# Patient Record
Sex: MALE | Race: WHITE | NOT HISPANIC OR LATINO | Employment: OTHER | URBAN - METROPOLITAN AREA
[De-identification: names, ages, dates, MRNs, and addresses within clinical notes are randomized per-mention and may not be internally consistent; named-entity substitution may affect disease eponyms.]

---

## 2017-01-06 ENCOUNTER — ALLSCRIPTS OFFICE VISIT (OUTPATIENT)
Dept: OTHER | Facility: OTHER | Age: 77
End: 2017-01-06

## 2017-01-12 ENCOUNTER — GENERIC CONVERSION - ENCOUNTER (OUTPATIENT)
Dept: OTHER | Facility: OTHER | Age: 77
End: 2017-01-12

## 2017-02-13 ENCOUNTER — GENERIC CONVERSION - ENCOUNTER (OUTPATIENT)
Dept: OTHER | Facility: OTHER | Age: 77
End: 2017-02-13

## 2017-03-02 ENCOUNTER — TRANSCRIBE ORDERS (OUTPATIENT)
Dept: ADMINISTRATIVE | Facility: HOSPITAL | Age: 77
End: 2017-03-02

## 2017-03-02 DIAGNOSIS — I71.4 ABDOMINAL AORTIC ANEURYSM (AAA) WITHOUT RUPTURE (HCC): Primary | ICD-10-CM

## 2017-03-03 ENCOUNTER — APPOINTMENT (OUTPATIENT)
Dept: LAB | Facility: HOSPITAL | Age: 77
End: 2017-03-03
Payer: MEDICARE

## 2017-03-03 ENCOUNTER — TRANSCRIBE ORDERS (OUTPATIENT)
Dept: ADMINISTRATIVE | Facility: HOSPITAL | Age: 77
End: 2017-03-03

## 2017-03-03 DIAGNOSIS — I71.4 ABDOMINAL AORTIC ANEURYSM WITHOUT RUPTURE (HCC): ICD-10-CM

## 2017-03-03 DIAGNOSIS — I71.4 ABDOMINAL AORTIC ANEURYSM WITHOUT RUPTURE (HCC): Primary | ICD-10-CM

## 2017-03-03 LAB
ANION GAP SERPL CALCULATED.3IONS-SCNC: 9 MMOL/L (ref 4–13)
BUN SERPL-MCNC: 10 MG/DL (ref 5–25)
CALCIUM SERPL-MCNC: 9.1 MG/DL (ref 8.3–10.1)
CHLORIDE SERPL-SCNC: 105 MMOL/L (ref 100–108)
CO2 SERPL-SCNC: 29 MMOL/L (ref 21–32)
CREAT SERPL-MCNC: 1.04 MG/DL (ref 0.6–1.3)
GFR SERPL CREATININE-BSD FRML MDRD: >60 ML/MIN/1.73SQ M
GLUCOSE SERPL-MCNC: 109 MG/DL (ref 65–140)
POTASSIUM SERPL-SCNC: 4.9 MMOL/L (ref 3.5–5.3)
SODIUM SERPL-SCNC: 143 MMOL/L (ref 136–145)

## 2017-03-03 PROCEDURE — 36415 COLL VENOUS BLD VENIPUNCTURE: CPT

## 2017-03-03 PROCEDURE — 80048 BASIC METABOLIC PNL TOTAL CA: CPT

## 2017-03-08 ENCOUNTER — HOSPITAL ENCOUNTER (OUTPATIENT)
Dept: CT IMAGING | Facility: HOSPITAL | Age: 77
Discharge: HOME | End: 2017-03-08
Payer: MEDICARE

## 2017-03-08 DIAGNOSIS — I71.4 ABDOMINAL AORTIC ANEURYSM (AAA) WITHOUT RUPTURE (HCC): ICD-10-CM

## 2017-03-08 PROCEDURE — 71275 CT ANGIOGRAPHY CHEST: CPT

## 2017-03-08 PROCEDURE — 74174 CTA ABD&PLVS W/CONTRAST: CPT

## 2017-03-08 RX ADMIN — IOHEXOL 100 ML: 350 INJECTION, SOLUTION INTRAVENOUS at 09:53

## 2017-03-21 ENCOUNTER — GENERIC CONVERSION - ENCOUNTER (OUTPATIENT)
Dept: OTHER | Facility: OTHER | Age: 77
End: 2017-03-21

## 2017-03-21 ENCOUNTER — TRANSCRIBE ORDERS (OUTPATIENT)
Dept: LAB | Facility: MEDICAL CENTER | Age: 77
End: 2017-03-21

## 2017-03-21 ENCOUNTER — ALLSCRIPTS OFFICE VISIT (OUTPATIENT)
Dept: OTHER | Facility: OTHER | Age: 77
End: 2017-03-21

## 2017-03-21 ENCOUNTER — TRANSCRIBE ORDERS (OUTPATIENT)
Dept: RADIOLOGY | Facility: MEDICAL CENTER | Age: 77
End: 2017-03-21

## 2017-03-21 ENCOUNTER — HOSPITAL ENCOUNTER (OUTPATIENT)
Dept: RADIOLOGY | Facility: MEDICAL CENTER | Age: 77
Discharge: HOME | End: 2017-03-21
Payer: MEDICARE

## 2017-03-21 DIAGNOSIS — S50.02XA CONTUSION OF LEFT ELBOW: ICD-10-CM

## 2017-03-21 PROCEDURE — 73080 X-RAY EXAM OF ELBOW: CPT

## 2017-04-04 ENCOUNTER — ALLSCRIPTS OFFICE VISIT (OUTPATIENT)
Dept: OTHER | Facility: OTHER | Age: 77
End: 2017-04-04

## 2017-04-07 ENCOUNTER — GENERIC CONVERSION - ENCOUNTER (OUTPATIENT)
Dept: OTHER | Facility: OTHER | Age: 77
End: 2017-04-07

## 2017-04-08 ENCOUNTER — TRANSCRIBE ORDERS (OUTPATIENT)
Dept: ADMINISTRATIVE | Facility: HOSPITAL | Age: 77
End: 2017-04-08

## 2017-04-08 ENCOUNTER — APPOINTMENT (OUTPATIENT)
Dept: LAB | Facility: HOSPITAL | Age: 77
End: 2017-04-08
Payer: MEDICARE

## 2017-04-08 DIAGNOSIS — R19.4 FREQUENT BOWEL MOVEMENTS: ICD-10-CM

## 2017-04-08 DIAGNOSIS — R19.4 FREQUENT BOWEL MOVEMENTS: Primary | ICD-10-CM

## 2017-04-08 LAB
BASOPHILS # BLD AUTO: 0.04 THOUSANDS/ΜL (ref 0–0.1)
BASOPHILS NFR BLD AUTO: 1 % (ref 0–1)
EOSINOPHIL # BLD AUTO: 0.07 THOUSAND/ΜL (ref 0–0.61)
EOSINOPHIL NFR BLD AUTO: 1 % (ref 0–6)
ERYTHROCYTE [DISTWIDTH] IN BLOOD BY AUTOMATED COUNT: 15.2 % (ref 11.6–15.1)
HCT VFR BLD AUTO: 48.8 % (ref 36.5–49.3)
HGB BLD-MCNC: 16.2 G/DL (ref 12–17)
LYMPHOCYTES # BLD AUTO: 1.26 THOUSANDS/ΜL (ref 0.6–4.47)
LYMPHOCYTES NFR BLD AUTO: 23 % (ref 14–44)
MCH RBC QN AUTO: 30.3 PG (ref 26.8–34.3)
MCHC RBC AUTO-ENTMCNC: 33.2 G/DL (ref 31.4–37.4)
MCV RBC AUTO: 91 FL (ref 82–98)
MONOCYTES # BLD AUTO: 0.5 THOUSAND/ΜL (ref 0.17–1.22)
MONOCYTES NFR BLD AUTO: 9 % (ref 4–12)
NEUTROPHILS # BLD AUTO: 3.53 THOUSANDS/ΜL (ref 1.85–7.62)
NEUTS SEG NFR BLD AUTO: 66 % (ref 43–75)
PLATELET # BLD AUTO: 135 THOUSANDS/UL (ref 149–390)
PMV BLD AUTO: 9.9 FL (ref 8.9–12.7)
RBC # BLD AUTO: 5.34 MILLION/UL (ref 3.88–5.62)
WBC # BLD AUTO: 5.4 THOUSAND/UL (ref 4.31–10.16)

## 2017-04-08 PROCEDURE — 36415 COLL VENOUS BLD VENIPUNCTURE: CPT

## 2017-04-08 PROCEDURE — 85025 COMPLETE CBC W/AUTO DIFF WBC: CPT

## 2017-05-15 ENCOUNTER — ANESTHESIA EVENT (OUTPATIENT)
Dept: GASTROENTEROLOGY | Facility: HOSPITAL | Age: 77
End: 2017-05-15
Payer: MEDICARE

## 2017-05-16 ENCOUNTER — ANESTHESIA (OUTPATIENT)
Dept: GASTROENTEROLOGY | Facility: HOSPITAL | Age: 77
End: 2017-05-16
Payer: MEDICARE

## 2017-05-16 ENCOUNTER — HOSPITAL ENCOUNTER (OUTPATIENT)
Facility: HOSPITAL | Age: 77
Setting detail: OUTPATIENT SURGERY
Discharge: HOME | End: 2017-05-16
Attending: INTERNAL MEDICINE | Admitting: INTERNAL MEDICINE
Payer: MEDICARE

## 2017-05-16 VITALS
OXYGEN SATURATION: 96 % | HEIGHT: 69 IN | HEART RATE: 80 BPM | TEMPERATURE: 97.6 F | DIASTOLIC BLOOD PRESSURE: 63 MMHG | RESPIRATION RATE: 20 BRPM | SYSTOLIC BLOOD PRESSURE: 106 MMHG | WEIGHT: 157 LBS | BODY MASS INDEX: 23.25 KG/M2

## 2017-05-16 DIAGNOSIS — R19.4 CHANGE IN BOWEL HABITS: ICD-10-CM

## 2017-05-16 PROCEDURE — 88305 TISSUE EXAM BY PATHOLOGIST: CPT | Performed by: INTERNAL MEDICINE

## 2017-05-16 RX ORDER — SODIUM CHLORIDE 9 MG/ML
125 INJECTION, SOLUTION INTRAVENOUS CONTINUOUS
Status: DISCONTINUED | OUTPATIENT
Start: 2017-05-16 | End: 2017-05-16 | Stop reason: HOSPADM

## 2017-05-16 RX ORDER — PROPOFOL 10 MG/ML
INJECTION, EMULSION INTRAVENOUS AS NEEDED
Status: DISCONTINUED | OUTPATIENT
Start: 2017-05-16 | End: 2017-05-16 | Stop reason: SURG

## 2017-05-16 RX ADMIN — PROPOFOL 20 MG: 10 INJECTION, EMULSION INTRAVENOUS at 14:31

## 2017-05-16 RX ADMIN — PROPOFOL 20 MG: 10 INJECTION, EMULSION INTRAVENOUS at 14:41

## 2017-05-16 RX ADMIN — PROPOFOL 80 MG: 10 INJECTION, EMULSION INTRAVENOUS at 14:17

## 2017-05-16 RX ADMIN — SODIUM CHLORIDE 125 ML/HR: 0.9 INJECTION, SOLUTION INTRAVENOUS at 13:22

## 2017-05-16 RX ADMIN — PROPOFOL 20 MG: 10 INJECTION, EMULSION INTRAVENOUS at 14:25

## 2017-05-16 RX ADMIN — PROPOFOL 20 MG: 10 INJECTION, EMULSION INTRAVENOUS at 14:53

## 2017-05-16 RX ADMIN — PROPOFOL 20 MG: 10 INJECTION, EMULSION INTRAVENOUS at 14:35

## 2017-05-16 RX ADMIN — PROPOFOL 20 MG: 10 INJECTION, EMULSION INTRAVENOUS at 14:45

## 2017-05-25 ENCOUNTER — GENERIC CONVERSION - ENCOUNTER (OUTPATIENT)
Dept: OTHER | Facility: OTHER | Age: 77
End: 2017-05-25

## 2017-05-26 ENCOUNTER — GENERIC CONVERSION - ENCOUNTER (OUTPATIENT)
Dept: OTHER | Facility: OTHER | Age: 77
End: 2017-05-26

## 2017-05-30 ENCOUNTER — GENERIC CONVERSION - ENCOUNTER (OUTPATIENT)
Dept: OTHER | Facility: OTHER | Age: 77
End: 2017-05-30

## 2017-05-30 ENCOUNTER — HOSPITAL ENCOUNTER (INPATIENT)
Facility: HOSPITAL | Age: 77
LOS: 5 days | Discharge: HOME | DRG: 871 | End: 2017-06-05
Attending: EMERGENCY MEDICINE | Admitting: INTERNAL MEDICINE
Payer: MEDICARE

## 2017-05-30 ENCOUNTER — APPOINTMENT (EMERGENCY)
Dept: RADIOLOGY | Facility: HOSPITAL | Age: 77
DRG: 871 | End: 2017-05-30
Payer: MEDICARE

## 2017-05-30 DIAGNOSIS — R93.89 ABNORMAL CT SCAN, CHEST: ICD-10-CM

## 2017-05-30 DIAGNOSIS — N28.9 ACUTE RENAL INSUFFICIENCY: ICD-10-CM

## 2017-05-30 DIAGNOSIS — A49.1 STREPTOCOCCUS PNEUMONIAE INFECTION: ICD-10-CM

## 2017-05-30 DIAGNOSIS — R05.9 COUGH: ICD-10-CM

## 2017-05-30 DIAGNOSIS — R31.9 HEMATURIA: ICD-10-CM

## 2017-05-30 DIAGNOSIS — J20.9 ACUTE TRACHEOBRONCHITIS: ICD-10-CM

## 2017-05-30 DIAGNOSIS — A41.9 SEVERE SEPSIS (HCC): Primary | ICD-10-CM

## 2017-05-30 DIAGNOSIS — R68.89 RIGORS: ICD-10-CM

## 2017-05-30 DIAGNOSIS — R65.20 SEVERE SEPSIS (HCC): Primary | ICD-10-CM

## 2017-05-30 LAB
ALBUMIN SERPL BCP-MCNC: 3.8 G/DL (ref 3.5–5)
ALP SERPL-CCNC: 103 U/L (ref 46–116)
ALT SERPL W P-5'-P-CCNC: 13 U/L (ref 12–78)
ANION GAP SERPL CALCULATED.3IONS-SCNC: 13 MMOL/L (ref 4–13)
APTT PPP: 30 SECONDS (ref 23–35)
AST SERPL W P-5'-P-CCNC: 18 U/L (ref 5–45)
BACTERIA UR QL AUTO: ABNORMAL /HPF
BASOPHILS # BLD AUTO: 0.04 THOUSANDS/ΜL (ref 0–0.1)
BASOPHILS NFR BLD AUTO: 0 % (ref 0–1)
BILIRUB SERPL-MCNC: 1.3 MG/DL (ref 0.2–1)
BILIRUB UR QL STRIP: NEGATIVE
BUN SERPL-MCNC: 15 MG/DL (ref 5–25)
CALCIUM SERPL-MCNC: 9.8 MG/DL (ref 8.3–10.1)
CHLORIDE SERPL-SCNC: 105 MMOL/L (ref 100–108)
CLARITY UR: CLEAR
CO2 SERPL-SCNC: 26 MMOL/L (ref 21–32)
COLOR UR: YELLOW
CREAT SERPL-MCNC: 1.36 MG/DL (ref 0.6–1.3)
EOSINOPHIL # BLD AUTO: 0.07 THOUSAND/ΜL (ref 0–0.61)
EOSINOPHIL NFR BLD AUTO: 1 % (ref 0–6)
ERYTHROCYTE [DISTWIDTH] IN BLOOD BY AUTOMATED COUNT: 14.5 % (ref 11.6–15.1)
GFR SERPL CREATININE-BSD FRML MDRD: 51.2 ML/MIN/1.73SQ M
GLUCOSE SERPL-MCNC: 104 MG/DL (ref 65–140)
GLUCOSE SERPL-MCNC: 96 MG/DL (ref 65–140)
GLUCOSE UR STRIP-MCNC: NEGATIVE MG/DL
HCT VFR BLD AUTO: 48.6 % (ref 36.5–49.3)
HGB BLD-MCNC: 16 G/DL (ref 12–17)
HGB UR QL STRIP.AUTO: ABNORMAL
INR PPP: 1.02 (ref 0.86–1.16)
KETONES UR STRIP-MCNC: NEGATIVE MG/DL
LACTATE SERPL-SCNC: 6 MMOL/L (ref 0.5–2)
LEUKOCYTE ESTERASE UR QL STRIP: NEGATIVE
LYMPHOCYTES # BLD AUTO: 1.46 THOUSANDS/ΜL (ref 0.6–4.47)
LYMPHOCYTES NFR BLD AUTO: 14 % (ref 14–44)
MCH RBC QN AUTO: 30.8 PG (ref 26.8–34.3)
MCHC RBC AUTO-ENTMCNC: 32.9 G/DL (ref 31.4–37.4)
MCV RBC AUTO: 94 FL (ref 82–98)
MONOCYTES # BLD AUTO: 0.95 THOUSAND/ΜL (ref 0.17–1.22)
MONOCYTES NFR BLD AUTO: 9 % (ref 4–12)
NEUTROPHILS # BLD AUTO: 7.74 THOUSANDS/ΜL (ref 1.85–7.62)
NEUTS SEG NFR BLD AUTO: 76 % (ref 43–75)
NITRITE UR QL STRIP: NEGATIVE
NON-SQ EPI CELLS URNS QL MICRO: ABNORMAL /HPF
PH UR STRIP.AUTO: 7.5 [PH] (ref 4.5–8)
PLATELET # BLD AUTO: 153 THOUSANDS/UL (ref 149–390)
PMV BLD AUTO: 9.7 FL (ref 8.9–12.7)
POTASSIUM SERPL-SCNC: 5 MMOL/L (ref 3.5–5.3)
PROT SERPL-MCNC: 7.4 G/DL (ref 6.4–8.2)
PROT UR STRIP-MCNC: NEGATIVE MG/DL
PROTHROMBIN TIME: 13.3 SECONDS (ref 12.1–14.4)
RBC # BLD AUTO: 5.2 MILLION/UL (ref 3.88–5.62)
RBC #/AREA URNS AUTO: ABNORMAL /HPF
SODIUM SERPL-SCNC: 144 MMOL/L (ref 136–145)
SP GR UR STRIP.AUTO: 1.01 (ref 1–1.03)
UROBILINOGEN UR QL STRIP.AUTO: 0.2 E.U./DL
WBC # BLD AUTO: 10.26 THOUSAND/UL (ref 4.31–10.16)
WBC #/AREA URNS AUTO: ABNORMAL /HPF

## 2017-05-30 PROCEDURE — 82948 REAGENT STRIP/BLOOD GLUCOSE: CPT

## 2017-05-30 PROCEDURE — 81001 URINALYSIS AUTO W/SCOPE: CPT | Performed by: EMERGENCY MEDICINE

## 2017-05-30 PROCEDURE — 96361 HYDRATE IV INFUSION ADD-ON: CPT

## 2017-05-30 PROCEDURE — 85025 COMPLETE CBC W/AUTO DIFF WBC: CPT | Performed by: EMERGENCY MEDICINE

## 2017-05-30 PROCEDURE — 83605 ASSAY OF LACTIC ACID: CPT | Performed by: EMERGENCY MEDICINE

## 2017-05-30 PROCEDURE — 85730 THROMBOPLASTIN TIME PARTIAL: CPT | Performed by: EMERGENCY MEDICINE

## 2017-05-30 PROCEDURE — 36415 COLL VENOUS BLD VENIPUNCTURE: CPT | Performed by: EMERGENCY MEDICINE

## 2017-05-30 PROCEDURE — 80053 COMPREHEN METABOLIC PANEL: CPT | Performed by: EMERGENCY MEDICINE

## 2017-05-30 PROCEDURE — 93005 ELECTROCARDIOGRAM TRACING: CPT | Performed by: EMERGENCY MEDICINE

## 2017-05-30 PROCEDURE — 71020 HB CHEST X-RAY 2VW FRONTAL&LATL: CPT

## 2017-05-30 PROCEDURE — 87040 BLOOD CULTURE FOR BACTERIA: CPT | Performed by: EMERGENCY MEDICINE

## 2017-05-30 PROCEDURE — 85610 PROTHROMBIN TIME: CPT | Performed by: EMERGENCY MEDICINE

## 2017-05-30 RX ORDER — ACETAMINOPHEN 325 MG/1
TABLET ORAL
Status: COMPLETED
Start: 2017-05-30 | End: 2017-05-30

## 2017-05-30 RX ORDER — ACETAMINOPHEN 325 MG/1
650 TABLET ORAL ONCE
Status: COMPLETED | OUTPATIENT
Start: 2017-05-30 | End: 2017-05-30

## 2017-05-30 RX ADMIN — SODIUM CHLORIDE 1000 ML: 0.9 INJECTION, SOLUTION INTRAVENOUS at 23:24

## 2017-05-30 RX ADMIN — ACETAMINOPHEN 650 MG: 325 TABLET ORAL at 23:20

## 2017-05-30 RX ADMIN — ACETAMINOPHEN 650 MG: 325 TABLET, FILM COATED ORAL at 23:20

## 2017-05-31 ENCOUNTER — APPOINTMENT (INPATIENT)
Dept: OCCUPATIONAL THERAPY | Facility: HOSPITAL | Age: 77
DRG: 871 | End: 2017-05-31
Payer: MEDICARE

## 2017-05-31 ENCOUNTER — APPOINTMENT (INPATIENT)
Dept: PHYSICAL THERAPY | Facility: HOSPITAL | Age: 77
DRG: 871 | End: 2017-05-31
Payer: MEDICARE

## 2017-05-31 ENCOUNTER — APPOINTMENT (INPATIENT)
Dept: CT IMAGING | Facility: HOSPITAL | Age: 77
DRG: 871 | End: 2017-05-31
Payer: MEDICARE

## 2017-05-31 PROBLEM — E87.2 LACTIC ACIDOSIS: Status: ACTIVE | Noted: 2017-05-31

## 2017-05-31 PROBLEM — E80.6 HYPERBILIRUBINEMIA: Status: ACTIVE | Noted: 2017-05-31

## 2017-05-31 PROBLEM — R31.29 MICROSCOPIC HEMATURIA: Status: ACTIVE | Noted: 2017-05-31

## 2017-05-31 PROBLEM — N17.9 ACUTE KIDNEY INJURY (HCC): Status: ACTIVE | Noted: 2017-05-31

## 2017-05-31 PROBLEM — N28.9 ACUTE RENAL INSUFFICIENCY: Status: ACTIVE | Noted: 2017-05-31

## 2017-05-31 LAB
ANION GAP SERPL CALCULATED.3IONS-SCNC: 10 MMOL/L (ref 4–13)
ATRIAL RATE: 94 BPM
BUN SERPL-MCNC: 15 MG/DL (ref 5–25)
CALCIUM SERPL-MCNC: 8.1 MG/DL (ref 8.3–10.1)
CHLORIDE SERPL-SCNC: 107 MMOL/L (ref 100–108)
CO2 SERPL-SCNC: 22 MMOL/L (ref 21–32)
CREAT SERPL-MCNC: 1.12 MG/DL (ref 0.6–1.3)
ERYTHROCYTE [DISTWIDTH] IN BLOOD BY AUTOMATED COUNT: 14.5 % (ref 11.6–15.1)
GFR SERPL CREATININE-BSD FRML MDRD: >60 ML/MIN/1.73SQ M
GLUCOSE SERPL-MCNC: 108 MG/DL (ref 65–140)
HCT VFR BLD AUTO: 42.4 % (ref 36.5–49.3)
HGB BLD-MCNC: 13.9 G/DL (ref 12–17)
L PNEUMO1 AG UR QL IA.RAPID: NEGATIVE
LACTATE SERPL-SCNC: 1.4 MMOL/L (ref 0.5–2)
MAGNESIUM SERPL-MCNC: 1.6 MG/DL (ref 1.6–2.6)
MCH RBC QN AUTO: 30.5 PG (ref 26.8–34.3)
MCHC RBC AUTO-ENTMCNC: 32.8 G/DL (ref 31.4–37.4)
MCV RBC AUTO: 93 FL (ref 82–98)
P AXIS: 68 DEGREES
PLATELET # BLD AUTO: 135 THOUSANDS/UL (ref 149–390)
PMV BLD AUTO: 10.2 FL (ref 8.9–12.7)
POTASSIUM SERPL-SCNC: 3.7 MMOL/L (ref 3.5–5.3)
PR INTERVAL: 184 MS
QRS AXIS: 65 DEGREES
QRSD INTERVAL: 88 MS
QT INTERVAL: 384 MS
QTC INTERVAL: 480 MS
RBC # BLD AUTO: 4.55 MILLION/UL (ref 3.88–5.62)
S PNEUM AG UR QL: POSITIVE
SODIUM SERPL-SCNC: 139 MMOL/L (ref 136–145)
T WAVE AXIS: 83 DEGREES
VENTRICULAR RATE: 94 BPM
WBC # BLD AUTO: 9.22 THOUSAND/UL (ref 4.31–10.16)

## 2017-05-31 PROCEDURE — 97163 PT EVAL HIGH COMPLEX 45 MIN: CPT | Performed by: PHYSICAL THERAPIST

## 2017-05-31 PROCEDURE — 96365 THER/PROPH/DIAG IV INF INIT: CPT

## 2017-05-31 PROCEDURE — 83605 ASSAY OF LACTIC ACID: CPT | Performed by: PHYSICIAN ASSISTANT

## 2017-05-31 PROCEDURE — 71260 CT THORAX DX C+: CPT

## 2017-05-31 PROCEDURE — 97116 GAIT TRAINING THERAPY: CPT | Performed by: PHYSICAL THERAPIST

## 2017-05-31 PROCEDURE — 87798 DETECT AGENT NOS DNA AMP: CPT | Performed by: INTERNAL MEDICINE

## 2017-05-31 PROCEDURE — 80048 BASIC METABOLIC PNL TOTAL CA: CPT | Performed by: PHYSICIAN ASSISTANT

## 2017-05-31 PROCEDURE — G8978 MOBILITY CURRENT STATUS: HCPCS | Performed by: PHYSICAL THERAPIST

## 2017-05-31 PROCEDURE — G8988 SELF CARE GOAL STATUS: HCPCS

## 2017-05-31 PROCEDURE — 97167 OT EVAL HIGH COMPLEX 60 MIN: CPT

## 2017-05-31 PROCEDURE — 94760 N-INVAS EAR/PLS OXIMETRY 1: CPT

## 2017-05-31 PROCEDURE — G8987 SELF CARE CURRENT STATUS: HCPCS

## 2017-05-31 PROCEDURE — 99285 EMERGENCY DEPT VISIT HI MDM: CPT

## 2017-05-31 PROCEDURE — 85027 COMPLETE CBC AUTOMATED: CPT | Performed by: PHYSICIAN ASSISTANT

## 2017-05-31 PROCEDURE — G8979 MOBILITY GOAL STATUS: HCPCS | Performed by: PHYSICAL THERAPIST

## 2017-05-31 PROCEDURE — 83735 ASSAY OF MAGNESIUM: CPT | Performed by: PHYSICIAN ASSISTANT

## 2017-05-31 PROCEDURE — 87449 NOS EACH ORGANISM AG IA: CPT | Performed by: INTERNAL MEDICINE

## 2017-05-31 PROCEDURE — 94640 AIRWAY INHALATION TREATMENT: CPT

## 2017-05-31 RX ORDER — MONTELUKAST SODIUM 10 MG/1
10 TABLET ORAL DAILY
Status: DISCONTINUED | OUTPATIENT
Start: 2017-05-31 | End: 2017-06-05 | Stop reason: HOSPADM

## 2017-05-31 RX ORDER — MAGNESIUM HYDROXIDE/ALUMINUM HYDROXICE/SIMETHICONE 120; 1200; 1200 MG/30ML; MG/30ML; MG/30ML
30 SUSPENSION ORAL EVERY 6 HOURS PRN
Status: DISCONTINUED | OUTPATIENT
Start: 2017-05-31 | End: 2017-06-05 | Stop reason: HOSPADM

## 2017-05-31 RX ORDER — HEPARIN SODIUM 5000 [USP'U]/ML
5000 INJECTION, SOLUTION INTRAVENOUS; SUBCUTANEOUS EVERY 8 HOURS SCHEDULED
Status: DISCONTINUED | OUTPATIENT
Start: 2017-05-31 | End: 2017-06-05 | Stop reason: HOSPADM

## 2017-05-31 RX ORDER — FLUTICASONE FUROATE AND VILANTEROL 100; 25 UG/1; UG/1
1 POWDER RESPIRATORY (INHALATION) DAILY
Status: DISCONTINUED | OUTPATIENT
Start: 2017-05-31 | End: 2017-06-05 | Stop reason: HOSPADM

## 2017-05-31 RX ORDER — TAMSULOSIN HYDROCHLORIDE 0.4 MG/1
0.4 CAPSULE ORAL DAILY
Status: DISCONTINUED | OUTPATIENT
Start: 2017-05-31 | End: 2017-06-05 | Stop reason: HOSPADM

## 2017-05-31 RX ORDER — ATORVASTATIN CALCIUM 10 MG/1
10 TABLET, FILM COATED ORAL
Status: DISCONTINUED | OUTPATIENT
Start: 2017-05-31 | End: 2017-06-05 | Stop reason: HOSPADM

## 2017-05-31 RX ORDER — SODIUM CHLORIDE 9 MG/ML
100 INJECTION, SOLUTION INTRAVENOUS CONTINUOUS
Status: DISCONTINUED | OUTPATIENT
Start: 2017-05-31 | End: 2017-06-05 | Stop reason: HOSPADM

## 2017-05-31 RX ORDER — PANTOPRAZOLE SODIUM 40 MG/1
40 TABLET, DELAYED RELEASE ORAL
Status: DISCONTINUED | OUTPATIENT
Start: 2017-05-31 | End: 2017-06-05 | Stop reason: HOSPADM

## 2017-05-31 RX ORDER — SODIUM CHLORIDE 9 MG/ML
125 INJECTION, SOLUTION INTRAVENOUS CONTINUOUS
Status: DISCONTINUED | OUTPATIENT
Start: 2017-05-31 | End: 2017-05-31

## 2017-05-31 RX ORDER — DONEPEZIL HYDROCHLORIDE 5 MG/1
10 TABLET, FILM COATED ORAL
Status: DISCONTINUED | OUTPATIENT
Start: 2017-05-31 | End: 2017-06-05 | Stop reason: HOSPADM

## 2017-05-31 RX ORDER — AZTREONAM 1 G/1
INJECTION, POWDER, LYOPHILIZED, FOR SOLUTION INTRAMUSCULAR; INTRAVENOUS
Status: COMPLETED
Start: 2017-05-31 | End: 2017-05-31

## 2017-05-31 RX ORDER — POTASSIUM CHLORIDE 20 MEQ/1
40 TABLET, EXTENDED RELEASE ORAL ONCE
Status: COMPLETED | OUTPATIENT
Start: 2017-05-31 | End: 2017-05-31

## 2017-05-31 RX ORDER — ACETAMINOPHEN 325 MG/1
650 TABLET ORAL EVERY 6 HOURS PRN
Status: DISCONTINUED | OUTPATIENT
Start: 2017-05-31 | End: 2017-05-31

## 2017-05-31 RX ORDER — LEVALBUTEROL 1.25 MG/.5ML
1.25 SOLUTION, CONCENTRATE RESPIRATORY (INHALATION)
Status: DISCONTINUED | OUTPATIENT
Start: 2017-05-31 | End: 2017-06-01

## 2017-05-31 RX ORDER — METOPROLOL SUCCINATE 25 MG/1
25 TABLET, EXTENDED RELEASE ORAL DAILY
Status: DISCONTINUED | OUTPATIENT
Start: 2017-05-31 | End: 2017-06-05 | Stop reason: HOSPADM

## 2017-05-31 RX ORDER — LORATADINE 10 MG/1
10 TABLET ORAL DAILY
Status: DISCONTINUED | OUTPATIENT
Start: 2017-05-31 | End: 2017-06-05 | Stop reason: HOSPADM

## 2017-05-31 RX ORDER — ACETAMINOPHEN 325 MG/1
650 TABLET ORAL EVERY 6 HOURS PRN
Status: DISCONTINUED | OUTPATIENT
Start: 2017-05-31 | End: 2017-06-05 | Stop reason: HOSPADM

## 2017-05-31 RX ORDER — MAGNESIUM SULFATE HEPTAHYDRATE 40 MG/ML
2 INJECTION, SOLUTION INTRAVENOUS ONCE
Status: COMPLETED | OUTPATIENT
Start: 2017-05-31 | End: 2017-06-02

## 2017-05-31 RX ORDER — MELATONIN
2000 DAILY
Status: DISCONTINUED | OUTPATIENT
Start: 2017-05-31 | End: 2017-06-02

## 2017-05-31 RX ORDER — LEVALBUTEROL 1.25 MG/.5ML
SOLUTION, CONCENTRATE RESPIRATORY (INHALATION)
Status: COMPLETED
Start: 2017-05-31 | End: 2017-05-31

## 2017-05-31 RX ORDER — ONDANSETRON 2 MG/ML
4 INJECTION INTRAMUSCULAR; INTRAVENOUS EVERY 6 HOURS PRN
Status: DISCONTINUED | OUTPATIENT
Start: 2017-05-31 | End: 2017-06-05 | Stop reason: HOSPADM

## 2017-05-31 RX ORDER — ASPIRIN 81 MG/1
81 TABLET, CHEWABLE ORAL DAILY
Status: DISCONTINUED | OUTPATIENT
Start: 2017-05-31 | End: 2017-06-05 | Stop reason: HOSPADM

## 2017-05-31 RX ORDER — AZTREONAM 1 G/1
1000 INJECTION, POWDER, LYOPHILIZED, FOR SOLUTION INTRAMUSCULAR; INTRAVENOUS EVERY 8 HOURS
Status: DISCONTINUED | OUTPATIENT
Start: 2017-05-31 | End: 2017-05-31 | Stop reason: CLARIF

## 2017-05-31 RX ADMIN — DONEPEZIL HYDROCHLORIDE 10 MG: 5 TABLET, FILM COATED ORAL at 22:06

## 2017-05-31 RX ADMIN — DONEPEZIL HYDROCHLORIDE 10 MG: 5 TABLET, FILM COATED ORAL at 01:52

## 2017-05-31 RX ADMIN — IPRATROPIUM BROMIDE 0.5 MG: 0.5 SOLUTION RESPIRATORY (INHALATION) at 05:15

## 2017-05-31 RX ADMIN — MAGNESIUM SULFATE HEPTAHYDRATE 2 G: 40 INJECTION, SOLUTION INTRAVENOUS at 15:22

## 2017-05-31 RX ADMIN — MONTELUKAST SODIUM 10 MG: 10 TABLET, FILM COATED ORAL at 08:55

## 2017-05-31 RX ADMIN — ACETAMINOPHEN 650 MG: 325 TABLET, FILM COATED ORAL at 03:32

## 2017-05-31 RX ADMIN — SODIUM CHLORIDE 100 ML/HR: 0.9 INJECTION, SOLUTION INTRAVENOUS at 08:56

## 2017-05-31 RX ADMIN — ASPIRIN 81 MG 81 MG: 81 TABLET ORAL at 08:55

## 2017-05-31 RX ADMIN — SODIUM CHLORIDE 125 ML/HR: 0.9 INJECTION, SOLUTION INTRAVENOUS at 02:22

## 2017-05-31 RX ADMIN — LEVALBUTEROL 1.25 MG: 1.25 SOLUTION, CONCENTRATE RESPIRATORY (INHALATION) at 20:57

## 2017-05-31 RX ADMIN — ATORVASTATIN CALCIUM 10 MG: 10 TABLET, FILM COATED ORAL at 15:59

## 2017-05-31 RX ADMIN — HEPARIN SODIUM 5000 UNITS: 5000 INJECTION, SOLUTION INTRAVENOUS; SUBCUTANEOUS at 22:06

## 2017-05-31 RX ADMIN — SODIUM CHLORIDE 100 ML/HR: 0.9 INJECTION, SOLUTION INTRAVENOUS at 23:15

## 2017-05-31 RX ADMIN — CEFTRIAXONE 1000 MG: 1 INJECTION, POWDER, FOR SOLUTION INTRAMUSCULAR; INTRAVENOUS at 11:45

## 2017-05-31 RX ADMIN — VANCOMYCIN HYDROCHLORIDE 1250 MG: 1 INJECTION, POWDER, LYOPHILIZED, FOR SOLUTION INTRAVENOUS at 02:25

## 2017-05-31 RX ADMIN — METOPROLOL SUCCINATE 25 MG: 25 TABLET, EXTENDED RELEASE ORAL at 08:55

## 2017-05-31 RX ADMIN — CHOLECALCIFEROL TAB 25 MCG (1000 UNIT) 2000 UNITS: 25 TAB at 08:55

## 2017-05-31 RX ADMIN — LEVALBUTEROL 1.25 MG: 1.25 SOLUTION, CONCENTRATE RESPIRATORY (INHALATION) at 05:16

## 2017-05-31 RX ADMIN — AZITHROMYCIN MONOHYDRATE 500 MG: 500 INJECTION, POWDER, LYOPHILIZED, FOR SOLUTION INTRAVENOUS at 13:19

## 2017-05-31 RX ADMIN — POTASSIUM CHLORIDE 40 MEQ: 20 TABLET, EXTENDED RELEASE ORAL at 11:45

## 2017-05-31 RX ADMIN — SODIUM CHLORIDE 1271 ML: 0.9 INJECTION, SOLUTION INTRAVENOUS at 00:25

## 2017-05-31 RX ADMIN — IOHEXOL 85 ML: 350 INJECTION, SOLUTION INTRAVENOUS at 14:37

## 2017-05-31 RX ADMIN — LORATADINE 10 MG: 10 TABLET ORAL at 08:55

## 2017-05-31 RX ADMIN — ACETAMINOPHEN 650 MG: 325 TABLET, FILM COATED ORAL at 15:59

## 2017-05-31 RX ADMIN — HEPARIN SODIUM 5000 UNITS: 5000 INJECTION, SOLUTION INTRAVENOUS; SUBCUTANEOUS at 01:53

## 2017-05-31 RX ADMIN — AZTREONAM 2000 MG: 1 INJECTION, POWDER, LYOPHILIZED, FOR SOLUTION INTRAMUSCULAR; INTRAVENOUS at 00:12

## 2017-05-31 RX ADMIN — HEPARIN SODIUM 5000 UNITS: 5000 INJECTION, SOLUTION INTRAVENOUS; SUBCUTANEOUS at 13:19

## 2017-05-31 RX ADMIN — HEPARIN SODIUM 5000 UNITS: 5000 INJECTION, SOLUTION INTRAVENOUS; SUBCUTANEOUS at 05:20

## 2017-05-31 RX ADMIN — TAMSULOSIN HYDROCHLORIDE 0.4 MG: 0.4 CAPSULE ORAL at 08:56

## 2017-05-31 RX ADMIN — PANTOPRAZOLE SODIUM 40 MG: 40 TABLET, DELAYED RELEASE ORAL at 05:20

## 2017-05-31 RX ADMIN — IPRATROPIUM BROMIDE 0.5 MG: 0.5 SOLUTION RESPIRATORY (INHALATION) at 20:57

## 2017-06-01 ENCOUNTER — APPOINTMENT (INPATIENT)
Dept: ULTRASOUND IMAGING | Facility: HOSPITAL | Age: 77
DRG: 871 | End: 2017-06-01
Payer: MEDICARE

## 2017-06-01 ENCOUNTER — APPOINTMENT (INPATIENT)
Dept: PHYSICAL THERAPY | Facility: HOSPITAL | Age: 77
DRG: 871 | End: 2017-06-01
Payer: MEDICARE

## 2017-06-01 ENCOUNTER — APPOINTMENT (INPATIENT)
Dept: OCCUPATIONAL THERAPY | Facility: HOSPITAL | Age: 77
DRG: 871 | End: 2017-06-01
Payer: MEDICARE

## 2017-06-01 ENCOUNTER — GENERIC CONVERSION - ENCOUNTER (OUTPATIENT)
Dept: OTHER | Facility: OTHER | Age: 77
End: 2017-06-01

## 2017-06-01 PROBLEM — E83.39 HYPOPHOSPHATEMIA: Status: ACTIVE | Noted: 2017-06-01

## 2017-06-01 PROBLEM — A49.1 STREPTOCOCCUS PNEUMONIAE INFECTION: Status: ACTIVE | Noted: 2017-06-01

## 2017-06-01 PROBLEM — R93.89 ABNORMAL CT SCAN, CHEST: Status: ACTIVE | Noted: 2017-06-01

## 2017-06-01 PROBLEM — I71.4 ABDOMINAL AORTIC ANEURYSM (HCC): Status: ACTIVE | Noted: 2017-06-01

## 2017-06-01 LAB
25(OH)D3 SERPL-MCNC: 25.9 NG/ML (ref 30–100)
ALBUMIN SERPL BCP-MCNC: 2.7 G/DL (ref 3.5–5)
ALP SERPL-CCNC: 70 U/L (ref 46–116)
ALT SERPL W P-5'-P-CCNC: 12 U/L (ref 12–78)
ANION GAP SERPL CALCULATED.3IONS-SCNC: 10 MMOL/L (ref 4–13)
AST SERPL W P-5'-P-CCNC: 17 U/L (ref 5–45)
BASOPHILS # BLD AUTO: 0.03 THOUSANDS/ΜL (ref 0–0.1)
BASOPHILS NFR BLD AUTO: 0 % (ref 0–1)
BILIRUB SERPL-MCNC: 2 MG/DL (ref 0.2–1)
BUN SERPL-MCNC: 10 MG/DL (ref 5–25)
CA-I BLD-SCNC: 1.15 MMOL/L (ref 1.12–1.32)
CALCIUM SERPL-MCNC: 8.1 MG/DL (ref 8.3–10.1)
CHLORIDE SERPL-SCNC: 108 MMOL/L (ref 100–108)
CO2 SERPL-SCNC: 20 MMOL/L (ref 21–32)
CREAT SERPL-MCNC: 0.97 MG/DL (ref 0.6–1.3)
EOSINOPHIL # BLD AUTO: 0.02 THOUSAND/ΜL (ref 0–0.61)
EOSINOPHIL NFR BLD AUTO: 0 % (ref 0–6)
ERYTHROCYTE [DISTWIDTH] IN BLOOD BY AUTOMATED COUNT: 14.7 % (ref 11.6–15.1)
FLUAV AG SPEC QL: NORMAL
FLUBV AG SPEC QL: NORMAL
GFR SERPL CREATININE-BSD FRML MDRD: >60 ML/MIN/1.73SQ M
GLUCOSE SERPL-MCNC: 103 MG/DL (ref 65–140)
HCT VFR BLD AUTO: 40 % (ref 36.5–49.3)
HGB BLD-MCNC: 13.3 G/DL (ref 12–17)
LACTATE SERPL-SCNC: 1 MMOL/L (ref 0.5–2)
LYMPHOCYTES # BLD AUTO: 1.05 THOUSANDS/ΜL (ref 0.6–4.47)
LYMPHOCYTES NFR BLD AUTO: 14 % (ref 14–44)
MAGNESIUM SERPL-MCNC: 2.1 MG/DL (ref 1.6–2.6)
MCH RBC QN AUTO: 30.8 PG (ref 26.8–34.3)
MCHC RBC AUTO-ENTMCNC: 33.3 G/DL (ref 31.4–37.4)
MCV RBC AUTO: 93 FL (ref 82–98)
MONOCYTES # BLD AUTO: 0.44 THOUSAND/ΜL (ref 0.17–1.22)
MONOCYTES NFR BLD AUTO: 6 % (ref 4–12)
NEUTROPHILS # BLD AUTO: 5.99 THOUSANDS/ΜL (ref 1.85–7.62)
NEUTS SEG NFR BLD AUTO: 80 % (ref 43–75)
NT-PROBNP SERPL-MCNC: 742 PG/ML
PHOSPHATE SERPL-MCNC: 1.9 MG/DL (ref 2.3–4.1)
PLATELET # BLD AUTO: 110 THOUSANDS/UL (ref 149–390)
PMV BLD AUTO: 10.2 FL (ref 8.9–12.7)
POTASSIUM SERPL-SCNC: 3.7 MMOL/L (ref 3.5–5.3)
PROT SERPL-MCNC: 6.1 G/DL (ref 6.4–8.2)
RBC # BLD AUTO: 4.32 MILLION/UL (ref 3.88–5.62)
RSV B RNA SPEC QL NAA+PROBE: NORMAL
SODIUM SERPL-SCNC: 138 MMOL/L (ref 136–145)
TSH SERPL DL<=0.05 MIU/L-ACNC: 2.14 UIU/ML (ref 0.36–3.74)
WBC # BLD AUTO: 7.53 THOUSAND/UL (ref 4.31–10.16)

## 2017-06-01 PROCEDURE — 97110 THERAPEUTIC EXERCISES: CPT

## 2017-06-01 PROCEDURE — 84100 ASSAY OF PHOSPHORUS: CPT | Performed by: INTERNAL MEDICINE

## 2017-06-01 PROCEDURE — 94760 N-INVAS EAR/PLS OXIMETRY 1: CPT

## 2017-06-01 PROCEDURE — 80053 COMPREHEN METABOLIC PANEL: CPT | Performed by: INTERNAL MEDICINE

## 2017-06-01 PROCEDURE — 83735 ASSAY OF MAGNESIUM: CPT | Performed by: INTERNAL MEDICINE

## 2017-06-01 PROCEDURE — 94640 AIRWAY INHALATION TREATMENT: CPT

## 2017-06-01 PROCEDURE — 94762 N-INVAS EAR/PLS OXIMTRY CONT: CPT

## 2017-06-01 PROCEDURE — 83880 ASSAY OF NATRIURETIC PEPTIDE: CPT | Performed by: INTERNAL MEDICINE

## 2017-06-01 PROCEDURE — 84443 ASSAY THYROID STIM HORMONE: CPT | Performed by: INTERNAL MEDICINE

## 2017-06-01 PROCEDURE — 76770 US EXAM ABDO BACK WALL COMP: CPT

## 2017-06-01 PROCEDURE — 97530 THERAPEUTIC ACTIVITIES: CPT

## 2017-06-01 PROCEDURE — 82306 VITAMIN D 25 HYDROXY: CPT | Performed by: INTERNAL MEDICINE

## 2017-06-01 PROCEDURE — 97116 GAIT TRAINING THERAPY: CPT

## 2017-06-01 PROCEDURE — 82330 ASSAY OF CALCIUM: CPT | Performed by: INTERNAL MEDICINE

## 2017-06-01 PROCEDURE — 83605 ASSAY OF LACTIC ACID: CPT | Performed by: INTERNAL MEDICINE

## 2017-06-01 PROCEDURE — 85025 COMPLETE CBC W/AUTO DIFF WBC: CPT | Performed by: INTERNAL MEDICINE

## 2017-06-01 RX ADMIN — ACETAMINOPHEN 650 MG: 325 TABLET, FILM COATED ORAL at 01:39

## 2017-06-01 RX ADMIN — DIBASIC SODIUM PHOSPHATE, MONOBASIC POTASSIUM PHOSPHATE AND MONOBASIC SODIUM PHOSPHATE 2 TABLET: 852; 155; 130 TABLET ORAL at 12:11

## 2017-06-01 RX ADMIN — LEVALBUTEROL 1.25 MG: 1.25 SOLUTION, CONCENTRATE RESPIRATORY (INHALATION) at 20:31

## 2017-06-01 RX ADMIN — ATORVASTATIN CALCIUM 10 MG: 10 TABLET, FILM COATED ORAL at 17:06

## 2017-06-01 RX ADMIN — ASPIRIN 81 MG 81 MG: 81 TABLET ORAL at 10:24

## 2017-06-01 RX ADMIN — LEVALBUTEROL 1.25 MG: 1.25 SOLUTION, CONCENTRATE RESPIRATORY (INHALATION) at 14:12

## 2017-06-01 RX ADMIN — HEPARIN SODIUM 5000 UNITS: 5000 INJECTION, SOLUTION INTRAVENOUS; SUBCUTANEOUS at 14:13

## 2017-06-01 RX ADMIN — LORATADINE 10 MG: 10 TABLET ORAL at 10:26

## 2017-06-01 RX ADMIN — SODIUM CHLORIDE 100 ML/HR: 0.9 INJECTION, SOLUTION INTRAVENOUS at 21:57

## 2017-06-01 RX ADMIN — IPRATROPIUM BROMIDE 0.5 MG: 0.5 SOLUTION RESPIRATORY (INHALATION) at 14:12

## 2017-06-01 RX ADMIN — CEFTRIAXONE 1000 MG: 1 INJECTION, POWDER, FOR SOLUTION INTRAMUSCULAR; INTRAVENOUS at 11:30

## 2017-06-01 RX ADMIN — MONTELUKAST SODIUM 10 MG: 10 TABLET, FILM COATED ORAL at 10:21

## 2017-06-01 RX ADMIN — METOPROLOL SUCCINATE 25 MG: 25 TABLET, EXTENDED RELEASE ORAL at 10:24

## 2017-06-01 RX ADMIN — IPRATROPIUM BROMIDE 0.5 MG: 0.5 SOLUTION RESPIRATORY (INHALATION) at 07:42

## 2017-06-01 RX ADMIN — IPRATROPIUM BROMIDE 0.5 MG: 0.5 SOLUTION RESPIRATORY (INHALATION) at 20:31

## 2017-06-01 RX ADMIN — ACETAMINOPHEN 650 MG: 325 TABLET, FILM COATED ORAL at 18:33

## 2017-06-01 RX ADMIN — HEPARIN SODIUM 5000 UNITS: 5000 INJECTION, SOLUTION INTRAVENOUS; SUBCUTANEOUS at 05:24

## 2017-06-01 RX ADMIN — LEVALBUTEROL 1.25 MG: 1.25 SOLUTION, CONCENTRATE RESPIRATORY (INHALATION) at 07:42

## 2017-06-01 RX ADMIN — HEPARIN SODIUM 5000 UNITS: 5000 INJECTION, SOLUTION INTRAVENOUS; SUBCUTANEOUS at 21:57

## 2017-06-01 RX ADMIN — AZITHROMYCIN MONOHYDRATE 500 MG: 500 INJECTION, POWDER, LYOPHILIZED, FOR SOLUTION INTRAVENOUS at 12:11

## 2017-06-01 RX ADMIN — PANTOPRAZOLE SODIUM 40 MG: 40 TABLET, DELAYED RELEASE ORAL at 05:24

## 2017-06-01 RX ADMIN — DONEPEZIL HYDROCHLORIDE 10 MG: 5 TABLET, FILM COATED ORAL at 21:57

## 2017-06-01 RX ADMIN — TAMSULOSIN HYDROCHLORIDE 0.4 MG: 0.4 CAPSULE ORAL at 10:23

## 2017-06-01 RX ADMIN — CHOLECALCIFEROL TAB 25 MCG (1000 UNIT) 2000 UNITS: 25 TAB at 10:22

## 2017-06-01 RX ADMIN — SODIUM CHLORIDE 100 ML/HR: 0.9 INJECTION, SOLUTION INTRAVENOUS at 10:20

## 2017-06-02 ENCOUNTER — APPOINTMENT (INPATIENT)
Dept: ULTRASOUND IMAGING | Facility: HOSPITAL | Age: 77
DRG: 871 | End: 2017-06-02
Payer: MEDICARE

## 2017-06-02 ENCOUNTER — APPOINTMENT (INPATIENT)
Dept: PHYSICAL THERAPY | Facility: HOSPITAL | Age: 77
DRG: 871 | End: 2017-06-02
Payer: MEDICARE

## 2017-06-02 ENCOUNTER — APPOINTMENT (INPATIENT)
Dept: OCCUPATIONAL THERAPY | Facility: HOSPITAL | Age: 77
DRG: 871 | End: 2017-06-02
Payer: MEDICARE

## 2017-06-02 PROBLEM — E55.9 VITAMIN D INSUFFICIENCY: Status: ACTIVE | Noted: 2017-06-02

## 2017-06-02 PROBLEM — N20.0 RENAL CALCULI: Status: ACTIVE | Noted: 2017-06-02

## 2017-06-02 PROBLEM — D69.6 THROMBOCYTOPENIA (HCC): Status: ACTIVE | Noted: 2017-06-02

## 2017-06-02 LAB
ALBUMIN SERPL BCP-MCNC: 2.7 G/DL (ref 3.5–5)
ALP SERPL-CCNC: 72 U/L (ref 46–116)
ALT SERPL W P-5'-P-CCNC: 12 U/L (ref 12–78)
ANION GAP SERPL CALCULATED.3IONS-SCNC: 10 MMOL/L (ref 4–13)
AST SERPL W P-5'-P-CCNC: 17 U/L (ref 5–45)
BASOPHILS # BLD AUTO: 0.01 THOUSANDS/ΜL (ref 0–0.1)
BASOPHILS NFR BLD AUTO: 0 % (ref 0–1)
BILIRUB DIRECT SERPL-MCNC: 0.3 MG/DL (ref 0–0.2)
BILIRUB SERPL-MCNC: 1.5 MG/DL (ref 0.2–1)
BILIRUB UR QL STRIP: NEGATIVE
BUN SERPL-MCNC: 9 MG/DL (ref 5–25)
CA-I BLD-SCNC: 1.14 MMOL/L (ref 1.12–1.32)
CALCIUM SERPL-MCNC: 8.3 MG/DL (ref 8.3–10.1)
CHLORIDE SERPL-SCNC: 108 MMOL/L (ref 100–108)
CLARITY UR: CLEAR
CO2 SERPL-SCNC: 22 MMOL/L (ref 21–32)
COLOR UR: YELLOW
CREAT SERPL-MCNC: 0.88 MG/DL (ref 0.6–1.3)
EOSINOPHIL # BLD AUTO: 0.06 THOUSAND/ΜL (ref 0–0.61)
EOSINOPHIL NFR BLD AUTO: 1 % (ref 0–6)
ERYTHROCYTE [DISTWIDTH] IN BLOOD BY AUTOMATED COUNT: 14.7 % (ref 11.6–15.1)
GFR SERPL CREATININE-BSD FRML MDRD: >60 ML/MIN/1.73SQ M
GLUCOSE SERPL-MCNC: 93 MG/DL (ref 65–140)
GLUCOSE UR STRIP-MCNC: NEGATIVE MG/DL
HCT VFR BLD AUTO: 39.8 % (ref 36.5–49.3)
HGB BLD-MCNC: 13.2 G/DL (ref 12–17)
HGB UR QL STRIP.AUTO: NEGATIVE
KETONES UR STRIP-MCNC: ABNORMAL MG/DL
LACTATE SERPL-SCNC: 0.8 MMOL/L (ref 0.5–2)
LEUKOCYTE ESTERASE UR QL STRIP: NEGATIVE
LYMPHOCYTES # BLD AUTO: 0.91 THOUSANDS/ΜL (ref 0.6–4.47)
LYMPHOCYTES NFR BLD AUTO: 16 % (ref 14–44)
MAGNESIUM SERPL-MCNC: 1.9 MG/DL (ref 1.6–2.6)
MCH RBC QN AUTO: 30.7 PG (ref 26.8–34.3)
MCHC RBC AUTO-ENTMCNC: 33.2 G/DL (ref 31.4–37.4)
MCV RBC AUTO: 93 FL (ref 82–98)
MONOCYTES # BLD AUTO: 0.47 THOUSAND/ΜL (ref 0.17–1.22)
MONOCYTES NFR BLD AUTO: 8 % (ref 4–12)
NEUTROPHILS # BLD AUTO: 4.29 THOUSANDS/ΜL (ref 1.85–7.62)
NEUTS SEG NFR BLD AUTO: 75 % (ref 43–75)
NITRITE UR QL STRIP: NEGATIVE
PH UR STRIP.AUTO: 7 [PH] (ref 4.5–8)
PHOSPHATE SERPL-MCNC: 2.2 MG/DL (ref 2.3–4.1)
PLATELET # BLD AUTO: 101 THOUSANDS/UL (ref 149–390)
PMV BLD AUTO: 10.6 FL (ref 8.9–12.7)
POTASSIUM SERPL-SCNC: 3.6 MMOL/L (ref 3.5–5.3)
PROT SERPL-MCNC: 6.3 G/DL (ref 6.4–8.2)
PROT UR STRIP-MCNC: NEGATIVE MG/DL
RBC # BLD AUTO: 4.3 MILLION/UL (ref 3.88–5.62)
SODIUM SERPL-SCNC: 140 MMOL/L (ref 136–145)
SP GR UR STRIP.AUTO: 1.01 (ref 1–1.03)
UROBILINOGEN UR QL STRIP.AUTO: 0.2 E.U./DL
WBC # BLD AUTO: 5.74 THOUSAND/UL (ref 4.31–10.16)

## 2017-06-02 PROCEDURE — 85025 COMPLETE CBC W/AUTO DIFF WBC: CPT | Performed by: INTERNAL MEDICINE

## 2017-06-02 PROCEDURE — 94760 N-INVAS EAR/PLS OXIMETRY 1: CPT

## 2017-06-02 PROCEDURE — 82330 ASSAY OF CALCIUM: CPT | Performed by: INTERNAL MEDICINE

## 2017-06-02 PROCEDURE — 94761 N-INVAS EAR/PLS OXIMETRY MLT: CPT

## 2017-06-02 PROCEDURE — 81003 URINALYSIS AUTO W/O SCOPE: CPT | Performed by: PHYSICIAN ASSISTANT

## 2017-06-02 PROCEDURE — 97535 SELF CARE MNGMENT TRAINING: CPT

## 2017-06-02 PROCEDURE — 84100 ASSAY OF PHOSPHORUS: CPT | Performed by: INTERNAL MEDICINE

## 2017-06-02 PROCEDURE — 76705 ECHO EXAM OF ABDOMEN: CPT

## 2017-06-02 PROCEDURE — 83735 ASSAY OF MAGNESIUM: CPT | Performed by: INTERNAL MEDICINE

## 2017-06-02 PROCEDURE — 80053 COMPREHEN METABOLIC PANEL: CPT | Performed by: INTERNAL MEDICINE

## 2017-06-02 PROCEDURE — 83605 ASSAY OF LACTIC ACID: CPT | Performed by: INTERNAL MEDICINE

## 2017-06-02 PROCEDURE — 97116 GAIT TRAINING THERAPY: CPT

## 2017-06-02 PROCEDURE — 82248 BILIRUBIN DIRECT: CPT | Performed by: INTERNAL MEDICINE

## 2017-06-02 RX ORDER — MELATONIN
3000 DAILY
Status: DISCONTINUED | OUTPATIENT
Start: 2017-06-03 | End: 2017-06-05 | Stop reason: HOSPADM

## 2017-06-02 RX ORDER — POTASSIUM CHLORIDE 20 MEQ/1
40 TABLET, EXTENDED RELEASE ORAL ONCE
Status: COMPLETED | OUTPATIENT
Start: 2017-06-02 | End: 2017-06-02

## 2017-06-02 RX ADMIN — HEPARIN SODIUM 5000 UNITS: 5000 INJECTION, SOLUTION INTRAVENOUS; SUBCUTANEOUS at 14:02

## 2017-06-02 RX ADMIN — TAMSULOSIN HYDROCHLORIDE 0.4 MG: 0.4 CAPSULE ORAL at 08:42

## 2017-06-02 RX ADMIN — METOPROLOL SUCCINATE 25 MG: 25 TABLET, EXTENDED RELEASE ORAL at 08:42

## 2017-06-02 RX ADMIN — SODIUM CHLORIDE 100 ML/HR: 0.9 INJECTION, SOLUTION INTRAVENOUS at 21:57

## 2017-06-02 RX ADMIN — CHOLECALCIFEROL TAB 25 MCG (1000 UNIT) 2000 UNITS: 25 TAB at 08:42

## 2017-06-02 RX ADMIN — HEPARIN SODIUM 5000 UNITS: 5000 INJECTION, SOLUTION INTRAVENOUS; SUBCUTANEOUS at 21:54

## 2017-06-02 RX ADMIN — MONTELUKAST SODIUM 10 MG: 10 TABLET, FILM COATED ORAL at 08:42

## 2017-06-02 RX ADMIN — SODIUM CHLORIDE 100 ML/HR: 0.9 INJECTION, SOLUTION INTRAVENOUS at 08:41

## 2017-06-02 RX ADMIN — POTASSIUM CHLORIDE 40 MEQ: 1500 TABLET, EXTENDED RELEASE ORAL at 11:52

## 2017-06-02 RX ADMIN — CEFTRIAXONE 1000 MG: 1 INJECTION, POWDER, FOR SOLUTION INTRAMUSCULAR; INTRAVENOUS at 11:52

## 2017-06-02 RX ADMIN — DIBASIC SODIUM PHOSPHATE, MONOBASIC POTASSIUM PHOSPHATE AND MONOBASIC SODIUM PHOSPHATE 2 TABLET: 852; 155; 130 TABLET ORAL at 11:52

## 2017-06-02 RX ADMIN — PANTOPRAZOLE SODIUM 40 MG: 40 TABLET, DELAYED RELEASE ORAL at 05:31

## 2017-06-02 RX ADMIN — AZITHROMYCIN MONOHYDRATE 500 MG: 500 INJECTION, POWDER, LYOPHILIZED, FOR SOLUTION INTRAVENOUS at 14:02

## 2017-06-02 RX ADMIN — DONEPEZIL HYDROCHLORIDE 10 MG: 5 TABLET, FILM COATED ORAL at 21:54

## 2017-06-02 RX ADMIN — LORATADINE 10 MG: 10 TABLET ORAL at 08:42

## 2017-06-02 RX ADMIN — HEPARIN SODIUM 5000 UNITS: 5000 INJECTION, SOLUTION INTRAVENOUS; SUBCUTANEOUS at 05:31

## 2017-06-02 RX ADMIN — ASPIRIN 81 MG 81 MG: 81 TABLET ORAL at 08:42

## 2017-06-02 RX ADMIN — ATORVASTATIN CALCIUM 10 MG: 10 TABLET, FILM COATED ORAL at 17:34

## 2017-06-03 LAB
ALBUMIN SERPL BCP-MCNC: 2.7 G/DL (ref 3.5–5)
ALP SERPL-CCNC: 81 U/L (ref 46–116)
ALT SERPL W P-5'-P-CCNC: 15 U/L (ref 12–78)
ANION GAP SERPL CALCULATED.3IONS-SCNC: 14 MMOL/L (ref 4–13)
AST SERPL W P-5'-P-CCNC: 24 U/L (ref 5–45)
BASOPHILS # BLD AUTO: 0.02 THOUSANDS/ΜL (ref 0–0.1)
BASOPHILS NFR BLD AUTO: 0 % (ref 0–1)
BILIRUB SERPL-MCNC: 1.6 MG/DL (ref 0.2–1)
BUN SERPL-MCNC: 8 MG/DL (ref 5–25)
CALCIUM SERPL-MCNC: 8.4 MG/DL (ref 8.3–10.1)
CHLORIDE SERPL-SCNC: 107 MMOL/L (ref 100–108)
CO2 SERPL-SCNC: 19 MMOL/L (ref 21–32)
CREAT SERPL-MCNC: 0.89 MG/DL (ref 0.6–1.3)
EOSINOPHIL # BLD AUTO: 0.08 THOUSAND/ΜL (ref 0–0.61)
EOSINOPHIL NFR BLD AUTO: 2 % (ref 0–6)
ERYTHROCYTE [DISTWIDTH] IN BLOOD BY AUTOMATED COUNT: 14.6 % (ref 11.6–15.1)
GFR SERPL CREATININE-BSD FRML MDRD: >60 ML/MIN/1.73SQ M
GLUCOSE SERPL-MCNC: 83 MG/DL (ref 65–140)
HCT VFR BLD AUTO: 42.3 % (ref 36.5–49.3)
HGB BLD-MCNC: 14 G/DL (ref 12–17)
LACTATE SERPL-SCNC: 1 MMOL/L (ref 0.5–2)
LYMPHOCYTES # BLD AUTO: 0.91 THOUSANDS/ΜL (ref 0.6–4.47)
LYMPHOCYTES NFR BLD AUTO: 19 % (ref 14–44)
MAGNESIUM SERPL-MCNC: 1.8 MG/DL (ref 1.6–2.6)
MCH RBC QN AUTO: 30.4 PG (ref 26.8–34.3)
MCHC RBC AUTO-ENTMCNC: 33.1 G/DL (ref 31.4–37.4)
MCV RBC AUTO: 92 FL (ref 82–98)
MONOCYTES # BLD AUTO: 0.44 THOUSAND/ΜL (ref 0.17–1.22)
MONOCYTES NFR BLD AUTO: 9 % (ref 4–12)
NEUTROPHILS # BLD AUTO: 3.23 THOUSANDS/ΜL (ref 1.85–7.62)
NEUTS SEG NFR BLD AUTO: 70 % (ref 43–75)
PHOSPHATE SERPL-MCNC: 2.5 MG/DL (ref 2.3–4.1)
PLATELET # BLD AUTO: 124 THOUSANDS/UL (ref 149–390)
PMV BLD AUTO: 10.2 FL (ref 8.9–12.7)
POTASSIUM SERPL-SCNC: 3.7 MMOL/L (ref 3.5–5.3)
PREALB SERPL-MCNC: 10.8 MG/DL (ref 18–40)
PROT SERPL-MCNC: 6.4 G/DL (ref 6.4–8.2)
RBC # BLD AUTO: 4.61 MILLION/UL (ref 3.88–5.62)
SODIUM SERPL-SCNC: 140 MMOL/L (ref 136–145)
WBC # BLD AUTO: 4.68 THOUSAND/UL (ref 4.31–10.16)

## 2017-06-03 PROCEDURE — 83605 ASSAY OF LACTIC ACID: CPT | Performed by: INTERNAL MEDICINE

## 2017-06-03 PROCEDURE — 94664 DEMO&/EVAL PT USE INHALER: CPT

## 2017-06-03 PROCEDURE — 94762 N-INVAS EAR/PLS OXIMTRY CONT: CPT

## 2017-06-03 PROCEDURE — 84134 ASSAY OF PREALBUMIN: CPT | Performed by: INTERNAL MEDICINE

## 2017-06-03 PROCEDURE — 82542 COL CHROMOTOGRAPHY QUAL/QUAN: CPT | Performed by: INTERNAL MEDICINE

## 2017-06-03 PROCEDURE — 94760 N-INVAS EAR/PLS OXIMETRY 1: CPT

## 2017-06-03 PROCEDURE — 80074 ACUTE HEPATITIS PANEL: CPT | Performed by: INTERNAL MEDICINE

## 2017-06-03 PROCEDURE — 80053 COMPREHEN METABOLIC PANEL: CPT | Performed by: INTERNAL MEDICINE

## 2017-06-03 PROCEDURE — 83735 ASSAY OF MAGNESIUM: CPT | Performed by: INTERNAL MEDICINE

## 2017-06-03 PROCEDURE — 84100 ASSAY OF PHOSPHORUS: CPT | Performed by: INTERNAL MEDICINE

## 2017-06-03 PROCEDURE — 85025 COMPLETE CBC W/AUTO DIFF WBC: CPT | Performed by: INTERNAL MEDICINE

## 2017-06-03 RX ORDER — LEVALBUTEROL INHALATION SOLUTION 0.63 MG/3ML
0.63 SOLUTION RESPIRATORY (INHALATION) EVERY 6 HOURS PRN
Status: DISCONTINUED | OUTPATIENT
Start: 2017-06-03 | End: 2017-06-05 | Stop reason: HOSPADM

## 2017-06-03 RX ADMIN — CHOLECALCIFEROL TAB 25 MCG (1000 UNIT) 3000 UNITS: 25 TAB at 08:44

## 2017-06-03 RX ADMIN — SODIUM CHLORIDE 100 ML/HR: 0.9 INJECTION, SOLUTION INTRAVENOUS at 20:40

## 2017-06-03 RX ADMIN — MONTELUKAST SODIUM 10 MG: 10 TABLET, FILM COATED ORAL at 08:45

## 2017-06-03 RX ADMIN — METOPROLOL SUCCINATE 25 MG: 25 TABLET, EXTENDED RELEASE ORAL at 08:45

## 2017-06-03 RX ADMIN — SODIUM CHLORIDE 100 ML/HR: 0.9 INJECTION, SOLUTION INTRAVENOUS at 08:47

## 2017-06-03 RX ADMIN — HEPARIN SODIUM 5000 UNITS: 5000 INJECTION, SOLUTION INTRAVENOUS; SUBCUTANEOUS at 05:00

## 2017-06-03 RX ADMIN — TAMSULOSIN HYDROCHLORIDE 0.4 MG: 0.4 CAPSULE ORAL at 08:45

## 2017-06-03 RX ADMIN — HEPARIN SODIUM 5000 UNITS: 5000 INJECTION, SOLUTION INTRAVENOUS; SUBCUTANEOUS at 13:22

## 2017-06-03 RX ADMIN — LORATADINE 10 MG: 10 TABLET ORAL at 08:45

## 2017-06-03 RX ADMIN — ASPIRIN 81 MG 81 MG: 81 TABLET ORAL at 08:44

## 2017-06-03 RX ADMIN — CEFTRIAXONE 1000 MG: 1 INJECTION, POWDER, FOR SOLUTION INTRAMUSCULAR; INTRAVENOUS at 12:09

## 2017-06-03 RX ADMIN — ATORVASTATIN CALCIUM 10 MG: 10 TABLET, FILM COATED ORAL at 17:24

## 2017-06-03 RX ADMIN — AZITHROMYCIN MONOHYDRATE 500 MG: 500 INJECTION, POWDER, LYOPHILIZED, FOR SOLUTION INTRAVENOUS at 13:19

## 2017-06-03 RX ADMIN — PANTOPRAZOLE SODIUM 40 MG: 40 TABLET, DELAYED RELEASE ORAL at 05:00

## 2017-06-04 ENCOUNTER — APPOINTMENT (INPATIENT)
Dept: CT IMAGING | Facility: HOSPITAL | Age: 77
DRG: 871 | End: 2017-06-04
Payer: MEDICARE

## 2017-06-04 LAB
ALBUMIN SERPL BCP-MCNC: 2.7 G/DL (ref 3.5–5)
ALP SERPL-CCNC: 84 U/L (ref 46–116)
ALT SERPL W P-5'-P-CCNC: 24 U/L (ref 12–78)
ANION GAP SERPL CALCULATED.3IONS-SCNC: 11 MMOL/L (ref 4–13)
AST SERPL W P-5'-P-CCNC: 29 U/L (ref 5–45)
BASOPHILS # BLD AUTO: 0.03 THOUSANDS/ΜL (ref 0–0.1)
BASOPHILS NFR BLD AUTO: 1 % (ref 0–1)
BILIRUB SERPL-MCNC: 1.2 MG/DL (ref 0.2–1)
BUN SERPL-MCNC: 8 MG/DL (ref 5–25)
CALCIUM SERPL-MCNC: 8.4 MG/DL (ref 8.3–10.1)
CHLORIDE SERPL-SCNC: 107 MMOL/L (ref 100–108)
CO2 SERPL-SCNC: 22 MMOL/L (ref 21–32)
CREAT SERPL-MCNC: 0.86 MG/DL (ref 0.6–1.3)
EOSINOPHIL # BLD AUTO: 0.05 THOUSAND/ΜL (ref 0–0.61)
EOSINOPHIL NFR BLD AUTO: 1 % (ref 0–6)
ERYTHROCYTE [DISTWIDTH] IN BLOOD BY AUTOMATED COUNT: 14.4 % (ref 11.6–15.1)
GFR SERPL CREATININE-BSD FRML MDRD: >60 ML/MIN/1.73SQ M
GLUCOSE SERPL-MCNC: 93 MG/DL (ref 65–140)
HAV IGM SER QL: NORMAL
HBV CORE IGM SER QL: NORMAL
HBV SURFACE AG SER QL: NORMAL
HCT VFR BLD AUTO: 40.2 % (ref 36.5–49.3)
HCV AB SER QL: NORMAL
HGB BLD-MCNC: 13.7 G/DL (ref 12–17)
LYMPHOCYTES # BLD AUTO: 1.09 THOUSANDS/ΜL (ref 0.6–4.47)
LYMPHOCYTES NFR BLD AUTO: 19 % (ref 14–44)
MAGNESIUM SERPL-MCNC: 1.8 MG/DL (ref 1.6–2.6)
MCH RBC QN AUTO: 30.9 PG (ref 26.8–34.3)
MCHC RBC AUTO-ENTMCNC: 34.1 G/DL (ref 31.4–37.4)
MCV RBC AUTO: 91 FL (ref 82–98)
MONOCYTES # BLD AUTO: 0.54 THOUSAND/ΜL (ref 0.17–1.22)
MONOCYTES NFR BLD AUTO: 10 % (ref 4–12)
NEUTROPHILS # BLD AUTO: 3.95 THOUSANDS/ΜL (ref 1.85–7.62)
NEUTS SEG NFR BLD AUTO: 69 % (ref 43–75)
PLATELET # BLD AUTO: 139 THOUSANDS/UL (ref 149–390)
PMV BLD AUTO: 9.7 FL (ref 8.9–12.7)
POTASSIUM SERPL-SCNC: 3.3 MMOL/L (ref 3.5–5.3)
POTASSIUM SERPL-SCNC: 3.4 MMOL/L (ref 3.5–5.3)
PROT SERPL-MCNC: 6.4 G/DL (ref 6.4–8.2)
RBC # BLD AUTO: 4.43 MILLION/UL (ref 3.88–5.62)
SODIUM SERPL-SCNC: 140 MMOL/L (ref 136–145)
WBC # BLD AUTO: 5.66 THOUSAND/UL (ref 4.31–10.16)

## 2017-06-04 PROCEDURE — 70450 CT HEAD/BRAIN W/O DYE: CPT

## 2017-06-04 PROCEDURE — 85025 COMPLETE CBC W/AUTO DIFF WBC: CPT | Performed by: PHYSICIAN ASSISTANT

## 2017-06-04 PROCEDURE — 84132 ASSAY OF SERUM POTASSIUM: CPT | Performed by: HOSPITALIST

## 2017-06-04 PROCEDURE — 80053 COMPREHEN METABOLIC PANEL: CPT | Performed by: PHYSICIAN ASSISTANT

## 2017-06-04 PROCEDURE — 83735 ASSAY OF MAGNESIUM: CPT | Performed by: HOSPITALIST

## 2017-06-04 RX ORDER — POTASSIUM CHLORIDE 20 MEQ/1
20 TABLET, EXTENDED RELEASE ORAL ONCE
Status: COMPLETED | OUTPATIENT
Start: 2017-06-04 | End: 2017-06-04

## 2017-06-04 RX ORDER — CEFUROXIME AXETIL 500 MG/1
500 TABLET ORAL EVERY 12 HOURS SCHEDULED
Qty: 4 TABLET | Refills: 0 | Status: SHIPPED | OUTPATIENT
Start: 2017-06-04 | End: 2017-06-06

## 2017-06-04 RX ADMIN — POTASSIUM CHLORIDE 20 MEQ: 1500 TABLET, EXTENDED RELEASE ORAL at 12:41

## 2017-06-04 RX ADMIN — CEFTRIAXONE 1000 MG: 1 INJECTION, POWDER, FOR SOLUTION INTRAMUSCULAR; INTRAVENOUS at 10:15

## 2017-06-04 RX ADMIN — HEPARIN SODIUM 5000 UNITS: 5000 INJECTION, SOLUTION INTRAVENOUS; SUBCUTANEOUS at 00:49

## 2017-06-04 RX ADMIN — HEPARIN SODIUM 5000 UNITS: 5000 INJECTION, SOLUTION INTRAVENOUS; SUBCUTANEOUS at 13:14

## 2017-06-04 RX ADMIN — LORATADINE 10 MG: 10 TABLET ORAL at 08:19

## 2017-06-04 RX ADMIN — FLUTICASONE FUROATE AND VILANTEROL 1 PUFF: 100; 25 POWDER RESPIRATORY (INHALATION) at 08:20

## 2017-06-04 RX ADMIN — ATORVASTATIN CALCIUM 10 MG: 10 TABLET, FILM COATED ORAL at 16:45

## 2017-06-04 RX ADMIN — DONEPEZIL HYDROCHLORIDE 10 MG: 5 TABLET, FILM COATED ORAL at 00:49

## 2017-06-04 RX ADMIN — PANTOPRAZOLE SODIUM 40 MG: 40 TABLET, DELAYED RELEASE ORAL at 06:52

## 2017-06-04 RX ADMIN — AZITHROMYCIN MONOHYDRATE 500 MG: 500 INJECTION, POWDER, LYOPHILIZED, FOR SOLUTION INTRAVENOUS at 12:40

## 2017-06-04 RX ADMIN — SODIUM CHLORIDE 100 ML/HR: 0.9 INJECTION, SOLUTION INTRAVENOUS at 06:57

## 2017-06-04 RX ADMIN — HEPARIN SODIUM 5000 UNITS: 5000 INJECTION, SOLUTION INTRAVENOUS; SUBCUTANEOUS at 23:07

## 2017-06-04 RX ADMIN — METOPROLOL SUCCINATE 25 MG: 25 TABLET, EXTENDED RELEASE ORAL at 08:19

## 2017-06-04 RX ADMIN — ASPIRIN 81 MG 81 MG: 81 TABLET ORAL at 08:19

## 2017-06-04 RX ADMIN — SODIUM CHLORIDE 100 ML/HR: 0.9 INJECTION, SOLUTION INTRAVENOUS at 18:56

## 2017-06-04 RX ADMIN — HEPARIN SODIUM 5000 UNITS: 5000 INJECTION, SOLUTION INTRAVENOUS; SUBCUTANEOUS at 06:52

## 2017-06-04 RX ADMIN — MONTELUKAST SODIUM 10 MG: 10 TABLET, FILM COATED ORAL at 08:19

## 2017-06-04 RX ADMIN — TAMSULOSIN HYDROCHLORIDE 0.4 MG: 0.4 CAPSULE ORAL at 08:19

## 2017-06-04 RX ADMIN — DONEPEZIL HYDROCHLORIDE 10 MG: 5 TABLET, FILM COATED ORAL at 23:08

## 2017-06-04 RX ADMIN — CHOLECALCIFEROL TAB 25 MCG (1000 UNIT) 3000 UNITS: 25 TAB at 08:19

## 2017-06-05 ENCOUNTER — APPOINTMENT (INPATIENT)
Dept: OCCUPATIONAL THERAPY | Facility: HOSPITAL | Age: 77
DRG: 871 | End: 2017-06-05
Payer: MEDICARE

## 2017-06-05 ENCOUNTER — APPOINTMENT (INPATIENT)
Dept: PHYSICAL THERAPY | Facility: HOSPITAL | Age: 77
DRG: 871 | End: 2017-06-05
Payer: MEDICARE

## 2017-06-05 VITALS
DIASTOLIC BLOOD PRESSURE: 80 MMHG | BODY MASS INDEX: 24.46 KG/M2 | OXYGEN SATURATION: 94 % | HEART RATE: 92 BPM | HEIGHT: 68 IN | TEMPERATURE: 98.4 F | WEIGHT: 161.38 LBS | SYSTOLIC BLOOD PRESSURE: 136 MMHG | RESPIRATION RATE: 18 BRPM

## 2017-06-05 LAB
ANION GAP SERPL CALCULATED.3IONS-SCNC: 13 MMOL/L (ref 4–13)
BACTERIA BLD CULT: NORMAL
BACTERIA BLD CULT: NORMAL
BASOPHILS # BLD AUTO: 0.03 THOUSANDS/ΜL (ref 0–0.1)
BASOPHILS NFR BLD AUTO: 1 % (ref 0–1)
BUN SERPL-MCNC: 8 MG/DL (ref 5–25)
CALCIUM SERPL-MCNC: 8.6 MG/DL (ref 8.3–10.1)
CHLORIDE SERPL-SCNC: 106 MMOL/L (ref 100–108)
CO2 SERPL-SCNC: 20 MMOL/L (ref 21–32)
CREAT SERPL-MCNC: 0.83 MG/DL (ref 0.6–1.3)
EOSINOPHIL # BLD AUTO: 0.09 THOUSAND/ΜL (ref 0–0.61)
EOSINOPHIL NFR BLD AUTO: 2 % (ref 0–6)
ERYTHROCYTE [DISTWIDTH] IN BLOOD BY AUTOMATED COUNT: 14.6 % (ref 11.6–15.1)
GFR SERPL CREATININE-BSD FRML MDRD: >60 ML/MIN/1.73SQ M
GLUCOSE SERPL-MCNC: 97 MG/DL (ref 65–140)
HCT VFR BLD AUTO: 41.4 % (ref 36.5–49.3)
HGB BLD-MCNC: 13.8 G/DL (ref 12–17)
LYMPHOCYTES # BLD AUTO: 1.34 THOUSANDS/ΜL (ref 0.6–4.47)
LYMPHOCYTES NFR BLD AUTO: 22 % (ref 14–44)
MCH RBC QN AUTO: 30.4 PG (ref 26.8–34.3)
MCHC RBC AUTO-ENTMCNC: 33.3 G/DL (ref 31.4–37.4)
MCV RBC AUTO: 91 FL (ref 82–98)
MONOCYTES # BLD AUTO: 0.59 THOUSAND/ΜL (ref 0.17–1.22)
MONOCYTES NFR BLD AUTO: 10 % (ref 4–12)
NEUTROPHILS # BLD AUTO: 4.09 THOUSANDS/ΜL (ref 1.85–7.62)
NEUTS SEG NFR BLD AUTO: 65 % (ref 43–75)
PLATELET # BLD AUTO: 164 THOUSANDS/UL (ref 149–390)
PMV BLD AUTO: 9.9 FL (ref 8.9–12.7)
POTASSIUM SERPL-SCNC: 3.3 MMOL/L (ref 3.5–5.3)
RBC # BLD AUTO: 4.54 MILLION/UL (ref 3.88–5.62)
SODIUM SERPL-SCNC: 139 MMOL/L (ref 136–145)
WBC # BLD AUTO: 6.14 THOUSAND/UL (ref 4.31–10.16)

## 2017-06-05 PROCEDURE — 97116 GAIT TRAINING THERAPY: CPT

## 2017-06-05 PROCEDURE — 97535 SELF CARE MNGMENT TRAINING: CPT

## 2017-06-05 PROCEDURE — 94760 N-INVAS EAR/PLS OXIMETRY 1: CPT

## 2017-06-05 PROCEDURE — 80048 BASIC METABOLIC PNL TOTAL CA: CPT | Performed by: PHYSICIAN ASSISTANT

## 2017-06-05 PROCEDURE — 85025 COMPLETE CBC W/AUTO DIFF WBC: CPT | Performed by: PHYSICIAN ASSISTANT

## 2017-06-05 PROCEDURE — 97530 THERAPEUTIC ACTIVITIES: CPT

## 2017-06-05 PROCEDURE — 94762 N-INVAS EAR/PLS OXIMTRY CONT: CPT

## 2017-06-05 RX ADMIN — ASPIRIN 81 MG 81 MG: 81 TABLET ORAL at 08:16

## 2017-06-05 RX ADMIN — LORATADINE 10 MG: 10 TABLET ORAL at 08:16

## 2017-06-05 RX ADMIN — FLUTICASONE FUROATE AND VILANTEROL 1 PUFF: 100; 25 POWDER RESPIRATORY (INHALATION) at 08:17

## 2017-06-05 RX ADMIN — CEFTRIAXONE 1000 MG: 1 INJECTION, POWDER, FOR SOLUTION INTRAMUSCULAR; INTRAVENOUS at 11:31

## 2017-06-05 RX ADMIN — HEPARIN SODIUM 5000 UNITS: 5000 INJECTION, SOLUTION INTRAVENOUS; SUBCUTANEOUS at 05:13

## 2017-06-05 RX ADMIN — TAMSULOSIN HYDROCHLORIDE 0.4 MG: 0.4 CAPSULE ORAL at 08:17

## 2017-06-05 RX ADMIN — ACETAMINOPHEN 650 MG: 325 TABLET, FILM COATED ORAL at 04:24

## 2017-06-05 RX ADMIN — CHOLECALCIFEROL TAB 25 MCG (1000 UNIT) 3000 UNITS: 25 TAB at 08:16

## 2017-06-05 RX ADMIN — METOPROLOL SUCCINATE 25 MG: 25 TABLET, EXTENDED RELEASE ORAL at 08:16

## 2017-06-05 RX ADMIN — AZITHROMYCIN MONOHYDRATE 500 MG: 500 INJECTION, POWDER, LYOPHILIZED, FOR SOLUTION INTRAVENOUS at 12:24

## 2017-06-05 RX ADMIN — PANTOPRAZOLE SODIUM 40 MG: 40 TABLET, DELAYED RELEASE ORAL at 05:13

## 2017-06-05 RX ADMIN — HEPARIN SODIUM 5000 UNITS: 5000 INJECTION, SOLUTION INTRAVENOUS; SUBCUTANEOUS at 13:16

## 2017-06-05 RX ADMIN — MONTELUKAST SODIUM 10 MG: 10 TABLET, FILM COATED ORAL at 08:16

## 2017-06-05 RX ADMIN — SODIUM CHLORIDE 100 ML/HR: 0.9 INJECTION, SOLUTION INTRAVENOUS at 04:24

## 2017-06-07 LAB
SRA .2 IU/ML UFH SER-ACNC: 12 % (ref 0–20)
SRA 100IU/ML UFH SER-ACNC: 10 % (ref 0–20)
SRA UFH SER-IMP: NORMAL

## 2017-06-08 ENCOUNTER — APPOINTMENT (OUTPATIENT)
Dept: LAB | Facility: MEDICAL CENTER | Age: 77
End: 2017-06-08
Payer: MEDICARE

## 2017-06-08 ENCOUNTER — ALLSCRIPTS OFFICE VISIT (OUTPATIENT)
Dept: OTHER | Facility: OTHER | Age: 77
End: 2017-06-08

## 2017-06-08 ENCOUNTER — GENERIC CONVERSION - ENCOUNTER (OUTPATIENT)
Dept: OTHER | Facility: OTHER | Age: 77
End: 2017-06-08

## 2017-06-08 ENCOUNTER — TRANSCRIBE ORDERS (OUTPATIENT)
Dept: LAB | Facility: MEDICAL CENTER | Age: 77
End: 2017-06-08

## 2017-06-08 DIAGNOSIS — A41.9 SEPSIS, UNSPECIFIED: ICD-10-CM

## 2017-06-08 LAB
ANION GAP SERPL CALCULATED.3IONS-SCNC: 6 MMOL/L (ref 4–13)
BUN SERPL-MCNC: 9 MG/DL (ref 5–25)
CALCIUM SERPL-MCNC: 9.2 MG/DL (ref 8.3–10.1)
CHLORIDE SERPL-SCNC: 104 MMOL/L (ref 100–108)
CO2 SERPL-SCNC: 27 MMOL/L (ref 21–32)
CREAT SERPL-MCNC: 0.96 MG/DL (ref 0.6–1.3)
GFR SERPL CREATININE-BSD FRML MDRD: >60 ML/MIN/1.73SQ M
GLUCOSE SERPL-MCNC: 99 MG/DL (ref 65–140)
POTASSIUM SERPL-SCNC: 4.3 MMOL/L (ref 3.5–5.3)
SODIUM SERPL-SCNC: 137 MMOL/L (ref 136–145)

## 2017-06-08 PROCEDURE — 80048 BASIC METABOLIC PNL TOTAL CA: CPT

## 2017-06-08 PROCEDURE — 36415 COLL VENOUS BLD VENIPUNCTURE: CPT

## 2017-06-12 ENCOUNTER — GENERIC CONVERSION - ENCOUNTER (OUTPATIENT)
Dept: OTHER | Facility: OTHER | Age: 77
End: 2017-06-12

## 2017-06-15 ENCOUNTER — GENERIC CONVERSION - ENCOUNTER (OUTPATIENT)
Dept: OTHER | Facility: OTHER | Age: 77
End: 2017-06-15

## 2017-06-26 ENCOUNTER — HOSPITAL ENCOUNTER (OUTPATIENT)
Dept: NUCLEAR MEDICINE | Facility: HOSPITAL | Age: 77
Discharge: HOME | End: 2017-06-26
Attending: FAMILY MEDICINE
Payer: MEDICARE

## 2017-06-26 ENCOUNTER — GENERIC CONVERSION - ENCOUNTER (OUTPATIENT)
Dept: OTHER | Facility: OTHER | Age: 77
End: 2017-06-26

## 2017-06-26 DIAGNOSIS — A41.9 SEPSIS, UNSPECIFIED: ICD-10-CM

## 2017-06-26 PROCEDURE — 78227 HEPATOBIL SYST IMAGE W/DRUG: CPT

## 2017-06-26 PROCEDURE — A9537 TC99M MEBROFENIN: HCPCS

## 2017-06-26 RX ADMIN — SINCALIDE 1.4 MCG: 5 INJECTION, POWDER, LYOPHILIZED, FOR SOLUTION INTRAVENOUS at 13:01

## 2017-07-11 ENCOUNTER — TRANSCRIBE ORDERS (OUTPATIENT)
Dept: LAB | Facility: MEDICAL CENTER | Age: 77
End: 2017-07-11

## 2017-07-11 ENCOUNTER — ALLSCRIPTS OFFICE VISIT (OUTPATIENT)
Dept: OTHER | Facility: OTHER | Age: 77
End: 2017-07-11

## 2017-07-11 ENCOUNTER — APPOINTMENT (OUTPATIENT)
Dept: LAB | Facility: MEDICAL CENTER | Age: 77
End: 2017-07-11
Payer: MEDICARE

## 2017-07-11 ENCOUNTER — GENERIC CONVERSION - ENCOUNTER (OUTPATIENT)
Dept: OTHER | Facility: OTHER | Age: 77
End: 2017-07-11

## 2017-07-11 DIAGNOSIS — R42 DIZZINESS AND GIDDINESS: ICD-10-CM

## 2017-07-11 DIAGNOSIS — E55.9 VITAMIN D DEFICIENCY: ICD-10-CM

## 2017-07-11 LAB
25(OH)D3 SERPL-MCNC: 33.5 NG/ML (ref 30–100)
ANION GAP SERPL CALCULATED.3IONS-SCNC: 6 MMOL/L (ref 4–13)
BUN SERPL-MCNC: 9 MG/DL (ref 5–25)
CALCIUM SERPL-MCNC: 9.2 MG/DL (ref 8.3–10.1)
CHLORIDE SERPL-SCNC: 106 MMOL/L (ref 100–108)
CO2 SERPL-SCNC: 27 MMOL/L (ref 21–32)
CREAT SERPL-MCNC: 1.1 MG/DL (ref 0.6–1.3)
GFR SERPL CREATININE-BSD FRML MDRD: >60 ML/MIN/1.73SQ M
GLUCOSE SERPL-MCNC: 91 MG/DL (ref 65–140)
POTASSIUM SERPL-SCNC: 4.7 MMOL/L (ref 3.5–5.3)
SODIUM SERPL-SCNC: 139 MMOL/L (ref 136–145)

## 2017-07-11 PROCEDURE — 82306 VITAMIN D 25 HYDROXY: CPT

## 2017-07-11 PROCEDURE — 80048 BASIC METABOLIC PNL TOTAL CA: CPT

## 2017-07-11 PROCEDURE — 36415 COLL VENOUS BLD VENIPUNCTURE: CPT

## 2017-07-17 ENCOUNTER — TRANSCRIBE ORDERS (OUTPATIENT)
Dept: ADMINISTRATIVE | Facility: HOSPITAL | Age: 77
End: 2017-07-17

## 2017-07-17 ENCOUNTER — GENERIC CONVERSION - ENCOUNTER (OUTPATIENT)
Dept: OTHER | Facility: OTHER | Age: 77
End: 2017-07-17

## 2017-07-17 DIAGNOSIS — R19.7 DIARRHEA OF PRESUMED INFECTIOUS ORIGIN: Primary | ICD-10-CM

## 2017-07-20 ENCOUNTER — GENERIC CONVERSION - ENCOUNTER (OUTPATIENT)
Dept: OTHER | Facility: OTHER | Age: 77
End: 2017-07-20

## 2017-07-28 ENCOUNTER — GENERIC CONVERSION - ENCOUNTER (OUTPATIENT)
Dept: OTHER | Facility: OTHER | Age: 77
End: 2017-07-28

## 2017-08-03 ENCOUNTER — HOSPITAL ENCOUNTER (OUTPATIENT)
Dept: RADIOLOGY | Facility: HOSPITAL | Age: 77
Discharge: HOME | End: 2017-08-03
Payer: MEDICARE

## 2017-08-03 DIAGNOSIS — R19.7 DIARRHEA OF PRESUMED INFECTIOUS ORIGIN: ICD-10-CM

## 2017-08-03 PROCEDURE — 74250 X-RAY XM SM INT 1CNTRST STD: CPT

## 2017-08-31 ENCOUNTER — GENERIC CONVERSION - ENCOUNTER (OUTPATIENT)
Dept: OTHER | Facility: OTHER | Age: 77
End: 2017-08-31

## 2017-09-01 ENCOUNTER — ALLSCRIPTS OFFICE VISIT (OUTPATIENT)
Dept: OTHER | Facility: OTHER | Age: 77
End: 2017-09-01

## 2017-09-01 ENCOUNTER — GENERIC CONVERSION - ENCOUNTER (OUTPATIENT)
Dept: OTHER | Facility: OTHER | Age: 77
End: 2017-09-01

## 2017-09-01 DIAGNOSIS — J95.89 OTHER POSTPROCEDURAL COMPLICATIONS AND DISORDERS OF RESPIRATORY SYSTEM, NOT ELSEWHERE CLASSIFIED: ICD-10-CM

## 2017-09-01 DIAGNOSIS — R35.0 FREQUENCY OF MICTURITION: ICD-10-CM

## 2017-09-01 DIAGNOSIS — M54.50 LOW BACK PAIN: ICD-10-CM

## 2017-09-05 ENCOUNTER — TRANSCRIBE ORDERS (OUTPATIENT)
Dept: ADMINISTRATIVE | Facility: HOSPITAL | Age: 77
End: 2017-09-05

## 2017-09-05 ENCOUNTER — APPOINTMENT (OUTPATIENT)
Dept: LAB | Facility: HOSPITAL | Age: 77
End: 2017-09-05
Payer: MEDICARE

## 2017-09-05 ENCOUNTER — GENERIC CONVERSION - ENCOUNTER (OUTPATIENT)
Dept: OTHER | Facility: OTHER | Age: 77
End: 2017-09-05

## 2017-09-05 DIAGNOSIS — I25.119 ATHEROSCLEROSIS OF NATIVE CORONARY ARTERY WITH ANGINA PECTORIS, UNSPECIFIED WHETHER NATIVE OR TRANSPLANTED HEART (HCC): Primary | ICD-10-CM

## 2017-09-05 DIAGNOSIS — I25.119 ATHEROSCLEROSIS OF NATIVE CORONARY ARTERY WITH ANGINA PECTORIS, UNSPECIFIED WHETHER NATIVE OR TRANSPLANTED HEART (HCC): ICD-10-CM

## 2017-09-05 LAB
ANION GAP SERPL CALCULATED.3IONS-SCNC: 12 MMOL/L (ref 4–13)
BASOPHILS # BLD AUTO: 0.03 THOUSANDS/ΜL (ref 0–0.1)
BASOPHILS NFR BLD AUTO: 0 % (ref 0–1)
BUN SERPL-MCNC: 13 MG/DL (ref 5–25)
CALCIUM SERPL-MCNC: 8.9 MG/DL (ref 8.3–10.1)
CHLORIDE SERPL-SCNC: 98 MMOL/L (ref 100–108)
CO2 SERPL-SCNC: 24 MMOL/L (ref 21–32)
CREAT SERPL-MCNC: 1.12 MG/DL (ref 0.6–1.3)
EOSINOPHIL # BLD AUTO: 0.07 THOUSAND/ΜL (ref 0–0.61)
EOSINOPHIL NFR BLD AUTO: 1 % (ref 0–6)
ERYTHROCYTE [DISTWIDTH] IN BLOOD BY AUTOMATED COUNT: 15 % (ref 11.6–15.1)
GFR SERPL CREATININE-BSD FRML MDRD: 64 ML/MIN/1.73SQ M
GLUCOSE SERPL-MCNC: 112 MG/DL (ref 65–140)
HCT VFR BLD AUTO: 40 % (ref 36.5–49.3)
HGB BLD-MCNC: 13.1 G/DL (ref 12–17)
LYMPHOCYTES # BLD AUTO: 1.84 THOUSANDS/ΜL (ref 0.6–4.47)
LYMPHOCYTES NFR BLD AUTO: 23 % (ref 14–44)
MCH RBC QN AUTO: 30.5 PG (ref 26.8–34.3)
MCHC RBC AUTO-ENTMCNC: 32.8 G/DL (ref 31.4–37.4)
MCV RBC AUTO: 93 FL (ref 82–98)
MONOCYTES # BLD AUTO: 0.91 THOUSAND/ΜL (ref 0.17–1.22)
MONOCYTES NFR BLD AUTO: 12 % (ref 4–12)
NEUTROPHILS # BLD AUTO: 5.03 THOUSANDS/ΜL (ref 1.85–7.62)
NEUTS SEG NFR BLD AUTO: 64 % (ref 43–75)
PLATELET # BLD AUTO: 325 THOUSANDS/UL (ref 149–390)
PMV BLD AUTO: 8.7 FL (ref 8.9–12.7)
POTASSIUM SERPL-SCNC: 4.3 MMOL/L (ref 3.5–5.3)
RBC # BLD AUTO: 4.3 MILLION/UL (ref 3.88–5.62)
SODIUM SERPL-SCNC: 134 MMOL/L (ref 136–145)
WBC # BLD AUTO: 7.88 THOUSAND/UL (ref 4.31–10.16)

## 2017-09-05 PROCEDURE — 80048 BASIC METABOLIC PNL TOTAL CA: CPT

## 2017-09-05 PROCEDURE — 85025 COMPLETE CBC W/AUTO DIFF WBC: CPT

## 2017-09-05 PROCEDURE — 36415 COLL VENOUS BLD VENIPUNCTURE: CPT

## 2017-09-11 ENCOUNTER — ALLSCRIPTS OFFICE VISIT (OUTPATIENT)
Dept: OTHER | Facility: OTHER | Age: 77
End: 2017-09-11

## 2017-09-11 ENCOUNTER — HOSPITAL ENCOUNTER (OUTPATIENT)
Dept: CT IMAGING | Facility: HOSPITAL | Age: 77
Discharge: HOME | End: 2017-09-11
Attending: FAMILY MEDICINE
Payer: MEDICARE

## 2017-09-11 ENCOUNTER — GENERIC CONVERSION - ENCOUNTER (OUTPATIENT)
Dept: OTHER | Facility: OTHER | Age: 77
End: 2017-09-11

## 2017-09-11 ENCOUNTER — TRANSCRIBE ORDERS (OUTPATIENT)
Dept: ADMINISTRATIVE | Facility: HOSPITAL | Age: 77
End: 2017-09-11

## 2017-09-11 ENCOUNTER — HOSPITAL ENCOUNTER (OUTPATIENT)
Dept: RADIOLOGY | Facility: HOSPITAL | Age: 77
Discharge: HOME | End: 2017-09-11
Attending: FAMILY MEDICINE
Payer: MEDICARE

## 2017-09-11 DIAGNOSIS — J95.89 OTHER POSTPROCEDURAL COMPLICATIONS AND DISORDERS OF RESPIRATORY SYSTEM, NOT ELSEWHERE CLASSIFIED: ICD-10-CM

## 2017-09-11 DIAGNOSIS — M54.5 LOW BACK PAIN, UNSPECIFIED BACK PAIN LATERALITY, UNSPECIFIED CHRONICITY, WITH SCIATICA PRESENCE UNSPECIFIED: Primary | ICD-10-CM

## 2017-09-11 DIAGNOSIS — M54.50 LOW BACK PAIN: ICD-10-CM

## 2017-09-11 PROCEDURE — 71020 HB CHEST X-RAY 2VW FRONTAL&LATL: CPT

## 2017-09-11 PROCEDURE — 74174 CTA ABD&PLVS W/CONTRAST: CPT

## 2017-09-11 RX ADMIN — IOHEXOL 100 ML: 350 INJECTION, SOLUTION INTRAVENOUS at 15:56

## 2017-09-12 ENCOUNTER — GENERIC CONVERSION - ENCOUNTER (OUTPATIENT)
Dept: OTHER | Facility: OTHER | Age: 77
End: 2017-09-12

## 2017-09-15 ENCOUNTER — APPOINTMENT (OUTPATIENT)
Dept: LAB | Facility: HOSPITAL | Age: 77
End: 2017-09-15
Attending: FAMILY MEDICINE
Payer: MEDICARE

## 2017-09-15 DIAGNOSIS — R35.0 FREQUENCY OF MICTURITION: ICD-10-CM

## 2017-09-15 PROCEDURE — 87086 URINE CULTURE/COLONY COUNT: CPT

## 2017-09-16 LAB — BACTERIA UR CULT: NORMAL

## 2017-09-18 ENCOUNTER — GENERIC CONVERSION - ENCOUNTER (OUTPATIENT)
Dept: OTHER | Facility: OTHER | Age: 77
End: 2017-09-18

## 2017-09-19 ENCOUNTER — GENERIC CONVERSION - ENCOUNTER (OUTPATIENT)
Dept: OTHER | Facility: OTHER | Age: 77
End: 2017-09-19

## 2017-09-21 ENCOUNTER — GENERIC CONVERSION - ENCOUNTER (OUTPATIENT)
Dept: OTHER | Facility: OTHER | Age: 77
End: 2017-09-21

## 2017-10-02 ENCOUNTER — GENERIC CONVERSION - ENCOUNTER (OUTPATIENT)
Dept: OTHER | Facility: OTHER | Age: 77
End: 2017-10-02

## 2017-10-06 ENCOUNTER — ALLSCRIPTS OFFICE VISIT (OUTPATIENT)
Dept: OTHER | Facility: OTHER | Age: 77
End: 2017-10-06

## 2017-10-06 LAB
BILIRUB UR QL STRIP: NEGATIVE
CLARITY UR: NORMAL
COLOR UR: YELLOW
GLUCOSE (HISTORICAL): NEGATIVE
HGB UR QL STRIP.AUTO: NORMAL
KETONES UR STRIP-MCNC: NEGATIVE MG/DL
LEUKOCYTE ESTERASE UR QL STRIP: NEGATIVE
NITRITE UR QL STRIP: NEGATIVE
PH UR STRIP.AUTO: 7.5 [PH]
PROT UR STRIP-MCNC: NEGATIVE MG/DL
SP GR UR STRIP.AUTO: 1.01
UROBILINOGEN UR QL STRIP.AUTO: 0.2

## 2017-10-18 ENCOUNTER — GENERIC CONVERSION - ENCOUNTER (OUTPATIENT)
Dept: OTHER | Facility: OTHER | Age: 77
End: 2017-10-18

## 2017-10-20 ENCOUNTER — ALLSCRIPTS OFFICE VISIT (OUTPATIENT)
Dept: OTHER | Facility: OTHER | Age: 77
End: 2017-10-20

## 2017-10-20 LAB
BILIRUB UR QL STRIP: NEGATIVE
CLARITY UR: NORMAL
COLOR UR: YELLOW
GLUCOSE (HISTORICAL): NEGATIVE
HGB UR QL STRIP.AUTO: NORMAL
KETONES UR STRIP-MCNC: NORMAL MG/DL
LEUKOCYTE ESTERASE UR QL STRIP: NEGATIVE
NITRITE UR QL STRIP: NEGATIVE
PH UR STRIP.AUTO: 7.5 [PH]
PROT UR STRIP-MCNC: NEGATIVE MG/DL
SP GR UR STRIP.AUTO: 1.01
UROBILINOGEN UR QL STRIP.AUTO: 0.2

## 2017-10-26 ENCOUNTER — APPOINTMENT (EMERGENCY)
Dept: RADIOLOGY | Facility: HOSPITAL | Age: 77
End: 2017-10-26
Payer: MEDICARE

## 2017-10-26 ENCOUNTER — HOSPITAL ENCOUNTER (EMERGENCY)
Facility: HOSPITAL | Age: 77
Discharge: HOME | End: 2017-10-27
Attending: EMERGENCY MEDICINE | Admitting: EMERGENCY MEDICINE
Payer: MEDICARE

## 2017-10-26 ENCOUNTER — ALLSCRIPTS OFFICE VISIT (OUTPATIENT)
Dept: OTHER | Facility: OTHER | Age: 77
End: 2017-10-26

## 2017-10-26 DIAGNOSIS — R25.1 OCCASIONAL TREMORS: Primary | ICD-10-CM

## 2017-10-26 LAB
ALBUMIN SERPL BCP-MCNC: 3.4 G/DL (ref 3.5–5)
ALP SERPL-CCNC: 89 U/L (ref 46–116)
ALT SERPL W P-5'-P-CCNC: 21 U/L (ref 12–78)
ANION GAP SERPL CALCULATED.3IONS-SCNC: 10 MMOL/L (ref 4–13)
APTT PPP: 31 SECONDS (ref 23–35)
AST SERPL W P-5'-P-CCNC: 21 U/L (ref 5–45)
BASOPHILS # BLD AUTO: 0.03 THOUSANDS/ΜL (ref 0–0.1)
BASOPHILS NFR BLD AUTO: 0 % (ref 0–1)
BILIRUB SERPL-MCNC: 1 MG/DL (ref 0.2–1)
BUN SERPL-MCNC: 13 MG/DL (ref 5–25)
CALCIUM SERPL-MCNC: 9 MG/DL (ref 8.3–10.1)
CHLORIDE SERPL-SCNC: 103 MMOL/L (ref 100–108)
CO2 SERPL-SCNC: 26 MMOL/L (ref 21–32)
CREAT SERPL-MCNC: 0.99 MG/DL (ref 0.6–1.3)
EOSINOPHIL # BLD AUTO: 0.05 THOUSAND/ΜL (ref 0–0.61)
EOSINOPHIL NFR BLD AUTO: 1 % (ref 0–6)
ERYTHROCYTE [DISTWIDTH] IN BLOOD BY AUTOMATED COUNT: 15.1 % (ref 11.6–15.1)
GFR SERPL CREATININE-BSD FRML MDRD: 75 ML/MIN/1.73SQ M
GLUCOSE SERPL-MCNC: 95 MG/DL (ref 65–140)
HCT VFR BLD AUTO: 43.7 % (ref 36.5–49.3)
HGB BLD-MCNC: 14.5 G/DL (ref 12–17)
INR PPP: 0.97 (ref 0.86–1.16)
LACTATE SERPL-SCNC: 1.1 MMOL/L (ref 0.5–2)
LYMPHOCYTES # BLD AUTO: 1.26 THOUSANDS/ΜL (ref 0.6–4.47)
LYMPHOCYTES NFR BLD AUTO: 15 % (ref 14–44)
MCH RBC QN AUTO: 30.9 PG (ref 26.8–34.3)
MCHC RBC AUTO-ENTMCNC: 33.2 G/DL (ref 31.4–37.4)
MCV RBC AUTO: 93 FL (ref 82–98)
MONOCYTES # BLD AUTO: 0.86 THOUSAND/ΜL (ref 0.17–1.22)
MONOCYTES NFR BLD AUTO: 10 % (ref 4–12)
NEUTROPHILS # BLD AUTO: 6.29 THOUSANDS/ΜL (ref 1.85–7.62)
NEUTS SEG NFR BLD AUTO: 74 % (ref 43–75)
PLATELET # BLD AUTO: 142 THOUSANDS/UL (ref 149–390)
PMV BLD AUTO: 9.7 FL (ref 8.9–12.7)
POTASSIUM SERPL-SCNC: 4.4 MMOL/L (ref 3.5–5.3)
PROT SERPL-MCNC: 7.2 G/DL (ref 6.4–8.2)
PROTHROMBIN TIME: 12.8 SECONDS (ref 12.1–14.4)
RBC # BLD AUTO: 4.7 MILLION/UL (ref 3.88–5.62)
SODIUM SERPL-SCNC: 139 MMOL/L (ref 136–145)
TROPONIN I SERPL-MCNC: <0.02 NG/ML
WBC # BLD AUTO: 8.49 THOUSAND/UL (ref 4.31–10.16)

## 2017-10-26 PROCEDURE — 87040 BLOOD CULTURE FOR BACTERIA: CPT | Performed by: EMERGENCY MEDICINE

## 2017-10-26 PROCEDURE — 80053 COMPREHEN METABOLIC PANEL: CPT | Performed by: EMERGENCY MEDICINE

## 2017-10-26 PROCEDURE — 36415 COLL VENOUS BLD VENIPUNCTURE: CPT | Performed by: EMERGENCY MEDICINE

## 2017-10-26 PROCEDURE — 84484 ASSAY OF TROPONIN QUANT: CPT | Performed by: EMERGENCY MEDICINE

## 2017-10-26 PROCEDURE — 85610 PROTHROMBIN TIME: CPT | Performed by: EMERGENCY MEDICINE

## 2017-10-26 PROCEDURE — 85025 COMPLETE CBC W/AUTO DIFF WBC: CPT | Performed by: EMERGENCY MEDICINE

## 2017-10-26 PROCEDURE — 85730 THROMBOPLASTIN TIME PARTIAL: CPT | Performed by: EMERGENCY MEDICINE

## 2017-10-26 PROCEDURE — 93005 ELECTROCARDIOGRAM TRACING: CPT | Performed by: EMERGENCY MEDICINE

## 2017-10-26 PROCEDURE — 83605 ASSAY OF LACTIC ACID: CPT | Performed by: EMERGENCY MEDICINE

## 2017-10-26 PROCEDURE — 71020 HB CHEST X-RAY 2VW FRONTAL&LATL: CPT

## 2017-10-27 VITALS
OXYGEN SATURATION: 91 % | TEMPERATURE: 98.5 F | BODY MASS INDEX: 25.22 KG/M2 | DIASTOLIC BLOOD PRESSURE: 84 MMHG | SYSTOLIC BLOOD PRESSURE: 167 MMHG | RESPIRATION RATE: 18 BRPM | HEART RATE: 84 BPM | HEIGHT: 68 IN | WEIGHT: 166.4 LBS

## 2017-10-27 DIAGNOSIS — Z86.19 PERSONAL HISTORY OF OTHER INFECTIOUS AND PARASITIC DISEASES: ICD-10-CM

## 2017-10-27 DIAGNOSIS — R50.9 FEVER: ICD-10-CM

## 2017-10-27 DIAGNOSIS — R53.83 OTHER FATIGUE: ICD-10-CM

## 2017-10-27 DIAGNOSIS — M62.81 MUSCLE WEAKNESS (GENERALIZED): ICD-10-CM

## 2017-10-27 LAB
BILIRUB UR QL STRIP: NEGATIVE
CLARITY UR: CLEAR
COLOR UR: YELLOW
GLUCOSE UR STRIP-MCNC: NEGATIVE MG/DL
HGB UR QL STRIP.AUTO: NEGATIVE
KETONES UR STRIP-MCNC: NEGATIVE MG/DL
LEUKOCYTE ESTERASE UR QL STRIP: NEGATIVE
NITRITE UR QL STRIP: NEGATIVE
PH UR STRIP.AUTO: 7.5 [PH] (ref 4.5–8)
PROT UR STRIP-MCNC: NEGATIVE MG/DL
SP GR UR STRIP.AUTO: 1.01 (ref 1–1.03)
UROBILINOGEN UR QL STRIP.AUTO: 0.2 E.U./DL

## 2017-10-27 PROCEDURE — 81003 URINALYSIS AUTO W/O SCOPE: CPT | Performed by: EMERGENCY MEDICINE

## 2017-10-27 PROCEDURE — 99285 EMERGENCY DEPT VISIT HI MDM: CPT

## 2017-10-27 NOTE — ED PROVIDER NOTES
History  Chief Complaint   Patient presents with    Tremors     Wife states that pt today started with tremors, then this evening started running a low grade fever and became more confused than usual  Pt is prone to sepsis wife states  Patient presents with his wife for 2 hour complaint of tremulous shaking of his upper extremities and mildly increased confusion  Patient has some early signs of dementia and does get confused from time to time  His wife is quite concerned about his shaking because she states that he gets sepsis Baby Baptise  He has been diagnosed with sepsis 4 times in the past couple of years; 3 times from pneumonia, once from UTI  His wife further states that his temperature sergei from 97-99 she became concerned that he was becoming febrile  She did not give him anything to treat the rise in temperature or the tremors  No recent travel or similar sick contacts  Denies HA, neck pain/stiffness, lightheadedness or dizziness, cough, CP, SOB, abdominal pain, n/v/d, dysuria  12 system ROS o/w negative  History provided by:  Patient and medical records  Medical Problem   Severity:  Mild  Onset quality:  Sudden  Duration:  2 hours  Timing:  Intermittent  Progression:  Waxing and waning  Chronicity:  New  Relieved by:  Nothing tried  Worsened by:  Nothing tried  Ineffective treatments:  None tried  Associated symptoms: no abdominal pain, no chest pain, no congestion, no cough, no diarrhea, no ear pain, no fatigue, no fever, no headaches, no loss of consciousness, no myalgias, no nausea, no rash, no rhinorrhea, no shortness of breath, no sore throat, no vomiting and no wheezing        Prior to Admission Medications   Prescriptions Last Dose Informant Patient Reported? Taking? Cholecalciferol (VITAMIN D-3 PO)  Self Yes No   Sig: Take 2,000 Units by mouth daily     Fluticasone Furoate-Vilanterol (BREO ELLIPTA) 100-25 MCG/INH AEPB  Spouse/Significant Other Yes No   Sig: Inhale 1 puff daily    KRILL OIL PO  Spouse/Significant Other Yes No   Sig: Take 500 mg by mouth daily  METOPROLOL SUCCINATE ER PO  Spouse/Significant Other Yes No   Sig: Take 25 mg by mouth daily  Montelukast Sodium (SINGULAIR PO)  Self Yes No   Sig: Take 10 mg by mouth daily  Potassium Citrate ER 15 MEQ (1620 MG) TBCR  Spouse/Significant Other Yes No   Sig: Take 15 mEq by mouth 2 (two) times a day  Probiotic Product (PROBIOTIC DAILY PO)  Spouse/Significant Other Yes No   Sig: Take by mouth daily  aspirin 81 MG tablet  Spouse/Significant Other Yes No   Sig: Take 81 mg by mouth daily Took within 24 hours    atorvastatin (LIPITOR) 20 mg tablet  Self Yes No   Sig: Take 10 mg by mouth daily     cetirizine (ZYRTEC ALLERGY) 10 mg tablet  Self Yes No   Sig: Take 10 mg by mouth daily  donepezil (ARICEPT) 5 mg tablet  Self Yes No   Sig: Take 10 mg by mouth daily at bedtime     pantoprazole (PROTONIX) 40 mg tablet  Spouse/Significant Other Yes No   Sig: Take 40 mg by mouth daily   tamsulosin (FLOMAX) 0 4 mg  Spouse/Significant Other Yes No   Sig: Take 0 4 mg by mouth daily  tiotropium (SPIRIVA HANDIHALER) 18 mcg inhalation capsule  Spouse/Significant Other Yes No   Sig: Place 18 mcg into inhaler and inhale daily  Facility-Administered Medications: None       Past Medical History:   Diagnosis Date    AAA (abdominal aortic aneurysm) (HCC)     Acid reflux     Anxiety     Asthma     bronchietasis    BPH (benign prostatic hyperplasia)     Cancer (HCC)     skin CA on nose    Change in bowel function     Colon polyps     COPD (chronic obstructive pulmonary disease) (HCC)     Coronary artery disease     Dementia     Dementia     Depression     Fatigue     High cholesterol     Insomnia     Kidney stones     Pancreatitis, chronic (HCC)     Pneumonia     Pulmonary emphysema (HCC)     Sleep apnea     does not use CPAP      Ulcer ([facility] [address] )     stomach    Vitamin D deficiency        Past Surgical History: Procedure Laterality Date    CARDIAC SURGERY      CABG x3    CATARACT EXTRACTION Bilateral     CATARACT EXTRACTION, BILATERAL      EXCISIONAL HEMORRHOIDECTOMY      HERNIA REPAIR      umbilical    LITHOTRIPSY      renal    MOUTH SURGERY      MT COLONOSCOPY FLX DX W/COLLJ SPEC WHEN PFRMD N/A 5/16/2017    Procedure: COLONOSCOPY with polypectomies/ hot snare and tattoo;  Surgeon: Ernestina Hinds MD;  Location: [facility]; Service: Gastroenterology    MT ESOPHAGOGASTRODUODENOSCOPY TRANSORAL DIAGNOSTIC N/A 8/18/2016    Procedure: ESOPHAGOGASTRODUODENOSCOPY (EGD); Surgeon: Ernestina Hinds MD;  Location: [facility]; Service: Gastroenterology    URETERAL STENT PLACEMENT         No family history on file  I have reviewed and agree with the history as documented  Social History   Substance Use Topics    Smoking status: Former Smoker     Packs/day: 1 50     Years: 60 00    Smokeless tobacco: Never Used      Comment: quit 2 yrs ago, used to be a 1-1 5 ppd smoker    Alcohol use No      Comment: quit 25 yrs ago        Review of Systems   Constitutional: Negative for chills, diaphoresis, fatigue and fever  HENT: Negative for congestion, ear pain, rhinorrhea, sore throat and trouble swallowing  Respiratory: Negative for cough, chest tightness, shortness of breath and wheezing  Cardiovascular: Negative for chest pain, palpitations and leg swelling  Gastrointestinal: Negative for abdominal distention, abdominal pain, constipation, diarrhea, nausea and vomiting  Genitourinary: Negative for decreased urine volume, difficulty urinating, dysuria, flank pain, frequency, hematuria and urgency  Musculoskeletal: Negative for arthralgias, back pain, myalgias, neck pain and neck stiffness  Skin: Negative for color change, pallor, rash and wound  Neurological: Positive for tremors   Negative for dizziness, seizures, loss of consciousness, syncope, facial asymmetry, weakness, light-headedness, numbness and headaches  Hematological: Negative for adenopathy  Psychiatric/Behavioral: Negative for agitation and confusion  The patient is not nervous/anxious  All other systems reviewed and are negative  Physical Exam  ED Triage Vitals   Temperature Pulse Respirations Blood Pressure SpO2   10/26/17 2223 10/26/17 2223 10/26/17 2223 10/26/17 2223 10/26/17 2245   99 4 °F (37 4 °C) 91 22 167/84 92 %      Temp Source Heart Rate Source Patient Position - Orthostatic VS BP Location FiO2 (%)   10/26/17 2223 10/26/17 2223 10/26/17 2223 10/26/17 2223 --   Temporal Monitor Sitting Left arm       Pain Score       10/26/17 2223       No Pain           Orthostatic Vital Signs  Vitals:    10/26/17 2223 10/26/17 2245 10/27/17 0045   BP: 167/84     Pulse: 91 89 84   Patient Position - Orthostatic VS: Sitting         Physical Exam   Constitutional: He is oriented to person, place, and time  He appears well-developed and well-nourished  No distress  HENT:   Head: Normocephalic  Right Ear: External ear normal    Left Ear: External ear normal    Mouth/Throat: Oropharynx is clear and moist    Eyes: EOM are normal  Pupils are equal, round, and reactive to light  Neck: Normal range of motion  Neck supple  Cardiovascular: Normal rate and regular rhythm  No murmur heard  Pulmonary/Chest: Effort normal and breath sounds normal    Abdominal: Soft  Bowel sounds are normal  He exhibits no distension  There is no tenderness  Musculoskeletal: Normal range of motion  He exhibits no edema or tenderness  Lymphadenopathy:     He has no cervical adenopathy  Neurological: He is alert and oriented to person, place, and time  He has normal reflexes  No cranial nerve deficit  He exhibits normal muscle tone  Coordination normal    No tremors present during my exam   Skin: Skin is warm and dry  Capillary refill takes less than 2 seconds  No rash noted  He is not diaphoretic  No erythema  No pallor     Psychiatric: He has a normal mood and affect  His behavior is normal  Thought content normal    Vitals reviewed  ED Medications  Medications - No data to display    Diagnostic Studies  Results Reviewed     Procedure Component Value Units Date/Time    UA w Reflex to Microscopic w Reflex to Culture [[id number]]  (Normal) Collected:  10/27/17 0017    Lab Status:  Final result Specimen:  Urine from Urine, Clean Catch Updated:  10/27/17 0025     Color, UA Yellow     Clarity, UA Clear     Specific Gravity, UA 1 015     pH, UA 7 5     Leukocytes, UA Negative     Nitrite, UA Negative     Protein, UA Negative mg/dl      Glucose, UA Negative mg/dl      Ketones, UA Negative mg/dl      Urobilinogen, UA 0 2 E U /dl      Bilirubin, UA Negative     Blood, UA Negative    Lactic acid x2 [[id number]]  (Normal) Collected:  10/26/17 2323    Lab Status:  Final result Specimen:  Blood from Arm, Right Updated:  10/26/17 2350     LACTIC ACID 1 1 mmol/L     Narrative:         Result may be elevated if tourniquet was used during collection  Troponin I [[id number]]  (Normal) Collected:  10/26/17 2323    Lab Status:  Final result Specimen:  Blood from Arm, Right Updated:  10/26/17 2350     Troponin I <0 02 ng/mL     Narrative:         [employer] Chemistry analyzer 99% cutoff is > 0 04 ng/mL in network labs    o cTnI 99% cutoff is useful only when applied to patients in the clinical setting of myocardial ischemia  o cTnI 99% cutoff should be interpreted in the context of clinical history, ECG findings and possibly cardiac imaging to establish correct diagnosis  o cTnI 99% cutoff may be suggestive but clearly not indicative of a coronary event without the clinical setting of myocardial ischemia      Comprehensive metabolic panel [[id number]]  (Abnormal) Collected:  10/26/17 2323    Lab Status:  Final result Specimen:  Blood from Arm, Right Updated:  10/26/17 2347     Sodium 139 mmol/L      Potassium 4 4 mmol/L      Chloride 103 mmol/L      CO2 26 mmol/L      Anion Gap 10 mmol/L BUN 13 mg/dL      Creatinine 0 99 mg/dL      Glucose 95 mg/dL      Calcium 9 0 mg/dL      AST 21 U/L      ALT 21 U/L      Alkaline Phosphatase 89 U/L      Total Protein 7 2 g/dL      Albumin 3 4 (L) g/dL      Total Bilirubin 1 00 mg/dL      eGFR 75 ml/min/1 73sq m     Narrative:         National Kidney Disease Education Program recommendations are as follows:  GFR calculation is accurate only with a steady state creatinine  Chronic Kidney disease less than 60 ml/min/1 73 sq  meters  Kidney failure less than 15 ml/min/1 73 sq  meters  Nithya Perez [[id number]]  (Normal) Collected:  10/26/17 2323    Lab Status:  Final result Specimen:  Blood from Arm, Right Updated:  10/26/17 2342     Protime 12 8 seconds      INR 0 97    APTT [[id number]]  (Normal) Collected:  10/26/17 2323    Lab Status:  Final result Specimen:  Blood from Arm, Right Updated:  10/26/17 2342     PTT 31 seconds     Narrative: Therapeutic Heparin Range = 60-90 seconds    CBC and differential [[id number]]  (Abnormal) Collected:  10/26/17 2323    Lab Status:  Final result Specimen:  Blood from Arm, Right Updated:  10/26/17 2333     WBC 8 49 Thousand/uL      RBC 4 70 Million/uL      Hemoglobin 14 5 g/dL      Hematocrit 43 7 %      MCV 93 fL      MCH 30 9 pg      MCHC 33 2 g/dL      RDW 15 1 %      MPV 9 7 fL      Platelets 383 (L) Thousands/uL      Neutrophils Relative 74 %      Lymphocytes Relative 15 %      Monocytes Relative 10 %      Eosinophils Relative 1 %      Basophils Relative 0 %      Neutrophils Absolute 6 29 Thousands/µL      Lymphocytes Absolute 1 26 Thousands/µL      Monocytes Absolute 0 86 Thousand/µL      Eosinophils Absolute 0 05 Thousand/µL      Basophils Absolute 0 03 Thousands/µL     Blood culture #2 [[id number]] Collected:  10/26/17 2323    Lab Status: In process Specimen:  Blood from Arm, Right Updated:  10/26/17 2332    Blood culture #1 [[id number]] Collected:  10/26/17 2323    Lab Status:   In process Specimen:  Blood from Arm, Right Updated:  10/26/17 2332    Lactic acid x2 [[id number]]     Lab Status:  No result Specimen:  Blood                  X-ray chest 2 views   ED Interpretation by Roberto Kwong DO (10/27 0003)   No acute cardiopulmonary disease                 Procedures  Procedures       Phone Contacts  ED Phone Contact    ED Course  ED Course as of Oct 27 0218   Fri Oct 27, 2017   0029 Results reviewed with patient and wife  Has shaking/tremors again but otherwise feels well  Will recheck temperature  0030 Repeat oral temp is 98 5  HEART Risk Score    Flowsheet Row Most Recent Value   History  0 Filed at: 10/26/2017 2240   ECG  0 Filed at: 10/26/2017 2240   Age  2 Filed at: 10/26/2017 2240   Risk Factors  1 Filed at: 10/26/2017 2240   Troponin  0 Filed at: 10/26/2017 2240   Heart Score Risk Calculator   History  0 Filed at: 10/26/2017 2240   ECG  0 Filed at: 10/26/2017 2240   Age  2 Filed at: 10/26/2017 2240   Risk Factors  1 Filed at: 10/26/2017 2240   Troponin  0 Filed at: 10/26/2017 2240   HEART Score  3 Filed at: 10/26/2017 2240   HEART Score  3 Filed at: 10/26/2017 2240                      Initial Sepsis Screening     Row Name 10/26/17 2240                Is the patient's history suggestive of a new or worsening infection? (!)  Yes (Proceed)  -AC        Suspected source of infection suspect infection, source unknown  -AC        Are two or more of the following signs & symptoms of infection both present and new to the patient? No  -AC        Indicate SIRS criteria          If the answer is yes to both questions, suspicion of sepsis is present          If severe sepsis is present AND tissue hypoperfusion perists in the hour after fluid resuscitation or lactate > 4, the patient meets criteria for SEPTIC SHOCK          Are any of the following organ dysfunction criteria present within 6 hours of suspected infection and SIRS criteria that are NOT considered to be chronic conditions?           Organ dysfunction   Date of presentation of severe sepsis          Time of presentation of severe sepsis          Tissue hypoperfusion persists in the hour after crystalloid fluid administration, evidenced, by either:          Was hypotension present within one hour of the conclusion of crystalloid fluid administration?         Date of presentation of septic shock          Time of presentation of septic shock            User Key  (r) = Recorded By, (t) = Taken By, (c) = Cosigned By    [address] Name Provider Type    [place] [address], DO Physician                  MDM  Number of Diagnoses or Management Options  Occasional tremors:   Diagnosis management comments: DDx:  Tremors - essential, abnormal electrolytes, generalized weakness, doubt sepsis, ACS/MI or stroke  A/P: Will check septic workup, treat symptoms, reevaluate for further work up and disposition  Amount and/or Complexity of Data Reviewed  Clinical lab tests: reviewed and ordered  Tests in the radiology section of CPT®: reviewed and ordered  Obtain history from someone other than the patient: yes (Wife)  Review and summarize past medical records: yes  Independent visualization of images, tracings, or specimens: yes      CritCare Time    Disposition  Final diagnoses:   Occasional tremors     Time reflects when diagnosis was documented in both MDM as applicable and the Disposition within this note     Time User Action Codes Description Comment    10/27/2017 12:31 AM Adrian Garcia Add [R25 1] Occasional tremors       ED Disposition     ED Disposition Condition Comment    Discharge  Lorna Ghada discharge to home/self care      Condition at discharge: Good        Follow-up Information     Follow up With Specialties Details Why Contact Zaina Prabhakar Later, DO Family Medicine Schedule an appointment as soon as possible for a visit If symptoms worsen [address] [address]  [address]  [phone]          Discharge Medication List as of 10/27/2017 12:32 AM      CONTINUE these medications which have NOT CHANGED    Details   aspirin 81 MG tablet Take 81 mg by mouth daily Took within 24 hours , Until Discontinued, Historical Med      atorvastatin (LIPITOR) 20 mg tablet Take 10 mg by mouth daily  , Until Discontinued, Historical Med      cetirizine (ZYRTEC ALLERGY) 10 mg tablet Take 10 mg by mouth daily  , Until Discontinued, Historical Med      Cholecalciferol (VITAMIN D-3 PO) Take 2,000 Units by mouth daily  , Until Discontinued, Historical Med      donepezil (ARICEPT) 5 mg tablet Take 10 mg by mouth daily at bedtime  , Until Discontinued, Historical Med      Fluticasone Furoate-Vilanterol (BREO ELLIPTA) 100-25 MCG/INH AEPB Inhale 1 puff daily  , Until Discontinued, Historical Med      KRILL OIL PO Take 500 mg by mouth daily  , Until Discontinued, Historical Med      METOPROLOL SUCCINATE ER PO Take 25 mg by mouth daily  , Until Discontinued, Historical Med      Montelukast Sodium (SINGULAIR PO) Take 10 mg by mouth daily  , Until Discontinued, Historical Med      pantoprazole (PROTONIX) 40 mg tablet Take 40 mg by mouth daily, Until Discontinued, Historical Med      Potassium Citrate ER 15 MEQ (1620 MG) TBCR Take 15 mEq by mouth 2 (two) times a day , Until Discontinued, Historical Med      Probiotic Product (PROBIOTIC DAILY PO) Take by mouth daily  , Until Discontinued, Historical Med      tamsulosin (FLOMAX) 0 4 mg Take 0 4 mg by mouth daily  , Until Discontinued, Historical Med      tiotropium (SPIRIVA HANDIHALER) 18 mcg inhalation capsule Place 18 mcg into inhaler and inhale daily  , Until Discontinued, Historical Med           No discharge procedures on file      ED Provider  Electronically Signed by           Cale Lara DO  10/27/17 3830

## 2017-10-27 NOTE — ED PROCEDURE NOTE
PROCEDURE  ECG 12 Lead Documentation  Date/Time: 10/26/2017 10:29 PM  Performed by: Elliot Harding  Authorized by: Elliot Harding     Indications / Diagnosis:  Dyspnea  ECG reviewed by me, the ED Provider: yes    Patient location:  ED  Interpretation:     Interpretation: normal    Quality:     Tracing quality:  Limited by artifact  Rate:     ECG rate:  92    ECG rate assessment: normal    Rhythm:     Rhythm: sinus rhythm    Ectopy:     Ectopy: none    QRS:     QRS axis:  Normal  ST segments:     ST segments:  Non-specific  T waves:     T waves: normal

## 2017-10-27 NOTE — PROGRESS NOTES
Assessment  1  Former smoker (V15 82) (J44 521)   2  Advanced COPD (496) (J44 9)   3  Hyperlipidemia (272 4) (E78 5)    Plan  Hyperlipidemia    · Amoxicillin 500 MG Oral Capsule; TAKE 1 CAPSULE 3 TIMES DAILY  Screening for colorectal cancer    · COLONOSCOPY; Status:Temporary Deferral - Patient reports item recently done;     10/26/2022    Discussion/Summary    Patient will let continue current medications  We'll see him again in about 3-4 months  Chief Complaint  Patient is here today for follow up of chronic conditions described in HPI  History of Present Illness  The patient states his hyperlipidemia has been under good control since the last visit  Comorbid Illnesses: coronary artery disease,-- tobacco use-- and-- hypertension  He has no significant interval events  Symptoms: The patient is currently asymptomatic  Associated symptoms include no focal neurologic deficits-- and-- no memory loss  Medications: the patient is adherent with his medication regimen  -- He denies medication side effects  The patient is doing well with his hyperlipidemia goals  the patient's LDL goal is 100 mg/dL  -- the patient's last LDL was 104 mg/dL  Review of Systems    Constitutional: No fever or chills, feels well, no tiredness, no recent weight gain or weight loss  Eyes: No complaints of eye pain, no red eyes, no discharge from eyes, no itchy eyes  ENT: no complaints of earache, no hearing loss, no nosebleeds, no nasal discharge, no sore throat, no hoarseness  Cardiovascular: No complaints of slow heart rate, no fast heart rate, no chest pain, no palpitations, no leg claudication, no lower extremity  Respiratory: shortness of breath-- and-- shortness of breath during exertion  Gastrointestinal: No complaints of abdominal pain, no constipation, no nausea or vomiting, no diarrhea or bloody stools     Genitourinary: No complaints of dysuria, no incontinence, no hesitancy, no nocturia, no genital lesion, no testicular pain  Musculoskeletal: No complaints of arthralgia, no myalgias, no joint swelling or stiffness, no limb pain or swelling  Integumentary: No complaints of skin rash or skin lesions, no itching, no skin wound, no dry skin  Neurological: No compliants of headache, no confusion, no convulsions, no numbness or tingling, no dizziness or fainting, no limb weakness, no difficulty walking  Psychiatric: Is not suicidal, no sleep disturbances, no anxiety or depression, no change in personality, no emotional problems  ROS reviewed  Active Problems  1  Acute bilateral low back pain without sciatica (724 2,338 19) (M54 5)   2  Acute cholecystitis (575 0) (K81 0)   3  Adrenal Calcification (255 41)   4  Advanced COPD (496) (J44 9)   5  Aortic Aneurysm Repair   6  Arteriosclerotic coronary artery disease (414 00) (I25 10)   7  Benign prostatic hypertrophy (600 00) (N40 0)   8  Biliary dyskinesia (575 8) (K82 8)   9  Carious teeth (521 00) (K02 9)   10  Cath Aorta Aneurysm Abdominal   11  Dementia (294 20) (F03 90)   12  Depression (311) (F32 9)   13  Disequilibrium (780 4) (R42)   14  Fatigue (780 79) (R53 83)   15  Hyperlipidemia (272 4) (E78 5)   16  Infiltrate, 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)   17  Insomnia (780 52) (G47 00)   18  Positive depression screening (796 4) (Z13 89)   19  Postoperative pneumonia (997 39,486) (J95 89,J18 9)   20  Renal calculi (592 0) (N20 0)   21  Screening for colorectal cancer (V76 51) (Z12 11,Z12 12)   22  Severe obstructive sleep apnea (327 23) (G47 33)   23  Skin cancer (173 90) (C44 90)   24  Urine frequency (788 41) (R35 0)   25  Vitamin D deficiency (268 9) (E55 9)    Past Medical History  1  History of Abdominal aortic aneurysm without rupture (441 4) (I71 4)   2  History of Acute exacerbation of chronic obstructive airways disease with asthma   (493 22) (J44 1,J45 901)   3   History of Acute serous otitis media of left ear, recurrence not specified (381 01) (H65 02)   4  History of BPH without urinary obstruction (600 00) (N40 0)   5  History of CAP (community acquired pneumonia) (5) (J18 9)   6  History of Cataracts, bilateral (366 9) (H26 9)   7  History of Contusion of left elbow, initial encounter (923 11) (S50 02XA)   8  History of Cough (786 2) (R05)   9  History of Dental caries (521 00) (K02 9)   10  History of Exercise counseling (V65 41) (Z71 82)   11  History of Fever of unknown origin (780 60) (R50 9)   12  History of Glaucoma screening (V80 1) (Z13 5)   13  History of abdominal aortic aneurysm (AAA) (V12 59) (Z86 79)   14  History of bacteremia (V12 09) (Z87 898)   15  History of bladder stone (V13 09) (Z87 448)   16  History of chronic obstructive lung disease (V12 69) (Z87 09)   17  History of common cold (V12 09) (Z86 19)   18  History of epistaxis (V12 69) (Z87 898)   19  History of influenza vaccination (V49 89) (Z92 29)   20  History of pneumonia (V12 61) (Z87 01)   21  History of skin cancer (V10 83) (Z85 828)   22  History of sleep apnea (V13 89) (Z86 69)   23  History of urinary tract infection (V13 02) (Z87 440)   24  History of Hydronephrosis with obstructing calculus (591) (N13 2)   25  History of Influenza vaccine needed (V04 81) (Z23)   26  History of Need for pneumococcal vaccination (V03 82) (Z23)   27  Need for prophylactic vaccination and inoculation against influenza (V04 81) (Z23)   28  History of Other emphysema (492 8) (J43 8)   29  Personal history of kidney stones (V13 01) (Z87 442)   30  History of Pneumonia (486) (J18 9)   31  History of Screening for colon cancer (V76 51) (Z12 11)   32  History of Screening for depression (V79 0) (Z13 89)   33  History of Screening for genitourinary condition (V81 6) (Z13 89)   34  History of Screening for neurological condition (V80 09) (Z13 89)   35  History of Sepsis, due to unspecified organism (038 9,995 91) (A41 9)   36  History of Sinusitis (473 9) (J32 9)   37   History of Special screening examination for neoplasm of prostate (V76 44) (Z12 5)    The active problems and past medical history were reviewed and updated today  Surgical History  1  History of CABG   2  History of Cataract Surgery   3  History of Coronary Artery Surgery   4  History of Cystoscopy With Insertion Of Ureteral Stent   5  History of Cystoscopy With Ureteroscopy With Lithotripsy   6  History of Endovascular Repair Of Aortic Aneurysm   7  History of Hernia Repair   8  History of Lithotripsy   9  History of Oral Surgery Tooth Extraction   10  History of Renal Lithotripsy   11  History of Tonsillectomy   12  History of Transurethral Removal Of Internally Dwelling Ureteral Stent    The surgical history was reviewed and updated today  Family History  Mother    1  Family history of Type 2 diabetes mellitus (250 00) (E11 9)  Father    2  Family history of renal failure (V18 69) (Z84 1)  Maternal Grandmother    3  Family history of Type 2 diabetes mellitus (250 00) (E11 9)  Paternal Grandmother    4  Family history of Benign essential hypertension (401 1) (I10)    The family history was reviewed and updated today  Social History   · Daily caffeine consumption, 4-5 servings a day   · Denied: Drug Use (305 90)   · Former smoker (V15 82) (Q26 257)   ·    · No illicit drug use   · No living will   · Retired   · Stopped Drinking Alcohol  The social history was reviewed and updated today  The social history was reviewed and is unchanged  Current Meds   1  Aspirin 81 MG Oral Tablet Delayed Release; Take 1 tablet daily Recorded   2  Atorvastatin Calcium 20 MG Oral Tablet; TAKE 1 TABLET AT BEDTIME; Therapy: 11WJW7335 to (Evaluate:07Duc8666)  Requested for: 17Aug2017; Last   Rx:17Aug2017 Ordered   3  Breo Ellipta 100-25 MCG/INH Inhalation Aerosol Powder Breath Activated; USE 1   INHALATION ONCE DAILY; Therapy: 81GEE4185 to ([phone])  Requested for: 26Oct2017 Recorded   4   Escitalopram Oxalate 20 MG Oral Tablet; TAKE 1 TABLET EVERY MORNING; Therapy: 00XVD6059 to (Last Rx:82Pfz8557)  Requested for: 22Snn4921 Ordered   5  Metoprolol Succinate ER 25 MG Oral Tablet Extended Release 24 Hour; Take 1 tablet   daily Recorded   6  Montelukast Sodium 10 MG Oral Tablet; Take 1 tablet daily; Therapy: 22FTX3751 to (Xuan Garcia)  Requested for: 23CFH6616; Last   PQ:90SYP0792 Ordered   7  Probiotic Oral Capsule Recorded   8  RA Vitamin D-3 2000 UNIT Oral Capsule; TAKE 1 SOFTGELL ONCE DAILY Recorded   9  Spiriva HandiHaler 18 MCG Inhalation Capsule; INHALE THE CONTENTS OF 1     CAPSULE ONCE DAILY; Therapy: 14CGE6323 to (Laitha Burn)  Requested for: 84GQC2630; Last   Rx:39Bqp1563 Ordered   10  Tamsulosin HCl - 0 4 MG Oral Capsule; Therapy: 33DCF5665 to (Last Rx:22Nov2010)  Requested for: 22Nov2010 Ordered   11  Urocit-K 15 15 MEQ (1620 MG) Oral Tablet Extended Release; Take 1 tablet daily    Recorded   12  ZyrTEC Allergy 10 MG Oral Capsule; TAKE 1 CAPSULE Daily Recorded    The medication list was reviewed and updated today  Allergies  1  Cipro TABS   2  Augmentin TABS   3  Morphine Sulfate SOLN  4  No Known Environmental Allergies   5  No Known Food Allergies    Vitals  Vital Signs    Recorded: 68CXW9740 58:82EG   Systolic 146, LUE, Sitting   Diastolic 60, LUE, Sitting   Height 5 ft 8 in   Weight 166 lb 8 oz   BMI Calculated 25 32   BSA Calculated 1 89     Physical Exam    Constitutional   General appearance: No acute distress, well appearing and well nourished  Eyes   Conjunctiva and lids: No swelling, erythema, or discharge  Pupils and irises: Equal, round and reactive to light  Ears, Nose, Mouth, and Throat   External inspection of ears and nose: Normal     Otoscopic examination: Tympanic membrance translucent with normal light reflex  Canals patent without erythema  Nasal mucosa, septum, and turbinates: Normal without edema or erythema      Oropharynx: Normal with no erythema, edema, exudate or lesions  Pulmonary   Respiratory effort: No increased work of breathing or signs of respiratory distress  Auscultation of lungs: Clear to auscultation, equal breath sounds bilaterally, no wheezes, no rales, no rhonci  Cardiovascular   Palpation of heart: Normal PMI, no thrills  Auscultation of heart: Normal rate and rhythm, normal S1 and S2, without murmurs  Examination of extremities for edema and/or varicosities: Normal     Carotid pulses: Normal     Abdomen   Abdomen: Non-tender, no masses  Liver and spleen: No hepatomegaly or splenomegaly  Lymphatic   Palpation of lymph nodes in neck: No lymphadenopathy  Musculoskeletal   Gait and station: Normal     Digits and nails: Normal without clubbing or cyanosis  Inspection/palpation of joints, bones, and muscles: Normal     Skin   Skin and subcutaneous tissue: Normal without rashes or lesions  Neurologic   Cranial nerves: Cranial nerves 2-12 intact  Reflexes: 2+ and symmetric  Sensation: No sensory loss  Psychiatric   Orientation to person, place and time: Normal     Mood and affect: Normal         Socks and shoes removed, Right Foot Findings: normal foot, no swelling, no erythema  The right toes were normal-- and-- had full range of motion  The sensory exam showed normal vibratory sensation at the level of the toes on the right  Normal tactile sensation with monofilament testing throughout the right foot  Socks and shoes removed, Left Foot Findings: normal foot, no swelling, no erythema  The left toes were normal-- and-- had full range of motion  The sensory exam showed normal vibratory sensation at the level of the toes on the left  Normal tactile sensation with monofilament testing throughout the left foot  Pulses:   2+ in the dorsalis pedis on the right  Pulses:   2+ in the dorsalis pedis on the left  Assign Risk Category: 0: No loss of protective sensation, no deformity  No present risk  Health Management  Health Maintenance   Medicare Annual Wellness Visit; every 1 year; Last 35QSC3133; Next Due: 68KCX8957;  Overdue    Future Appointments    Date/Time Provider Specialty Site   11/16/2017 07:30 AM Aleksandra Palma MD Urology [facility]   12/15/2017 01:00 PM Aleksandra Palma MD Urology [address]     Signatures   Electronically signed by : Se Wilks DO; Oct 26 2017  1:34PM EST                       (Author)

## 2017-10-27 NOTE — DISCHARGE INSTRUCTIONS
Tremors   WHAT YOU NEED TO KNOW:   A tremor is a movement you cannot control that occurs in a rhythm  Tremors most commonly occur in the hands  Other common places include the head or face, trunk, or legs  Your voice can also have a tremor and sound shaky when you speak  A tremor may be caused by a nerve problem, too much thyroid hormone, or by certain medicines, caffeine, or alcohol  Tremors may be temporary or permanent  The tremor may go away and return, or worsen with stress  Tremors can happen at any age, but they are more common in later years  DISCHARGE INSTRUCTIONS:   Contact your healthcare provider if:   · You have a new or worsening tremor  · You have tremors that make it difficult to do your regular activities  · You have questions or concerns about your condition or care  Medicines:   · Medicines  may be used to help control some kinds of tremors  · Take your medicine as directed  Contact your healthcare provider if you think your medicine is not helping or if you have side effects  Tell him or her if you are allergic to any medicine  Keep a list of the medicines, vitamins, and herbs you take  Include the amounts, and when and why you take them  Bring the list or the pill bottles to follow-up visits  Carry your medicine list with you in case of an emergency  Manage your symptoms:   · Do not have caffeine or other chemicals that affect your nerves  Limit or do not have caffeine  Caffeine can make tremors worse  Talk to your healthcare provider about herbal medicines, teas, and supplements  They may also increase tremors  Do not use illegal drugs  · Go to physical and occupational therapy as directed  A physical therapist can teach you exercises to help reduce the tremor and improve muscle control  You may be shown how to hold the body part during a tremor to help control the movement  The therapist can also help you build strength and balance   An occupational therapist can show you how to use adaptive devices to help you move more easily  · Use objects that will help you control movements  You may have more hand control if you add a watch or bracelet to your wrist  It may be easier for you to drink from a straw, or to fill your cup only half full  Cups with lids, such as travel mugs, can also help you drink with more control  Heavy eating utensils can help you eat more easily  A button fastener can help you button clothing if tremors in your hands make this difficult  · Set a regular sleep schedule  Lack of sleep can make tremors worse  Try to go to bed at the same time each night and wake up at the same time each morning  Follow up with your healthcare provider as directed:  Write down your questions so you remember to ask them during your visits  © 2017 [employer] [employer] Information is for End User's use only and may not be sold, redistributed or otherwise used for commercial purposes  All illustrations and images included in CareNotes® are the copyrighted property of [employer]  or Babak Deshpande  The above information is an  only  It is not intended as medical advice for individual conditions or treatments  Talk to your doctor, nurse or pharmacist before following any medical regimen to see if it is safe and effective for you

## 2017-10-30 LAB
ATRIAL RATE: 92 BPM
P AXIS: 74 DEGREES
PR INTERVAL: 188 MS
QRS AXIS: 70 DEGREES
QRSD INTERVAL: 86 MS
QT INTERVAL: 372 MS
QTC INTERVAL: 460 MS
T WAVE AXIS: 82 DEGREES
VENTRICULAR RATE: 92 BPM

## 2017-11-01 LAB
BACTERIA BLD CULT: NORMAL
BACTERIA BLD CULT: NORMAL

## 2017-11-08 ENCOUNTER — APPOINTMENT (OUTPATIENT)
Dept: RADIOLOGY | Facility: MEDICAL CENTER | Age: 77
End: 2017-11-08
Payer: MEDICARE

## 2017-11-08 ENCOUNTER — ALLSCRIPTS OFFICE VISIT (OUTPATIENT)
Dept: OTHER | Facility: OTHER | Age: 77
End: 2017-11-08

## 2017-11-08 ENCOUNTER — TRANSCRIBE ORDERS (OUTPATIENT)
Dept: RADIOLOGY | Facility: MEDICAL CENTER | Age: 77
End: 2017-11-08

## 2017-11-08 DIAGNOSIS — Z86.19 PERSONAL HISTORY OF OTHER INFECTIOUS AND PARASITIC DISEASES: ICD-10-CM

## 2017-11-08 PROCEDURE — 73502 X-RAY EXAM HIP UNI 2-3 VIEWS: CPT

## 2017-11-08 PROCEDURE — 71020 HB CHEST X-RAY 2VW FRONTAL&LATL: CPT

## 2017-11-10 ENCOUNTER — APPOINTMENT (OUTPATIENT)
Dept: PREADMISSION TESTING | Facility: HOSPITAL | Age: 77
End: 2017-11-10
Payer: MEDICARE

## 2017-11-10 ENCOUNTER — APPOINTMENT (OUTPATIENT)
Dept: LAB | Facility: HOSPITAL | Age: 77
End: 2017-11-10
Attending: UROLOGY
Payer: MEDICARE

## 2017-11-10 ENCOUNTER — ANESTHESIA EVENT (OUTPATIENT)
Dept: PERIOP | Facility: HOSPITAL | Age: 77
End: 2017-11-10
Payer: MEDICARE

## 2017-11-10 ENCOUNTER — TRANSCRIBE ORDERS (OUTPATIENT)
Dept: ADMINISTRATIVE | Facility: HOSPITAL | Age: 77
End: 2017-11-10

## 2017-11-10 VITALS
SYSTOLIC BLOOD PRESSURE: 134 MMHG | RESPIRATION RATE: 18 BRPM | DIASTOLIC BLOOD PRESSURE: 72 MMHG | WEIGHT: 158.2 LBS | BODY MASS INDEX: 23.98 KG/M2 | HEART RATE: 78 BPM | HEIGHT: 68 IN | TEMPERATURE: 97.6 F

## 2017-11-10 DIAGNOSIS — Z01.818 PREOP EXAMINATION: Primary | ICD-10-CM

## 2017-11-10 DIAGNOSIS — N21.0 CALCULUS IN DIVERTICULUM OF BLADDER: ICD-10-CM

## 2017-11-10 DIAGNOSIS — Z01.818 PREOP EXAMINATION: ICD-10-CM

## 2017-11-10 LAB
ALBUMIN SERPL BCP-MCNC: 3.4 G/DL (ref 3.5–5)
ALP SERPL-CCNC: 96 U/L (ref 46–116)
ALT SERPL W P-5'-P-CCNC: 19 U/L (ref 12–78)
ANION GAP SERPL CALCULATED.3IONS-SCNC: 7 MMOL/L (ref 4–13)
AST SERPL W P-5'-P-CCNC: 18 U/L (ref 5–45)
BASOPHILS # BLD AUTO: 0.02 THOUSANDS/ΜL (ref 0–0.1)
BASOPHILS NFR BLD AUTO: 0 % (ref 0–1)
BILIRUB SERPL-MCNC: 0.83 MG/DL (ref 0.2–1)
BUN SERPL-MCNC: 14 MG/DL (ref 5–25)
CALCIUM SERPL-MCNC: 9.2 MG/DL (ref 8.3–10.1)
CHLORIDE SERPL-SCNC: 102 MMOL/L (ref 100–108)
CO2 SERPL-SCNC: 28 MMOL/L (ref 21–32)
CREAT SERPL-MCNC: 1.12 MG/DL (ref 0.6–1.3)
EOSINOPHIL # BLD AUTO: 0.04 THOUSAND/ΜL (ref 0–0.61)
EOSINOPHIL NFR BLD AUTO: 1 % (ref 0–6)
ERYTHROCYTE [DISTWIDTH] IN BLOOD BY AUTOMATED COUNT: 15 % (ref 11.6–15.1)
GFR SERPL CREATININE-BSD FRML MDRD: 64 ML/MIN/1.73SQ M
GLUCOSE SERPL-MCNC: 107 MG/DL (ref 65–140)
HCT VFR BLD AUTO: 43.9 % (ref 36.5–49.3)
HGB BLD-MCNC: 14.3 G/DL (ref 12–17)
LYMPHOCYTES # BLD AUTO: 1.19 THOUSANDS/ΜL (ref 0.6–4.47)
LYMPHOCYTES NFR BLD AUTO: 17 % (ref 14–44)
MCH RBC QN AUTO: 30.8 PG (ref 26.8–34.3)
MCHC RBC AUTO-ENTMCNC: 32.6 G/DL (ref 31.4–37.4)
MCV RBC AUTO: 94 FL (ref 82–98)
MONOCYTES # BLD AUTO: 0.46 THOUSAND/ΜL (ref 0.17–1.22)
MONOCYTES NFR BLD AUTO: 7 % (ref 4–12)
NEUTROPHILS # BLD AUTO: 5.38 THOUSANDS/ΜL (ref 1.85–7.62)
NEUTS SEG NFR BLD AUTO: 75 % (ref 43–75)
NRBC BLD AUTO-RTO: 0 /100 WBCS
PLATELET # BLD AUTO: 243 THOUSANDS/UL (ref 149–390)
PMV BLD AUTO: 10 FL (ref 8.9–12.7)
POTASSIUM SERPL-SCNC: 4.8 MMOL/L (ref 3.5–5.3)
PROT SERPL-MCNC: 7.8 G/DL (ref 6.4–8.2)
RBC # BLD AUTO: 4.65 MILLION/UL (ref 3.88–5.62)
SODIUM SERPL-SCNC: 137 MMOL/L (ref 136–145)
WBC # BLD AUTO: 7.09 THOUSAND/UL (ref 4.31–10.16)

## 2017-11-10 PROCEDURE — 80053 COMPREHEN METABOLIC PANEL: CPT

## 2017-11-10 PROCEDURE — 87086 URINE CULTURE/COLONY COUNT: CPT

## 2017-11-10 PROCEDURE — 36415 COLL VENOUS BLD VENIPUNCTURE: CPT

## 2017-11-10 PROCEDURE — 85025 COMPLETE CBC W/AUTO DIFF WBC: CPT

## 2017-11-10 RX ORDER — LANOLIN ALCOHOL/MO/W.PET/CERES
1000 CREAM (GRAM) TOPICAL 3 TIMES WEEKLY
COMMUNITY
End: 2020-08-29 | Stop reason: HOSPADM

## 2017-11-10 RX ORDER — MULTIVITAMIN
1 TABLET ORAL DAILY
Status: ON HOLD | COMMUNITY
End: 2018-05-13

## 2017-11-10 RX ORDER — SODIUM CHLORIDE 9 MG/ML
125 INJECTION, SOLUTION INTRAVENOUS CONTINUOUS
Status: CANCELLED | OUTPATIENT
Start: 2017-11-30

## 2017-11-10 NOTE — PRE-PROCEDURE INSTRUCTIONS
Pre-Surgery Instructions:   Medication Instructions    aspirin 81 MG tablet Patient was instructed by Physician and understands   atorvastatin (LIPITOR) 20 mg tablet Patient was instructed by Physician and understands   cetirizine (ZYRTEC ALLERGY) 10 mg tablet Patient was instructed by Physician and understands   Cholecalciferol (VITAMIN D-3 PO) Patient was instructed by Physician and understands   cyanocobalamin (VITAMIN B-12) 1,000 mcg tablet Patient was instructed by Physician and understands   Fluticasone Furoate-Vilanterol (BREO ELLIPTA) 100-25 MCG/INH AEPB Patient was instructed by Physician and understands   KRILL OIL PO Patient was instructed by Physician and understands   METOPROLOL SUCCINATE ER PO Patient was instructed by Physician and understands   Montelukast Sodium (SINGULAIR PO) Patient was instructed by Physician and understands   Multiple Vitamin (MULTIVITAMIN) tablet Patient was instructed by Physician and understands   Potassium Citrate ER 15 MEQ (1620 MG) TBCR Patient was instructed by Physician and understands   Probiotic Product (PROBIOTIC DAILY PO) Patient was instructed by Physician and understands   tamsulosin (FLOMAX) 0 4 mg Patient was instructed by Physician and understands   tiotropium (SPIRIVA HANDIHALER) 18 mcg inhalation capsule Patient was instructed by Physician and understands  Pt instructed by Dr Sharee Gale to take lexapro and singulair with a sip of water the morning of surgery and also breo and spireva IN  Pt and wife given/reviewed [facility] preop instructions and chlorhexadine soap   Pt to hold asa/NSAIDS/vitamins/herbal supplements one week before surgery

## 2017-11-10 NOTE — PRE-PROCEDURE INSTRUCTIONS
Pre-Surgery Instructions:   Medication Instructions    aspirin 81 MG tablet Patient was instructed by Physician and understands   atorvastatin (LIPITOR) 20 mg tablet Patient was instructed by Physician and understands   cetirizine (ZYRTEC ALLERGY) 10 mg tablet Patient was instructed by Physician and understands   Cholecalciferol (VITAMIN D-3 PO) Patient was instructed by Physician and understands   cyanocobalamin (VITAMIN B-12) 1,000 mcg tablet Patient was instructed by Physician and understands   Fluticasone Furoate-Vilanterol (BREO ELLIPTA) 100-25 MCG/INH AEPB Patient was instructed by Physician and understands   KRILL OIL PO Patient was instructed by Physician and understands   METOPROLOL SUCCINATE ER PO Patient was instructed by Physician and understands   Montelukast Sodium (SINGULAIR PO) Patient was instructed by Physician and understands   Multiple Vitamin (MULTIVITAMIN) tablet Patient was instructed by Physician and understands   Potassium Citrate ER 15 MEQ (1620 MG) TBCR Patient was instructed by Physician and understands   Probiotic Product (PROBIOTIC DAILY PO) Patient was instructed by Physician and understands   tamsulosin (FLOMAX) 0 4 mg Patient was instructed by Physician and understands   tiotropium (SPIRIVA HANDIHALER) 18 mcg inhalation capsule Patient was instructed by Physician and understands   UNKNOWN TO PATIENT Patient was instructed by Physician and understands  Pt instructed by Dr Shira Steen to take lexapro and singulair with a sip of water the morning of surgery also breo and bi IN  Pt and wife given/reviewed [facility] preop instructions and chlorhexadine soap  Pt to hold asa/NSAIDS/vitamins/herbal supplements one week before surgery

## 2017-11-11 LAB — BACTERIA UR CULT: NORMAL

## 2017-11-13 ENCOUNTER — GENERIC CONVERSION - ENCOUNTER (OUTPATIENT)
Dept: OTHER | Facility: OTHER | Age: 77
End: 2017-11-13

## 2017-11-16 ENCOUNTER — ANESTHESIA (OUTPATIENT)
Dept: PERIOP | Facility: HOSPITAL | Age: 77
End: 2017-11-16
Payer: MEDICARE

## 2017-11-30 ENCOUNTER — HOSPITAL ENCOUNTER (OUTPATIENT)
Facility: HOSPITAL | Age: 77
Setting detail: OUTPATIENT SURGERY
Discharge: HOME | End: 2017-11-30
Attending: UROLOGY | Admitting: UROLOGY
Payer: MEDICARE

## 2017-11-30 VITALS
HEIGHT: 68 IN | DIASTOLIC BLOOD PRESSURE: 64 MMHG | SYSTOLIC BLOOD PRESSURE: 133 MMHG | HEART RATE: 79 BPM | TEMPERATURE: 97.5 F | OXYGEN SATURATION: 94 % | WEIGHT: 158.2 LBS | RESPIRATION RATE: 20 BRPM | BODY MASS INDEX: 23.98 KG/M2

## 2017-11-30 DIAGNOSIS — N21.0 CALCULUS IN BLADDER: ICD-10-CM

## 2017-11-30 PROCEDURE — 82360 CALCULUS ASSAY QUANT: CPT | Performed by: UROLOGY

## 2017-11-30 RX ORDER — ONDANSETRON 2 MG/ML
4 INJECTION INTRAMUSCULAR; INTRAVENOUS EVERY 6 HOURS PRN
Status: DISCONTINUED | OUTPATIENT
Start: 2017-11-30 | End: 2017-11-30 | Stop reason: HOSPADM

## 2017-11-30 RX ORDER — ESCITALOPRAM OXALATE 20 MG/1
5 TABLET ORAL DAILY
COMMUNITY
End: 2018-01-29

## 2017-11-30 RX ORDER — MAGNESIUM HYDROXIDE 1200 MG/15ML
LIQUID ORAL AS NEEDED
Status: DISCONTINUED | OUTPATIENT
Start: 2017-11-30 | End: 2017-11-30 | Stop reason: HOSPADM

## 2017-11-30 RX ORDER — PROPOFOL 10 MG/ML
INJECTION, EMULSION INTRAVENOUS CONTINUOUS PRN
Status: DISCONTINUED | OUTPATIENT
Start: 2017-11-30 | End: 2017-11-30 | Stop reason: SURG

## 2017-11-30 RX ORDER — FENTANYL CITRATE 50 UG/ML
50 INJECTION, SOLUTION INTRAMUSCULAR; INTRAVENOUS
Status: DISCONTINUED | OUTPATIENT
Start: 2017-11-30 | End: 2017-11-30 | Stop reason: HOSPADM

## 2017-11-30 RX ORDER — ACETAMINOPHEN 325 MG/1
650 TABLET ORAL EVERY 6 HOURS PRN
Status: DISCONTINUED | OUTPATIENT
Start: 2017-11-30 | End: 2017-11-30 | Stop reason: HOSPADM

## 2017-11-30 RX ORDER — PROPOFOL 10 MG/ML
INJECTION, EMULSION INTRAVENOUS AS NEEDED
Status: DISCONTINUED | OUTPATIENT
Start: 2017-11-30 | End: 2017-11-30 | Stop reason: SURG

## 2017-11-30 RX ORDER — SODIUM CHLORIDE 9 MG/ML
125 INJECTION, SOLUTION INTRAVENOUS CONTINUOUS
Status: DISCONTINUED | OUTPATIENT
Start: 2017-11-30 | End: 2017-11-30 | Stop reason: HOSPADM

## 2017-11-30 RX ADMIN — PROPOFOL 20 MG: 10 INJECTION, EMULSION INTRAVENOUS at 12:41

## 2017-11-30 RX ADMIN — PROPOFOL 30 MG: 10 INJECTION, EMULSION INTRAVENOUS at 12:59

## 2017-11-30 RX ADMIN — CEFAZOLIN SODIUM 2000 MG: 2 SOLUTION INTRAVENOUS at 12:40

## 2017-11-30 RX ADMIN — PROPOFOL 50 MG: 10 INJECTION, EMULSION INTRAVENOUS at 12:45

## 2017-11-30 RX ADMIN — PROPOFOL 70 MCG/KG/MIN: 10 INJECTION, EMULSION INTRAVENOUS at 12:41

## 2017-11-30 RX ADMIN — SODIUM CHLORIDE 125 ML/HR: 0.9 INJECTION, SOLUTION INTRAVENOUS at 10:29

## 2017-11-30 RX ADMIN — GENTAMICIN SULFATE 70 MG: 40 INJECTION, SOLUTION INTRAMUSCULAR; INTRAVENOUS at 12:51

## 2017-11-30 NOTE — OP NOTE
OPERATIVE REPORT  PATIENT NAME: Samuel Gastelum    :  1940  MRN: [id number]  Pt Location: [facility] OR ROOM 05    SURGERY DATE: 2017    Surgeon(s) and Role:     * Elva Hess MD - Primary    Preop Diagnosis:  Calculus in bladder [N21 0]    Post-Op Diagnosis Codes:     * Calculus in bladder [N21 0]    Procedure(s) (LRB):  CYSTOSCOPY (N/A)  LITHOLOPAXY HOLMIUM LASER (N/A)    Specimen(s): It was approximately 1 cm in size, too large to manipulate through the sheath without breaking it up 1st   * No specimens in log *    Estimated Blood Loss:   Minimal    Drains:       Anesthesia Type:   General    Operative Indications:  Calculus in bladder [N21 0]      Operative Findings:  A yellow crystalline stone was identified in the urinary bladder  Complications:   None    Procedure and Technique:,   The patient was brought to the operating room identified  He was sedated  He was placed in the lithotomy position and prepped and draped in sterile fashion  A time-out was performed  The 25 [place] cystoscope sheath-30 degree telescope was inserted under direct vision  The urethra was normal   The prostate was borderline obstructing  Inspection of the interior of the bladder disclosed a normal trigone, normal orifices and normal mucosa  The stone was identified in the dependent portion of the bladder  There was moderate trabeculation noted, despite the relatively small prostate  Using the 550 micron holmium laser fiber, the stone was broken into the several chunks which could be brought through the cystoscope  In addition, there was residual tiny dustlike fragments which were irrigated out  After clearing the bladder, the cystoscope was removed  The procedure was completed  The patient tolerated well and was sent to recovery in satisfactory condition  There was no blood loss       I was present for the entire procedure    Patient Disposition:  PACU     SIGNATURE: Elva Hess MD  DATE: November 30, 2017  TIME: 1:13 PM

## 2017-11-30 NOTE — DISCHARGE INSTRUCTIONS

## 2017-12-07 ENCOUNTER — TRANSCRIBE ORDERS (OUTPATIENT)
Dept: LAB | Facility: MEDICAL CENTER | Age: 77
End: 2017-12-07

## 2017-12-07 ENCOUNTER — APPOINTMENT (OUTPATIENT)
Dept: LAB | Facility: MEDICAL CENTER | Age: 77
End: 2017-12-07
Payer: MEDICARE

## 2017-12-07 ENCOUNTER — APPOINTMENT (OUTPATIENT)
Dept: RADIOLOGY | Facility: MEDICAL CENTER | Age: 77
End: 2017-12-07
Payer: MEDICARE

## 2017-12-07 ENCOUNTER — ALLSCRIPTS OFFICE VISIT (OUTPATIENT)
Dept: OTHER | Facility: OTHER | Age: 77
End: 2017-12-07

## 2017-12-07 DIAGNOSIS — J44.1 CHRONIC OBSTRUCTIVE PULMONARY DISEASE WITH ACUTE EXACERBATION (HCC): ICD-10-CM

## 2017-12-07 LAB
BASOPHILS # BLD AUTO: 0.03 THOUSANDS/ΜL (ref 0–0.1)
BASOPHILS NFR BLD AUTO: 1 % (ref 0–1)
EOSINOPHIL # BLD AUTO: 0.06 THOUSAND/ΜL (ref 0–0.61)
EOSINOPHIL NFR BLD AUTO: 1 % (ref 0–6)
ERYTHROCYTE [DISTWIDTH] IN BLOOD BY AUTOMATED COUNT: 15.3 % (ref 11.6–15.1)
HCT VFR BLD AUTO: 46.7 % (ref 36.5–49.3)
HGB BLD-MCNC: 15.2 G/DL (ref 12–17)
LYMPHOCYTES # BLD AUTO: 1.54 THOUSANDS/ΜL (ref 0.6–4.47)
LYMPHOCYTES NFR BLD AUTO: 30 % (ref 14–44)
MCH RBC QN AUTO: 30.3 PG (ref 26.8–34.3)
MCHC RBC AUTO-ENTMCNC: 32.5 G/DL (ref 31.4–37.4)
MCV RBC AUTO: 93 FL (ref 82–98)
MONOCYTES # BLD AUTO: 0.71 THOUSAND/ΜL (ref 0.17–1.22)
MONOCYTES NFR BLD AUTO: 14 % (ref 4–12)
NEUTROPHILS # BLD AUTO: 2.78 THOUSANDS/ΜL (ref 1.85–7.62)
NEUTS SEG NFR BLD AUTO: 54 % (ref 43–75)
NRBC BLD AUTO-RTO: 0 /100 WBCS
NT-PROBNP SERPL-MCNC: 106 PG/ML
PLATELET # BLD AUTO: 139 THOUSANDS/UL (ref 149–390)
PMV BLD AUTO: 11.1 FL (ref 8.9–12.7)
RBC # BLD AUTO: 5.01 MILLION/UL (ref 3.88–5.62)
WBC # BLD AUTO: 5.13 THOUSAND/UL (ref 4.31–10.16)

## 2017-12-07 PROCEDURE — 85025 COMPLETE CBC W/AUTO DIFF WBC: CPT

## 2017-12-07 PROCEDURE — 71020 HB CHEST X-RAY 2VW FRONTAL&LATL: CPT

## 2017-12-07 PROCEDURE — 36415 COLL VENOUS BLD VENIPUNCTURE: CPT

## 2017-12-07 PROCEDURE — 83880 ASSAY OF NATRIURETIC PEPTIDE: CPT

## 2017-12-08 ENCOUNTER — GENERIC CONVERSION - ENCOUNTER (OUTPATIENT)
Dept: OTHER | Facility: OTHER | Age: 77
End: 2017-12-08

## 2017-12-11 LAB
CA PHOS MFR STONE: 10 %
CALCIUM OXALATE DIHYDRATE MFR STONE IR: 10 %
COLOR STONE: NORMAL
COM MFR STONE: 80 %
COMMENT-STONE3: NORMAL
COMPOSITION: NORMAL
LABORATORY COMMENT REPORT: NORMAL
NIDUS STONE QL: NORMAL
PHOTO: NORMAL
SIZE STONE: NORMAL MM
STONE ANALYSIS-IMP: NORMAL
WT STONE: 131 MG

## 2017-12-15 ENCOUNTER — ALLSCRIPTS OFFICE VISIT (OUTPATIENT)
Dept: OTHER | Facility: OTHER | Age: 77
End: 2017-12-15

## 2017-12-15 LAB
BILIRUB UR QL STRIP: NEGATIVE
CLARITY UR: NORMAL
COLOR UR: YELLOW
GLUCOSE (HISTORICAL): NEGATIVE
HGB UR QL STRIP.AUTO: NORMAL
KETONES UR STRIP-MCNC: NEGATIVE MG/DL
LEUKOCYTE ESTERASE UR QL STRIP: NEGATIVE
NITRITE UR QL STRIP: NEGATIVE
PH UR STRIP.AUTO: 7 [PH]
PROT UR STRIP-MCNC: NEGATIVE MG/DL
SP GR UR STRIP.AUTO: 1.01
UROBILINOGEN UR QL STRIP.AUTO: 1

## 2018-01-10 NOTE — RESULT NOTES
Verified Results  * XR ELBOW 3+ VIEW LEFT 21Mar2017 09:21AM Simona James Order Number: [id number]     Test Name Result Flag Reference   XR ELBOW 3+ VW LEFT (Report)     LEFT ELBOW     INDICATION: Left elbow pain  COMPARISON: None     VIEWS: AP, lateral and obliques      IMAGES: 5     FINDINGS:     There is no acute fracture or dislocation  There is no joint effusion  No degenerative changes  No lytic or blastic lesions are seen  Soft tissue swelling overlies the dorsum of the olecranon  No radiopaque foreign body or calcification  IMPRESSION:     No acute osseous abnormality  Olecranon soft tissue swelling suggests posttraumatic olecranon bursitis  Workstation performed: [id number]     Signed by:   Shona Hernandez MD   3/21/17

## 2018-01-10 NOTE — MISCELLANEOUS
Assessment    1  No advance directives (V49 89) (Z78 9)   2  Former smoker (V15 82) (U73 628)   3  No advance directives (V49 89) (Z78 9)   4  History of sepsis (V12 09) (Z86 19)    Plan  Fatigue, Fever of unknown origin, History of sepsis    · (1) URINE CULTURE; Source:Urine, Clean Catch; Status:Active; Requested  WRG:[id number];    Perform:[facility]; MRF:[id number]; Ordered; For:Fatigue, Fever of unknown origin, History of sepsis; Ordered By:Romeo Lyles; History of sepsis    · (1) BASIC METABOLIC PROFILE; Status:Active; Requested YKE:[id number];    Perform:[facility]; DSI:[id number]; Ordered;  For:History of sepsis; Ordered By:Romeo Lyles;   · (1) CBC/PLT/DIFF; Status:Active; Requested RMQ:[id number];    Perform:[facility]; JGH:[id number]; Ordered;  For:History of sepsis; Ordered By:Romeo Lyles;   · * XR CHEST PA & LATERAL; Status:Active; Requested DVF:[id number];    Perform:[facility]; XDT:[id number]; Ordered;  For:History of sepsis; Ordered By:Pablito Lyles;   · * XR HIP/PELV 2-3 VWS LEFT W PELVIS IF PERFORMED; Status:Active; Requested  RUB:[id number];    Perform:[facility]; XXL:[id number]; Ordered;  For:History of sepsis; Ordered By:Romeo Lyles; History of Present Illness  TCM Communication [facility]: The patient is being contacted for follow-up after hospitalization and PT WIFE SHELLIE CALLED JU FOR PERRY APPT 11-7-17  He was hospitalized at [facility]  Diagnosis: HYPOXEMIA AND SEPSIS AND FEVER OF UNKNOWN ORIGIN  He was discharged to home  Medications were not reviewed today  Follow-up appointments with other specialists: INFECTIOUS DISEASE AND DR Sinai Skaggs [address] [place] [address]  The patient is currently asymptomatic  Communication performed and completed by Ra Wood   HPI: PT WAS IN THE ER WITH AMS AND FEVER  PT WAS ADMITTED WITH INFECTIOUS DISEASE CONSULT AND LABS SHOWED ESR WAS ELEVATED SO LYME TEST ORDERED THAT WAS NEGATIVE   PT HAD AN US OF RLQ THAT HAD SLUDGE BUT NO GB PATHOLOGY AND NORMAL LFT'S  PT HAD A CT OF CHEST WITH A QUESTION OF A LLL INFILTRATE BUT NO PNEUNO WAS SUSPECTED AS SOURCE OF INFECTION  THERE WAS A ILIAC OCCULSIVE THROMBUS NOTED BUT DUPLEX WAS NEGATIVE  PT HAD A PAST CT 9-11-17 THAT SHOWED A 8MM MODULAR DENSITY THAT WAS FOLLOWED BY FLY THAT WAS TREATED WITH BACTRIM AND AZITHRO WITH A NEGATIVE TB  PT PLATELET COUNT DROPPED TO 88 THAT SELF RESOLVED  FOLLOW UP APPTS WITH INFECTIOUS DISEASE AND UROLOGY WERE MADE AT TIME OF D/C  Review of Systems  Complete-Male:   Constitutional: No fever or chills, feels well, no tiredness, no recent weight gain or weight loss  Eyes: No complaints of eye pain, no red eyes, no discharge from eyes, no itchy eyes  ENT: no complaints of earache, no hearing loss, no nosebleeds, no nasal discharge, no sore throat, no hoarseness  Cardiovascular: No complaints of slow heart rate, no fast heart rate, no chest pain, no palpitations, no leg claudication, no lower extremity  Respiratory: No complaints of shortness of breath, no wheezing, no cough, no SOB on exertion, no orthopnea or PND  Gastrointestinal: No complaints of abdominal pain, no constipation, no nausea or vomiting, no diarrhea or bloody stools  Genitourinary: No complaints of dysuria, no incontinence, no hesitancy, no nocturia, no genital lesion, no testicular pain  Musculoskeletal: No complaints of arthralgia, no myalgias, no joint swelling or stiffness, no limb pain or swelling  Integumentary: No complaints of skin rash or skin lesions, no itching, no skin wound, no dry skin  Neurological: No compliants of headache, no confusion, no convulsions, no numbness or tingling, no dizziness or fainting, no limb weakness, no difficulty walking  Psychiatric: Is not suicidal, no sleep disturbances, no anxiety or depression, no change in personality, no emotional problems     Endocrine: No complaints of proptosis, no hot flashes, no muscle weakness, no erectile dysfunction, no deepening of the voice, no feelings of weakness  Hematologic/Lymphatic: No complaints of swollen glands, no swollen glands in the neck, does not bleed easily, no easy bruising  ROS Reviewed:   ROS reviewed  Active Problems    1  Acute bilateral low back pain without sciatica (724 2,338 19) (M54 5)   2  Acute cholecystitis (575 0) (K81 0)   3  Adrenal Calcification (255 41)   4  Advanced COPD (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) (J44 9)   5  Aortic Aneurysm Repair   6  Arteriosclerotic coronary artery disease (414 00) (I25 10)   7  Benign prostatic hypertrophy (600 00) (N40 0)   8  Biliary dyskinesia (575 8) (K82 8)   9  Carious teeth (521 00) (K02 9)   10  Cath Aorta Aneurysm Abdominal   11  Dementia (294 20) (F03 90)   12  Depression (311) (F32 9)   13  Disequilibrium (780 4) (R42)   14  Fatigue (780 79) (R53 83)   15  Fever of unknown origin (780 60) (R50 9)   16  Hyperlipidemia (272 4) (E78 5)   17  Infiltrate, 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)   18  Insomnia (780 52) (G47 00)   19  Muscle weakness (728 87) (M62 81)   20  Positive depression screening (796 4) (Z13 89)   21  Postoperative pneumonia (997 39,486) (J95 89,J18 9)   22  Renal calculi (592 0) (N20 0)   23  Screening for colorectal cancer (V76 51) (Z12 11,Z12 12)   24  Severe obstructive sleep apnea (327 23) (G47 33)   25  Skin cancer (173 90) (C44 90)   26  Urine frequency (788 41) (R35 0)   27  Vitamin D deficiency (268 9) (E55 9)    Past Medical History    1  History of Abdominal aortic aneurysm without rupture (441 4) (I71 4)   2  History of Acute exacerbation of chronic obstructive airways disease with asthma   (493 22) (J44 1,J45 901)   3  History of Acute serous otitis media of left ear, recurrence not specified (381 01) (H65 02)   4  History of BPH without urinary obstruction (600 00) (N40 0)   5  History of CAP (community acquired pneumonia) (5) (J18 9)   6  History of Cataracts, bilateral (366 9) (H26 9)   7   History of Contusion of left elbow, initial encounter (923 11) (S50 02XA)   8  History of Cough (786 2) (R05)   9  History of Dental caries (521 00) (K02 9)   10  History of Exercise counseling (V65 41) (Z71 82)   11  History of Glaucoma screening (V80 1) (Z13 5)   12  History of abdominal aortic aneurysm (AAA) (V12 59) (Z86 79)   13  History of bacteremia (V12 09) (Z87 898)   14  History of bladder stone (V13 09) (Z87 448)   15  History of chronic obstructive lung disease (V12 69) (Z87 09)   16  History of common cold (V12 09) (Z86 19)   17  History of epistaxis (V12 69) (Z87 898)   18  History of influenza vaccination (V49 89) (Z92 29)   19  History of pneumonia (V12 61) (Z87 01)   20  History of skin cancer (V10 83) (Z85 828)   21  History of sleep apnea (V13 89) (Z86 69)   22  History of urinary tract infection (V13 02) (Z87 440)   23  History of Hydronephrosis with obstructing calculus (591) (N13 2)   24  History of Influenza vaccine needed (V04 81) (Z23)   25  History of Need for pneumococcal vaccination (V03 82) (Z23)   26  Need for prophylactic vaccination and inoculation against influenza (V04 81) (Z23)   27  History of Other emphysema (492 8) (J43 8)   28  Personal history of kidney stones (V13 01) (Z87 442)   29  History of Pneumonia (486) (J18 9)   30  History of Screening for colon cancer (V76 51) (Z12 11)   31  History of Screening for depression (V79 0) (Z13 89)   32  History of Screening for genitourinary condition (V81 6) (Z13 89)   33  History of Screening for neurological condition (V80 09) (Z13 89)   34  History of Sepsis, due to unspecified organism (038 9,995 91) (A41 9)   35  History of Sinusitis (473 9) (J32 9)   36  History of Special screening examination for neoplasm of prostate (V76 44) (Z12 5)    Surgical History    1  History of CABG   2  History of Cataract Surgery   3  History of Coronary Artery Surgery   4  History of Cystoscopy With Insertion Of Ureteral Stent   5   History of Cystoscopy With Ureteroscopy With Lithotripsy   6  History of Endovascular Repair Of Aortic Aneurysm   7  History of Hernia Repair   8  History of Lithotripsy   9  History of Oral Surgery Tooth Extraction   10  History of Renal Lithotripsy   11  History of Tonsillectomy   12  History of Transurethral Removal Of Internally Dwelling Ureteral Stent  Surgical History Reviewed: The surgical history was reviewed and updated today  Family History  Mother    1  Family history of Type 2 diabetes mellitus (250 00) (E11 9)  Father    2  Family history of renal failure (V18 69) (Z84 1)  Maternal Grandmother    3  Family history of Type 2 diabetes mellitus (250 00) (E11 9)  Paternal Grandmother    4  Family history of Benign essential hypertension (401 1) (I10)  Family History Reviewed: The family history was reviewed and updated today  Social History    · Daily caffeine consumption, 4-5 servings a day   · Denied: Drug Use (305 90)   · Former smoker (V15 82) (D68 209)   ·    · No illicit drug use   · No living will   · Retired   · Stopped Drinking Alcohol  Social History Reviewed: The social history was reviewed and updated today  The social history was reviewed and is unchanged  Current Meds   1  Aspirin 81 MG Oral Tablet Delayed Release; Take 1 tablet daily Recorded   2  Atorvastatin Calcium 20 MG Oral Tablet; TAKE 1 TABLET AT BEDTIME; Therapy: 68ZOP1111 to (Evaluate:62Sdf6707)  Requested for: 17Aug2017; Last   Rx:17Aug2017 Ordered   3  Breo Ellipta 100-25 MCG/INH Inhalation Aerosol Powder Breath Activated; USE 1   INHALATION ONCE DAILY; Therapy: 71KCH6151 to (Kayleen Montanez)  Requested for: 26Oct2017 Recorded   4  Centrum Silver Adult 50+ TABS; TAKE 1 TABLET DAILY; Therapy: (0470-3916399) to Recorded   5  Escitalopram Oxalate 20 MG Oral Tablet; TAKE 1 TABLET EVERY MORNING; Therapy: 47ARN0791 to (Last Rx:09Ens6159)  Requested for: 58Cyb5963 Ordered   6  [facility];    Therapy: (2414-1691503) to Recorded   7  Metoprolol Succinate ER 25 MG Oral Tablet Extended Release 24 Hour; Take 1 tablet   daily Recorded   8  Montelukast Sodium 10 MG Oral Tablet; Take 1 tablet daily; Therapy: 08ETW0198 to (Madi Meline)  Requested for: 92ICX7529; Last   RB:83LUC0179 Ordered   9  Probiotic Oral Capsule Recorded   10  RA Vitamin D-3 2000 UNIT Oral Capsule; TAKE 1 SOFTGELL ONCE DAILY Recorded   11  Spiriva HandiHaler 18 MCG Inhalation Capsule; INHALE THE CONTENTS OF 1      CAPSULE ONCE DAILY; Therapy: 75KXS6384 to (Tawnya Rosales)  Requested for: 94ALW6485; Last    Rx:02Hft6173 Ordered   12  Tamsulosin HCl - 0 4 MG Oral Capsule; Therapy: 76VDL0465 to (Last Rx:22Nov2010)  Requested for: 22Nov2010 Ordered   13  Urocit-K 15 15 MEQ (1620 MG) Oral Tablet Extended Release; Take 1 tablet twice daily; Therapy: (0914-6873026) to Recorded   14  Vitamin B-12 1000 MCG Sublingual Tablet Sublingual; TAKE AS DIRECTED; Therapy: (0914-6873026) to Recorded   15  ZyrTEC Allergy 10 MG Oral Capsule; TAKE 1 CAPSULE Daily Recorded  Medication List Reviewed: The medication list was reviewed and updated today  Allergies    1  Cipro TABS   2  Augmentin TABS   3  Morphine Sulfate SOLN    4  No Known Environmental Allergies   5  No Known Food Allergies    Vitals  Signs   Recorded: 99FDJ7380 02:51PM   Temperature: 34 1 F  Systolic: 214  Diastolic: 70  Height: 5 ft 8 in  Weight: 158 lb   BMI Calculated: 24 02  BSA Calculated: 1 85    Physical Exam    Constitutional   General appearance: No acute distress, well appearing and well nourished  Eyes   Conjunctiva and lids: No swelling, erythema, or discharge  Pupils and irises: Equal, round and reactive to light  Ears, Nose, Mouth, and Throat   External inspection of ears and nose: Normal     Otoscopic examination: Tympanic membrance translucent with normal light reflex  Canals patent without erythema      Nasal mucosa, septum, and turbinates: Normal without edema or erythema  Oropharynx: Normal with no erythema, edema, exudate or lesions  Pulmonary   Respiratory effort: No increased work of breathing or signs of respiratory distress  Auscultation of lungs: Abnormal   Prolonged expiratory phase of respiration  Cardiovascular   Palpation of heart: Normal PMI, no thrills  Auscultation of heart: Normal rate and rhythm, normal S1 and S2, without murmurs  Examination of extremities for edema and/or varicosities: Normal     Carotid pulses: Normal     Abdomen   Abdomen: Non-tender, no masses  Liver and spleen: No hepatomegaly or splenomegaly  Lymphatic   Palpation of lymph nodes in neck: No lymphadenopathy  Musculoskeletal   Gait and station: Abnormal   Pain left hip with ambulation  Digits and nails: Normal without clubbing or cyanosis  Inspection/palpation of joints, bones, and muscles: Normal     Skin   Skin and subcutaneous tissue: Normal without rashes or lesions  Neurologic   Cranial nerves: Cranial nerves 2-12 intact  Reflexes: 2+ and symmetric  Sensation: No sensory loss  Psychiatric   Orientation to person, place and time: Normal     Mood and affect: Normal          Health Management  Health Maintenance   Medicare Annual Wellness Visit; every 1 year; Last 55EBC8540; Next Due: 16KEX9416;  Overdue    Future Appointments    Date/Time Provider Specialty Site   11/08/2017 03:00 PM Abdulaziz Paredes DO Family Medicine [address] [place] [address]   01/29/2018 11:00 AM Abdulaziz Paredes DO Family Medicine [address] [place] [address]   11/16/2017 07:30 AM Josie Almanza MD Urology [address]   12/15/2017 01:00 PM Josie Almanza MD Urology [address]     Signatures   Electronically signed by : Mario Alberto Sarabia, ; Nov 6 2017 10:34AM EST                       (Author)    Electronically signed by : Miriam Bryson DO; Nov 8 2017  3:21PM EST                       (Author)

## 2018-01-10 NOTE — RESULT NOTES
Verified Results  (1) URINE CULTURE 33Fqb9424 05:31PM Susana Calabresenya   TW Order Number: [id number]     Test Name Result Flag Reference   CLINICAL REPORT (Report)     Test:        Urine culture  Specimen Type:   Urine  Specimen Date:   9/15/2017 5:31 PM  Result Date:    9/16/2017 5:01 PM  Result Status:   Final result  Resulting Lab:   [place] [address]            Tel: [phone]      CULTURE                                       ------------------                                   No Growth <1000 cfu/mL

## 2018-01-11 ENCOUNTER — GENERIC CONVERSION - ENCOUNTER (OUTPATIENT)
Dept: FAMILY MEDICINE CLINIC | Facility: CLINIC | Age: 78
End: 2018-01-11

## 2018-01-11 NOTE — RESULT NOTES
Verified Results  (1) BASIC METABOLIC PROFILE 17HES0568 12:14PM Lyly HartRubinstein Order Number: [id number]     Test Name Result Flag Reference   GLUCOSE,RANDM 99 mg/dL     If the patient is fasting, the ADA then defines impaired fasting glucose as > 100 mg/dL and diabetes as > or equal to 123 mg/dL  SODIUM 137 mmol/L  136-145   POTASSIUM 4 3 mmol/L  3 5-5 3   CHLORIDE 104 mmol/L  100-108   CARBON DIOXIDE 27 mmol/L  21-32   ANION GAP (CALC) 6 mmol/L  4-13   BLOOD UREA NITROGEN 9 mg/dL  5-25   CREATININE 0 96 mg/dL  0 60-1 30   Standardized to IDMS reference method   CALCIUM 9 2 mg/dL  8 3-10 1   eGFR Non-African American      >60 0 ml/min/1 73sq m   [facility] [employer] Disease Education Program recommendations are as follows:  GFR calculation is accurate only with a steady state creatinine  Chronic Kidney disease less than 60 ml/min/1 73 sq  meters  Kidney failure less than 15 ml/min/1 73 sq  meters

## 2018-01-11 NOTE — MISCELLANEOUS
Message   Recorded as Task   Date: 11/13/2017 02:04 PM, Created By: Mohamud Russ   Task Name: Miscellaneous   Assigned To: Giles VALERA,TEAM   Regarding Patient: Richa Bishop, Status: Active   CommentSpencer Pica - 13 Nov 2017 2:04 PM     TASK CREATED  Osbaldo Wang; [phone]  Wife called to inform Noe Yee pt was in [facility] 10/27/17- 11/02/17 Pneumonia, today pt is having hip pain which is due to arthritis and she has been treating him with Tylenol & Lidocaine Patch,he is scheduled 11/16/17 for surgery if this is a problem please call her  Alyssa Paredes - 13 Nov 2017 2:08 PM     TASK EDITED  WILL DIRECT TO DR LARSOE  Active Problems    1  Acute bilateral low back pain without sciatica (724 2,338 19) (M54 5)   2  Acute cholecystitis (575 0) (K81 0)   3  Adrenal Calcification (255 41)   4  Advanced COPD (496) (J44 9)   5  Aortic Aneurysm Repair   6  Arteriosclerotic coronary artery disease (414 00) (I25 10)   7  Benign prostatic hypertrophy (600 00) (N40 0)   8  Biliary dyskinesia (575 8) (K82 8)   9  Carious teeth (521 00) (K02 9)   10  Cath Aorta Aneurysm Abdominal   11  Dementia (294 20) (F03 90)   12  Depression (311) (F32 9)   13  Disequilibrium (780 4) (R42)   14  Fatigue (780 79) (R53 83)   15  Fever of unknown origin (780 60) (R50 9)   16  History of sepsis (V12 09) (Z86 19)   17  Hyperlipidemia (272 4) (E78 5)   18  Infiltrate, 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)   19  Insomnia (780 52) (G47 00)   20  Muscle weakness (728 87) (M62 81)   21  Positive depression screening (796 4) (Z13 89)   22  Postoperative pneumonia (997 39,486) (J95 89,J18 9)   23  Renal calculi (592 0) (N20 0)   24  Screening for colorectal cancer (V76 51) (Z12 11,Z12 12)   25  Severe obstructive sleep apnea (327 23) (G47 33)   26  Skin cancer (173 90) (C44 90)   27  Urine frequency (788 41) (R35 0)   28  Vitamin D deficiency (268 9) (E55 9)    Current Meds   1   Aspirin 81 MG Oral Tablet Delayed Release; Take 1 tablet daily Recorded   2  Atorvastatin Calcium 20 MG Oral Tablet; TAKE 1 TABLET AT BEDTIME; Therapy: 08LVH2495 to (Evaluate:57Ypr4155)  Requested for: 17Aug2017; Last   Rx:17Aug2017 Ordered   3  Breo Ellipta 100-25 MCG/INH Inhalation Aerosol Powder Breath Activated; USE 1   INHALATION ONCE DAILY; Therapy: 24RNB3018 to ([facility])  Requested for: 26Oct2017 Recorded   4  Centrum Silver Adult 50+ TABS; TAKE 1 TABLET DAILY; Therapy: (450 45 295) to Recorded   5  Escitalopram Oxalate 20 MG Oral Tablet; TAKE 1 TABLET EVERY MORNING; Therapy: 85ZPJ5773 to (Last Rx:10Xdi5239)  Requested for: 39Jww9578 Ordered   6  [address] [place] [address]; Therapy: (450 45 295) to Recorded   7  Metoprolol Succinate ER 25 MG Oral Tablet Extended Release 24 Hour; Take 1 tablet   daily Recorded   8  Montelukast Sodium 10 MG Oral Tablet; Take 1 tablet daily; Therapy: 07PHH5487 to ([facility])  Requested for: 31CDK1452; Last   JV:64AVI6075 Ordered   9  Probiotic Oral Capsule Recorded   10  RA Vitamin D-3 2000 UNIT Oral Capsule; TAKE 1 SOFTGELL ONCE DAILY Recorded   11  Spiriva HandiHaler 18 MCG Inhalation Capsule; INHALE THE CONTENTS OF 1      CAPSULE ONCE DAILY; Therapy: 49WLD8739 to (Liz King)  Requested for: 48FZM8907; Last    Rx:73Xqc9865 Ordered   12  Tamsulosin HCl - 0 4 MG Oral Capsule; Therapy: 42MAT8373 to (Last Rx:22Nov2010)  Requested for: 22Nov2010 Ordered   13  Urocit-K 15 15 MEQ (1620 MG) Oral Tablet Extended Release (Potassium Citrate ER); Take 1 tablet twice daily; Therapy: (450 45 295) to Recorded   14  Vitamin B-12 1000 MCG Sublingual Tablet Sublingual; TAKE AS DIRECTED; Therapy: (450 45 295) to Recorded   15  ZyrTEC Allergy 10 MG Oral Capsule; TAKE 1 CAPSULE Daily Recorded    Allergies    1  Cipro TABS   2  Augmentin TABS   3  Morphine Sulfate SOLN    4  No Known Environmental Allergies   5   No Known Food Allergies    Signatures   Electronically signed by : Cecile Olmstead, ; Nov 13 2017  2:09PM EST                       (Author)

## 2018-01-11 NOTE — MISCELLANEOUS
Assessment    1  Biliary dyskinesia (575 8) (K82 8)   2  Sepsis, due to unspecified organism (038 9,330 91) (A41 9)    Plan  Sepsis, due to unspecified organism    · Cefuroxime Axetil 500 MG Oral Tablet; TAKE 1 TABLET EVERY 12 HOURS DAILY   Rx By: Beronica Franklin; Dispense: 10 Days ; #:20 Tablet; Refill: 0; For: Sepsis, due to unspecified organism; MICHAEL = N; Sent To: [facility] [address]   · * NM HEPATOBILIARY W RX; Status:Hold For - Scheduling; Requested MVY:[id number];    Perform:[facility]; [id number]; Ordered; For:Sepsis, due to unspecified organism; Ordered By:Aravind Lyles; Discussion/Summary  Discussion Summary:   Patient will need a hepatobiliary nuclear scan if his scan shows chronic cholecystitis and biliary dyskinesia  This might possibly be the source of his infection in which case he'll need a surgical consult probably with Dr Marcela Lewis  History of Present Illness  TCM Communication [facility]: The patient is being contacted for follow-up after hospitalization and Called and spoke with patients wife Brooks Troy to schedule PERRY appt 17 at 11 am  he has no pending appts scheduled with our office at this time  He was hospitalized at [facility]  The date of admission: 2017, date of discharge: 2017  Diagnosis: Cough; Rigors; Acute renal insufficiency; Fever; Severe sepsis D/C dx - Severe sepsis; Acute tracheobronchitis; COPD; Hyperlipidemia; GERD; Acute kidney injury;; Microscopic hematuria; Lactic acidosis; Hyperbilirubinemia; Abnormal CT scan, chest; Abdominal aortic aneurysm; Streptococcus pneumoniae infection; hypophosphatemia; Renal calculi; Thrombocytopenia; Vitamin d insufficiency  He was discharged to home  Medications were not reviewed today  He scheduled a follow up appointment  Symptoms: weakness, fatigue and cough     Communication performed and completed by APX      Review of Systems  Complete-Male:   Constitutional: feeling poorly and feeling tired    Eyes: eyesight problems  ENT: no complaints of earache, no hearing loss, no nosebleeds, no nasal discharge, no sore throat, no hoarseness  Cardiovascular: No complaints of slow heart rate, no fast heart rate, no chest pain, no palpitations, no leg claudication, no lower extremity  Respiratory: wheezing and shortness of breath during exertion  Gastrointestinal: Patient has bloating, gassiness and chalky stools  Genitourinary: Story of kidney stones  Musculoskeletal: arthralgias  ROS Reviewed:   ROS reviewed  Active Problems    1  Acute exacerbation of chronic obstructive airways disease with asthma (493 22)   (J44 1,J45 901)   2  Adrenal Calcification (255 41)   3  Advanced COPD (496) (J44 9)   4  Arteriosclerotic coronary artery disease (414 00) (I25 10)   5  Benign prostatic hypertrophy (600 00) (N40 0)   6  CAP (community acquired pneumonia) (5) (J18 9)   7  Carious teeth (521 00) (K02 9)   8  Cath Aorta Aneurysm Abdominal   9  Contusion of left elbow, initial encounter (923 11) (S50 02XA)   10  Dementia (294 20) (F03 90)   11  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)   12  Depression (311) (F32 9)   13  Fatigue (780 79) (R53 83)   14  Hyperlipidemia (272 4) (E78 5)   15  Infiltrate, 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)   16  Insomnia (780 52) (G47 00)   17  Pneumonia (486) (J18 9)   18  Renal calculi (592 0) (N20 0)   19  Severe obstructive sleep apnea (327 23) (G47 33)   20  Skin cancer (173 90) (C44 90)   21  Vitamin D deficiency (268 9) (E55 9)    Past Medical History    1  History of Abdominal aortic aneurysm without rupture (441 4) (I71 4)   2  History of Cataracts, bilateral (366 9) (H26 9)   3  History of Cough (786 2) (R05)   4  History of Dental caries (521 00) (K02 9)   5  History of Exercise counseling (V65 41) (Z71 89)   6  History of Fever of unknown origin (780 60) (R50 9)   7  History of Glaucoma screening (V80 1) (Z13 5)   8   History of common cold (V12 09) (Z86 19)   9  History of epistaxis (V12 69) (Z87 898)   10  History of Influenza vaccine needed (V04 81) (Z23)   11  History of Need for pneumococcal vaccination (V03 82) (Z23)   12  Need for prophylactic vaccination and inoculation against influenza (V04 81) (Z23)   13  History of Pneumonia (486) (J18 9)   14  History of Screening for colorectal cancer (V76 51) (Z12 11,Z12 12)   15  History of Screening for genitourinary condition (V81 6) (Z13 89)   16  History of Sinusitis (473 9) (J32 9)   17  History of Special screening examination for neoplasm of prostate (V76 44) (Z12 5)    Surgical History    1  History of Cataract Surgery   2  History of Coronary Artery Surgery   3  History of Hernia Repair   4  History of Lithotripsy   5  History of Oral Surgery Tooth Extraction   6  History of Renal Lithotripsy   7  History of Transurethral Removal Of Internally Dwelling Ureteral Stent  Surgical History Reviewed: The surgical history was reviewed and updated today  Family History  Mother    1  Family history of Type 2 diabetes mellitus (250 00) (E11 9)  Maternal Grandmother    2  Family history of Type 2 diabetes mellitus (250 00) (E11 9)  Paternal Grandmother    3  Family history of Benign essential hypertension (401 1) (I10)  Family History Reviewed: The family history was reviewed and updated today  Social History    · Denied: Drug Use (305 90)   · Former smoker (H82 90) (F35 179)   · No living will   · Stopped Drinking Alcohol  Social History Reviewed: The social history was reviewed and updated today  The social history was reviewed and is unchanged  Current Meds   1  Aspirin 81 MG Oral Tablet Delayed Release; Take 1 tablet daily Recorded   2  Atorvastatin Calcium 10 MG Oral Tablet; TAKE 1 TABLET DAILY AT BEDTIME; Therapy: 41PPM0293 to (01 81 87 12 80)  Requested for: 31UOD3931; Last   Rx:46Tjq6038 Ordered   3   Breo Ellipta 100-25 MCG/INH Inhalation Aerosol Powder Breath Activated; USE 1   INHALATION ONCE DAILY; Therapy: 92BJL3932 to (Evaluate:11Mar2018)  Requested for: 39PLY9829; Last   Rx:16Mar2017 Ordered   4  Donepezil HCl - 10 MG Oral Tablet; TAKE 1 TABLET DAILY; Therapy: 04KSG1237 to (Evaluate:14Nov2017)  Requested for: 72VCH6928; Last   Rx:78Heb4166 Ordered   5  Metoprolol Succinate ER 25 MG Oral Tablet Extended Release 24 Hour; Take 1 tablet   daily Recorded   6  Montelukast Sodium 10 MG Oral Tablet; Take 1 tablet daily; Therapy: 75HKZ7115 to (Jim Palomares)  Requested for: 88ZGF8525; Last   EI:34ASF0566 Ordered   7  Probiotic Oral Capsule Recorded   8  RA Vitamin D-3 2000 UNIT Oral Capsule; TAKE 1 SOFTGELL ONCE DAILY Recorded   9  Spiriva HandiHaler 18 MCG Inhalation Capsule; INHALE THE CONTENTS OF 1     CAPSULE ONCE DAILY; Therapy: 58ZJY8823 to (Evelyn Abebe)  Requested for: 01Apr2016; Last   Rx:01Apr2016 Ordered   10  Tamsulosin HCl - 0 4 MG Oral Capsule; Therapy: 01IID5762 to (Last Rx:22Nov2010)  Requested for: 22Nov2010 Ordered   11  Urocit-K 15 15 MEQ (1620 MG) Oral Tablet Extended Release; Take 1 tablet daily    Recorded   12  ZyrTEC Allergy 10 MG Oral Capsule; TAKE 1 CAPSULE Daily Recorded  Medication List Reviewed: The medication list was reviewed and updated today  Allergies    1  Cipro TABS   2  Augmentin TABS   3  Morphine Sulfate SOLN    Vitals  Signs   Recorded: 74JRB1507 10:58AM   Temperature: 97 3 F, Tympanic  Systolic: 180, LUE, Sitting  Diastolic: 80, LUE, Sitting  Height: 5 ft 10 in  Weight: 160 lb   BMI Calculated: 22 96  BSA Calculated: 1 9    Physical Exam    Constitutional   General appearance: No acute distress, well appearing and well nourished  Ears, Nose, Mouth, and Throat   External inspection of ears and nose: Normal     Otoscopic examination: Tympanic membrance translucent with normal light reflex  Canals patent without erythema  Nasal mucosa, septum, and turbinates: Normal without edema or erythema  Oropharynx: Normal with no erythema, edema, exudate or lesions  Pulmonary   Respiratory effort: No increased work of breathing or signs of respiratory distress  Auscultation of lungs: Clear to auscultation, equal breath sounds bilaterally, no wheezes, no rales, no rhonci  Cardiovascular   Palpation of heart: Normal PMI, no thrills  Auscultation of heart: Normal rate and rhythm, normal S1 and S2, without murmurs  Examination of extremities for edema and/or varicosities: Normal     Carotid pulses: Normal     Abdomen   Abdomen: Non-tender, no masses  Liver and spleen: No hepatomegaly or splenomegaly  Lymphatic   Palpation of lymph nodes in neck: No lymphadenopathy  Musculoskeletal   Gait and station: Normal     Digits and nails: Normal without clubbing or cyanosis  Inspection/palpation of joints, bones, and muscles: Normal     Skin   Skin and subcutaneous tissue: Normal without rashes or lesions  Neurologic   Cranial nerves: Cranial nerves 2-12 intact  Reflexes: 2+ and symmetric  Sensation: No sensory loss  Psychiatric   Orientation to person, place and time: Normal     Mood and affect: Normal          Health Management  Health Maintenance   Medicare Annual Wellness Visit; every 1 year; Last 34EWH3594; Next Due: 70ZQF2427;  Overdue    Signatures   Electronically signed by : Rita Cody, ; Jun 7 2017  3:34PM EST                       (Author)    Electronically signed by : Mart Knowles DO; Jun 8 2017 11:48AM EST                       (Author)

## 2018-01-12 VITALS
WEIGHT: 160 LBS | TEMPERATURE: 97.3 F | HEIGHT: 70 IN | SYSTOLIC BLOOD PRESSURE: 130 MMHG | DIASTOLIC BLOOD PRESSURE: 80 MMHG | BODY MASS INDEX: 22.9 KG/M2

## 2018-01-12 VITALS
BODY MASS INDEX: 23.22 KG/M2 | DIASTOLIC BLOOD PRESSURE: 78 MMHG | SYSTOLIC BLOOD PRESSURE: 118 MMHG | HEIGHT: 70 IN | WEIGHT: 162.2 LBS

## 2018-01-12 VITALS
BODY MASS INDEX: 23.95 KG/M2 | SYSTOLIC BLOOD PRESSURE: 130 MMHG | TEMPERATURE: 96.9 F | HEIGHT: 68 IN | WEIGHT: 158 LBS | DIASTOLIC BLOOD PRESSURE: 70 MMHG

## 2018-01-12 NOTE — RESULT NOTES
Verified Results  (1) BASIC METABOLIC PROFILE [id number] 11:00AM Valentino Somerset    Order Number: [id number]     Test Name Result Flag Reference   GLUCOSE,RANDM 91 mg/dL     If the patient is fasting, the ADA then defines impaired fasting glucose as > 100 mg/dL and diabetes as > or equal to 123 mg/dL  SODIUM 139 mmol/L  136-145   POTASSIUM 4 7 mmol/L  3 5-5 3   CHLORIDE 106 mmol/L  100-108   CARBON DIOXIDE 27 mmol/L  21-32   ANION GAP (CALC) 6 mmol/L  4-13   BLOOD UREA NITROGEN 9 mg/dL  5-25   CREATININE 1 10 mg/dL  0 60-1 30   Standardized to IDMS reference method   CALCIUM 9 2 mg/dL  8 3-10 1   eGFR Non-African American      >60 0 ml/min/1 [facility] Disease Education Program recommendations are as follows:  GFR calculation is accurate only with a steady state creatinine  Chronic Kidney disease less than 60 ml/min/1 73 sq  meters  Kidney failure less than 15 ml/min/1 73 sq  meters  (1) VITAMIN D 25-HYDROXY 03KUL3515 11:00AM [employer] Order Number: [id number]     Test Name Result Flag Reference   VIT D 25-HYDROX 33 5 ng/mL  30 0-100 0   This assay is a certified procedure of the CDC Vitamin D Standardization Certification Program (VDSCP)     Deficiency <20ng/ml   Insufficiency 20-30ng/ml   Sufficient  ng/ml     *Patients undergoing fluorescein dye angiography may retain small amounts of fluorescein in the body for 48-72 hours post procedure  Samples containing fluorescein can produce falsely elevated Vitamin D values  If the patient had this procedure, a specimen should be resubmitted post fluorescein clearance

## 2018-01-12 NOTE — RESULT NOTES
Verified Results  * NM HEPATOBILIARY W YF 66RHQ1971 11:05AM Jesus Alberto Rausch Order Number: [id number]    - Patient Instructions: To schedule this appointment, please contact Central Scheduling at [phone]  Test Name Result Flag Reference   NM HEPATOBILIARY W RX (Report)     HEPATOBILIARY SCAN WITH CHOLECYSTOKININ ADMINISTRATION      INDICATION: Diarrhea  Rasheed colored stools  Gallbladder wall thickening on ultrasound  COMPARISON: None available     TECHNIQUE:  Following the intravenous administration of 5 4 mCi Tc-99m labeled mebrofenin, dynamic abdominal images were obtained over a 60 minute time period  Images were performed in AP projection  FINDINGS:      There is prompt, uniform accumulation with normal clearance of the radiopharmaceutical by the liver  There is normal filling of the intrahepatic ducts, common bile duct and gallbladder with normal excretion of the radiopharmaceutical into the duodenum  In order to evaluate the contractile response of the gallbladder to cholecystokinin stimulation, 1 4 mcg sincalide (0 02 mcg/kg) was administered by slow intravenous infusion over 60 minutes  These images demonstrate normal contraction of the    gallbladder  The calculated gallbladder ejection fraction is 93 % (N = >35%)  The patient experienced no symptoms after CCK administration  IMPRESSION:     1  Normal hepatobiliary study   2  Normal contractile response of the gallbladder to cholecystokinin infusion  (93%)       Workstation performed: [id number]     Signed by:   Romayne Cross Carnella Pipe, MD   6/26/17       Plan  Depression    · Escitalopram Oxalate 5 MG Oral Tablet;  Take 1 tablet daily

## 2018-01-13 VITALS
BODY MASS INDEX: 22.12 KG/M2 | HEIGHT: 70 IN | SYSTOLIC BLOOD PRESSURE: 88 MMHG | DIASTOLIC BLOOD PRESSURE: 64 MMHG | WEIGHT: 154.5 LBS | HEART RATE: 94 BPM

## 2018-01-13 VITALS
BODY MASS INDEX: 22.53 KG/M2 | WEIGHT: 157.38 LBS | HEIGHT: 70 IN | OXYGEN SATURATION: 97 % | DIASTOLIC BLOOD PRESSURE: 80 MMHG | SYSTOLIC BLOOD PRESSURE: 130 MMHG

## 2018-01-13 VITALS
HEIGHT: 70 IN | WEIGHT: 164.5 LBS | DIASTOLIC BLOOD PRESSURE: 78 MMHG | SYSTOLIC BLOOD PRESSURE: 136 MMHG | BODY MASS INDEX: 23.55 KG/M2

## 2018-01-13 VITALS
HEIGHT: 68 IN | BODY MASS INDEX: 24.15 KG/M2 | DIASTOLIC BLOOD PRESSURE: 72 MMHG | WEIGHT: 159.38 LBS | SYSTOLIC BLOOD PRESSURE: 114 MMHG

## 2018-01-13 NOTE — RESULT NOTES
Verified Results  CTA ABDOMEN PELVIS W WO CONTRAST [id number] 03:11PM Luc Mack Order Number: [id number]   Performing Comments: Recent endovascular repair of abdominal aortic aneurysm August 23 patient has severe low back pain, rule out leakage from endovascular repair   - Patient Instructions: To schedule this appointment, please contact Central Scheduling    at [phone]  Test Name Result Flag Reference   CTA ABDOMEN PELVIS W WO CONTRAST (Report)     Addendum: This examination, like all CT scans performed in the [facility], was performed utilizing techniques to minimize radiation dose exposure, including the use of iterative reconstruction and automated exposure control  CT ANGIOGRAM OF THE ABDOMEN AND PELVIS WITH AND WITHOUT IV CONTRAST     INDICATION: Fever, Acute bilateral low back pain  Post endograft placement for abdominal aortic aneurysm  Endovascular repair of aortic aneurysm performed on August 23  COMPARISON: March 8, 2017     TECHNIQUE: CT angiogram examination of the abdomen and pelvis was performed according to standard post endograft CTA protocol  Contrast as well as noncontrast images were obtained  100 ml of Omnipaque 350 was injected intravenously  3D reconstructions were performed an independent workstation, and are supplied for review  FINDINGS:     VASCULAR STRUCTURES:     As compared to the previous CT examination of March 20, 2017 there has been interval placement of a bifurcated aortic endograft for repair of aortic aneurysm  There is air in the aneurysm sac, expected post operative appearance  There is no significant   postcontrast enhancement of the sac, no definitive endoleak identified  Overall sac size is similar to prior exam up to 6 2 cm on the current exam, and 6 1 cm on the previous exam      Bifurcated graft extends into the right common iliac artery and with extension into the left external iliac artery   Left internal iliac artery not opacified at the origin but there is filling distally possibly via collaterals  There is filling of the    right internal iliac artery, with thrombosed aneurysmal segment near the origin measuring up to 1 7 cm  There is diffuse atherosclerotic disease of both external iliac arteries which extends into both femoral arteries  Celiac artery remains patent  Superior mesenteric artery narrowing of at least 50% secondary to partially calcified atherosclerotic plaque eccentric to the left persists, there is filling of the artery distal to this segment  Bilateral renal arteries are patent  Inferior mesenteric artery is not well identified  OTHER FINDINGS:   ABDOMEN:     LOWER CHEST: 8 mm nodular density in the lingula, which appears more prominent than on the previous exam  Emphysematous changes persist      LIVER/BILIARY TREE: Unremarkable  GALLBLADDER: Small nodular density again seen at the fundus of the gallbladder, similar in size to prior exam      SPLEEN: Unremarkable  Normal size  PANCREAS: Unremarkable  ADRENAL GLANDS: Unremarkable  KIDNEYS/URETERS: There are multiple stones in both left and right kidneys, these do not appear to be obstructive as there is no hydronephrosis or hydroureter on either the left of the right  Largest on the left measures up to 7 mm, largest on the right    measures up to 5 mm  PELVIS:   REPRODUCTIVE ORGANS: Prostate gland is enlarged to 5 cm and does protrude superiorly  URINARY BLADDER: 7 mm stone is seen dependently in the urinary bladder  There is air in the urinary bladder on delayed phase imaging, this was not appreciated on the precontrast imaging but is more apparent on the contrast secondary to greater filling of the bladder  ADDITIONAL ABDOMINAL AND PELVIC STRUCTURES:   STOMACH AND BOWEL: No oral contrast was administered  There is some stool throughout the colon  Scattered colonic diverticula   No evidence of pneumatosis  No portal venous gas  ABDOMINOPELVIC CAVITY: No pathologically enlarged mesenteric or retroperitoneal lymph nodes  No ascites or free intraperitoneal air  ABDOMINAL WALL/INGUINAL REGIONS: Unremarkable  OSSEOUS STRUCTURES: No acute fracture or destructive osseous lesion  IMPRESSION:     1  Status post bifurcated endograft repair of abdominal aortic aneurysm  The current sac size is 62 mm, which is within range of expected variation from the 61 mm since the prior study  2  There is no endoleak present  3  7 mm stone and air in the urinary bladder, more apparent on arterial and delayed phase imaging  Findings could represent urinary bladder infection  4  Multiple nonobstructing renal stones  No hydronephrosis or hydroureter  5  8 mm nodule within the lingula, slightly more apparent than on prior exam  This can be reevaluated on next follow-up for the abdominal aortic aneurysm if that is within a year  6  8mm nodule in the fundus of the gallbladder   This is unchanged from prior exam         ##sigslh##sigslh       Workstation performed: [id number]     Signed by:   Nadege Hernadnez MD   9/12/17   RAD_DOSE   Modality Radiation Exposure Data    Order Radiation    Type Dose Range    Radiation Dose 1464 27 mGy-cm 0 - 6000 mGy-cm

## 2018-01-13 NOTE — RESULT NOTES
Verified Results  * CT HEAD WO CONTRAST [id number] 09:23AM Alissa Waters Order Number: [id number]    - Patient Instructions: To schedule this appointment, please contact Central Scheduling at [phone]   Order Number: [id number]    - Patient Instructions: To schedule this appointment, please contact Central Scheduling at [phone]  Test Name Result Flag Reference   CT HEAD WO CONTRAST (Report)     CT BRAIN - WITHOUT CONTRAST     INDICATION: Intermittent confusion  Dementia  Forgetfulness  COMPARISON: None     TECHNIQUE: Multiple contiguous axial CT images were obtained at 2 5 mm intervals through the posterior fossa followed by 5 mm intervals through the remainder of the brain  IMAGE QUALITY: Diagnostic     FINDINGS:      PARENCHYMA:    No mass, mass effect or midline shift  No acute parenchymal abnormality  Diminished attenuation is noted in the periventricular and subcortical white matter tracks of both hemispheres, likely related to small vessel ischemic changes of age  No hemorrhage  No signs of acute ischemia  VENTRICLES AND EXTRA-AXIAL SPACES:  Age appropriate  VISUALIZED ORBITS AND PARANASAL SINUSES: Orbits intact  5 mm left frontal osteoma  CALVARIUM AND EXTRACRANIAL SOFT TISSUES: Normal          IMPRESSION:     Age related changes; chronic white matter small vessel ischemic changes  Small left frontal osteoma  Workstation performed: [id number]     Signed by:   Gera Mckeon MD   10/18/16

## 2018-01-14 VITALS
DIASTOLIC BLOOD PRESSURE: 72 MMHG | WEIGHT: 162.5 LBS | HEIGHT: 68 IN | BODY MASS INDEX: 24.63 KG/M2 | SYSTOLIC BLOOD PRESSURE: 116 MMHG

## 2018-01-15 VITALS
WEIGHT: 166.5 LBS | HEIGHT: 68 IN | DIASTOLIC BLOOD PRESSURE: 60 MMHG | BODY MASS INDEX: 25.23 KG/M2 | SYSTOLIC BLOOD PRESSURE: 114 MMHG

## 2018-01-15 NOTE — RESULT NOTES
Verified Results  CTA ABDOMEN PELVIS W WO CONTRAST [id number] 03:11PM Hugo Jewell Order Number: [id number]   Performing Comments: Recent endovascular repair of abdominal aortic aneurysm August 23 patient has severe low back pain, rule out leakage from endovascular repair   - Patient Instructions: To schedule this appointment, please contact Central Scheduling    at [phone]  Test Name Result Flag Reference   CTA ABDOMEN PELVIS W WO CONTRAST (Report)     CT ANGIOGRAM OF THE ABDOMEN AND PELVIS WITH AND WITHOUT IV CONTRAST     INDICATION: Fever, Acute bilateral low back pain  Post endograft placement for abdominal aortic aneurysm  Endovascular repair of aortic aneurysm performed on August 23  COMPARISON: March 8, 2017     TECHNIQUE: CT angiogram examination of the abdomen and pelvis was performed according to standard post endograft CTA protocol  Contrast as well as noncontrast images were obtained  100 ml of Omnipaque 350 was injected intravenously  3D reconstructions were performed an independent workstation, and are supplied for review  FINDINGS:     VASCULAR STRUCTURES:     As compared to the previous CT examination of March 20, 2017 there has been interval placement of a bifurcated aortic endograft for repair of aortic aneurysm  There is air in the aneurysm sac, expected post operative appearance  There is no significant   postcontrast enhancement of the sac, no definitive endoleak identified  Overall sac size is similar to prior exam up to 6 2 cm on the current exam, and 6 1 cm on the previous exam      Bifurcated graft extends into the right common iliac artery and with extension into the left external iliac artery  Left internal iliac artery not opacified at the origin but there is filling distally possibly via collaterals  There is filling of the    right internal iliac artery, with thrombosed aneurysmal segment near the origin measuring up to 1 7 cm   There is diffuse atherosclerotic disease of both external iliac arteries which extends into both femoral arteries  Celiac artery remains patent  Superior mesenteric artery narrowing of at least 50% secondary to partially calcified atherosclerotic plaque eccentric to the left persists, there is filling of the artery distal to this segment  Bilateral renal arteries are patent  Inferior mesenteric artery is not well identified  OTHER FINDINGS:   ABDOMEN:     LOWER CHEST: 8 mm nodular density in the lingula, which appears more prominent than on the previous exam  Emphysematous changes persist      LIVER/BILIARY TREE: Unremarkable  GALLBLADDER: Small nodular density again seen at the fundus of the gallbladder, similar in size to prior exam      SPLEEN: Unremarkable  Normal size  PANCREAS: Unremarkable  ADRENAL GLANDS: Unremarkable  KIDNEYS/URETERS: There are multiple stones in both left and right kidneys, these do not appear to be obstructive as there is no hydronephrosis or hydroureter on either the left of the right  Largest on the left measures up to 7 mm, largest on the right    measures up to 5 mm  PELVIS:   REPRODUCTIVE ORGANS: Prostate gland is enlarged to 5 cm and does protrude superiorly  URINARY BLADDER: 7 mm stone is seen dependently in the urinary bladder  There is air in the urinary bladder on delayed phase imaging, this was not appreciated on the precontrast imaging but is more apparent on the contrast secondary to greater filling of the bladder  ADDITIONAL ABDOMINAL AND PELVIC STRUCTURES:   STOMACH AND BOWEL: No oral contrast was administered  There is some stool throughout the colon  Scattered colonic diverticula  No evidence of pneumatosis  No portal venous gas  ABDOMINOPELVIC CAVITY: No pathologically enlarged mesenteric or retroperitoneal lymph nodes  No ascites or free intraperitoneal air  ABDOMINAL WALL/INGUINAL REGIONS: Unremarkable       OSSEOUS STRUCTURES: No acute fracture or destructive osseous lesion  IMPRESSION:     1  Status post bifurcated endograft repair of abdominal aortic aneurysm  The current sac size is 62 mm, which is within range of expected variation from the 61 mm since the prior study  2  There is no endoleak present  3  7 mm stone and air in the urinary bladder, more apparent on arterial and delayed phase imaging  Findings could represent urinary bladder infection  4  Multiple nonobstructing renal stones  No hydronephrosis or hydroureter  5  8 mm nodule within the lingula, slightly more apparent than on prior exam  This can be reevaluated on next follow-up for the abdominal aortic aneurysm if that is within a year  6  8mm nodule in the fundus of the gallbladder   This is unchanged from prior exam         ##sigslh##sigslh       Workstation performed: [id number]     Signed by:   Nadege Hernandez MD   9/12/17

## 2018-01-15 NOTE — RESULT NOTES
Verified Results  * XR HIP/PELV 2-3 VWS LEFT W PELVIS IF PERFORMED [id number] 03:31PM Susana Leavitt   TW Order Number: [id number]     Test Name Result Flag Reference   * XR HIP/PELV 2-3 VWS LEFT (Report)     LEFT HIP     INDICATION: 68-year-old male, left hip pain     COMPARISON: None     VIEWS: AP pelvis and 2 coned down views     IMAGES: 3     FINDINGS:     There is no fracture or dislocation  Mild degenerative arthritis both hips     No lytic or blastic lesions are seen  Bilateral iliac arterial stents       IMPRESSION:   Mild degenerative arthritis both hips     No acute osseous abnormality  Workstation performed: [id number]     Signed by:   Valentina Otoole MD   11/11/17     * XR CHEST PA & LATERAL [id number] 03:31PM Sabra Choe Order Number: [id number]     Test Name Result Flag Reference   XR CHEST PA & LATERAL (Report)     CHEST     INDICATION: 68-year-old male, CABG, follow-up   COMPARISON: 9/11/2017 abdominopelvic CTA; 10/26/2017 x-rays     VIEWS: Frontal and lateral projections; 2 images     FINDINGS:   Hyperinflation consistent with COPD   Status post CABG   The lungs appear clear  Lingular nodule identified on prior CT is not visualized No pleural effusions  The cardiomediastinal silhouette is unremarkable  Bony thorax unremarkable       Findings are stable       IMPRESSION:   Hyperinflation consistent with COPD   No acute cardiopulmonary disease, stable    Status post CABG       Workstation performed: [id number]     Signed by:   Valentina Otoole MD   11/11/17

## 2018-01-15 NOTE — RESULT NOTES
Verified Results  (1) CBC/PLT/DIFF 21Oct2016 06:56AM Bella Sánchez    Order Number: [id number]     Test Name Result Flag Reference   WBC COUNT 5 59 Thousand/uL  4 31-10 16   RBC COUNT 5 21 Million/uL  3 88-5 62   HEMOGLOBIN 15 4 g/dL  12 0-17 0   HEMATOCRIT 46 5 %  36 5-49 3   MCV 89 fL  82-98   MCH 29 6 pg  26 8-34 3   MCHC 33 1 g/dL  31 4-37 4   RDW 15 0 %  11 6-15 1   MPV 8 8 fL L 8 9-12 7   PLATELET COUNT 523 Thousands/uL  149-390   NEUTROPHILS RELATIVE PERCENT 56 %  43-75   LYMPHOCYTES RELATIVE PERCENT 30 %  14-44   MONOCYTES RELATIVE PERCENT 11 %  4-12   EOSINOPHILS RELATIVE PERCENT 2 %  0-6   BASOPHILS RELATIVE PERCENT 1 %  0-1   NEUTROPHILS ABSOLUTE COUNT 3 20 Thousands/?L  1 85-7 62   LYMPHOCYTES ABSOLUTE COUNT 1 65 Thousands/?L  0 60-4 47   MONOCYTES ABSOLUTE COUNT 0 60 Thousand/?L  0 17-1 22   EOSINOPHILS ABSOLUTE COUNT 0 09 Thousand/?L  0 00-0 61   BASOPHILS ABSOLUTE COUNT 0 05 Thousands/?L  0 00-0 10   - Patient Instructions: This bloodwork is non-fasting  Please drink two glasses of water morning of bloodwork  - Patient Instructions: This bloodwork is non-fasting  Please drink two glasses of water morning of bloodwork  (1) COMPREHENSIVE METABOLIC PANEL [id number] 24:15RT Bella Sánchez    Order Number: [id number]     Test Name Result Flag Reference   GLUCOSE,RANDM 120 mg/dL     If the patient is fasting, the ADA then defines impaired fasting glucose as > 100 mg/dL and diabetes as > or equal to 123 mg/dL     SODIUM 140 mmol/L  136-145   POTASSIUM 4 4 mmol/L  3 5-5 3   CHLORIDE 104 mmol/L  100-108   CARBON DIOXIDE 28 mmol/L  21-32   ANION GAP (CALC) 8 mmol/L  4-13   BLOOD UREA NITROGEN 13 mg/dL  5-25   CREATININE 1 02 mg/dL  0 60-1 30   Standardized to IDMS reference method   CALCIUM 8 6 mg/dL  8 3-10 1   BILI, TOTAL 0 80 mg/dL  0 20-1 00   ALK PHOSPHATAS 110 U/L     ALT (SGPT) 17 U/L  12-78   AST(SGOT) 15 U/L  5-45   ALBUMIN 3 2 g/dL L 3 5-5 0   TOTAL PROTEIN 6 8 g/dL  6 4-8 2   eGFR Non-African American      >60 0 ml/min/1 73sq m   [facility] Disease Education Program recommendations are as follows:  GFR calculation is accurate only with a steady state creatinine  Chronic Kidney disease less than 60 ml/min/1 73 sq  meters  Kidney failure less than 15 ml/min/1 73 sq  meters  (1) TSH WITH FT4 REFLEX 21Oct2016 06:56AM Zia Chew   TW Order Number: [id number]     Test Name Result Flag Reference   TSH 1 270 uIU/mL  0 358-3 740   Patients undergoing fluorescein dye angiography may retain small amounts of fluorescein in the body for 48-72 hours post procedure  Samples containing fluorescein can produce falsely depressed TSH values  If the patient had this procedure,a specimen should be resubmitted post fluorescein clearance       (1) VITAMIN B12 21Oct2016 06:56AM Zia Chew   TW Order Number: [id number]     Test Name Result Flag Reference   VITAMIN B12 451 pg/mL  100-900     (1) FOLATE [id number] 06:56AM Gwyn Gonzalez Order Number: [id number]     Test Name Result Flag Reference   FOLATE 10 6 ng/mL  3 1-17 5

## 2018-01-17 NOTE — RESULT NOTES
Verified Results  * XR CHEST PA & LATERAL 11Sep2017 03:12PM Darin Rahman Order Number: [id number]     Test Name Result Flag Reference   XR CHEST PA & LATERAL (Report)     CHEST     INDICATION: Check up  AAA  Remote tobacco abuse  COMPARISON: May 30, 2017     VIEWS: PA and lateral; IMAGES: 2     FINDINGS: The patient is slightly rotated to the left  Midline sternotomy wires are present from previous cardiac surgery  The cardiomediastinal silhouette is unremarkable  Linear scarring noted at the right lung apex, unchanged  No acute pulmonary consolidation  No pleural effusions  Bony thorax is otherwise unremarkable  IMPRESSION:     No acute pulmonary disease, status post cardiac surgery             Workstation performed: [id number]     Signed by:   Sepideh Fuentes MD   9/11/17       Plan  Positive depression screening    · *VB - Depression Follow Up With Primary Care Provider Evaluation and Treatment   Follow-up  Status: Active  Requested for: 11Sep2017

## 2018-01-18 NOTE — RESULT NOTES
Verified Results  (1) LYME ANTIBODY PROFILE W/REFLEX TO WESTERN BLOT 21Oct2016 06:56AM Kadie Valerio    Order Number: [id number]     Test Name Result Flag Reference   LYME IGG 0 14  0 00-0 79   NEGATIVE(0 00-0 79)-Absence of detectable Borrelia IgG Antibodies  A negative result does not exclude the possibility of Borrelia infection  If early Lyme disease is suspected,a second sample should be collected & tested 4 weeks after initial testing  LYME IGM 0 26  0 00-0 79   NEGATIVE (0 00-0 79)-Absence of detectable Borrelia IgM antibodies  A negative result does not exclude the possibility of Borrelia infection   If early lyme disease is suspected, a second sample should be collected & tested 4 weeks after initial testing      (1) RPR 21Oct2016 06:56AM Kadie Valerio    Order Number: [id number]     Test Name Result Flag Reference   RPR Non-Reactive  Non-Reactive

## 2018-01-22 VITALS
TEMPERATURE: 97.8 F | HEIGHT: 70 IN | WEIGHT: 159 LBS | BODY MASS INDEX: 22.76 KG/M2 | SYSTOLIC BLOOD PRESSURE: 118 MMHG | DIASTOLIC BLOOD PRESSURE: 76 MMHG

## 2018-01-22 VITALS
BODY MASS INDEX: 23.19 KG/M2 | DIASTOLIC BLOOD PRESSURE: 58 MMHG | WEIGHT: 162 LBS | SYSTOLIC BLOOD PRESSURE: 136 MMHG | HEIGHT: 70 IN

## 2018-01-23 VITALS
TEMPERATURE: 99.6 F | SYSTOLIC BLOOD PRESSURE: 128 MMHG | HEIGHT: 68 IN | DIASTOLIC BLOOD PRESSURE: 84 MMHG | HEIGHT: 68 IN | BODY MASS INDEX: 24.74 KG/M2 | WEIGHT: 163.25 LBS | SYSTOLIC BLOOD PRESSURE: 110 MMHG | WEIGHT: 165.5 LBS | BODY MASS INDEX: 25.08 KG/M2 | DIASTOLIC BLOOD PRESSURE: 60 MMHG

## 2018-01-23 NOTE — MISCELLANEOUS
Assessment   1  Former smoker (F86 34) (W27 230)7   2  No advance directives (V49 89) (Z78 9)1   3  History of Endovascular Repair Of Aortic Aneurysm1   4  Need for influenza vaccination (V04 81) (Z23)1   5  No living will1   6  Aortic Aneurysm Repair1   7  Postoperative pneumonia (890 51,712) (J95 89,J18 9)1      1 Amended By: Denny Cooper; Sep 01 2017 3:10 PM EST    Plan  Postoperative pneumonia    · (1) BASIC METABOLIC PROFILE; Status:Active; Requested for:69Nmy9212; 1   Perform:[facility]; Due:61Lxj4257; Ordered; For:Postoperative   pneumonia; Ordered By:Alex Lyles    · (1) CBC/PLT/DIFF; Status:Active; Requested for:55Ejd9475; 1   Perform:[facility]; Due:98Vsn1921; Ordered; For:Postoperative   pneumonia; Ordered By:Alex Lyles    · (1) CULTURE, BLOOD BACT; Status:Active; Requested for:91Gng4123; 1   Perform:[facility]; Due:60Bij6594; Ordered; For:Postoperative   pneumonia; Ordered By:Alex Lyles    · * XR CHEST PA & LATERAL; Status:Active; Requested for:01Sep2017; 1   Perform:[facility]; [phone]; Ordered; For:Postoperative pneumonia; Ordered By:Alex Lyles      1 Amended By: Denny Cooper; Sep 01 2017 3:10 PM EST    Discussion/Summary  Discussion Summary:   Patient is here status post endovascular repair of an abdominal aortic aneurysm  He had postoperative pneumonia and sirs  He had an enterococcus fiery Dixon bacteremia  He looks great and other than the fact these purple from his belly button to his mid thighs, so I'm going to order a chest x-ray, CBC, I'm also going to order a BMP just to make sure he hasn't had any decrease in renal function from the contrast that was administered for the endovascular stenting and we will repeat a chest x-ray  We will see him again in a month1    Counseling Documentation With Imm: The  patient1  was counseled regarding1      Patient Education: Educational resources provided:1    Medication SE Review and Pt Understands Tx: Possible side effects of new medications were reviewed with the patient/guardian today1  The treatment plan was reviewed with the patient/guardian  The patient/guardian understands and agrees with the treatment plan1    Self Referrals:   Self Referrals: No1        1 Amended By: Timothy Rodriguez; Sep 01 2017 3:12 PM EST    History of Present Illness    TCM Communication [facility]: The patient is being contacted for  follow-up after hospitalization1  and  and PERRY appt is on 9/1/17 at 2:30 pm  He has no pending appts scheduled with our office at this time  1  1   He was hospitalized  at [facility]  1   The  date of admission: 08/23/20171  ,  date of discharge: 08/29/20171  1   Diagnosis:1  Endovascular AAA repair via percutaneous approach; Soft hematoma right groin post procedure; Pneumonia involving right lung; Hypokalemia; Hypophosphatemia; Headache; Hypotension; CAD; COPD; Shivering; Hematoma of groin; SIRS; Leukocytosis1   He was discharged  to home1  ,  with home health services1  ,  [address]  1   Medications were not reviewed today  1    He scheduled a follow up appointment  1   Follow-up appointments with other specialists:  Vascular surgeon Kev get 9/281  1   Symptoms:1  weakness1  and fatigue1   The patient is currently experiencing symptoms  1  Sore belly and black and blue in groin area1  Counseling was provided to  patient's family1  1   Per wife Od[place]   Communication performed and completed by1  Génesis Sy1        1 Amended By: Eyal Alcazar; Sep 01 2017 12:02 PM EST    Review of Systems  Complete-Male:   Constitutional:1  No fever or chills, feels well, no tiredness, no recent weight gain or weight loss1   Eyes:1  No complaints of eye pain, no red eyes, no discharge from eyes, no itchy eyes1   ENT:1  no complaints of earache, no hearing loss, no nosebleeds, no nasal discharge, no sore throat, no hoarseness1      Cardiovascular: 1  No complaints of slow heart rate, no fast heart rate, no chest pain, no palpitations, no leg claudication, no lower extremity1   Respiratory:1  No complaints of shortness of breath, no wheezing, no cough, no SOB on exertion, no orthopnea or PND1   Gastrointestinal: recent endovascular AAA repair1   Genitourinary:1  No complaints of dysuria, no incontinence, no hesitancy, no nocturia, no genital lesion, no testicular pain1   Musculoskeletal:1  No complaints of arthralgia, no myalgias, no joint swelling or stiffness, no limb pain or swelling1   Integumentary:1  No complaints of skin rash or skin lesions, no itching, no skin wound, no dry skin1   ROS Reviewed:   ROS reviewed  1        1 Amended By: Krystal Zamora; Sep 01 2017 3:08 PM EST    Active Problems   1  Acute cholecystitis (575 0) (K81 0)  2  Acute exacerbation of chronic obstructive airways disease with asthma (493 22)   (J44 1,J45 901)  3  Acute serous otitis media of left ear, recurrence not specified (381 01) (H65 02)  4  Adrenal Calcification (255 41)  5  Advanced COPD (496) (J44 9)  6  Arteriosclerotic coronary artery disease (414 00) (I25 10)  7  Benign prostatic hypertrophy (600 00) (N40 0)  8  Biliary dyskinesia (575 8) (K82 8)  9  CAP (community acquired pneumonia) (5) (J18 9)  10  Carious teeth (521 00) (K02 9)  11  Cath Aorta Aneurysm Abdominal  12  Contusion of left elbow, initial encounter (923 11) (S50 02XA)  13  Dementia (294 20) (F03 90)  14  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)  15  Depression (311) (F32 9)  16  Disequilibrium (780 4) (R42)  17  Fatigue (780 79) (R53 83)  18  Hyperlipidemia (272 4) (E78 5)  19  Infiltrate, 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)  20  Insomnia (780 52) (G47 00)  21  Pneumonia (486) (J18 9)  22  Renal calculi (592 0) (N20 0)  23  Screening for genitourinary condition (V81 6) (Z13 89)  24  Sepsis, due to unspecified organism (038 9,995 91) (A41 9)  25   Severe obstructive sleep apnea (327 23) (G47 33)  26  Skin cancer (173 90) (C44 90)  27  Vitamin D deficiency (268 9) (E55 9)    Past Medical History   1  History of Abdominal aortic aneurysm without rupture (441 4) (I71 4)  2  History of Cataracts, bilateral (366 9) (H26 9)  3  History of Cough (786 2) (R05)  4  History of Dental caries (521 00) (K02 9)  5  History of Exercise counseling (V65 41) (Z71 89)  6  History of Fever of unknown origin (780 60) (R50 9)  7  History of Glaucoma screening (V80 1) (Z13 5)  8  History of common cold (V12 09) (Z86 19)  9  History of epistaxis (V12 69) (Z87 898)  10  History of Influenza vaccine needed (V04 81) (Z23)  11  History of Need for pneumococcal vaccination (V03 82) (Z23)  12  Need for prophylactic vaccination and inoculation against influenza (V04 81) (Z23)  13  History of Pneumonia (486) (J18 9)  14  History of Screening for colorectal cancer (V76 51) (Z12 11,Z12 12)  15  History of Sinusitis (473 9) (J32 9)  16  History of Special screening examination for neoplasm of prostate (V76 44) (Z12 5)    Surgical History   1  History of Cataract Surgery  2  History of Coronary Artery Surgery  3  History of Hernia Repair  4  History of Lithotripsy  5  History of Oral Surgery Tooth Extraction  6  History of Renal Lithotripsy  7  History of Transurethral Removal Of Internally Dwelling Ureteral Stent  Surgical History Reviewed: The surgical history was reviewed and updated today  1        1 Amended By: Abdulaziz Paredes; Sep 01 2017 3:08 PM EST    Family History  Mother   1  Family history of Type 2 diabetes mellitus (250 00) (E11 9)  Maternal Grandmother   2  Family history of Type 2 diabetes mellitus (250 00) (E11 9)  Paternal Grandmother   3  Family history of Benign essential hypertension (401 1) (I10)  Family History Reviewed: The family history was reviewed and updated today  1        1 Amended By: Abdulaziz Paredes;  Sep 01 2017 3:08 PM EST    Social History    · Denied: Drug Use (305 90)   · Former smoker (V15 82) (C75 876)   · No living will   · Stopped Drinking Alcohol    Social History Reviewed: The social history was reviewed and updated today  1  The social history was reviewed and is unchanged  1        1 Amended By: Trisha Nicole; Sep 01 2017 3:08 PM EST    Current Meds  1  Aspirin 81 MG Oral Tablet Delayed Release; Take 1 tablet daily Recorded  2  Atorvastatin Calcium 20 MG Oral Tablet; TAKE 1 TABLET AT BEDTIME; Therapy: 17BMH4012 to (Evaluate:73Njq9685)  Requested for: 17Aug2017; Last   Rx:17Aug2017 Ordered  3  Breo Ellipta 100-25 MCG/INH Inhalation Aerosol Powder Breath Activated; USE 1   INHALATION ONCE DAILY; Therapy: 96GSQ6462 to (Evaluate:11Mar2018)  Requested for: 16NMC8515; Last   Rx:16Mar2017 Ordered  4  Donepezil HCl - 10 MG Oral Tablet; TAKE 1 TABLET DAILY; Therapy: 66MKT2914 to (Evaluate:14Nov2017)  Requested for: 48ZCZ8837; Last   Rx:57Tef9065 Ordered  5  Escitalopram Oxalate 10 MG Oral Tablet; Take 1 tablet daily; Therapy: 60JRX4241 to (Evaluate:11Zcy8686)  Requested for: 27Jun2017; Last   CR:27REB5438 Ordered  6  Metoprolol Succinate ER 25 MG Oral Tablet Extended Release 24 Hour; Take 1 tablet   daily Recorded  7  Montelukast Sodium 10 MG Oral Tablet; Take 1 tablet daily; Therapy: 28SQM1034 to (Pearson Reasons)  Requested for: 73PVF9484; Last   QH:77WGF8610 Ordered  8  Neomycin-Polymyxin-HC 3 5-44960-0 Otic Suspension; INSTILL 3 DROPS IN   AFFECTED EAR(S) 3-4 TIMES DAILY; Therapy: 42TVT2756 to (Last Rx:25Ete7445)  Requested for: 13MAJ1782 Ordered  9  Probiotic Oral Capsule Recorded  10  RA Vitamin D-3 2000 UNIT Oral Capsule; TAKE 1 SOFTGELL ONCE DAILY Recorded  11  Spiriva HandiHaler 18 MCG Inhalation Capsule; INHALE THE CONTENTS OF 1      CAPSULE ONCE DAILY; Therapy: 33BNY1546 to (Sonya Clark)  Requested for: 57OYS1323; Last    Rx:98Upi8400 Ordered  12  Tamsulosin HCl - 0 4 MG Oral Capsule;     Therapy: 63OPL4603 to (Last Rx:22Nov2010)  Requested for: 36TPO1265 Ordered  13  Urocit-K 15 15 MEQ (1620 MG) Oral Tablet Extended Release (Potassium Citrate ER); Take 1 tablet daily Recorded  14  ZyrTEC Allergy 10 MG Oral Capsule; TAKE 1 CAPSULE Daily Recorded  Medication List Reviewed: The medication list was reviewed and updated today  1        1 Amended By: Nicole Lundberg; Sep 01 2017 3:08 PM EST    Allergies   1  Cipro TABS  2  Augmentin TABS  3  Morphine Sulfate SOLN    Vitals  Signs   Recorded: 01Sep2017 57:74PK    Systolic: 202 1    Diastolic: 58 1    Height: 5 ft 10 in 1    Weight: 162 lb  1    BMI Calculated: 23 24 1    BSA Calculated: 1 91 1      1 Amended By: Nicole Lundberg; Sep 01 2017 3:08 PM EST    Physical Exam    Constitutional1    General appearance: No acute distress, well appearing and well nourished  1    Eyes1    Conjunctiva and lids: No swelling, erythema, or discharge  1    Pupils and irises: Equal, round and reactive to light  1    Ears, Nose, Mouth, and Throat1    External inspection of ears and nose: Normal 1    Otoscopic examination: Tympanic membrance translucent with normal light reflex  Canals patent without erythema  1    Nasal mucosa, septum, and turbinates: Normal without edema or erythema  1    Oropharynx: Normal with no erythema, edema, exudate or lesions  1    Pulmonary1    Respiratory effort: No increased work of breathing or signs of respiratory distress  1    Auscultation of lungs: Clear to auscultation, equal breath sounds bilaterally, no wheezes, no rales, no rhonci  1    Cardiovascular1    Palpation of heart: Normal PMI, no thrills  1    Auscultation of heart: Normal rate and rhythm, normal S1 and S2, without murmurs  1    Examination of extremities for edema and/or varicosities: Normal 1    Carotid pulses: Normal 1    Abdomen1    Abdomen: Non-tender, no masses  1    Liver and spleen: No hepatomegaly or splenomegaly  1    Lymphatic1    Palpation of lymph nodes in neck: No lymphadenopathy  1    Musculoskeletal1    Gait and station: Normal 1 Digits and nails: Normal without clubbing or cyanosis  1    Inspection/palpation of joints, bones, and muscles: Normal 1    Skin1    Skin and subcutaneous tissue: Normal without rashes or lesions  1    Neurologic1    Cranial nerves: Cranial nerves 2-12 intact  1    Reflexes: 2+ and symmetric  1    Sensation: No sensory loss  1    Psychiatric1    Orientation to person, place and time: Normal 1    Mood and affect: Normal 1          1 Amended By: Beronica Franklin; Sep 01 2017 3:08 PM EST    Health Management  Health Maintenance   Medicare Annual Wellness Visit; every 1 year; Last 59XED6902; Next Due: 63DXV6841;  Overdue    Future Appointments    Date/Time Provider Specialty Site   09/01/2017 02:30 PM Beronica Franklin DO Family Medicine [address] [facility]     Signatures   Electronically signed by : Walter Quispe DO; Sep  1 2017  8:19AM EST                       (Author)    Electronically signed by : Walter Quispe DO; Sep  1 2017  3:13PM EST                       (Author)

## 2018-01-23 NOTE — RESULT NOTES
Verified Results  * XR CHEST PA & LATERAL [id number] 01:36PM Bobby Collazo Order Number: [id number]     Test Name Result Flag Reference   XR CHEST PA & LATERAL (Report)     CHEST     INDICATION: COPD exacerbation  COMPARISON: 11/8/2017     VIEWS: Frontal and lateral projections     IMAGES: 2     FINDINGS:   Posttraumatic changes from CABG  Intact median sternotomy wires  The cardiomediastinal silhouette is unremarkable  Normal pulmonary vasculature  Coarsened reticular markings are chronic  Lungs are hyperinflated  Incidental note of right azygous lobe  No acute consolidation  Chronic biapical fibronodular pleural thickening  No pleural effusions  No pneumothorax  Bones are unremarkable  IMPRESSION:   Chronic air trapping  No acute cardiopulmonary findings or significant change from prior  Specifically, no pneumonic consolidation         Workstation performed: [id number]     Signed by:   Sanaz Bahena MD   12/8/17

## 2018-01-23 NOTE — RESULT NOTES
Verified Results  (1) CBC/PLT/DIFF [id number] 01:40PM Renate Jesus    Order Number: [id number]     Test Name Result Flag Reference   WBC COUNT 5 13 Thousand/uL  4 31-10 16   RBC COUNT 5 01 Million/uL  3 88-5 62   HEMOGLOBIN 15 2 g/dL  12 0-17 0   HEMATOCRIT 46 7 %  36 5-49 3   MCV 93 fL  82-98   MCH 30 3 pg  26 8-34 3   MCHC 32 5 g/dL  31 4-37 4   RDW 15 3 % H 11 6-15 1   MPV 11 1 fL  8 9-12 7   PLATELET COUNT 800 Thousands/uL L 149-390   nRBC AUTOMATED 0 /100 WBCs     NEUTROPHILS RELATIVE PERCENT 54 %  43-75   LYMPHOCYTES RELATIVE PERCENT 30 %  14-44   MONOCYTES RELATIVE PERCENT 14 % H 4-12   EOSINOPHILS RELATIVE PERCENT 1 %  0-6   BASOPHILS RELATIVE PERCENT 1 %  0-1   NEUTROPHILS ABSOLUTE COUNT 2 78 Thousands/? ??L  1 85-7 62   LYMPHOCYTES ABSOLUTE COUNT 1 54 Thousands/? ??L  0 60-4 47   MONOCYTES ABSOLUTE COUNT 0 71 Thousand/? ??L  0 17-1 22   EOSINOPHILS ABSOLUTE COUNT 0 06 Thousand/? ??L  0 00-0 61   BASOPHILS ABSOLUTE COUNT 0 03 Thousands/? ??L  0 00-0 10

## 2018-01-24 NOTE — MISCELLANEOUS
Assessment   1  Former smoker (Y61 72) (J28 939)2   2  No advance directives (V49 89) (Z78 9)1   3  Exercise counseling (V65 41) (Z71 89)1   4  Screening for colorectal cancer (V76 51) (Z12 11,Z12 12)1   5  CAP (community acquired pneumonia) (472) (J19 10)1      1 Amended By: Maria Teresa Gross; Jan 06 2017 11:28 AM EST    Plan  CAP (community acquired pneumonia)    · Pulse Ox Exercise - POC; Status:Complete;   Done: 30HGA9415 11:28AM1   Perform: In Office; VVW:[id number]; Ordered; For:CAP (community acquired pneumonia); Ordered By:Alex Lyles      1 Amended By: Maria Teresa Gross; Jan 06 2017 11:28 AM EST    Discussion/Summary  Discussion Summary:   Patient is discharged from the hospital  This is a trans-for of care  He has been treated for community-acquired pneumonia, dehydration  Blood cultures were negative  Echocardiogram did not reveal any subacute bacterial endocarditis today here in the office  He looks great  He feels well  His respiratory and GI symptoms have totally resolved  He's back on his usual medications  I did review his entire hospital chart1        1 Amended By: Maria Teresa Gross; Jan 06 2017 11:29 AM EST    History of Present Illness  TCM Communication [facility]: The patient is being contacted for follow-up after hospitalization and PERRY 1-6-17  He was hospitalized at [facility]  The dates of hospitalization:, date of admission: 12-28-16, date of discharge: 12-30-16  Diagnosis: PNEUMONIA  He was discharged to home  Symptoms: weakness  Communication performed and completed by Angelia Paredes      Review of Systems  Complete-Male:   Constitutional:1  feeling poorly1  and feeling tired1   Eyes:1  No complaints of eye pain, no red eyes, no discharge from eyes, no itchy eyes1   ENT:1  no complaints of earache, no hearing loss, no nosebleeds, no nasal discharge, no sore throat, no hoarseness1      Cardiovascular: 1  No complaints of slow heart rate, no fast heart rate, no chest pain, no palpitations, no leg claudication, no lower extremity1   Respiratory:1  cough1  and shortness of breath during exertion1   Gastrointestinal:1  No complaints of abdominal pain, no constipation, no nausea or vomiting, no diarrhea or bloody stools1   Genitourinary:1  No complaints of dysuria, no incontinence, no hesitancy, no nocturia, no genital lesion, no testicular pain1   Musculoskeletal:1  No complaints of arthralgia, no myalgias, no joint swelling or stiffness, no limb pain or swelling1   Integumentary:1  No complaints of skin rash or skin lesions, no itching, no skin wound, no dry skin1   Neurological:1  No compliants of headache, no confusion, no convulsions, no numbness or tingling, no dizziness or fainting, no limb weakness, no difficulty walking1   Psychiatric:1  Is not suicidal, no sleep disturbances, no anxiety or depression, no change in personality, no emotional problems1   ROS Reviewed:   ROS reviewed  1        1 Amended By: Jose Harding; Jan 06 2017 11:26 AM EST    Active Problems   1  Abdominal aortic aneurysm without rupture (441 4) (I71 4)  2  Acute exacerbation of chronic obstructive airways disease with asthma (493 22)   (J44 1,J45 901)  3  Adrenal Calcification (255 41)  4  Advanced COPD (496) (J44 9)  5  Arteriosclerotic coronary artery disease (414 00) (I25 10)  6  Benign prostatic hypertrophy (600 00) (N40 0)  7  Carious teeth (521 00) (K02 9)  8  Cath Aorta Aneurysm Abdominal  9  Dementia (294 20) (F03 90)  10  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)  11  Dental caries (521 00) (K02 9)  12  Depression (311) (F32 9)  13  Epistaxis (784 7) (R04 0)  14  Fatigue (780 79) (R53 83)  15  Hyperlipidemia (272 4) (E78 5)  16  Infiltrate, 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)  17  Influenza vaccine needed (V04 81) (Z23)  18  Insomnia (780 52) (G47 00)  19  Need for pneumococcal vaccination (V03 82) (Z23)  20  Renal calculi (592 0) (N20 0)  21   Screening for genitourinary condition (V81 6) (Z13 89)  22  Severe obstructive sleep apnea (327 23) (G47 33)  23  Skin cancer (173 90) (C44 90)  24  Vitamin D deficiency (268 9) (E55 9)    Past Medical History   1  History of Cataracts, bilateral (366 9) (H26 9)  2  History of Cough (786 2) (R05)  3  History of Fever of unknown origin (780 60) (R50 9)  4  History of Glaucoma screening (V80 1) (Z13 5)  5  History of common cold (V12 09) (Z86 19)  6  Need for prophylactic vaccination and inoculation against influenza (V04 81) (Z23)  7  History of Pneumonia (486) (J18 9)  8  History of Sinusitis (473 9) (J32 9)  9  History of Special screening examination for neoplasm of prostate (V76 44) (Z12 5)    Surgical History   1  History of Cataract Surgery  2  History of Coronary Artery Surgery  3  History of Hernia Repair  4  History of Lithotripsy  5  History of Oral Surgery Tooth Extraction  6  History of Renal Lithotripsy  7  History of Transurethral Removal Of Internally Dwelling Ureteral Stent  Surgical History Reviewed: The surgical history was reviewed and updated today  1        1 Amended By: Yessica Garcia; Jan 06 2017 11:26 AM EST    Family History  Mother   1  Family history of Type 2 diabetes mellitus (250 00) (E11 9)  Maternal Grandmother   2  Family history of Type 2 diabetes mellitus (250 00) (E11 9)  Paternal Grandmother   3  Family history of Benign essential hypertension (401 1) (I10)  Family History Reviewed: The family history was reviewed and updated today  1        1 Amended By: Yessica Garcia; Jan 06 2017 11:26 AM EST    Social History    · Denied: Drug Use (305 90)   · Former smoker (V15 82) (Z87 891)   · No living will   · Stopped Drinking Alcohol    Social History Reviewed: The social history was reviewed and updated today  1  The social history was reviewed and is unchanged  1        1 Amended By: Yessica Garcia; Jan 06 2017 11:26 AM EST    Current Meds  1  Aspirin 81 MG Oral Tablet Delayed Release; Take 1 tablet daily Recorded  2  Atorvastatin Calcium 10 MG Oral Tablet; TAKE 1 TABLET DAILY AT BEDTIME; Therapy: 16GDZ7449 to (96 701955)  Requested for: 83WOM0401; Last   Rx:[id number] Ordered  3  Breo Ellipta 100-25 MCG/INH Inhalation Aerosol Powder Breath Activated; USE 1   INHALATION ONCE DAILY; Therapy: 59IQK6787 to (Evaluate:20Dpr9072)  Requested for: 91XQY1339; Last   Rx:[id number] Ordered  4  [employer] Vitamin D3 1000 UNIT CAPS; Take as directed 2000iu daily Recorded  5  Donepezil HCl - 10 MG Oral Tablet; TAKE 1 TABLET DAILY; Therapy: 03PRY0286 to ([facility])  Requested for: 27FCH2122; Last   Rx:[id number] Ordered  6  Krill Oil Plus Oral Capsule; 500mg daily Recorded  7  Metoprolol Succinate ER 25 MG Oral Tablet Extended Release 24 Hour; Take 1 tablet   daily Recorded  8  Pantoprazole Sodium 40 MG Oral Tablet Delayed Release; Take 1 tablet daily Recorded  9  Probiotic Oral Capsule Recorded  10  QUEtiapine Fumarate 25 MG Oral Tablet; take 0 5 tablet bedtime for one week then    increase to a full tablet; Therapy: 32RQR5716 to ([facility])  Requested for: 96LWE4303; Last    Rx:[id number] Ordered  11  RA Vitamin D-3 2000 UNIT Oral Capsule; TAKE 1 SOFTGELL ONCE DAILY Recorded  12  Spiriva HandiHaler 18 MCG Inhalation Capsule; INHALE THE CONTENTS OF 1      CAPSULE ONCE DAILY; Therapy: 78TWA0417 to ([facility])  Requested for: 01Apr2016; Last    Rx:01Apr2016 Ordered  13  Tamsulosin HCl - 0 4 MG Oral Capsule; Therapy: 86AYO7710 to (Last Rx:22Nov2010)  Requested for: 22Nov2010 Ordered  14  Urocit-K 15 15 MEQ (1620 MG) Oral Tablet Extended Release (Potassium Citrate ER); Take 1 tablet daily Recorded  15  ZyrTEC Allergy 10 MG Oral Capsule; TAKE 1 CAPSULE Daily Recorded  Medication List Reviewed: The medication list was reviewed and updated today  1        1 Amended By: Pedro Parisi; Jan 06 2017 11:26 AM EST    Allergies   1  Cipro TABS  2  Augmentin TABS  3   Morphine Sulfate SOLN    Vitals  Signs Recorded: 97DQM1413 10:57AM    Temperature: 65 9 F 1    Systolic: 738 1    Diastolic: 76 1    Height: 5 ft 10 in 1    Weight: 159 lb  1    BMI Calculated: 22 81 1    BSA Calculated: 1 9 1      1 Amended By: Huey Blanchard; Jan 06 2017 11:26 AM EST    Physical Exam    Constitutional1    General appearance: No acute distress, well appearing and well nourished  1    Eyes1    Conjunctiva and lids: No swelling, erythema, or discharge  1    Pupils and irises: Equal, round and reactive to light  1    Ears, Nose, Mouth, and Throat1    External inspection of ears and nose: Normal 1    Otoscopic examination: Tympanic membrance translucent with normal light reflex  Canals patent without erythema  1    Nasal mucosa, septum, and turbinates: Normal without edema or erythema  1    Oropharynx: Normal with no erythema, edema, exudate or lesions  1    Pulmonary1    Respiratory effort: No increased work of breathing or signs of respiratory distress  1    Auscultation of lungs: Clear to auscultation, equal breath sounds bilaterally, no wheezes, no rales, no rhonci  1    Cardiovascular1    Palpation of heart: Normal PMI, no thrills  1    Auscultation of heart: Normal rate and rhythm, normal S1 and S2, without murmurs  1    Examination of extremities for edema and/or varicosities: Normal 1    Carotid pulses: Normal 1    Abdomen1    Abdomen: Non-tender, no masses  1    Liver and spleen: No hepatomegaly or splenomegaly  1    Lymphatic1    Palpation of lymph nodes in neck: No lymphadenopathy  1    Musculoskeletal1    Gait and station: Normal 1    Digits and nails: Normal without clubbing or cyanosis  1    Inspection/palpation of joints, bones, and muscles: Normal 1    Skin1    Skin and subcutaneous tissue: Normal without rashes or lesions  1    Neurologic1    Cranial nerves: Cranial nerves 2-12 intact  1    Reflexes: 2+ and symmetric  1    Sensation: No sensory loss  1    Psychiatric1    Orientation to person, place and time: Normal 1    Mood and affect: Normal 1          1 Amended By: Jimmy Rasmussen; Jan 06 2017 11:27 AM EST    Health Management  Health Maintenance   Medicare Annual Wellness Visit; every 1 year; Last 29JUR5593; Next Due: 61TXK2737;  Overdue    Future Appointments    Date/Time Provider Specialty Site   01/06/2017 11:00 AM Jimmy Rasmussen DO Family Medicine [address] [facility]     Signatures   Electronically signed by : Moreno Mckeon, ; [place]  3 2017 11:25AM EST                       (Author)    Electronically signed by : Priya Deleon DO; Jan 5 2017  5:54PM EST                       (Author)    Electronically signed by : Priya Deleon DO; Jan 6 2017 11:29AM EST                       (Author)

## 2018-01-29 ENCOUNTER — OFFICE VISIT (OUTPATIENT)
Dept: FAMILY MEDICINE CLINIC | Facility: CLINIC | Age: 78
End: 2018-01-29
Payer: MEDICARE

## 2018-01-29 VITALS
DIASTOLIC BLOOD PRESSURE: 60 MMHG | HEIGHT: 68 IN | BODY MASS INDEX: 24.58 KG/M2 | WEIGHT: 162.2 LBS | SYSTOLIC BLOOD PRESSURE: 102 MMHG

## 2018-01-29 DIAGNOSIS — J20.9 COPD (CHRONIC OBSTRUCTIVE PULMONARY DISEASE) WITH ACUTE BRONCHITIS (HCC): Primary | ICD-10-CM

## 2018-01-29 DIAGNOSIS — J44.0 COPD (CHRONIC OBSTRUCTIVE PULMONARY DISEASE) WITH ACUTE BRONCHITIS (HCC): Primary | ICD-10-CM

## 2018-01-29 DIAGNOSIS — F03.90 DEMENTIA ARISING IN THE SENIUM AND PRESENIUM (HCC): ICD-10-CM

## 2018-01-29 DIAGNOSIS — F32.A DEPRESSION, UNSPECIFIED DEPRESSION TYPE: ICD-10-CM

## 2018-01-29 PROBLEM — E80.6 HYPERBILIRUBINEMIA: Status: RESOLVED | Noted: 2017-05-31 | Resolved: 2018-01-29

## 2018-01-29 PROBLEM — A49.1 STREPTOCOCCUS PNEUMONIAE INFECTION: Status: RESOLVED | Noted: 2017-06-01 | Resolved: 2018-01-29

## 2018-01-29 PROBLEM — N17.9 ACUTE KIDNEY INJURY (HCC): Status: RESOLVED | Noted: 2017-05-31 | Resolved: 2018-01-29

## 2018-01-29 PROBLEM — E55.9 VITAMIN D INSUFFICIENCY: Status: RESOLVED | Noted: 2017-06-02 | Resolved: 2018-01-29

## 2018-01-29 PROBLEM — R31.29 MICROSCOPIC HEMATURIA: Status: RESOLVED | Noted: 2017-05-31 | Resolved: 2018-01-29

## 2018-01-29 PROBLEM — Z86.79 HISTORY OF AORTIC ANEURYSM REPAIR: Status: ACTIVE | Noted: 2017-09-01

## 2018-01-29 PROBLEM — E83.39 HYPOPHOSPHATEMIA: Status: RESOLVED | Noted: 2017-06-01 | Resolved: 2018-01-29

## 2018-01-29 PROBLEM — E87.2 LACTIC ACIDOSIS: Status: RESOLVED | Noted: 2017-05-31 | Resolved: 2018-01-29

## 2018-01-29 PROBLEM — N20.0 RENAL CALCULI: Status: RESOLVED | Noted: 2017-06-02 | Resolved: 2018-01-29

## 2018-01-29 PROBLEM — D69.6 THROMBOCYTOPENIA (HCC): Status: RESOLVED | Noted: 2017-06-02 | Resolved: 2018-01-29

## 2018-01-29 PROBLEM — Z98.890 HISTORY OF AORTIC ANEURYSM REPAIR: Status: ACTIVE | Noted: 2017-09-01

## 2018-01-29 PROCEDURE — 99214 OFFICE O/P EST MOD 30 MIN: CPT | Performed by: FAMILY MEDICINE

## 2018-01-29 RX ORDER — AMOXICILLIN 500 MG/1
500 CAPSULE ORAL EVERY 8 HOURS SCHEDULED
Qty: 30 CAPSULE | Refills: 2 | Status: SHIPPED | OUTPATIENT
Start: 2018-01-29 | End: 2018-02-08

## 2018-01-29 RX ORDER — ESCITALOPRAM OXALATE 20 MG/1
20 TABLET ORAL DAILY
Qty: 30 TABLET | Refills: 0 | Status: SHIPPED | OUTPATIENT
Start: 2018-01-29 | End: 2019-02-13 | Stop reason: SDUPTHER

## 2018-01-29 RX ORDER — DONEPEZIL HYDROCHLORIDE 10 MG/1
10 TABLET, FILM COATED ORAL
Qty: 30 TABLET | Refills: 0 | Status: SHIPPED | OUTPATIENT
Start: 2018-01-29 | End: 2019-01-25 | Stop reason: SDUPTHER

## 2018-01-29 NOTE — ASSESSMENT & PLAN NOTE
Patient not experiencing any symptoms referable to his reflux esophagitis he is not having any swallowing difficulties

## 2018-01-29 NOTE — ASSESSMENT & PLAN NOTE
Currently doing very well with his COPD not having any excessive shortness of breath not smoking patient's using Breo and Spiriva to control his symptoms and doing quite well

## 2018-01-29 NOTE — PROGRESS NOTES
Assessment/Plan:    GERD (gastroesophageal reflux disease)   Patient not experiencing any symptoms referable to his reflux esophagitis he is not having any swallowing difficulties    COPD (chronic obstructive pulmonary disease) (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  -     donepezil (ARICEPT) 10 mg tablet; Take 1 tablet (10 mg total) by mouth daily at bedtime for 30 days    Depression, unspecified depression type  -     escitalopram (LEXAPRO) 20 mg tablet; Take 1 tablet (20 mg total) by mouth daily          Subjective:      Patient ID: Roshan De La Torre is a 78 y o  male  Patient is here for follow-up visit recently his geriatric psychiatrist started on a 10 mg dose of donepezil and that seems to be working quite nicely he is tolerating the medications well        The following portions of the patient's history were reviewed and updated as appropriate: He  has a past medical history of AAA (abdominal aortic aneurysm) ([address] ); Acid reflux; Acute exacerbation of chronic obstructive airways disease with asthma ([address] ); Acute serous otitis media of left ear; Anesthesia; Anxiety; Aortic aneurysm without rupture ([address] ); Arm bruise; Arthritis; Asthma; At risk for falls; BPH (benign prostatic hyperplasia); BPH without urinary obstruction; Cancer ([address] ); CAP (community acquired pneumonia); Cataracts, bilateral; Change in bowel function; CHF (congestive heart failure) ([address] ); Clubbing of fingers; Colon polyps; Common cold; Contusion of elbow, left; COPD (chronic obstructive pulmonary disease) ([address] );  Coronary artery disease; Cough; Dementia; Depression; Diverticulosis; Dizziness; Exercise counseling; Fatigue; Full dentures; Glaucoma screening; High cholesterol; History of abdominal aortic aneurysm (AAA) repair (08/2017); History of bacteremia; History of chronic obstructive lung disease; History of epistaxis; History of influenza vaccination; History of kidney stones; History of pneumonia; History of sepsis (10/2017); History of sinusitis; History of skin cancer; History of sleep apnea; History of urinary tract infection; [place] (hard of hearing); Hydronephrosis with obstructing calculus; Influenza vaccine needed; Insomnia; Jock itch; Kidney stones; Left hip pain; Need for pneumococcal vaccination; Need for prophylactic vaccination and inoculation against influenza; Other emphysema ([facility] ); Pancreatitis, chronic ([facility] ); Pneumonia (10/26/2017); Pulmonary emphysema ([facility] ); S/P CABG x 3; Screening for genitourinary condition; Screening for neurological condition; Sepsis ([place] [address] ); Short-term memory loss; Sleep apnea; Special screening examination for neoplasm of prostate; TIA involving carotid artery; Ulcer ([facility] ); Unsteady gait; Use of cane as ambulatory aid; Vitamin D deficiency; and Wears glasses  He  does not have any pertinent problems on file  He  has a past surgical history that includes Cardiac surgery; Ureteral stent placement; Excisional hemorrhoidectomy; Mouth surgery; pr esophagogastroduodenoscopy transoral diagnostic (N/A, 8/18/2016); Cataract extraction (Bilateral); Lithotripsy; Hernia repair; pr colonoscopy flx dx w/collj spec when pfrmd (N/A, 5/16/2017); Abdominal aortic aneurysm repair (08/23/2017); CAROTID ENDARTARECTOMY (Left, 11/1996); Coronary artery bypass graft (01/2003); Eye surgery (Bilateral); Cystoscopy; pr cystourethroscopy (N/A, 11/30/2017); pr remove bladder stone,<2 5 cm (N/A, 11/30/2017); Cystoscopy; AAA repair, endovascular; and Tonsillectomy    His family history includes Diabetes type II in his maternal grandmother and mother; Hypertension in his paternal grandmother; Kidney failure in his father  He  reports that he quit smoking about 4 years ago  He has a 90 00 pack-year smoking history  He has never used smokeless tobacco  He reports that he does not drink alcohol or use drugs  Current Outpatient Prescriptions   Medication Sig Dispense Refill    aspirin 81 MG tablet Take 81 mg by mouth daily Took within 24 hours       atorvastatin (LIPITOR) 20 mg tablet Take 20 mg by mouth every evening        cetirizine (ZYRTEC ALLERGY) 10 mg tablet Take 10 mg by mouth every evening        Cholecalciferol (VITAMIN D-3 PO) Take 2,000 Units by mouth daily   cyanocobalamin (VITAMIN B-12) 1,000 mcg tablet Take by mouth daily      Fluticasone Furoate-Vilanterol (BREO ELLIPTA) 100-25 MCG/INH AEPB Inhale 1 puff daily   KRILL OIL PO Take 500 mg by mouth daily   METOPROLOL SUCCINATE ER PO Take 25 mg by mouth daily at bedtime        Montelukast Sodium (SINGULAIR PO) Take 10 mg by mouth daily   Multiple Vitamin (MULTIVITAMIN) tablet Take 1 tablet by mouth daily      Potassium Citrate ER 15 MEQ (1620 MG) TBCR Take 15 mEq by mouth 2 (two) times a day   Probiotic Product (PROBIOTIC DAILY PO) Take by mouth daily   tamsulosin (FLOMAX) 0 4 mg Take 0 4 mg by mouth daily   tiotropium (SPIRIVA HANDIHALER) 18 mcg inhalation capsule Place 18 mcg into inhaler and inhale daily   donepezil (ARICEPT) 10 mg tablet Take 1 tablet (10 mg total) by mouth daily at bedtime for 30 days 30 tablet 0    escitalopram (LEXAPRO) 20 mg tablet Take 1 tablet (20 mg total) by mouth daily 30 tablet 0     No current facility-administered medications for this visit        Current Outpatient Prescriptions on File Prior to Visit   Medication Sig    aspirin 81 MG tablet Take 81 mg by mouth daily Took within 24 hours     atorvastatin (LIPITOR) 20 mg tablet Take 20 mg by mouth every evening      cetirizine (ZYRTEC ALLERGY) 10 mg tablet Take 10 mg by mouth every evening      Cholecalciferol (VITAMIN D-3 PO) Take 2,000 Units by mouth daily   cyanocobalamin (VITAMIN B-12) 1,000 mcg tablet Take by mouth daily    Fluticasone Furoate-Vilanterol (BREO ELLIPTA) 100-25 MCG/INH AEPB Inhale 1 puff daily   KRILL OIL PO Take 500 mg by mouth daily   METOPROLOL SUCCINATE ER PO Take 25 mg by mouth daily at bedtime      Montelukast Sodium (SINGULAIR PO) Take 10 mg by mouth daily   Multiple Vitamin (MULTIVITAMIN) tablet Take 1 tablet by mouth daily    Potassium Citrate ER 15 MEQ (1620 MG) TBCR Take 15 mEq by mouth 2 (two) times a day   Probiotic Product (PROBIOTIC DAILY PO) Take by mouth daily   tamsulosin (FLOMAX) 0 4 mg Take 0 4 mg by mouth daily   tiotropium (SPIRIVA HANDIHALER) 18 mcg inhalation capsule Place 18 mcg into inhaler and inhale daily   [DISCONTINUED] escitalopram (LEXAPRO) 20 mg tablet Take 5 mg by mouth daily     No current facility-administered medications on file prior to visit  He is allergic to augmentin [amoxicillin-pot clavulanate]; ciprofloxacin; morphine and related; and quetiapine       Review of Systems   Constitutional: Negative for activity change, appetite change, diaphoresis, fatigue and fever  HENT: Negative  Eyes: Negative  Respiratory: Negative for apnea, cough, chest tightness, shortness of breath and wheezing  Cardiovascular: Negative for chest pain, palpitations and leg swelling  Gastrointestinal: Negative for abdominal distention, abdominal pain, anal bleeding, constipation, diarrhea, nausea and vomiting  Endocrine: Negative for cold intolerance, heat intolerance, polydipsia, polyphagia and polyuria  Genitourinary: Negative for difficulty urinating, dysuria, flank pain, hematuria and urgency  Musculoskeletal: Negative for arthralgias, back pain, gait problem, joint swelling and myalgias  Skin: Negative for color change, rash and wound  Allergic/Immunologic: Negative for environmental allergies, food allergies and immunocompromised state  Neurological: Negative for dizziness, seizures, syncope, speech difficulty, numbness and headaches  Hematological: Negative for adenopathy  Does not bruise/bleed easily  Psychiatric/Behavioral: Negative for agitation, behavioral problems, hallucinations, sleep disturbance and suicidal ideas  Objective:     Physical Exam   Constitutional: He is oriented to person, place, and time  He appears well-developed and well-nourished  No distress  HENT:   Head: Normocephalic  Right Ear: External ear normal    Left Ear: External ear normal    Nose: Nose normal    Mouth/Throat: Oropharynx is clear and moist    Eyes: Conjunctivae and EOM are normal  Pupils are equal, round, and reactive to light  Right eye exhibits no discharge  Left eye exhibits no discharge  No scleral icterus  Neck: Normal range of motion  No tracheal deviation present  No thyromegaly present  Cardiovascular: Normal rate, regular rhythm and normal heart sounds  Exam reveals no gallop and no friction rub  No murmur heard  Pulmonary/Chest: Effort normal and breath sounds normal  No respiratory distress  He has no wheezes  Abdominal: Soft  Bowel sounds are normal  He exhibits no mass  There is no tenderness  There is no guarding  Musculoskeletal: He exhibits no edema or deformity  Lymphadenopathy:     He has no cervical adenopathy  Neurological: He is alert and oriented to person, place, and time  No cranial nerve deficit  Skin: Skin is warm and dry  No rash noted  He is not diaphoretic  No erythema  Psychiatric: He has a normal mood and affect   Thought content normal

## 2018-01-29 NOTE — ASSESSMENT & PLAN NOTE
Status post repair of abdominal aortic aneurysm doing well has not yet had a follow-up visit with his surgeon and that is scheduled for the spring

## 2018-02-06 DIAGNOSIS — E78.2 MIXED HYPERLIPIDEMIA: Primary | ICD-10-CM

## 2018-02-07 RX ORDER — ATORVASTATIN CALCIUM 20 MG/1
TABLET, FILM COATED ORAL
Qty: 90 TABLET | Refills: 1 | Status: SHIPPED | OUTPATIENT
Start: 2018-02-07 | End: 2018-08-01 | Stop reason: SDUPTHER

## 2018-02-25 ENCOUNTER — APPOINTMENT (EMERGENCY)
Dept: CT IMAGING | Facility: HOSPITAL | Age: 78
End: 2018-02-25
Payer: MEDICARE

## 2018-02-25 ENCOUNTER — APPOINTMENT (EMERGENCY)
Dept: RADIOLOGY | Facility: HOSPITAL | Age: 78
End: 2018-02-25
Payer: MEDICARE

## 2018-02-25 ENCOUNTER — HOSPITAL ENCOUNTER (EMERGENCY)
Facility: HOSPITAL | Age: 78
Discharge: HOME | End: 2018-02-25
Attending: EMERGENCY MEDICINE
Payer: MEDICARE

## 2018-02-25 VITALS
TEMPERATURE: 98.3 F | RESPIRATION RATE: 18 BRPM | DIASTOLIC BLOOD PRESSURE: 71 MMHG | SYSTOLIC BLOOD PRESSURE: 151 MMHG | OXYGEN SATURATION: 96 % | HEART RATE: 78 BPM | BODY MASS INDEX: 25.58 KG/M2 | WEIGHT: 168.21 LBS

## 2018-02-25 DIAGNOSIS — M54.30 SCIATICA: Primary | ICD-10-CM

## 2018-02-25 DIAGNOSIS — R26.2 AMBULATORY DYSFUNCTION: ICD-10-CM

## 2018-02-25 LAB
ALBUMIN SERPL BCP-MCNC: 3.6 G/DL (ref 3.5–5)
ALP SERPL-CCNC: 86 U/L (ref 46–116)
ALT SERPL W P-5'-P-CCNC: 27 U/L (ref 12–78)
ANION GAP SERPL CALCULATED.3IONS-SCNC: 13 MMOL/L (ref 4–13)
AST SERPL W P-5'-P-CCNC: 31 U/L (ref 5–45)
BASOPHILS # BLD AUTO: 0.04 THOUSANDS/ΜL (ref 0–0.1)
BASOPHILS NFR BLD AUTO: 1 % (ref 0–1)
BILIRUB SERPL-MCNC: 1.4 MG/DL (ref 0.2–1)
BUN SERPL-MCNC: 19 MG/DL (ref 5–25)
CALCIUM SERPL-MCNC: 9 MG/DL (ref 8.3–10.1)
CHLORIDE SERPL-SCNC: 105 MMOL/L (ref 100–108)
CO2 SERPL-SCNC: 23 MMOL/L (ref 21–32)
CREAT SERPL-MCNC: 1.12 MG/DL (ref 0.6–1.3)
EOSINOPHIL # BLD AUTO: 0.05 THOUSAND/ΜL (ref 0–0.61)
EOSINOPHIL NFR BLD AUTO: 1 % (ref 0–6)
ERYTHROCYTE [DISTWIDTH] IN BLOOD BY AUTOMATED COUNT: 14.9 % (ref 11.6–15.1)
GFR SERPL CREATININE-BSD FRML MDRD: 64 ML/MIN/1.73SQ M
GLUCOSE SERPL-MCNC: 109 MG/DL (ref 65–140)
HCT VFR BLD AUTO: 45.7 % (ref 36.5–49.3)
HGB BLD-MCNC: 15.5 G/DL (ref 12–17)
INR PPP: 0.99 (ref 0.86–1.16)
LYMPHOCYTES # BLD AUTO: 1.58 THOUSANDS/ΜL (ref 0.6–4.47)
LYMPHOCYTES NFR BLD AUTO: 24 % (ref 14–44)
MCH RBC QN AUTO: 30.9 PG (ref 26.8–34.3)
MCHC RBC AUTO-ENTMCNC: 33.9 G/DL (ref 31.4–37.4)
MCV RBC AUTO: 91 FL (ref 82–98)
MONOCYTES # BLD AUTO: 0.61 THOUSAND/ΜL (ref 0.17–1.22)
MONOCYTES NFR BLD AUTO: 9 % (ref 4–12)
NEUTROPHILS # BLD AUTO: 4.28 THOUSANDS/ΜL (ref 1.85–7.62)
NEUTS SEG NFR BLD AUTO: 65 % (ref 43–75)
PLATELET # BLD AUTO: 124 THOUSANDS/UL (ref 149–390)
PMV BLD AUTO: 10 FL (ref 8.9–12.7)
POTASSIUM SERPL-SCNC: 4.3 MMOL/L (ref 3.5–5.3)
PROT SERPL-MCNC: 7.1 G/DL (ref 6.4–8.2)
PROTHROMBIN TIME: 13 SECONDS (ref 12.1–14.4)
RBC # BLD AUTO: 5.01 MILLION/UL (ref 3.88–5.62)
SODIUM SERPL-SCNC: 141 MMOL/L (ref 136–145)
TROPONIN I SERPL-MCNC: <0.02 NG/ML
WBC # BLD AUTO: 6.56 THOUSAND/UL (ref 4.31–10.16)

## 2018-02-25 PROCEDURE — 85610 PROTHROMBIN TIME: CPT | Performed by: EMERGENCY MEDICINE

## 2018-02-25 PROCEDURE — G8980 MOBILITY D/C STATUS: HCPCS

## 2018-02-25 PROCEDURE — 96374 THER/PROPH/DIAG INJ IV PUSH: CPT

## 2018-02-25 PROCEDURE — 72170 X-RAY EXAM OF PELVIS: CPT

## 2018-02-25 PROCEDURE — 80053 COMPREHEN METABOLIC PANEL: CPT | Performed by: EMERGENCY MEDICINE

## 2018-02-25 PROCEDURE — 36415 COLL VENOUS BLD VENIPUNCTURE: CPT | Performed by: EMERGENCY MEDICINE

## 2018-02-25 PROCEDURE — 97162 PT EVAL MOD COMPLEX 30 MIN: CPT

## 2018-02-25 PROCEDURE — 74177 CT ABD & PELVIS W/CONTRAST: CPT

## 2018-02-25 PROCEDURE — 96375 TX/PRO/DX INJ NEW DRUG ADDON: CPT

## 2018-02-25 PROCEDURE — G8979 MOBILITY GOAL STATUS: HCPCS

## 2018-02-25 PROCEDURE — G8978 MOBILITY CURRENT STATUS: HCPCS

## 2018-02-25 PROCEDURE — 93005 ELECTROCARDIOGRAM TRACING: CPT | Performed by: EMERGENCY MEDICINE

## 2018-02-25 PROCEDURE — 97116 GAIT TRAINING THERAPY: CPT

## 2018-02-25 PROCEDURE — 85025 COMPLETE CBC W/AUTO DIFF WBC: CPT | Performed by: EMERGENCY MEDICINE

## 2018-02-25 PROCEDURE — 99284 EMERGENCY DEPT VISIT MOD MDM: CPT

## 2018-02-25 PROCEDURE — 84484 ASSAY OF TROPONIN QUANT: CPT | Performed by: EMERGENCY MEDICINE

## 2018-02-25 RX ORDER — PREDNISONE 20 MG/1
60 TABLET ORAL ONCE
Status: COMPLETED | OUTPATIENT
Start: 2018-02-25 | End: 2018-02-25

## 2018-02-25 RX ORDER — DIAZEPAM 5 MG/1
2.5 TABLET ORAL EVERY 8 HOURS PRN
Qty: 15 TABLET | Refills: 0 | Status: SHIPPED | OUTPATIENT
Start: 2018-02-25 | End: 2018-03-06

## 2018-02-25 RX ORDER — PREDNISONE 20 MG/1
60 TABLET ORAL DAILY
Qty: 15 TABLET | Refills: 0 | Status: SHIPPED | OUTPATIENT
Start: 2018-02-25 | End: 2018-03-02

## 2018-02-25 RX ORDER — LIDOCAINE 50 MG/G
1 PATCH TOPICAL ONCE
Status: DISCONTINUED | OUTPATIENT
Start: 2018-02-25 | End: 2018-02-25 | Stop reason: HOSPADM

## 2018-02-25 RX ORDER — KETOROLAC TROMETHAMINE 30 MG/ML
15 INJECTION, SOLUTION INTRAMUSCULAR; INTRAVENOUS ONCE
Status: COMPLETED | OUTPATIENT
Start: 2018-02-25 | End: 2018-02-25

## 2018-02-25 RX ORDER — LIDOCAINE 50 MG/G
1 PATCH TOPICAL DAILY
Qty: 30 PATCH | Refills: 0 | Status: SHIPPED | OUTPATIENT
Start: 2018-02-25 | End: 2018-03-06

## 2018-02-25 RX ORDER — FENTANYL CITRATE 50 UG/ML
12.5 INJECTION, SOLUTION INTRAMUSCULAR; INTRAVENOUS ONCE
Status: COMPLETED | OUTPATIENT
Start: 2018-02-25 | End: 2018-02-25

## 2018-02-25 RX ORDER — ONDANSETRON 2 MG/ML
4 INJECTION INTRAMUSCULAR; INTRAVENOUS ONCE
Status: COMPLETED | OUTPATIENT
Start: 2018-02-25 | End: 2018-02-25

## 2018-02-25 RX ORDER — DIAZEPAM 5 MG/1
5 TABLET ORAL ONCE
Status: COMPLETED | OUTPATIENT
Start: 2018-02-25 | End: 2018-02-25

## 2018-02-25 RX ADMIN — ONDANSETRON 4 MG: 2 INJECTION INTRAMUSCULAR; INTRAVENOUS at 08:04

## 2018-02-25 RX ADMIN — SODIUM CHLORIDE 500 ML: 0.9 INJECTION, SOLUTION INTRAVENOUS at 07:29

## 2018-02-25 RX ADMIN — LIDOCAINE 1 PATCH: 50 PATCH CUTANEOUS at 08:24

## 2018-02-25 RX ADMIN — DIAZEPAM 5 MG: 5 TABLET ORAL at 06:23

## 2018-02-25 RX ADMIN — KETOROLAC TROMETHAMINE 15 MG: 30 INJECTION, SOLUTION INTRAMUSCULAR at 07:29

## 2018-02-25 RX ADMIN — PREDNISONE 60 MG: 20 TABLET ORAL at 06:23

## 2018-02-25 RX ADMIN — IOHEXOL 100 ML: 350 INJECTION, SOLUTION INTRAVENOUS at 08:31

## 2018-02-25 RX ADMIN — FENTANYL CITRATE 12.5 MCG: 50 INJECTION, SOLUTION INTRAMUSCULAR; INTRAVENOUS at 08:06

## 2018-02-25 NOTE — ED PROCEDURE NOTE
PROCEDURE  ECG 12 Lead Documentation  Date/Time: 2/25/2018 7:35 AM  Performed by: Kasey Batres  Authorized by: Kasey Batres     Indications / Diagnosis:  Weakness   ECG reviewed by me, the ED Provider: yes    Patient location:  ED  Previous ECG:     Previous ECG:  Unavailable  Interpretation:     Interpretation: non-specific    Rate:     ECG rate:  72    ECG rate assessment: normal    Rhythm:     Rhythm: sinus rhythm    Ectopy:     Ectopy: none    Conduction:     Conduction normal: 1st degree AV block      ST segments:     ST segments:  Non-specific         Lennie Body, DO  02/25/18 0611

## 2018-02-25 NOTE — PHYSICAL THERAPY NOTE
PT EVAL     02/25/18 1006   Note Type   Note type Eval/Treat   Pain Assessment   Pain Assessment 0-10   Pain Score 2   Pain Type Acute pain   Pain Location Buttocks; Hip   Pain Orientation Left   Pain Radiating Towards radiates from L buttocks to L thigh   Pain Descriptors Aching;Dull   Pain Frequency Constant/continuous   Home Living   Type of Home House   Home Layout Two level   Home Equipment Cane  (urinal)   Additional Comments 1 BERNICE, 13 steps to 2nd floor with 1 rail   Prior Function   Level of [place] Independent with ADLs and functional mobility   Lives With Spouse   Receives Help From Family   ADL Assistance Independent   IADLs Independent   Comments Pt was walking without a device  Drives   Restrictions/Precautions   Weight Bearing Precautions Per Order No   Other Precautions Fall Risk;Pain   General   Family/Caregiver Present Yes  (Pt's wife present)   Cognition   Overall Cognitive Status WFL   Arousal/Participation Alert   Orientation Level Oriented X4   Memory Within functional limits   Following Commands Follows multistep commands with increased time or repetition   RUE Assessment   RUE Assessment WFL   LUE Assessment   LUE Assessment WFL   Strength RLE   RLE Overall Strength 4/5   LLE Assessment   LLE Assessment WFL  (L hip pain with L knee extension)   Strength LLE   LLE Overall Strength (L LE 4- to 4/5 due to pain)   Coordination   Movements are Fluid and Coordinated 1   Bed Mobility   Rolling R (Pt OOB in chair at start of eval )   Transfers   Sit to Stand 5  Supervision   Additional items Verbal cues  (Cues for safe hand placement)   Stand to Sit 5  Supervision   Additional items Verbal cues  (Cues for safe hand placement)   Ambulation/Elevation   Gait pattern (Decreased step length   Initially antalgic)   Gait Assistance (CGA to supervision)   Additional items Verbal cues   Assistive Device Rolling walker   Distance (80')   Balance   Static Sitting Normal   Dynamic Sitting Normal   Static Standing Fair +  (With RW)   Dynamic Standing Fair +  (With RW)   Ambulatory Fair +  (With RW)   Activity Tolerance   Activity Tolerance Patient limited by pain   Assessment   Prognosis Good   Problem List Decreased strength; Impaired balance;Decreased mobility;Pain   Assessment Pt is a 77 yo male admitted due to increased pain in L buttock and L hip with difficulty walking  Pt diagnosed with sciatica  Pt presents with pain in the L hip and buttock  Reports that pain is more manageable  Pt instructed in gait with use of RW due to pt unsteady without device  Pt initially required CGA for gait  Gait trained with step to gait pattern  With increased gait, pt able to gait train with reciprocal step pattern with decreased antalgic gait noted  Pt's wife present during eval  Reviewed safety with hand placement with transfers, safe use of RW for gait, sequencing with ascending/descending steps and curb, gentle seated hamstring stretch, and overall safety  Pt is to be discharged to home after PT eval    Goals   Patient Goals To return home and decrease pain  Plan   Treatment/Interventions (D/C PT)   Recommendation   Recommendation Home independently  (Outpatient PT as needed)   Equipment Recommended Walker  (Pt issued RW)   PT - OK to Discharge Yes   Nsg present at end of eval to review d/c paperwork

## 2018-02-25 NOTE — ED PROVIDER NOTES
History  Chief Complaint   Patient presents with    Hip Pain     Pt c/o left hip pain since Friday - now having increased pain with weight bearing - denies trauma     HPI    Prior to Admission Medications   Prescriptions Last Dose Informant Patient Reported? Taking? Cholecalciferol (VITAMIN D-3 PO)  Self Yes Yes   Sig: Take 2,000 Units by mouth daily  Fluticasone Furoate-Vilanterol (BREO ELLIPTA) 100-25 MCG/INH AEPB  Spouse/Significant Other Yes Yes   Sig: Inhale 1 puff daily  KRILL OIL PO  Spouse/Significant Other Yes Yes   Sig: Take 500 mg by mouth daily  METOPROLOL SUCCINATE ER PO  Spouse/Significant Other Yes Yes   Sig: Take 25 mg by mouth daily at bedtime     Montelukast Sodium (SINGULAIR PO)  Self Yes Yes   Sig: Take 10 mg by mouth daily  Multiple Vitamin (MULTIVITAMIN) tablet   Yes Yes   Sig: Take 1 tablet by mouth daily   Potassium Citrate ER 15 MEQ (1620 MG) TBCR  Spouse/Significant Other Yes No   Sig: Take 15 mEq by mouth 2 (two) times a day  Probiotic Product (PROBIOTIC DAILY PO)  Spouse/Significant Other Yes Yes   Sig: Take by mouth daily  aspirin 81 MG tablet  Spouse/Significant Other Yes Yes   Sig: Take 81 mg by mouth daily Took within 24 hours    atorvastatin (LIPITOR) 20 mg tablet   No Yes   Sig: TAKE 1 TABLET AT BEDTIME   cetirizine (ZYRTEC ALLERGY) 10 mg tablet  Self Yes Yes   Sig: Take 10 mg by mouth every evening     cyanocobalamin (VITAMIN B-12) 1,000 mcg tablet   Yes Yes   Sig: Take by mouth daily   donepezil (ARICEPT) 10 mg tablet   No Yes   Sig: Take 1 tablet (10 mg total) by mouth daily at bedtime for 30 days   Patient taking differently: Take 5 mg by mouth daily at bedtime     escitalopram (LEXAPRO) 20 mg tablet   No Yes   Sig: Take 1 tablet (20 mg total) by mouth daily   tamsulosin (FLOMAX) 0 4 mg  Spouse/Significant Other Yes Yes   Sig: Take 0 4 mg by mouth daily     tiotropium (SPIRIVA HANDIHALER) 18 mcg inhalation capsule  Spouse/Significant Other Yes Yes   Sig: Place 18 mcg into inhaler and inhale daily        Facility-Administered Medications: None       Past Medical History:   Diagnosis Date    [facility]     Acid reflux     Acute exacerbation of chronic obstructive airways disease with asthma [facility]     Acute serous otitis media of left ear     recurrence not specified     Anesthesia     "always has mental changes /lingers and lingers after anesthesia"    Anxiety     Aortic aneurysm without rupture ([facility]     Arm bruise     right inner forearm "recent IV"    Arthritis     spine and poss left hip    Asthma     bronchietasis    At risk for falls     BPH (benign prostatic hyperplasia)     pt and wife can't confirm 11/10    BPH without urinary obstruction     Cancer ([facility] [address] [facility]     skin CA on nose    CAP (community acquired pneumonia)     Cataracts, bilateral     Change in bowel function     CHF (congestive heart failure) [facility]     Clubbing of fingers     Colon polyps     Common cold     Contusion of elbow, left     initial encounter     COPD (chronic obstructive pulmonary disease) [facility]     Coronary artery disease     Cough     Dementia     Depression     Diverticulosis     Dizziness     upon "standing quickly on occas"    Exercise counseling     Fatigue     Full dentures     "doesn't wear them"    Glaucoma screening     High cholesterol     History of abdominal aortic aneurysm (AAA) repair 08/2017    History of bacteremia     History of chronic obstructive lung disease     History of epistaxis     History of influenza vaccination     History of kidney stones     History of pneumonia     "many times" "at least 5 times" almost always goes to sepsis"    History of sepsis 10/2017    History of sinusitis     History of skin cancer     History of sleep apnea     History of urinary tract infection     [place] (hard of hearing)     Hydronephrosis with obstructing calculus     Influenza vaccine needed     Insomnia     Jock itch     left/saw doctor 11/8 and started on antifungal cream    Kidney stones     Left hip pain     Need for pneumococcal vaccination     Need for prophylactic vaccination and inoculation against influenza     Other emph[facility]ma [facility] [address] [facility]     Pancreatitis, chronic ([facility] [address] )     pt and wife can't confirm 11/10/17    Pneumonia 10/26/2017    admitted [facility]    Pulmonary emphysema ([facility] [address] [facility]     S/P CABG x 3     approx 14 yrs ago    Screening for genitourinary condition     Screening for neurological condition     Sepsis ([facility] [address] )     due to unspecified organism     Short-term memory loss     Sleep apnea     does not use CPAP   Special screening examination for neoplasm of prostate     TIA involving carotid artery     "before carotid surgery"    Ulcer ([facility] [address] )     stomach, "years ago"    Unsteady gait     Use of cane as ambulatory aid     sometimes    Vitamin D deficiency     Wears glasses        Past Surgical History:   Procedure Laterality Date    ABDOMINAL AORTIC ANEURYSM REPAIR  08/23/2017    ABDOMINAL AORTIC ANEURYSM REPAIR, ENDOVASCULAR      CARDIAC SURGERY      CABG x3    CAROTID ENDARTARECTOMY Left 11/1996    CATARACT EXTRACTION Bilateral     CORONARY ARTERY BYPASS GRAFT  01/2003    x3    CYSTOSCOPY      with insertion of ureteral stent     CYSTOSCOPY      with ureteroscopy with lithotripsy     EXCISIONAL HEMORRHOIDECTOMY      EYE SURGERY Bilateral     "for a wrinkle" after cataract surgery    HERNIA REPAIR      umbilical    LITHOTRIPSY      renal    MOUTH SURGERY      full mouth extraction     [place] COLONOSCOPY FLX DX W/COLLJ SPEC WHEN PFRMD N/A 5/16/2017    Procedure: COLONOSCOPY with polypectomies/ hot snare and tattoo;  Surgeon: Sara Garcia MD;  [facility]: [facility];   Service: Gastroenterology    [place] CYSTOURETHROSCOPY N/A 11/30/2017    Procedure: Carlos Richey;  Surgeon: Ivory Bahena MD;  [facility];  Service: Urology    [place] ESOPHAGOGASTRODUODENOSCOPY TRANSORAL DIAGNOSTIC N/A 8/18/2016    Procedure: ESOPHAGOGASTRODUODENOSCOPY (EGD); Surgeon: Patricia Starks MD;  Location: [facility]; Service: Gastroenterology    [place] REMOVE BLADDER STONE,<2 5 CM N/A 11/30/2017    Procedure: Luisana Arnold;  Surgeon: Sindi Kim MD;  [facility];  Service: Urology    TONSILLECTOMY      URETERAL STENT PLACEMENT      and removal       Family History   Problem Relation Age of Onset    Diabetes type II Mother      mellitus    Kidney failure Father     Diabetes type II Maternal Grandmother      mellitus     Hypertension Paternal Grandmother      benign essential      I have reviewed and agree with the history as documented      Social History   Substance Use Topics    Smoking status: Former Smoker     Packs/day: 1 50     Years: 60 00     Quit date: 2014    Smokeless tobacco: Never Used      Comment: quit 3 yrs ago, used to be a 1-1 5 ppd smoker    Alcohol use No      Comment: quit 25 yrs ago        Review of Systems    Physical Exam  ED Triage Vitals   Temperature Pulse Respirations Blood Pressure SpO2   02/25/18 0614 02/25/18 0614 02/25/18 0614 02/25/18 0614 02/25/18 0614   98 3 °F (36 8 °C) 97 (!) 24 135/90 96 %      Temp Source Heart Rate Source Patient Position - Orthostatic VS BP Location FiO2 (%)   02/25/18 0614 02/25/18 0614 02/25/18 0614 02/25/18 0614 --   Temporal Monitor Lying Left arm       Pain Score       02/25/18 0618       4           Orthostatic Vital Signs  Vitals:    02/25/18 0614 02/25/18 0706 02/25/18 0937   BP: 135/90 112/78 151/71   Pulse: 97 72 78   Patient Position - Orthostatic VS: Lying Sitting Sitting       Physical Exam    ED Medications  Medications   lidocaine (LIDODERM) 5 % patch 1 patch (1 patch Transdermal Medication Applied 2/25/18 0824)   diazepam (VALIUM) tablet 5 mg (5 mg Oral Given 2/25/18 0623)   predniSONE tablet 60 mg (60 mg Oral Given 2/25/18 0623)   ketorolac (TORADOL) injection 15 mg (15 mg Intravenous Given 2/25/18 0729)   sodium chloride 0 9 % bolus 500 mL (0 mL Intravenous Stopped 2/25/18 0756)   fentanyl citrate (PF) 100 MCG/2ML 12 5 mcg (12 5 mcg Intravenous Given 2/25/18 0806)   ondansetron (ZOFRAN) injection 4 mg (4 mg Intravenous Given 2/25/18 0804)   iohexol (OMNIPAQUE) 350 MG/ML injection (SINGLE-DOSE) 100 mL (100 mL Intravenous Given 2/25/18 0831)       Diagnostic Studies  Results Reviewed     Procedure Component Value Units Date/Time    Troponin I [[id number]]  (Normal) Collected:  02/25/18 8525    Lab Status:  Final result Specimen:  Blood from Arm, Right Updated:  02/25/18 0756     Troponin I <0 02 ng/mL     Narrative:         [employer] Chemistry analyzer 99% cutoff is > 0 04 ng/mL in network labs    o cTnI 99% cutoff is useful only when applied to patients in the clinical setting of myocardial ischemia  o cTnI 99% cutoff should be interpreted in the context of clinical history, ECG findings and possibly cardiac imaging to establish correct diagnosis  o cTnI 99% cutoff may be suggestive but clearly not indicative of a coronary event without the clinical setting of myocardial ischemia  Comprehensive metabolic panel [[id number]]  (Abnormal) Collected:  02/25/18 0728    Lab Status:  Final result Specimen:  Blood from Arm, Right Updated:  02/25/18 0751     Sodium 141 mmol/L      Potassium 4 3 mmol/L      Chloride 105 mmol/L      CO2 23 mmol/L      Anion Gap 13 mmol/L      BUN 19 mg/dL      Creatinine 1 12 mg/dL      Glucose 109 mg/dL      Calcium 9 0 mg/dL      AST 31 U/L      ALT 27 U/L      Alkaline Phosphatase 86 U/L      Total Protein 7 1 g/dL      Albumin 3 6 g/dL      Total Bilirubin 1 40 (H) mg/dL      eGFR 64 ml/min/1 73sq m     Narrative:         National Kidney Disease Education Program recommendations are as follows:  GFR calculation is accurate only with a steady state creatinine  Chronic Kidney disease less than 60 ml/min/1 73 sq  meters  Kidney failure less than 15 ml/min/1 73 sq  meters      Marielle Waters [[id number]] (Normal) Collected:  02/25/18 0728    Lab Status:  Final result Specimen:  Blood from Arm, Right Updated:  02/25/18 0746     Protime 13 0 seconds      INR 0 99    CBC and differential [[id number]]  (Abnormal) Collected:  02/25/18 0728    Lab Status:  Final result Specimen:  Blood from Arm, Right Updated:  02/25/18 0739     WBC 6 56 Thousand/uL      RBC 5 01 Million/uL      Hemoglobin 15 5 g/dL      Hematocrit 45 7 %      MCV 91 fL      MCH 30 9 pg      MCHC 33 9 g/dL      RDW 14 9 %      MPV 10 0 fL      Platelets 010 (L) Thousands/uL      Neutrophils Relative 65 %      Lymphocytes Relative 24 %      Monocytes Relative 9 %      Eosinophils Relative 1 %      Basophils Relative 1 %      Neutrophils Absolute 4 28 Thousands/µL      Lymphocytes Absolute 1 58 Thousands/µL      Monocytes Absolute 0 61 Thousand/µL      Eosinophils Absolute 0 05 Thousand/µL      Basophils Absolute 0 04 Thousands/µL                  XR pelvis ap only 1 or 2 vw   ED Interpretation by Jasper Gee DO (02/25 4208)   No acute osseous abnormality of the pelvis      CT recon only lumbar spine   Final Result by Bartolome Ochoa MD (02/25 6369)      No fracture or traumatic subluxation  Multilevel degenerative disc disease as above, greatest at L4-5 with severe canal narrowing  Workstation performed: [id number]         CT abdomen pelvis with contrast   Final Result by Bartolome Ochoa MD (02/25 3252)      No acute pneumonia identified  Numerous bilateral nonobstructing renal calculi, left greater than right  Mild renal cortical scarring  Bifurcated aortoiliac endograft repair within expected normal compared to prior                    Workstation performed: [id number]                    Procedures  Procedures       Phone Contacts  ED Phone Contact    ED Course  ED Course                                MDM  Number of Diagnoses or Management Options  Sciatica:   Diagnosis management comments: 0 700 hours patient care assumed from Dr Helen Wilson  Patient continues to complain of bilateral hip pain left greater than right  At this time we will perform labs EKG check a CT scan IV contrast of the abdomen pelvis and have PT OT evaluate the patient  Patient has no cauda equina syndrome symptoms and has no bladder bowel incontinence or saddle paresthesia     CritCare Time    Disposition  Final diagnoses:   Sciatica   Ambulatory dysfunction     Time reflects when diagnosis was documented in both MDM as applicable and the Disposition within this note     Time User Action Codes Description Comment    2/25/2018  6:54 AM Keven Daley Add [M54 30] Sciatica     2/25/2018  9:55 AM Kiesha Giles Add [R26 2] Ambulatory dysfunction       ED Disposition     ED Disposition Condition Comment    Discharge  Sung Busch discharge to home/self care  Condition at discharge: Good        Follow-up Information     Follow up With Specialties Details Why Contact Info    Sheeba Wyatt DO Family Medicine  As needed [address] [address],[facility] Floor [address]  [address] [facility]  [phone]          Patient's Medications   Discharge Prescriptions    DIAZEPAM (VALIUM) 5 MG TABLET    Take 0 5 tablets (2 5 mg total) by mouth every 8 (eight) hours as needed for anxiety for up to 10 days       Start Date: 2/25/2018 End Date: 3/7/2018       Order Dose: 2 5 mg       Quantity: 15 tablet    Refills: 0    LIDOCAINE (LIDODERM) 5 %    Place 1 patch on the skin daily Remove & Discard patch within 12 hours or as directed by MD       Start Date: 2/25/2018 End Date: --       Order Dose: 1 patch       Quantity: 30 patch    Refills: 0    PREDNISONE 20 MG TABLET    Take 3 tablets (60 mg total) by mouth daily for 5 days       Start Date: 2/25/2018 End Date: 3/2/2018       Order Dose: 60 mg       Quantity: 15 tablet    Refills: 0     No discharge procedures on file      ED Provider  Electronically Signed by           Jose Potter DO  02/25/18 8393

## 2018-02-25 NOTE — ED NOTES
Attempted to sit back on side of bed and unable due to pain in hip  Dr Jana Almanzar and dr Durbin Brought made aware   New orders noted     Florentin Larry RN  02/25/18 7106

## 2018-02-26 LAB
ATRIAL RATE: 72 BPM
P AXIS: 74 DEGREES
PR INTERVAL: 224 MS
QRS AXIS: 67 DEGREES
QRSD INTERVAL: 94 MS
QT INTERVAL: 420 MS
QTC INTERVAL: 459 MS
T WAVE AXIS: 73 DEGREES
VENTRICULAR RATE: 72 BPM

## 2018-02-26 PROCEDURE — 93010 ELECTROCARDIOGRAM REPORT: CPT | Performed by: INTERNAL MEDICINE

## 2018-03-06 ENCOUNTER — HOSPITAL ENCOUNTER (INPATIENT)
Facility: HOSPITAL | Age: 78
LOS: 2 days | Discharge: HOME | DRG: 310 | End: 2018-03-08
Attending: EMERGENCY MEDICINE | Admitting: HOSPITALIST
Payer: MEDICARE

## 2018-03-06 ENCOUNTER — APPOINTMENT (EMERGENCY)
Dept: RADIOLOGY | Facility: HOSPITAL | Age: 78
DRG: 310 | End: 2018-03-06
Payer: MEDICARE

## 2018-03-06 DIAGNOSIS — I49.8 BIGEMINY: ICD-10-CM

## 2018-03-06 DIAGNOSIS — R00.1 BRADYCARDIA: Primary | ICD-10-CM

## 2018-03-06 DIAGNOSIS — R42 POSITIONAL LIGHTHEADEDNESS: ICD-10-CM

## 2018-03-06 PROBLEM — R06.02 SOB (SHORTNESS OF BREATH): Status: ACTIVE | Noted: 2018-03-06

## 2018-03-06 LAB
ALBUMIN SERPL BCP-MCNC: 3.5 G/DL (ref 3.5–5)
ALP SERPL-CCNC: 99 U/L (ref 46–116)
ALT SERPL W P-5'-P-CCNC: 35 U/L (ref 12–78)
ANION GAP SERPL CALCULATED.3IONS-SCNC: 8 MMOL/L (ref 4–13)
APTT PPP: 28 SECONDS (ref 23–35)
AST SERPL W P-5'-P-CCNC: 21 U/L (ref 5–45)
BASOPHILS # BLD AUTO: 0.02 THOUSANDS/ΜL (ref 0–0.1)
BASOPHILS NFR BLD AUTO: 0 % (ref 0–1)
BILIRUB SERPL-MCNC: 1.1 MG/DL (ref 0.2–1)
BILIRUB UR QL STRIP: NEGATIVE
BUN SERPL-MCNC: 20 MG/DL (ref 5–25)
CALCIUM SERPL-MCNC: 8.6 MG/DL (ref 8.3–10.1)
CHLORIDE SERPL-SCNC: 102 MMOL/L (ref 100–108)
CLARITY UR: CLEAR
CO2 SERPL-SCNC: 30 MMOL/L (ref 21–32)
COLOR UR: NORMAL
CREAT SERPL-MCNC: 1.14 MG/DL (ref 0.6–1.3)
EOSINOPHIL # BLD AUTO: 0.13 THOUSAND/ΜL (ref 0–0.61)
EOSINOPHIL NFR BLD AUTO: 1 % (ref 0–6)
ERYTHROCYTE [DISTWIDTH] IN BLOOD BY AUTOMATED COUNT: 15.4 % (ref 11.6–15.1)
GFR SERPL CREATININE-BSD FRML MDRD: 63 ML/MIN/1.73SQ M
GLUCOSE SERPL-MCNC: 110 MG/DL (ref 65–140)
GLUCOSE UR STRIP-MCNC: NEGATIVE MG/DL
HCT VFR BLD AUTO: 47.6 % (ref 36.5–49.3)
HGB BLD-MCNC: 16 G/DL (ref 12–17)
HGB UR QL STRIP.AUTO: NEGATIVE
INR PPP: 0.97 (ref 0.86–1.16)
KETONES UR STRIP-MCNC: NEGATIVE MG/DL
LEUKOCYTE ESTERASE UR QL STRIP: NEGATIVE
LYMPHOCYTES # BLD AUTO: 2.06 THOUSANDS/ΜL (ref 0.6–4.47)
LYMPHOCYTES NFR BLD AUTO: 22 % (ref 14–44)
MAGNESIUM SERPL-MCNC: 2.1 MG/DL (ref 1.6–2.6)
MCH RBC QN AUTO: 31.4 PG (ref 26.8–34.3)
MCHC RBC AUTO-ENTMCNC: 33.6 G/DL (ref 31.4–37.4)
MCV RBC AUTO: 94 FL (ref 82–98)
MONOCYTES # BLD AUTO: 1.08 THOUSAND/ΜL (ref 0.17–1.22)
MONOCYTES NFR BLD AUTO: 11 % (ref 4–12)
NEUTROPHILS # BLD AUTO: 6.15 THOUSANDS/ΜL (ref 1.85–7.62)
NEUTS SEG NFR BLD AUTO: 66 % (ref 43–75)
NITRITE UR QL STRIP: NEGATIVE
NT-PROBNP SERPL-MCNC: 382 PG/ML
PH UR STRIP.AUTO: 7 [PH] (ref 4.5–8)
PHOSPHATE SERPL-MCNC: 3.3 MG/DL (ref 2.3–4.1)
PLATELET # BLD AUTO: 131 THOUSANDS/UL (ref 149–390)
PMV BLD AUTO: 10 FL (ref 8.9–12.7)
POTASSIUM SERPL-SCNC: 4.4 MMOL/L (ref 3.5–5.3)
PROT SERPL-MCNC: 6.6 G/DL (ref 6.4–8.2)
PROT UR STRIP-MCNC: NEGATIVE MG/DL
PROTHROMBIN TIME: 12.8 SECONDS (ref 12.1–14.4)
RBC # BLD AUTO: 5.09 MILLION/UL (ref 3.88–5.62)
SODIUM SERPL-SCNC: 140 MMOL/L (ref 136–145)
SP GR UR STRIP.AUTO: <=1.005 (ref 1–1.03)
TROPONIN I SERPL-MCNC: <0.02 NG/ML
UROBILINOGEN UR QL STRIP.AUTO: 0.2 E.U./DL
WBC # BLD AUTO: 9.44 THOUSAND/UL (ref 4.31–10.16)

## 2018-03-06 PROCEDURE — 71046 X-RAY EXAM CHEST 2 VIEWS: CPT

## 2018-03-06 PROCEDURE — 81003 URINALYSIS AUTO W/O SCOPE: CPT | Performed by: EMERGENCY MEDICINE

## 2018-03-06 PROCEDURE — 99222 1ST HOSP IP/OBS MODERATE 55: CPT | Performed by: HOSPITALIST

## 2018-03-06 PROCEDURE — 93005 ELECTROCARDIOGRAM TRACING: CPT

## 2018-03-06 PROCEDURE — 84484 ASSAY OF TROPONIN QUANT: CPT | Performed by: EMERGENCY MEDICINE

## 2018-03-06 PROCEDURE — 84100 ASSAY OF PHOSPHORUS: CPT | Performed by: EMERGENCY MEDICINE

## 2018-03-06 PROCEDURE — 36415 COLL VENOUS BLD VENIPUNCTURE: CPT

## 2018-03-06 PROCEDURE — 80053 COMPREHEN METABOLIC PANEL: CPT | Performed by: EMERGENCY MEDICINE

## 2018-03-06 PROCEDURE — 83735 ASSAY OF MAGNESIUM: CPT | Performed by: EMERGENCY MEDICINE

## 2018-03-06 PROCEDURE — 99285 EMERGENCY DEPT VISIT HI MDM: CPT

## 2018-03-06 PROCEDURE — 83880 ASSAY OF NATRIURETIC PEPTIDE: CPT | Performed by: EMERGENCY MEDICINE

## 2018-03-06 PROCEDURE — 93005 ELECTROCARDIOGRAM TRACING: CPT | Performed by: EMERGENCY MEDICINE

## 2018-03-06 PROCEDURE — 85610 PROTHROMBIN TIME: CPT | Performed by: EMERGENCY MEDICINE

## 2018-03-06 PROCEDURE — 85730 THROMBOPLASTIN TIME PARTIAL: CPT | Performed by: EMERGENCY MEDICINE

## 2018-03-06 PROCEDURE — 85025 COMPLETE CBC W/AUTO DIFF WBC: CPT | Performed by: EMERGENCY MEDICINE

## 2018-03-06 RX ORDER — ONDANSETRON 2 MG/ML
4 INJECTION INTRAMUSCULAR; INTRAVENOUS EVERY 6 HOURS PRN
Status: DISCONTINUED | OUTPATIENT
Start: 2018-03-06 | End: 2018-03-08 | Stop reason: HOSPADM

## 2018-03-06 RX ORDER — MONTELUKAST SODIUM 10 MG/1
10 TABLET ORAL DAILY
Status: DISCONTINUED | OUTPATIENT
Start: 2018-03-07 | End: 2018-03-08 | Stop reason: HOSPADM

## 2018-03-06 RX ORDER — MELATONIN
2000 DAILY
Status: DISCONTINUED | OUTPATIENT
Start: 2018-03-07 | End: 2018-03-08 | Stop reason: HOSPADM

## 2018-03-06 RX ORDER — ASPIRIN 81 MG/1
81 TABLET, CHEWABLE ORAL DAILY
Status: DISCONTINUED | OUTPATIENT
Start: 2018-03-07 | End: 2018-03-08 | Stop reason: HOSPADM

## 2018-03-06 RX ORDER — ESCITALOPRAM OXALATE 10 MG/1
20 TABLET ORAL DAILY
Status: DISCONTINUED | OUTPATIENT
Start: 2018-03-07 | End: 2018-03-07

## 2018-03-06 RX ORDER — POTASSIUM CITRATE 10 MEQ/1
10 TABLET, EXTENDED RELEASE ORAL 2 TIMES DAILY
Status: DISCONTINUED | OUTPATIENT
Start: 2018-03-07 | End: 2018-03-08 | Stop reason: HOSPADM

## 2018-03-06 RX ORDER — ACETAMINOPHEN 325 MG/1
650 TABLET ORAL EVERY 6 HOURS PRN
Status: DISCONTINUED | OUTPATIENT
Start: 2018-03-06 | End: 2018-03-08 | Stop reason: HOSPADM

## 2018-03-06 RX ORDER — ATORVASTATIN CALCIUM 10 MG/1
20 TABLET, FILM COATED ORAL
Status: DISCONTINUED | OUTPATIENT
Start: 2018-03-06 | End: 2018-03-08 | Stop reason: HOSPADM

## 2018-03-06 RX ORDER — LORATADINE 10 MG/1
10 TABLET ORAL DAILY
Status: DISCONTINUED | OUTPATIENT
Start: 2018-03-07 | End: 2018-03-08 | Stop reason: HOSPADM

## 2018-03-06 RX ORDER — TAMSULOSIN HYDROCHLORIDE 0.4 MG/1
0.4 CAPSULE ORAL DAILY
Status: DISCONTINUED | OUTPATIENT
Start: 2018-03-07 | End: 2018-03-08 | Stop reason: HOSPADM

## 2018-03-06 RX ORDER — SACCHAROMYCES BOULARDII 250 MG
250 CAPSULE ORAL 2 TIMES DAILY
Status: DISCONTINUED | OUTPATIENT
Start: 2018-03-07 | End: 2018-03-08 | Stop reason: HOSPADM

## 2018-03-06 RX ORDER — CALCIUM CARBONATE 200(500)MG
1000 TABLET,CHEWABLE ORAL DAILY PRN
Status: DISCONTINUED | OUTPATIENT
Start: 2018-03-06 | End: 2018-03-08 | Stop reason: HOSPADM

## 2018-03-06 RX ORDER — LANOLIN ALCOHOL/MO/W.PET/CERES
1000 CREAM (GRAM) TOPICAL DAILY
Status: DISCONTINUED | OUTPATIENT
Start: 2018-03-07 | End: 2018-03-08 | Stop reason: HOSPADM

## 2018-03-06 RX ORDER — DONEPEZIL HYDROCHLORIDE 5 MG/1
10 TABLET, FILM COATED ORAL
Status: DISCONTINUED | OUTPATIENT
Start: 2018-03-06 | End: 2018-03-08 | Stop reason: HOSPADM

## 2018-03-06 RX ADMIN — ATORVASTATIN CALCIUM 20 MG: 10 TABLET, FILM COATED ORAL at 23:51

## 2018-03-06 RX ADMIN — DONEPEZIL HYDROCHLORIDE 10 MG: 5 TABLET, FILM COATED ORAL at 23:51

## 2018-03-06 NOTE — ED PROVIDER NOTES
History  Chief Complaint   Patient presents with    Shortness of Breath     sob started 2 hours ago  Also dizzy  Wife stated Pulse decreased 38  Fluctuation of Bp  Patient with intermittant carlie cardia, currently rate of 105 but then episodes of HR in 40s  Patient with sob that started today around 3pm     No cough or f/c/s  No vomiting  No chest pain  No recent cancer, no recent immobilization, no chest pain, no leg swelling and no hx of dvt or pe  SOB is worse with walking around  Hx of COPD but currently with no wheezing and no cough  Some lightheadedness worse with sitting up to fast and with exertion  MDM well appearing 78 yom, intermittant bradycardia, intermittant bigeminy on the monitor, concern for tachy carlie syndrome, possible need for admission and pacemaker  PER PRIOR NOTES-------   Active Problems    1  Acute bilateral low back pain without sciatica (724 2,338 19) (M54 5)   2  Acute cholecystitis (575 0) (K81 0)   3  Adrenal Calcification (255 41)   4  Advanced COPD (496) (J44 9)   5  Aortic Aneurysm Repair   6  Arteriosclerotic coronary artery disease (414 00) (I25 10)   7  Benign prostatic hypertrophy (600 00) (N40 0)   8  Biliary dyskinesia (575 8) (K82 8)   9  Carious teeth (521 00) (K02 9)   10  Cath Aorta Aneurysm Abdominal   11  Dementia (294 20) (F03 90)   12  Depression (311) (F32 9)   13  Disequilibrium (780 4) (R42)   14  Fatigue (780 79) (R53 83)   15  Fever of unknown origin (780 60) (R50 9)   16  Hyperlipidemia (272 4) (E78 5)   17  Infiltrate, 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)   18  Insomnia (780 52) (G47 00)   19  Muscle weakness (728 87) (M62 81)   20  Positive depression screening (796 4) (Z13 89)   21  Postoperative pneumonia (997 39,486) (J95 89,J18 9)   22  Renal calculi (592 0) (N20 0)   23  Screening for colorectal cancer (V76 51) (Z12 11,Z12 12)   24  Severe obstructive sleep apnea (327 23) (G47 33)   25   Skin cancer (173 90) (C44 90)   26  Urine frequency (788 41) (R35 0)   27  Vitamin D deficiency (268 9) (E55 9)     Past Medical History    1  History of Abdominal aortic aneurysm without rupture (441 4) (I71 4)   2  History of Acute exacerbation of chronic obstructive airways disease with asthma   (493 22) (J44 1,J45 901)   3  History of Acute serous otitis media of left ear, recurrence not specified (381 01) (H65 02)   4  History of BPH without urinary obstruction (600 00) (N40 0)   5  History of CAP (community acquired pneumonia) (5) (J18 9)   6  History of Cataracts, bilateral (366 9) (H26 9)   7  History of Contusion of left elbow, initial encounter (923 11) (S50 02XA)   8  History of Cough (786 2) (R05)   9  History of Dental caries (521 00) (K02 9)   10  History of Exercise counseling (V65 41) (Z71 82)   11  History of Glaucoma screening (V80 1) (Z13 5)   12  History of abdominal aortic aneurysm (AAA) (V12 59) (Z86 79)   13  History of bacteremia (V12 09) (Z87 898)   14  History of bladder stone (V13 09) (Z87 448)   15  History of chronic obstructive lung disease (V12 69) (Z87 09)   16  History of common cold (V12 09) (Z86 19)   17  History of epistaxis (V12 69) (Z87 898)   18  History of influenza vaccination (V49 89) (Z92 29)   19  History of pneumonia (V12 61) (Z87 01)   20  History of skin cancer (V10 83) (Z85 828)   21  History of sleep apnea (V13 89) (Z86 69)   22  History of urinary tract infection (V13 02) (Z87 440)   23  History of Hydronephrosis with obstructing calculus (591) (N13 2)   24  History of Influenza vaccine needed (V04 81) (Z23)   25  History of Need for pneumococcal vaccination (V03 82) (Z23)   26  Need for prophylactic vaccination and inoculation against influenza (V04 81) (Z23)   27  History of Other emphysema (492 8) (J43 8)   28  Personal history of kidney stones (V13 01) (Z87 442)   29  History of Pneumonia (486) (J18 9)   30  History of Screening for colon cancer (V76 51) (Z12 11)   31   History of Screening for depression (V79 0) (Z13 89)   32  History of Screening for genitourinary condition (V81 6) (Z13 89)   33  History of Screening for neurological condition (V80 09) (Z13 89)   34  History of Sepsis, due to unspecified organism (038 9,995 91) (A41 9)   35  History of Sinusitis (473 9) (J32 9)   36  History of Special screening examination for neoplasm of prostate (V76 44) (Z12 5)     Surgical History    1  History of CABG   2  History of Cataract Surgery   3  History of Coronary Artery Surgery   4  History of Cystoscopy With Insertion Of Ureteral Stent   5  History of Cystoscopy With Ureteroscopy With Lithotripsy   6  History of Endovascular Repair Of Aortic Aneurysm   7  History of Hernia Repair   8  History of Lithotripsy   9  History of Oral Surgery Tooth Extraction   10  History of Renal Lithotripsy   11  History of Tonsillectomy   12  History of Transurethral Removal Of Internally Dwelling Ureteral Stent  Surgical History Reviewed: The surgical history was reviewed and updated today     --------------------------            Prior to Admission Medications   Prescriptions Last Dose Informant Patient Reported? Taking? Cholecalciferol (VITAMIN D-3 PO) 3/6/2018 at Unknown time Self Yes Yes   Sig: Take 2,000 Units by mouth daily  Fluticasone Furoate-Vilanterol (BREO ELLIPTA) 100-25 MCG/INH AEPB 3/6/2018 at Unknown time Spouse/Significant Other Yes Yes   Sig: Inhale 1 puff daily  KRILL OIL PO 3/6/2018 at Unknown time Spouse/Significant Other Yes Yes   Sig: Take 500 mg by mouth daily  METOPROLOL SUCCINATE ER PO 3/6/2018 at Unknown time Spouse/Significant Other Yes Yes   Sig: Take 25 mg by mouth daily at bedtime     Montelukast Sodium (SINGULAIR PO) 3/6/2018 at Unknown time Self Yes Yes   Sig: Take 10 mg by mouth daily     Multiple Vitamin (MULTIVITAMIN) tablet 3/6/2018 at Unknown time  Yes Yes   Sig: Take 1 tablet by mouth daily   Potassium Citrate ER 15 MEQ (1620 MG) TBCR 3/6/2018 at Unknown time Spouse/Significant Other Yes Yes   Sig: Take 15 mEq by mouth 2 (two) times a day  Probiotic Product (PROBIOTIC DAILY PO) 3/6/2018 at Unknown time Spouse/Significant Other Yes Yes   Sig: Take by mouth daily  aspirin 81 MG tablet 3/6/2018 at Unknown time Spouse/Significant Other Yes Yes   Sig: Take 81 mg by mouth daily Took within 24 hours    atorvastatin (LIPITOR) 20 mg tablet 3/6/2018 at Unknown time  No Yes   Sig: TAKE 1 TABLET AT BEDTIME   cetirizine (ZYRTEC ALLERGY) 10 mg tablet 3/6/2018 at Unknown time Self Yes Yes   Sig: Take 10 mg by mouth every evening     cyanocobalamin (VITAMIN B-12) 1,000 mcg tablet 3/6/2018 at Unknown time  Yes Yes   Sig: Take by mouth daily   donepezil (ARICEPT) 10 mg tablet 3/6/2018 at Unknown time  No Yes   Sig: Take 1 tablet (10 mg total) by mouth daily at bedtime for 30 days   escitalopram (LEXAPRO) 20 mg tablet 3/6/2018 at Unknown time  No Yes   Sig: Take 1 tablet (20 mg total) by mouth daily   tamsulosin (FLOMAX) 0 4 mg 3/6/2018 at Unknown time Spouse/Significant Other Yes Yes   Sig: Take 0 4 mg by mouth daily  tiotropium (SPIRIVA HANDIHALER) 18 mcg inhalation capsule 3/6/2018 at Unknown time Spouse/Significant Other Yes Yes   Sig: Place 18 mcg into inhaler and inhale daily        Facility-Administered Medications: None       Past Medical History:   Diagnosis Date    AAA [facility]abdominal aortic aneurysm) ([facility])     Acid reflux     Acute exacerbation of chronic obstructive airways disease with asthma ([facility])     Acute serous otitis media of left ear     recurrence not specified     Anesthesia     "always has mental changes /lingers and lingers after anesthesia"    Anxiety     Aortic aneurysm without rupture (HCC)     Arm bruise     right inner forearm "recent IV"    Arthritis     spine and poss left hip    Asthma     bronchietasis    At risk for falls     BPH (benign prostatic hyperplasia)     pt and wife can't confirm 11/10    BPH without urinary obstruction     Cancer ([address] )     skin CA on nose    CAP (community acquired pneumonia)     Cataracts, bilateral     Change in bowel function     CHF (congestive heart failure) (HCC)     Clubbing of fingers     Colon polyps     Common cold     Contusion of elbow, left     initial encounter     COPD (chronic obstructive pulmonary disease) (HCC)     Coronary artery disease     Cough     Dementia     Depression     Diverticulosis     Dizziness     upon "standing quickly on occas"    Exercise counseling     Fatigue     Full dentures     "doesn't wear them"    Glaucoma screening     High cholesterol     History of abdominal aortic aneurysm (AAA) repair 08/2017    History of bacteremia     History of chronic obstructive lung disease     History of epistaxis     History of influenza vaccination     History of kidney stones     History of pneumonia     "many times" "at least 5 times" almost always goes to sepsis"    History of sepsis 10/2017    History of sinusitis     History of skin cancer     History of sleep apnea     History of urinary tract infection     [place] hearing[place]     Hydronephrosis with obstructing calculus     Influenza vaccine needed     Insomnia     Jock itch     left/saw doctor 11/8 and started on antifungal cream    Kidney stones     Left hip pain     Need for pneumococcal vaccination     Need for prophylactic vaccination and inoculation against influenza     Other emphysema ([address] )     Pancreatitis, chronic ([address] )     pt and wife can't confirm 11/10/17    Pneumonia 10/26/2017    admitted [facility]    Pulmonary emphysema ([address] )     S/P CABG x 3     approx 14 yrs ago    Screening for genitourinary condition     Screening for neurological condition     Sepsis ([address] )     due to unspecified organism     Short-term memory loss     Sleep apnea     does not use CPAP      Special screening examination for neoplasm of prostate     TIA involving carotid artery     "before carotid surgery"    Ulcer ([address] )     stomach, "years ago"    Unsteady gait     Use of cane as ambulatory aid     sometimes    Vitamin D deficiency     Wears glasses        Past Surgical History:   Procedure Laterality Date    ABDOMINAL AORTIC ANEURYSM REPAIR  08/23/2017    ABDOMINAL AORTIC ANEURYSM REPAIR, ENDOVASCULAR      CARDIAC SURGERY      CABG x3    CAROTID ENDARTARECTOMY Left 11/1996    CATARACT EXTRACTION Bilateral     CORONARY ARTERY BYPASS GRAFT  01/2003    x3    CYSTOSCOPY      with insertion of ureteral stent     CYSTOSCOPY      with ureteroscopy with lithotripsy     EXCISIONAL HEMORRHOIDECTOMY      EYE SURGERY Bilateral     "for a wrinkle" after cataract surgery    HERNIA REPAIR      umbilical    LITHOTRIPSY      renal    MOUTH SURGERY      full mouth extraction     MD COLONOSCOPY FLX DX W/COLLJ SPEC WHEN PFRMD N/A 5/16/2017    Procedure: COLONOSCOPY with polypectomies/ hot snare and tattoo;  Surgeon: Kaitlynn Stokes MD;  Location: [facility]; Service: Gastroenterology    MD CYSTOURETHROSCOPY N/A 11/30/2017    Procedure: Bang Tanner;  Surgeon: Marc Lobo MD;  [facility];  Service: Urology    MD ESOPHAGOGASTRODUODENOSCOPY TRANSORAL DIAGNOSTIC N/A 8/18/2016    Procedure: ESOPHAGOGASTRODUODENOSCOPY (EGD); Surgeon: Kaitlynn Stokes MD;  Location: [facility]; Service: Gastroenterology    MD REMOVE BLADDER STONE,<2 5 CM N/A 11/30/2017    Procedure: Maira Odom;  Surgeon: Marc Lobo MD;  [facility];  Service: Urology    TONSILLECTOMY      URETERAL STENT PLACEMENT      and removal       Family History   Problem Relation Age of Onset    Diabetes type II Mother      mellitus    Kidney failure Father     Diabetes type II Maternal Grandmother      mellitus     Hypertension Paternal Grandmother      benign essential      I have reviewed and agree with the history as documented      Social History   Substance Use Topics    Smoking status: Former Smoker Packs/day: 1 50     Years: 60 00     Quit date: 2014    Smokeless tobacco: Never Used      Comment: quit 3 yrs ago, used to be a 1-1 5 ppd smoker    Alcohol use No      Comment: quit 25 yrs ago        Review of Systems   Constitutional: Negative for chills and fever  Respiratory: Positive for shortness of breath  Negative for cough and chest tightness  Gastrointestinal: Negative for abdominal pain, nausea and vomiting  Skin: Negative for pallor and wound  All other systems reviewed and are negative  Physical Exam  ED Triage Vitals [03/06/18 1813]   Temperature Pulse Respirations Blood Pressure SpO2   98 °F (36 7 °C) 105 20 147/66 94 %      Temp Source Heart Rate Source Patient Position - Orthostatic VS BP Location FiO2 (%)   Temporal Monitor Lying Left arm --      Pain Score       5           Orthostatic Vital Signs  Vitals:    03/06/18 1900 03/06/18 1930 03/06/18 2000 03/06/18 2100   BP: 137/79 125/60 136/78 114/55   Pulse: 81 80 102 76   Patient Position - Orthostatic VS: Sitting Sitting Sitting Lying       Physical Exam   Constitutional: He is oriented to person, place, and time  He appears well-developed and well-nourished  HENT:   Head: Normocephalic and atraumatic  Eyes: EOM are normal  Pupils are equal, round, and reactive to light  Neck: Normal range of motion  Neck supple  Cardiovascular: Normal rate and normal heart sounds  No murmur heard  Occasionally irregular   Pulmonary/Chest: Effort normal and breath sounds normal  No respiratory distress  He has no wheezes  Abdominal: Soft  Bowel sounds are normal  He exhibits no distension  There is no tenderness  Musculoskeletal: Normal range of motion  He exhibits no edema or tenderness  Neurological: He is alert and oriented to person, place, and time  No cranial nerve deficit  Coordination normal    Skin: Skin is warm and dry  He is not diaphoretic  No erythema  Psychiatric: He has a normal mood and affect   His behavior is normal    Nursing note and vitals reviewed  ED Medications  Medications - No data to display    Diagnostic Studies  Results Reviewed     Procedure Component Value Units Date/Time    UA w Reflex to Microscopic w Reflex to Culture [[id number]]  (Normal) Collected:  03/06/18 1951    Lab Status:  Final result Specimen:  Urine from Urine, Clean Catch Updated:  03/06/18 2006     Color, UA Light Yellow     Clarity, UA Clear     Specific Gravity, UA <=1 005     pH, UA 7 0     Leukocytes, UA Negative     Nitrite, UA Negative     Protein, UA Negative mg/dl      Glucose, UA Negative mg/dl      Ketones, UA Negative mg/dl      Urobilinogen, UA 0 2 E U /dl      Bilirubin, UA Negative     Blood, UA Negative    Comprehensive metabolic panel [[id number]]  (Abnormal) Collected:  03/06/18 1822    Lab Status:  Final result Specimen:  Blood from Arm, Right Updated:  03/06/18 1906     Sodium 140 mmol/L      Potassium 4 4 mmol/L      Chloride 102 mmol/L      CO2 30 mmol/L      Anion Gap 8 mmol/L      BUN 20 mg/dL      Creatinine 1 14 mg/dL      Glucose 110 mg/dL      Calcium 8 6 mg/dL      AST 21 U/L      ALT 35 U/L      Alkaline Phosphatase 99 U/L      Total Protein 6 6 g/dL      Albumin 3 5 g/dL      Total Bilirubin 1 10 (H) mg/dL      eGFR 63 ml/min/1 73sq m     Narrative:         National Kidney Disease Education Program recommendations are as follows:  GFR calculation is accurate only with a steady state creatinine  Chronic Kidney disease less than 60 ml/min/1 73 sq  meters  Kidney failure less than 15 ml/min/1 73 sq  meters      B-type natriuretic peptide [[id number]]  (Normal) Collected:  03/06/18 1822    Lab Status:  Final result Specimen:  Blood from Arm, Right Updated:  03/06/18 1906     NT-proBNP 382 pg/mL     Phosphorus [[id number]]  (Normal) Collected:  03/06/18 1822    Lab Status:  Final result Specimen:  Blood from Arm, Right Updated:  03/06/18 1906     Phosphorus 3 3 mg/dL     Magnesium [[id number]]  (Normal) Collected: 03/06/18 1822    Lab Status:  Final result Specimen:  Blood from Arm, Right Updated:  03/06/18 1906     Magnesium 2 1 mg/dL     Troponin I [[id number]]  (Normal) Collected:  03/06/18 1822    Lab Status:  Final result Specimen:  Blood from Arm, Right Updated:  03/06/18 1856     Troponin I <0 02 ng/mL     Narrative:         [employer] Chemistry analyzer 99% cutoff is > 0 04 ng/mL in network labs    o cTnI 99% cutoff is useful only when applied to patients in the clinical setting of myocardial ischemia  o cTnI 99% cutoff should be interpreted in the context of clinical history, ECG findings and possibly cardiac imaging to establish correct diagnosis  o cTnI 99% cutoff may be suggestive but clearly not indicative of a coronary event without the clinical setting of myocardial ischemia  Protime-INR [[id number]]  (Normal) Collected:  03/06/18 1822    Lab Status:  Final result Specimen:  Blood from Arm, Right Updated:  03/06/18 1845     Protime 12 8 seconds      INR 0 97    APTT [[id number]]  (Normal) Collected:  03/06/18 1822    Lab Status:  Final result Specimen:  Blood from Arm, Right Updated:  03/06/18 1845     PTT 28 seconds     Narrative:          Therapeutic Heparin Range = 60-90 seconds    CBC and differential [[id number]]  (Abnormal) Collected:  03/06/18 1822    Lab Status:  Final result Specimen:  Blood from Arm, Right Updated:  03/06/18 1842     WBC 9 44 Thousand/uL      RBC 5 09 Million/uL      Hemoglobin 16 0 g/dL      Hematocrit 47 6 %      MCV 94 fL      MCH 31 4 pg      MCHC 33 6 g/dL      RDW 15 4 (H) %      MPV 10 0 fL      Platelets 561 (L) Thousands/uL      Neutrophils Relative 66 %      Lymphocytes Relative 22 %      Monocytes Relative 11 %      Eosinophils Relative 1 %      Basophils Relative 0 %      Neutrophils Absolute 6 15 Thousands/µL      Lymphocytes Absolute 2 06 Thousands/µL      Monocytes Absolute 1 08 Thousand/µL      Eosinophils Absolute 0 13 Thousand/µL      Basophils Absolute 0 02 Thousands/µL X-ray chest 2 views   ED Interpretation by Denton Balderas MD (03/06 1927)   Similar to prior cxr      Final Result by María Farrar MD (03/06 2014)      COPD  No focal airspace opacity  Questionable 8 mm lower lung nodule overlying the anterior 6th rib  Follow-up with outpatient nonemergent unenhanced CT chest       Workstation performed: [id number]                    Procedures  Procedures       Phone Contacts  ED Phone Contact    ED Course  ED Course as of Mar 06 2242   Tue Mar 06, 2018   1922 EKG rate of 78, sinus with PVCs, bigeminy  Nonspecific ST changes  1930 Spoke with Najma Harris, admit here, monitor on tele  MDM  CritCare Time    Disposition  Final diagnoses:   Bradycardia     Time reflects when diagnosis was documented in both MDM as applicable and the Disposition within this note     Time User Action Codes Description Comment    3/6/2018  7:40 PM Marcarmen Hermilo, [address] [R00 1] Bradycardia       ED Disposition     ED Disposition Condition Comment    Admit  Case was discussed with [facility] Dr Bulmaro Prajapati and the patient's admission status was agreed to be Admission Status: inpatient status to the service of Dr Bulmaro Prajapati  Follow-up Information    None       Current Discharge Medication List      CONTINUE these medications which have NOT CHANGED    Details   aspirin 81 MG tablet Take 81 mg by mouth daily Took within 24 hours       atorvastatin (LIPITOR) 20 mg tablet TAKE 1 TABLET AT BEDTIME  Qty: 90 tablet, Refills: 1    Associated Diagnoses: Mixed hyperlipidemia      cetirizine (ZYRTEC ALLERGY) 10 mg tablet Take 10 mg by mouth every evening        Cholecalciferol (VITAMIN D-3 PO) Take 2,000 Units by mouth daily        cyanocobalamin (VITAMIN B-12) 1,000 mcg tablet Take by mouth daily      donepezil (ARICEPT) 10 mg tablet Take 1 tablet (10 mg total) by mouth daily at bedtime for 30 days  Qty: 30 tablet, Refills: 0    Associated Diagnoses: 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      escitalopram (LEXAPRO) 20 mg tablet Take 1 tablet (20 mg total) by mouth daily  Qty: 30 tablet, Refills: 0    Associated Diagnoses: Depression, unspecified depression type      Fluticasone Furoate-Vilanterol (BREO ELLIPTA) 100-25 MCG/INH AEPB Inhale 1 puff daily  KRILL OIL PO Take 500 mg by mouth daily  METOPROLOL SUCCINATE ER PO Take 25 mg by mouth daily at bedtime        Montelukast Sodium (SINGULAIR PO) Take 10 mg by mouth daily  Multiple Vitamin (MULTIVITAMIN) tablet Take 1 tablet by mouth daily      Potassium Citrate ER 15 MEQ (1620 MG) TBCR Take 15 mEq by mouth 2 (two) times a day  Probiotic Product (PROBIOTIC DAILY PO) Take by mouth daily  tamsulosin (FLOMAX) 0 4 mg Take 0 4 mg by mouth daily  tiotropium (SPIRIVA HANDIHALER) 18 mcg inhalation capsule Place 18 mcg into inhaler and inhale daily  No discharge procedures on file      ED Provider  Electronically Signed by           Romy Higgins MD  03/06/18 5387

## 2018-03-07 ENCOUNTER — APPOINTMENT (INPATIENT)
Dept: NON INVASIVE DIAGNOSTICS | Facility: HOSPITAL | Age: 78
DRG: 310 | End: 2018-03-07
Payer: MEDICARE

## 2018-03-07 PROBLEM — R91.1 PULMONARY NODULE: Status: ACTIVE | Noted: 2018-03-07

## 2018-03-07 PROBLEM — R42 POSITIONAL LIGHTHEADEDNESS: Status: ACTIVE | Noted: 2018-03-07

## 2018-03-07 PROBLEM — I49.8 VENTRICULAR BIGEMINY: Status: ACTIVE | Noted: 2018-03-07

## 2018-03-07 LAB
ALBUMIN SERPL BCP-MCNC: 3 G/DL (ref 3.5–5)
ALP SERPL-CCNC: 79 U/L (ref 46–116)
ALT SERPL W P-5'-P-CCNC: 30 U/L (ref 12–78)
ANION GAP SERPL CALCULATED.3IONS-SCNC: 8 MMOL/L (ref 4–13)
APTT PPP: 31 SECONDS (ref 23–35)
AST SERPL W P-5'-P-CCNC: 20 U/L (ref 5–45)
ATRIAL RATE: 74 BPM
ATRIAL RATE: 78 BPM
BILIRUB SERPL-MCNC: 1.2 MG/DL (ref 0.2–1)
BUN SERPL-MCNC: 17 MG/DL (ref 5–25)
CALCIUM SERPL-MCNC: 8.4 MG/DL (ref 8.3–10.1)
CHLORIDE SERPL-SCNC: 106 MMOL/L (ref 100–108)
CO2 SERPL-SCNC: 27 MMOL/L (ref 21–32)
CREAT SERPL-MCNC: 1.01 MG/DL (ref 0.6–1.3)
ERYTHROCYTE [DISTWIDTH] IN BLOOD BY AUTOMATED COUNT: 15.3 % (ref 11.6–15.1)
GFR SERPL CREATININE-BSD FRML MDRD: 72 ML/MIN/1.73SQ M
GLUCOSE SERPL-MCNC: 103 MG/DL (ref 65–140)
HCT VFR BLD AUTO: 46.2 % (ref 36.5–49.3)
HGB BLD-MCNC: 15.4 G/DL (ref 12–17)
INR PPP: 1.05 (ref 0.86–1.16)
MAGNESIUM SERPL-MCNC: 2.1 MG/DL (ref 1.6–2.6)
MCH RBC QN AUTO: 30.9 PG (ref 26.8–34.3)
MCHC RBC AUTO-ENTMCNC: 33.3 G/DL (ref 31.4–37.4)
MCV RBC AUTO: 93 FL (ref 82–98)
P AXIS: 65 DEGREES
P AXIS: 82 DEGREES
PHOSPHATE SERPL-MCNC: 3.3 MG/DL (ref 2.3–4.1)
PLATELET # BLD AUTO: 117 THOUSANDS/UL (ref 149–390)
PMV BLD AUTO: 10.1 FL (ref 8.9–12.7)
POTASSIUM SERPL-SCNC: 4.4 MMOL/L (ref 3.5–5.3)
PR INTERVAL: 180 MS
PR INTERVAL: 182 MS
PROT SERPL-MCNC: 6 G/DL (ref 6.4–8.2)
PROTHROMBIN TIME: 13.6 SECONDS (ref 12.1–14.4)
QRS AXIS: 65 DEGREES
QRS AXIS: 78 DEGREES
QRSD INTERVAL: 86 MS
QRSD INTERVAL: 92 MS
QT INTERVAL: 400 MS
QT INTERVAL: 426 MS
QTC INTERVAL: 456 MS
QTC INTERVAL: 472 MS
RBC # BLD AUTO: 4.98 MILLION/UL (ref 3.88–5.62)
SODIUM SERPL-SCNC: 141 MMOL/L (ref 136–145)
T WAVE AXIS: 75 DEGREES
T WAVE AXIS: 88 DEGREES
TROPONIN I SERPL-MCNC: <0.02 NG/ML
TROPONIN I SERPL-MCNC: <0.02 NG/ML
TSH SERPL DL<=0.05 MIU/L-ACNC: 1.88 UIU/ML (ref 0.36–3.74)
VENTRICULAR RATE: 74 BPM
VENTRICULAR RATE: 78 BPM
WBC # BLD AUTO: 6.85 THOUSAND/UL (ref 4.31–10.16)

## 2018-03-07 PROCEDURE — 99232 SBSQ HOSP IP/OBS MODERATE 35: CPT | Performed by: INTERNAL MEDICINE

## 2018-03-07 PROCEDURE — 85027 COMPLETE CBC AUTOMATED: CPT | Performed by: HOSPITALIST

## 2018-03-07 PROCEDURE — 97116 GAIT TRAINING THERAPY: CPT | Performed by: PHYSICAL THERAPIST

## 2018-03-07 PROCEDURE — 84443 ASSAY THYROID STIM HORMONE: CPT | Performed by: HOSPITALIST

## 2018-03-07 PROCEDURE — 97167 OT EVAL HIGH COMPLEX 60 MIN: CPT

## 2018-03-07 PROCEDURE — 93306 TTE W/DOPPLER COMPLETE: CPT

## 2018-03-07 PROCEDURE — 80053 COMPREHEN METABOLIC PANEL: CPT | Performed by: HOSPITALIST

## 2018-03-07 PROCEDURE — 85730 THROMBOPLASTIN TIME PARTIAL: CPT | Performed by: HOSPITALIST

## 2018-03-07 PROCEDURE — 93010 ELECTROCARDIOGRAM REPORT: CPT | Performed by: INTERNAL MEDICINE

## 2018-03-07 PROCEDURE — 99232 SBSQ HOSP IP/OBS MODERATE 35: CPT | Performed by: FAMILY MEDICINE

## 2018-03-07 PROCEDURE — 93306 TTE W/DOPPLER COMPLETE: CPT | Performed by: INTERNAL MEDICINE

## 2018-03-07 PROCEDURE — 97163 PT EVAL HIGH COMPLEX 45 MIN: CPT | Performed by: PHYSICAL THERAPIST

## 2018-03-07 PROCEDURE — G8988 SELF CARE GOAL STATUS: HCPCS

## 2018-03-07 PROCEDURE — 84100 ASSAY OF PHOSPHORUS: CPT | Performed by: HOSPITALIST

## 2018-03-07 PROCEDURE — G8978 MOBILITY CURRENT STATUS: HCPCS | Performed by: PHYSICAL THERAPIST

## 2018-03-07 PROCEDURE — 85610 PROTHROMBIN TIME: CPT | Performed by: HOSPITALIST

## 2018-03-07 PROCEDURE — 83735 ASSAY OF MAGNESIUM: CPT | Performed by: HOSPITALIST

## 2018-03-07 PROCEDURE — 84484 ASSAY OF TROPONIN QUANT: CPT | Performed by: INTERNAL MEDICINE

## 2018-03-07 PROCEDURE — G8987 SELF CARE CURRENT STATUS: HCPCS

## 2018-03-07 PROCEDURE — G8979 MOBILITY GOAL STATUS: HCPCS | Performed by: PHYSICAL THERAPIST

## 2018-03-07 RX ORDER — ESCITALOPRAM OXALATE 10 MG/1
10 TABLET ORAL DAILY
Status: DISCONTINUED | OUTPATIENT
Start: 2018-03-07 | End: 2018-03-08 | Stop reason: HOSPADM

## 2018-03-07 RX ORDER — METOPROLOL SUCCINATE 25 MG/1
25 TABLET, EXTENDED RELEASE ORAL
Status: DISCONTINUED | OUTPATIENT
Start: 2018-03-07 | End: 2018-03-08 | Stop reason: HOSPADM

## 2018-03-07 RX ADMIN — ESCITALOPRAM OXALATE 10 MG: 10 TABLET ORAL at 09:12

## 2018-03-07 RX ADMIN — MONTELUKAST SODIUM 10 MG: 10 TABLET, FILM COATED ORAL at 09:12

## 2018-03-07 RX ADMIN — CYANOCOBALAMIN TAB 1000 MCG 1000 MCG: 1000 TAB at 09:12

## 2018-03-07 RX ADMIN — LORATADINE 10 MG: 10 TABLET ORAL at 09:12

## 2018-03-07 RX ADMIN — ACETAMINOPHEN 650 MG: 325 TABLET, FILM COATED ORAL at 23:22

## 2018-03-07 RX ADMIN — Medication 250 MG: at 09:12

## 2018-03-07 RX ADMIN — TAMSULOSIN HYDROCHLORIDE 0.4 MG: 0.4 CAPSULE ORAL at 09:12

## 2018-03-07 RX ADMIN — VITAMIN D, TAB 1000IU (100/BT) 2000 UNITS: 25 TAB at 09:12

## 2018-03-07 RX ADMIN — DONEPEZIL HYDROCHLORIDE 10 MG: 5 TABLET, FILM COATED ORAL at 22:17

## 2018-03-07 RX ADMIN — TIOTROPIUM BROMIDE 18 MCG: 18 CAPSULE ORAL; RESPIRATORY (INHALATION) at 09:18

## 2018-03-07 RX ADMIN — Medication 250 MG: at 18:07

## 2018-03-07 RX ADMIN — ENOXAPARIN SODIUM 40 MG: 40 INJECTION SUBCUTANEOUS at 09:13

## 2018-03-07 RX ADMIN — FLUTICASONE PROPIONATE AND SALMETEROL 1 PUFF: 50; 250 POWDER RESPIRATORY (INHALATION) at 09:16

## 2018-03-07 RX ADMIN — POTASSIUM CITRATE 10 MEQ: 10 TABLET ORAL at 18:07

## 2018-03-07 RX ADMIN — ASPIRIN 81 MG 81 MG: 81 TABLET ORAL at 09:12

## 2018-03-07 RX ADMIN — ATORVASTATIN CALCIUM 20 MG: 10 TABLET, FILM COATED ORAL at 22:17

## 2018-03-07 NOTE — ASSESSMENT & PLAN NOTE
· Frequent PVCs noted on EKG/monitor  · Cardiology input appreciated  · Awaiting 2D Echo report  · Restarted metoprolol per Cardiology recommendations

## 2018-03-07 NOTE — PHYSICAL THERAPY NOTE
PT Evaluation     03/07/18 0741   Note Type   Note type Eval/Treat   Pain Assessment   Pain Score 8   Pain Location Hip;Leg  (Left)   Home Living   Type of Home House   Home Layout Multi-level;Bed/bath upstairs;Stairs to enter without rails  (1 BERNICE no HR, 12 Steps with HR)   Bathroom Shower/Tub Walk-in shower   Bathroom Toilet Standard   Bathroom Equipment Shower chair;Grab bars in shower;Grab bars around toilet   Home Equipment Walker;Cane   Prior Function   Level of [place] Independent with ADLs and functional mobility  ((I) ambulation with RW)   Lives With Annabelle Help From Family   ADL Assistance Independent   IADLs Needs assistance  (spouse performs IADL's)   Comments (I) driving    Restrictions/Precautions   Other Precautions Chair Alarm; Bed Alarm;Multiple lines;Telemetry; Fall Risk;Pain   General   Family/Caregiver Present No   Cognition   Arousal/Participation Alert   Orientation Level Oriented X4   Following Commands Follows all commands and directions without difficulty   RLE Assessment   RLE Assessment WFL  (4+/5)   LLE Assessment   LLE Assessment WFL  (4+/5)   Coordination   Sensation WFL   Light Touch   RLE Light Touch Grossly intact   LLE Light Touch Grossly intact   Bed Mobility   Supine to Sit 5  Supervision   Additional items Bedrails   Sit to Supine (seated in chair at bedside alarm on and bell in reach)   Additional Comments vitals at rest HR 75-80 SpO2 93% /69, after ambulation SpO2 94% HR 87-90 /72   Transfers   Sit to Stand 5  Supervision   Additional items (with RW)   Stand to Sit 5  Supervision   Additional items (with RW)   Stand pivot 5  Supervision   Additional items (with RW)   Additional Comments pt with antalgic gait due to sciatic pain L posterior hip and thigh  No LOB with use of RW but demonstrates mild limitation in endurance due to fatigue and mild SOB   Ambulation/Elevation   Gait pattern Forward Flexion; Antalgic   Gait Assistance 5  Supervision Assistive Device Rolling walker   Distance 110ft with RW Supervision no LOB but demonstrates antalgic gait pattern due to sciatica  Balance   Static Sitting Good   Dynamic Sitting Good   Static Standing Fair +  (with RW)   Dynamic Standing Fair +  (with RW)   Ambulatory Fair +  (with RW)   Endurance Deficit   Endurance Deficit Yes   Endurance Deficit Description mild limitation in ambulation due to pain and sciatic symptoms   Activity Tolerance   Activity Tolerance Patient limited by pain; Patient tolerated treatment well   Assessment   Prognosis Good   Problem List Decreased strength;Decreased endurance; Impaired balance;Decreased mobility;Pain   Assessment Pt is a 78year old male presenting with L posterior buttock pain with radicular symptoms limiting ambulation  Pt demonstrates fair to good strength balance and functional mobility performing all bed mobility transfers and ambulation at a Supervision level  Pt would benefit from continued activity in PT to improve impairments and functional deficits and to ensure safe (I) stair negotiation  Pt will be safe to return home on discharge  Goals   Patient Goals To feel better and return home   LTG Expiration Date 03/21/18   Long Term Goal #1 (I) with all bed mobility and transfers no A D  Long Term Goal #2 Pt will ambulate 300ft with RW modified Independent and will ascend/descend 11 steps with HR (S)   Plan   Treatment/Interventions Functional transfer training;LE strengthening/ROM; Elevations; Therapeutic exercise; Endurance training;Patient/family training;Bed mobility;Gait training   PT Frequency (3-5x/wk)   Recommendation   Recommendation Outpatient PT  (for treatment of sciatica)   PT - OK to Discharge Yes  (when medically stable for discharge)   Pt with SCD's on when PT entered room  SCD's reapplied and turned on with pt seated in chair at bedside with call bell in reach and chair alarm on

## 2018-03-07 NOTE — ASSESSMENT & PLAN NOTE
· Will check orthostatic VS  · Unclear if related to bradycardia/bigeminy  · Cardiology input appreciated

## 2018-03-07 NOTE — ASSESSMENT & PLAN NOTE
· Occurs most on exertion and at time at rest, no associated chest pain  · COPD is controlled, normal SPO2, no wheezing  · 2D Echo pending  · Cardiology input appreciated

## 2018-03-07 NOTE — PHYSICIAN ADVISOR
Current patient class: Inpatient  The patient is currently on Hospital Day: 2      The patient was admitted to the hospital at 1947 on 3/6/18 for the following diagnosis:  Bradycardia [R00 1]  SOB (shortness of breath) [R06 02]       There is documentation in the medical record of an expected length of stay of at least 2 midnights  The patient is therefore expected to satisfy the 2 midnight benchmark and given the 2 midnight presumption is appropriate for INPATIENT ADMISSION  Given this expectation of a satisfying stay, CMS instructs us that the patient is most often appropriate for inpatient admission under part A provided medical necessity is documented in the chart  After review of the relevant documentation, labs, vital signs and test results, the patient is appropriate for INPATIENT ADMISSION  Admission to the hospital as an inpatient is a complex decision making process which requires the practitioner to consider the patients presenting complaint, history and physical examination and all relevant testing  With this in mind, in this case, the patient was deemed appropriate for INPATIENT ADMISSION  After review of the documentation and testing available at the time of the admission I concur with this clinical determination of medical necessity  Rationale is as follows: The patient is a 78 yrs old Male who presented to the ED at 3/6/2018  6:10 PM with a chief complaint of Shortness of Breath (sob started 2 hours ago  Also dizzy  Wife stated Pulse decreased 38  Fluctuation of Bp )     Patient admitted with a complaint of dizziness and an irregular heart rate  He was noted to have episodes of bigeminy with a palpable pulse in the 40's  He odes have a past medical history for COPD and dementia  He was placed on telemetry and a consult was placed with Cardiology  An echocardiagram was also ordered    This patient had documented episodes of marked bradycardia and the reason is yet unexplained; a two night admission status to the hospital would be considered appropriate for him      The patients vitals on arrival were ED Triage Vitals [03/06/18 1813]   Temperature Pulse Respirations Blood Pressure SpO2   98 °F (36 7 °C) 105 20 147/66 94 %      Temp Source Heart Rate Source Patient Position - Orthostatic VS BP Location FiO2 (%)   Temporal Monitor Lying Left arm --      Pain Score       5           Past Medical History:   Diagnosis Date    [facility]abdominal aortic aneurysm) [facility])     Acid reflux     Acute exacerbation of chronic obstructive airways disease with asthma ([facility])     Acute serous otitis media of left ear     recurrence not specified     Anesthesia     "always has mental changes /lingers and lingers after anesthesia"    Anxiety     Aortic aneurysm without rupture ([facility])     Arm bruise     right inner forearm "recent IV"    Arthritis     spine and poss left hip    Asthma     bronchietasis    At risk for falls     BPH (benign prostatic hyperplasia)     pt and wife can't confirm 11/10    BPH without urinary obstruction     Cancer ([facility] [address] )     skin CA on nose    CAP (community acquired pneumonia)     Cataracts, bilateral     Change in bowel function     CHF (congestive heart failure) ([facility])     Clubbing of fingers     Colon polyps     Common cold     Contusion of elbow, left     initial encounter     COPD (chronic obstructive pulmonary disease) ([address] )     Coronary artery disease     Cough     Dementia     Depression     Diverticulosis     Dizziness     upon "standing quickly on occas"    Exercise counseling     Fatigue     Full dentures     "doesn't wear them"    Glaucoma screening     High cholesterol     History of abdominal aortic aneurysm (AAA) repair 08/2017    History of bacteremia     History of chronic obstructive lung disease     History of epistaxis     History of influenza vaccination     History of kidney stones     History of pneumonia     "many times" "at least 5 times" almost always goes to sepsis"    History of sepsis 10/2017    History of sinusitis     History of skin cancer     History of sleep apnea     History of urinary tract infection     [place] (hard of hearing)     Hydronephrosis with obstructing calculus     Influenza vaccine needed     Insomnia     Jock itch     left/saw doctor 11/8 and started on antifungal cream    Kidney stones     Left hip pain     Need for pneumococcal vaccination     Need for prophylactic vaccination and inoculation against influenza     Other emphysema ([address] )     Pancreatitis, chronic ([address] )     pt and wife can't confirm 11/10/17    Pneumonia 10/26/2017    admitted [facility]    Pulmonary emphysema ([address] )     S/P CABG x 3     approx 14 yrs ago    Screening for genitourinary condition     Screening for neurological condition     Sepsis ([address] )     due to unspecified organism     Short-term memory loss     Sleep apnea     does not use CPAP      Special screening examination for neoplasm of prostate     TIA involving carotid artery     "before carotid surgery"    Ulcer ([address] )     stomach, "years ago"    Unsteady gait     Use of cane as ambulatory aid     sometimes    Vitamin D deficiency     Wears glasses      Past Surgical History:   Procedure Laterality Date    ABDOMINAL AORTIC ANEURYSM REPAIR  08/23/2017    ABDOMINAL AORTIC ANEURYSM REPAIR, ENDOVASCULAR      CARDIAC SURGERY      CABG x3    CAROTID ENDARTARECTOMY Left 11/1996    CATARACT EXTRACTION Bilateral     CORONARY ARTERY BYPASS GRAFT  01/2003    x3    CYSTOSCOPY      with insertion of ureteral stent     CYSTOSCOPY      with ureteroscopy with lithotripsy     EXCISIONAL HEMORRHOIDECTOMY      EYE SURGERY Bilateral     "for a wrinkle" after cataract surgery    HERNIA REPAIR      umbilical    LITHOTRIPSY      renal    MOUTH SURGERY      full mouth extraction     CT COLONOSCOPY FLX DX W/COLLJ SPEC WHEN PFRMD N/A 5/16/2017    Procedure: COLONOSCOPY with polypectomies/ hot snare and tattoo;  Surgeon: María Slaughter MD;  Location: [facility]; Service: Gastroenterology    WA CYSTOURETHROSCOPY N/A 11/30/2017    Procedure: Elaina Bowling;  Surgeon: Bam Cleary MD;  [facility];  Service: Urology    WA ESOPHAGOGASTRODUODENOSCOPY TRANSORAL DIAGNOSTIC N/A 8/18/2016    Procedure: ESOPHAGOGASTRODUODENOSCOPY (EGD); Surgeon: María Slaughter MD;  Location: [facility];   Service: Gastroenterology    WA REMOVE BLADDER STONE,<2 5 CM N/A 11/30/2017    Procedure: Tavia Mutter;  Surgeon: Bam Cleary MD;  [facility]: [facility];  Service: Urology    TONSILLECTOMY     [address]      and removal           Consults have been placed to:   IP CONSULT TO CARDIOLOGY    Vitals:    03/07/18 0200 03/07/18 0300 03/07/18 0400 03/07/18 0714   BP: 118/59 122/59 120/71 153/69   BP Location:  Left arm  Left arm   Pulse: 72 82 74 78   Resp: 15 20 15 19   Temp:  98 1 °F (36 7 °C)  97 9 °F (36 6 °C)   TempSrc:  Temporal  Temporal   SpO2: 91% 92% 92% 93%   Weight:       Height:           Most recent labs:    Recent Labs      03/06/18   1822  03/07/18   0502  03/07/18   0750   WBC  9 44  6 85   --    HGB  16 0  15 4   --    HCT  47 6  46 2   --    PLT  131*  117*   --    K  4 4  4 4   --    NA  140  141   --    CALCIUM  8 6  8 4   --    BUN  20  17   --    CREATININE  1 14  1 01   --    INR  0 97  1 05   --    TROPONINI  <0 02   --   <0 02   AST  21  20   --    ALT  35  30   --    ALKPHOS  99  79   --    BILITOT  1 10*  1 20*   --        Scheduled Meds:  Current Facility-Administered Medications:  acetaminophen 650 mg Oral Q6H PRN Blease Laurel, DO   aspirin 81 mg Oral Daily Poppy Ku Argueta, DO   atorvastatin 20 mg Oral HS Blease Laurel, DO   calcium carbonate 1,000 mg Oral Daily PRN Blease Derek, DO   cholecalciferol 2,000 Units Oral Daily Poppy Ku Argueta, DO   cyanocobalamin 1,000 mcg Oral Daily Poppy Argueta, DO   donepezil 10 mg Oral HS Bobbi Reed, DO enoxaparin 40 mg Subcutaneous Daily Smithblayne Escobedo, DO   escitalopram 10 mg Oral Daily Earma Darrell Dodson, DO   fluticasone-salmeterol 1 puff Inhalation Q12H [facility] Smith Escobedo, DO   loratadine 10 mg Oral Daily Earma Darrell Dodson, DO   montelukast 10 mg Oral Daily Earma Darrell Dodson, DO   ondansetron 4 mg Intravenous Q6H PRN Smith Escobedo, DO   Potassium Citrate ER  Oral BID Earma Darrell Argueta, DO   saccharomyces boulardii 250 mg Oral BID Smithblayne Escobedo, DO   tamsulosin 0 4 mg Oral Daily Earma Darrell Dodson, DO   tiotropium 18 mcg Inhalation Daily Earnorma Argueta, DO     Continuous Infusions:   PRN Meds:   acetaminophen    calcium carbonate    ondansetron    Surgical procedures (if appropriate):

## 2018-03-07 NOTE — H&P
H&P Exam - Lee Tejada 78 y o  male MRN: [id number]    Unit/Bed#:  Encounter: [id number]    Assessment:  1  Irregular cardiac rhythm with episodes of bigeminy with rates 80 on monitor and 40 by palpation of pulse  Also episode of HR 40 noted in ED but no strips caught  2  Episodes of dyspnea and lightheaded sensation  3  COPD chronic and stable  4  Dementia--mild  5  Nodule on CXR--already being followed as outpatient, has a repeat CT scan already scheduled this month    Plan:  1  Admit to hospital, monitor in ICU  2  Consult cardiology  3  Hold Toprol xl in a m  Pending cardiology evaluation  4  Full Code    History of Present Illness   Mr Hassan Holter is a very pleasant 78year old male who presents to the ED with lightheaded sensation and dyspnea  He was seen by cardiology, Dr Reyes Agrawal,  as an outpatient yesterday for his lightheadedness  He was noted to have a pulse of 40 by office staff and confirmed by the cardiologist  An EKG was done and his HR was 75 on that  The cardiologist did not change anything yesterday  Today the patient came down the stairs and said he was so lightheaded and his wife states that he was breathing hard and looked terrible  She checked his pulse with a pulse ox and it was 42, then 38, then 79, then 38  His BP was 144/114 and then 90/60  She brought him to the ED for evaluation  He had an episode of bradycardia post op after his endovascular AAA repair in August  The doctors at [facility] told her if it persisted to bring him to an ED  It had resolved and didn't seem to recur until now  Review of Systems   HENT:        Edentulous, upper dentures only with no sores in mouth   Eyes:        Glasses, dry eyes   Respiratory: Positive for shortness of breath  Gastrointestinal: Positive for diarrhea          Diarrhea is chronic and sporadic   Genitourinary:        Nocturia x 1   Musculoskeletal:        Sciatica--due for shot #2 by pain management on Friday   Neurological: Positive for light-headedness and headaches  Memory loss   All other systems reviewed and are negative        Historical Information   Past Medical History:   Diagnosis Date    AAA [facility]ab[facility]) [facility])     Acid reflux     Acute exacerbation of chronic obstructive airways disease with asthma ([facility])     Acute serous otitis media of left ear     recurrence not specified     Anesthesia     "always has mental changes /lingers and lingers after anesthesia"    Anxiety     Aortic aneurysm without rupture ([facility])     Arm bruise     right inner forearm "recent IV"    Arthritis     spine and poss left hip    Asthma     bronchietasis    At risk for falls     BPH (benign prostatic hyperplasia)     pt and wife can't confirm 11/10    BPH without urinary obstruction     Cancer ([facility] [address] [facility]     skin CA on nose    CAP (community acquired pneumonia)     Cataracts, bilateral     Change in bowel function     CHF (congestive heart failure) ([facility])     Clubbing of fingers     Colon polyps     Common cold     Contusion of elbow, left     initial encounter     COPD (chronic obstructive pulmonary disease) ([facility] [address] [facility]     Coronary artery disease     Cough     Dementia     Depression     Diverticulosis     Dizziness     upon "standing quickly on occas"    Exercise counseling     Fatigue     Full dentures     "doesn't wear them"    Glaucoma screening     High cholesterol     History of abdominal aortic aneurysm (AAA) repair 08/2017    History of bacteremia     History of chronic obstructive lung disease     History of epistaxis     History of influenza vaccination     History of kidney stones     History of pneumonia     "many times" "at least 5 times" almost always goes to sepsis"    History of sepsis 10/2017    History of sinusitis     History of skin cancer     History of sleep apnea     History of urinary tract infection     [place] (hard of hearing)     Hydronephrosis with obstructing calculus     Influenza vaccine needed     Insomnia     Jock itch     left/saw doctor 11/8 and started on antifungal cream    Kidney stones     Left hip pain     Need for pneumococcal vaccination     Need for prophylactic vaccination and inoculation against influenza     Other emphysema ([facility])     Pancreatitis, chronic ([facility])     pt and wife can't confirm 11/10/17    Pneumonia 10/26/2017    admitted [facility]    Pulmonary emphysema ([facility])     S/P CABG x 3     approx 14 yrs ago    Screening for genitourinary condition     Screening for neurological condition     Sepsis ([facility])     due to unspecified organism     Short-term memory loss     Sleep apnea     does not use CPAP   Special screening examination for neoplasm of prostate     TIA involving carotid artery     "before carotid surgery"    Ulcer ([facility])     stomach, "years ago"    Unsteady gait     Use of cane as ambulatory aid     sometimes    Vitamin D deficiency     Wears glasses      Past Surgical History:   Procedure Laterality Date    ABDOMINAL AORTIC ANEURYSM REPAIR  08/23/2017    ABDOMINAL AORTIC ANEURYSM REPAIR, ENDOVASCULAR      CARDIAC SURGERY      CABG x3    CAROTID ENDARTARECTOMY Left 11/1996    CATARACT EXTRACTION Bilateral     CORONARY ARTERY BYPASS GRAFT  01/2003    x3    CYSTOSCOPY      with insertion of ureteral stent     CYSTOSCOPY      with ureteroscopy with lithotripsy     EXCISIONAL HEMORRHOIDECTOMY      EYE SURGERY Bilateral     "for a wrinkle" after cataract surgery    HERNIA REPAIR      umbilical    LITHOTRIPSY      renal    MOUTH SURGERY      full mouth extraction     MO COLONOSCOPY FLX DX W/COLLJ SPEC WHEN PFRMD N/A 5/16/2017    Procedure: COLONOSCOPY with polypectomies/ hot snare and tattoo;  Surgeon: Gil Albrecht MD;  [facility];   Service: Gastroenterology    MO CYSTOURETHROSCOPY N/A 11/30/2017    Procedure: Mckayla Fermo;  Surgeon: Dale Bonilla MD;  [facility];  Service: Urology    MO ESOPHAGOGASTRODUODENOSCOPY TRANSORAL DIAGNOSTIC N/A 8/18/2016    Procedure: ESOPHAGOGASTRODUODENOSCOPY (EGD); Surgeon: Paulina Gatica MD;  [facility]: [facility]; Service: Gastroenterology    IA REMOVE BLADDER STONE,<2 5 CM N/A 11/30/2017    Procedure: Nilo Tony;  Surgeon: Carlos A Hooper MD;  [facility];  Service: Urology    TONSILLECTOMY     [address]      and [facility]     Social History   History   Alcohol Use No     Comment: quit 25 yrs ago     History   Drug Use No     Comment: No illicit drug use      History   Smoking Status    Former Smoker    Packs/day: 1 50    Years: 60 00    Quit date: 2014   Smokeless Tobacco    Never Used     Comment: quit 3 yrs ago, used to be a 1-1 5 ppd smoker     Family History:   Family History   Problem Relation Age of Onset    Diabetes type II Mother      mellitus    Kidney failure Father     Diabetes type II Maternal Grandmother      mellitus     Hypertension Paternal Grandmother      benign essential        Meds/Allergies   PTA meds:   Prior to Admission Medications   Prescriptions Last Dose Informant Patient Reported? Taking? Cholecalciferol (VITAMIN D-3 PO) 3/6/2018 at Unknown time Self Yes Yes   Sig: Take 2,000 Units by mouth daily  Fluticasone Furoate-Vilanterol (BREO ELLIPTA) 100-25 MCG/INH AEPB 3/6/2018 at Unknown time Spouse/Significant Other Yes Yes   Sig: Inhale 1 puff daily  KRILL OIL PO 3/6/2018 at Unknown time Spouse/Significant Other Yes Yes   Sig: Take 500 mg by mouth daily  METOPROLOL SUCCINATE ER PO 3/6/2018 at Unknown time Spouse/Significant Other Yes Yes   Sig: Take 25 mg by mouth daily at bedtime     Montelukast Sodium (SINGULAIR PO) 3/6/2018 at Unknown time Self Yes Yes   Sig: Take 10 mg by mouth daily     Multiple Vitamin (MULTIVITAMIN) tablet 3/6/2018 at Unknown time  Yes Yes   Sig: Take 1 tablet by mouth daily   Potassium Citrate ER 15 MEQ (1620 MG) TBCR 3/6/2018 at Unknown time Spouse/Significant Other Yes Yes   Sig: Take 15 mEq by mouth 2 (two) times a day  Probiotic Product (PROBIOTIC DAILY PO) 3/6/2018 at Unknown time Spouse/Significant Other Yes Yes   Sig: Take by mouth daily  aspirin 81 MG tablet 3/6/2018 at Unknown time Spouse/Significant Other Yes Yes   Sig: Take 81 mg by mouth daily Took within 24 hours    atorvastatin (LIPITOR) 20 mg tablet 3/6/2018 at Unknown time  No Yes   Sig: TAKE 1 TABLET AT BEDTIME   cetirizine (ZYRTEC ALLERGY) 10 mg tablet 3/6/2018 at Unknown time Self Yes Yes   Sig: Take 10 mg by mouth every evening     cyanocobalamin (VITAMIN B-12) 1,000 mcg tablet 3/6/2018 at Unknown time  Yes Yes   Sig: Take by mouth daily   donepezil (ARICEPT) 10 mg tablet 3/6/2018 at Unknown time  No Yes   Sig: Take 1 tablet (10 mg total) by mouth daily at bedtime for 30 days   escitalopram (LEXAPRO) 20 mg tablet 3/6/2018 at Unknown time  No Yes   Sig: Take 1 tablet (20 mg total) by mouth daily   tamsulosin (FLOMAX) 0 4 mg 3/6/2018 at Unknown time Spouse/Significant Other Yes Yes   Sig: Take 0 4 mg by mouth daily  tiotropium (SPIRIVA HANDIHALER) 18 mcg inhalation capsule 3/6/2018 at Unknown time Spouse/Significant Other Yes Yes   Sig: Place 18 mcg into inhaler and inhale daily        Facility-Administered Medications: None     Allergies   Allergen Reactions    Augmentin [Amoxicillin-Pot Clavulanate] Diarrhea    Ciprofloxacin Hives    Morphine And Related Other (See Comments)     Change in mental status    Quetiapine Other (See Comments)     Severe nightmares       Objective   First Vitals:   Blood Pressure: 147/66 (03/06/18 1813)  Pulse: 105 (03/06/18 1813)  Temperature: 98 °F (36 7 °C) (03/06/18 1813)  Temp Source: Temporal (03/06/18 1813)  Respirations: 20 (03/06/18 1813)  Height: 5' 8" (172 7 cm) (03/06/18 1813)  Weight - Scale: 76 3 kg (168 lb 3 oz) (03/06/18 1813)  SpO2: 94 % (03/06/18 1813)    Current Vitals:   Blood Pressure: 163/77 (03/06/18 2200)  Pulse: 74 (03/06/18 2200)  Temperature: 97 9 °F (36 6 °C) (03/06/18 2100)  Temp Source: Temporal (03/06/18 2100)  Respirations: (!) 29 (03/06/18 2200)  Height: 5' 8" (172 7 cm) (03/06/18 2100)  Weight - Scale: 73 3 kg (161 lb 9 6 oz) (03/06/18 2100)  SpO2: 96 % (03/06/18 2200)      Intake/Output Summary (Last 24 hours) at 03/06/18 2243  Last data filed at 03/06/18 2226   Gross per 24 hour   Intake              300 ml   Output                0 ml   Net              300 ml       Invasive Devices     Peripheral Intravenous Line            Peripheral IV 03/06/18 Right Antecubital less than 1 day                Physical Exam   Constitutional: He is oriented to person, place, and time  He appears well-developed and well-nourished  No distress  HENT:   Head: Normocephalic and atraumatic  Nose: Nose normal    Mouth/Throat: Oropharynx is clear and moist  No oropharyngeal exudate  Eyes: Conjunctivae and EOM are normal  Pupils are equal, round, and reactive to light  Right eye exhibits no discharge  Left eye exhibits no discharge  No scleral icterus  Neck: Normal range of motion  Neck supple  No JVD present  No tracheal deviation present  No thyromegaly present  Cardiovascular: Normal rate, regular rhythm, normal heart sounds and intact distal pulses  Exam reveals no gallop and no friction rub  No murmur heard  Pulmonary/Chest: Effort normal and breath sounds normal  No stridor  No respiratory distress  He has no wheezes  He has no rales  He exhibits no tenderness  Abdominal: Soft  Bowel sounds are normal  He exhibits no distension and no mass  There is no tenderness  There is no rebound and no guarding  Musculoskeletal: Normal range of motion  He exhibits no edema, tenderness or deformity  Lymphadenopathy:     He has no cervical adenopathy  Neurological: He is alert and oriented to person, place, and time  He has normal reflexes  He displays normal reflexes  No cranial nerve deficit  He exhibits normal muscle tone  Coordination normal    Skin: Skin is warm and dry  No rash noted  He is not diaphoretic  No erythema  No pallor  Psychiatric: He has a normal mood and affect  His behavior is normal  Judgment and thought content normal    Vitals reviewed  Lab Results: Results for Richa Bishop (MRN [id number]) as of 3/6/2018 22:46   Ref   Range 3/6/2018 18:22 3/6/2018 19:51   eGFR Latest Units: ml/min/1 73sq m 63    Sodium Latest Ref Range: 136 - 145 mmol/L 140    Potassium Latest Ref Range: 3 5 - 5 3 mmol/L 4 4    Chloride Latest Ref Range: 100 - 108 mmol/L 102    CO2 Latest Ref Range: 21 - 32 mmol/L 30    Anion Gap Latest Ref Range: 4 - 13 mmol/L 8    BUN Latest Ref Range: 5 - 25 mg/dL 20    Creatinine Latest Ref Range: 0 60 - 1 30 mg/dL 1 14    Glucose Latest Ref Range: 65 - 140 mg/dL 110    Calcium Latest Ref Range: 8 3 - 10 1 mg/dL 8 6    AST Latest Ref Range: 5 - 45 U/L 21    ALT Latest Ref Range: 12 - 78 U/L 35    Alkaline Phosphatase Latest Ref Range: 46 - 116 U/L 99    Total Protein Latest Ref Range: 6 4 - 8 2 g/dL 6 6    Albumin Latest Ref Range: 3 5 - 5 0 g/dL 3 5    Total Bilirubin Latest Ref Range: 0 20 - 1 00 mg/dL 1 10 (H)    Phosphorus Latest Ref Range: 2 3 - 4 1 mg/dL 3 3    Magnesium Latest Ref Range: 1 6 - 2 6 mg/dL 2 1    Troponin I Latest Ref Range: <=0 04 ng/mL <0 02    NT-proBNP Latest Ref Range: <450 pg/mL 382    WBC Latest Ref Range: 4 31 - 10 16 Thousand/uL 9 44    RBC Latest Ref Range: 3 88 - 5 62 Million/uL 5 09    Hemoglobin Latest Ref Range: 12 0 - 17 0 g/dL 16 0    Hematocrit Latest Ref Range: 36 5 - 49 3 % 47 6    MCV Latest Ref Range: 82 - 98 fL 94    MCH Latest Ref Range: 26 8 - 34 3 pg 31 4    MCHC Latest Ref Range: 31 4 - 37 4 g/dL 33 6    RDW Latest Ref Range: 11 6 - 15 1 % 15 4 (H)    Platelets Latest Ref Range: 149 - 390 Thousands/uL 131 (L)    MPV Latest Ref Range: 8 9 - 12 7 fL 10 0    Neutrophils Relative Latest Ref Range: 43 - 75 % 66    Lymphocytes Relative Latest Ref Range: 14 - 44 % 22    Monocytes Relative Latest Ref Range: 4 - 12 % 11    Eosinophils Relative Latest Ref Range: 0 - 6 % 1    Basophils Relative Latest Ref Range: 0 - 1 % 0    Neutrophils Absolute Latest Ref Range: 1 85 - 7 62 Thousands/µL 6 15    Lymphocytes Absolute Latest Ref Range: 0 60 - 4 47 Thousands/µL 2 06    Monocytes Absolute Latest Ref Range: 0 17 - 1 22 Thousand/µL 1 08    Eosinophils Absolute Latest Ref Range: 0 00 - 0 61 Thousand/µL 0 13    Basophils Absolute Latest Ref Range: 0 00 - 0 10 Thousands/µL 0 02    Protime Latest Ref Range: 12 1 - 14 4 seconds 12 8    INR Latest Ref Range: 0 86 - 1 16  0 97    PTT Latest Ref Range: 23 - 35 seconds 28    Color, UA Unknown  Light Yellow   Clarity, UA Unknown  Clear   Specific [place], UA Latest Ref Range: 1 003 - 1 030   <=1 005   Glucose, UA Latest Ref Range: Negative mg/dl  Negative   Ketones, UA Latest Ref Range: Negative mg/dl  Negative   Blood, UA Latest Ref Range: Negative, Trace-Intact   Negative   Nitrite, UA Latest Ref Range: Negative   Negative   Leukocytes, UA Latest Ref Range: Negative   Negative   pH, UA Latest Ref Range: 4 5 - 8 0   7 0   Protein, UA Latest Ref Range: Negative mg/dl  Negative   Bilirubin, UA Latest Ref Range: Negative   Negative   Urobilinogen, UA Latest Ref Range: 0 2, 1 0 E U /dl E U /dl  0 2     Imaging: IMPRESSION:     COPD  No focal airspace opacity      Questionable 8 mm lower lung nodule overlying the anterior 6th rib  Follow-up with outpatient nonemergent unenhanced CT chest        EKG, Pathology, and Other Studies: EKG: EKG rate of 78, sinus with PVCs, bigeminy  Nonspecific ST changes  Code Status: Prior FULL   Advance Directive and Living Will:      Power of :    POLST:      Counseling / Coordination of Care: Total floor / unit time spent today 63 minutes

## 2018-03-07 NOTE — PLAN OF CARE
Problem: Potential for Falls  Goal: Patient will remain free of falls  INTERVENTIONS:  - Assess patient frequently for physical needs  -  Identify cognitive and physical deficits and behaviors that affect risk of falls    -  [place] fall precautions as indicated by assessment   - Educate patient/family on patient safety including physical limitations  - Instruct patient to call for assistance with activity based on assessment  - Modify environment to reduce risk of injury  - Consider OT/PT consult to assist with strengthening/mobility   Outcome: Progressing      Problem: DISCHARGE PLANNING - CARE MANAGEMENT  Goal: Discharge to post-acute care or home with appropriate resources  INTERVENTIONS:  - Conduct assessment to determine patient/family and health care team treatment goals, and need for post-acute services based on payer coverage, community resources, and patient preferences, and barriers to discharge  - Address psychosocial, clinical, and financial barriers to discharge as identified in assessment in conjunction with the patient/family and health care team  - Arrange appropriate level of post-acute services according to patient's   needs and preference and payer coverage in collaboration with the physician and health care team  - Communicate with and update the patient/family, physician, and health care team regarding progress on the discharge plan  - Arrange appropriate transportation to post-acute venues   Outcome: Progressing

## 2018-03-07 NOTE — PROGRESS NOTES
Pt received from ER;being admitted to CCU room 206,pt identified & being placed on bedside monitoring

## 2018-03-07 NOTE — OCCUPATIONAL THERAPY NOTE
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    Bradycardia    SOB (shortness of breath)     Past Medical History  Past Medical History:   Diagnosis Date    [facility]) [facility]     Acid reflux     Acute exacerbation of chronic obstructive airways disease with asthma ([facility] )     Acute serous otitis media of left ear     recurrence not specified     Anesthesia     "always has mental changes /lingers and lingers after anesthesia"    Anxiety     [facility]     Arm bruise     right inner forearm "recent IV"    Arthritis     spine and poss left hip    Asthma     bronchietasis    At risk for falls     BPH (benign prostatic hyperplasia)     pt and wife can't confirm 11/10    BPH without urinary obstruction     Cancer ([facility] )     skin CA on nose    CAP (community acquired pneumonia)     Cataracts, bilateral     Change in bowel function     CHF (congestive heart failure) ([facility])     Clubbing of fingers     Colon polyps     Common cold     Contusion of elbow, left     initial encounter     COPD (chronic obstructive pulmonary disease) ([facility]     Coronary artery disease     Cough     Dementia     Depression     Diverticulosis     Dizziness     upon "standing quickly on occas"    Exercise counseling     Fatigue     Full dentures     "doesn't wear them"    Glaucoma screening     High cholesterol     History of abdominal aortic aneurysm (AAA) repair 08/2017    History of bacteremia     History of chronic obstructive lung disease     History of epistaxis  History of influenza vaccination     History of kidney stones     History of pneumonia     "many times" "at least 5 times" almost always goes to sepsis"    History of sepsis 10/2017    History of sinusitis     History of skin cancer     History of sleep apnea     History of urinary tract infection     [place] (hard of hearing)     Hydronephrosis with obstructing calculus     Influenza vaccine needed     Insomnia     Jock itch     left/saw doctor 11/8 and started on antifungal cream    Kidney stones     Left hip pain     Need for pneumococcal vaccination     Need for prophylactic vaccination and inoculation against influenza     Other emphysema ([address] )     Pancreatitis, chronic ([address] )     pt and wife can't confirm 11/10/17    Pneumonia 10/26/2017    admitted [facility]    Pulmonary emphysema ([address] )     S/P CABG x 3     approx 14 yrs ago    Screening for genitourinary condition     Screening for neurological condition     Sepsis ([address] )     due to unspecified organism     Short-term memory loss     Sleep apnea     does not use CPAP      Special screening examination for neoplasm of prostate     TIA involving carotid artery     "before carotid surgery"    Ulcer ([address] )     stomach, "years ago"    Unsteady gait     Use of cane as ambulatory aid     sometimes    Vitamin D deficiency     Wears glasses      Past Surgical History  Past Surgical History:   Procedure Laterality Date    ABDOMINAL AORTIC ANEURYSM REPAIR  08/23/2017    ABDOMINAL AORTIC ANEURYSM REPAIR, ENDOVASCULAR      CARDIAC SURGERY      CABG x3    CAROTID ENDARTARECTOMY Left 11/1996    CATARACT EXTRACTION Bilateral     CORONARY ARTERY BYPASS GRAFT  01/2003    x3    CYSTOSCOPY      with insertion of ureteral stent     CYSTOSCOPY      with ureteroscopy with lithotripsy     EXCISIONAL HEMORRHOIDECTOMY      EYE SURGERY Bilateral     "for a wrinkle" after cataract surgery    HERNIA REPAIR      umbilical    LITHOTRIPSY      renal    MOUTH SURGERY      full mouth extraction     ND COLONOSCOPY FLX DX W/COLLJ SPEC WHEN PFRMD N/A 5/16/2017    Procedure: COLONOSCOPY with polypectomies/ hot snare and tattoo;  Surgeon: Tom Moody MD;  [facility]; Service: Gastroenterology    ND CYSTOURETHROSCOPY N/A 11/30/2017    Procedure: Wicho Jose Enrique;  Surgeon: Migdalia Gloria MD;  [facility];  Service: Urology    ND ESOPHAGOGASTRODUODENOSCOPY TRANSORAL DIAGNOSTIC N/A 8/18/2016    Procedure: ESOPHAGOGASTRODUODENOSCOPY (EGD); Surgeon: Tom Moody MD;  [facility]; Service: Gastroenterology    ND REMOVE BLADDER STONE,<2 5 CM N/A 11/30/2017    Procedure: Favian Phillips;  Surgeon: Migdalia Gloria MD;  [facility];  Service: Urology    TONSILLECTOMY     [address]      and removal           03/07/18 0801   Note Type   Note type Eval/Treat   Restrictions/Precautions   Weight Bearing Precautions Per Order No   Other Precautions Chair Alarm; Bed Alarm;Telemetry;Multiple lines; Fall Risk;Pain   Pain Assessment   Pain Assessment 0-10   Pain Score 8   Pain Location Leg;Hip   Pain Orientation Left   Home Living   Additional Comments see PT evaluation for details    Prior Function   Level of [place] Independent with ADLs and functional mobility   Lives With Spouse   Receives Help From Family   ADL Assistance Independent   IADLs Needs assistance   Falls in the last 6 months 0   Comments pt is (I) c driving    Psychosocial   Psychosocial (WDL) WDL   Bed Mobility   Supine to Sit 5  Supervision   Additional items Bedrails   Sit to Supine (seated in chair at end of session)   Transfers   Sit to Stand 5  Supervision   Additional items (RW)   Stand to Sit 5  Supervision   Additional items (RW)   Additional Comments pt with sciatic pain throughout session   Functional Mobility   Functional Mobility 5  Supervision   Additional Comments pt performed FM with RW during session at (S) level    Additional items Rolling walker   Balance   Static Sitting Good   Dynamic Sitting Good   Static Standing Fair +   Dynamic Standing Fair +   Ambulatory Fair   Activity Tolerance   Activity Tolerance Patient limited by fatigue;Patient limited by pain   RUE Assessment   RUE Assessment WFL   LUE Assessment   LUE Assessment WFL   Hand Function   Gross Motor Coordination Functional   Fine Motor Coordination Functional   Sensation   Light Touch No apparent deficits   Sharp/Dull No apparent deficits   Cognition   Overall Cognitive Status WFL   Arousal/Participation Alert   Attention Within functional limits   Orientation Level Oriented X4   Memory Within functional limits   Following Commands Follows all commands and directions without difficulty   Assessment   Limitation Decreased ADL status; Decreased UE strength;Decreased endurance;Decreased self-care trans;Decreased high-level ADLs   Assessment pt presents at evaluation performing at overall (S) level with use of RW during session; pt limited this session due to sciatic pain in L leg during FM; pt will benefit from continued OT intervention to ensure (I) c ADL performance prior to d/c home; recommend outpatient therapy or home services at d/c    Goals   Short Term Goal  pt will perform UE strengthening exercises    Long Term Goal #1 pt will perform FM c RW at mod (I) level    Long Term Goal #2 pt will perform UB/LB bathing and grooming tasks at (I) level    Long Term Goal pt will perform toilet transfers and perineal hygiene at (I) level    Plan   Treatment Interventions ADL retraining;Functional transfer training;UE strengthening/ROM; Endurance training;Patient/family training; Activityengagement   Goal Expiration Date 03/21/18   OT Frequency 3-5x/wk   Recommendation   OT Discharge Recommendation (home health vs  outpatient therapy)   OT - OK to Discharge No   Barthel Index   Feeding 10   Bathing 0   Grooming Score 0   Dressing Score 5   Bladder Score 10   Bowels Score 10   Toilet Use Score 5   Transfers (Bed/Chair) Score 10   Mobility (Level Surface) Score 10   Stairs Score 0   Barthel Index Score 60       Pt will benefit from continued OT services in order to maximize (I) c ADL performance, FM c RW, and improve overall endurance/strength required to complete functional tasks in preparation for d/c  Pt left seated in chair at end of session; all needs within reach; all lines intact; scds connected and turned on

## 2018-03-07 NOTE — PLAN OF CARE
Problem: Potential for Falls  Goal: Patient will remain free of falls  INTERVENTIONS:  - Assess patient frequently for physical needs  -  Identify cognitive and physical deficits and behaviors that affect risk of falls    -  [place] fall precautions as indicated by assessment   - Educate patient/family on patient safety including physical limitations  - Instruct patient to call for assistance with activity based on assessment  - Modify environment to reduce risk of injury  - Consider OT/PT consult to assist with strengthening/mobility   Outcome: Progressing

## 2018-03-07 NOTE — PROGRESS NOTES
No pain, ambulated with therapy in [place] and did well, hr/rhythm stable this shift, patient downgraded to telemetry

## 2018-03-07 NOTE — ASSESSMENT & PLAN NOTE
· Resolved  · Awaiting 2D Echo  · No further intervention for now  · Continue telemetry  · Transfer to [facility]

## 2018-03-07 NOTE — SOCIAL WORK
Cm met with the patient to evaluate the patients prior function and living situation and any barriers to d/c and form a safe d/c plan  Cm also evaluated the patient for any services in the home or needs for services  Pt resides at home with his wife in a multistory house (has 1 BERNICE then 13 to his 2nd floor bedroom/bathroom)  Pt  had been independent with his adls and ambulation (has a walker and cane but wasn't using an Ad)  No services  PCP is Jay Walls and pharmacy is [facility] in [facility]  Pt's family to transport him home on dc  Plans at this time are home on dc with outpatient follow up  Cm will continue to follow

## 2018-03-07 NOTE — PROGRESS NOTES
Progress Note - Tony Montes De [place] 1940, 78 y o  male MRN: [id number]    Unit/Bed#:  Encounter: [id number]    Primary Care Provider: Miriam Bryson DO   Date and time admitted to hospital: 3/6/2018  6:10 PM        Ventricular bigeminy   Assessment & Plan    · Frequent PVCs noted on EKG/monitor  · Cardiology input appreciated  · Awaiting 2D Echo report  · Restarted metoprolol per Cardiology recommendations        Pulmonary nodule   Assessment & Plan    · Stable  · Outpatient follow up        SOB (shortness of breath)   Assessment & Plan    · Occurs most on exertion and at time at rest, no associated chest pain  · COPD is controlled, normal SPO2, no wheezing  · 2D Echo pending  · Cardiology input appreciated          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   Assessment & Plan    · Mild, patient is pleasant and cooperative, A&O x 3  · Continue home medications        COPD (chronic obstructive pulmonary disease) ([facility] )   Assessment & Plan    · Does not appear in exacerbation, lungs are clear  · Continue home regimen        * Bradycardia   Assessment & Plan    · Resolved  · Awaiting 2D Echo  · No further intervention for now  · Continue telemetry  · Transfer to Med-Surg            VTE Pharmacologic Prophylaxis:   Pharmacologic: Enoxaparin (Lovenox)  Mechanical VTE Prophylaxis in Place: Yes    Patient Centered Rounds: I have performed bedside rounds with nursing staff today  Discussions with Specialists or Other Care Team Provider: Reviewed Cardiology recommendations    Education and Discussions with Family / Patient: Patient, will update wife    Time Spent for Care: 30 minutes  More than 50% of total time spent on counseling and coordination of care as described above      Current Length of Stay: 1 day(s)    Current Patient Status: Inpatient   Certification Statement: The patient will continue to require additional inpatient hospital stay due to need for close monitoring    Discharge Plan: TBD    Code Status: Level 1 - Full Code      Subjective:   Patient seen and examined  He states he feels fine other than occasional SOB and lightheadedness, mostly positional  Denies chest pain or palpitations  Denies abdominal or urinary symptoms  Objective:     Vitals:   Temp (24hrs), Av °F (36 7 °C), Min:97 9 °F (36 6 °C), Max:98 2 °F (36 8 °C)    HR:  [] 78  Resp:  [15-29] 19  BP: (106-163)/(55-79) 153/69  SpO2:  [91 %-99 %] 93 %  Body mass index is 24 57 kg/m²  Input and Output Summary (last 24 hours): Intake/Output Summary (Last 24 hours) at 18 1409  Last data filed at 18 1300   Gross per 24 hour   Intake              520 ml   Output             1250 ml   Net             -730 ml       Physical Exam:     Physical Exam   Constitutional: He is oriented to person, place, and time  No distress  HENT:   Head: Normocephalic and atraumatic  Eyes: Conjunctivae are normal    Neck: No JVD present  Cardiovascular: Normal rate and regular rhythm  Exam reveals no gallop and no friction rub  No murmur heard  Pulmonary/Chest: Effort normal  No respiratory distress  He has no wheezes  He has no rales  Abdominal: Soft  He exhibits no distension  There is no tenderness  There is no guarding  Musculoskeletal: He exhibits no edema  Neurological: He is alert and oriented to person, place, and time  Skin: Skin is warm and dry  Psychiatric: He has a normal mood and affect         Additional Data:     Labs:      Results from last 7 days  Lab Units 18  0502 18  1822   WBC Thousand/uL 6 85 9 44   HEMOGLOBIN g/dL 15 4 16 0   HEMATOCRIT % 46 2 47 6   PLATELETS Thousands/uL 117* 131*   NEUTROS PCT %  --  66   LYMPHS PCT %  --  22   MONOS PCT %  --  11   EOS PCT %  --  1       Results from last 7 days  Lab Units 18  0502   SODIUM mmol/L 141   POTASSIUM mmol/L 4 4   CHLORIDE mmol/L 106   CO2 mmol/L 27   BUN mg/dL 17   CREATININE mg/dL 1 01   CALCIUM mg/dL 8 4   TOTAL PROTEIN g/dL 6 0*   BILIRUBIN TOTAL mg/dL 1 20*   ALK PHOS U/L 79   ALT U/L 30   AST U/L 20   GLUCOSE RANDOM mg/dL 103       Results from last 7 days  Lab Units 03/07/18  0502   INR  1 05       * I Have Reviewed All Lab Data Listed Above  * Additional Pertinent Lab Tests Reviewed: [facility] Admission Reviewed    Imaging:    Imaging Reports Reviewed Today Include: Chest xray, CT abd/pelvis    Recent Cultures (last 7 days):           Last 24 Hours Medication List:     Current Facility-Administered Medications:  acetaminophen 650 mg Oral Q6H PRN Dav Kyle, DO   aspirin 81 mg Oral Daily Nicholette Cool Argueta, DO   atorvastatin 20 mg Oral HS Dav Kyle, DO   calcium carbonate 1,000 mg Oral Daily PRN Lovejoy Kyle, DO   cholecalciferol 2,000 Units Oral Daily Nicholette Cool Argueta, DO   cyanocobalamin 1,000 mcg Oral Daily Nicholette Cool Argueta, DO   donepezil 10 mg Oral HS Nicholette Cool Argueta, DO   enoxaparin 40 mg Subcutaneous Daily Nicholette Cool Argueta, DO   escitalopram 10 mg Oral Daily Nicholette Cool Ela Kaur, DO   fluticasone-salmeterol 1 puff Inhalation Q12H [facility] Lovejoy Kyle, DO   loratadine 10 mg Oral Daily Nicholette Cool Argueta, DO   metoprolol succinate 25 mg Oral HS Ela Kaur MD   montelukast 10 mg Oral Daily Nicholette Cool Argueta, DO   ondansetron 4 mg Intravenous Q6H PRN Dav Kyle, DO   Potassium Citrate ER  Oral BID Nicholette Cool Argueta, DO   saccharomyces boulardii 250 mg Oral BID Lovejoy Kyle, DO   tamsulosin 0 4 mg Oral Daily Dav Kyle, DO   tiotropium 18 mcg Inhalation Daily Dav Kyle, DO        Today, Patient Was Seen By: Curtis Mandujano MD    ** Please Note: Dictation voice to text software may have been used in the creation of this document   **

## 2018-03-07 NOTE — CASE MANAGEMENT
Initial Clinical Review    Admission: Date/Time/Statement: 3/6/18 @ 1947     Orders Placed This Encounter   Procedures    Inpatient Admission (expected length of stay for this patient is greater than two midnights)     Standing Status:   Standing     Number of Occurrences:   1     Order Specific Question:   Admitting Physician     Answer:   Paolo Valadez [[id number]]     Order Specific Question:   Level of Care     Answer:   Med Surg [16]     Order Specific Question:   Estimated length of stay     Answer:   More than 2 Midnights     Order Specific Question:   Certification     Answer:   I certify that inpatient services are medically necessary for this patient for a duration of greater than two midnights  See H&P and MD Progress Notes for additional information about the patient's course of treatment   Inpatient Admission     Standing Status:   Standing     Number of Occurrences:   1     Order Specific Question:   Admitting Physician     Answer:   Paolo Valadez [[id number]]     Order Specific Question:   Level of Care     Answer:   Critical Care [15]     Order Specific Question:   Estimated length of stay     Answer:   More than 2 Midnights     Order Specific Question:   Certification     Answer:   I certify that inpatient services are medically necessary for this patient for a duration of greater than two midnights  See H&P and MD Progress Notes for additional information about the patient's course of treatment  ED: Date/Time/Mode of Arrival:   ED Arrival Information     Expected Arrival Acuity Means of Arrival Escorted By Service Admission Type    - 3/6/2018 18:09 Urgent Walk-In Family Member General Medicine Urgent    Arrival Complaint    SOB          Chief Complaint:   Chief Complaint   Patient presents with    Shortness of Breath     sob started 2 hours ago  Also dizzy  Wife stated Pulse decreased 38  Fluctuation of Bp         History of Illness:   Patient with intermittant carlie cardia, currently rate of 105 but then episodes of HR in 40s  Patient with sob that started today around 3pm  SOB is worse with walking around     Some lightheadedness worse with sitting up too fast and with exertion   MDM well appearing 78 yom, intermittant bradycardia, intermittant bigeminy on the monitor, concern for tachy carlie syndrome, possible need for admission and pacemaker  ED Vital Signs:   ED Triage Vitals [03/06/18 1813]   Temperature Pulse Respirations Blood Pressure SpO2   98 °F (36 7 °C) 105 20 147/66 94 %      Temp Source Heart Rate Source Patient Position - Orthostatic VS BP Location FiO2 (%)   Temporal Monitor Lying Left arm --      Pain Score       5        Wt Readings from Last 1 Encounters:   03/06/18 73 3 kg (161 lb 9 6 oz)   Vital Signs (abnormal): Abnormal Labs/Diagnostic Test Results:   TBILI 1 10    TROP NEG  CXR=COPD  No focal airspace opacity  Questionable 8 mm lower lung nodule overlying the anterior 6th rib  Follow-up with outpatient nonemergent unenhanced CT chest   EKG rate of 78, sinus with PVCs, bigeminy  Nonspecific ST changes  ED Treatment:   Medication Administration from 03/06/2018 1809 to 03/06/2018 2056     None      Past Medical/Surgical History:    Active Ambulatory Problems     Diagnosis Date Noted    [facility] 12/28/2016    Hyperlipidemia 12/28/2016    GERD (gastroesophageal reflux disease) 12/28/2016    Abnormal CT scan, chest 06/01/2017    Abdominal aortic aneurysm ([facility] ) 06/01/2017    History of aortic aneurysm repair 09/01/2017    Benign prostatic hyperplasia 09/18/2015    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 01/29/2018     Resolved Ambulatory Problems     Diagnosis Date Noted    Severe sepsis ([facility] ) 12/28/2016    Acute tracheobronchitis 12/28/2016    Gastroenteritis 12/28/2016    Nausea and vomiting 12/28/2016    Syncope and collapse 12/28/2016    Acute kidney injury ([address] ) 05/31/2017    Microscopic hematuria 05/31/2017    Lactic acidosis 05/31/2017    Hyperbilirubinemia 05/31/2017    Streptococcus pneumoniae infection 06/01/2017    Hypophosphatemia 06/01/2017    Renal calculi 06/02/2017    Thrombocytopenia ([facility] ) 06/02/2017    Vitamin D insufficiency 06/02/2017     Past Medical History:   Diagnosis Date    [facility]     Acid reflux     Acute exacerbation of chronic obstructive airways disease with asthma (HCC)     Acute serous otitis media of left ear     Anesthesia     Anxiety     Aortic aneurysm without rupture ([facility])     Arm bruise     Arthritis     Asthma     At risk for falls     BPH (benign prostatic hyperplasia)     BPH without urinary obstruction     Cancer ([facility] )     CAP (community acquired pneumonia)     Cataracts, bilateral     Change in bowel function     CHF (congestive heart failure) ([facility])     Clubbing of fingers     Colon polyps     Common cold     Contusion of elbow, left     COPD (chronic obstructive pulmonary disease) ([facility]     Coronary artery disease     Cough     Dementia     Depression     Diverticulosis     Dizziness     Exercise counseling     Fatigue     Full dentures     Glaucoma screening     High cholesterol     History of abdominal aortic aneurysm (AAA) repair 08/2017    History of bacteremia     History of chronic obstructive lung disease     History of epistaxis     History of influenza vaccination     History of kidney stones     History of pneumonia     History of sepsis 10/2017    History of sinusitis     History of skin cancer     History of sleep apnea     History of urinary tract infection     [place] (hard of hearing)     Hydronephrosis with obstructing calculus     Influenza vaccine needed     Insomnia     Jock itch     Kidney stones     Left hip pain     Need for pneumococcal vaccination     Need for prophylactic vaccination and inoculation against influenza     Other emphysema ([facility] )     Pancreatitis, chronic ([facility] )     Pneumonia 10/26/2017    Pulmonary emphysema (HCC)     S/P CABG x 3     Screening for genitourinary condition     Screening for neurological condition     Sepsis ([place] [address] )     Short-term memory loss     Sleep apnea     Special screening examination for neoplasm of prostate     TIA involving carotid artery     Ulcer ([place] [address] )     Unsteady gait     Use of cane as ambulatory aid     Vitamin D deficiency     Wears glasses    Admitting Diagnosis: Bradycardia [R00 1]  SOB (shortness of breath) [R06 02]  Age/Sex: 78 y o  male  Assessment/Plan:   1  Irregular cardiac rhythm with episodes of bigeminy with rates 80 on monitor and 40 by palpation of pulse  Also episode of HR 40 noted in ED  2  Episodes of dyspnea and lightheaded sensation  3  COPD chronic and stable  4  Dementia--mild  5  Nodule on CXR--already being followed as outpatient, has a repeat CT scan already scheduled this month  Plan:  1  Admit to hospital, monitor in ICU  2  Consult cardiology  3  Hold Toprol xl in a m   Pending cardiology evaluation  Admission Orders:  CCU/TELEMETRY  PT/OT EVAL & [address]  CONT PULSE OX  INCENTIVE SPIROMETRY  Scheduled Meds:   Current Facility-Administered Medications:  acetaminophen 650 mg Oral Q6H PRN Blease [place], DO   aspirin 81 mg Oral Daily Poppy [employer] [place], DO   atorvastatin 20 mg Oral HS Blease [place], DO   calcium carbonate 1,000 mg Oral Daily PRN Blease Derek, DO   cholecalciferol 2,000 Units Oral Daily Poppy Ku Argueta, DO   cyanocobalamin 1,000 mcg Oral Daily Poppy Trineanks, DO   donepezil 10 mg Oral HS Blease [place], DO   enoxaparin 40 mg Subcutaneous Daily Poppy Ku Argueta, DO   escitalopram 10 mg Oral Daily Poppy Ku Mány, DO   fluticasone-salmeterol 1 puff Inhalation Q12H [address] Blease [place], DO   loratadine 10 mg Oral Daily Poppy Ku Argueta, DO   montelukast 10 mg Oral Daily Poppy Ku Argueta, DO   ondansetron 4 mg Intravenous Q6H PRN Blease Derek, DO   Potassium Citrate ER  Oral BID Desi Cotton, DO   saccharomyces boulardii 250 mg Oral BID Rowena Dodson, DO   tamsulosin 0 4 mg Oral Daily Rowena Dodson, DO   tiotropium 18 mcg Inhalation Daily Rowena Dodson, DO     Continuous Infusions:    PRN Meds:   acetaminophen    calcium carbonate    ondansetron

## 2018-03-07 NOTE — CONSULTS
Consultation - Cardiology   Hartford Cranker 78 y o  male MRN: [id number]  Unit/Bed#:  Encounter: [id number]  03/07/18  1:23 PM      Assessment:  Principal Problem:    Bradycardia  Active Problems:    COPD (chronic obstructive pulmonary disease) ([address] )    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    SOB (shortness of breath)    Chief Complaint   Patient presents with    Shortness of Breath     sob started 2 hours ago  Also dizzy  Wife stated Pulse decreased 38  Fluctuation of Bp  Admitting diagnosis:  Bradycardia [R00 1]  SOB (shortness of breath) [R06 02]      Plan:    Dizziness/lightheadedness:  Check orthostatics  Mostly symptoms are with standing up  No associated nausea, pain, sweating or shortness of breath  No changes to medications at this time  If his orthostatics are positive, I will give him some fluids  No recent weight loss  TSH was normal, echocardiogram is pending  If necessary, if his symptoms are refractory, will check his posterior  extracranial circulation for any stenotic lesions  Frequent PVCs:  Normal electrolytes and normal TSH, if his echocardiogram shows preserved LV function, no further evaluation will be necessary  I will leave him on the beta-blocker for now  He has not demonstrated any bradycardia since admission over 12 hours ago  Fatigue:  Await echocardiogram, hemoglobin is fifteen, no recent significant weight loss  Coronary artery disease and CABG and prior history of abdominal aortic aneurysm, continue aspirin statin  History of Present Illness   Physician Requesting Consult: Amarjit Cleary MD   Reason for Consult / Principal Problem:  Lightheadedness  HPI: Hartford Cranker is a 78y o  year old male   With history of coronary artery disease, coronary artery bypass grafting, endovascular abdominal aortic aneurysm repair in September, hypertension, dyslipidemia, no peripheral neuropathy and sciatica that has been an issue for the last few weeks    He has also apparently been treated for pneumonia recently  He has been admitted with suspected bradycardia-by pulse  Subsequently he had a 12 lead electrocardiogram-I am not sure this confirmed any bradycardia  He has been in sinus rhythm with frequent PVCs often in a pattern of bigeminy  His main complaint currently is lightheadedness for the last few weeks and severe fatigue  His blood pressures have been normal here  At baseline he is on metoprolol 25 mg at bedtime  His never had any syncopal spells and has never been recommended a pacemaker  No new medications recently  He has had his sciatica injected recently  Cardiac physical exam is unremarkable  Orthostatics were not checked by me-ordered  He will need assistance to stand up because of his sciatica  ProBNP is normal at three hundred eighty-two, serial troponins are negative  Cardiac physical exam is unremarkable  ECG demonstrates sinus rhythm with PVCs  Consults    Review of Systems:  feels ill and fatigued  Review of Systems   Constitutional: Negative  HENT: Negative  Eyes: Negative  Respiratory: Negative  Cardiovascular: Negative  Gastrointestinal: Negative  Endocrine: Negative  Genitourinary: Negative  Musculoskeletal: Positive for arthralgias, back pain and gait problem  Negative for joint swelling, myalgias and neck pain  Allergic/Immunologic: Negative  Neurological: Positive for dizziness and light-headedness  Negative for tremors, seizures, syncope, facial asymmetry, speech difficulty, weakness, numbness and headaches  Hematological: Negative  Psychiatric/Behavioral: Negative          Historical Information   Past Medical History:   Diagnosis Date    [facility]     Acid reflux     Acute exacerbation of chronic obstructive airways disease with asthma ([facility])     Acute serous otitis media of left ear     recurrence not specified     Anesthesia     "always has mental changes /lingers and lingers after anesthesia"    Anxiety     Aortic aneurysm without rupture (HCC)     Arm bruise     right inner forearm "recent IV"    Arthritis     spine and poss left hip    Asthma     bronchietasis    At risk for falls     BPH (benign prostatic hyperplasia)     pt and wife can't confirm 11/10    BPH without urinary obstruction     Cancer ([address] )     skin CA on nose    CAP (community acquired pneumonia)     Cataracts, bilateral     Change in bowel function     CH[facility]congestive [facility] failure) [facility]     Clubbing of fingers     Colon polyps     Common cold     Contusion of elbow, left     initial encounter     COPD (chronic obstructive pulmonary disease) [facility])     Coronary artery disease     Cough     Dementia     Depression     Diverticulosis     Dizziness     upon "standing quickly on occas"    Exercise counseling     Fatigue     Full dentures     "doesn't wear them"    Glaucoma screening     High cholesterol     History of abdominal aortic aneurysm (AAA) repair 08/2017    History of bacteremia     History of chronic obstructive lung disease     History of epistaxis     History of influenza vaccination     History of kidney stones     History of pneumonia     "many times" "at least 5 times" almost always goes to sepsis"    History of sepsis 10/2017    History of sinusitis     History of skin cancer     History of sleep apnea     History of urinary tract infection     [place] (hard of hearing)     Hydronephrosis with obstructing calculus     Influenza vaccine needed     Insomnia     Jock itch     left/saw doctor 11/8 and started on antifungal cream    Kidney stones     Left hip pain     Need for pneumococcal vaccination     Need for prophylactic vaccination and inoculation against influenza     Other emphysema ([address] )     Pancreatitis, chronic ([address] )     pt and wife can't confirm 11/10/17    Pneumonia 10/26/2017    admitted [facility]    [facility] emph[facility]sema [facility]     S/P CABG x 3     approx 14 yrs ago    Screening for genitourinary condition     Screening for neurological condition     Sepsis ([facility] )     due to unspecified organism     Short-term memory loss     Sleep apnea     does not use CPAP   Special screening examination for neoplasm of prostate     TIA involving carotid artery     "before carotid surgery"    Ulcer ([facility] )     stomach, "years ago"    Unsteady gait     Use of cane as ambulatory aid     sometimes    Vitamin D deficiency     Wears glasses      Past Surgical History:   Procedure Laterality Date    ABDOMINAL AORTIC ANEURYSM REPAIR  08/23/2017    ABDOMINAL AORTIC ANEURYSM REPAIR, ENDOVASCULAR      CARDIAC SURGERY      CABG x3    CAROTID ENDARTARECTOMY Left 11/1996    CATARACT EXTRACTION Bilateral     CORONARY ARTERY BYPASS GRAFT  01/2003    x3    CYSTOSCOPY      with insertion of ureteral stent     CYSTOSCOPY      with ureteroscopy with lithotripsy     EXCISIONAL HEMORRHOIDECTOMY      EYE SURGERY Bilateral     "for a wrinkle" after cataract surgery    HERNIA REPAIR      umbilical    LITHOTRIPSY      renal    MOUTH SURGERY      full mouth extraction     [place] COLONOSCOPY FLX DX W/COLLJ SPEC WHEN PFRMD N/A 5/16/2017    Procedure: COLONOSCOPY with polypectomies/ hot snare and tattoo;  Surgeon: Leoncio Ko MD;  [facility]; Service: Gastroenterology    [place] CYSTOURETHROSCOPY N/A 11/30/2017    Procedure: Evita Mixer;  Surgeon: Archie Lehman MD;  [facility];  Service: Urology    [place] ESOPHAGOGASTRODUODENOSCOPY TRANSORAL DIAGNOSTIC N/A 8/18/2016    Procedure: ESOPHAGOGASTRODUODENOSCOPY (EGD); Surgeon: Leoncio Ko MD;  [facility];   Service: Gastroenterology    [place] REMOVE BLADDER STONE,<2 5 CM N/A 11/30/2017    Procedure: Emilie Kim;  Surgeon: Archie Lehman MD;  [facility];  Service: Urology    [address] [place] [address]      and removal History   Alcohol Use No     Comment: quit 25 yrs ago     History   Drug Use No     Comment: No illicit drug use      History   Smoking Status    Former Smoker    Packs/day: 1 50    Years: 60 00    Quit date: 2014   Smokeless Tobacco    Never Used     Comment: quit 3 yrs ago, used to be a 1-1 5 ppd smoker     Family History:   Family History   Problem Relation Age of Onset    Diabetes type II Mother      mellitus    Kidney failure Father     Diabetes type II Maternal Grandmother      mellitus     Hypertension Paternal Grandmother      benign essential      No family history of premature CAD or Sudden Cardiac Death    Meds/Allergies     Current Facility-Administered Medications:     acetaminophen (TYLENOL) tablet 650 mg, 650 mg, Oral, Q6H PRN, Stacey Blazing, DO    aspirin chewable tablet 81 mg, 81 mg, Oral, Daily, Estheriván Samuelks, DO, 81 mg at 03/07/18 0912    atorvastatin (LIPITOR) tablet 20 mg, 20 mg, Oral, HS, Stacey Blazing, DO, 20 mg at 03/06/18 2351    calcium carbonate (TUMS) chewable tablet 1,000 mg, 1,000 mg, Oral, Daily PRN, Stacey Blazing, DO    cholecalciferol (VITAMIN D3) tablet 2,000 Units, 2,000 Units, Oral, Daily, Stacey Blazing, DO, 2,000 Units at 03/07/18 0912    cyanocobalamin (VITAMIN B-12) tablet 1,000 mcg, 1,000 mcg, Oral, Daily, Stacey Blazing, DO, 1,000 mcg at 03/07/18 0912    donepezil (ARICEPT) tablet 10 mg, 10 mg, Oral, HS, Stacey Blazing, DO, 10 mg at 03/06/18 2351    enoxaparin (LOVENOX) subcutaneous injection 40 mg, 40 mg, Subcutaneous, Daily, Stacey Blazing, DO, 40 mg at 03/07/18 0913    escitalopram (LEXAPRO) tablet 10 mg, 10 mg, Oral, Daily, Stacey Blazing, DO, 10 mg at 03/07/18 0912    fluticasone-salmeterol (ADVAIR) 250-50 mcg/dose inhaler 1 puff, 1 puff, Inhalation, Q12H [address], Stacey Blazing, DO, 1 puff at 03/07/18 0916    loratadine (CLARITIN) tablet 10 mg, 10 mg, Oral, Daily, Esther Argueta DO, 10 mg at 03/07/18 0912    montelukast (SINGULAIR) tablet 10 mg, 10 mg, Oral, Daily, Roanne Forward, DO, 10 mg at 03/07/18 0912    ondansetron [facility]) injection 4 mg, 4 mg, Intravenous, Q6H PRN, Roanne Forward, DO    Potassium Citrate ER TBCR, , Oral, BID, Arvid Messenger Argueta, DO    saccharomyces boulardii Evgeny) capsule 250 mg, 250 mg, Oral, BID, Arvid Messenger Argueta, DO, 250 mg at 03/07/18 0912    tamsulosin [facility]) capsule 0 4 mg, 0 4 mg, Oral, Daily, Roanne Forward, DO, 0 4 mg at 03/07/18 0912    tiotropium [facility]) capsule for inhaler 18 mcg, 18 mcg, Inhalation, Daily, Roanne Forward, DO, 18 mcg at 03/07/18 0924  Allergies   Allergen Reactions    Augmentin [Amoxicillin-Pot Clavulanate] Diarrhea    Ciprofloxacin Hives    Morphine And Related Other (See Comments)     Change in mental status    Quetiapine Other (See Comments)     Severe nightmares       Objective   Vitals: Blood pressure 153/69, pulse 78, temperature 97 9 °F (36 6 °C), temperature source Temporal, resp  rate 19, height 5' 8" (1 727 m), weight 73 3 kg (161 lb 9 6 oz), SpO2 93 %  , Body mass index is 24 57 kg/m² , Orthostatic Blood Pressures    Flowsheet Row Most Recent Value   Blood Pressure  153/69 filed at 03/07/2018 6925   Patient Position - Orthostatic VS  Lying filed at 03/07/2018 3072            Intake/Output Summary (Last 24 hours) at 03/07/18 1323  Last data filed at 03/07/18 0901   Gross per 24 hour   Intake              400 ml   Output              850 ml   Net             -450 ml       Weight (last 2 days)     Date/Time   Weight    03/06/18 2100  73 3 (161 6)    03/06/18 1813  76 3 (168 19)              Invasive Devices     Peripheral Intravenous Line            Peripheral IV 03/06/18 Right Antecubital less than 1 day                    Physical Exam:  GEN: Hartford Cranker appears well, alert and oriented x 3, pleasant and cooperative   HEENT: pupils equal, round, and reactive to light; extraocular muscles intact  NECK: supple, no carotid bruits or JVD  HEART/Cardiovascular: regular rhythm, normal S1 and S2, no murmurs, clicks, gallops or rubs   LUNGS/Respiratory: clear to auscultation bilaterally; no wheezes, rales, or rhonchi   ABDOMEN/GI: normal bowel sounds, soft, no tenderness, no distention  EXTREMITIES/Musculoskeletal: peripheral pulses normal; no clubbing, cyanosis, or edema  NEURO: no focal findings   SKIN: normal without suspicious lesions on exposed skin    Lab Results:   Admission on 03/06/2018   Component Date Value    Sodium 03/06/2018 140     Potassium 03/06/2018 4 4     Chloride 03/06/2018 102     CO2 03/06/2018 30     Anion Gap 03/06/2018 8     BUN 03/06/2018 20     Creatinine 03/06/2018 1 14     Glucose 03/06/2018 110     Calcium 03/06/2018 8 6     AST 03/06/2018 21     ALT 03/06/2018 35     Alkaline Phosphatase 03/06/2018 99     Total Protein 03/06/2018 6 6     Albumin 03/06/2018 3 5     Total Bilirubin 03/06/2018 1 10*    eGFR 03/06/2018 63     WBC 03/06/2018 9 44     RBC 03/06/2018 5 09     Hemoglobin 03/06/2018 16 0     Hematocrit 03/06/2018 47 6     MCV 03/06/2018 94     MCH 03/06/2018 31 4     MCHC 03/06/2018 33 6     RDW 03/06/2018 15 4*    MPV 03/06/2018 10 0     Platelets 48/82/2365 131*    Neutrophils Relative 03/06/2018 66     Lymphocytes Relative 03/06/2018 22     Monocytes Relative 03/06/2018 11     Eosinophils Relative 03/06/2018 1     Basophils Relative 03/06/2018 0     Neutrophils Absolute 03/06/2018 6 15     Lymphocytes Absolute 03/06/2018 2 06     Monocytes Absolute 03/06/2018 1 08     Eosinophils Absolute 03/06/2018 0 13     Basophils Absolute 03/06/2018 0 02     Ventricular Rate 03/06/2018 74     Atrial Rate 03/06/2018 74     [place] Interval 03/06/2018 182     QRSD Interval 03/06/2018 92     QT Interval 03/06/2018 426     QTC Interval 03/06/2018 472     P Axis 03/06/2018 82     QRS Axis 03/06/2018 78     T Wave Axis 03/06/2018 88     NT-proBNP 03/06/2018 382     Protime 03/06/2018 12 8     INR 03/06/2018 0 97     PTT 03/06/2018 28  Troponin I 03/06/2018 <0 02     Color, UA 03/06/2018 Light Yellow     Clarity, UA 03/06/2018 Clear     Specific Gravity, UA 03/06/2018 <=1 005     pH, UA 03/06/2018 7 0     Leukocytes, UA 03/06/2018 Negative     Nitrite, UA 03/06/2018 Negative     Protein, UA 03/06/2018 Negative     Glucose, UA 03/06/2018 Negative     Ketones, UA 03/06/2018 Negative     Urobilinogen, UA 03/06/2018 0 2     Bilirubin, UA 03/06/2018 Negative     Blood, UA 03/06/2018 Negative     Phosphorus 03/06/2018 3 3     Magnesium 03/06/2018 2 1     TSH 3RD GENERATON 03/07/2018 1 884     Sodium 03/07/2018 141     Potassium 03/07/2018 4 4     Chloride 03/07/2018 106     CO2 03/07/2018 27     Anion Gap 03/07/2018 8     BUN 03/07/2018 17     Creatinine 03/07/2018 1 01     Glucose 03/07/2018 103     Calcium 03/07/2018 8 4     AST 03/07/2018 20     ALT 03/07/2018 30     Alkaline Phosphatase 03/07/2018 79     Total Protein 03/07/2018 6 0*    Albumin 03/07/2018 3 0*    Total Bilirubin 03/07/2018 1 20*    eGFR 03/07/2018 72     Phosphorus 03/07/2018 3 3     Magnesium 03/07/2018 2 1     WBC 03/07/2018 6 85     RBC 03/07/2018 4 98     Hemoglobin 03/07/2018 15 4     Hematocrit 03/07/2018 46 2     MCV 03/07/2018 93     MCH 03/07/2018 30 9     MCHC 03/07/2018 33 3     RDW 03/07/2018 15 3*    Platelets 16/93/2920 117*    MPV 03/07/2018 10 1     Protime 03/07/2018 13 6     INR 03/07/2018 1 05     PTT 03/07/2018 31     Troponin I 03/07/2018 <0 02     Troponin I 03/07/2018 <0 02     Ventricular Rate 03/06/2018 78     Atrial Rate 03/06/2018 78     UT Interval 03/06/2018 180     QRSD Interval 03/06/2018 86     QT Interval 03/06/2018 400     QTC Interval 03/06/2018 456     P Axis 03/06/2018 65     QRS Axis 03/06/2018 65     T Wave Axis 03/06/2018 75          Imaging: I have personally reviewed pertinent reports          EKG:  Sinus rhythm, frequent PVCs    Counseling / Coordination of Care  Total floor / unit time spent today 45 minutes  Greater than 50% of total time was spent with the patient and / or family counseling and / or coordination of care  We will continue to follow with the patient throughout their hospitalization  Thank you for allowing us to participate in their care     Storm Blum MD

## 2018-03-08 VITALS
HEIGHT: 68 IN | TEMPERATURE: 98.9 F | RESPIRATION RATE: 18 BRPM | DIASTOLIC BLOOD PRESSURE: 54 MMHG | SYSTOLIC BLOOD PRESSURE: 103 MMHG | OXYGEN SATURATION: 96 % | HEART RATE: 92 BPM | BODY MASS INDEX: 24.49 KG/M2 | WEIGHT: 161.6 LBS

## 2018-03-08 LAB
ALBUMIN SERPL BCP-MCNC: 3.1 G/DL (ref 3.5–5)
ALP SERPL-CCNC: 86 U/L (ref 46–116)
ALT SERPL W P-5'-P-CCNC: 30 U/L (ref 12–78)
ANION GAP SERPL CALCULATED.3IONS-SCNC: 11 MMOL/L (ref 4–13)
AST SERPL W P-5'-P-CCNC: 17 U/L (ref 5–45)
BASOPHILS # BLD AUTO: 0.01 THOUSANDS/ΜL (ref 0–0.1)
BASOPHILS NFR BLD AUTO: 0 % (ref 0–1)
BILIRUB SERPL-MCNC: 1.7 MG/DL (ref 0.2–1)
BUN SERPL-MCNC: 18 MG/DL (ref 5–25)
CALCIUM SERPL-MCNC: 8.4 MG/DL (ref 8.3–10.1)
CHLORIDE SERPL-SCNC: 106 MMOL/L (ref 100–108)
CO2 SERPL-SCNC: 24 MMOL/L (ref 21–32)
CREAT SERPL-MCNC: 1.08 MG/DL (ref 0.6–1.3)
EOSINOPHIL # BLD AUTO: 0.08 THOUSAND/ΜL (ref 0–0.61)
EOSINOPHIL NFR BLD AUTO: 1 % (ref 0–6)
ERYTHROCYTE [DISTWIDTH] IN BLOOD BY AUTOMATED COUNT: 15.5 % (ref 11.6–15.1)
GFR SERPL CREATININE-BSD FRML MDRD: 67 ML/MIN/1.73SQ M
GLUCOSE SERPL-MCNC: 102 MG/DL (ref 65–140)
HCT VFR BLD AUTO: 46.8 % (ref 36.5–49.3)
HGB BLD-MCNC: 15.9 G/DL (ref 12–17)
LYMPHOCYTES # BLD AUTO: 1.41 THOUSANDS/ΜL (ref 0.6–4.47)
LYMPHOCYTES NFR BLD AUTO: 19 % (ref 14–44)
MCH RBC QN AUTO: 31.5 PG (ref 26.8–34.3)
MCHC RBC AUTO-ENTMCNC: 34 G/DL (ref 31.4–37.4)
MCV RBC AUTO: 93 FL (ref 82–98)
MONOCYTES # BLD AUTO: 0.68 THOUSAND/ΜL (ref 0.17–1.22)
MONOCYTES NFR BLD AUTO: 9 % (ref 4–12)
NEUTROPHILS # BLD AUTO: 5.28 THOUSANDS/ΜL (ref 1.85–7.62)
NEUTS SEG NFR BLD AUTO: 71 % (ref 43–75)
PLATELET # BLD AUTO: 125 THOUSANDS/UL (ref 149–390)
PMV BLD AUTO: 9.7 FL (ref 8.9–12.7)
POTASSIUM SERPL-SCNC: 3.9 MMOL/L (ref 3.5–5.3)
PROT SERPL-MCNC: 6.4 G/DL (ref 6.4–8.2)
RBC # BLD AUTO: 5.04 MILLION/UL (ref 3.88–5.62)
SODIUM SERPL-SCNC: 141 MMOL/L (ref 136–145)
WBC # BLD AUTO: 7.46 THOUSAND/UL (ref 4.31–10.16)

## 2018-03-08 PROCEDURE — 97530 THERAPEUTIC ACTIVITIES: CPT

## 2018-03-08 PROCEDURE — 97116 GAIT TRAINING THERAPY: CPT

## 2018-03-08 PROCEDURE — 99239 HOSP IP/OBS DSCHRG MGMT >30: CPT | Performed by: FAMILY MEDICINE

## 2018-03-08 PROCEDURE — 80053 COMPREHEN METABOLIC PANEL: CPT | Performed by: FAMILY MEDICINE

## 2018-03-08 PROCEDURE — 85025 COMPLETE CBC W/AUTO DIFF WBC: CPT | Performed by: FAMILY MEDICINE

## 2018-03-08 RX ADMIN — MONTELUKAST SODIUM 10 MG: 10 TABLET, FILM COATED ORAL at 08:17

## 2018-03-08 RX ADMIN — VITAMIN D, TAB 1000IU (100/BT) 2000 UNITS: 25 TAB at 08:17

## 2018-03-08 RX ADMIN — CYANOCOBALAMIN TAB 1000 MCG 1000 MCG: 1000 TAB at 08:17

## 2018-03-08 RX ADMIN — POTASSIUM CITRATE 10 MEQ: 10 TABLET ORAL at 08:17

## 2018-03-08 RX ADMIN — LORATADINE 10 MG: 10 TABLET ORAL at 08:17

## 2018-03-08 RX ADMIN — ASPIRIN 81 MG 81 MG: 81 TABLET ORAL at 08:17

## 2018-03-08 RX ADMIN — Medication 250 MG: at 08:17

## 2018-03-08 RX ADMIN — ACETAMINOPHEN 650 MG: 325 TABLET, FILM COATED ORAL at 08:22

## 2018-03-08 RX ADMIN — ESCITALOPRAM OXALATE 10 MG: 10 TABLET ORAL at 08:17

## 2018-03-08 RX ADMIN — FLUTICASONE PROPIONATE AND SALMETEROL 1 PUFF: 50; 250 POWDER RESPIRATORY (INHALATION) at 08:16

## 2018-03-08 RX ADMIN — TAMSULOSIN HYDROCHLORIDE 0.4 MG: 0.4 CAPSULE ORAL at 08:17

## 2018-03-08 RX ADMIN — ENOXAPARIN SODIUM 40 MG: 40 INJECTION SUBCUTANEOUS at 08:17

## 2018-03-08 RX ADMIN — TIOTROPIUM BROMIDE 18 MCG: 18 CAPSULE ORAL; RESPIRATORY (INHALATION) at 08:16

## 2018-03-08 NOTE — PLAN OF CARE
Problem: Potential for Falls  Goal: Patient will remain free of falls  INTERVENTIONS:  - Assess patient frequently for physical needs  -  Identify cognitive and physical deficits and behaviors that affect risk of falls    -  [place] fall precautions as indicated by assessment   - Educate patient/family on patient safety including physical limitations  - Instruct patient to call for assistance with activity based on assessment  - Modify environment to reduce risk of injury  - Consider OT/PT consult to assist with strengthening/mobility   Outcome: Progressing      Problem: PAIN - ADULT  Goal: Verbalizes/displays adequate comfort level or baseline comfort level  Interventions:  - Encourage patient to monitor pain and request assistance  - Assess pain using appropriate pain scale  - Administer analgesics based on type and severity of pain and evaluate response  - Implement non-pharmacological measures as appropriate and evaluate response  - Consider cultural and social influences on pain and pain management  - Notify physician/advanced practitioner if interventions unsuccessful or patient reports new pain  Outcome: Progressing      Problem: INFECTION - ADULT  Goal: Absence or prevention of progression during hospitalization  INTERVENTIONS:  - Assess and monitor for signs and symptoms of infection  - Monitor lab/diagnostic results  - Monitor all insertion sites, i e  indwelling lines, tubes, and drains  - Monitor endotracheal (as able) and nasal secretions for changes in amount and color  - [place] appropriate cooling/warming therapies per order  - Administer medications as ordered  - Instruct and encourage patient and family to use good hand hygiene technique  - Identify and instruct in appropriate isolation precautions for identified infection/condition  Outcome: Progressing    Goal: Absence of fever/infection during neutropenic period  INTERVENTIONS:  - Monitor WBC  - Implement neutropenic guidelines  Outcome: Progressing      Problem: SAFETY ADULT  Goal: Patient will remain free of falls  INTERVENTIONS:  - Assess patient frequently for physical needs  -  Identify cognitive and physical deficits and behaviors that affect risk of falls    -  [place] fall precautions as indicated by assessment   - Educate patient/family on patient safety including physical limitations  - Instruct patient to call for assistance with activity based on assessment  - Modify environment to reduce risk of injury  - Consider OT/PT consult to assist with strengthening/mobility   Outcome: Progressing    Goal: Maintain or return to baseline ADL function  INTERVENTIONS:  -  Assess patient's ability to carry out ADLs; assess patient's baseline for ADL function and identify physical deficits which impact ability to perform ADLs (bathing, care of mouth/teeth, toileting, grooming, dressing, etc )  - Assess/evaluate cause of self-care deficits   - Assess range of motion  - Assess patient's mobility; develop plan if impaired  - Assess patient's need for assistive devices and provide as appropriate  - Encourage maximum independence but intervene and supervise when necessary  ¯ Involve family in performance of ADLs  ¯ Assess for home care needs following discharge   ¯ Request OT consult to assist with ADL evaluation and planning for discharge  ¯ Provide patient education as appropriate  Outcome: Progressing    Goal: Maintain or return mobility status to optimal level  INTERVENTIONS:  - Assess patient's baseline mobility status (ambulation, transfers, stairs, etc )    - Identify cognitive and physical deficits and behaviors that affect mobility  - Identify mobility aids required to assist with transfers and/or ambulation (gait belt, sit-to-stand, lift, walker, cane, etc )  - [place] fall precautions as indicated by assessment  - Record patient progress and toleration of activity level on Mobility SBAR; progress patient to next Phase/Stage  - Instruct patient to call for assistance with activity based on assessment  - Request Rehabilitation consult to assist with strengthening/weightbearing, etc   Outcome: Progressing      Problem: DISCHARGE PLANNING  Goal: Discharge to home or other facility with appropriate resources  INTERVENTIONS:  - Identify barriers to discharge w/patient and caregiver  - Arrange for needed discharge resources and transportation as appropriate  - Identify discharge learning needs (meds, wound care, etc )  - Arrange for interpretive services to assist at discharge as needed  - Refer to Case Management Department for coordinating discharge planning if the patient needs post-hospital services based on physician/advanced practitioner order or complex needs related to functional status, cognitive ability, or social support system  Outcome: Progressing      Problem: Knowledge Deficit  Goal: Patient/family/caregiver demonstrates understanding of disease process, treatment plan, medications, and discharge instructions  Complete learning assessment and assess knowledge base    Interventions:  - Provide teaching at level of understanding  - Provide teaching via preferred learning methods  Outcome: Progressing

## 2018-03-08 NOTE — PLAN OF CARE
Problem: DISCHARGE PLANNING - CARE MANAGEMENT  Goal: Discharge to post-acute care or home with appropriate resources  INTERVENTIONS:  - Conduct assessment to determine patient/family and health care team treatment goals, and need for post-acute services based on payer coverage, community resources, and patient preferences, and barriers to discharge  - Address psychosocial, clinical, and financial barriers to discharge as identified in assessment in conjunction with the patient/family and health care team  - Arrange appropriate level of post-acute services according to patient's   needs and preference and payer coverage in collaboration with the physician and health care team  - Communicate with and update the patient/family, physician, and health care team regarding progress on the discharge plan  - Arrange appropriate transportation to post-acute venues   Outcome: Completed Date Met: 03/08/18  -[place] home with outpatient follow up with Pcp and cardiology

## 2018-03-08 NOTE — NURSING NOTE
Reviewed AVS with patient and significant other, gave compression stocking note to wife and reviewed it with her  Pt independent at home  VS stable, no signs of distress

## 2018-03-08 NOTE — DISCHARGE SUMMARY
Discharge- [facility] 1940, 78 y o  male MRN: [id number]    Unit/Bed#: 421-01 Encounter: [id number]    Primary Care Provider: Viola Neri DO   Date and time admitted to hospital: 3/6/2018  6:10 PM        Positional lightheadedness   Assessment & Plan    · Improved  · Patient is non-orthostatic  · Good oral intake  · No evidence of bradycardia  · No further intervention, f/u with Cardiology within 1 week        Ventricular bigeminy   Assessment & Plan    · Frequent PVCs noted on EKG/monitor  · Cardiology input appreciated  · Grade I diastolic dysfunction on 2D Echo  · Continue metoprolol per Cardiology recommendations        Pulmonary nodule   Assessment & Plan    · Stable  · Outpatient follow up        SOB (shortness of breath)   Assessment & Plan    · Improved  · Occurs most on exertion and at time at rest, no associated chest pain  · COPD is controlled, normal SPO2, no wheezing  · 2D Echo noted for grade 1 diastolic dysfunction  · Cardiology input appreciated          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   Assessment & Plan    · Mild, patient is pleasant and cooperative, A&O x 3  · Continue home medications        Thrombocytopenia (HCC)   Assessment & Plan    · Chronic and stable  · Monitor CBC        COPD (chronic obstructive pulmonary disease) ([facility] [address] )   Assessment & Plan    · Does not appear in exacerbation, lungs are clear  · Continue home regimen        * Bradycardia   Assessment & Plan    · Resolved  · Grade I diastolic dysfunction on 2D Echo  · No further intervention for now  · No events on Telemetry other than frequent PVCs  · Transfer to Med-Surg            Resolved Problems  Date Reviewed: 3/7/2018    None          Consultations During Hospital Stay:  · Cardiology    Procedures Performed:     · 2D Echo  SUMMARY     LEFT VENTRICLE:  Systolic function was normal  Ejection fraction was estimated to be 60 %  There were no regional wall motion abnormalities    Doppler parameters were consistent with abnormal left ventricular relaxation (grade 1 diastolic dysfunction)      RIGHT VENTRICLE:  The size was normal   Systolic function was normal      LEFT ATRIUM:  The atrium was mildly dilated      TRICUSPID VALVE:  There was trace regurgitation      HISTORY: PRIOR HISTORY: CABG 2003     PROCEDURE: The procedure was performed at the bedside  This was a routine study  The transthoracic approach was used  The study included complete 2D imaging, M-mode, complete spectral Doppler, and color Doppler  The heart rate was 75 bpm,  at the start of the study  This was a technically difficult study      LEFT VENTRICLE: Size was normal  Systolic function was normal  Ejection fraction was estimated to be 60 %  There were no regional wall motion abnormalities  Wall thickness was normal  DOPPLER: Doppler parameters were consistent with  abnormal left ventricular relaxation (grade 1 diastolic dysfunction)      RIGHT VENTRICLE: The size was normal  Systolic function was normal  Wall thickness was normal      LEFT ATRIUM: The atrium was mildly dilated      RIGHT ATRIUM: Size was normal      MITRAL VALVE: Valve structure was normal  There was normal leaflet separation  DOPPLER: The transmitral velocity was within the normal range  There was no evidence for stenosis  There was no significant regurgitation      AORTIC VALVE: The valve was trileaflet  Leaflets exhibited normal thickness and normal cuspal separation  DOPPLER: Transaortic velocity was within the normal range  There was no evidence for stenosis  There was no significant  regurgitation      TRICUSPID VALVE: The valve structure was normal  There was normal leaflet separation  DOPPLER: The transtricuspid velocity was within the normal range  There was no evidence for stenosis  There was trace regurgitation  Estimated peak PA  pressure was 29 mmHg      PULMONIC VALVE: Leaflets exhibited normal thickness, no calcification, and normal cuspal separation   DOPPLER: The transpulmonic velocity was within the normal range  There was no significant regurgitation      PERICARDIUM: There was no pericardial effusion  The pericardium was normal in appearance      AORTA: The root exhibited normal size      SYSTEMIC VEINS: IVC: The inferior vena cava was normal in size  Significant Findings / Test Results:     · As above    Incidental Findings:   · None     Test Results Pending at Discharge (will require follow up): · None     Outpatient Tests Requested:  · None    Complications:  None    Reason for Admission: [facility] Course:     James Daniels is a 78 y o  male patient who originally presented to the hospital on 3/6/2018 due to symptomatic bradycardia for which he was sent to [place] [address] ED by his physician  The patient was complaining of positional lightheadedness and occasional shortness of breath  The patient was found to be in bigeminy and was monitored on telemetry  No recurrence of bradycardia was recorded during his hospitalization even after his home metoprolol was restarted  Cardiology was following the patient during his stay  Orthostatic vital signs were obtained and negative, while mild drop in diastolic BP was recorded, it was less than 10  Meanwhile the patient's symptoms resolved and he was able to tolerate PT/OT with chronic sciatica as his limiting factor  Compression stocking were recommended to the patient in the case his positional lightheadedness recurs to avoid orthostatic hypotension, prescription was provided  The patient was discharge in improved and stable condition  His VS remained stable  His oxygen saturation was normal on room air  He oral intake was good  He was instructed to follow up with his Cardiologist and his primary care provider  Discharge instructions were provided in verbal and written form to patient and his wife  Please see above list of diagnoses and related plan for additional information       Condition at Discharge: stable     Discharge Day Visit / Exam:     Subjective:  Patient seen and examined  He reports feeling much better, stating his lightheadedness and SOB have resolved  He denies chest pain or palpitations, denies fatigue  He states that he would like to go home so he can make it to his Pain management appointment later today to address his chronic sciatica  The patient also denies abdominal pain, nausea, vomiting, diarrhea or constipation  Denies urinary symptoms  Vitals: Blood Pressure: 103/54 (03/08/18 0713)  Pulse: 92 (03/08/18 0713)  Temperature: 98 9 °F (37 2 °C) (03/08/18 0713)  Temp Source: Temporal (03/08/18 0713)  Respirations: 18 (03/08/18 0713)  Height: 5' 8" (172 7 cm) (03/06/18 2100)  Weight - Scale: 73 3 kg (161 lb 9 6 oz) (03/06/18 2100)  SpO2: 96 % (03/08/18 0713)  Exam:     Physical Exam   Constitutional: He is oriented to person, place, and time  No distress  HENT:   Head: Normocephalic and atraumatic  Eyes: Conjunctivae are normal    Neck: No JVD present  Cardiovascular: Normal rate  An irregularly irregular rhythm present  No murmur heard  Pulmonary/Chest: Effort normal  No respiratory distress  He has no wheezes  He has no rales  Abdominal: Soft  He exhibits no distension  There is no tenderness  There is no guarding  Musculoskeletal: He exhibits no edema  Neurological: He is alert and oriented to person, place, and time  Skin: Skin is warm and dry  Psychiatric: He has a normal mood and affect  Discussion with Family: Wife    Discharge instructions/Information to patient and family:   See after visit summary for information provided to patient and family  Provisions for Follow-Up Care:  See after visit summary for information related to follow-up care and any pertinent home health orders        Disposition:     Home    For Discharges to [facility] SNF:   · Not Applicable to this Patient - Not Applicable to this Patient    Planned Readmission: No     Discharge Statement:  I spent 35 minutes discharging the patient  This time was spent on the day of discharge  I had direct contact with the patient on the day of discharge  Greater than 50% of the total time was spent examining patient, answering all patient questions, arranging and discussing plan of care with patient as well as directly providing post-discharge instructions  Additional time then spent on discharge activities  Discharge Medications:  See after visit summary for reconciled discharge medications provided to patient and family        ** Please Note: This note has been constructed using a voice recognition system **

## 2018-03-08 NOTE — SOCIAL WORK
Discussed with patient preferences on discharge;understanding how to manage health at home; purpose of taking medications; importance of follow up care/appointments; and symptoms to watch out for once discharged home  Pt is being dc'd home on this date with no Cm needs  Pt prefers to schedule his own follow up appointments with his PCP and cardiology

## 2018-03-08 NOTE — ASSESSMENT & PLAN NOTE
· Resolved  · Grade I diastolic dysfunction on 2D Echo  · No further intervention for now  · No events on Telemetry other than frequent PVCs  · Transfer to [facility]

## 2018-03-08 NOTE — PLAN OF CARE
Problem: Potential for Falls  Goal: Patient will remain free of falls  INTERVENTIONS:  - Assess patient frequently for physical needs  -  Identify cognitive and physical deficits and behaviors that affect risk of falls  -  [place] fall precautions as indicated by assessment   - Educate patient/family on patient safety including physical limitations  - Instruct patient to call for assistance with activity based on assessment  - Modify environment to reduce risk of injury  - Consider OT/PT consult to assist with strengthening/mobility   Outcome: Progressing      Problem: DISCHARGE PLANNING - CARE MANAGEMENT  Goal: Discharge to post-acute care or home with appropriate resources  INTERVENTIONS:  - Conduct assessment to determine patient/family and health care team treatment goals, and need for post-acute services based on payer coverage, community resources, and patient preferences, and barriers to discharge  - Address psychosocial, clinical, and financial barriers to discharge as identified in assessment in conjunction with the patient/family and health care team  - Arrange appropriate level of post-acute services according to patient's   needs and preference and payer coverage in collaboration with the physician and health care team  - Communicate with and update the patient/family, physician, and health care team regarding progress on the discharge plan  - Arrange appropriate transportation to post-acute venues   Outcome: Progressing      Problem: CARDIOVASCULAR - ADULT  Goal: Maintains optimal cardiac output and hemodynamic stability  INTERVENTIONS:  - Monitor I/O, vital signs and rhythm  - Monitor for S/S and trends of decreased cardiac output i e  bleeding, hypotension  - Administer and titrate ordered vasoactive medications to optimize hemodynamic stability  - Assess quality of pulses, skin color and temperature  - Assess for signs of decreased coronary artery perfusion - ex   Angina  - Instruct patient to report change in severity of symptoms   Outcome: Progressing    Goal: Absence of cardiac dysrhythmias or at baseline rhythm  INTERVENTIONS:  - Continuous cardiac monitoring, monitor vital signs, obtain 12 lead EKG if indicated  - Administer antiarrhythmic and heart rate control medications as ordered  - Monitor electrolytes and administer replacement therapy as ordered   Outcome: Progressing

## 2018-03-08 NOTE — PHYSICAL THERAPY NOTE
PT Treatment note      03/08/18 0802   Pain Assessment   Pain Score 6   Pain Location Hip;Leg   Pain Orientation Left   Restrictions/Precautions   Other Precautions (Chair Alarm; Bed Alarm;Multiple lines;Telemetry; Fall Risk;[place])   Subjective   Subjective Feeling better but I have sciatic pain  Bed Mobility   Supine to Sit 5  Supervision   Additional items Bedrails   Transfers   Sit to Stand 5  Supervision   Additional items Verbal cues   Stand to Sit 5  Supervision   Additional items Verbal cues   Stand pivot 5  Supervision   Ambulation/Elevation   Gait pattern Forward Flexion; Antalgic   Gait Assistance 5  Supervision   Assistive Device Rolling walker   Distance 140'   Balance   Static Sitting Good   Dynamic Sitting Good   Static Standing Fair +   Dynamic Standing [facility]  (with RW)   Endurance Deficit   Endurance Deficit Yes   Activity Tolerance   Activity Tolerance Patient limited by pain   Assessment   Prognosis Good   Problem List Decreased strength;Decreased endurance; Impaired balance;Decreased mobility   Assessment Performing all bed mobility transfers and ambulation at a Supervision level  Tolerating increased distance with ambulation  Pt would benefit from continued activity in PT to improve impairments and functional deficits and    Plan   Treatment/Interventions Functional transfer training;LE strengthening/ROM; Therapeutic exercise; Endurance training;Bed mobility;Gait training   Progress Progressing toward goals   Recommendation   Recommendation (Outpatient PT (for treatment of sciatica))   Pt  OOB with call bell within reach, scd's connected and turned on and alarm on at end of PT session

## 2018-03-08 NOTE — ASSESSMENT & PLAN NOTE
· Frequent PVCs noted on EKG/monitor  · Cardiology input appreciated  · Grade I diastolic dysfunction on 2D Echo  · Continue metoprolol per Cardiology recommendations

## 2018-03-08 NOTE — ASSESSMENT & PLAN NOTE
· Improved  · Patient is non-orthostatic  · Good oral intake  · No evidence of bradycardia  · No further intervention, f/u with Cardiology within 1 week

## 2018-03-08 NOTE — OCCUPATIONAL THERAPY NOTE
OT Note     03/08/18 0946   Restrictions/Precautions   Other Precautions Fall Risk   Bed Mobility   Sit to Supine 5  Supervision   Additional items Verbal cues   Transfers   Sit to Stand 5  Supervision   Additional items Armrests   Stand to Sit 5  Supervision   Additional items Bedrails   Functional Mobility   Functional Mobility 5  Supervision   Additional Comments Presents seated recliner at bedside  Requests return to supine  Additional items Rolling walker   Activity Tolerance   Activity Tolerance Patient tolerated treatment well   Assessment   Assessment Presents sseated recliner bedside  Requests return to supine  Completes txfr Recliner to opposite side of bed utiliizng RW  Cues to side step to allow proper bed positioning in supine  Call bell and phone in reach  SCDs applied and turned on     Recommendation   OT Discharge Recommendation Home with family support

## 2018-03-08 NOTE — ASSESSMENT & PLAN NOTE
· Improved  · Occurs most on exertion and at time at rest, no associated chest pain  · COPD is controlled, normal SPO2, no wheezing  · 2D Echo noted for grade 1 diastolic dysfunction  · Cardiology input appreciated

## 2018-03-09 ENCOUNTER — TRANSITIONAL CARE MANAGEMENT (OUTPATIENT)
Dept: FAMILY MEDICINE CLINIC | Facility: CLINIC | Age: 78
End: 2018-03-09

## 2018-03-13 ENCOUNTER — HOSPITAL ENCOUNTER (OUTPATIENT)
Dept: MRI IMAGING | Facility: HOSPITAL | Age: 78
Discharge: HOME | End: 2018-03-13
Attending: ANESTHESIOLOGY
Payer: MEDICARE

## 2018-03-13 ENCOUNTER — HOSPITAL ENCOUNTER (OUTPATIENT)
Dept: MRI IMAGING | Facility: HOSPITAL | Age: 78
Discharge: HOME | End: 2018-03-13
Payer: MEDICARE

## 2018-03-13 ENCOUNTER — TRANSCRIBE ORDERS (OUTPATIENT)
Dept: ADMINISTRATIVE | Facility: HOSPITAL | Age: 78
End: 2018-03-13

## 2018-03-13 DIAGNOSIS — M48.061 SPINAL STENOSIS OF LUMBAR REGION, UNSPECIFIED WHETHER NEUROGENIC CLAUDICATION PRESENT: Primary | ICD-10-CM

## 2018-03-13 DIAGNOSIS — C79.9 METASTATIC DISEASE (HCC): ICD-10-CM

## 2018-03-13 DIAGNOSIS — S72.002A CLOSED FRACTURE OF LEFT HIP, INITIAL ENCOUNTER (HCC): ICD-10-CM

## 2018-03-13 DIAGNOSIS — M79.662 PAIN OF LEFT CALF: ICD-10-CM

## 2018-03-13 DIAGNOSIS — M48.061 SPINAL STENOSIS OF LUMBAR REGION, UNSPECIFIED WHETHER NEUROGENIC CLAUDICATION PRESENT: ICD-10-CM

## 2018-03-13 PROCEDURE — 73721 MRI JNT OF LWR EXTRE W/O DYE: CPT

## 2018-03-13 PROCEDURE — 72148 MRI LUMBAR SPINE W/O DYE: CPT

## 2018-03-15 ENCOUNTER — OFFICE VISIT (OUTPATIENT)
Dept: FAMILY MEDICINE CLINIC | Facility: CLINIC | Age: 78
End: 2018-03-15
Payer: MEDICARE

## 2018-03-15 VITALS
SYSTOLIC BLOOD PRESSURE: 118 MMHG | HEIGHT: 68 IN | WEIGHT: 160.8 LBS | BODY MASS INDEX: 24.37 KG/M2 | DIASTOLIC BLOOD PRESSURE: 72 MMHG

## 2018-03-15 DIAGNOSIS — I49.8 VENTRICULAR BIGEMINY: ICD-10-CM

## 2018-03-15 DIAGNOSIS — R00.1 BRADYCARDIA: Primary | ICD-10-CM

## 2018-03-15 PROCEDURE — 99496 TRANSJ CARE MGMT HIGH F2F 7D: CPT | Performed by: FAMILY MEDICINE

## 2018-04-02 ENCOUNTER — TRANSCRIBE ORDERS (OUTPATIENT)
Dept: ADMINISTRATIVE | Facility: HOSPITAL | Age: 78
End: 2018-04-02

## 2018-04-02 DIAGNOSIS — I71.4 ABDOMINAL AORTIC ANEURYSM (AAA) WITHOUT RUPTURE (HCC): Primary | ICD-10-CM

## 2018-04-05 ENCOUNTER — HOSPITAL ENCOUNTER (OUTPATIENT)
Dept: ULTRASOUND IMAGING | Facility: HOSPITAL | Age: 78
Discharge: HOME | End: 2018-04-05
Payer: MEDICARE

## 2018-04-05 DIAGNOSIS — I71.4 ABDOMINAL AORTIC ANEURYSM (AAA) WITHOUT RUPTURE (HCC): ICD-10-CM

## 2018-04-05 PROCEDURE — 93978 VASCULAR STUDY: CPT | Performed by: SURGERY

## 2018-04-05 PROCEDURE — 93978 VASCULAR STUDY: CPT

## 2018-04-11 ENCOUNTER — HOSPITAL ENCOUNTER (OUTPATIENT)
Dept: ULTRASOUND IMAGING | Facility: HOSPITAL | Age: 78
Discharge: HOME | End: 2018-04-11
Attending: ANESTHESIOLOGY
Payer: MEDICARE

## 2018-04-11 DIAGNOSIS — M79.662 PAIN OF LEFT CALF: ICD-10-CM

## 2018-04-11 PROCEDURE — 93971 EXTREMITY STUDY: CPT

## 2018-04-12 PROCEDURE — 93971 EXTREMITY STUDY: CPT | Performed by: SURGERY

## 2018-05-01 DIAGNOSIS — J30.1 ALLERGIC RHINITIS DUE TO POLLEN, UNSPECIFIED SEASONALITY: Primary | ICD-10-CM

## 2018-05-01 RX ORDER — MONTELUKAST SODIUM 10 MG/1
TABLET ORAL
Qty: 90 TABLET | Refills: 3 | Status: SHIPPED | OUTPATIENT
Start: 2018-05-01 | End: 2018-08-01 | Stop reason: SDUPTHER

## 2018-05-08 RX ORDER — AZELASTINE 1 MG/ML
1 SPRAY, METERED NASAL
COMMUNITY
Start: 2014-11-07 | End: 2018-05-16 | Stop reason: ALTCHOICE

## 2018-05-08 RX ORDER — TRAMADOL HYDROCHLORIDE 50 MG/1
TABLET ORAL
Refills: 0 | COMMUNITY
Start: 2018-05-02 | End: 2018-07-17 | Stop reason: SDUPTHER

## 2018-05-08 RX ORDER — PANTOPRAZOLE SODIUM 40 MG/1
40 TABLET, DELAYED RELEASE ORAL
Status: ON HOLD | COMMUNITY
Start: 2016-10-11 | End: 2018-05-13

## 2018-05-08 RX ORDER — ACETAMINOPHEN 325 MG/1
650 TABLET ORAL EVERY 6 HOURS PRN
COMMUNITY
End: 2018-07-17 | Stop reason: ALTCHOICE

## 2018-05-08 RX ORDER — ALPRAZOLAM 0.25 MG/1
TABLET ORAL
COMMUNITY
Start: 2016-08-16 | End: 2018-05-16 | Stop reason: ALTCHOICE

## 2018-05-08 RX ORDER — QUETIAPINE FUMARATE 25 MG/1
TABLET, FILM COATED ORAL
Status: ON HOLD | COMMUNITY
Start: 2016-10-21 | End: 2018-05-13

## 2018-05-08 RX ORDER — MULTIVIT-MIN/FA/LYCOPEN/LUTEIN .4-300-25
1 TABLET ORAL DAILY
COMMUNITY
End: 2020-10-29 | Stop reason: HOSPADM

## 2018-05-08 RX ORDER — BACITRACIN ZINC AND POLYMYXIN B SULFATE 500; 10000 [USP'U]/G; [USP'U]/G
1 OINTMENT TOPICAL DAILY
COMMUNITY
End: 2020-10-29 | Stop reason: HOSPADM

## 2018-05-08 RX ORDER — LEVOFLOXACIN 750 MG/1
1 TABLET ORAL
Status: ON HOLD | COMMUNITY
Start: 2017-12-07 | End: 2018-05-13

## 2018-05-08 RX ORDER — CLOTRIMAZOLE AND BETAMETHASONE DIPROPIONATE 10; .5 MG/ML; MG/ML
LOTION TOPICAL
Status: ON HOLD | COMMUNITY
Start: 2017-11-08 | End: 2018-05-13

## 2018-05-08 RX ORDER — TRAZODONE HYDROCHLORIDE 50 MG/1
50 TABLET ORAL
Refills: 0 | Status: ON HOLD | COMMUNITY
Start: 2018-03-28 | End: 2018-05-13

## 2018-05-11 ENCOUNTER — TRANSCRIBE ORDERS (OUTPATIENT)
Dept: ADMINISTRATIVE | Facility: HOSPITAL | Age: 78
End: 2018-05-11

## 2018-05-11 ENCOUNTER — OFFICE VISIT (OUTPATIENT)
Dept: CARDIOLOGY CLINIC | Facility: HOSPITAL | Age: 78
End: 2018-05-11
Payer: MEDICARE

## 2018-05-11 ENCOUNTER — APPOINTMENT (OUTPATIENT)
Dept: LAB | Facility: HOSPITAL | Age: 78
DRG: 202 | End: 2018-05-11
Attending: UROLOGY
Payer: MEDICARE

## 2018-05-11 VITALS
DIASTOLIC BLOOD PRESSURE: 70 MMHG | WEIGHT: 164.8 LBS | SYSTOLIC BLOOD PRESSURE: 112 MMHG | HEIGHT: 68 IN | HEART RATE: 85 BPM | BODY MASS INDEX: 24.98 KG/M2

## 2018-05-11 DIAGNOSIS — I49.8 VENTRICULAR BIGEMINY: ICD-10-CM

## 2018-05-11 DIAGNOSIS — I25.119 CORONARY ARTERY DISEASE INVOLVING NATIVE CORONARY ARTERY OF NATIVE HEART WITH ANGINA PECTORIS (HCC): ICD-10-CM

## 2018-05-11 DIAGNOSIS — I65.23 BILATERAL CAROTID ARTERY STENOSIS: ICD-10-CM

## 2018-05-11 DIAGNOSIS — N40.0 BENIGN PROSTATIC HYPERPLASIA, UNSPECIFIED WHETHER LOWER URINARY TRACT SYMPTOMS PRESENT: Primary | ICD-10-CM

## 2018-05-11 DIAGNOSIS — Z98.890 HISTORY OF AORTIC ANEURYSM REPAIR: ICD-10-CM

## 2018-05-11 DIAGNOSIS — Z86.79 HISTORY OF AORTIC ANEURYSM REPAIR: ICD-10-CM

## 2018-05-11 DIAGNOSIS — R00.1 BRADYCARDIA: Primary | ICD-10-CM

## 2018-05-11 DIAGNOSIS — E78.2 MIXED HYPERLIPIDEMIA: ICD-10-CM

## 2018-05-11 DIAGNOSIS — N40.0 BENIGN PROSTATIC HYPERPLASIA, UNSPECIFIED WHETHER LOWER URINARY TRACT SYMPTOMS PRESENT: ICD-10-CM

## 2018-05-11 DIAGNOSIS — Z95.1 HX OF CABG: ICD-10-CM

## 2018-05-11 LAB — PSA SERPL-MCNC: 0.8 NG/ML (ref 0–4)

## 2018-05-11 PROCEDURE — 93000 ELECTROCARDIOGRAM COMPLETE: CPT | Performed by: INTERNAL MEDICINE

## 2018-05-11 PROCEDURE — G0103 PSA SCREENING: HCPCS

## 2018-05-11 PROCEDURE — 99214 OFFICE O/P EST MOD 30 MIN: CPT | Performed by: INTERNAL MEDICINE

## 2018-05-11 PROCEDURE — 36415 COLL VENOUS BLD VENIPUNCTURE: CPT

## 2018-05-11 NOTE — PROGRESS NOTES
Cardiology Follow Up    Eunice Perla  1940  [id number]  [address] [facility] [place] [address] [place] [id number]    1  Bradycardia  Ambulatory referral to Cardiology    POCT ECG    VAS carotid complete study    Event recorder with review and interpretation by physician or other qualified professional   2  Ventricular bigeminy  Ambulatory referral to Cardiology    Event recorder with review and interpretation by physician or other qualified professional   3  Coronary artery disease involving native coronary artery of native heart with angina pectoris (HCC)  VAS carotid complete study   4  Hx of CABG     5  Mixed hyperlipidemia     6  History of aortic aneurysm repair     7  Bilateral carotid artery stenosis         Discussion/Summary:  Coronary artery disease stable without complaints of angina  He has had episodes of lightheadedness I suspect her non perfusing PVCs  I have ordered a 2 week event recorder to gain some rhythm/symptom correlation  I have also ordered a follow-up carotid Dopplers  Recent transthoracic echocardiogram shows structurally normal heart  Blood pressures been well controlled  Lipids have been doing well as current dose of statin  I will request his prior records for most recent stress testing  Interval History:  68-year-old gentleman history of coronary artery disease status post CABG in 2003, peripheral arterial disease status post carotid endarterectomy and abdominal aortic aneurysm repaired with EVAR, hypertension, hyperlipidemia, prior tobacco abuse presents to establish care with me in the office  Overall he has been doing well he has had difficulty with some periods of lightheadedness  He has been told in the past that he has a low heart rate  These are most likely non perfusing PVCs    He was recently admitted to the hospital no abnormalities were discovered and he was discharged home  He has not had any ambulatory monitoring that I am aware of  He denies any exertional chest pain or discomfort  He tells me had a stress test less than a year ago at his prior cardiologist   There has been some moderate shortness of breath at times  No lower extremity edema, PND, orthopnea      Problem List     COPD (chronic obstructive pulmonary disease) ([facility])    Hyperlipidemia    GERD (gastroesophageal reflux disease)    Abnormal CT scan, chest    Abdominal aortic aneurysm ([facility])    Thrombocytopenia ([facility])    History of aortic aneurysm repair    Benign prostatic hyperplasia    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    Bradycardia    SOB (shortness of breath)    Pulmonary nodule    Ventricular bigeminy    Positional lightheadedness    Coronary artery disease involving native coronary artery of native heart with angina pectoris ([facility])    Hx of CABG    Bilateral carotid artery stenosis        Past Medical History:   Diagnosis Date    AAA (abdominal aortic aneurysm) ([facility])     Acid reflux     Acute exacerbation of chronic obstructive airways disease with asthma ([facility])     Acute serous otitis media of left ear     recurrence not specified     Anesthesia     "always has mental changes /lingers and lingers after anesthesia"    Anxiety     Aortic aneurysm without rupture ([address] )     Arm bruise     right inner forearm "recent IV"    Arthritis     spine and poss left hip    Asthma     bronchietasis    At risk for falls     BPH (benign prostatic hyperplasia)     pt and wife can't confirm 11/10    BPH without urinary obstruction     Cancer ([address] )     skin CA on nose    CAP (community acquired pneumonia)     Cataracts, bilateral     Change in bowel function     CHF (congestive heart failure) ([facility])     Clubbing of fingers     Colon polyps     Common cold     Contusion of elbow, left     initial encounter     COPD (chronic obstructive pulmonary disease) ([address] )     Coronary artery disease     Cough     Dementia     Depression     Diverticulosis     Dizziness     upon "standing quickly on occas"    Exercise counseling     Fatigue     Full dentures     "doesn't wear them"    Glaucoma screening     High cholesterol     History of abdominal aortic aneurysm (AAA) repair 08/2017    History of bacteremia     History of chronic obstructive lung disease     History of epistaxis     History of influenza vaccination     History of kidney stones     History of pneumonia     "many times" "at least 5 times" almost always goes to sepsis"    History of sepsis 10/2017    History of sinusitis     History of skin cancer     History of sleep apnea     History of urinary tract infection     [place] (hard of hearing)     Hydronephrosis with obstructing calculus     Influenza vaccine needed     Insomnia     Jock itch     left/saw doctor 11/8 and started on antifungal cream    Kidney stones     Left hip pain     Need for pneumococcal vaccination     Need for prophylactic vaccination and inoculation against influenza     Other emphysema ([address] )     Pancreatitis, chronic ([address] )     pt and wife can't confirm 11/10/17    Pneumonia 10/26/2017    admitted [facility]    Pulmonary emphysema ([address] )     S/P CABG x 3     approx 14 yrs ago    Screening for genitourinary condition     Screening for neurological condition     Sepsis ([address] )     due to unspecified organism     Short-term memory loss     Sleep apnea     does not use CPAP   Special screening examination for neoplasm of prostate     TIA involving carotid artery     "before carotid surgery"    Ulcer     stomach, "years ago"    Unsteady gait     Use of cane as ambulatory aid     sometimes    Vitamin D deficiency     Wears glasses      Social History     Social History    Marital status: /Civil Union     Spouse name: N/A    Number of children: N/A    Years of education: N/A     Occupational History    Not on file  Social History Main Topics    Smoking status: Former Smoker     Packs/day: 1 50     Years: 60 00     Quit date: 2014    Smokeless tobacco: Never Used      Comment: quit 3 yrs ago, used to be a 1-1 5 ppd smoker    Alcohol use No      Comment: quit 25 yrs ago    Drug use: No      Comment: No illicit drug use     Sexual activity: Not on file     Other Topics Concern    Not on file     Social History Narrative    Retired     No living Will     No advance directives     Daily caffeine consumption - 4-5 servings a day       Family History   Problem Relation Age of Onset    Diabetes type II Mother      mellitus    Kidney failure Father     Diabetes type II Maternal Grandmother      mellitus     Hypertension Paternal Grandmother      benign essential      Past Surgical History:   Procedure Laterality Date    ABDOMINAL AORTIC ANEURYSM REPAIR  08/23/2017    ABDOMINAL AORTIC ANEURYSM REPAIR, ENDOVASCULAR      CARDIAC SURGERY      CABG x3    CAROTID ENDARTARECTOMY Left 11/1996    CATARACT EXTRACTION Bilateral     CORONARY ARTERY BYPASS GRAFT  01/2003    x3    CYSTOSCOPY      with insertion of ureteral stent     CYSTOSCOPY      with ureteroscopy with lithotripsy     EXCISIONAL HEMORRHOIDECTOMY      EYE SURGERY Bilateral     "for a wrinkle" after cataract surgery    HERNIA REPAIR      umbilical    LITHOTRIPSY      renal    MOUTH SURGERY      full mouth extraction     CO COLONOSCOPY FLX DX W/COLLJ SPEC WHEN PFRMD N/A 5/16/2017    Procedure: COLONOSCOPY with polypectomies/ hot snare and tattoo;  Surgeon: Slick Rubin MD;  Location: [facility]; Service: Gastroenterology    CO CYSTOURETHROSCOPY N/A 11/30/2017    Procedure: Angelito Luis Miguel;  Surgeon: Dorma Epley, MD;  Location: [facility];  Service: Urology    CO ESOPHAGOGASTRODUODENOSCOPY TRANSORAL DIAGNOSTIC N/A 8/18/2016    Procedure: ESOPHAGOGASTRODUODENOSCOPY (EGD); Surgeon: Slick Ruibn MD;  Location: [facility];   Service: Gastroenterology  NH REMOVE BLADDER STONE,<2 5 CM N/A 11/30/2017    Procedure: Carissa Obrien;  Surgeon: Fredy Hurst MD;  Location[facility];  Service: Urology    TONSILLECTOMY      URETERAL STENT PLACEMENT      and removal       Current Outpatient Prescriptions:     azelastine (ASTELIN) 0 1 % nasal spray, 1 spray into each nostril, Disp: , Rfl:     clotrimazole-betamethasone (LOTRISONE) 1-0 05 % lotion, Apply topically, Disp: , Rfl:     levofloxacin (LEVAQUIN) 750 mg tablet, Take 1 tablet by mouth, Disp: , Rfl:     pantoprazole (PROTONIX) 40 mg tablet, Take 40 mg by mouth, Disp: , Rfl:     QUEtiapine (SEROquel) 25 mg tablet, Reported on 6/15/2017, Disp: , Rfl:     acetaminophen (TYLENOL) 325 mg tablet, Take 650 mg by mouth every 6 (six) hours, Disp: , Rfl:     ALPRAZolam (XANAX) 0 25 mg tablet, TAKE AS NEEDED, Disp: , Rfl:     aspirin 81 MG tablet, Take 81 mg by mouth daily Took within 24 hours , Disp: , Rfl:     atorvastatin (LIPITOR) 20 mg tablet, TAKE 1 TABLET AT BEDTIME, Disp: 90 tablet, Rfl: 1    Bacillus Coagulans-Inulin (PROBIOTIC FORMULA) 1-250 BILLION-MG CAPS, Take 2 capsules by mouth, Disp: , Rfl:     cetirizine (ZYRTEC ALLERGY) 10 mg tablet, Take 10 mg by mouth every evening  , Disp: , Rfl:     Cholecalciferol (VITAMIN D-3 PO), Take 2,000 Units by mouth daily  , Disp: , Rfl:     cyanocobalamin (VITAMIN B-12) 1,000 mcg tablet, Take by mouth daily, Disp: , Rfl:     donepezil (ARICEPT) 10 mg tablet, Take 1 tablet (10 mg total) by mouth daily at bedtime for 30 days, Disp: 30 tablet, Rfl: 0    escitalopram (LEXAPRO) 20 mg tablet, Take 1 tablet (20 mg total) by mouth daily, Disp: 30 tablet, Rfl: 0    Fluticasone Furoate-Vilanterol (BREO ELLIPTA) 100-25 MCG/INH AEPB, Inhale 1 puff daily  , Disp: , Rfl:     KRILL OIL PO, Take 500 mg by mouth daily  , Disp: , Rfl:     METOPROLOL SUCCINATE ER PO, Take 25 mg by mouth daily at bedtime  , Disp: , Rfl:     montelukast (SINGULAIR) 10 mg tablet, take 1 tablet by mouth once daily, Disp: 90 tablet, Rfl: 3    Montelukast Sodium (SINGULAIR PO), Take 10 mg by mouth daily  , Disp: , Rfl:     Multiple Vitamin (MULTIVITAMIN) tablet, Take 1 tablet by mouth daily, Disp: , Rfl:     Multiple Vitamins-Minerals (CENTRUM SILVER ADULT 50+) TABS, Take 1 tablet by mouth daily, Disp: , Rfl:     Potassium Citrate ER 15 MEQ (1620 MG) TBCR, Take 15 mEq by mouth 2 (two) times a day , Disp: , Rfl:     Probiotic Product (PROBIOTIC DAILY PO), Take by mouth daily  , Disp: , Rfl:     tamsulosin (FLOMAX) 0 4 mg, Take 0 4 mg by mouth daily  , Disp: , Rfl:     tiotropium (SPIRIVA HANDIHALER) 18 mcg inhalation capsule, Place 18 mcg into inhaler and inhale daily  , Disp: , Rfl:     traMADol (ULTRAM) 50 mg tablet, take 1/2 tablets by mouth twice a day, Disp: , Rfl: 0    traZODone (DESYREL) 50 mg tablet, Take 50 mg by mouth daily at bedtime as needed, Disp: , Rfl: 0  Allergies   Allergen Reactions    Augmentin [Amoxicillin-Pot Clavulanate] Diarrhea    Ciprofloxacin Hives    Morphine And Related Other (See Comments)     Change in mental status    Quetiapine Other (See Comments)     Severe nightmares       Labs:     Chemistry        Component Value Date/Time     03/08/2018 0506     11/29/2014 1416    K 3 9 03/08/2018 0506    K 3 6 11/29/2014 1416     03/08/2018 0506     11/29/2014 1416    CO2 24 03/08/2018 0506    CO2 30 6 11/29/2014 1416    BUN 18 03/08/2018 0506    BUN 13 11/29/2014 1416    CREATININE 1 08 03/08/2018 0506    CREATININE 0 94 08/31/2015 1715        Component Value Date/Time    CALCIUM 8 4 03/08/2018 0506    CALCIUM 8 9 11/29/2014 1416    ALKPHOS 86 03/08/2018 0506    ALKPHOS 122 11/29/2014 1416    AST 17 03/08/2018 0506    AST 28 11/29/2014 1416    ALT 30 03/08/2018 0506    ALT 38 11/29/2014 1416    BILITOT 1 70 (H) 03/08/2018 0506    BILITOT 0 7 11/29/2014 1416            Lab Results   Component Value Date    CHOL 152 04/08/2016     Lab Results   Component Value Date    HDL 32 (L) 04/08/2016     Lab Results   Component Value Date    LDLCALC 104 (H) 04/08/2016     Lab Results   Component Value Date    TRIG 81 04/08/2016     No components found for: CHOLHDL    Imaging: Vas Lower Limb Venous Duplex Study, Unilateral/limited    Result Date: 4/12/2018  Narrative:  [facility] REPORT [facility]INICAL: Indications: Patient has been experiencing pain radiating from his left buttock to the left calf  Risk Factors: The patient has no history of DVT or limb trauma  Clinical: Left Lower Limb There is complaint of pain  FINDINGS:  Segment  Right            Left              Impression       Impression       CFV      Normal (Patent)  Normal (Patent)     CONCLUSION: Impression: RIGHT LOWER LIMB LIMITED: Evaluation shows no evidence of thrombus in the common femoral vein  Doppler evaluation shows a normal response to augmentation maneuvers  LEFT LOWER LIMB: No evidence of acute or chronic deep vein thrombosis No evidence of superficial thrombophlebitis noted  Doppler evaluation shows a normal response to augmentation maneuvers  Popliteal, posterior tibial and anterior tibial arterial Doppler waveforms are triphasic  Technical findings were given to Connie Del Angel, at the office of [place], [place]   SIGNATURE: Electronically Signed by: Cindy Charles MD, [address] on 2018-04-12 87:22:17 PM      ECG:  Sinus rhythm PVC      ROS    Vitals:    05/11/18 1308   BP: 112/70   Pulse: 85     Vitals:    05/11/18 1308   Weight: 74 8 kg (164 lb 12 8 oz)     Height: 5' 8" (172 7 cm)   Body mass index is 25 06 kg/m²      Physical Exam:   General appearance:  Appears stated age, alert, well appearing and in no distress  HEENT:  PERRLA, EOMI, no scleral icterus, no conjunctival pallor  NECK:  Supple, No elevated JVP, no thyromegaly, no carotid bruits  HEART:  Regular rate and rhythm, normal S1/S2, no S3/S4, no murmur or rub  LUNGS:  Clear to auscultation bilaterally, no wheezes rales or rhonchi  ABDOMEN:  Soft, non-tender, positive bowel sounds, no rebound or guarding, no organomegaly   EXTREMITIES:  No edema, normal range of motion  VASCULAR:  Normal pedal pulses, good pulse volume   SKIN: No lesions or rashes on exposed skin  NEURO:  CN II-XII intact, no focal deficits

## 2018-05-13 ENCOUNTER — APPOINTMENT (EMERGENCY)
Dept: RADIOLOGY | Facility: HOSPITAL | Age: 78
DRG: 202 | End: 2018-05-13
Payer: MEDICARE

## 2018-05-13 ENCOUNTER — HOSPITAL ENCOUNTER (INPATIENT)
Facility: HOSPITAL | Age: 78
LOS: 2 days | Discharge: HOME | DRG: 202 | End: 2018-05-15
Attending: EMERGENCY MEDICINE | Admitting: FAMILY MEDICINE
Payer: MEDICARE

## 2018-05-13 DIAGNOSIS — J40 TRACHEOBRONCHITIS: ICD-10-CM

## 2018-05-13 DIAGNOSIS — J43.9 PULMONARY EMPHYSEMA, UNSPECIFIED EMPHYSEMA TYPE (HCC): ICD-10-CM

## 2018-05-13 DIAGNOSIS — J18.9 PNEUMONIA: Primary | ICD-10-CM

## 2018-05-13 PROBLEM — R17 ELEVATED BILIRUBIN: Status: ACTIVE | Noted: 2018-05-13

## 2018-05-13 LAB
ALBUMIN SERPL BCP-MCNC: 3.4 G/DL (ref 3.5–5)
ALP SERPL-CCNC: 121 U/L (ref 46–116)
ALT SERPL W P-5'-P-CCNC: 23 U/L (ref 12–78)
ANION GAP SERPL CALCULATED.3IONS-SCNC: 10 MMOL/L (ref 4–13)
AST SERPL W P-5'-P-CCNC: 15 U/L (ref 5–45)
BASOPHILS # BLD AUTO: 0.04 THOUSANDS/ΜL (ref 0–0.1)
BASOPHILS NFR BLD AUTO: 1 % (ref 0–1)
BILIRUB SERPL-MCNC: 2.1 MG/DL (ref 0.2–1)
BUN SERPL-MCNC: 15 MG/DL (ref 5–25)
CALCIUM SERPL-MCNC: 9 MG/DL (ref 8.3–10.1)
CHLORIDE SERPL-SCNC: 97 MMOL/L (ref 100–108)
CO2 SERPL-SCNC: 26 MMOL/L (ref 21–32)
CREAT SERPL-MCNC: 1.18 MG/DL (ref 0.6–1.3)
EOSINOPHIL # BLD AUTO: 0.12 THOUSAND/ΜL (ref 0–0.61)
EOSINOPHIL NFR BLD AUTO: 1 % (ref 0–6)
ERYTHROCYTE [DISTWIDTH] IN BLOOD BY AUTOMATED COUNT: 15.2 % (ref 11.6–15.1)
GFR SERPL CREATININE-BSD FRML MDRD: 60 ML/MIN/1.73SQ M
GLUCOSE SERPL-MCNC: 108 MG/DL (ref 65–140)
HCT VFR BLD AUTO: 45.4 % (ref 36.5–49.3)
HGB BLD-MCNC: 15.8 G/DL (ref 12–17)
INR PPP: 1.01 (ref 0.86–1.16)
LACTATE SERPL-SCNC: 2 MMOL/L (ref 0.5–2)
LYMPHOCYTES # BLD AUTO: 0.87 THOUSANDS/ΜL (ref 0.6–4.47)
LYMPHOCYTES NFR BLD AUTO: 10 % (ref 14–44)
MCH RBC QN AUTO: 33 PG (ref 26.8–34.3)
MCHC RBC AUTO-ENTMCNC: 34.8 G/DL (ref 31.4–37.4)
MCV RBC AUTO: 95 FL (ref 82–98)
MONOCYTES # BLD AUTO: 0.98 THOUSAND/ΜL (ref 0.17–1.22)
MONOCYTES NFR BLD AUTO: 11 % (ref 4–12)
NEUTROPHILS # BLD AUTO: 6.57 THOUSANDS/ΜL (ref 1.85–7.62)
NEUTS SEG NFR BLD AUTO: 77 % (ref 43–75)
PLATELET # BLD AUTO: 125 THOUSANDS/UL (ref 149–390)
PLATELET # BLD AUTO: 128 THOUSANDS/UL (ref 149–390)
PMV BLD AUTO: 8.8 FL (ref 8.9–12.7)
PMV BLD AUTO: 8.9 FL (ref 8.9–12.7)
POTASSIUM SERPL-SCNC: 4.2 MMOL/L (ref 3.5–5.3)
PROT SERPL-MCNC: 7.1 G/DL (ref 6.4–8.2)
PROTHROMBIN TIME: 13.2 SECONDS (ref 12.1–14.4)
RBC # BLD AUTO: 4.79 MILLION/UL (ref 3.88–5.62)
SODIUM SERPL-SCNC: 133 MMOL/L (ref 136–145)
TROPONIN I SERPL-MCNC: <0.02 NG/ML
WBC # BLD AUTO: 8.58 THOUSAND/UL (ref 4.31–10.16)

## 2018-05-13 PROCEDURE — 83605 ASSAY OF LACTIC ACID: CPT | Performed by: EMERGENCY MEDICINE

## 2018-05-13 PROCEDURE — 80053 COMPREHEN METABOLIC PANEL: CPT | Performed by: EMERGENCY MEDICINE

## 2018-05-13 PROCEDURE — 99223 1ST HOSP IP/OBS HIGH 75: CPT | Performed by: FAMILY MEDICINE

## 2018-05-13 PROCEDURE — 85049 AUTOMATED PLATELET COUNT: CPT | Performed by: FAMILY MEDICINE

## 2018-05-13 PROCEDURE — 87449 NOS EACH ORGANISM AG IA: CPT | Performed by: FAMILY MEDICINE

## 2018-05-13 PROCEDURE — 85025 COMPLETE CBC W/AUTO DIFF WBC: CPT | Performed by: EMERGENCY MEDICINE

## 2018-05-13 PROCEDURE — 84145 PROCALCITONIN (PCT): CPT | Performed by: FAMILY MEDICINE

## 2018-05-13 PROCEDURE — 87040 BLOOD CULTURE FOR BACTERIA: CPT | Performed by: EMERGENCY MEDICINE

## 2018-05-13 PROCEDURE — 85610 PROTHROMBIN TIME: CPT | Performed by: EMERGENCY MEDICINE

## 2018-05-13 PROCEDURE — 93005 ELECTROCARDIOGRAM TRACING: CPT

## 2018-05-13 PROCEDURE — 36415 COLL VENOUS BLD VENIPUNCTURE: CPT

## 2018-05-13 PROCEDURE — 84484 ASSAY OF TROPONIN QUANT: CPT | Performed by: EMERGENCY MEDICINE

## 2018-05-13 PROCEDURE — 96375 TX/PRO/DX INJ NEW DRUG ADDON: CPT

## 2018-05-13 PROCEDURE — 96365 THER/PROPH/DIAG IV INF INIT: CPT

## 2018-05-13 PROCEDURE — 99285 EMERGENCY DEPT VISIT HI MDM: CPT

## 2018-05-13 PROCEDURE — 94760 N-INVAS EAR/PLS OXIMETRY 1: CPT

## 2018-05-13 PROCEDURE — 71046 X-RAY EXAM CHEST 2 VIEWS: CPT

## 2018-05-13 PROCEDURE — 94664 DEMO&/EVAL PT USE INHALER: CPT

## 2018-05-13 RX ORDER — ASPIRIN 81 MG/1
81 TABLET, CHEWABLE ORAL DAILY
Status: DISCONTINUED | OUTPATIENT
Start: 2018-05-14 | End: 2018-05-15 | Stop reason: HOSPADM

## 2018-05-13 RX ORDER — ALPRAZOLAM 0.25 MG/1
0.25 TABLET ORAL 2 TIMES DAILY PRN
Status: DISCONTINUED | OUTPATIENT
Start: 2018-05-13 | End: 2018-05-15 | Stop reason: HOSPADM

## 2018-05-13 RX ORDER — SACCHAROMYCES BOULARDII 250 MG
250 CAPSULE ORAL 2 TIMES DAILY
Status: DISCONTINUED | OUTPATIENT
Start: 2018-05-13 | End: 2018-05-15 | Stop reason: HOSPADM

## 2018-05-13 RX ORDER — DONEPEZIL HYDROCHLORIDE 5 MG/1
10 TABLET, FILM COATED ORAL
Status: DISCONTINUED | OUTPATIENT
Start: 2018-05-13 | End: 2018-05-15 | Stop reason: HOSPADM

## 2018-05-13 RX ORDER — TAMSULOSIN HYDROCHLORIDE 0.4 MG/1
0.4 CAPSULE ORAL DAILY
Status: DISCONTINUED | OUTPATIENT
Start: 2018-05-14 | End: 2018-05-15 | Stop reason: HOSPADM

## 2018-05-13 RX ORDER — ATORVASTATIN CALCIUM 10 MG/1
20 TABLET, FILM COATED ORAL
Status: DISCONTINUED | OUTPATIENT
Start: 2018-05-13 | End: 2018-05-15 | Stop reason: HOSPADM

## 2018-05-13 RX ORDER — POTASSIUM CITRATE 15 MEQ/1
TABLET, EXTENDED RELEASE ORAL 2 TIMES DAILY
Status: DISCONTINUED | OUTPATIENT
Start: 2018-05-13 | End: 2018-05-14

## 2018-05-13 RX ORDER — ESCITALOPRAM OXALATE 10 MG/1
20 TABLET ORAL DAILY
Status: DISCONTINUED | OUTPATIENT
Start: 2018-05-14 | End: 2018-05-15 | Stop reason: HOSPADM

## 2018-05-13 RX ORDER — TRAZODONE HYDROCHLORIDE 50 MG/1
50 TABLET ORAL
Status: DISCONTINUED | OUTPATIENT
Start: 2018-05-13 | End: 2018-05-15 | Stop reason: HOSPADM

## 2018-05-13 RX ORDER — METOPROLOL SUCCINATE 25 MG/1
25 TABLET, EXTENDED RELEASE ORAL
Status: DISCONTINUED | OUTPATIENT
Start: 2018-05-13 | End: 2018-05-15 | Stop reason: HOSPADM

## 2018-05-13 RX ORDER — MONTELUKAST SODIUM 10 MG/1
10 TABLET ORAL DAILY
Status: DISCONTINUED | OUTPATIENT
Start: 2018-05-14 | End: 2018-05-15 | Stop reason: HOSPADM

## 2018-05-13 RX ORDER — QUETIAPINE FUMARATE 25 MG/1
25 TABLET, FILM COATED ORAL
Status: DISCONTINUED | OUTPATIENT
Start: 2018-05-13 | End: 2018-05-15 | Stop reason: HOSPADM

## 2018-05-13 RX ORDER — LEVALBUTEROL 1.25 MG/.5ML
1.25 SOLUTION, CONCENTRATE RESPIRATORY (INHALATION)
Status: DISCONTINUED | OUTPATIENT
Start: 2018-05-14 | End: 2018-05-15 | Stop reason: HOSPADM

## 2018-05-13 RX ORDER — SODIUM CHLORIDE 9 MG/ML
100 INJECTION, SOLUTION INTRAVENOUS ONCE
Status: COMPLETED | OUTPATIENT
Start: 2018-05-13 | End: 2018-05-14

## 2018-05-13 RX ADMIN — QUETIAPINE 25 MG: 25 TABLET ORAL at 21:17

## 2018-05-13 RX ADMIN — ATORVASTATIN CALCIUM 20 MG: 10 TABLET, FILM COATED ORAL at 21:17

## 2018-05-13 RX ADMIN — VANCOMYCIN HYDROCHLORIDE 1250 MG: 1 INJECTION, POWDER, LYOPHILIZED, FOR SOLUTION INTRAVENOUS at 17:56

## 2018-05-13 RX ADMIN — DONEPEZIL HYDROCHLORIDE 10 MG: 5 TABLET, FILM COATED ORAL at 21:17

## 2018-05-13 RX ADMIN — SODIUM CHLORIDE 250 ML: 0.9 INJECTION, SOLUTION INTRAVENOUS at 17:56

## 2018-05-13 RX ADMIN — CEFEPIME HYDROCHLORIDE 2000 MG: 2 INJECTION, SOLUTION INTRAVENOUS at 17:14

## 2018-05-13 RX ADMIN — SODIUM CHLORIDE 100 ML/HR: 0.9 INJECTION, SOLUTION INTRAVENOUS at 19:54

## 2018-05-13 NOTE — ASSESSMENT & PLAN NOTE
No evidence of infiltrate on official CXR read  Will deescalate antibiotics and utilize Rocephin  Urine legionella and pneumococcal antigen are negative  Sputum culture pending  Blood cultures x2 pending  pro-calcitonin from yesterday and today are pending  Will repeat procalcitonin tomorrow to assess response to deescalated antibiotics  Influenza screen negative  Incentive spirometry

## 2018-05-13 NOTE — ED PROVIDER NOTES
History  Chief Complaint   Patient presents with    Shortness of Breath     Patient states he started with shortness of breath this morning with a productive cough  Patient is a pleasant 72-year-old male with a history of pneumonia that reports to the emergency department with shortness of breath and productive cough that started this morning  No lower extremity edema, no chest pain or lightheadedness/dizziness  He notes this feels similar to prior episodes of pneumonia that he has had in the past   He has rigors on exam and is febrile  Normal heart and lung exam but chest x-ray reveals a right lower lobe pneumonia, will treat as such  Medical decision m[age]-year-old male, pneumonia, admit, IV antibiotics  Prior to Admission Medications   Prescriptions Last Dose Informant Patient Reported? Taking? ALPRAZolam (XANAX) 0 25 mg tablet Unknown at Unknown time  Yes No   Sig: TAKE AS NEEDED   Bacillus Coagulans-Inulin (PROBIOTIC FORMULA) 1-250 BILLION-MG CAPS 2018 at Unknown time  Yes Yes   Sig: Take 2 capsules by mouth   Cholecalciferol (VITAMIN D-3 PO) 2018 at Unknown time Self Yes Yes   Sig: Take 2,000 Units by mouth daily  Fluticasone Furoate-Vilanterol (BREO ELLIPTA) 100-25 MCG/INH AEPB 2018 at Unknown time Spouse/Significant Other Yes Yes   Sig: Inhale 1 puff daily  KRILL OIL PO 2018 at Unknown time Spouse/Significant Other Yes Yes   Sig: Take 500 mg by mouth daily  METOPROLOL SUCCINATE ER PO 2018 at Unknown time Spouse/Significant Other Yes Yes   Sig: Take 25 mg by mouth daily at bedtime     Multiple Vitamins-Minerals (CENTRUM SILVER ADULT 50+) TABS 2018 at Unknown time  Yes Yes   Sig: Take 1 tablet by mouth daily   Potassium Citrate ER 15 MEQ (1620 MG) TBCR 2018 at Unknown time Spouse/Significant Other Yes Yes   Sig: Take 15 mEq by mouth 2 (two) times a day     Probiotic Product (PROBIOTIC DAILY PO) 2018 at Unknown time Spouse/Significant Other Yes Yes   Sig: Take by mouth daily  acetaminophen (TYLENOL) 325 mg tablet Unknown at Unknown time  Yes No   Sig: Take 650 mg by mouth every 6 (six) hours   aspirin 81 MG tablet 2018 at Unknown time Spouse/Significant Other Yes Yes   Sig: Take 81 mg by mouth daily Took within 24 hours    atorvastatin (LIPITOR) 20 mg tablet 2018 at Unknown time  No Yes   Sig: TAKE 1 TABLET AT BEDTIME   azelastine (ASTELIN) 0 1 % nasal spray   Yes No   Si spray into each nostril   cetirizine (ZYRTEC ALLERGY) 10 mg tablet 2018 at Unknown time Self Yes Yes   Sig: Take 10 mg by mouth every evening     cyanocobalamin (VITAMIN B-12) 1,000 mcg tablet Unknown at Unknown time  Yes No   Sig: Take by mouth daily   donepezil (ARICEPT) 10 mg tablet 2018 at Unknown time  No Yes   Sig: Take 1 tablet (10 mg total) by mouth daily at bedtime for 30 days   escitalopram (LEXAPRO) 20 mg tablet 2018 at Unknown time  No Yes   Sig: Take 1 tablet (20 mg total) by mouth daily   montelukast (SINGULAIR) 10 mg tablet 2018 at Unknown time  No Yes   Sig: take 1 tablet by mouth once daily   tamsulosin (FLOMAX) 0 4 mg 2018 at Unknown time Spouse/Significant Other Yes Yes   Sig: Take 0 4 mg by mouth daily  tiotropium (SPIRIVA HANDIHALER) 18 mcg inhalation capsule 2018 at Unknown time Spouse/Significant Other Yes Yes   Sig: Place 18 mcg into inhaler and inhale daily     traMADol (ULTRAM) 50 mg tablet   Yes Yes   Sig: take 1/2 tablets by mouth twice a day as needed      Facility-Administered Medications: None       Past Medical History:   Diagnosis Date    AAA (abdominal aortic aneurysm) ([facility])     Acid reflux     Acute exacerbation of chronic obstructive airways disease with asthma ([facility])     Acute serous otitis media of left ear     recurrence not specified     Anesthesia     "always has mental changes /lingers and lingers after anesthesia"    Anxiety     Aortic aneurysm without rupture (HCC)     Arm bruise right inner forearm "recent IV"    Arthritis     spine and poss left hip    Asthma     bronchietasis    At risk for falls     BPH (benign prostatic hyperplasia)     pt and wife can't confirm 11/10    BPH without urinary obstruction     Cancer ([address] )     skin CA on nose    CAP (community acquired pneumonia)     Cataracts, bilateral     Change in bowel function     CHF (congestive heart failure) (HCC)     Clubbing of fingers     Colon polyps     Common cold     Contusion of elbow, left     initial encounter     COPD (chronic obstructive pulmonary disease) (HCC)     Coronary artery disease     Cough     Dementia     Depression     Diverticulosis     Dizziness     upon "standing quickly on occas"    Exercise counseling     Fatigue     Full dentures     "doesn't wear them"    Glaucoma screening     High cholesterol     History of abdominal aortic aneurysm (AAA) repair 08/2017    History of bacteremia     History of chronic obstructive lung disease     History of epistaxis     History of influenza vaccination     History of kidney stones     History of pneumonia     "many times" "at least 5 times" almost always goes to sepsis"    History of sepsis 10/2017    History of sinusitis     History of skin cancer     History of sleep apnea     History of urinary tract infection     [place] (hard of hearing)     Hydronephrosis with obstructing calculus     Influenza vaccine needed     Insomnia     Jock itch     left/saw doctor 11/8 and started on antifungal cream    Kidney stones     Left hip pain     Need for pneumococcal vaccination     Need for prophylactic vaccination and inoculation against influenza     Other emphysema ([address] )     Pancreatitis, chronic ([address] )     pt and wife can't confirm 11/10/17    Pneumonia 10/26/2017    admitted [facility]    Pulmonary emphysema ([address] )     S/P CABG x 3     approx 14 yrs ago    Screening for genitourinary condition     Screening for neurological condition     Sepsis ([facility] [address] [facility]     due to unspecified organism     Short-term memory loss     Sleep apnea     does not use CPAP   Special screening examination for neoplasm of prostate     TIA involving carotid artery     "before carotid surgery"    Ulcer     stomach, "years ago"    Unsteady gait     Use of cane as ambulatory aid     sometimes    Vitamin D deficiency     Wears glasses        Past Surgical History:   Procedure Laterality Date    ABDOMINAL AORTIC ANEURYSM REPAIR  08/23/2017    ABDOMINAL AORTIC ANEURYSM REPAIR, ENDOVASCULAR      CARDIAC SURGERY      CABG x3    CAROTID ENDARTARECTOMY Left 11/1996    CATARACT EXTRACTION Bilateral     CORONARY ARTERY BYPASS GRAFT  01/2003    x3    CYSTOSCOPY      with insertion of ureteral stent     CYSTOSCOPY      with ureteroscopy with lithotripsy     EXCISIONAL HEMORRHOIDECTOMY      EYE SURGERY Bilateral     "for a wrinkle" after cataract surgery    HERNIA REPAIR      umbilical    LITHOTRIPSY      renal    MOUTH SURGERY      full mouth extraction     NH COLONOSCOPY FLX DX W/COLLJ SPEC WHEN PFRMD N/A 5/16/2017    Procedure: COLONOSCOPY with polypectomies/ hot snare and tattoo;  Surgeon: Herrera Brian MD;  Location: [facility]; Service: Gastroenterology    NH CYSTOURETHROSCOPY N/A 11/30/2017    Procedure: Kasandra Collado;  Surgeon: Mora Montes MD;  [facility];  Service: Urology    NH ESOPHAGOGASTRODUODENOSCOPY TRANSORAL DIAGNOSTIC N/A 8/18/2016    Procedure: ESOPHAGOGASTRODUODENOSCOPY (EGD); Surgeon: Herrera Brian MD;  [facility]: [facility];   Service: Gastroenterology    NH REMOVE BLADDER STONE,<2 5 CM N/A 11/30/2017    Procedure: Booker Croft;  Surgeon: Mora Montes MD;  [facility];  Service: Urology    TONSILLECTOMY      URETERAL STENT PLACEMENT      and removal       Family History   Problem Relation Age of Onset    Diabetes type II Mother      mellitus    Kidney failure Father     Diabetes type II Maternal Grandmother      mellitus     Hypertension Paternal Grandmother      benign essential      I have reviewed and agree with the history as documented  Social History   Substance Use Topics    Smoking status: Former Smoker     Packs/day: 1 50     Years: 60 00     Quit date: 2014    Smokeless tobacco: Never Used      Comment: quit 3 yrs ago, used to be a 1-1 5 ppd smoker    Alcohol use No      Comment: quit 25 yrs ago        Review of Systems   Constitutional: Positive for chills, fatigue and fever  Respiratory: Positive for cough and shortness of breath  Negative for chest tightness  Cardiovascular: Negative for chest pain  All other systems reviewed and are negative  Physical Exam  ED Triage Vitals [05/13/18 1630]   Temperature Pulse Respirations Blood Pressure SpO2   (!) 100 9 °F (38 3 °C) 80 22 136/67 92 %      Temp Source Heart Rate Source Patient Position - Orthostatic VS BP Location FiO2 (%)   Temporal Monitor Lying Left arm --      Pain Score       No Pain           Orthostatic Vital Signs  Vitals:    05/13/18 1630 05/13/18 1800 05/13/18 1900 05/13/18 1945   BP: 136/67 114/72 129/71 119/70   Pulse: 80 98 91 94   Patient Position - Orthostatic VS: Lying Sitting Lying Lying       Physical Exam   Constitutional: He is oriented to person, place, and time  He appears well-developed and well-nourished  HENT:   Head: Normocephalic and atraumatic  Eyes: EOM are normal  Pupils are equal, round, and reactive to light  Neck: Normal range of motion  Neck supple  Cardiovascular: Normal rate, regular rhythm and normal heart sounds  No murmur heard  Pulmonary/Chest: Effort normal and breath sounds normal  No respiratory distress  He has no wheezes  Abdominal: Soft  Bowel sounds are normal  He exhibits no distension  There is no tenderness  Musculoskeletal: Normal range of motion  He exhibits no edema or tenderness     Neurological: He is alert and oriented to person, place, and time  No cranial nerve deficit  Coordination normal    Skin: Skin is warm and dry  He is not diaphoretic  No erythema  Psychiatric: He has a normal mood and affect  His behavior is normal    Nursing note and vitals reviewed  ED Medications  Medications   atorvastatin (LIPITOR) tablet 20 mg (not administered)   donepezil (ARICEPT) tablet 10 mg (not administered)   escitalopram (LEXAPRO) tablet 20 mg (not administered)   montelukast (SINGULAIR) tablet 10 mg (not administered)   ALPRAZolam (XANAX) tablet 0 25 mg (not administered)   aspirin chewable tablet 81 mg (not administered)   saccharomyces boulardii (FLORASTOR) capsule 250 mg (250 mg Oral Not Given 5/13/18 2003)   metoprolol succinate (TOPROL-XL) 24 hr tablet 25 mg (not administered)   Potassium Citrate ER TBCR (not administered)   QUEtiapine (SEROquel) tablet 25 mg (not administered)   tamsulosin (FLOMAX) capsule 0 4 mg (not administered)   traZODone (DESYREL) tablet 50 mg (not administered)   enoxaparin (LOVENOX) subcutaneous injection 40 mg (not administered)   cefepime (MAXIPIME) IVPB (premix) 2,000 mg (not administered)   vancomycin (VANCOCIN) 1,250 mg in sodium chloride 0 9 % 250 mL IVPB (not administered)   vancomycin (VANCOCIN) 1,250 mg in sodium chloride 0 9 % 250 mL IVPB (1,250 mg Intravenous New Bag 5/13/18 1756)   cefepime (MAXIPIME) IVPB (premix) 2,000 mg (0 mg Intravenous Stopped 5/13/18 1753)   sodium chloride 0 9 % bolus 250 mL (250 mL Intravenous New Bag 5/13/18 1756)   sodium chloride 0 9 % infusion (100 mL/hr Intravenous New Bag 5/13/18 1954)       Diagnostic Studies  Results Reviewed     Procedure Component Value Units Date/Time    Lactic acid x2 Q2H [[id number]]  (Normal) Collected:  05/13/18 1653    Lab Status:  Final result Specimen:  Blood from Arm, Right Updated:  05/13/18 1728     LACTIC ACID 2 0 mmol/L     Narrative:         Result may be elevated if tourniquet was used during collection      Blood culture #1 [[id number]] Collected: 05/13/18 1713    Lab Status: In process Specimen:  Blood from Arm, Right Updated:  05/13/18 1724    Troponin I [[id number]]  (Normal) Collected:  05/13/18 1634    Lab Status:  Final result Specimen:  Blood from Arm, Right Updated:  05/13/18 1659     Troponin I <0 02 ng/mL     Narrative:         [employer] Chemistry analyzer 99% cutoff is > 0 04 ng/mL in network labs    o cTnI 99% cutoff is useful only when applied to patients in the clinical setting of myocardial ischemia  o cTnI 99% cutoff should be interpreted in the context of clinical history, ECG findings and possibly cardiac imaging to establish correct diagnosis  o cTnI 99% cutoff may be suggestive but clearly not indicative of a coronary event without the clinical setting of myocardial ischemia  Blood culture #2 [[id number]] Collected:  05/13/18 1653    Lab Status: In process Specimen:  Blood from Arm, Right Updated:  05/13/18 1659    Comprehensive metabolic panel [[id number]]  (Abnormal) Collected:  05/13/18 1634    Lab Status:  Final result Specimen:  Blood from Arm, Right Updated:  05/13/18 1657     Sodium 133 (L) mmol/L      Potassium 4 2 mmol/L      Chloride 97 (L) mmol/L      CO2 26 mmol/L      Anion Gap 10 mmol/L      BUN 15 mg/dL      Creatinine 1 18 mg/dL      Glucose 108 mg/dL      Calcium 9 0 mg/dL      AST 15 U/L      ALT 23 U/L      Alkaline Phosphatase 121 (H) U/L      Total Protein 7 1 g/dL      Albumin 3 4 (L) g/dL      Total Bilirubin 2 10 (H) mg/dL      eGFR 60 ml/min/1 73sq m     Narrative:         National Kidney Disease Education Program recommendations are as follows:  GFR calculation is accurate only with a steady state creatinine  Chronic Kidney disease less than 60 ml/min/1 73 sq  meters  Kidney failure less than 15 ml/min/1 73 sq  meters      [facility] [[id number]]  (Normal) Collected:  05/13/18 1634    Lab Status:  Final result Specimen:  Blood from Arm, Right Updated:  05/13/18 1652     Protime 13 2 seconds      INR 1 01    CBC and differential [[id number]]  (Abnormal) Collected:  05/13/18 1634    Lab Status:  Final result Specimen:  Blood from Arm, Right Updated:  05/13/18 1642     WBC 8 58 Thousand/uL      RBC 4 79 Million/uL      Hemoglobin 15 8 g/dL      Hematocrit 45 4 %      MCV 95 fL      MCH 33 0 pg      MCHC 34 8 g/dL      RDW 15 2 (H) %      MPV 8 8 (L) fL      Platelets 389 (L) Thousands/uL      Neutrophils Relative 77 (H) %      Lymphocytes Relative 10 (L) %      Monocytes Relative 11 %      Eosinophils Relative 1 %      Basophils Relative 1 %      Neutrophils Absolute 6 57 Thousands/µL      Lymphocytes Absolute 0 87 Thousands/µL      Monocytes Absolute 0 98 Thousand/µL      Eosinophils Absolute 0 12 Thousand/µL      Basophils Absolute 0 04 Thousands/µL     UA w Reflex to Microscopic w Reflex to Culture [[id number]]     Lab Status:  No result Specimen:  Urine                  X-ray chest 2 views   ED Interpretation by Camille Ortega MD (05/13 1208)   RLL pneumonia                 Procedures  Procedures       Phone Contacts  ED Phone Contact    ED Course  ED Course as of May 13 2014   Sun May 13, 2018   1640 Ekg rate 86, sinus, narrow qrs, no stemi                                MDM  CritCare Time    Disposition  Final diagnoses:   Pneumonia     Time reflects when diagnosis was documented in both MDM as applicable and the Disposition within this note     Time User Action Codes Description Comment    5/13/2018  6:07 PM [address] [place] [address] Add [J18 9] Pneumonia       ED Disposition     ED Disposition Condition Comment    Admit  Case was discussed with sana and the patient's admission status was agreed to be Admission Status: inpatient status to the service of Dr Marshall Ortiz           Follow-up Information    None       Current Discharge Medication List      CONTINUE these medications which have NOT CHANGED    Details   aspirin 81 MG tablet Take 81 mg by mouth daily Took within 24 hours       atorvastatin (LIPITOR) 20 mg tablet TAKE 1 TABLET AT BEDTIME  Qty: 90 tablet, Refills: 1    Associated Diagnoses: Mixed hyperlipidemia      Bacillus Coagulans-Inulin (PROBIOTIC FORMULA) 1-250 BILLION-MG CAPS Take 2 capsules by mouth      cetirizine (ZYRTEC ALLERGY) 10 mg tablet Take 10 mg by mouth every evening        Cholecalciferol (VITAMIN D-3 PO) Take 2,000 Units by mouth daily  donepezil (ARICEPT) 10 mg tablet Take 1 tablet (10 mg total) by mouth daily at bedtime for 30 days  Qty: 30 tablet, Refills: 0    Associated Diagnoses: 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      escitalopram (LEXAPRO) 20 mg tablet Take 1 tablet (20 mg total) by mouth daily  Qty: 30 tablet, Refills: 0    Associated Diagnoses: Depression, unspecified depression type      Fluticasone Furoate-Vilanterol (BREO ELLIPTA) 100-25 MCG/INH AEPB Inhale 1 puff daily  KRILL OIL PO Take 500 mg by mouth daily  METOPROLOL SUCCINATE ER PO Take 25 mg by mouth daily at bedtime        montelukast (SINGULAIR) 10 mg tablet take 1 tablet by mouth once daily  Qty: 90 tablet, Refills: 3    Associated Diagnoses: Allergic rhinitis due to pollen, unspecified seasonality      Multiple Vitamins-Minerals (CENTRUM SILVER ADULT 50+) TABS Take 1 tablet by mouth daily      Potassium Citrate ER 15 MEQ (1620 MG) TBCR Take 15 mEq by mouth 2 (two) times a day  Probiotic Product (PROBIOTIC DAILY PO) Take by mouth daily  tamsulosin (FLOMAX) 0 4 mg Take 0 4 mg by mouth daily  tiotropium (SPIRIVA HANDIHALER) 18 mcg inhalation capsule Place 18 mcg into inhaler and inhale daily        traMADol (ULTRAM) 50 mg tablet take 1/2 tablets by mouth twice a day as needed  Refills: 0      acetaminophen (TYLENOL) 325 mg tablet Take 650 mg by mouth every 6 (six) hours      ALPRAZolam (XANAX) 0 25 mg tablet TAKE AS NEEDED      azelastine (ASTELIN) 0 1 % nasal spray 1 spray into each nostril      cyanocobalamin (VITAMIN B-12) 1,000 mcg tablet Take by mouth daily         STOP taking these medications QUEtiapine (SEROquel) 25 mg tablet Comments:   Reason for Stopping:         traZODone (DESYREL) 50 mg tablet Comments:   Reason for Stopping:             No discharge procedures on file      ED Provider  Electronically Signed by           Marjorie Ruiz MD  05/13/18 2015

## 2018-05-13 NOTE — ASSESSMENT & PLAN NOTE
Unclear etiology  No abdominal pain   Worsening today, will obtain a right upper quadrant ultrasound

## 2018-05-13 NOTE — H&P
H&P Exam - Jody Judith 78 y o  male MRN: [id number]    Unit/Bed#: RM04 Encounter: [id number]        HCAP (healthcare-associated pneumonia)   Assessment & Plan    vanco/cefepime  Urine legionella and pneumococcal antigen  Sputum culture  Blood cultures x2 obtain the emergency room  Check pro-calcitonin now and in am   Check for flu  Incentive spirometry        Elevated bilirubin   Assessment & Plan    Unclear etiology  No abdominal pain   Will repeat tomorrow morning         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   Assessment & Plan    Continue Aricept         GERD (gastroesophageal reflux disease)   Assessment & Plan    Protonix 40 mg p o  daily        COPD (chronic obstructive pulmonary disease) ([facility])   Assessment & Plan    Without exacerbation  Initiate respiratory protocol and continue Spiriva, singular              History of Present Illness   Patient is a pleasant 60-year-old male with past medical history significant for COPD and chronic diastolic congestive heart failure presents to the emergency room today with a chief complaint of fevers and rigors  Patient has a history of dementia and is accompanied by his wife was able to relate a good history  She states that he has been complaining of a cough and the patient states he has been expectorating yellow sputum  Wife states that when he was coming down the stairs he developed sudden rigors and had to sit down  He broke out in a sweat and and the patient's wife brought to the emergency room  In the ER he was noted to have a fever over 100 9, x-ray reviewed by myself in the emergency room physician shows a possible infiltrate  Review of Systems   Constitutional: Positive for chills and fever  HENT: Negative  Eyes: Negative  Respiratory: Positive for cough  Cardiovascular: Negative for chest pain, palpitations and leg swelling  Gastrointestinal: Negative  Endocrine: Negative  Genitourinary: Negative      Musculoskeletal: Negative  Allergic/Immunologic: Negative  Neurological: Negative  Hematological: Negative  Psychiatric/Behavioral: Negative          Historical Information   Past Medical History:   Diagnosis Date    AAA [facility]abdominal aortic aneurysm) ([facility])     Acid reflux     Acute exacerbation of chronic obstructive airways disease with asthma ([facility])     Acute serous otitis media of left ear     recurrence not specified     Anesthesia     "always has mental changes /lingers and lingers after anesthesia"    Anxiety     Aortic aneurysm without rupture ([facility])     Arm bruise     right inner forearm "recent IV"    Arthritis     spine and poss left hip    Asthma     bronchietasis    At risk for falls     BPH (benign prostatic hyperplasia)     pt and wife can't confirm 11/10    BPH without urinary obstruction     Cancer ([facility] [address] )     skin CA on nose    CAP (community acquired pneumonia)     Cataracts, bilateral     Change in bowel function     CHF (congestive heart failure) ([facility])     Clubbing of fingers     Colon polyps     Common cold     Contusion of elbow, left     initial encounter     COPD (chronic obstructive pulmonary disease) ([facility] [address] [facility]     Coronary artery disease     Cough     Dementia     Depression     Diverticulosis     Dizziness     upon "standing quickly on occas"    Exercise counseling     Fatigue     Full dentures     "doesn't wear them"    Glaucoma screening     High cholesterol     History of abdominal aortic aneurysm (AAA) repair 08/2017    History of bacteremia     History of chronic obstructive lung disease     History of epistaxis     History of influenza vaccination     History of kidney stones     History of pneumonia     "many times" "at least 5 times" almost always goes to sepsis"    History of sepsis 10/2017    History of sinusitis     History of skin cancer     History of sleep apnea     History of urinary tract infection     [place] (hard of hearing)     Hydronephrosis with obstructing calculus     Influenza vaccine needed     Insomnia     Jock itch     left/saw doctor 11/8 and started on antifungal cream    Kidney stones     Left hip pain     Need for pneumococcal vaccination     Need for prophylactic vaccination and inoculation against influenza     Other emphysema ([facility] [address] [facility]     Pancreatitis, chronic ([facility] )     pt and wife can't confirm 11/10/17    Pneumonia 10/26/2017    admitted [facility]    Pulmonary emphysema ([facility] [address] )     S/P CABG x 3     approx 14 yrs ago    Screening for genitourinary condition     Screening for neurological condition     Sepsis ([facility] [address] )     due to unspecified organism     Short-term memory loss     Sleep apnea     does not use CPAP   Special screening examination for neoplasm of prostate     TIA involving carotid artery     "before carotid surgery"    Ulcer     stomach, "years ago"    Unsteady gait     Use of cane as ambulatory aid     sometimes    Vitamin D deficiency     Wears glasses      Past Surgical History:   Procedure Laterality Date    ABDOMINAL AORTIC ANEURYSM REPAIR  08/23/2017    ABDOMINAL AORTIC ANEURYSM REPAIR, ENDOVASCULAR      CARDIAC SURGERY      CABG x3    CAROTID ENDARTARECTOMY Left 11/1996    CATARACT EXTRACTION Bilateral     CORONARY ARTERY BYPASS GRAFT  01/2003    x3    CYSTOSCOPY      with insertion of ureteral stent     CYSTOSCOPY      with ureteroscopy with lithotripsy     EXCISIONAL HEMORRHOIDECTOMY      EYE SURGERY Bilateral     "for a wrinkle" after cataract surgery    HERNIA REPAIR      umbilical    LITHOTRIPSY      renal    MOUTH SURGERY      full mouth extraction     ND COLONOSCOPY FLX DX W/COLLJ SPEC WHEN PFRMD N/A 5/16/2017    Procedure: COLONOSCOPY with polypectomies/ hot snare and tattoo;  Surgeon: Sisi Waddell MD;  Location: [facility];   Service: Gastroenterology    ND CYSTOURETHROSCOPY N/A 11/30/2017    Procedure: Yuly Goff;  Surgeon: Thaddeus Dancer, MD;  [facility]: [facility];  Service: Urology    ND ESOPHAGOGASTRODUODENOSCOPY TRANSORAL DIAGNOSTIC N/A 8/18/2016    Procedure: ESOPHAGOGASTRODUODENOSCOPY (EGD); Surgeon: Jovon Silva MD;  Location: [facility];   Service: Gastroenterology    ND REMOVE BLADDER STONE,<2 5 CM N/A 11/30/2017    Procedure: Thuan Reeves;  Surgeon: Jossue Slaughter MD;  [facility];  Service: Urology    TONSILLECTOMY      URETERAL STENT PLACEMENT      and removal     Social History   History   Alcohol Use No     Comment: quit 25 yrs ago     History   Drug Use No     Comment: No illicit drug use      History   Smoking Status    Former Smoker    Packs/day: 1 50    Years: 60 00    Quit date: 2014   Smokeless Tobacco    Never Used     Comment: quit 3 yrs ago, used to be a 1-1 5 ppd smoker     Family History: non-contributory    Meds/Allergies   all medications and allergies reviewed  Allergies   Allergen Reactions    Augmentin [Amoxicillin-Pot Clavulanate] Diarrhea    Ciprofloxacin Hives    Morphine And Related Other (See Comments)     Change in mental status    Quetiapine Other (See Comments)     Severe nightmares       Objective   First Vitals:   Blood Pressure: 136/67 (05/13/18 1630)  Pulse: 80 (05/13/18 1630)  Temperature: (!) 100 9 °F (38 3 °C) (05/13/18 1630)  Temp Source: Temporal (05/13/18 1630)  Respirations: 22 (05/13/18 1630)  Weight - Scale: 79 kg (174 lb 2 6 oz) (05/13/18 1630)  SpO2: 92 % (05/13/18 1630)    Current Vitals:   Blood Pressure: 114/72 (05/13/18 1800)  Pulse: 98 (05/13/18 1800)  Temperature: (!) 100 9 °F (38 3 °C) (05/13/18 1630)  Temp Source: Temporal (05/13/18 1630)  Respirations: 22 (05/13/18 1800)  Weight - Scale: 79 kg (174 lb 2 6 oz) (05/13/18 1630)  SpO2: 91 % (05/13/18 1800)    No intake or output data in the 24 hours ending 05/13/18 1820    Invasive Devices     Peripheral Intravenous Line            Peripheral IV 05/13/18 Left Wrist less than 1 day    Peripheral IV 05/13/18 Right Antecubital less than 1 day Physical Exam   Constitutional: He appears well-developed  No distress  HENT:   Head: Normocephalic and atraumatic  Eyes: Pupils are equal, round, and reactive to light  No scleral icterus  Neck: Neck supple  No JVD present  Cardiovascular: Normal rate and regular rhythm  Pulmonary/Chest: Effort normal and breath sounds normal  No respiratory distress  He has no wheezes  He has no rales  Abdominal: Soft  Bowel sounds are normal  He exhibits no distension  There is no tenderness  There is no rebound  Musculoskeletal: Normal range of motion  He exhibits no edema  Neurological: He is alert  No cranial nerve deficit  Skin: Skin is warm and dry  No rash noted  No erythema         Lab Results:   Lab Results   Component Value Date     (L) 05/13/2018    K 4 2 05/13/2018    CL 97 (L) 05/13/2018    CO2 26 05/13/2018    ANIONGAP 10 05/13/2018    BUN 15 05/13/2018    CREATININE 1 18 05/13/2018    GLUCOSE 108 05/13/2018    CALCIUM 9 0 05/13/2018    AST 15 05/13/2018    ALT 23 05/13/2018    ALKPHOS 121 (H) 05/13/2018    PROT 7 1 05/13/2018    BILITOT 2 10 (H) 05/13/2018    EGFR 60 05/13/2018     Imaging:   CXR Personally reviewed: RLL Infiltrate  EKG, Pathology, and Other Studies:    Sinus on monitor    Code Status: Prior  Advance Directive and Living Will:      Power of :    POLST:      Counseling / Coordination of Care:

## 2018-05-14 ENCOUNTER — APPOINTMENT (INPATIENT)
Dept: ULTRASOUND IMAGING | Facility: HOSPITAL | Age: 78
DRG: 202 | End: 2018-05-14
Payer: MEDICARE

## 2018-05-14 PROBLEM — J40 TRACHEOBRONCHITIS: Status: ACTIVE | Noted: 2018-05-13

## 2018-05-14 LAB
ALBUMIN SERPL BCP-MCNC: 2.8 G/DL (ref 3.5–5)
ALP SERPL-CCNC: 105 U/L (ref 46–116)
ALT SERPL W P-5'-P-CCNC: 20 U/L (ref 12–78)
ANION GAP SERPL CALCULATED.3IONS-SCNC: 10 MMOL/L (ref 4–13)
AST SERPL W P-5'-P-CCNC: 15 U/L (ref 5–45)
ATRIAL RATE: 86 BPM
BILIRUB SERPL-MCNC: 2.6 MG/DL (ref 0.2–1)
BUN SERPL-MCNC: 13 MG/DL (ref 5–25)
CALCIUM SERPL-MCNC: 8.6 MG/DL (ref 8.3–10.1)
CHLORIDE SERPL-SCNC: 102 MMOL/L (ref 100–108)
CO2 SERPL-SCNC: 23 MMOL/L (ref 21–32)
CREAT SERPL-MCNC: 1.03 MG/DL (ref 0.6–1.3)
ERYTHROCYTE [DISTWIDTH] IN BLOOD BY AUTOMATED COUNT: 15.3 % (ref 11.6–15.1)
GFR SERPL CREATININE-BSD FRML MDRD: 71 ML/MIN/1.73SQ M
GLUCOSE SERPL-MCNC: 111 MG/DL (ref 65–140)
HCT VFR BLD AUTO: 43.4 % (ref 36.5–49.3)
HGB BLD-MCNC: 14.8 G/DL (ref 12–17)
L PNEUMO1 AG UR QL IA.RAPID: NEGATIVE
MAGNESIUM SERPL-MCNC: 2 MG/DL (ref 1.6–2.6)
MCH RBC QN AUTO: 32.4 PG (ref 26.8–34.3)
MCHC RBC AUTO-ENTMCNC: 34.1 G/DL (ref 31.4–37.4)
MCV RBC AUTO: 95 FL (ref 82–98)
P AXIS: 69 DEGREES
PLATELET # BLD AUTO: 136 THOUSANDS/UL (ref 149–390)
PMV BLD AUTO: 9.1 FL (ref 8.9–12.7)
POTASSIUM SERPL-SCNC: 3.6 MMOL/L (ref 3.5–5.3)
PR INTERVAL: 188 MS
PROCALCITONIN SERPL-MCNC: <0.05 NG/ML
PROCALCITONIN SERPL-MCNC: <0.05 NG/ML
PROT SERPL-MCNC: 6.6 G/DL (ref 6.4–8.2)
QRS AXIS: 68 DEGREES
QRSD INTERVAL: 84 MS
QT INTERVAL: 376 MS
QTC INTERVAL: 449 MS
RBC # BLD AUTO: 4.57 MILLION/UL (ref 3.88–5.62)
S PNEUM AG UR QL: NEGATIVE
SODIUM SERPL-SCNC: 135 MMOL/L (ref 136–145)
T WAVE AXIS: 87 DEGREES
VENTRICULAR RATE: 86 BPM
WBC # BLD AUTO: 6.61 THOUSAND/UL (ref 4.31–10.16)

## 2018-05-14 PROCEDURE — G8979 MOBILITY GOAL STATUS: HCPCS | Performed by: PHYSICAL THERAPIST

## 2018-05-14 PROCEDURE — G8987 SELF CARE CURRENT STATUS: HCPCS

## 2018-05-14 PROCEDURE — 85027 COMPLETE CBC AUTOMATED: CPT | Performed by: FAMILY MEDICINE

## 2018-05-14 PROCEDURE — G8978 MOBILITY CURRENT STATUS: HCPCS | Performed by: PHYSICAL THERAPIST

## 2018-05-14 PROCEDURE — 87070 CULTURE OTHR SPECIMN AEROBIC: CPT | Performed by: FAMILY MEDICINE

## 2018-05-14 PROCEDURE — 97116 GAIT TRAINING THERAPY: CPT | Performed by: PHYSICAL THERAPIST

## 2018-05-14 PROCEDURE — 93010 ELECTROCARDIOGRAM REPORT: CPT | Performed by: INTERNAL MEDICINE

## 2018-05-14 PROCEDURE — 83735 ASSAY OF MAGNESIUM: CPT | Performed by: FAMILY MEDICINE

## 2018-05-14 PROCEDURE — 97530 THERAPEUTIC ACTIVITIES: CPT

## 2018-05-14 PROCEDURE — 87205 SMEAR GRAM STAIN: CPT | Performed by: FAMILY MEDICINE

## 2018-05-14 PROCEDURE — 99232 SBSQ HOSP IP/OBS MODERATE 35: CPT | Performed by: FAMILY MEDICINE

## 2018-05-14 PROCEDURE — 97167 OT EVAL HIGH COMPLEX 60 MIN: CPT

## 2018-05-14 PROCEDURE — 94640 AIRWAY INHALATION TREATMENT: CPT

## 2018-05-14 PROCEDURE — 84145 PROCALCITONIN (PCT): CPT | Performed by: FAMILY MEDICINE

## 2018-05-14 PROCEDURE — G8988 SELF CARE GOAL STATUS: HCPCS

## 2018-05-14 PROCEDURE — 94760 N-INVAS EAR/PLS OXIMETRY 1: CPT

## 2018-05-14 PROCEDURE — 76705 ECHO EXAM OF ABDOMEN: CPT

## 2018-05-14 PROCEDURE — 97163 PT EVAL HIGH COMPLEX 45 MIN: CPT | Performed by: PHYSICAL THERAPIST

## 2018-05-14 PROCEDURE — 80053 COMPREHEN METABOLIC PANEL: CPT | Performed by: FAMILY MEDICINE

## 2018-05-14 RX ORDER — ACETAMINOPHEN 325 MG/1
650 TABLET ORAL ONCE
Status: COMPLETED | OUTPATIENT
Start: 2018-05-14 | End: 2018-05-14

## 2018-05-14 RX ORDER — ACETAMINOPHEN 325 MG/1
650 TABLET ORAL EVERY 6 HOURS PRN
Status: DISCONTINUED | OUTPATIENT
Start: 2018-05-14 | End: 2018-05-15 | Stop reason: HOSPADM

## 2018-05-14 RX ORDER — POLYVINYL ALCOHOL 14 MG/ML
1 SOLUTION/ DROPS OPHTHALMIC
Status: DISCONTINUED | OUTPATIENT
Start: 2018-05-14 | End: 2018-05-15 | Stop reason: HOSPADM

## 2018-05-14 RX ORDER — MAGNESIUM HYDROXIDE/ALUMINUM HYDROXICE/SIMETHICONE 120; 1200; 1200 MG/30ML; MG/30ML; MG/30ML
30 SUSPENSION ORAL EVERY 4 HOURS PRN
Status: DISCONTINUED | OUTPATIENT
Start: 2018-05-14 | End: 2018-05-15 | Stop reason: HOSPADM

## 2018-05-14 RX ORDER — POTASSIUM CITRATE 10 MEQ/1
10 TABLET, EXTENDED RELEASE ORAL
Status: DISCONTINUED | OUTPATIENT
Start: 2018-05-14 | End: 2018-05-15 | Stop reason: HOSPADM

## 2018-05-14 RX ORDER — OXYCODONE HYDROCHLORIDE 5 MG/1
5 TABLET ORAL EVERY 4 HOURS PRN
Status: DISCONTINUED | OUTPATIENT
Start: 2018-05-14 | End: 2018-05-15 | Stop reason: HOSPADM

## 2018-05-14 RX ADMIN — LEVALBUTEROL 1.25 MG: 1.25 SOLUTION, CONCENTRATE RESPIRATORY (INHALATION) at 20:02

## 2018-05-14 RX ADMIN — ENOXAPARIN SODIUM 40 MG: 40 INJECTION SUBCUTANEOUS at 10:52

## 2018-05-14 RX ADMIN — VANCOMYCIN HYDROCHLORIDE 1250 MG: 1 INJECTION, POWDER, LYOPHILIZED, FOR SOLUTION INTRAVENOUS at 06:06

## 2018-05-14 RX ADMIN — IPRATROPIUM BROMIDE 0.5 MG: 0.5 SOLUTION RESPIRATORY (INHALATION) at 07:06

## 2018-05-14 RX ADMIN — IPRATROPIUM BROMIDE 0.5 MG: 0.5 SOLUTION RESPIRATORY (INHALATION) at 20:02

## 2018-05-14 RX ADMIN — ATORVASTATIN CALCIUM 20 MG: 10 TABLET, FILM COATED ORAL at 22:14

## 2018-05-14 RX ADMIN — CEFTRIAXONE 1000 MG: 1 INJECTION, SOLUTION INTRAVENOUS at 18:26

## 2018-05-14 RX ADMIN — QUETIAPINE 25 MG: 25 TABLET ORAL at 22:14

## 2018-05-14 RX ADMIN — TAMSULOSIN HYDROCHLORIDE 0.4 MG: 0.4 CAPSULE ORAL at 10:52

## 2018-05-14 RX ADMIN — LEVALBUTEROL 1.25 MG: 1.25 SOLUTION, CONCENTRATE RESPIRATORY (INHALATION) at 07:06

## 2018-05-14 RX ADMIN — DONEPEZIL HYDROCHLORIDE 10 MG: 5 TABLET, FILM COATED ORAL at 22:14

## 2018-05-14 RX ADMIN — ESCITALOPRAM OXALATE 20 MG: 10 TABLET ORAL at 10:52

## 2018-05-14 RX ADMIN — OXYCODONE HYDROCHLORIDE 5 MG: 5 TABLET ORAL at 18:35

## 2018-05-14 RX ADMIN — ACETAMINOPHEN 650 MG: 325 TABLET, FILM COATED ORAL at 05:33

## 2018-05-14 RX ADMIN — Medication 250 MG: at 10:56

## 2018-05-14 RX ADMIN — MONTELUKAST SODIUM 10 MG: 10 TABLET, FILM COATED ORAL at 10:52

## 2018-05-14 RX ADMIN — POTASSIUM CITRATE 10 MEQ: 10 TABLET, EXTENDED RELEASE ORAL at 10:56

## 2018-05-14 RX ADMIN — POTASSIUM CITRATE 10 MEQ: 10 TABLET, EXTENDED RELEASE ORAL at 17:50

## 2018-05-14 RX ADMIN — ASPIRIN 81 MG 81 MG: 81 TABLET ORAL at 10:52

## 2018-05-14 RX ADMIN — Medication 250 MG: at 17:50

## 2018-05-14 RX ADMIN — CEFEPIME HYDROCHLORIDE 2000 MG: 2 INJECTION, SOLUTION INTRAVENOUS at 04:30

## 2018-05-14 RX ADMIN — ALUMINUM HYDROXIDE, MAGNESIUM HYDROXIDE, AND SIMETHICONE 30 ML: 200; 200; 20 SUSPENSION ORAL at 18:34

## 2018-05-14 NOTE — PLAN OF CARE
Problem: PAIN - ADULT  Goal: Verbalizes/displays adequate comfort level or baseline comfort level  Interventions:  - Encourage patient to monitor pain and request assistance  - Assess pain using appropriate pain scale  - Administer analgesics based on type and severity of pain and evaluate response  - Implement non-pharmacological measures as appropriate and evaluate response  - Consider cultural and social influences on pain and pain management  - Notify physician/advanced practitioner if interventions unsuccessful or patient reports new pain  Outcome: Progressing      Problem: INFECTION - ADULT  Goal: Absence or prevention of progression during hospitalization  INTERVENTIONS:  - Assess and monitor for signs and symptoms of infection  - Monitor lab/diagnostic results  - [place] appropriate cooling/warming therapies per order  - Administer medications as ordered  - Instruct and encourage patient and family to use good hand hygiene technique  - Identify and instruct in appropriate isolation precautions for identified infection/condition  Outcome: Progressing      Problem: SAFETY ADULT  Goal: Maintain or return to baseline ADL function  INTERVENTIONS:  -  Assess patient's ability to carry out ADLs; assess patient's baseline for ADL function and identify physical deficits which impact ability to perform ADLs (bathing, care of mouth/teeth, toileting, grooming, dressing, etc )  - Assess/evaluate cause of self-care deficits   - Assess range of motion  - Assess patient's mobility; develop plan if impaired  - Assess patient's need for assistive devices and provide as appropriate  - Encourage maximum independence but intervene and supervise when necessary  ¯ Involve family in performance of ADLs  ¯ Assess for home care needs following discharge   ¯ Request OT consult to assist with ADL evaluation and planning for discharge  ¯ Provide patient education as appropriate  Outcome: Progressing    Goal: Maintain or return mobility status to optimal level  INTERVENTIONS:  - Assess patient's baseline mobility status (ambulation, transfers, stairs, etc )    - Identify cognitive and physical deficits and behaviors that affect mobility  - Identify mobility aids required to assist with transfers and/or ambulation (gait belt, sit-to-stand, lift, walker, cane, etc )  - [place] fall precautions as indicated by assessment  - Record patient progress and toleration of activity level on Mobility SBAR; progress patient to next Phase/Stage  - Instruct patient to call for assistance with activity based on assessment  - Request Rehabilitation consult to assist with strengthening/weightbearing, etc   Outcome: Progressing      Problem: DISCHARGE PLANNING  Goal: Discharge to home or other facility with appropriate resources  INTERVENTIONS:  - Identify barriers to discharge w/patient and caregiver  - Arrange for needed discharge resources and transportation as appropriate  - Identify discharge learning needs (meds, wound care, etc )  - Arrange for interpretive services to assist at discharge as needed  - Refer to Case Management Department for coordinating discharge planning if the patient needs post-hospital services based on physician/advanced practitioner order or complex needs related to functional status, cognitive ability, or social support system  Outcome: Progressing      Problem: Knowledge Deficit  Goal: Patient/family/caregiver demonstrates understanding of disease process, treatment plan, medications, and discharge instructions  Complete learning assessment and assess knowledge base    Interventions:  - Provide teaching at level of understanding  - Provide teaching via preferred learning methods  Outcome: Progressing

## 2018-05-14 NOTE — PROGRESS NOTES
Progress Note - Samuel Gastelum 1940, 78 y o  male MRN: [id number]    Unit/Bed#: 409-01 Encounter: [id number]    Primary Care Provider: Jeromy Freedman DO   Date and time admitted to hospital: 5/13/2018  4:31 PM        Tracheobronchitis   Assessment & Plan    No evidence of infiltrate on official CXR read  Will deescalate antibiotics and utilize Rocephin  Urine legionella and pneumococcal antigen are negative  Sputum culture pending  Blood cultures x2 pending  pro-calcitonin from yesterday and today are pending  Will repeat procalcitonin tomorrow to assess response to deescalated antibiotics  Influenza screen negative  Incentive spirometry        Elevated bilirubin   Assessment & Plan    Unclear etiology  No abdominal pain   Worsening today, will obtain a right upper quadrant ultrasound        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   Assessment & Plan    Continue Aricept   Patient is at baseline        COPD (chronic obstructive pulmonary disease) (HCC)   Assessment & Plan    Without exacerbation  Continue respiratory protocol and Spiriva, singular        * GERD (gastroesophageal reflux disease)   Assessment & Plan    Protonix 40 mg p o  daily            Progress Note - Samuel Gastelum 78 y o  male MRN: [id number]    Unit/Bed#: 409-01 Encounter: [id number]      Subjective:   Seen and examined @ bedside  Feels better  Wife updated via phone     Objective:     Vitals:   Vitals:    05/14/18 0750   BP: 99/59   Pulse: 96   Resp: 18   Temp: 98 2 °F (36 8 °C)   SpO2: 93%     Body mass index is 25 61 kg/m²      Intake/Output Summary (Last 24 hours) at 05/14/18 0852  Last data filed at 05/14/18 0507   Gross per 24 hour   Intake                0 ml   Output             1200 ml   Net            -1200 ml       Physical Exam:   BP 99/59 (BP Location: Left arm)   Pulse 96   Temp 98 2 °F (36 8 °C) (Temporal)   Resp 18   Ht 5' 8" (1 727 m)   Wt 76 4 kg (168 lb 6 9 oz)   SpO2 93%   BMI 25 61 kg/m²   General appearance: alert and oriented, in no acute distress  Head: Normocephalic, without obvious abnormality, atraumatic  Lungs: scattered ronchi, no rales/crackles  Heart: regular rate and rhythm, S1, S2 normal, no murmur, click, rub or gallop  Abdomen: soft, non-tender; bowel sounds normal; no masses,  no organomegaly  Extremities: extremities normal, warm and well-perfused; no cyanosis, clubbing, or edema  Pulses: 2+ and symmetric  Neurologic: Grossly normal     Invasive Devices     Peripheral Intravenous Line            Peripheral IV 05/13/18 Left Wrist less than 1 day    Peripheral IV 05/14/18 Right Antecubital less than 1 day                  Results from last 7 days  Lab Units 05/14/18  0542 05/13/18  1953 05/13/18  1634   WBC Thousand/uL 6 61  --  8 58   HEMOGLOBIN g/dL 14 8  --  15 8   HEMATOCRIT % 43 4  --  45 4   PLATELETS Thousands/uL 136* 125* 128*         Results from last 7 days  Lab Units 05/14/18  0542 05/13/18  1634   SODIUM mmol/L 135* 133*   POTASSIUM mmol/L 3 6 4 2   CHLORIDE mmol/L 102 97*   CO2 mmol/L 23 26   BUN mg/dL 13 15   CREATININE mg/dL 1 03 1 18   CALCIUM mg/dL 8 6 9 0   TOTAL PROTEIN g/dL 6 6 7 1   BILIRUBIN TOTAL mg/dL 2 60* 2 10*   ALK PHOS U/L 105 121*   ALT U/L 20 23   AST U/L 15 15   GLUCOSE RANDOM mg/dL 111 108       Medication Administration - last 24 hours from 05/13/2018 0852 to 05/14/2018 1607       Date/Time Order Dose Route Action Action by     05/13/2018 1756 vancomycin (VANCOCIN) 1,250 mg in sodium chloride 0 9 % 250 mL IVPB 1,250 mg Intravenous New Bag Bibi Zepeda RN     05/13/2018 1753 cefepime (MAXIPIME) IVPB (premix) 2,000 mg 0 mg Intravenous Stopped Bibi Zepead RN     05/13/2018 1714 cefepime (MAXIPIME) IVPB (premix) 2,000 mg 2,000 mg Intravenous New Bag Bibi Zepeda RN     05/13/2018 1756 sodium chloride 0 9 % bolus 250 mL 250 mL Intravenous New Mukesh Zepeda RN     05/13/2018 2117 atorvastatin (LIPITOR) tablet 20 mg 20 mg Oral Given Emily Bravo, RN     05/13/2018 2117 donepezil (ARICEPT) tablet 10 mg 10 mg Oral Given Emily Bravo, RN     05/13/2018 2003 saccharomyces boulardii (FLORASTOR) capsule 250 mg 250 mg Oral Not Given Emily Bravo RN     05/13/2018 2117 metoprolol succinate (TOPROL-XL) 24 hr tablet 25 mg 25 mg Oral Not Given Emily Bravo, RN     05/13/2018 2152 Potassium Citrate ER TBCR   Oral Not Given Emily Bravo, RN     05/13/2018 2117 QUEtiapine (SEROquel) tablet 25 mg 25 mg Oral Given Emily Bravo, RN     05/14/2018 0539 sodium chloride 0 9 % infusion 0 mL/hr Intravenous Stopped Emily Bravo RN     05/13/2018 1954 sodium chloride 0 9 % infusion 100 mL/hr Intravenous [facility], RN     05/14/2018 0430 cefepime (MAXIPIME) IVPB (premix) 2,000 mg 2,000 mg Intravenous [facility], [place]     05/14/2018 0758 vancomycin (VANCOCIN) 1,250 mg in sodium chloride 0 9 % 250 mL IVPB 0 mg Intravenous Stopped Cheryl Rivers, RN     05/14/2018 0606 vancomycin (VANCOCIN) 1,250 mg in sodium chloride 0 9 % 250 mL IVPB 1,250 mg Intravenous [facility], RN     05/14/2018 0706 ipratropium (ATROVENT) 0 02 % inhalation solution 0 5 mg 0 5 mg Nebulization [facility]     05/14/2018 7621 levalbuterol (XOPENEX) inhalation solution 1 25 mg 1 25 mg Nebulization Given Andrew Brochure, RT     05/14/2018 0533 acetaminophen (TYLENOL) tablet 650 mg 650 mg Oral Given Emily Bravo RN            Lab, Imaging and other studies: I have personally reviewed pertinent reports      VTE Pharmacologic Prophylaxis: Enoxaparin (Lovenox)  VTE Mechanical Prophylaxis: sequential compression device     Scot Verde MD  1/56/8427,5:23 AM

## 2018-05-14 NOTE — PLAN OF CARE
Problem: Potential for Falls  Goal: Patient will remain free of falls  INTERVENTIONS:  - Assess patient frequently for physical needs  -  Identify cognitive and physical deficits and behaviors that affect risk of falls    -  [place] fall precautions as indicated by assessment   - Educate patient/family on patient safety including physical limitations  - Instruct patient to call for assistance with activity based on assessment  - Modify environment to reduce risk of injury  - Consider OT/PT consult to assist with strengthening/mobility   Outcome: Progressing

## 2018-05-14 NOTE — RESPIRATORY THERAPY NOTE
RT Protocol Note  Eual Sacks 78 y o  male MRN: [id number]  Unit/Bed#: 409-01 Encounter: [id number]    Assessment    Active Problems:    COPD (chronic obstructive pulmonary disease) ([facility])    GERD (gastroesophageal reflux disease)    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    Elevated bilirubin    HCAP (healthcare-associated pneumonia)      Home Pulmonary Medications:  BREO 1 puff daily  Spiriva 1 puff daily       Past Medical History:   Diagnosis Date    AAA (abdominal aortic aneurysm) ([facility])     Acid reflux     Acute exacerbation of chronic obstructive airways disease with asthma ([facility])     Acute serous otitis media of left ear     recurrence not specified     Anesthesia     "always has mental changes /lingers and lingers after anesthesia"    Anxiety     [facility] without rupture [facility]     Arm bruise     right inner forearm "recent IV"    Arthritis     spine and poss left hip    Asthma     bronchietasis    At risk for falls     BPH (benign prostatic hyperplasia)     pt and wife can't confirm 11/10    BPH without urinary obstruction     Cancer ([address] )     skin CA on nose    CAP (community acquired pneumonia)     Cataracts, bilateral     Change in bowel function     CHF (congestive heart failure) ([facility])     Clubbing of fingers     Colon polyps     Common cold     Contusion of elbow, left     initial encounter     COPD (chronic obstructive pulmonary disease) ([address]     Coronary artery disease     Cough     Dementia     Depression     Diverticulosis     Dizziness     upon "standing quickly on occas"    Exercise counseling     Fatigue     Full dentures     "doesn't wear them"    Glaucoma screening     High cholesterol     History of abdominal aortic aneurysm (AAA) repair 08/2017    History of bacteremia     History of chronic obstructive lung disease     History of epistaxis     History of influenza vaccination     History of kidney stones     History of pneumonia "many times" "at least 5 times" almost always goes to sepsis"    History of sepsis 10/2017    History of sinusitis     History of skin cancer     History of sleep apnea     History of urinary tract infection     [place] (hard of hearing)     Hydronephrosis with obstructing calculus     Influenza vaccine needed     Insomnia     Jock itch     left/saw doctor 11/8 and started on antifungal cream    Kidney stones     Left hip pain     Need for pneumococcal vaccination     Need for prophylactic vaccination and inoculation against influenza     Other emphysema [facility]     Pancreatitis, chronic ([facility] [facility] )     pt and wife can't confirm 11/10/17    Pneumonia 10/26/2017    admitted [facility]    Pulmonary emphysema ([facility] )     S/P CABG x 3     approx 14 yrs ago    Screening for genitourinary condition     Screening for neurological condition     Seps[facility]     due to unspecified organism     Short-term memory loss     Sleep apnea     does not use CPAP   Special screening examination for neoplasm of prostate     TIA involving carotid artery     "before carotid surgery"    Ulcer     stomach, "years ago"    Unsteady gait     Use of cane as ambulatory aid     sometimes    Vitamin D deficiency     Wears glasses      Social History     Social History    Marital status: /Civil Union     Spouse name: N/A    Number of children: N/A    Years of education: N/A     Social History Main Topics    Smoking status: Former Smoker     Packs/day: 1 50     Years: 60 00     Quit date: 2014    Smokeless tobacco: Never Used      Comment: quit 3 yrs ago, used to be a 1-1 5 ppd smoker    Alcohol use No      Comment: quit 25 yrs ago    Drug use: No      Comment: No illicit drug use     Sexual activity: Not Asked     Other Topics Concern    None     Social History Narrative    Retired     No living Will     No advance directives     Daily caffeine consumption - 4-5 servings a day        Subjective    Subjective Data:  Will make Treatments BID    Objective    Physical Exam:   Assessment Type: Assess only  General Appearance: Alert, Awake  Respiratory Pattern: Normal  Chest Assessment: Chest expansion symmetrical  Bilateral Breath Sounds: Diminished, Clear  Cough: None    Vitals:  Blood pressure 119/70, pulse 93, temperature 99 7 °F (37 6 °C), temperature source Temporal, resp  rate 18, height 5' 8" (1 727 m), weight 76 4 kg (168 lb 6 9 oz), SpO2 91 %  Imaging and other studies: I have personally reviewed pertinent reports  Plan  Will make SVN treatments BID with 1 25mg Xopenex/0 5mg Atrovent since he is n't taking any home meds while here    Respiratory Plan: Home Bronchodilator Patient pathway

## 2018-05-14 NOTE — SOCIAL WORK
Cm met with the patient to evaluate the patients prior function and living situation and any barriers to d/c and form a safe d/c plan  Cm also evaluated the patient for any services in the home or needs for services  Pt resides at home with his wife in a house  Has 1 BERNICE then 13 to his 2nd floor bedroom/bathroom  Pt had been independent with is adls and used a cane for ambulation  (Also has a walker)  Pt was to go to outpatient therapy today for an evaluation  Pt drives  PCP is Bobbi Mcdaniels and pharmacy is [facility] or mail order through [employer]  Pt plans on returning home on dc with outpatient follow up  Cm will continue to follow

## 2018-05-14 NOTE — PHYSICAL THERAPY NOTE
PT Evaluation     05/14/18 4679   Note Type   Note type Eval/Treat   Pain Assessment   Pain Score 2   Pain Type Chronic pain   Pain Location Leg   Pain Orientation Left   Home Living   Type of [address] Multi-level;Bed/bath upstairs;Stairs to enter without rails  (1 BERNICE, 12 steps with HR to 2nd floor)   Bathroom Shower/Tub Walk-in shower   Bathroom Toilet Standard   Bathroom Equipment Grab bars around toilet;Grab bars in shower; Shower chair   Bathroom Accessibility Accessible   Home Equipment Walker;Cane   Prior Function   Level of [place] Independent with ADLs and functional mobility  (Pt ambulates with SPC at times due to sciatic pain)   Lives With Spouse   Receives Help From Family   ADL Assistance Independent   IADLs Needs assistance   Comments spouse drives  pt states pain management MD told him not to drive   Restrictions/Precautions   Other Precautions Telemetry;Multiple lines; Fall Risk;Pain; Chair Alarm   General   Family/Caregiver Present No   Cognition   Arousal/Participation Alert   Orientation Level Oriented X4   Following Commands Follows all commands and directions without difficulty   RLE Assessment   RLE Assessment WFL  (4+/5)   LLE Assessment   LLE Assessment WFL  (hip flex 4+/5, knee ext 4+/5 flex 4/5, ankle/great toe df 4)   Coordination   Sensation WFL  (pt however notes he feels numbness in L calf)   Light Touch   RLE Light Touch Grossly intact   LLE Light Touch Grossly intact   Bed Mobility   Supine to Sit 5  Supervision   Additional items Bedrails;Verbal cues   Sit to Supine (seated in chair at bedside alarm on bell in reach)   Additional Comments Pt limited by L radicular pain from L buttock to L calf  SpO2 97% after ambulation but ambulation limited by SOB and back pain     Transfers   Sit to Stand 5  Supervision   Additional items Verbal cues  (with RW)   Stand to Sit 5  Supervision   Additional items Verbal cues  (with RW)   Stand pivot 5  Supervision   Additional items (with RW)   Additional Comments Pt with limited ambulation tolerance at 70ft due to sciatic pain and SOB   Ambulation/Elevation   Gait pattern Antalgic;Decreased L stance   Gait Assistance 5  Supervision   Assistive Device Rolling walker   Distance 70ft with RW Supervision no LOB but limited by pain and SOB  SpO2 97% after ambulation but pt fatigued and with SOB   Balance   Static Sitting Good   Dynamic Sitting Good   Static Standing Fair +  (with RW)   Dynamic Standing Fair +  (with RW)   Ambulatory Fair  (with RW)   Endurance Deficit   Endurance Deficit Yes   Endurance Deficit Description limited ambulation tolerance due to fatigue pain and SOB   Activity Tolerance   Activity Tolerance Patient limited by fatigue;Patient limited by pain   Assessment   Prognosis Good   Problem List Decreased strength;Decreased endurance; Impaired balance;Decreased mobility;Pain   Assessment Pt is a 78year old male presenting with increased sciatic pain radiating from L buttock to calf with decreased strength balance endurance and functional mobility  Pt required RW for ambulation due to pain and SOB with limited ambulation tolerance  Pt would benefit from continued activity in PT to improve impairments and functional deficits  Pt will be safe to return home  Recommend Outpatient PT for treatment of back pain and radicular symptoms  Goals   Patient Goals To decrease pain improve breathing and return home   LTG Expiration Date 05/28/18   Long Term Goal #1 Pt will perform all bed mobility and transfers with least restrictive A D  Independently   Long Term Goal #2 Pt will ambulate 200ft with least restrictive A D  modified Independent and will ascend/descend 5ft with HR (S)   Plan   Treatment/Interventions Functional transfer training;LE strengthening/ROM; Elevations; Therapeutic exercise; Endurance training;Patient/family training;Bed mobility;Gait training   PT Frequency (3-5x/wk)   Recommendation   Recommendation Outpatient PT PT - OK to Discharge Yes  (when medically stable for discharge)   Pt with SCD's on when PT entered room  SCD's reapplied and turned on with pt seated in chair at bedside with call bell in reach and chair alarm on

## 2018-05-14 NOTE — OCCUPATIONAL THERAPY NOTE
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    Bradycardia    SOB (shortness of breath)    Pulmonary nodule    Ventricular bigeminy    Positional lightheadedness    Coronary artery disease involving native coronary artery of native heart with angina pectoris ([address] )    Hx of CABG    Bilateral carotid artery stenosis    Elevated bilirubin    Tracheobronchitis     Past Medical History  Past Medical History:   Diagnosis Date    [facility])     Acid reflux     Acute exacerbation of chronic obstructive airways disease with asthma ([facility])     Acute serous otitis media of left ear     recurrence not specified     Anesthesia     "always has mental changes /lingers and lingers after anesthesia"    Anxiety     A[facility] aneurysm without rupture [facility]     Arm bruise     right inner forearm "recent IV"    Arthritis     spine and poss left hip    Asthma     bronchietasis    At risk for falls     BPH (benign prostatic hyperplasia)     pt and wife can't confirm 11/10    BPH without urinary obstruction     Cancer ([address] )     skin CA on nose    CAP (community acquired pneumonia)     Cataracts, bilateral     Change in bowel function     CHF (congestive heart failure) ([facility])     Clubbing of fingers     Colon polyps     Common cold     Contusion of elbow, left     initial encounter     COPD (chronic obstructive pulmonary disease) ([address]     Coronary artery disease     Cough     Dementia     Depression     Diverticulosis     Dizziness     upon "standing quickly on occas"    Exercise counseling     Fatigue     Full dentures     "doesn't wear them"    Glaucoma screening     High cholesterol     History of abdominal aortic aneurysm (AAA) repair 08/2017    History of bacteremia     History of chronic obstructive lung disease     History of epistaxis     History of influenza vaccination     History of kidney stones     History of pneumonia     "many times" "at least 5 times" almost always goes to sepsis"    History of sepsis 10/2017    History of sinusitis     History of skin cancer     History of sleep apnea     History of urinary tract infection     [place] (hard of hearing)     Hydronephrosis with obstructing calculus     Influenza vaccine needed     Insomnia     Jock itch     left/saw doctor 11/8 and started on antifungal cream    Kidney stones     Left hip pain     Need for pneumococcal vaccination     Need for prophylactic vaccination and inoculation against influenza     Other emphysema ([address] )     Pancreatitis, chronic ([address] )     pt and wife can't confirm 11/10/17    Pneumonia 10/26/2017    admitted [facility]    Pulmonary emphysema ([address] )     S/P CABG x 3     approx 14 yrs ago    Screening for genitourinary condition     Screening for neurological condition     Sepsis ([address] )     due to unspecified organism     Short-term memory loss     Sleep apnea     does not use CPAP      Special screening examination for neoplasm of prostate     TIA involving carotid artery     "before carotid surgery"    Ulcer     stomach, "years ago"    Unsteady gait     Use of cane as ambulatory aid     sometimes    Vitamin D deficiency     Wears glasses      Past Surgical History  Past Surgical History:   Procedure Laterality Date    ABDOMINAL AORTIC ANEURYSM REPAIR  08/23/2017    ABDOMINAL AORTIC ANEURYSM REPAIR, ENDOVASCULAR      CARDIAC SURGERY      CABG x3    CAROTID ENDARTARECTOMY Left 11/1996    CATARACT EXTRACTION Bilateral     CORONARY ARTERY BYPASS GRAFT  01/2003    x3    CYSTOSCOPY      with insertion of ureteral stent     CYSTOSCOPY      with ureteroscopy with lithotripsy     EXCISIONAL HEMORRHOIDECTOMY      EYE SURGERY Bilateral     "for a wrinkle" after cataract surgery    HERNIA REPAIR      umbilical    LITHOTRIPSY      renal    MOUTH SURGERY      full mouth extraction     MI COLONOSCOPY FLX DX W/COLLJ SPEC WHEN PFRMD N/A 5/16/2017    Procedure: COLONOSCOPY with polypectomies/ hot snare and tattoo;  Surgeon: Sam Bran MD;  Location: [facility]; Service: Gastroenterology    MI CYSTOURETHROSCOPY N/A 11/30/2017    Procedure: Thurnell Livers;  Surgeon: Fredy Hurst MD;  [facility];  Service: Urology    MI ESOPHAGOGASTRODUODENOSCOPY TRANSORAL DIAGNOSTIC N/A 8/18/2016    Procedure: ESOPHAGOGASTRODUODENOSCOPY (EGD); Surgeon: Sam Bran MD;  [facility]: [facility]; Service: Gastroenterology    MI REMOVE BLADDER STONE,<2 5 CM N/A 11/30/2017    Procedure: Carissa Purchase;  Surgeon: Fredy Hurst MD;  [facility];  Service: Urology    [address] [place] [address]      and removal           05/14/18 0756   Note Type   Note type Eval/Treat   Restrictions/Precautions   Weight Bearing Precautions Per Order No   Other Precautions Multiple lines;Telemetry; Fall Risk;Pain   Pain Assessment   Pain Assessment 0-10   Pain Score 2   Pain Type Chronic pain   Pain Location Leg   Pain Orientation Left   Home Living   Type of Home House   Home Layout Two level;Bed/bath upstairs;Stairs to enter without rails  (1 BERNICE no HR; 12 steps to 2nd c HR)   Bathroom Shower/Tub Walk-in shower   Bathroom Toilet Standard   Bathroom Equipment Grab bars around toilet   [address] Walker;Cane;Grab bars  (pt utilizes cane at baseline )   Prior Function   Level of [place] Independent with ADLs and functional mobility; Needs assistance with IADLs Lives With Spouse   Receives Help From Family   ADL Assistance Independent   IADLs Needs assistance   Comments pt's family drives    Psychosocial   Psychosocial (WDL) WDL   Bed Mobility   Supine to Sit 5  Supervision   Additional items Verbal cues   Sit to Supine (seated in chair at end of session)   Transfers   Sit to Stand 5  Supervision   Additional items Verbal cues  (RW)   Stand to Sit 5  Supervision   Additional items Verbal cues  (RW)   Additional Comments pt performed transfers at slow pace with increased pain when transfering    Functional Mobility   Functional Mobility 5  Supervision   Additional Comments pt performed FM c RW at (S) level; 150ft with increased pain and visible limp of L LE during task   Additional items Rolling walker   Balance   Static Sitting Good   Dynamic Sitting Good   Static Standing Fair +   Dynamic Standing Fair +   Ambulatory Fair   Activity Tolerance   Activity Tolerance Patient limited by fatigue;Patient limited by pain   RUE Assessment   RUE Assessment WFL   LUE Assessment   LUE Assessment WFL   Hand Function   Gross Motor Coordination Functional   Fine Motor Coordination Functional   Sensation   Light Touch No apparent deficits   Sharp/Dull No apparent deficits   Cognition   Overall Cognitive Status WFL   Arousal/Participation Alert   Attention Within functional limits   Orientation Level Oriented X4   Memory Decreased long term memory   Following Commands Follows all commands and directions without difficulty   Assessment   Limitation Decreased ADL status; Decreased UE strength;Decreased Safe judgement during ADL;Decreased endurance;Decreased self-care trans;Decreased high-level ADLs   Assessment pt presents at evaluation performing at (S) level with transfers and mobility; recommend continued OT intervention and home health and oupatient PT on d/c    Goals   Short Term Goal  pt will perform UE strengthening exercises    Long Term Goal #1 pt will perform LB dressing/bathing at (S) level    Long Term Goal #2 pt will perform FM c RW at mod (I) level    Long Term Goal pt will perform toilet transfers and [place] hygiene at (I) level    Plan   Treatment Interventions ADL retraining;Functional transfer training;UE strengthening/ROM; Endurance training;Cognitive reorientation;Patient/family training; Activityengagement   Goal Expiration Date 05/28/18   OT Frequency 3-5x/wk   Recommendation   OT Discharge Recommendation (home health and outpatient PT)   OT - OK to Discharge No   Barthel Index   Feeding 10   Bathing 0   Grooming Score 0   Dressing Score 5   Bladder Score 10   Bowels Score 10   Toilet Use Score 5   Transfers (Bed/Chair) Score 10   Mobility (Level Surface) Score 10   Stairs Score 0   Barthel Index Score 60     Pt will benefit from continued OT services in order to maximize (I) c ADL performance, FM c RW, and improve overall endurance/strength required to complete functional tasks in preparation for d/c  Pt left seated in chair at end of session; all needs within reach; all lines intact; scds connected and turned on

## 2018-05-14 NOTE — CASE MANAGEMENT
Initial Clinical Review    Admission: Date/Time/Statement: 5/13/18 @ 1808     Orders Placed This Encounter   Procedures    Inpatient Admission (expected length of stay for this patient is greater than two midnights)     Standing Status:   Standing     Number of Occurrences:   1     Order Specific Question:   Admitting Physician     Answer:   Madi Garrido     Order Specific Question:   Level of Care     Answer:   Med Surg [16]     Order Specific Question:   Estimated length of stay     Answer:   More than 2 Midnights     Order Specific Question:   Certification     Answer:   I certify that inpatient services are medically necessary for this patient for a duration of greater than two midnights  See H&P and MD Progress Notes for additional information about the patient's course of treatment  ED: Date/Time/Mode of Arrival:   ED Arrival Information     Expected Arrival Acuity Means of Arrival Escorted By Service Admission Type    - 5/13/2018 16:28 Urgent [address] [facility] General Medicine Urgent    Arrival Complaint    sob          Chief Complaint:   Chief Complaint   Patient presents with    Shortness of Breath     Patient states he started with shortness of breath this morning with a productive cough  History of Illness: 19-year-old male with past medical history significant for COPD and chronic diastolic congestive heart failure presents to the emergency room today with a chief complaint of fevers and rigors  Patient has a history of dementia and is accompanied by his wife was able to relate a good history  She states that he has been complaining of a cough and the patient states he has been expectorating yellow sputum  Wife states that when he was coming down the stairs he developed sudden rigors and had to sit down  He broke out in a sweat and and the patient's wife brought to the emergency room    In the ER he was noted to have a fever over 100 9, x-ray reviewed by myself in the emergency room physician shows a possible infiltrate        ED Vital Signs:   ED Triage Vitals [05/13/18 1630]   Temperature Pulse Respirations Blood Pressure SpO2   (!) 100 9 °F (38 3 °C) 80 22 136/67 92 %      Temp Source Heart Rate Source Patient Position - Orthostatic VS BP Location FiO2 (%)   Temporal Monitor Lying Left arm --      Pain Score       No Pain        Wt Readings from Last 1 Encounters:   05/13/18 76 4 kg (168 lb 6 9 oz)       Vital Signs (abnormal): Abnormal Labs/Diagnostic Test Results: na 133--cl 97---alk phos 121--alb 3 4--t  Bili 2 10  platlets 128  ED Treatment:   Medication Administration from 05/13/2018 1628 to 05/13/2018 1929       Date/Time Order Dose Route Action Action by Comments     05/13/2018 1756 vancomycin (VANCOCIN) 1,250 mg in sodium chloride 0 9 % 250 mL IVPB 1,250 mg Intravenous New Bag Lorena Gorman RN      05/13/2018 1753 cefepime (MAXIPIME) IVPB (premix) 2,000 mg 0 mg Intravenous Stopped Lorena Gorman RN      05/13/2018 1714 cefepime (MAXIPIME) IVPB (premix) 2,000 mg 2,000 mg Intravenous New Bag Lorena Gorman RN      05/13/2018 1756 sodium chloride 0 9 % bolus 250 mL 250 mL Intravenous New Bag Lorena Gorman RN           Past Medical/Surgical History:    Active Ambulatory Problems     Diagnosis Date Noted    Acute tracheobronchitis 12/28/2016    COPD (chronic obstructive pulmonary disease) ([facility] ) 12/28/2016    Hyperlipidemia 12/28/2016    GERD (gastroesophageal reflux disease) 12/28/2016    Abnormal CT scan, chest 06/01/2017    Abdominal aortic aneurysm (HCC) 06/01/2017    Thrombocytopenia ([facility] [address] ) 06/02/2017    History of aortic aneurysm repair 09/01/2017    Benign prostatic hyperplasia 09/18/2015    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 01/29/2018    Bradycardia 03/06/2018    SOB (shortness of breath) 03/06/2018    Pulmonary nodule 03/07/2018    Ventricular bigeminy 03/07/2018    Positional lightheadedness 03/07/2018    Coronary artery disease involving native coronary artery of native heart with angina pectoris ([facility] ) 05/11/2018    Hx of CABG 05/11/2018    Bilateral carotid artery stenosis 05/11/2018     Resolved Ambulatory Problems     Diagnosis Date Noted    Severe sepsis ([place] [address] ) 12/28/2016    Gastroenteritis 12/28/2016    Nausea and vomiting 12/28/2016    Syncope and collapse 12/28/2016    Acute kidney injury ([place] [address] ) 05/31/2017    Microscopic hematuria 05/31/2017    Lactic acidosis 05/31/2017    Hyperbilirubinemia 05/31/2017    Streptococcus pneumoniae infection 06/01/2017    Hypophosphatemia 06/01/2017    Renal calculi 06/02/2017    Vitamin D insufficiency 06/02/2017     Past Medical History:   Diagnosis Date    AAA [facility]abdominal aortic [facility]) [facility]     Acid reflux     Acute exacerbation of chronic obstructive airways disease with asthma ([facility])     Acute serous otitis media of left ear     Anesthesia     Anxiety     Aortic aneurysm without rupture ([facility])     Arm bruise     Arthritis     Asthma     At risk for falls     BPH (benign prostatic hyperplasia)     BPH without urinary obstruction     Cancer ([place] [address] )     CAP (community acquired pneumonia)     Cataracts, bilateral     Change in bowel function     CHF (congestive heart failure) ([facility])     Clubbing of fingers     Colon polyps     Common cold     Contusion of elbow, left     COPD (chronic obstructive pulmonary disease) ([place] [address] )     Coronary artery disease     Cough     Dementia     Depression     Diverticulosis     Dizziness     Exercise counseling     Fatigue     Full dentures     Glaucoma screening     High cholesterol     History of abdominal aortic aneurysm (AAA) repair 08/2017    History of bacteremia     History of chronic obstructive lung disease     History of epistaxis     History of influenza vaccination     History of kidney stones     History of pneumonia     History of sepsis 10/2017    History of sinusitis     History of skin cancer     History of sleep apnea     History of urinary tract infection     [place] (hard of hearing)     Hydronephrosis with obstructing calculus     Influenza vaccine needed     Insomnia     Jock itch     Kidney stones     Left hip pain     Need for pneumococcal vaccination     Need for prophylactic vaccination and inoculation against influenza     Other emphysema ([place] [address] )     Pancreatitis, chronic ([place] [address] )     Pneumonia 10/26/2017    Pulmonary emphysema ([place] [address] )     S/P CABG x 3     Screening for genitourinary condition     Screening for neurological condition     Sepsis ([place] [address] )     Short-term memory loss     Sleep apnea     Special screening examination for neoplasm of prostate     TIA involving carotid artery     Ulcer     Unsteady gait     Use of cane as ambulatory aid     Vitamin D deficiency     Wears glasses        Admitting Diagnosis: Shortness of breath [R06 02]  Pneumonia [J18 9]    Age/Sex: 78 y o  male    Assessment/Plan:   HCAP (healthcare-associated pneumonia)   Assessment & Plan     vanco/cefepime  Urine legionella and pneumococcal antigen  Sputum culture  Blood cultures x2 obtain the emergency room  Check pro-calcitonin now and in am   Check for flu  Incentive spirometry          Elevated bilirubin   Assessment & Plan     Unclear etiology  No abdominal pain   Will repeat tomorrow morning           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   Assessment & Plan     Continue Aricept           GERD (gastroesophageal reflux disease)   Assessment & Plan     Protonix 40 mg p o  daily          COPD (chronic obstructive pulmonary disease) ([place] [address] )   Assessment & Plan     Without exacerbation  Initiate respiratory protocol and continue Spiriva, singular                Admission Orders:  Scheduled Meds:   Current Facility-Administered Medications:  ALPRAZolam 0 25 mg Oral BID PRN Sabino Anderson MD   aspirin 81 mg Oral Daily Nicci Mata MD   atorvastatin 20 mg Oral HS Nicci Mata MD   cefTRIAXone 1,000 mg Intravenous Q24H Nicci Mata MD   donepezil 10 mg Oral HS Nicci Mata MD   enoxaparin 40 mg Subcutaneous Daily Keaton Ortiz MD   escitalopram 20 mg Oral Daily Nicci Mata MD   ipratropium 0 5 mg Nebulization BID Nicci Mata MD   levalbuterol 1 25 mg Nebulization BID Nicci Mata MD   metoprolol succinate 25 mg Oral HS Nicci Mata MD   montelukast 10 mg Oral Daily Nicci Mata MD   potassium citrate 10 mEq Oral TID With Meals Keaton Ortiz MD   QUEtiapine 25 mg Oral HS Keaton Ortiz MD   saccharomyces boulardii 250 mg Oral BID Nicci Mata MD   tamsulosin 0 4 mg Oral Daily Keaton Ortiz MD   traZODone 50 mg Oral HS PRN Nicci Mata MD     Continuous Infusions:    PRN Meds:   ALPRAZolam    traZODone     scd  u/s of right uq abd  Reg diept/ot

## 2018-05-15 ENCOUNTER — HOSPITAL ENCOUNTER (OUTPATIENT)
Dept: ULTRASOUND IMAGING | Facility: HOSPITAL | Age: 78
Discharge: HOME | End: 2018-05-15
Attending: INTERNAL MEDICINE
Payer: MEDICARE

## 2018-05-15 VITALS
RESPIRATION RATE: 18 BRPM | TEMPERATURE: 96.7 F | HEART RATE: 110 BPM | WEIGHT: 168.43 LBS | HEIGHT: 68 IN | SYSTOLIC BLOOD PRESSURE: 114 MMHG | OXYGEN SATURATION: 89 % | DIASTOLIC BLOOD PRESSURE: 59 MMHG | BODY MASS INDEX: 25.53 KG/M2

## 2018-05-15 DIAGNOSIS — R00.1 BRADYCARDIA: ICD-10-CM

## 2018-05-15 DIAGNOSIS — I25.119 CORONARY ARTERY DISEASE INVOLVING NATIVE CORONARY ARTERY OF NATIVE HEART WITH ANGINA PECTORIS (HCC): ICD-10-CM

## 2018-05-15 PROBLEM — E87.1 HYPONATREMIA: Status: ACTIVE | Noted: 2018-05-15

## 2018-05-15 PROBLEM — I25.10 CAD (CORONARY ARTERY DISEASE): Status: ACTIVE | Noted: 2018-05-15

## 2018-05-15 LAB
ALBUMIN SERPL BCP-MCNC: 2.5 G/DL (ref 3.5–5)
ALP SERPL-CCNC: 108 U/L (ref 46–116)
ALT SERPL W P-5'-P-CCNC: 20 U/L (ref 12–78)
ANION GAP SERPL CALCULATED.3IONS-SCNC: 12 MMOL/L (ref 4–13)
AST SERPL W P-5'-P-CCNC: 18 U/L (ref 5–45)
BASOPHILS # BLD AUTO: 0.02 THOUSANDS/ΜL (ref 0–0.1)
BASOPHILS NFR BLD AUTO: 0 % (ref 0–1)
BILIRUB SERPL-MCNC: 1.5 MG/DL (ref 0.2–1)
BUN SERPL-MCNC: 12 MG/DL (ref 5–25)
CALCIUM SERPL-MCNC: 8.3 MG/DL (ref 8.3–10.1)
CHLORIDE SERPL-SCNC: 102 MMOL/L (ref 100–108)
CO2 SERPL-SCNC: 23 MMOL/L (ref 21–32)
CREAT SERPL-MCNC: 1 MG/DL (ref 0.6–1.3)
EOSINOPHIL # BLD AUTO: 0.07 THOUSAND/ΜL (ref 0–0.61)
EOSINOPHIL NFR BLD AUTO: 1 % (ref 0–6)
ERYTHROCYTE [DISTWIDTH] IN BLOOD BY AUTOMATED COUNT: 15.1 % (ref 11.6–15.1)
GFR SERPL CREATININE-BSD FRML MDRD: 73 ML/MIN/1.73SQ M
GLUCOSE SERPL-MCNC: 102 MG/DL (ref 65–140)
HCT VFR BLD AUTO: 40.4 % (ref 36.5–49.3)
HGB BLD-MCNC: 14 G/DL (ref 12–17)
LYMPHOCYTES # BLD AUTO: 0.86 THOUSANDS/ΜL (ref 0.6–4.47)
LYMPHOCYTES NFR BLD AUTO: 15 % (ref 14–44)
MCH RBC QN AUTO: 32.6 PG (ref 26.8–34.3)
MCHC RBC AUTO-ENTMCNC: 34.7 G/DL (ref 31.4–37.4)
MCV RBC AUTO: 94 FL (ref 82–98)
MONOCYTES # BLD AUTO: 0.79 THOUSAND/ΜL (ref 0.17–1.22)
MONOCYTES NFR BLD AUTO: 14 % (ref 4–12)
NEUTROPHILS # BLD AUTO: 4.06 THOUSANDS/ΜL (ref 1.85–7.62)
NEUTS SEG NFR BLD AUTO: 70 % (ref 43–75)
PLATELET # BLD AUTO: 135 THOUSANDS/UL (ref 149–390)
PMV BLD AUTO: 9.6 FL (ref 8.9–12.7)
POTASSIUM SERPL-SCNC: 3.3 MMOL/L (ref 3.5–5.3)
PROCALCITONIN SERPL-MCNC: <0.05 NG/ML
PROT SERPL-MCNC: 6.4 G/DL (ref 6.4–8.2)
RBC # BLD AUTO: 4.3 MILLION/UL (ref 3.88–5.62)
SODIUM SERPL-SCNC: 137 MMOL/L (ref 136–145)
WBC # BLD AUTO: 5.8 THOUSAND/UL (ref 4.31–10.16)

## 2018-05-15 PROCEDURE — 97110 THERAPEUTIC EXERCISES: CPT

## 2018-05-15 PROCEDURE — 80053 COMPREHEN METABOLIC PANEL: CPT | Performed by: FAMILY MEDICINE

## 2018-05-15 PROCEDURE — 93880 EXTRACRANIAL BILAT STUDY: CPT | Performed by: SURGERY

## 2018-05-15 PROCEDURE — 84145 PROCALCITONIN (PCT): CPT | Performed by: FAMILY MEDICINE

## 2018-05-15 PROCEDURE — 99239 HOSP IP/OBS DSCHRG MGMT >30: CPT | Performed by: INTERNAL MEDICINE

## 2018-05-15 PROCEDURE — 94640 AIRWAY INHALATION TREATMENT: CPT

## 2018-05-15 PROCEDURE — 97116 GAIT TRAINING THERAPY: CPT

## 2018-05-15 PROCEDURE — 97535 SELF CARE MNGMENT TRAINING: CPT

## 2018-05-15 PROCEDURE — 94760 N-INVAS EAR/PLS OXIMETRY 1: CPT

## 2018-05-15 PROCEDURE — 85025 COMPLETE CBC W/AUTO DIFF WBC: CPT | Performed by: FAMILY MEDICINE

## 2018-05-15 PROCEDURE — 93880 EXTRACRANIAL BILAT STUDY: CPT

## 2018-05-15 RX ORDER — CEFDINIR 300 MG/1
300 CAPSULE ORAL EVERY 12 HOURS SCHEDULED
Qty: 10 CAPSULE | Refills: 0 | Status: SHIPPED | OUTPATIENT
Start: 2018-05-15 | End: 2018-05-18 | Stop reason: SDUPTHER

## 2018-05-15 RX ORDER — PREDNISONE 10 MG/1
TABLET ORAL
Qty: 12 TABLET | Refills: 0 | Status: SHIPPED | OUTPATIENT
Start: 2018-05-15 | End: 2018-07-02 | Stop reason: ALTCHOICE

## 2018-05-15 RX ADMIN — POTASSIUM CITRATE 10 MEQ: 10 TABLET, EXTENDED RELEASE ORAL at 13:01

## 2018-05-15 RX ADMIN — LEVALBUTEROL 1.25 MG: 1.25 SOLUTION, CONCENTRATE RESPIRATORY (INHALATION) at 07:22

## 2018-05-15 RX ADMIN — POTASSIUM CITRATE 10 MEQ: 10 TABLET, EXTENDED RELEASE ORAL at 09:14

## 2018-05-15 RX ADMIN — MONTELUKAST SODIUM 10 MG: 10 TABLET, FILM COATED ORAL at 09:14

## 2018-05-15 RX ADMIN — Medication 250 MG: at 09:14

## 2018-05-15 RX ADMIN — ASPIRIN 81 MG 81 MG: 81 TABLET ORAL at 09:14

## 2018-05-15 RX ADMIN — ESCITALOPRAM OXALATE 20 MG: 10 TABLET ORAL at 09:14

## 2018-05-15 RX ADMIN — ENOXAPARIN SODIUM 40 MG: 40 INJECTION SUBCUTANEOUS at 09:14

## 2018-05-15 RX ADMIN — TAMSULOSIN HYDROCHLORIDE 0.4 MG: 0.4 CAPSULE ORAL at 09:14

## 2018-05-15 RX ADMIN — IPRATROPIUM BROMIDE 0.5 MG: 0.5 SOLUTION RESPIRATORY (INHALATION) at 07:22

## 2018-05-15 RX ADMIN — OXYCODONE HYDROCHLORIDE 5 MG: 5 TABLET ORAL at 04:08

## 2018-05-15 NOTE — SOCIAL WORK
Discussed with patient preferences on discharge;understanding how to manage health at home; purpose of taking medications; importance of follow up care/appointments; and symptoms to watch out for once discharged home  Cm called pt's PCP office re: JORGE CERVANTES appointment as pt has an appointment but not until May 29th  Deana at Dr Consuelo Pathak office will check with Dr Holland Gordon and call Cm back  Pt's wife will be over to take pt home   Pt states his wife called to re schedule him to start outpatient PT

## 2018-05-15 NOTE — ASSESSMENT & PLAN NOTE
Much improved with ceftriaxone and breathing treatments  Will discharge with cefdinir for five more days to complete seven day course    In addition will also give prednisone taper for short course of six days

## 2018-05-15 NOTE — PLAN OF CARE
Problem: DISCHARGE PLANNING - CARE MANAGEMENT  Goal: Discharge to post-acute care or home with appropriate resources  INTERVENTIONS:  - Conduct assessment to determine patient/family and health care team treatment goals, and need for post-acute services based on payer coverage, community resources, and patient preferences, and barriers to discharge  - Address psychosocial, clinical, and financial barriers to discharge as identified in assessment in conjunction with the patient/family and health care team  - Arrange appropriate level of post-acute services according to patient's   needs and preference and payer coverage in collaboration with the physician and health care team  - Communicate with and update the patient/family, physician, and health care team regarding progress on the discharge plan  - Arrange appropriate transportation to post-acute venues   Outcome: Completed Date Met: 05/15/18  -MA home with outpatient follow up with PCP

## 2018-05-15 NOTE — PHYSICAL THERAPY NOTE
PT Treatment note      05/15/18 0900   Pain Assessment   Diversional Activities Television   Exercises   Hip Flexion 20 reps   Hip Abduction 20 reps   Hip Adduction 20 reps   Knee AROM Long Arc Quad 20 reps   Ankle Pumps 20 reps   Assessment   Prognosis Good   Problem List Decreased strength;Decreased endurance; Impaired balance;Decreased mobility;Pain   Assessment Performs functional mobility with supervision  Mild SOB   after ambulation  Pt would benefit from continued activity in PT to improve impairments and functional deficits  Pt will be safe to return home  Recommend Outpatient PT for treatment of back pain and radicular symptoms  Plan   Treatment/Interventions Functional transfer training;LE strengthening/ROM; Therapeutic exercise; Endurance training;Bed mobility;Gait training   Progress Progressing toward goals   Recommendation   Recommendation Outpatient PT   Pt  OOB with call bell within reach, scd's connected and turned on and alarm on at end of PT session

## 2018-05-15 NOTE — DISCHARGE SUMMARY
Discharge- Barby Baum 1940, 78 y o  male MRN: [id number]    Unit/Bed#: 409-01 Encounter: [id number]    Primary Care Provider: Keysha Sanon DO   Date and time admitted to hospital: 5/13/2018  4:31 PM        DISCHARGE DIAGNOSES  * Tracheobronchitis   Assessment & Plan     Much improved with ceftriaxone and breathing treatments  Will discharge with cefdinir for five more days to complete seven day course  In addition will also give prednisone taper for short course of six days        Hyponatremia   Assessment & Plan     Likely secondary to acute illness        Elevated bilirubin   Assessment & Plan     Ultrasound of liver without any biliary obstruction        Dementia   Assessment & Plan     Mentation at baseline  Continue donepezil        Benign prostatic hyperplasia   Assessment & Plan     No urinary symptoms  Continue  tamsulosin        Thrombocytopenia (HCC)   Assessment & Plan     Mild  No evidence of bleeding        COPD (chronic obstructive pulmonary disease) ([facility] [address] )   Assessment & Plan     Without exacerbation  Continue Breo and Spiriva at time of discharge        Admitting Provider:  Stefanie Block MD  Discharge Provider:  Cheryl Forman DO  Admission Date: 5/13/2018       Discharge Date: 05/15/18   LOS: 2  Primary Care Physician at Discharge: Keysha Sanon, [address] [place] [address] COURSE:  Barby Baum is a 78 y o  male who has a past medical history of COPD presents to the hospital worsening shortness of breath and fevers  In the emergency department he was thought to have pneumonia and started on vancomycin and cefepime  Official chest x-ray without infiltrate  Procalcitonin and blood cultures negative to date There are concerns of tracheobronchitis  At time of discharge she is on room air without any symptoms with rest or ambulation  He will be discharged with cefdinir for five more days to complete seven day course as well as prednisone taper      At time of discharge case discussed with patient's wife  All questions have been answered to satisfaction  CONSULTING PROVIDERS   none    PROCEDURES PERFORMED  X-ray Chest 2 Views  Result Date: 5/14/2018  Impression: No acute process in a patient with previous aortocoronary bypass, emphysema, and pulmonary parenchymal scarring Workstation performed: [id number]     Us Right Upper Quadrant  Result Date: 5/15/2018  Impression: Normal   Workstation performed: [id number]       Other Pertinent Test Results    Results from last 7 days  Lab Units 05/15/18  0437 05/14/18  0542 05/13/18  1953 05/13/18  1634   WBC Thousand/uL 5 80 6 61  --  8 58   HEMOGLOBIN g/dL 14 0 14 8  --  15 8   HEMATOCRIT % 40 4 43 4  --  45 4   MCV fL 94 95  --  95   PLATELETS Thousands/uL 135* 136* 125* 128*   INR   --   --   --  1 01       Results from last 7 days  Lab Units 05/15/18  0437 05/14/18  0542 05/13/18  1634   SODIUM mmol/L 137 135* 133*   POTASSIUM mmol/L 3 3* 3 6 4 2   CHLORIDE mmol/L 102 102 97*   CO2 mmol/L 23 23 26   ANION GAP mmol/L 12 10 10   BUN mg/dL 12 13 15   CREATININE mg/dL 1 00 1 03 1 18   CALCIUM mg/dL 8 3 8 6 9 0   BILIRUBIN TOTAL mg/dL 1 50* 2 60* 2 10*   ALK PHOS U/L 108 105 121*   ALT U/L 20 20 23   AST U/L 18 15 15   EGFR ml/min/1 73sq m 73 71 60   GLUCOSE RANDOM mg/dL 102 111 108       Results from last 7 days  Lab Units 05/13/18  1634   TROPONIN I ng/mL <0 02       Results from last 7 days  Lab Units 05/13/18  1653   LACTIC ACID mmol/L 2 0           Cultures:       Results from last 7 days  Lab Units 05/13/18  2031 05/13/18  1713 05/13/18  1653   BLOOD CULTURE   --  No Growth at 24 hrs  No Growth at 24 hrs     LEGIONELLA URINARY ANTIGEN  Negative  --   --    STREP PNEUMONIAE ANTIGEN, URINE  Negative  --   --        PHYSICAL EXAM:  Vitals:   Blood Pressure: 114/59 (05/15/18 0740)  Pulse: (!) 110 (05/15/18 0740)  Temperature: (!) 96 7 °F (35 9 °C) (05/15/18 0740)  Temp Source: Temporal (05/15/18 0740)  Respirations: 18 (05/15/18 0740)  Height: 5' 8" (172 7 cm) (05/13/18 1945)  Weight - Scale: 76 4 kg (168 lb 6 9 oz) (05/13/18 1945)  SpO2: 92 % (05/15/18 0740)    General appearance: alert, appears stated age and cooperative  Head: Normocephalic, without obvious abnormality, atraumatic  Eyes: conjunctivae/corneas clear  PERRL, EOM's intact  Lungs: diminished breath sounds  Heart: regular rate and rhythm  Abdomen: Soft nontender nondistended positive bowel sounds  Back: negative, range of motion normal  Extremities: No ulcers or edema  Neurologic: Grossly normal    Planned Re-admission:  no  Discharge Disposition: Home/Self Care    Test Results Pending at Discharge:    Order Current Status    Sputum culture and Gram stain In process    Blood culture #1 Preliminary result    Blood culture #2 Preliminary result        Incidental findings:     Medications   Please see After Visit Summary for reconciled discharge medications provided to patient and family  Diet restrictions: Regular diet   Activity restrictions: No strenuous activity  Discharge Condition: stable    Outpatient Follow-Up  1  Abel Guidry DO [address] [place] [address] / [facility] [place] [address] [phone] - follow-up within one week    Code Status: Level 1 - Full Code  Discharge Statement   I spent 35 minutes discharging the patient  This time was spent on the day of discharge  Greater than 50% of total time was spent with the patient and / or family counseling and / or coordination of care

## 2018-05-15 NOTE — OCCUPATIONAL THERAPY NOTE
OT Note     05/15/18 0859   Restrictions/Precautions   Other Precautions Pain; Fall Risk   Bed Mobility   Sit to Supine 4  Minimal assistance   Additional items LE management; Increased time required;Assist x 1   Transfers   Sit to Stand 5  Supervision   Stand to Sit 5  Supervision   Additional items Verbal cues   Functional Mobility   Functional Mobility 5  Supervision   Additional Comments Completes txfr recliner to opposite side of bed   Additional items (Declines A device)   Activity Tolerance   Activity Tolerance (Tolerates tx session)   Assessment   Assessment Presents seated recliner at bedside  Agreeable to participate however reuests return supine prior to AE education  Educated use [facility], sock aide via demonstration and explanation  Receptive to all info, offers appropriate iinput  Call bell and phone in reach     Recommendation   OT Discharge Recommendation Home with family support

## 2018-05-15 NOTE — PHYSICIAN ADVISOR
Current patient class: Inpatient  The patient is currently on Hospital Day: 2      The patient was admitted to the hospital at [phone] on 5/13/18 for the following diagnosis:  Shortness of breath [R06 02]  Pneumonia [J18 9]       There is documentation in the medical record of an expected length of stay of at least 2 midnights  The patient is therefore expected to satisfy the 2 midnight benchmark and given the 2 midnight presumption is appropriate for INPATIENT ADMISSION  Given this expectation of a satisfying stay, CMS instructs us that the patient is most often appropriate for inpatient admission under part A provided medical necessity is documented in the chart  After review of the relevant documentation, labs, vital signs and test results, the patient is appropriate for INPATIENT ADMISSION  Admission to the hospital as an inpatient is a complex decision making process which requires the practitioner to consider the patients presenting complaint, history and physical examination and all relevant testing  With this in mind, in this case, the patient was deemed appropriate for INPATIENT ADMISSION  After review of the documentation and testing available at the time of the admission I concur with this clinical determination of medical necessity  Rationale is as follows: The patient is a 78 yrs old Male who presented to the ED at 5/13/2018  4:31 PM with a chief complaint of Shortness of Breath (Patient states he started with shortness of breath this morning with a productive cough )     Given the need for further hospitalization, and along with the documentation of medical necessity present in the chart, the patient is appropriate for inpatient admission  The patient is expected to satisfy the 2 midnight benchmark, and will require further acute medical care  The patient does have comorbid conditions which increases the risk for significant adverse outcome   Given this the patient is appropriate for inpatient admission        The patients vitals on arrival were ED Triage Vitals [05/13/18 1630]   Temperature Pulse Respirations Blood Pressure SpO2   (!) 100 9 °F (38 3 °C) 80 22 136/67 92 %      Temp Source Heart Rate Source Patient Position - Orthostatic VS BP Location FiO2 (%)   Temporal Monitor Lying Left arm --      Pain Score       No Pain           Past Medical History:   Diagnosis Date    AAA [facility]ab[facility]) [facility]     Acid reflux     Acute exacerbation of chronic obstructive airways disease with asthma ([facility])     Acute serous otitis media of left ear     recurrence not specified     Anesthesia     "always has mental changes /lingers and lingers after anesthesia"    Anxiety     Aortic aneurysm without rupture ([facility])     Arm bruise     right inner forearm "recent IV"    Arthritis     spine and poss left hip    Asthma     bronchietasis    At risk for falls     BPH (benign prostatic hyperplasia)     pt and wife can't confirm 11/10    BPH without urinary obstruction     Cancer ([address] )     skin CA on nose    CAP (community acquired pneumonia)     Cataracts, bilateral     Change in bowel function     CHF (congestive heart failure) ([facility])     Clubbing of fingers     Colon polyps     Common cold     Contusion of elbow, left     initial encounter     COPD (chronic obstructive pulmonary disease) ([address] )     Coronary artery disease     Cough     Dementia     Depression     Diverticulosis     Dizziness     upon "standing quickly on occas"    Exercise counseling     Fatigue     Full dentures     "doesn't wear them"    Glaucoma screening     High cholesterol     History of abdominal aortic aneurysm (AAA) repair 08/2017    History of bacteremia     History of chronic obstructive lung disease     History of epistaxis     History of influenza vaccination     History of kidney stones     History of pneumonia     "many times" "at least 5 times" almost always goes to sepsis"    History of sepsis 10/2017    History of sinusitis     History of skin cancer     History of sleep apnea     History of urinary tract infection     [place] hearing[place]     Hydronephrosis with obstructing calculus     Influenza vaccine needed     Insomnia     Jock itch     left/saw doctor 11/8 and started on antifungal cream    Kidney stones     Left hip pain     Need for pneumococcal vaccination     Need for prophylactic vaccination and inoculation against influenza     Other emphysema ([address] )     Pancreatitis, chronic ([address] )     pt and wife can't confirm 11/10/17    Pneumonia 10/26/2017    admitted [facility]    Pulmonary emphysema ([address] )     S/P CABG x 3     approx 14 yrs ago    Screening for genitourinary condition     Screening for neurological condition     Sepsis [facility] )     due to unspecified organism     Short-term memory loss     Sleep apnea     does not use CPAP      Special screening examination for neoplasm of prostate     TIA involving carotid artery     "before carotid surgery"    Ulcer     stomach, "years ago"    Unsteady gait     Use of cane as ambulatory aid     sometimes    Vitamin D deficiency     Wears glasses      Past Surgical History:   Procedure Laterality Date    ABDOMINAL AORTIC ANEURYSM REPAIR  08/23/2017    ABDOMINAL AORTIC ANEURYSM REPAIR, ENDOVASCULAR      CARDIAC SURGERY      CABG x3    CAROTID ENDARTARECTOMY Left 11/1996    CATARACT EXTRACTION Bilateral     CORONARY ARTERY BYPASS GRAFT  01/2003    x3    CYSTOSCOPY      with insertion of ureteral stent     CYSTOSCOPY      with ureteroscopy with lithotripsy     EXCISIONAL HEMORRHOIDECTOMY      EYE SURGERY Bilateral     "for a wrinkle" after cataract surgery    HERNIA REPAIR      umbilical    LITHOTRIPSY      renal    MOUTH SURGERY      full mouth extraction     NC COLONOSCOPY FLX DX W/COLLJ SPEC WHEN PFRMD N/A 5/16/2017    Procedure: COLONOSCOPY with polypectomies/ hot snare and tattoo;  Surgeon: Asiya Goldberg Jessica Solorzano MD;  [facility]: [facility]; Service: Gastroenterology    LA CYSTOURETHROSCOPY N/A 11/30/2017    Procedure: Jayy First;  Surgeon: Traci Rivas MD;  [facility];  Service: Urology    LA ESOPHAGOGASTRODUODENOSCOPY TRANSORAL DIAGNOSTIC N/A 8/18/2016    Procedure: ESOPHAGOGASTRODUODENOSCOPY (EGD); Surgeon: Chandler Wynn MD;  [facility]; Service: Gastroenterology    LA REMOVE BLADDER STONE,<2 5 CM N/A 11/30/2017    Procedure: Rhealacey Weinsteins;  Surgeon: Traci Rivas MD;  [facility];  Service: Urology    TONSILLECTOMY     [address]      and removal           Consults have been placed to:   None    Vitals:    05/14/18 0706 05/14/18 0750 05/14/18 1557 05/14/18 2002   BP:  99/59 116/58    BP Location:  Left arm Left arm    Pulse:  96 88    Resp:  18 18    Temp:  98 2 °F (36 8 °C) (!) 97 4 °F (36 3 °C)    TempSrc:  Temporal Temporal    SpO2: 93% 93% 95% 93%   Weight:       Height:           Most recent labs:    Recent Labs      05/13/18   1634   05/14/18   0542   WBC  8 58   --   6 61   HGB  15 8   --   14 8   HCT  45 4   --   43 4   PLT  128*   < >  136*   K  4 2   --   3 6   NA  133*   --   135*   CALCIUM  9 0   --   8 6   BUN  15   --   13   CREATININE  1 18   --   1 03   INR  1 01   --    --    TROPONINI  <0 02   --    --    AST  15   --   15   ALT  23   --   20   ALKPHOS  121*   --   105   BILITOT  2 10*   --   2 60*    < > = values in this interval not displayed         Scheduled Meds:  Current Facility-Administered Medications:  acetaminophen 650 mg Oral Q6H PRN El Aggarwal DO    ALPRAZolam 0 25 mg Oral BID PRN Gail Bowens MD    aluminum-magnesium hydroxide-simethicone 30 mL Oral Q4H PRN El Aggarwal DO    aspirin 81 mg Oral Daily Nicci Mata MD    atorvastatin 20 mg Oral HS Nicci Mata MD    cefTRIAXone 1,000 mg Intravenous Q24H Nicci Mata MD Last Rate: 1,000 mg (05/14/18 1826)   donepezil 10 mg Oral HS Gail Bowens MD enoxaparin 40 mg Subcutaneous Daily Nicci Mata MD    escitalopram 20 mg Oral Daily Nicci Mata MD    ipratropium 0 5 mg Nebulization BID Nicci Mata MD    levalbuterol 1 25 mg Nebulization BID Nicci Mata MD    metoprolol succinate 25 mg Oral HS Nicci Mata MD    montelukast 10 mg Oral Daily Nicci Mata MD    oxyCODONE 5 mg Oral Q4H PRN Tonie Syed DO    polyvinyl alcohol 1 drop Both Eyes Q3H PRN Tonie Syed DO    potassium citrate 10 mEq Oral TID With Meals Keaton Ortiz MD    QUEtiapine 25 mg Oral HS Keaton Ortiz MD    saccharomyces boulardii 250 mg Oral BID Keaton Ortiz MD    tamsulosin 0 4 mg Oral Daily Keaton Ortiz MD    traZODone 50 mg Oral HS PRN Nicci Mata MD      Continuous Infusions:   PRN Meds:   acetaminophen    ALPRAZolam    aluminum-magnesium hydroxide-simethicone    oxyCODONE    polyvinyl alcohol    traZODone    Surgical procedures (if appropriate):

## 2018-05-16 ENCOUNTER — OFFICE VISIT (OUTPATIENT)
Dept: FAMILY MEDICINE CLINIC | Facility: CLINIC | Age: 78
End: 2018-05-16
Payer: MEDICARE

## 2018-05-16 ENCOUNTER — TRANSITIONAL CARE MANAGEMENT (OUTPATIENT)
Dept: FAMILY MEDICINE CLINIC | Facility: CLINIC | Age: 78
End: 2018-05-16

## 2018-05-16 VITALS
BODY MASS INDEX: 24.75 KG/M2 | SYSTOLIC BLOOD PRESSURE: 92 MMHG | WEIGHT: 163.3 LBS | DIASTOLIC BLOOD PRESSURE: 60 MMHG | HEIGHT: 68 IN

## 2018-05-16 DIAGNOSIS — R50.9 FEVER OF UNKNOWN ORIGIN: Primary | ICD-10-CM

## 2018-05-16 PROCEDURE — 99496 TRANSJ CARE MGMT HIGH F2F 7D: CPT | Performed by: FAMILY MEDICINE

## 2018-05-16 NOTE — PROGRESS NOTES
Assessment/Plan:      There are no diagnoses linked to this encounter  Subjective:     Patient ID: Kenna Mcelroy is a 78 y o  male  Patient is here for transition of care is to now hospital for about 2 days still not right he has is sometimes has chills or tremors he sometimes becomes very hot almost like a woman having a hot flash and has removed clothing on an but then other times he has he has cold backed he still has long Travis's on today I have reviewed his hospital record and updated his med list on the he had a very extensive workup to negative blood cultures his procalcitonin level was negative on no sign of any sepsis he had abdominal ultrasounds done for gallbladder disease which was negative on he has home from the hospital now on cefepime after receiving Rocephin while in the hospital is wife states that he is not back to his baseline I agree however I do not have any other reason for him not to be at his baseline it may well be that he was sick enough in the hospital that is just going to take a while for him to regain his usual level of activity        Review of Systems   Constitutional: Negative for activity change, appetite change, diaphoresis, fatigue and fever  HENT: Negative  Eyes: Negative  Respiratory: Negative for apnea, cough, chest tightness, shortness of breath and wheezing  Cardiovascular: Negative for chest pain, palpitations and leg swelling  Gastrointestinal: Negative for abdominal distention, abdominal pain, anal bleeding, constipation, diarrhea, nausea and vomiting  Endocrine: Negative for cold intolerance, heat intolerance, polydipsia, polyphagia and polyuria  Genitourinary: Negative for difficulty urinating, dysuria, flank pain, hematuria and urgency  Musculoskeletal: Negative for arthralgias, back pain, gait problem, joint swelling and myalgias  Skin: Negative for color change, rash and wound     Allergic/Immunologic: Negative for environmental allergies, food allergies and immunocompromised state  Neurological: Negative for dizziness, seizures, syncope, speech difficulty, numbness and headaches  Hematological: Negative for adenopathy  Does not bruise/bleed easily  Psychiatric/Behavioral: Negative for agitation, behavioral problems, hallucinations, sleep disturbance and suicidal ideas  Objective:     Physical Exam   Constitutional: He is oriented to person, place, and time  He appears well-developed and well-nourished  No distress  HENT:   Head: Normocephalic  Right Ear: External ear normal    Left Ear: External ear normal    Nose: Nose normal    Mouth/Throat: Oropharynx is clear and moist    Eyes: Conjunctivae and EOM are normal  Pupils are equal, round, and reactive to light  Right eye exhibits no discharge  Left eye exhibits no discharge  No scleral icterus  Neck: Normal range of motion  No tracheal deviation present  No thyromegaly present  Cardiovascular: Normal rate, regular rhythm and normal heart sounds  Exam reveals no gallop and no friction rub  No murmur heard  Pulmonary/Chest: Effort normal and breath sounds normal  No respiratory distress  He has no wheezes  Abdominal: Soft  Bowel sounds are normal  He exhibits no mass  There is no tenderness  There is no guarding  Musculoskeletal: He exhibits no edema or deformity  Lymphadenopathy:     He has no cervical adenopathy  Neurological: He is alert and oriented to person, place, and time  No cranial nerve deficit  Skin: Skin is warm and dry  No rash noted  He is not diaphoretic  No erythema  Psychiatric: He has a normal mood and affect   Thought content normal          Vitals:    05/16/18 1027   BP: 92/60   BP Location: Left arm   Patient Position: Sitting   Cuff Size: Standard   Weight: 74 1 kg (163 lb 4 8 oz)   Height: 5' 8" (1 727 m)       The following portions of the patient's history were reviewed and updated as appropriate:   He  has a past medical history of AAA (abdominal aortic aneurysm) ([facility] ); Acid reflux; Acute exacerbation of chronic obstructive airways disease with asthma ([facility] ); Acute serous otitis media of left ear; Anesthesia; Anxiety; Aortic aneurysm without rupture ([facility] ); Arm bruise; Arthritis; Asthma; At risk for falls; BPH (benign prostatic hyperplasia); BPH without urinary obstruction; Cancer ([facility] ); CAP (community acquired pneumonia); Cataracts, bilateral; Change in bowel function; CHF (congestive heart failure) ([facility] ); Clubbing of fingers; Colon polyps; Common cold; Contusion of elbow, left; COPD (chronic obstructive pulmonary disease) ([facility] ); Coronary artery disease; Cough; Dementia; Depression; Diverticulosis; Dizziness; Exercise counseling; Fatigue; Full dentures; Glaucoma screening; High cholesterol; History of abdominal aortic aneurysm (AAA) repair (08/2017); History of bacteremia; History of chronic obstructive lung disease; History of epistaxis; History of influenza vaccination; History of kidney stones; History of pneumonia; History of sepsis (10/2017); History of sinusitis; History of skin cancer; History of sleep apnea; History of urinary tract infection; [place] (hard of hearing); Hydronephrosis with obstructing calculus; Influenza vaccine needed; Insomnia; Jock itch; Kidney stones; Left hip pain; Need for pneumococcal vaccination; Need for prophylactic vaccination and inoculation against influenza; Other emphysema ([facility] ); Pancreatitis, chronic ([facility] ); Pneumonia (10/26/2017); Pulmonary emphysema ([facility] ); S/P CABG x 3; Screening for genitourinary condition; Screening for neurological condition; Sepsis ([facility] ); Short-term memory loss; Sleep apnea; Special screening examination for neoplasm of prostate; TIA involving carotid artery; Ulcer; Unsteady gait; Use of cane as ambulatory aid; Vitamin D deficiency; and Wears glasses    He   Patient Active Problem List    Diagnosis Date Noted    Hyponatremia 05/15/2018    CAD (coronary artery disease) 05/15/2018    Elevated bilirubin 05/13/2018    Tracheobronchitis 05/13/2018    Coronary artery disease involving native coronary artery of native heart with angina pectoris ([facility] ) 05/11/2018    Hx of CABG 05/11/2018    Bilateral carotid artery stenosis 05/11/2018    Pulmonary nodule 03/07/2018    Ventricular bigeminy 03/07/2018    Positional lightheadedness 03/07/2018    Bradycardia 03/06/2018    SOB (shortness of breath) 03/06/2018    Dementia 01/29/2018    History of aortic aneurysm repair 09/01/2017    Thrombocytopenia ([facility] ) 06/02/2017    Abnormal CT scan, chest 06/01/2017    Abdominal aortic aneurysm ([facility]) 06/01/2017    Acute tracheobronchitis 12/28/2016    COPD (chronic obstructive pulmonary disease) ([place] [address] ) 12/28/2016    Hyperlipidemia 12/28/2016    GERD (gastroesophageal reflux disease) 12/28/2016    Benign prostatic hyperplasia 09/18/2015     He  has a past surgical history that includes Cardiac surgery; Ureteral stent placement; Excisional hemorrhoidectomy; Mouth surgery; pr esophagogastroduodenoscopy transoral diagnostic (N/A, 8/18/2016); Cataract extraction (Bilateral); Lithotripsy; Hernia repair; pr colonoscopy flx dx w/collj spec when pfrmd (N/A, 5/16/2017); Abdominal aortic aneurysm repair (08/23/2017); CAROTID ENDARTARECTOMY (Left, 11/1996); Coronary artery bypass graft (01/2003); Eye surgery (Bilateral); Cystoscopy; pr cystourethroscopy (N/A, 11/30/2017); pr remove bladder stone,<2 5 cm (N/A, 11/30/2017); Cystoscopy; AAA repair, endovascular; and Tonsillectomy  His family history includes Diabetes type II in his maternal grandmother and mother; Hypertension in his paternal grandmother; Kidney failure in his father  He  reports that he quit smoking about 4 years ago  He has a 90 00 pack-year smoking history  He has never used smokeless tobacco  He reports that he does not drink alcohol or use drugs    Current Outpatient Prescriptions   Medication Sig Dispense Refill    acetaminophen (TYLENOL) 325 mg tablet Take 650 mg by mouth every 6 (six) hours as needed        aspirin 81 MG tablet Take 81 mg by mouth daily Took within 24 hours       atorvastatin (LIPITOR) 20 mg tablet TAKE 1 TABLET AT BEDTIME 90 tablet 1    Bacillus Coagulans-Inulin (PROBIOTIC FORMULA) 1-250 BILLION-MG CAPS Take 2 capsules by mouth      cefdinir (OMNICEF) 300 mg capsule Take 1 capsule (300 mg total) by mouth every 12 (twelve) hours for 5 days 10 capsule 0    cetirizine (ZYRTEC ALLERGY) 10 mg tablet Take 10 mg by mouth every evening        Cholecalciferol (VITAMIN D-3 PO) Take 2,000 Units by mouth daily   cyanocobalamin (VITAMIN B-12) 1,000 mcg tablet Take by mouth daily      donepezil (ARICEPT) 10 mg tablet Take 1 tablet (10 mg total) by mouth daily at bedtime for 30 days 30 tablet 0    escitalopram (LEXAPRO) 20 mg tablet Take 1 tablet (20 mg total) by mouth daily 30 tablet 0    Fluticasone Furoate-Vilanterol (BREO ELLIPTA) 100-25 MCG/INH AEPB Inhale 1 puff daily   KRILL OIL PO Take 500 mg by mouth daily   METOPROLOL SUCCINATE ER PO Take 25 mg by mouth daily at bedtime        montelukast (SINGULAIR) 10 mg tablet take 1 tablet by mouth once daily 90 tablet 3    Multiple Vitamins-Minerals (CENTRUM SILVER ADULT 50+) TABS Take 1 tablet by mouth daily      Potassium Citrate ER 15 MEQ (1620 MG) TBCR Take 15 mEq by mouth 2 (two) times a day   predniSONE 10 mg tablet Days 1-2: 3 tablets daily Days 3-4: 2 tablets daily Days 5-6: 1 tablet daily then stop 12 tablet 0    Probiotic Product (PROBIOTIC DAILY PO) Take by mouth daily   tamsulosin (FLOMAX) 0 4 mg Take 0 4 mg by mouth daily   tiotropium (SPIRIVA HANDIHALER) 18 mcg inhalation capsule Place 18 mcg into inhaler and inhale daily   traMADol (ULTRAM) 50 mg tablet take 1/2 tablets by mouth twice a day as needed  0     No current facility-administered medications for this visit        Current Outpatient Prescriptions on File Prior to Visit   Medication Sig    acetaminophen (TYLENOL) 325 mg tablet Take 650 mg by mouth every 6 (six) hours as needed      aspirin 81 MG tablet Take 81 mg by mouth daily Took within 24 hours     atorvastatin (LIPITOR) 20 mg tablet TAKE 1 TABLET AT BEDTIME    Bacillus Coagulans-Inulin (PROBIOTIC FORMULA) 1-250 BILLION-MG CAPS Take 2 capsules by mouth    cefdinir (OMNICEF) 300 mg capsule Take 1 capsule (300 mg total) by mouth every 12 (twelve) hours for 5 days    cetirizine (ZYRTEC ALLERGY) 10 mg tablet Take 10 mg by mouth every evening      Cholecalciferol (VITAMIN D-3 PO) Take 2,000 Units by mouth daily   cyanocobalamin (VITAMIN B-12) 1,000 mcg tablet Take by mouth daily    donepezil (ARICEPT) 10 mg tablet Take 1 tablet (10 mg total) by mouth daily at bedtime for 30 days    escitalopram (LEXAPRO) 20 mg tablet Take 1 tablet (20 mg total) by mouth daily    Fluticasone Furoate-Vilanterol (BREO ELLIPTA) 100-25 MCG/INH AEPB Inhale 1 puff daily   KRILL OIL PO Take 500 mg by mouth daily   METOPROLOL SUCCINATE ER PO Take 25 mg by mouth daily at bedtime      montelukast (SINGULAIR) 10 mg tablet take 1 tablet by mouth once daily    Multiple Vitamins-Minerals (CENTRUM SILVER ADULT 50+) TABS Take 1 tablet by mouth daily    Potassium Citrate ER 15 MEQ (1620 MG) TBCR Take 15 mEq by mouth 2 (two) times a day   predniSONE 10 mg tablet Days 1-2: 3 tablets daily Days 3-4: 2 tablets daily Days 5-6: 1 tablet daily then stop    Probiotic Product (PROBIOTIC DAILY PO) Take by mouth daily   tamsulosin (FLOMAX) 0 4 mg Take 0 4 mg by mouth daily   tiotropium (SPIRIVA HANDIHALER) 18 mcg inhalation capsule Place 18 mcg into inhaler and inhale daily      traMADol (ULTRAM) 50 mg tablet take 1/2 tablets by mouth twice a day as needed    [DISCONTINUED] ALPRAZolam (XANAX) 0 25 mg tablet TAKE AS NEEDED    [DISCONTINUED] azelastine (ASTELIN) 0 1 % nasal spray 1 spray into each nostril     Current Facility-Administered Medications on File Prior to Visit   Medication    [DISCONTINUED] acetaminophen (TYLENOL) tablet 650 mg    [DISCONTINUED] ALPRAZolam (XANAX) tablet 0 25 mg    [DISCONTINUED] aluminum-magnesium hydroxide-simethicone (MYLANTA) 200-200-20 mg/5 mL oral suspension 30 mL    [DISCONTINUED] aspirin chewable tablet 81 mg    [DISCONTINUED] atorvastatin (LIPITOR) tablet 20 mg    [DISCONTINUED] cefTRIAXone (ROCEPHIN) IVPB (premix) 1,000 mg    [DISCONTINUED] donepezil (ARICEPT) tablet 10 mg    [DISCONTINUED] enoxaparin (LOVENOX) subcutaneous injection 40 mg    [DISCONTINUED] escitalopram (LEXAPRO) tablet 20 mg    [DISCONTINUED] ipratropium (ATROVENT) 0 02 % inhalation solution 0 5 mg    [DISCONTINUED] levalbuterol (XOPENEX) inhalation solution 1 25 mg    [DISCONTINUED] metoprolol succinate (TOPROL-XL) 24 hr tablet 25 mg    [DISCONTINUED] montelukast (SINGULAIR) tablet 10 mg    [DISCONTINUED] oxyCODONE (ROXICODONE) IR tablet 5 mg    [DISCONTINUED] polyvinyl alcohol (LIQUIFILM TEARS) 1 4 % ophthalmic solution 1 drop    [DISCONTINUED] potassium citrate (UROCIT-K) CR tablet 10 mEq    [DISCONTINUED] QUEtiapine (SEROquel) tablet 25 mg    [DISCONTINUED] saccharomyces boulardii (FLORASTOR) capsule 250 mg    [DISCONTINUED] tamsulosin (FLOMAX) capsule 0 4 mg    [DISCONTINUED] traZODone (DESYREL) tablet 50 mg     He is allergic to augmentin [amoxicillin-pot clavulanate]; ciprofloxacin; morphine and related; and quetiapine       Transitional Care Management Review:  Jeff Hernandes is a 78 y o  male here for TCM follow up  During the TCM phone call patient stated:    Date and time hospital follow up call was made:  5/16/2018  8:02 AM  Hospital care reviewed:   Other diagnostic studies pending (Comment: CAROTID DOPPLER 5/15/18)  Patient [facility] hopsital[facility] at:  [facility]  Date of admission:  5/13/18  Date of discharge:  5/15/18  Diagnosis:  COPD AND GERD  Were the patients medicaitons reviewed and updated: No  Current symptoms:  None  Should patient be enrolled in anticoag monitoring?:  No  Scheduled for follow up?:  Yes (Comment: PERRY 5/16/18)  Patients specialists:  Cardiologist  Cardiologist's Name:  James Dolan   Did you obtain your prescribed medications:  No  Do you need help managing your perscriptions or medications:  No  Is transportation to your appointments needed:  No  I have advised the patient to call PCP with any new or worsening symptoms (please type in name along with any credentials):  Jane Barnett  Living Arrangements:  Spouse or Significiant other  Support System:  Spouse  The type of support provided:  Emotional, Physical  Do you have social support:  Yes, as much as I need  Are you recieving outpatient services:  No  Are you recieving home care services:  No  Are you using any community resources:  No  Current waiver service:  No  Have you fallen in the last 12 months:  No  Interperter language line required?:  No  Counseling:  Patient             Sahara Arizmendi, DO

## 2018-05-17 LAB
BACTERIA SPT RESP CULT: NORMAL
GRAM STN SPEC: NORMAL

## 2018-05-18 ENCOUNTER — TELEPHONE (OUTPATIENT)
Dept: FAMILY MEDICINE CLINIC | Facility: CLINIC | Age: 78
End: 2018-05-18

## 2018-05-18 DIAGNOSIS — J40 TRACHEOBRONCHITIS: ICD-10-CM

## 2018-05-18 RX ORDER — CEFDINIR 300 MG/1
300 CAPSULE ORAL EVERY 12 HOURS SCHEDULED
Qty: 10 CAPSULE | Refills: 0 | Status: SHIPPED | OUTPATIENT
Start: 2018-05-18 | End: 2018-05-23

## 2018-05-19 LAB
BACTERIA BLD CULT: NORMAL
BACTERIA BLD CULT: NORMAL

## 2018-05-24 ENCOUNTER — PATIENT OUTREACH (OUTPATIENT)
Dept: FAMILY MEDICINE CLINIC | Facility: CLINIC | Age: 78
End: 2018-05-24

## 2018-05-24 NOTE — PROGRESS NOTES
Called pt to introduce self and discuss care management / assess services needed/assess support services pt has in place  No answer left message with contact info to return call

## 2018-06-05 ENCOUNTER — EVALUATION (OUTPATIENT)
Dept: PHYSICAL THERAPY | Facility: HOME HEALTHCARE | Age: 78
End: 2018-06-05
Payer: MEDICARE

## 2018-06-05 DIAGNOSIS — M54.42 CHRONIC LEFT-SIDED LOW BACK PAIN WITH LEFT-SIDED SCIATICA: Primary | ICD-10-CM

## 2018-06-05 DIAGNOSIS — G89.29 CHRONIC LEFT-SIDED LOW BACK PAIN WITH LEFT-SIDED SCIATICA: Primary | ICD-10-CM

## 2018-06-05 PROCEDURE — 97162 PT EVAL MOD COMPLEX 30 MIN: CPT | Performed by: PHYSICAL THERAPIST

## 2018-06-05 PROCEDURE — G8991 OTHER PT/OT GOAL STATUS: HCPCS | Performed by: PHYSICAL THERAPIST

## 2018-06-05 PROCEDURE — G8990 OTHER PT/OT CURRENT STATUS: HCPCS | Performed by: PHYSICAL THERAPIST

## 2018-06-05 NOTE — PROGRESS NOTES
PT Evaluation     Today's date: 2018  Patient name: Samuel Gastelum  : 1940  MRN: [id number]  Referring provider: Fransico Lorenzo MD  Dx:   Encounter Diagnosis     ICD-10-CM    1  Chronic left-sided low back pain with left-sided sciatica M54 42     G89 29                   Assessment  Impairments: abnormal gait, abnormal or restricted ROM, activity intolerance, impaired balance, impaired physical strength, lacks appropriate home exercise program and pain with function  Other impairment: decreased flexibility  Functional limitations: difficulty with dressing - shoes/socks/pants  Assessment details: The patient is a 77 y/o male who presents to PT with diagnosis of lumbago   He has complaints of constant back pain, with most pain being in his L buttock and down his LLE  He also demonstrates deficits with decreased L/S and BLE ROM and strength, decreased postural awareness, decreased flexibility, gait dysfunction with intermittent use of AD, limited mobility, difficulty sleeping and pain with completing his ADLs  He ambulates with [facility] for community distances but goes without AD in the house  He has difficulty with stair negotiation and is going up and down the steps with use of SPC and HR with non-reciprocal gait pattern  He does need help with some ADLs such as dressing  He has most difficulty with putting on his shoes and socks and pants  He has also made modifications with bathing and has been using a shower chair  He notes difficulty with sleeping and pain can wake him up overnight  He has been limited with his overall mobility and is not walking as much at home during the day  He is more sedentary by admission and typically sits during the day  He also has difficulty with transfers and needs more time to complete sit to stand transfers  He also has difficulty with getting in/out of the car and also with getting his legs on/off the bed with supine to sit transfers    The patient would benefit from continued PT to address deficits and improve function  Tx to include ROM, stretching, strengthening, modalities, HEP, pt education, postural ed, lifting/body mechanics, neuro re-ed, balance/proprioception Te, MT and equipment  Understanding of Dx/Px/POC: good   Prognosis: fair    Goals  STGs:  1  Initiate and complete HEP with verbal cues  2   Decrease LBP by 25% in 4 weeks  3   Improve  BLE ROM by 5-10 degrees in 4 weeks  4   Improve BLE strength by 1/2 grade in 4 weeks  LTGs:  1  Patient to be I with HEP in 8 weeks  2   Decrease LBP to < or = to 2-3/10 with activity in 8 weeks to improve function  3   Improve L/S ROM to [facility] t/o in 8 weeks to improve function  4   Improve BLE ROM to [facility] t/o in 8 weeks to improve function  5   Postural control is improved to maximal level of function in 8 weeks  6   Recreational performance in related activities is improved to maximal level of function in 8 weeks  7   Improve BLE strength to > or = to 4/5 t/o in 8 weeks to improve function  8   Stair negotiation is improved to maximal level of function in 8 weeks  9   ADL performance in related activities is improved to maximal level of function in 8 weeks  Plan  Patient would benefit from: skilled physical therapy  Planned modality interventions: cryotherapy and thermotherapy: hydrocollator packs  Planned therapy interventions: abdominal trunk stabilization, manual therapy, neuromuscular re-education, body mechanics training, patient education, postural training, self care, strengthening, stretching, therapeutic activities, flexibility, therapeutic exercise, home exercise program and balance  Frequency: 2x week  Duration in weeks: 8  Treatment plan discussed with: patient and family        Subjective Evaluation    History of Present Illness  Onset date: August 2017  Mechanism of injury: The patient states that last August he had a AAA repaired and after this procedure he had HHPT    He received stretching to his legs and they feel like it was "too aggressive"  Since then he has had pain in his buttocks and into his L leg  Then on 18 his wife had called the ambulance to take him to the hospital because of the pain  He had some testing and was sent home same day  After the hospital visit he started to see Dr Will Crawford and did have injections and nerve block  Some relief was noted after the nerve block for a few days  He was to start therapy last month but ended up in the hospital and was unable to start then  He now presents for his evaluation  He will also be seeing Dr Abrahan Cameron tomorrow and will return to Dr Will Crawford on 18  Quality of life: good    Pain  At best pain ratin  At worst pain rating: 10 (in the morning)  Location: LBP, buttock pain and L leg pain to calf   Quality: dull ache, sharp and discomfort  Relieving factors: ice and medications  Aggravating factors: standing and walking  Progression: no change    Social Support  Steps to enter house: yes  1  Stairs in house: yes (FF)   Lives in: multiple-level home  Lives with: spouse    Employment status: not working  Treatments  Previous treatment: injection treatment and medication  Patient Goals  Patient goals for therapy: decreased pain, increased motion, increased strength and independence with ADLs/IADLs  Patient goal: "To help get rid of the pain, to be able to walk better "          Objective     Static Posture     Lumbar Spine   Flattened       Postural Observations  Seated posture: fair  Standing posture: fair  Correction of posture: has no consistent effect        Neurological Testing     Sensation     Lumbar   Left   Intact: light touch    Right   Intact: light touch    Active Range of Motion   Left Hip   Flexion: 90 degrees with pain  Abduction: 20 degrees with pain    Right Hip   Flexion: 100 degrees   Abduction: 35 degrees   Left Knee   Flexion: 120 degrees   Extension: 0 degrees     Right Knee   Flexion: 125 degrees   Extension: 0 degrees     Additional Active Range of Motion Details  Seated can touch the floor  Extension: oakley to neutral with pain  Rotation seated: oakley 50% with pain   R SLR: 50  L SLR: 42    Strength/Myotome Testing     Left Hip   Planes of Motion   Flexion: 3-  Abduction: 2+  Adduction: 3-    Right Hip   Planes of Motion   Flexion: 4  Abduction: 4-  Adduction: 4+    Left Knee   Flexion: 3-  Extension: 3-    Right Knee   Flexion: 4  Extension: 4    Ambulation     Ambulation: Level Surfaces   Ambulation with assistive device: independent  Ambulation without assistive device: independent    Additional Level Surfaces Ambulation Details  SPC for community distances  No AD in the house    Ambulation: Stairs   Ascend stairs: independent  Pattern: non-reciprocal  Railings: one rail  Descend stairs: independent  Pattern: non-reciprocal  Railings: one rail    Additional Stairs Ambulation Details  Uses SPC on the steps  Observational Gait   Decreased walking speed, stride length and left stance time         Flowsheet Rows      Most Recent Value   PT/OT G-Codes   Current Score  20   FOTO information reviewed  Yes   Assessment Type  Evaluation   G code set  Other PT/OT Primary   Other PT Primary Current Status ()  CM   Other PT Primary Goal Status ()  CK          Precautions: None    Re-Eval DUE: 7/5/18    Specialty Daily Treatment Diary     Manual  6/5/18                                           Exercise Diary         NuStep        UBE - Retro        Stand - SLR x 3 ways - Mo        Squats        Marches        Stepups - F/L        SBB        MTP/LTP        PPT        PPT with marches        Supine SLR        Bridges        Hip Abd with TBand        Hip Add with Ball        Isometric Hip Flexion - Mo        Hooklying Heel Walk        S/L Hip Abd - Mo        Clamshells - Mo        LTR        SKTC            Modalities        HP to LB prn

## 2018-06-08 ENCOUNTER — OFFICE VISIT (OUTPATIENT)
Dept: PHYSICAL THERAPY | Facility: HOME HEALTHCARE | Age: 78
End: 2018-06-08
Payer: MEDICARE

## 2018-06-08 DIAGNOSIS — G89.29 CHRONIC LEFT-SIDED LOW BACK PAIN WITH LEFT-SIDED SCIATICA: Primary | ICD-10-CM

## 2018-06-08 DIAGNOSIS — M54.42 CHRONIC LEFT-SIDED LOW BACK PAIN WITH LEFT-SIDED SCIATICA: Primary | ICD-10-CM

## 2018-06-08 PROCEDURE — 97110 THERAPEUTIC EXERCISES: CPT

## 2018-06-08 NOTE — PROGRESS NOTES
Daily Note     Today's date: 2018  Patient name: Lorna Urrutia  : 1940  MRN: [id number]  Referring provider: Tamika Cadena MD  Dx:   Encounter Diagnosis     ICD-10-CM    1  Chronic left-sided low back pain with left-sided sciatica M54 42     G89 29                   Subjective: Pt reports he is going for micoscopic surgery on  for synovial cyst disc protrusion stenosis L-5  Pt reports his pain is pretty high today  Pt reports pain L side LB and buttock area  Pt reports Dr wants him to have light therapy  Objective: See treatment diary below      Assessment: Tolerated treatment fair  All exercises to Pts tolerance  Pt reports some pain and soreness L side LB and buttock area with exercise  Seated rest break needed t/o standing exercises  Pt reported feeling a little lightheaded from supine to sitting position  Checked BP 90/58, Pt and spouse report his BP usually runs lower  Pt sat a few minutes and reports he felt better with no lightheadedness  Advisted Pt to call Dr or go to ER if symptoms persist or worsen  Patient would benefit from continued PT      Plan: Continue per plan of care         Precautions: None    Re-Eval DUE: 18    Specialty Daily Treatment Diary     Manual  18        BP 90/58                                  Exercise Diary   18      NuStep  L 1 5 min      UBE - Retro        Stand - SLR x 3 ways - Mo  1 x 10 ea      Squats  1 x 10      Marches  1 x 10      Stepups - F/L        SBB        MTP/LTP        PPT        PPT with marches        Supine SLR        Bridges        Hip Abd with TBand  Red 1 x 10      Hip Add with Ball  1 x 10      Isometric Hip Flexion - Mo        Hooklying Heel Walk        S/L Hip Abd - Mo        Clamshells - Mo        LTR  1 x 10   5"      SKTC            Modalities  18      HP to LB prn  Seated 10 min

## 2018-06-11 ENCOUNTER — OFFICE VISIT (OUTPATIENT)
Dept: PHYSICAL THERAPY | Facility: HOME HEALTHCARE | Age: 78
End: 2018-06-11
Payer: MEDICARE

## 2018-06-11 DIAGNOSIS — G89.29 CHRONIC LEFT-SIDED LOW BACK PAIN WITH LEFT-SIDED SCIATICA: Primary | ICD-10-CM

## 2018-06-11 DIAGNOSIS — M54.42 CHRONIC LEFT-SIDED LOW BACK PAIN WITH LEFT-SIDED SCIATICA: Primary | ICD-10-CM

## 2018-06-11 PROCEDURE — 97110 THERAPEUTIC EXERCISES: CPT

## 2018-06-11 NOTE — PROGRESS NOTES
Daily Note     Today's date: 2018  Patient name: Em Valentine  : 1940  MRN: [id number]  Referring provider: Tanya Albarado MD  Dx:   Encounter Diagnosis     ICD-10-CM    1  Chronic left-sided low back pain with left-sided sciatica M54 42     G89 29               Subjective: Pt reports he was a little sore after last session  Objective: See treatment diary below      Assessment: Tolerated treatment fairly well  Pt able to tolerate a few new exercises added to program today  Increased time on Nustep  No increased pain reported t/o exercise  Pt with no c/o lightheadedness  Verbal cues and manual cues needed t/o exercise to perform correctly  Patient would benefit from continued PT      Plan: Continue per plan of care       Precautions: None    Re-Eval DUE: 18    Specialty Daily Treatment Diary     Manual  18       BP 90/58                                  Exercise Diary   18     NuStep  L 1 5 min L 1 10 min     UBE - Retro        Stand - SLR x 3 ways - Mo  1 x 10 ea 1 x 10 ea     Squats  1 x 10 1 x 10     Marches  1 x 10 1 x 10     Stepups - F/L        SBB   5" x 5     MTP/LTP        PPT   5" x 10     PPT with marches        Supine SLR        Bridges        Hip Abd with TBand  Red 1 x 10 Red 1 x 15     Hip Add with Ball  1 x 10 1 x 15     Isometric Hip Flexion - Mo        Hooklying Heel Walk        S/L Hip Abd - Mo        Clamshells - Mo        LTR  1 x 10   5" 1 x 10  5"      SKTC            Modalities  18     HP to LB prn  Seated 10 min Seated 10 min

## 2018-06-13 ENCOUNTER — TRANSCRIBE ORDERS (OUTPATIENT)
Dept: ADMINISTRATIVE | Facility: HOSPITAL | Age: 78
End: 2018-06-13

## 2018-06-13 DIAGNOSIS — R94.5 NONSPECIFIC ABNORMAL RESULTS OF LIVER FUNCTION STUDY: Primary | ICD-10-CM

## 2018-06-15 ENCOUNTER — OFFICE VISIT (OUTPATIENT)
Dept: PHYSICAL THERAPY | Facility: HOME HEALTHCARE | Age: 78
End: 2018-06-15
Payer: MEDICARE

## 2018-06-15 DIAGNOSIS — G89.29 CHRONIC LEFT-SIDED LOW BACK PAIN WITH LEFT-SIDED SCIATICA: Primary | ICD-10-CM

## 2018-06-15 DIAGNOSIS — M54.42 CHRONIC LEFT-SIDED LOW BACK PAIN WITH LEFT-SIDED SCIATICA: Primary | ICD-10-CM

## 2018-06-15 PROCEDURE — 97110 THERAPEUTIC EXERCISES: CPT

## 2018-06-15 NOTE — PROGRESS NOTES
Daily Note     Today's date: 6/15/2018  Patient name: Willma Angelucci  : 1940  MRN: [id number]  Referring provider: Jory Wilson MD  Dx: No diagnosis found  Subjective: Pt states " I'm sore today "       Objective: See treatment diary below      Assessment: Tolerated treatment fairly well  Verbal cues needed t/o exercise to perform correctly  Added supine SLR this session  Shaking and weakness noted BLE with supine SLR  Pt with no c/o increased pain t/o TE, just soreness  Patient would benefit from continued PT      Plan: Continue per plan of care       Precautions: None    Re-Eval DUE: 18    Specialty Daily Treatment Diary     Manual  6/5/18 6/8/18 6/11/18 6/15/18      BP 90/58                                  Exercise Diary   6/8/18 6/11/18 6/15/18    NuStep  L 1 5 min L 1 10 min L1 12 min    UBE - Retro        Stand - SLR x 3 ways - Mo  1 x 10 ea 1 x 10 ea 1 x 10 ea    Squats  1 x 10 1 x 10 1 x 10    Marches  1 x 10 1 x 10 1 x 15    Stepups - F/L        SBB   5" x 5 5" x 5    MTP/LTP        PPT   5" x 10 5" x 10    PPT with marches        Supine SLR    1 x 10    Bridges        Hip Abd with TBand  Red 1 x 10 Red 1 x 15 Red 1 x 15    Hip Add with Ball  1 x 10 1 x 15 1 x 15    Isometric Hip Flexion - Mo        Hooklying Heel Walk        S/L Hip Abd - Mo        Clamshells - Mo        LTR  1 x 10   5" 1 x 10  5"  1 x 10 5"    [facility]            Modalities  6/8/18 6/11/18 6/15/18    HP to LB prn  Seated 10 min Seated 10 min Seated 10 min

## 2018-06-16 ENCOUNTER — HOSPITAL ENCOUNTER (OUTPATIENT)
Dept: ULTRASOUND IMAGING | Facility: HOSPITAL | Age: 78
Discharge: HOME | End: 2018-06-16
Payer: MEDICARE

## 2018-06-16 DIAGNOSIS — R94.5 NONSPECIFIC ABNORMAL RESULTS OF LIVER FUNCTION STUDY: ICD-10-CM

## 2018-06-16 PROCEDURE — 76700 US EXAM ABDOM COMPLETE: CPT

## 2018-06-19 ENCOUNTER — OFFICE VISIT (OUTPATIENT)
Dept: PHYSICAL THERAPY | Facility: HOME HEALTHCARE | Age: 78
End: 2018-06-19
Payer: MEDICARE

## 2018-06-19 DIAGNOSIS — G89.29 CHRONIC LEFT-SIDED LOW BACK PAIN WITH LEFT-SIDED SCIATICA: Primary | ICD-10-CM

## 2018-06-19 DIAGNOSIS — M54.42 CHRONIC LEFT-SIDED LOW BACK PAIN WITH LEFT-SIDED SCIATICA: Primary | ICD-10-CM

## 2018-06-19 PROCEDURE — 97110 THERAPEUTIC EXERCISES: CPT

## 2018-06-19 NOTE — PROGRESS NOTES
Daily Note     Today's date: 2018  Patient name: Aracelis Martines  : 1940  MRN: [id number]  Referring provider: Zan Pathak MD  Dx:   Encounter Diagnosis     ICD-10-CM    1  Chronic left-sided low back pain with left-sided sciatica M54 42     G89 29               Subjective: Pt reports today is not as good of a day for him today  Pt reports he is more sore today  Objective: See treatment diary below      Assessment: Tolerated treatment fair  No progression this session due to Pt more sore today  Shaking and weakness noted BLE with supine SLR  Verbal cues needed t/o exercise to perform correctly  Patient would benefit from continued PT      Plan: Continue per plan of care         Precautions: None    Re-Eval DUE: 18    Specialty Daily Treatment Diary     Manual  6/5/18 6/8/18 6/11/18 6/15/18 6/19/18     BP 90/58                                  Exercise Diary   6/8/18 6/11/18 6/15/18 6/19/18   NuStep  L 1 5 min L 1 10 min L1 12 min L 2  12 min   UBE - Retro        Stand - SLR x 3 ways - Mo  1 x 10 ea 1 x 10 ea 1 x 10 ea 1 x 10 ea   Squats  1 x 10 1 x 10 1 x 10 1 x 10   Marches  1 x 10 1 x 10 1 x 15 1 x 15   Stepups - F/L        SBB   5" x 5 5" x 5 5" x 5   MTP/LTP        PPT   5" x 10 5" x 10 5" x 10   PPT with marches        Supine SLR    1 x 10 1 x 10   Bridges        Hip Abd with TBand  Red 1 x 10 Red 1 x 15 Red 1 x 15 Red 1 x 15   Hip Add with Ball  1 x 10 1 x 15 1 x 15 1 x 15   Isometric Hip Flexion - Mo        Hooklying Heel Walk        S/L Hip Abd - Mo        Clamshells - Mo        LTR  1 x 10   5" 1 x 10  5"  1 x 10 5" 1 x 10 5"    [facility]            Modalities  6/8/18 6/11/18 6/15/18 6/19/18   HP to LB prn  Seated 10 min Seated 10 min Seated 10 min Seated 10 min

## 2018-06-22 ENCOUNTER — OFFICE VISIT (OUTPATIENT)
Dept: PHYSICAL THERAPY | Facility: HOME HEALTHCARE | Age: 78
End: 2018-06-22
Payer: MEDICARE

## 2018-06-22 DIAGNOSIS — M54.42 CHRONIC LEFT-SIDED LOW BACK PAIN WITH LEFT-SIDED SCIATICA: Primary | ICD-10-CM

## 2018-06-22 DIAGNOSIS — G89.29 CHRONIC LEFT-SIDED LOW BACK PAIN WITH LEFT-SIDED SCIATICA: Primary | ICD-10-CM

## 2018-06-22 PROCEDURE — 97110 THERAPEUTIC EXERCISES: CPT

## 2018-06-22 NOTE — PROGRESS NOTES
Daily Note     Today's date: 2018  Patient name: Aracelis Martines  : 1940  MRN: [id number]  Referring provider: Zan Pathak MD  Dx:   Encounter Diagnosis     ICD-10-CM    1  Chronic left-sided low back pain with left-sided sciatica M54 42     G89 29               Subjective: Pt states " I'm sore "       Objective: See treatment diary below      Assessment: Tolerated treatment fair  Verbal cues needed t/o TE to perform correctly  Increased to green theraband this session  Shaking and weakness continues to be noted BLE with supine SLR  Pt reports soreness but no increased pain t/o session  Patient would benefit from continued PT      Plan: Continue per plan of care       Precautions: None    Re-Eval DUE: 18    Specialty Daily Treatment Diary     Manual  6/22/18 6/8/18 6/11/18 6/15/18 6/19/18     BP 90/58                                  Exercise Diary  6/22/18 6/8/18 6/11/18 6/15/18 6/19/18   NuStep L 2 13 min L 1 5 min L 1 10 min L1 12 min L 2  12 min   UBE - Retro        Stand - SLR x 3 ways - Mo 1 x 10 ea 1 x 10 ea 1 x 10 ea 1 x 10 ea 1 x 10 ea   Squats 1x 10 1 x 10 1 x 10 1 x 10 1 x 10   Marches 1 x 15 1 x 10 1 x 10 1 x 15 1 x 15   Stepups - F/L        SBB 5" x 5   5" x 5 5" x 5 5" x 5   MTP/LTP        PPT 5" x 10  5" x 10 5" x 10 5" x 10   PPT with marches        Supine SLR 1 x 10   1 x 10 1 x 10   Bridges        Hip Abd with TBand Green  1 x 15 Red 1 x 10 Red 1 x 15 Red 1 x 15 Red 1 x 15   Hip Add with Ball 1 x 15 1 x 10 1 x 15 1 x 15 1 x 15   Isometric Hip Flexion - Mo        Hooklying Heel Walk        S/L Hip Abd - Mo        Clamshells - Mo        LTR 1 x 10 5" 1 x 10   5" 1 x 10  5"  1 x 10 5" 1 x 10 5"    [facility]            Modalities 6/22/18 6/8/18 6/11/18 6/15/18 6/19/18   HP to LB prn Seated 10 min Seated 10 min Seated 10 min Seated 10 min Seated 10 min

## 2018-06-26 ENCOUNTER — OFFICE VISIT (OUTPATIENT)
Dept: PHYSICAL THERAPY | Facility: HOME HEALTHCARE | Age: 78
End: 2018-06-26
Payer: MEDICARE

## 2018-06-26 DIAGNOSIS — M54.42 CHRONIC LEFT-SIDED LOW BACK PAIN WITH LEFT-SIDED SCIATICA: Primary | ICD-10-CM

## 2018-06-26 DIAGNOSIS — N20.0 RENAL CALCULI: Primary | ICD-10-CM

## 2018-06-26 DIAGNOSIS — G89.29 CHRONIC LEFT-SIDED LOW BACK PAIN WITH LEFT-SIDED SCIATICA: Primary | ICD-10-CM

## 2018-06-26 PROCEDURE — 97110 THERAPEUTIC EXERCISES: CPT

## 2018-06-26 RX ORDER — POTASSIUM CITRATE 15 MEQ/1
TABLET, EXTENDED RELEASE ORAL
Qty: 180 TABLET | Refills: 3 | Status: SHIPPED | OUTPATIENT
Start: 2018-06-26 | End: 2019-06-06 | Stop reason: SDUPTHER

## 2018-06-26 NOTE — PROGRESS NOTES
Daily Note     Today's date: 2018  Patient name: Barby Baum  : 1940  MRN: [id number]  Referring provider: Teresa Nobles MD  Dx:   Encounter Diagnosis     ICD-10-CM    1  Chronic left-sided low back pain with left-sided sciatica M54 42     G89 29               Subjective: Pt reports he is sore today  Objective: See treatment diary below      Assessment: Tolerated treatment fairly well  Verbal cues needed t/o exercise to perform correctly  Added step ups and MTP/LTP to program today  Soreness but no increased pain t/o TE  Patient would benefit from continued PT      Plan: Continue per plan of care         Precautions: None    Re-Eval DUE: 18    Specialty Daily Treatment Diary     Manual  6/22/18 6/26/18 6/11/18 6/15/18 6/19/18                                       Exercise Diary  6/22/18 6/26/18 6/11/18 6/15/18 6/19/18   NuStep L 2 13 min L 2 10 min L 1 10 min L1 12 min L 2  12 min   UBE - Retro        Stand - SLR x 3 ways - Mo 1 x 10 ea 1 x 10 ea 1 x 10 ea 1 x 10 ea 1 x 10 ea   Squats 1x 10 1 x 10 1 x 10 1 x 10 1 x 10   Marches 1 x 15 1 x 15 1 x 10 1 x 15 1 x 15   Stepups - F/L  C step 1 x 10 Fwd      SBB 5" x 5  5" x 5 5" x 5 5" x 5 5" x 5   MTP/LTP  Green   1 x 10 ea      PPT 5" x 10 5" x 10 5" x 10 5" x 10 5" x 10   PPT with marches        Supine SLR 1 x 10 1 x 10  1 x 10 1 x 10   Bridges        Hip Abd with TBand Green  1 x 15 Green  1 x 15 Red 1 x 15 Red 1 x 15 Red 1 x 15   Hip Add with Ball 1 x 15 1 x 15 1 x 15 1 x 15 1 x 15   Isometric Hip Flexion - Mo        Hooklying Heel Walk        S/L Hip Abd - Mo        Clamshells - Mo        LTR 1 x 10 5" 1 x 10   5" 1 x 10  5"  1 x 10 5" 1 x 10 5"    [facility]            Modalities 6/22/18 6/8/18 6/11/18 6/15/18 6/19/18   HP to LB prn Seated 10 min Seated 10 min Seated 10 min Seated 10 min Seated 10 min

## 2018-06-27 ENCOUNTER — TELEPHONE (OUTPATIENT)
Dept: CARDIOLOGY CLINIC | Facility: HOSPITAL | Age: 78
End: 2018-06-27

## 2018-06-28 ENCOUNTER — OFFICE VISIT (OUTPATIENT)
Dept: PHYSICAL THERAPY | Facility: HOME HEALTHCARE | Age: 78
End: 2018-06-28
Payer: MEDICARE

## 2018-06-28 DIAGNOSIS — I49.8 VENTRICULAR BIGEMINY: Primary | ICD-10-CM

## 2018-06-28 DIAGNOSIS — M54.42 CHRONIC LEFT-SIDED LOW BACK PAIN WITH LEFT-SIDED SCIATICA: Primary | ICD-10-CM

## 2018-06-28 DIAGNOSIS — G89.29 CHRONIC LEFT-SIDED LOW BACK PAIN WITH LEFT-SIDED SCIATICA: Primary | ICD-10-CM

## 2018-06-28 PROCEDURE — 97150 GROUP THERAPEUTIC PROCEDURES: CPT | Performed by: PHYSICAL THERAPIST

## 2018-06-28 RX ORDER — METOPROLOL SUCCINATE 25 MG/1
25 TABLET, EXTENDED RELEASE ORAL
Qty: 90 TABLET | Refills: 3 | Status: SHIPPED | OUTPATIENT
Start: 2018-06-28 | End: 2018-12-18 | Stop reason: ALTCHOICE

## 2018-06-28 NOTE — PROGRESS NOTES
Daily Note     Today's date: 2018  Patient name: Eunice Perla  : 1940  MRN: [id number]  Referring provider: Cristofer Hare MD  Dx:   Encounter Diagnosis     ICD-10-CM    1  Chronic left-sided low back pain with left-sided sciatica M54 42     G89 29                   Subjective: " My back feels pretty good today"       Objective: See treatment diary below      Manual  6/22/18 6/26/18 6/28/18 6/15/18 6/19/18                                       Exercise Diary  6/22/18 6/26/18 6/11/18 6/15/18 6/19/18   NuStep L 2 13 min L 2 10 min L 1 10 min L1 12 min L 2  12 min   UBE - Retro        Stand - SLR x 3 ways - Mo 1 x 10 ea 1 x 10 ea 1 x 10 ea 1 x 10 ea 1 x 10 ea   Squats 1x 10 1 x 10 1 x 10 Cues  1 x 10 1 x 10   Marches 1 x 15 1 x 15 1 x 10 1 x 15 1 x 15   Stepups - F/L  C step 1 x 10 Fwd C 1x 10 forward      SBB 5" x 5  5" x 5 5" x 5 5" x 5 5" x 5   MTP/LTP  Green   1 x 10 ea      PPT 5" x 10 5" x 10 5" x 10 5" x 10 5" x 10   PPT with marches        Supine SLR 1 x 10 1 x 10  1 x 10 1 x 10   Bridges        Hip Abd with TBand Green  1 x 15 Green  1 x 15 Red 1 x 15 Red 1 x 15 Red 1 x 15   Hip Add with Ball 1 x 15 1 x 15 1 x 15 1 x 15 1 x 15   Isometric Hip Flexion - Mo        Hooklying Heel Walk        S/L Hip Abd - Mo        Clamshells - Mo        LTR 1 x 10 5" 1 x 10   5" 1 x 10  5"  1 x 10 5" 1 x 10 5"    [facility]            Modalities 6/22/18 6/8/18 6/28/18 6/15/18 6/19/18   HP to LB prn Seated 10 min Seated 10 min Seated 10 min Seated 10 min Seated 10 min                             Assessment: Tolerated treatment well  Patient exhibited good technique with therapeutic exercises  Pt requires cuing for proper performance of mini squats  Plan: Continue per plan of care

## 2018-07-02 ENCOUNTER — CONSULT (OUTPATIENT)
Dept: FAMILY MEDICINE CLINIC | Facility: CLINIC | Age: 78
End: 2018-07-02
Payer: MEDICARE

## 2018-07-02 VITALS
WEIGHT: 164.4 LBS | HEART RATE: 81 BPM | HEIGHT: 68 IN | RESPIRATION RATE: 16 BRPM | SYSTOLIC BLOOD PRESSURE: 90 MMHG | BODY MASS INDEX: 24.92 KG/M2 | OXYGEN SATURATION: 92 % | DIASTOLIC BLOOD PRESSURE: 60 MMHG | TEMPERATURE: 97.7 F

## 2018-07-02 DIAGNOSIS — M48.062 SPINAL STENOSIS OF LUMBAR REGION WITH NEUROGENIC CLAUDICATION: Primary | ICD-10-CM

## 2018-07-02 PROCEDURE — 99214 OFFICE O/P EST MOD 30 MIN: CPT | Performed by: FAMILY MEDICINE

## 2018-07-02 NOTE — PROGRESS NOTES
Subjective:     Elli Hawkins is a 78 y o  male who presents to the office today for a preoperative consultation at the request of surgeon Dr Odell Flower who plans on performing  Micro diskectomy and microscopic lumbar laminectomy on July 10  This consultation is requested for the specific conditions prompting preoperative evaluation (i e  because of potential affect on operative risk):  Severe COPD, hypertension, early dementia, atrial fibrillation  Planned anesthesia: general  The patient has the following known anesthesia issues: past general anesthesia with complications (Postoperative confusion and respiratory failure)  Patients bleeding risk: no recent abnormal bleeding  The following portions of the patient's history were reviewed and updated as appropriate:   He  has a past medical history of AAA (abdominal aortic aneurysm) ([address] ); Acid reflux; Acute exacerbation of chronic obstructive airways disease with asthma ([address] ); Acute serous otitis media of left ear; Anesthesia; Anxiety; Aortic aneurysm without rupture ([address] ); Arm bruise; Arthritis; Asthma; At risk for falls; BPH (benign prostatic hyperplasia); BPH without urinary obstruction; Cancer ([address] ); CAP (community acquired pneumonia); Cataracts, bilateral; Change in bowel function; CHF (congestive heart failure) ([address] ); Clubbing of fingers; Colon polyps; Common cold; Contusion of elbow, left; COPD (chronic obstructive pulmonary disease) ([address] ); Coronary artery disease; Cough; Dementia; Depression; Diverticulosis; Dizziness; Exercise counseling; Fatigue; Full dentures; Glaucoma screening; High cholesterol; History of abdominal aortic aneurysm (AAA) repair (08/2017); History of bacteremia; History of chronic obstructive lung disease; History of epistaxis; History of influenza vaccination; History of kidney stones; History of pneumonia; History of sepsis (10/2017); History of sinusitis; History of skin cancer; History of sleep apnea;  History of urinary tract infection; [place] (hard of hearing); Hydronephrosis with obstructing calculus; Influenza vaccine needed; Insomnia; Jock itch; Kidney stones; Left hip pain; Need for pneumococcal vaccination; Need for prophylactic vaccination and inoculation against influenza; Other emphysema ([place] [address] ); Pancreatitis, chronic ([place] [address] ); Pneumonia (10/26/2017); Pulmonary emphysema ([place] [address] ); S/P CABG x 3; Screening for genitourinary condition; Screening for neurological condition; Sepsis ([place] [address] ); Short-term memory loss; Sleep apnea; Special screening examination for neoplasm of prostate; TIA involving carotid artery; Ulcer; Unsteady gait; Use of cane as ambulatory aid; Vitamin D deficiency; and Wears glasses  He   Patient Active Problem List    Diagnosis Date Noted    Hyponatremia 05/15/2018    CAD (coronary artery disease) 05/15/2018    Elevated bilirubin 05/13/2018    Tracheobronchitis 05/13/2018    Coronary artery disease involving native coronary artery of native heart with angina pectoris ([place] [address] ) 05/11/2018    Hx of CABG 05/11/2018    Bilateral carotid artery stenosis 05/11/2018    Pulmonary nodule 03/07/2018    Ventricular bigeminy 03/07/2018    Positional lightheadedness 03/07/2018    Bradycardia 03/06/2018    SOB (shortness of breath) 03/06/2018    Dementia 01/29/2018    History of aortic aneurysm repair 09/01/2017    Thrombocytopenia ([place] [address] ) 06/02/2017    Abnormal CT scan, chest 06/01/2017    Abdominal aortic aneurysm ([place] [address] ) 06/01/2017    Acute tracheobronchitis 12/28/2016    COPD (chronic obstructive pulmonary disease) ([place] [address] ) 12/28/2016    Hyperlipidemia 12/28/2016    GERD (gastroesophageal reflux disease) 12/28/2016    Benign prostatic hyperplasia 09/18/2015     He  has a past surgical history that includes Cardiac surgery; Ureteral stent placement; Excisional hemorrhoidectomy; Mouth surgery; pr esophagogastroduodenoscopy transoral diagnostic (N/A, 8/18/2016); Cataract extraction (Bilateral); Lithotripsy;  Hernia repair; pr colonoscopy flx dx w/collj spec when pfrmd (N/A, 5/16/2017); Abdominal aortic aneurysm repair (08/23/2017); CAROTID ENDARTARECTOMY (Left, 11/1996); Coronary artery bypass graft (01/2003); Eye surgery (Bilateral); Cystoscopy; pr cystourethroscopy (N/A, 11/30/2017); pr remove bladder stone,<2 5 cm (N/A, 11/30/2017); Cystoscopy; AAA repair, endovascular; and Tonsillectomy  His family history includes Diabetes type II in his maternal grandmother and mother; Hypertension in his paternal grandmother; Kidney failure in his father  He  reports that he quit smoking about 4 years ago  He has a 90 00 pack-year smoking history  He has never used smokeless tobacco  He reports that he does not drink alcohol or use drugs  Current Outpatient Prescriptions   Medication Sig Dispense Refill    aspirin 81 MG tablet Take 81 mg by mouth daily Took within 24 hours       atorvastatin (LIPITOR) 20 mg tablet TAKE 1 TABLET AT BEDTIME 90 tablet 1    Bacillus Coagulans-Inulin (PROBIOTIC FORMULA) 1-250 BILLION-MG CAPS Take 2 capsules by mouth      Cholecalciferol (VITAMIN D-3 PO) Take 2,000 Units by mouth daily   cyanocobalamin (VITAMIN B-12) 1,000 mcg tablet Take by mouth daily      donepezil (ARICEPT) 10 mg tablet Take 1 tablet (10 mg total) by mouth daily at bedtime for 30 days 30 tablet 0    escitalopram (LEXAPRO) 20 mg tablet Take 1 tablet (20 mg total) by mouth daily 30 tablet 0    KRILL OIL PO Take 500 mg by mouth daily   metoprolol succinate (TOPROL-XL) 25 mg 24 hr tablet Take 1 tablet (25 mg total) by mouth daily at bedtime 90 tablet 3    montelukast (SINGULAIR) 10 mg tablet take 1 tablet by mouth once daily 90 tablet 3    Multiple Vitamins-Minerals (CENTRUM SILVER ADULT 50+) TABS Take 1 tablet by mouth daily      Potassium Citrate ER 15 MEQ (1620 MG) TBCR TAKE 1 TABLET TWICE A  tablet 3    tamsulosin (FLOMAX) 0 4 mg Take 0 4 mg by mouth daily        tiotropium (SPIRIVA HANDIHALER) 18 mcg inhalation capsule Place 18 mcg into inhaler and inhale daily   traMADol (ULTRAM) 50 mg tablet take 1/2 tablets by mouth twice a day as needed  0    acetaminophen (TYLENOL) 325 mg tablet Take 650 mg by mouth every 6 (six) hours as needed        Fluticasone Furoate-Vilanterol (BREO ELLIPTA) 100-25 MCG/INH AEPB Inhale 1 puff daily  No current facility-administered medications for this visit  Current Outpatient Prescriptions on File Prior to Visit   Medication Sig    aspirin 81 MG tablet Take 81 mg by mouth daily Took within 24 hours     atorvastatin (LIPITOR) 20 mg tablet TAKE 1 TABLET AT BEDTIME    Bacillus Coagulans-Inulin (PROBIOTIC FORMULA) 1-250 BILLION-MG CAPS Take 2 capsules by mouth    Cholecalciferol (VITAMIN D-3 PO) Take 2,000 Units by mouth daily   cyanocobalamin (VITAMIN B-12) 1,000 mcg tablet Take by mouth daily    donepezil (ARICEPT) 10 mg tablet Take 1 tablet (10 mg total) by mouth daily at bedtime for 30 days    escitalopram (LEXAPRO) 20 mg tablet Take 1 tablet (20 mg total) by mouth daily    KRILL OIL PO Take 500 mg by mouth daily   metoprolol succinate (TOPROL-XL) 25 mg 24 hr tablet Take 1 tablet (25 mg total) by mouth daily at bedtime    montelukast (SINGULAIR) 10 mg tablet take 1 tablet by mouth once daily    Multiple Vitamins-Minerals (CENTRUM SILVER ADULT 50+) TABS Take 1 tablet by mouth daily    Potassium Citrate ER 15 MEQ (1620 MG) TBCR TAKE 1 TABLET TWICE A DAY    tamsulosin (FLOMAX) 0 4 mg Take 0 4 mg by mouth daily   tiotropium (SPIRIVA HANDIHALER) 18 mcg inhalation capsule Place 18 mcg into inhaler and inhale daily   traMADol (ULTRAM) 50 mg tablet take 1/2 tablets by mouth twice a day as needed    [DISCONTINUED] Probiotic Product (PROBIOTIC DAILY PO) Take by mouth daily   acetaminophen (TYLENOL) 325 mg tablet Take 650 mg by mouth every 6 (six) hours as needed      Fluticasone Furoate-Vilanterol (BREO ELLIPTA) 100-25 MCG/INH AEPB Inhale 1 puff daily      [DISCONTINUED] cetirizine (ZYRTEC ALLERGY) 10 mg tablet Take 10 mg by mouth every evening      [DISCONTINUED] predniSONE 10 mg tablet Days 1-2: 3 tablets daily Days 3-4: 2 tablets daily Days 5-6: 1 tablet daily then stop     No current facility-administered medications on file prior to visit  He is allergic to augmentin [amoxicillin-pot clavulanate]; ciprofloxacin; morphine and related; and quetiapine     Past Medical History:   Diagnosis Date    AAA [facility]ab[facility]) [facility])     Acid reflux     Acute exacerbation of chronic obstructive airways disease with asthma ([facility])     Acute serous otitis media of left ear     recurrence not specified     Anesthesia     "always has mental changes /lingers and lingers after anesthesia"    Anxiety     Aortic aneurysm without rupture ([facility])     Arm bruise     right inner forearm "recent IV"    Arthritis     spine and poss left hip    Asthma     bronchietasis    At risk for falls     BPH (benign prostatic hyperplasia)     pt and wife can't confirm 11/10    BPH without urinary obstruction     Cancer [facility] )     skin CA on nose    CAP (community acquired pneumonia)     Cataracts, bilateral     Change in bowel function     CHF (congestive heart failure) ([facility])     Clubbing of fingers     Colon polyps     Common cold     Contusion of elbow, left     initial encounter     COPD (chronic obstructive pulmonary disease) ([facility])     Coronary artery disease     Cough     Dementia     Depression     Diverticulosis     Dizziness     upon "standing quickly on occas"    Exercise counseling     Fatigue     Full dentures     "doesn't wear them"    Glaucoma screening     High cholesterol     History of abdominal aortic aneurysm (AAA) repair 08/2017    History of bacteremia     History of chronic obstructive lung disease     History of epistaxis     History of influenza vaccination     History of kidney stones     History of pneumonia     "many times" "at least 5 times" almost always goes to sepsis"    History of sepsis 10/2017    History of sinusitis     History of skin cancer     History of sleep apnea     History of urinary tract infection     [place] (hard of hearing)     Hydronephrosis with obstructing calculus     Influenza vaccine needed     Insomnia     Jock itch     left/saw doctor 11/8 and started on antifungal cream    Kidney stones     Left hip pain     Need for pneumococcal vaccination     Need for prophylactic vaccination and inoculation against influenza     Other emphysema ([address] )     Pancreatitis, chronic ([address] )     pt and wife can't confirm 11/10/17    Pneumonia 10/26/2017    admitted [facility]    Pulmonary emphysema ([address] )     S/P CABG x 3     approx 14 yrs ago    Screening for genitourinary condition     Screening for neurological condition     Sepsis ([facility] )     due to unspecified organism     Short-term memory loss     Sleep apnea     does not use CPAP      Special screening examination for neoplasm of prostate     TIA involving carotid artery     "before carotid surgery"    Ulcer     stomach, "years ago"    Unsteady gait     Use of cane as ambulatory aid     sometimes    Vitamin D deficiency     Wears glasses        Past Surgical History:   Procedure Laterality Date    ABDOMINAL AORTIC ANEURYSM REPAIR  08/23/2017    ABDOMINAL AORTIC ANEURYSM REPAIR, ENDOVASCULAR      CARDIAC SURGERY      CABG x3    CAROTID ENDARTARECTOMY Left 11/1996    CATARACT EXTRACTION Bilateral     CORONARY ARTERY BYPASS GRAFT  01/2003    x3    CYSTOSCOPY      with insertion of ureteral stent     CYSTOSCOPY      with ureteroscopy with lithotripsy     EXCISIONAL HEMORRHOIDECTOMY      EYE SURGERY Bilateral     "for a wrinkle" after cataract surgery    HERNIA REPAIR      umbilical    LITHOTRIPSY      renal    MOUTH SURGERY      full mouth extraction     IA COLONOSCOPY FLX DX W/COLLJ SPEC WHEN PFRMD N/A 5/16/2017    Procedure: COLONOSCOPY with polypectomies/ hot snare and tattoo;  Surgeon: Flower Larry MD;  Location: [facility]; Service: Gastroenterology    IL CYSTOURETHROSCOPY N/A 11/30/2017    Procedure: Leece Gaster;  Surgeon: Fransisco Wu MD;  [facility];  Service: Urology    IL ESOPHAGOGASTRODUODENOSCOPY TRANSORAL DIAGNOSTIC N/A 8/18/2016    Procedure: ESOPHAGOGASTRODUODENOSCOPY (EGD); Surgeon: Flower Larry MD;  [facility]: [facility]; Service: Gastroenterology    IL REMOVE BLADDER STONE,<2 5 CM N/A 11/30/2017    Procedure: Henson Rick;  Surgeon: Fransisco Wu MD;  [facility];  Service: Urology    TONSILLECTOMY     [address]      and removal       Review of Systems  A comprehensive review of systems was negative except for: Respiratory: positive for Symptoms; Respiratory w/o Neg: emphysema  Cardiovascular: positive for irregular heart beat  Behavioral/Psych: positive for Symptoms; Pyschiatric: Dementia     Objective:  Physical Exam   Constitutional: He is oriented to person, place, and time  He appears well-developed and well-nourished  No distress  HENT:   Head: Normocephalic  Right Ear: External ear normal    Left Ear: External ear normal    Nose: Nose normal    Mouth/Throat: Oropharynx is clear and moist    Eyes: Conjunctivae and EOM are normal  Pupils are equal, round, and reactive to light  Right eye exhibits no discharge  Left eye exhibits no discharge  No scleral icterus  Neck: Normal range of motion  No tracheal deviation present  No thyromegaly present  Cardiovascular: Normal rate and normal heart sounds  Exam reveals no gallop and no friction rub  No murmur heard  Atrial fibrillation with a controlled ventricular rate   Pulmonary/Chest: Effort normal  No respiratory distress  He has no wheezes  Prolonged expiratory phase of respiration with flattening of the diaphragms   Abdominal: Soft  Bowel sounds are normal  He exhibits no mass  There is no tenderness  There is no guarding  Musculoskeletal: He exhibits no edema or deformity  Lymphadenopathy:     He has no cervical adenopathy  Neurological: He is alert and oriented to person, place, and time  No cranial nerve deficit  Skin: Skin is warm and dry  No rash noted  He is not diaphoretic  No erythema  Psychiatric: He has a normal mood and affect   Thought content normal        Cardiographics  ECG: Recent evaluation by patient's cardiologist he has known atrial fibrillation  Echocardiogram: Atrial fibrillation    Imaging  Chest x-ray:  no recent chest x-ray     Lab Review   Admission on 05/13/2018, Discharged on 05/15/2018   Component Date Value    Sodium 05/13/2018 133*    Potassium 05/13/2018 4 2     Chloride 05/13/2018 97*    CO2 05/13/2018 26     Anion Gap 05/13/2018 10     BUN 05/13/2018 15     Creatinine 05/13/2018 1 18     Glucose 05/13/2018 108     Calcium 05/13/2018 9 0     AST 05/13/2018 15     ALT 05/13/2018 23     Alkaline Phosphatase 05/13/2018 121*    Total Protein 05/13/2018 7 1     Albumin 05/13/2018 3 4*    Total Bilirubin 05/13/2018 2 10*    eGFR 05/13/2018 60     WBC 05/13/2018 8 58     RBC 05/13/2018 4 79     Hemoglobin 05/13/2018 15 8     Hematocrit 05/13/2018 45 4     MCV 05/13/2018 95     MCH 05/13/2018 33 0     MCHC 05/13/2018 34 8     RDW 05/13/2018 15 2*    MPV 05/13/2018 8 8*    Platelets 53/20/2299 128*    Neutrophils Relative 05/13/2018 77*    Lymphocytes Relative 05/13/2018 10*    Monocytes Relative 05/13/2018 11     Eosinophils Relative 05/13/2018 1     Basophils Relative 05/13/2018 1     Neutrophils Absolute 05/13/2018 6 57     Lymphocytes Absolute 05/13/2018 0 87     Monocytes Absolute 05/13/2018 0 98     Eosinophils Absolute 05/13/2018 0 12     Basophils Absolute 05/13/2018 0 04     Troponin I 05/13/2018 <0 02     Protime 05/13/2018 13 2     INR 05/13/2018 1 01     LACTIC ACID 05/13/2018 2 0     Blood Culture 05/13/2018 No Growth After 5 Days      Blood Culture 05/13/2018 No Growth After 5 Days       Platelets 96/62/8327 125*    MPV 05/13/2018 8 9     Procalcitonin 05/13/2018 <0 05     Legionella Urinary Antig* 05/13/2018 Negative     Strep pneumoniae antigen* 05/13/2018 Negative     Sputum Culture 05/14/2018 2+ Growth of      Gram Stain Result 05/14/2018 No Epithelial Cells     Gram Stain Result 05/14/2018 No Polys     Gram Stain Result 05/14/2018 1+ Gram negative rods     Gram Stain Result 05/14/2018 Rare Gram positive cocci in clusters     Sodium 05/14/2018 135*    Potassium 05/14/2018 3 6     Chloride 05/14/2018 102     CO2 05/14/2018 23     Anion Gap 05/14/2018 10     BUN 05/14/2018 13     Creatinine 05/14/2018 1 03     Glucose 05/14/2018 111     Calcium 05/14/2018 8 6     AST 05/14/2018 15     ALT 05/14/2018 20     Alkaline Phosphatase 05/14/2018 105     Total Protein 05/14/2018 6 6     Albumin 05/14/2018 2 8*    Total Bilirubin 05/14/2018 2 60*    eGFR 05/14/2018 71     Magnesium 05/14/2018 2 0     WBC 05/14/2018 6 61     RBC 05/14/2018 4 57     Hemoglobin 05/14/2018 14 8     Hematocrit 05/14/2018 43 4     MCV 05/14/2018 95     MCH 05/14/2018 32 4     MCHC 05/14/2018 34 1     RDW 05/14/2018 15 3*    Platelets 36/03/7885 136*    MPV 05/14/2018 9 1     Procalcitonin 05/14/2018 <0 05     Ventricular Rate 05/13/2018 86     Atrial Rate 05/13/2018 86     NH Interval 05/13/2018 188     QRSD Interval 05/13/2018 84     QT Interval 05/13/2018 376     QTC Interval 05/13/2018 449     P Axis 05/13/2018 69     QRS Axis 05/13/2018 68     T Wave Axis 05/13/2018 87     WBC 05/15/2018 5 80     RBC 05/15/2018 4 30     Hemoglobin 05/15/2018 14 0     Hematocrit 05/15/2018 40 4     MCV 05/15/2018 94     MCH 05/15/2018 32 6     MCHC 05/15/2018 34 7     RDW 05/15/2018 15 1     MPV 05/15/2018 9 6     Platelets 48/53/4042 135*    Neutrophils Relative 05/15/2018 70     Lymphocytes Relative 05/15/2018 15     Monocytes Relative 05/15/2018 14*    Eosinophils Relative 05/15/2018 1     Basophils Relative 05/15/2018 0     Neutrophils Absolute 05/15/2018 4 06     Lymphocytes Absolute 05/15/2018 0 86     Monocytes Absolute 05/15/2018 0 79     Eosinophils Absolute 05/15/2018 0 07     Basophils Absolute 05/15/2018 0 02     Sodium 05/15/2018 137     Potassium 05/15/2018 3 3*    Chloride 05/15/2018 102     CO2 05/15/2018 23     Anion Gap 05/15/2018 12     BUN 05/15/2018 12     Creatinine 05/15/2018 1 00     Glucose 05/15/2018 102     Calcium 05/15/2018 8 3     AST 05/15/2018 18     ALT 05/15/2018 20     Alkaline Phosphatase 05/15/2018 108     Total Protein 05/15/2018 6 4     Albumin 05/15/2018 2 5*    Total Bilirubin 05/15/2018 1 50*    eGFR 05/15/2018 73     Procalcitonin 05/15/2018 <0 05    Appointment on 05/11/2018   Component Date Value    PSA 05/11/2018 0 8         Assessment:     78 y o  male with planned surgery as above  Known risk factors for perioperative complications: None  Severe COPD, atrial fibrillation, dementia    Difficulty with intubation is not anticipated  Cardiac Risk Estimation:  moderate    Current medications which may produce withdrawal symptoms if withheld perioperatively:  none      Plan:     1  Preoperative workup as follows ECG, hemoglobin, hematocrit, electrolytes, creatinine, glucose  2  Change in medication regimen before surgery: discontinue ASA 14 days before surgery  3  Prophylaxis for cardiac events with perioperative beta-blockers: should be considered, specific regimen per anesthesia  4  Invasive hemodynamic monitoring perioperatively: not indicated  5  Deep vein thrombosis prophylaxis postoperatively:pharmacologic prophylaxis (with any of the following: enoxaparin (Lovenox) 40mg SQ 2 hours prior to surgery then every day)    6  Surveillance for postoperative MI with ECG immediately postoperatively and on postoperative days 1 and 2 AND troponin levels 24 hours postoperatively and on day 4 or hospital discharge (whichever comes first): not indicated  7  Other measures: Consultation with Hospitalists of surgeon's choice for in-hospital postoperative management of Postoperative respiratory care and cardiac care

## 2018-07-03 ENCOUNTER — OFFICE VISIT (OUTPATIENT)
Dept: PHYSICAL THERAPY | Facility: HOME HEALTHCARE | Age: 78
End: 2018-07-03
Payer: MEDICARE

## 2018-07-03 DIAGNOSIS — G89.29 CHRONIC LEFT-SIDED LOW BACK PAIN WITH LEFT-SIDED SCIATICA: Primary | ICD-10-CM

## 2018-07-03 DIAGNOSIS — M54.42 CHRONIC LEFT-SIDED LOW BACK PAIN WITH LEFT-SIDED SCIATICA: Primary | ICD-10-CM

## 2018-07-03 PROCEDURE — 97110 THERAPEUTIC EXERCISES: CPT

## 2018-07-03 NOTE — PROGRESS NOTES
Daily Note     Today's date: 7/3/2018  Patient name: Bernard Gonsalez  : 1940  MRN: [id number]  Referring provider: Saulo Goncalves MD  Dx: No diagnosis found  Subjective: Pt  reports he is to have surgery 1 week from today  7/10 pain L LB  Pt  Is very pleased with the progress he has made in therapy  Objective: See treatment diary below      Assessment: Tolerated treatment well  Pt  Did require VC for proper sequencing with TE  Plan: Potential discharge next visit  at [facility] secondary to sx x 1 week      Manual  6/22/18 6/26/18 6/28/18 6/15/18 6/19/18                                                                 Exercise Diary  6/22/18 6/26/18 7/3/18 6/15/18 6/19/18   NuStep L 2 13 min L 2 10 min L 2 10 min L1 12 min L 2  12 min   UBE - Retro             Stand - SLR x 3 ways - Mo 1 x 10 ea 1 x 10 ea 1 x 10 ea 1 x 10 ea 1 x 10 ea   Squats 1x 10 1 x 10 1 x 10 Cues  1 x 10 1 x 10   Marches 1 x 15 1 x 15 1 x 10 1 x 15 1 x 15   Stepups - F/L   C step 1 x 10 Fwd C 1x 10 forward        SBB 5" x 5  5" x 5 5" x 5 5" x 5 5" x 5   MTP/LTP   Green   1 x 10 ea  Green   1x10 ea       PPT 5" x 10 5" x 10 5" x 10 VC 5" x 10 5" x 10   PPT with marches             Supine SLR 1 x 10 1 x 10  1 x 10 with knees flexed 1 x 10 1 x 10   Bridges             Hip Abd with TBand Green  1 x 15 Green  1 x 15 Green 1 x 15 Red 1 x 15 Red 1 x 15   Hip Add with Ball 1 x 15 1 x 15 1 x 15 1 x 15 1 x 15   Isometric Hip Flexion - Mo             Hooklying Heel Walk             S/L Hip Abd - Mo             Clamshells - Mo             LTR 1 x 10 5" 1 x 10   5" 1 x 10  5"  1 x 10 5" 1 x 10 5"    SKTC                   Modalities 6/22/18 6/8/18 7/3/18 7/3/18 6/19/18   HP to LB prn Seated 10 min Seated 10 min Seated 10 min Seated 10 min Seated 10 min

## 2018-07-06 ENCOUNTER — EVALUATION (OUTPATIENT)
Dept: PHYSICAL THERAPY | Facility: HOME HEALTHCARE | Age: 78
End: 2018-07-06
Payer: MEDICARE

## 2018-07-06 ENCOUNTER — TRANSCRIBE ORDERS (OUTPATIENT)
Dept: PHYSICAL THERAPY | Facility: HOME HEALTHCARE | Age: 78
End: 2018-07-06

## 2018-07-06 DIAGNOSIS — G89.29 CHRONIC LEFT-SIDED LOW BACK PAIN WITH LEFT-SIDED SCIATICA: Primary | ICD-10-CM

## 2018-07-06 DIAGNOSIS — M54.42 CHRONIC LEFT-SIDED LOW BACK PAIN WITH LEFT-SIDED SCIATICA: Primary | ICD-10-CM

## 2018-07-06 PROCEDURE — G8991 OTHER PT/OT GOAL STATUS: HCPCS | Performed by: PHYSICAL THERAPIST

## 2018-07-06 PROCEDURE — 97110 THERAPEUTIC EXERCISES: CPT | Performed by: PHYSICAL THERAPIST

## 2018-07-06 PROCEDURE — 97164 PT RE-EVAL EST PLAN CARE: CPT | Performed by: PHYSICAL THERAPIST

## 2018-07-06 PROCEDURE — G8992 OTHER PT/OT  D/C STATUS: HCPCS | Performed by: PHYSICAL THERAPIST

## 2018-07-06 NOTE — PROGRESS NOTES
PT Discharge    Today's date: 2018  Patient name: Nancy Rey  : 1940  MRN: [id number]  Referring provider: Katlin Conklin MD  Dx:   Encounter Diagnosis     ICD-10-CM    1  Chronic left-sided low back pain with left-sided sciatica M54 42     G89 29                   Assessment  Impairments: abnormal gait, abnormal or restricted ROM, activity intolerance, impaired balance, impaired physical strength, lacks appropriate home exercise program and pain with function  Other impairment: decreased flexibility  Functional limitations: difficulty with dressing - shoes/socks/pants  Assessment details: The patient is a 79 y/o male who presents to PT with diagnosis of lumbago  Pt has attended 1 month of OPPT for pre-surgical conditioning and is now ready for D/C as he is scheduled for surgery on 7/10/18  Pt remains with pain, relatively unchanged since [facility]  However, PT observes pt with improved gait dynamics and improved tolerance to ADLs since starting therapy  He continues to rely on AD for ambulation but shows increased gait speed and improved step/stride lengths  Pt also continues to depend on wife for assistance with activities at home due to pain with bending, prolonged time on his feet, or any lifting  PT advised pt to continue with HEP until he goes for surgery and then to stop home program and follow MD post-operative orders until cleared to resume activity  He will be D/C due to change in status with upcoming surgical intervention  Thank you! Understanding of Dx/Px/POC: good   Prognosis: fair    Goals  STGs:  1  Initiate and complete HEP with verbal cues  - met  2  Decrease LBP by 25% in 4 weeks  - not met  3  Improve  BLE ROM by 5-10 degrees in 4 weeks  - partially met  4  Improve BLE strength by 1/2 grade in 4 weeks  - met  LTGs:  1  Patient to be I with HEP in 8 weeks  - met  2  Decrease LBP to < or = to 2-3/10 with activity in 8 weeks to improve function  - not met  3    Improve L/S ROM to WFL t/o in 8 weeks to improve function  - not met  4  Improve BLE ROM to [facility] t/o in 8 weeks to improve function  - not met  5  Postural control is improved to maximal level of function in 8 weeks  - not met  6  Recreational performance in related activities is improved to maximal level of function in 8 weeks  - not met  7  Improve BLE strength to > or = to 4/5 t/o in 8 weeks to improve function  - partially met  8  Stair negotiation is improved to maximal level of function in 8 weeks  - not met  9  ADL performance in related activities is improved to maximal level of function in 8 weeks  - partially met    Plan  Treatment plan discussed with: patient and family  Plan details: Pt to be D/C from OPPT as he is scheduled for surgery on 7/10/18  Subjective Evaluation    History of Present Illness  Onset date: 2017  Mechanism of injury: Pt reporting that he is going to be done with therapy because he is scheduled for his surgery next week, 7/10/18  He states that the pain hasn't really changed but he does notice an improvement with his mobility since starting therapy  Quality of life: good    Pain  At best pain ratin  At worst pain rating: 10 (in the morning)  Location: LBP, buttock pain and L leg pain to calf   Quality: dull ache, sharp and discomfort  Relieving factors: ice and medications  Aggravating factors: standing and walking  Progression: improved    Social Support  Steps to enter house: yes  1  Stairs in house: yes (FF)   Lives in: multiple-level home  Lives with: spouse    Employment status: not working  Treatments  Previous treatment: injection treatment and medication  Patient Goals  Patient goals for therapy: decreased pain, increased motion, increased strength and independence with ADLs/IADLs  Patient goal: "To help get rid of the pain, to be able to walk better "          Objective     Static Posture     Lumbar Spine   Flattened       Postural Observations  Seated posture: fair  Standing posture: fair  Correction of posture: has no consistent effect        Neurological Testing     Sensation     Lumbar   Left   Intact: light touch    Right   Intact: light touch    Active Range of Motion   Left Hip   Flexion: 95 degrees with pain  Abduction: 25 degrees with pain    Right Hip   Flexion: 100 degrees   Abduction: 35 degrees   Left Knee   Flexion: 120 degrees   Extension: 0 degrees     Right Knee   Flexion: 125 degrees   Extension: 0 degrees     Additional Active Range of Motion Details  Standing flexion: 75%  Extension: 10%  Rotation seated: oakley 50% with pain   R SLR: 50  L SLR: 45    Strength/Myotome Testing     Left Hip   Planes of Motion   Flexion: 4-  Abduction: 4-  Adduction: 4-    Right Hip   Planes of Motion   Flexion: 4  Abduction: 4+  Adduction: 4+    Left Knee   Flexion: 4-  Extension: 4-    Right Knee   Flexion: 4+  Extension: 4+    Ambulation     Ambulation: Level Surfaces   Ambulation with assistive device: independent  Ambulation without assistive device: independent    Additional Level Surfaces Ambulation Details  SPC for community distances  No AD in the house    Ambulation: Stairs   Ascend stairs: independent  Pattern: non-reciprocal  Railings: one rail  Descend stairs: independent  Pattern: non-reciprocal  Railings: one rail    Additional Stairs Ambulation Details  Uses SPC on the steps  Observational Gait   Decreased walking speed, stride length and left stance time         Flowsheet Rows      Most Recent Value   PT/OT G-Codes   Current Score  44   FOTO information reviewed  Yes   Assessment Type  Discharge   G code set  Other PT/OT Primary   Other PT Primary Goal Status ()  CK   Other PT Primary Discharge Status ()  CK         Manual  6/22/18 6/26/18 6/28/18 7/6/18 6/19/18                                                                 Exercise Diary  6/22/18 6/26/18 7/3/18 6/15/18 6/19/18   NuStep L 2 13 min L 2 10 min L 2 10 min L1 15 min L 2  12 min UBE - Retro             Stand - SLR x 3 ways - Mo 1 x 10 ea 1 x 10 ea 1 x 10 ea NP 1 x 10 ea   Squats 1x 10 1 x 10 1 x 10 Cues  NP 1 x 10   Marches 1 x 15 1 x 15 1 x 10 NP 1 x 15   Stepups - F/L   C step 1 x 10 Fwd C 1x 10 forward  NP     SBB 5" x 5  5" x 5 5" x 5 NP 5" x 5   MTP/LTP   Green   1 x 10 ea  Green   1x10 ea NP     PPT 5" x 10 5" x 10 5" x 10 VC NP 5" x 10   PPT with marches             Supine SLR 1 x 10 1 x 10  1 x 10 with knees flexed NP 1 x 10   Bridges            Hip Abd with TBand Green  1 x 15 Green  1 x 15 Green 1 x 15 NP Red 1 x 15   Hip Add with Ball 1 x 15 1 x 15 1 x 15 NP 1 x 15   Isometric Hip Flexion - Mo             Hooklying Heel Walk             S/L Hip Abd - Mo             Clamshells - Mo             LTR 1 x 10 5" 1 x 10   5" 1 x 10  5"  NP 1 x 10 5"    SKTC                   Modalities 6/22/18 6/8/18 7/3/18 7/3/18 6/19/18   HP to LB prn Seated 10 min Seated 10 min Seated 10 min NP Seated 10 min

## 2018-07-06 NOTE — LETTER
July 10, 2018    Wild Izquierdo MD  [address] [facility] [address]    Patient: Hayley Ortiz   YOB: 1940   Date of Visit: 2018     Encounter Diagnosis     ICD-10-CM    1  Chronic left-sided low back pain with left-sided sciatica M54 42     G89 29        Dear Dr Moreno Maia:    Please review the attached Plan of Care from Jasper Aase recent visit  Please verify that you agree therapy should continue by signing the attached document and sending it back to our office  If you have any questions or concerns, please don't hesitate to call  Sincerely,    Kesha Ziegler, PT      Referring Provider:      I certify that I have read the below Plan of Care and certify the need for these services furnished under this plan of treatment while under my care  Wild Izquierdo MD  [facility] [address]  [facility] [address]: [phone]          PT Discharge    Today's date: 2018  Patient name: Hayley Ortiz  : 1940  MRN: [id number]  Referring provider: Tesha Marte MD  Dx:   Encounter Diagnosis     ICD-10-CM    1  Chronic left-sided low back pain with left-sided sciatica M54 42     G89 29                   Assessment  Impairments: abnormal gait, abnormal or restricted ROM, activity intolerance, impaired balance, impaired physical strength, lacks appropriate home exercise program and pain with function  Other impairment: decreased flexibility  Functional limitations: difficulty with dressing - shoes/socks/pants  Assessment details: The patient is a 79 y/o male who presents to PT with diagnosis of lumbago  Pt has attended 1 month of OPPT for pre-surgical conditioning and is now ready for D/C as he is scheduled for surgery on 7/10/18  Pt remains with pain, relatively unchanged since [facility]  However, PT observes pt with improved gait dynamics and improved tolerance to ADLs since starting therapy   He continues to rely on AD for ambulation but shows increased gait speed and improved step/stride lengths  Pt also continues to depend on wife for assistance with activities at home due to pain with bending, prolonged time on his feet, or any lifting  PT advised pt to continue with HEP until he goes for surgery and then to stop home program and follow MD post-operative orders until cleared to resume activity  He will be D/C due to change in status with upcoming surgical intervention  Thank you! Understanding of Dx/Px/POC: good   Prognosis: fair    Goals  STGs:  1  Initiate and complete HEP with verbal cues  - met  2  Decrease LBP by 25% in 4 weeks  - not met  3  Improve  BLE ROM by 5-10 degrees in 4 weeks  - partially met  4  Improve BLE strength by 1/2 grade in 4 weeks  - met  LTGs:  1  Patient to be I with HEP in 8 weeks  - met  2  Decrease LBP to < or = to 2-3/10 with activity in 8 weeks to improve function  - not met  3  Improve L/S ROM to [facility] t/o in 8 weeks to improve function  - not met  4  [facility] in [facility] to improve function  - not met  5  Postural control is improved to maximal level of function in 8 weeks  - not met  6  Recreational performance in related activities is improved to maximal level of function in 8 weeks  - not met  7  Improve BLE strength to > or = to 4/5 t/o in 8 weeks to improve function  - partially met  8  Stair negotiation is improved to maximal level of function in 8 weeks  - not met  9  ADL performance in related activities is improved to maximal level of function in 8 weeks  - partially met    Plan  Treatment plan discussed with: patient and family  Plan details: Pt to be D/C from OPPT as he is scheduled for surgery on 7/10/18  Subjective Evaluation    History of Present Illness  Onset date: August 2017  Mechanism of injury: Pt reporting that he is going to be done with therapy because he is scheduled for his surgery next week, 7/10/18   He states that the pain hasn't really changed but he does notice an improvement with his mobility since starting therapy  Quality of life: good    Pain  At best pain ratin  At worst pain rating: 10 (in the morning)  Location: LBP, buttock pain and L leg pain to calf   Quality: dull ache, sharp and discomfort  Relieving factors: ice and medications  Aggravating factors: standing and walking  Progression: improved    Social Support  Steps to enter house: yes  1  Stairs in house: yes (FF)   Lives in: multiple-level home  Lives with: spouse    Employment status: not working  Treatments  Previous treatment: injection treatment and medication  Patient Goals  Patient goals for therapy: decreased pain, increased motion, increased strength and independence with ADLs/IADLs  Patient goal: "To help get rid of the pain, to be able to walk better "          Objective     Static Posture     Lumbar Spine   Flattened       Postural Observations  Seated posture: fair  Standing posture: fair  Correction of posture: has no consistent effect        Neurological Testing     Sensation     Lumbar   Left   Intact: light touch    Right   Intact: light touch    Active Range of Motion   Left Hip   Flexion: 95 degrees with pain  Abduction: 25 degrees with pain    Right Hip   Flexion: 100 degrees   Abduction: 35 degrees   Left Knee   Flexion: 120 degrees   Extension: 0 degrees     Right Knee   Flexion: 125 degrees   Extension: 0 degrees     Additional Active Range of Motion Details  Standing flexion: 75%  Extension: 10%  Rotation seated: oakley 50% with pain   R SLR: 50  L SLR: 45    Strength/Myotome Testing     Left Hip   Planes of Motion   Flexion: 4-  Abduction: 4-  Adduction: 4-    Right Hip   Planes of Motion   Flexion: 4  Abduction: 4+  Adduction: 4+    Left Knee   Flexion: 4-  Extension: 4-    Right Knee   Flexion: 4+  Extension: 4+    Ambulation     Ambulation: Level Surfaces   Ambulation with assistive device: independent  Ambulation without assistive device: independent    Additional Level Surfaces Ambulation Details  SPC for community distances  No AD in the house    Ambulation: Stairs   Ascend stairs: independent  Pattern: non-reciprocal  Railings: one rail  Descend stairs: independent  Pattern: non-reciprocal  Railings: one rail    Additional Stairs Ambulation Details  Uses SPC on the steps  Observational Gait   Decreased walking speed, stride length and left stance time         Flowsheet Rows      Most Recent Value   PT/OT G-Codes   Current Score  44   FOTO information reviewed  Yes   Assessment Type  Discharge   G code set  Other PT/OT Primary   Other PT Primary Goal Status ()  CK   Other PT Primary Discharge Status ()  CK         Manual  6/22/18 6/26/18 6/28/18 7/6/18 6/19/18                                                                 Exercise Diary  6/22/18 6/26/18 7/3/18 6/15/18 6/19/18   NuStep L 2 13 min L 2 10 min L 2 10 min L1 15 min L 2  12 min   UBE - Retro             Stand - SLR x 3 ways - Mo 1 x 10 ea 1 x 10 ea 1 x 10 ea NP 1 x 10 ea   Squats 1x 10 1 x 10 1 x 10 Cues  NP 1 x 10   Marches 1 x 15 1 x 15 1 x 10 NP 1 x 15   Stepups - F/L   C step 1 x 10 Fwd C 1x 10 forward  NP     SBB 5" x 5  5" x 5 5" x 5 NP 5" x 5   MTP/LTP   Green   1 x 10 ea  Green   1x10 ea NP     PPT 5" x 10 5" x 10 5" x 10 VC NP 5" x 10   PPT with marches             Supine SLR 1 x 10 1 x 10  1 x 10 with knees flexed NP 1 x 10   Bridges            Hip Abd with TBand Green  1 x 15 Green  1 x 15 Green 1 x 15 NP Red 1 x 15   Hip Add with Ball 1 x 15 1 x 15 1 x 15 NP 1 x 15   Isometric Hip Flexion - Mo             Hooklying Heel Walk             S/L Hip Abd - Mo             Clamshells - Mo             LTR 1 x 10 5" 1 x 10   5" 1 x 10  5"   NP 1 x 10 5"    SKTC                   Modalities 6/22/18 6/8/18 7/3/18 7/3/18 6/19/18   HP to LB prn Seated 10 min Seated 10 min Seated 10 min NP Seated 10 min

## 2018-07-16 ENCOUNTER — TRANSITIONAL CARE MANAGEMENT (OUTPATIENT)
Dept: FAMILY MEDICINE CLINIC | Facility: CLINIC | Age: 78
End: 2018-07-16

## 2018-07-17 ENCOUNTER — TRANSITIONAL CARE MANAGEMENT (OUTPATIENT)
Dept: FAMILY MEDICINE CLINIC | Facility: CLINIC | Age: 78
End: 2018-07-17

## 2018-07-17 ENCOUNTER — OFFICE VISIT (OUTPATIENT)
Dept: FAMILY MEDICINE CLINIC | Facility: CLINIC | Age: 78
End: 2018-07-17
Payer: MEDICARE

## 2018-07-17 VITALS
SYSTOLIC BLOOD PRESSURE: 126 MMHG | HEIGHT: 68 IN | DIASTOLIC BLOOD PRESSURE: 68 MMHG | WEIGHT: 163.4 LBS | BODY MASS INDEX: 24.77 KG/M2

## 2018-07-17 DIAGNOSIS — R33.8 POSTOPERATIVE URINARY RETENTION: Primary | ICD-10-CM

## 2018-07-17 DIAGNOSIS — N99.89 POSTOPERATIVE URINARY RETENTION: Primary | ICD-10-CM

## 2018-07-17 PROCEDURE — 99496 TRANSJ CARE MGMT HIGH F2F 7D: CPT | Performed by: FAMILY MEDICINE

## 2018-07-17 RX ORDER — TRAMADOL HYDROCHLORIDE 50 MG/1
TABLET ORAL
COMMUNITY
Start: 2018-07-14 | End: 2018-10-29 | Stop reason: ALTCHOICE

## 2018-07-17 RX ORDER — PHENAZOPYRIDINE HYDROCHLORIDE 200 MG/1
200 TABLET, FILM COATED ORAL
Qty: 20 TABLET | Refills: 0 | Status: SHIPPED | OUTPATIENT
Start: 2018-07-17 | End: 2018-08-21 | Stop reason: ALTCHOICE

## 2018-07-17 RX ORDER — QUETIAPINE FUMARATE 50 MG/1
TABLET, FILM COATED ORAL
COMMUNITY
Start: 2018-06-27 | End: 2018-12-20 | Stop reason: SDUPTHER

## 2018-07-17 RX ORDER — ACETAMINOPHEN 500 MG
650 TABLET ORAL EVERY 6 HOURS PRN
COMMUNITY
Start: 2018-07-10 | End: 2020-02-23

## 2018-07-17 NOTE — PROGRESS NOTES
Assessment/Plan:      Diagnoses and all orders for this visit:    Postoperative urinary retention  -     phenazopyridine (PYRIDIUM) 200 mg tablet; Take 1 tablet (200 mg total) by mouth 3 (three) times a day with meals    Other orders  -     QUEtiapine (SEROquel) 50 mg tablet;   -     acetaminophen (TYLENOL) 500 mg tablet;   -     traMADol (ULTRAM) 50 mg tablet;          Subjective:     Patient ID: Nestor Gallegos is a 78 y o  male  Mr  Selena Ardon if she had his lumbar surgery he developed acute urinary retention after the surgery had to have a Martel catheter placed he is very uncomfortable from the Martel catheter but it is draining on I think he is just irritated from having the catheter in in general he is already on tamsulosin I am going to add Pyridium 200 mg 3 times a day with meals to try to anesthetize the urinary tract infection until his catheter can be removed        Review of Systems   Constitutional: Negative for activity change, appetite change, diaphoresis, fatigue and fever  HENT: Negative  Eyes: Negative  Respiratory: Negative for apnea, cough, chest tightness, shortness of breath and wheezing  Cardiovascular: Negative for chest pain, palpitations and leg swelling  Gastrointestinal: Negative for abdominal distention, abdominal pain, anal bleeding, constipation, diarrhea, nausea and vomiting  Endocrine: Negative for cold intolerance, heat intolerance, polydipsia, polyphagia and polyuria  Genitourinary: Positive for difficulty urinating  Negative for dysuria, flank pain, hematuria and urgency  Musculoskeletal: Negative for arthralgias, back pain, gait problem, joint swelling and myalgias  Skin: Negative for color change, rash and wound  Allergic/Immunologic: Negative for environmental allergies, food allergies and immunocompromised state  Neurological: Negative for dizziness, seizures, syncope, speech difficulty, numbness and headaches  Hematological: Negative for adenopathy  Does not bruise/bleed easily  Psychiatric/Behavioral: Negative for agitation, behavioral problems, hallucinations, sleep disturbance and suicidal ideas  Objective:     Physical Exam   Constitutional: He is oriented to person, place, and time  He appears well-developed and well-nourished  No distress  HENT:   Head: Normocephalic  Right Ear: External ear normal    Left Ear: External ear normal    Nose: Nose normal    Mouth/Throat: Oropharynx is clear and moist    Eyes: Conjunctivae and EOM are normal  Pupils are equal, round, and reactive to light  Right eye exhibits no discharge  Left eye exhibits no discharge  No scleral icterus  Neck: Normal range of motion  No tracheal deviation present  No thyromegaly present  Cardiovascular: Normal rate, regular rhythm and normal heart sounds  Exam reveals no gallop and no friction rub  No murmur heard  Pulmonary/Chest: Effort normal and breath sounds normal  No respiratory distress  He has no wheezes  Abdominal: Soft  Bowel sounds are normal  He exhibits no mass  There is no tenderness  There is no guarding  Genitourinary:   Genitourinary Comments: Catheter is draining appropriately on but is uncomfortable for the patient will be given Pyridium   Musculoskeletal: He exhibits no edema or deformity  Lymphadenopathy:     He has no cervical adenopathy  Neurological: He is alert and oriented to person, place, and time  No cranial nerve deficit  Skin: Skin is warm and dry  No rash noted  He is not diaphoretic  No erythema  Psychiatric: He has a normal mood and affect  Thought content normal          Vitals:    07/17/18 1323   BP: 126/68   BP Location: Right arm   Patient Position: Sitting   Weight: 74 1 kg (163 lb 6 4 oz)   Height: 5' 8" (1 727 m)       The following portions of the patient's history were reviewed and updated as appropriate:   He  has a past medical history of AAA (abdominal aortic aneurysm) ([facility] ); Acid reflux;  Acute exacerbation of chronic obstructive airways disease with asthma ([facility] ); Acute serous otitis media of left ear; Anesthesia; Anxiety; Aortic aneurysm without rupture ([facility] ); Arm bruise; Arthritis; Asthma; At risk for falls; BPH (benign prostatic hyperplasia); BPH without urinary obstruction; Cancer ([facility] ); CAP (community acquired pneumonia); Cataracts, bilateral; Change in bowel function; CHF (congestive heart failure) ([facility] ); Clubbing of fingers; Colon polyps; Common cold; Contusion of elbow, left; COPD (chronic obstructive pulmonary disease) ([facility] ); Coronary artery disease; Cough; Dementia; Depression; Diverticulosis; Dizziness; Exercise counseling; Fatigue; Full dentures; Glaucoma screening; High cholesterol; History of abdominal aortic aneurysm (AAA) repair (08/2017); History of bacteremia; History of chronic obstructive lung disease; History of epistaxis; History of influenza vaccination; History of kidney stones; History of pneumonia; History of sepsis (10/2017); History of sinusitis; History of skin cancer; History of sleep apnea; History of urinary tract infection; [place] (hard of hearing); Hydronephrosis with obstructing calculus; Influenza vaccine needed; Insomnia; Jock itch; Kidney stones; Left hip pain; Need for pneumococcal vaccination; Need for prophylactic vaccination and inoculation against influenza; Other emphysema ([facility] ); Pancreatitis, chronic ([facility] ); Pneumonia (10/26/2017); Pulmonary emphysema ([facility] ); S/P CABG x 3; Screening for genitourinary condition; Screening for neurological condition; Sepsis ([facility] ); Short-term memory loss; Sleep apnea; Special screening examination for neoplasm of prostate; TIA involving carotid artery; Ulcer; Unsteady gait; Use of cane as ambulatory aid; Vitamin D deficiency; and Wears glasses    He   Patient Active Problem List    Diagnosis Date Noted    Hyponatremia 05/15/2018    CAD (coronary artery disease) 05/15/2018    Elevated bilirubin 05/13/2018    Tracheobronchitis 05/13/2018    Coronary artery disease involving native coronary artery of native heart with angina pectoris ([facility] ) 05/11/2018    Hx of CABG 05/11/2018    Bilateral carotid artery stenosis 05/11/2018    Pulmonary nodule 03/07/2018    Ventricular bigeminy 03/07/2018    Positional lightheadedness 03/07/2018    Bradycardia 03/06/2018    SOB (shortness of breath) 03/06/2018    Dementia 01/29/2018    History of aortic aneurysm repair 09/01/2017    Thrombocytopenia ([place] [address] ) 06/02/2017    Abnormal CT scan, chest 06/01/2017    Abdominal aortic aneurysm (HCC) 06/01/2017    Acute tracheobronchitis 12/28/2016    COPD (chronic obstructive pulmonary disease) ([place] [address] ) 12/28/2016    Hyperlipidemia 12/28/2016    GERD (gastroesophageal reflux disease) 12/28/2016    Benign prostatic hyperplasia 09/18/2015     He  has a past surgical history that includes Cardiac surgery; Ureteral stent placement; Excisional hemorrhoidectomy; Mouth surgery; pr esophagogastroduodenoscopy transoral diagnostic (N/A, 8/18/2016); Cataract extraction (Bilateral); Lithotripsy; Hernia repair; pr colonoscopy flx dx w/collj spec when pfrmd (N/A, 5/16/2017); Abdominal aortic aneurysm repair (08/23/2017); CAROTID ENDARTARECTOMY (Left, 11/1996); Coronary artery bypass graft (01/2003); Eye surgery (Bilateral); Cystoscopy; pr cystourethroscopy (N/A, 11/30/2017); pr remove bladder stone,<2 5 cm (N/A, 11/30/2017); Cystoscopy; AAA repair, endovascular; and Tonsillectomy  His family history includes Diabetes type II in his maternal grandmother and mother; Hypertension in his paternal grandmother; Kidney failure in his father  He  reports that he quit smoking about 4 years ago  He has a 90 00 pack-year smoking history  He has never used smokeless tobacco  He reports that he does not drink alcohol or use drugs    Current Outpatient Prescriptions   Medication Sig Dispense Refill    acetaminophen (TYLENOL) 500 mg tablet       aspirin 81 MG tablet Take 81 mg by mouth daily Took within 24 hours       atorvastatin (LIPITOR) 20 mg tablet TAKE 1 TABLET AT BEDTIME 90 tablet 1    Bacillus Coagulans-Inulin (PROBIOTIC FORMULA) 1-250 BILLION-MG CAPS Take 2 capsules by mouth      Cholecalciferol (VITAMIN D-3 PO) Take 2,000 Units by mouth daily   cyanocobalamin (VITAMIN B-12) 1,000 mcg tablet Take by mouth daily      donepezil (ARICEPT) 10 mg tablet Take 1 tablet (10 mg total) by mouth daily at bedtime for 30 days 30 tablet 0    escitalopram (LEXAPRO) 20 mg tablet Take 1 tablet (20 mg total) by mouth daily 30 tablet 0    Fluticasone Furoate-Vilanterol (BREO ELLIPTA) 100-25 MCG/INH AEPB Inhale 1 puff daily   KRILL OIL PO Take 500 mg by mouth daily   metoprolol succinate (TOPROL-XL) 25 mg 24 hr tablet Take 1 tablet (25 mg total) by mouth daily at bedtime 90 tablet 3    montelukast (SINGULAIR) 10 mg tablet take 1 tablet by mouth once daily 90 tablet 3    Multiple Vitamins-Minerals (CENTRUM SILVER ADULT 50+) TABS Take 1 tablet by mouth daily      Potassium Citrate ER 15 MEQ (1620 MG) TBCR TAKE 1 TABLET TWICE A  tablet 3    QUEtiapine (SEROquel) 50 mg tablet       tamsulosin (FLOMAX) 0 4 mg Take 0 4 mg by mouth daily   tiotropium (SPIRIVA HANDIHALER) 18 mcg inhalation capsule Place 18 mcg into inhaler and inhale daily   traMADol (ULTRAM) 50 mg tablet       phenazopyridine (PYRIDIUM) 200 mg tablet Take 1 tablet (200 mg total) by mouth 3 (three) times a day with meals 20 tablet 0     No current facility-administered medications for this visit  Current Outpatient Prescriptions on File Prior to Visit   Medication Sig    aspirin 81 MG tablet Take 81 mg by mouth daily Took within 24 hours     atorvastatin (LIPITOR) 20 mg tablet TAKE 1 TABLET AT BEDTIME    Bacillus Coagulans-Inulin (PROBIOTIC FORMULA) 1-250 BILLION-MG CAPS Take 2 capsules by mouth    Cholecalciferol (VITAMIN D-3 PO) Take 2,000 Units by mouth daily      cyanocobalamin (VITAMIN B-12) 1,000 mcg tablet Take by mouth daily    donepezil (ARICEPT) 10 mg tablet Take 1 tablet (10 mg total) by mouth daily at bedtime for 30 days    escitalopram (LEXAPRO) 20 mg tablet Take 1 tablet (20 mg total) by mouth daily    Fluticasone Furoate-Vilanterol (BREO ELLIPTA) 100-25 MCG/INH AEPB Inhale 1 puff daily   KRILL OIL PO Take 500 mg by mouth daily   metoprolol succinate (TOPROL-XL) 25 mg 24 hr tablet Take 1 tablet (25 mg total) by mouth daily at bedtime    montelukast (SINGULAIR) 10 mg tablet take 1 tablet by mouth once daily    Multiple Vitamins-Minerals (CENTRUM SILVER ADULT 50+) TABS Take 1 tablet by mouth daily    Potassium Citrate ER 15 MEQ (1620 MG) TBCR TAKE 1 TABLET TWICE A DAY    tamsulosin (FLOMAX) 0 4 mg Take 0 4 mg by mouth daily   tiotropium (SPIRIVA HANDIHALER) 18 mcg inhalation capsule Place 18 mcg into inhaler and inhale daily   [DISCONTINUED] acetaminophen (TYLENOL) 325 mg tablet Take 650 mg by mouth every 6 (six) hours as needed      [DISCONTINUED] traMADol (ULTRAM) 50 mg tablet take 1/2 tablets by mouth twice a day as needed     No current facility-administered medications on file prior to visit  He is allergic to augmentin [amoxicillin-pot clavulanate]; ciprofloxacin; morphine and related; and quetiapine       Transitional Care Management Review:  Donis Dia is a 78 y o  male here for TCM follow up       During the TCM phone call patient stated:    Date and time hospital follow up call was made:  7/16/2018 10:13 AM  Hospital care reviewed:  (Comment: CAROTID DOPPLER 5/15/18)  Patient [facility]ital[facility] at:  [facility]  Date of admission:  7/10/18  Date of discharge:  7/12/18  Diagnosis:  LUMBAR STENOSIS  Disposition:  Home  Were the patients medicaitons reviewed and updated:  No  Current symptoms:  None  Post hospital issues:  None  Should patient be enrolled in anticoag monitoring?:  No  Scheduled for follow up?:  Yes (Comment: PERRY 7/17/18)  Patients specialists: Other (comment)  Other specialists Name:  DR Christiano Akers  Did you obtain your prescribed medications:  No  Do you need help managing your perscriptions or medications:  No  Is transportation to your appointments needed:  No  I have advised the patient to call PCP with any new or worsening symptoms (please type in name along with any credentials):  Mily Zhu  Living Arrangements:  Spouse or Significiant other  Support System:  Spouse  The type of support provided:  Emotional, Physical  Do you have social support:  Yes, as much as I need  Are you recieving outpatient services:  No  Are you recieving home care services:  Yes  Types of home care services:  Nurse visit (Comment: MORALES Carvajal)  Are you using any community resources:  No  Current waiver service:  No  Have you fallen in the last 12 months:  No  Interperter language line required?:  No  Counseling:  Patient             Cosmo Bravo, DO

## 2018-07-20 ENCOUNTER — TELEPHONE (OUTPATIENT)
Dept: UROLOGY | Facility: MEDICAL CENTER | Age: 78
End: 2018-07-20

## 2018-07-20 ENCOUNTER — OFFICE VISIT (OUTPATIENT)
Dept: UROLOGY | Facility: MEDICAL CENTER | Age: 78
End: 2018-07-20
Payer: MEDICARE

## 2018-07-20 VITALS
WEIGHT: 163 LBS | BODY MASS INDEX: 24.71 KG/M2 | DIASTOLIC BLOOD PRESSURE: 70 MMHG | SYSTOLIC BLOOD PRESSURE: 108 MMHG | HEIGHT: 68 IN

## 2018-07-20 DIAGNOSIS — R33.9 URINARY RETENTION: Primary | ICD-10-CM

## 2018-07-20 DIAGNOSIS — Z71.89 OTHER SPECIFIED COUNSELING: ICD-10-CM

## 2018-07-20 PROCEDURE — 99213 OFFICE O/P EST LOW 20 MIN: CPT | Performed by: UROLOGY

## 2018-07-20 RX ORDER — TAMSULOSIN HYDROCHLORIDE 0.4 MG/1
0.4 CAPSULE ORAL DAILY
Qty: 90 CAPSULE | Refills: 3 | Status: SHIPPED | OUTPATIENT
Start: 2018-07-20 | End: 2019-06-06 | Stop reason: SDUPTHER

## 2018-07-20 NOTE — PROGRESS NOTES
Assessment/Plan:    Urinary retention    Voiding trial tomorrow  Diagnoses and all orders for this visit:    Urinary retention  -     tamsulosin (FLOMAX) 0 4 mg; Take 1 capsule (0 4 mg total) by mouth daily    Other specified counseling          Subjective:      Patient ID: Peyton Rivera is a 78 y o  male  HPI  Urinary Retention:  Back surgery on July 10  Went into retention and has had a Martel since July 11  Pt taking Pyridium for relief of catheter irritation  The patient's cystoscopy last fall showed mild prostatic obstruction  He apparently was voiding normally although the presence of a bladder stone suggest some degree of urinary retention  None the less, until we had back surgery, he was quite happy with the way he voided  The patient will have a voiding trial     The patient is accompanied by his wife  The following portions of the patient's history were reviewed and updated as appropriate: allergies, current medications, past family history, past medical history, past social history, past surgical history and problem list     Review of Systems   Constitutional: Negative for activity change and fatigue  Respiratory: Negative for shortness of breath and wheezing  Cardiovascular: Negative for chest pain  Gastrointestinal: Negative for abdominal pain  Genitourinary: Negative for difficulty urinating, dysuria, frequency, hematuria and urgency  Musculoskeletal: Negative for back pain and gait problem  Skin: Negative  Allergic/Immunologic: Negative  Neurological: Negative  "Memory problems" for which he takes done a is a pill  Psychiatric/Behavioral: Negative  Objective:      /70 (BP Location: Left arm, Patient Position: Sitting, Cuff Size: Standard)   Ht 5' 8" (1 727 m)   Wt 73 9 kg (163 lb)   BMI 24 78 kg/m²          Physical Exam   Constitutional: He is oriented to person, place, and time   He appears well-developed and well-nourished  HENT:   Head: Normocephalic and atraumatic  Pulmonary/Chest: Effort normal    Abdominal: Soft  Bowel sounds are normal    Musculoskeletal: Normal range of motion  Neurological: He is alert and oriented to person, place, and time  Skin: Skin is warm and dry  Psychiatric: He has a normal mood and affect   His behavior is normal  Judgment and thought content normal

## 2018-07-20 NOTE — LETTER
July 20, 2018     [facility]    Patient: Nohelia Hoskins   YOB: 1940   Date of Visit: 7/20/2018       Dear Dr Aubrey Valverde: Thank you for referring Jasper Beyer to me for evaluation  Below are my notes for this consultation  If you have questions, please do not hesitate to call me  I look forward to following your patient along with you  Sincerely,        Fransisco Wu MD        CC: No Recipients  Fransisco Wu MD  7/20/2018  2:51 PM  Sign at close encounter  Assessment/Plan:    Urinary retention    Voiding trial tomorrow  Diagnoses and all orders for this visit:    Urinary retention  -     tamsulosin (FLOMAX) 0 4 mg; Take 1 capsule (0 4 mg total) by mouth daily    Other specified counseling          Subjective:      Patient ID: Nohelia Hoskins is a 78 y o  male  HPI  Urinary Retention:  Back surgery on July 10  Went into retention and has had a Martel since July 11  Pt taking Pyridium for relief of catheter irritation  The patient's cystoscopy last fall showed mild prostatic obstruction  He apparently was voiding normally although the presence of a bladder stone suggest some degree of urinary retention  None the less, until we had back surgery, he was quite happy with the way he voided  The patient will have a voiding trial     The patient is accompanied by his wife  The following portions of the patient's history were reviewed and updated as appropriate: allergies, current medications, past family history, past medical history, past social history, past surgical history and problem list     Review of Systems   Constitutional: Negative for activity change and fatigue  Respiratory: Negative for shortness of breath and wheezing  Cardiovascular: Negative for chest pain  Gastrointestinal: Negative for abdominal pain     Genitourinary: Negative for difficulty urinating, dysuria, frequency, hematuria and urgency  Musculoskeletal: Negative for back pain and gait problem  Skin: Negative  Allergic/Immunologic: Negative  Neurological: Negative  "Memory problems" for which he takes done a is a pill  Psychiatric/Behavioral: Negative  Objective:      /70 (BP Location: Left arm, Patient Position: Sitting, Cuff Size: Standard)   Ht 5' 8" (1 727 m)   Wt 73 9 kg (163 lb)   BMI 24 78 kg/m²           Physical Exam   Constitutional: He is oriented to person, place, and time  He appears well-developed and well-nourished  HENT:   Head: Normocephalic and atraumatic  Pulmonary/Chest: Effort normal    Abdominal: Soft  Bowel sounds are normal    Musculoskeletal: Normal range of motion  Neurological: He is alert and oriented to person, place, and time  Skin: Skin is warm and dry  Psychiatric: He has a normal mood and affect   His behavior is normal  Judgment and thought content normal

## 2018-07-20 NOTE — TELEPHONE ENCOUNTER
Per Dr Hemant Carlisle pt to have Martel removed Sat by [facility] branch  Called facility and obtained fax [phone] and faxed order

## 2018-08-01 ENCOUNTER — OFFICE VISIT (OUTPATIENT)
Dept: CARDIOLOGY CLINIC | Facility: HOSPITAL | Age: 78
End: 2018-08-01
Payer: MEDICARE

## 2018-08-01 VITALS
SYSTOLIC BLOOD PRESSURE: 105 MMHG | WEIGHT: 163.9 LBS | HEIGHT: 68 IN | HEART RATE: 79 BPM | DIASTOLIC BLOOD PRESSURE: 66 MMHG | BODY MASS INDEX: 24.84 KG/M2

## 2018-08-01 DIAGNOSIS — E78.2 MIXED HYPERLIPIDEMIA: ICD-10-CM

## 2018-08-01 DIAGNOSIS — I71.4 ABDOMINAL AORTIC ANEURYSM (AAA) WITHOUT RUPTURE (HCC): ICD-10-CM

## 2018-08-01 DIAGNOSIS — J30.1 ALLERGIC RHINITIS DUE TO POLLEN, UNSPECIFIED SEASONALITY: ICD-10-CM

## 2018-08-01 DIAGNOSIS — I49.8 VENTRICULAR BIGEMINY: ICD-10-CM

## 2018-08-01 DIAGNOSIS — I65.23 BILATERAL CAROTID ARTERY STENOSIS: ICD-10-CM

## 2018-08-01 DIAGNOSIS — I25.10 CORONARY ARTERY DISEASE INVOLVING NATIVE HEART WITHOUT ANGINA PECTORIS, UNSPECIFIED VESSEL OR LESION TYPE: Primary | ICD-10-CM

## 2018-08-01 PROCEDURE — 99214 OFFICE O/P EST MOD 30 MIN: CPT | Performed by: INTERNAL MEDICINE

## 2018-08-01 RX ORDER — ATORVASTATIN CALCIUM 20 MG/1
20 TABLET, FILM COATED ORAL
Qty: 90 TABLET | Refills: 3 | Status: SHIPPED | OUTPATIENT
Start: 2018-08-01 | End: 2019-07-15 | Stop reason: SDUPTHER

## 2018-08-01 RX ORDER — MONTELUKAST SODIUM 10 MG/1
10 TABLET ORAL DAILY
Qty: 90 TABLET | Refills: 2 | Status: SHIPPED | OUTPATIENT
Start: 2018-08-01 | End: 2019-04-12 | Stop reason: SDUPTHER

## 2018-08-01 NOTE — PROGRESS NOTES
Cardiology Follow Up    Ernestine Favre  1940  [id number]  [address] [facility] [address] [id number] [phone]    1  Coronary artery disease involving native heart without angina pectoris, unspecified vessel or lesion type     2  Mixed hyperlipidemia  atorvastatin (LIPITOR) 20 mg tablet    Lipid panel   3  Bilateral carotid artery stenosis     4  Abdominal aortic aneurysm (AAA) without rupture ([facility] )     5  Ventricular bigeminy         Discussion/Summary:  Coronary disease stable with no complaints of angina, blood pressures been well controlled  He is due for a fasting lipid profile to evaluate the efficacy of his statin dose  He has an abdominal aortic aneurysm that is followed by vascular surgery  Carotid showed mild plaque bilaterally  No further cardiac testing at this point time  I would plan a yearly follow-up for echocardiogram due to frequent PVCs  Interval History:78year-old gentleman history of coronary artery disease status post CABG in 2003, peripheral arterial disease status post carotid endarterectomy and abdominal aortic aneurysm repaired with EVAR, hypertension, hyperlipidemia, prior tobacco abuse presents to establish care with me in the office  He had some periods of palpitations and frequent PVCs along with lightheadedness  I ordered 2 week event recorder that did not show any significant abnormalities  Lowest heart rate was 50  No indication for pacemaker  No atrial fibrillation  Overall he has been doing well since her last appointment  He had recent back surgery has been recovering nicely  Functional capacity has been improving  He denies any chest pain, shortness of breath, lightheadedness, syncope  His been no lower extremity edema, PND, orthopnea      Problem List     Acute tracheobronchitis    COPD (chronic obstructive pulmonary disease) ([facility] ) Hyperlipidemia    GERD (gastroesophageal reflux disease)    Abnormal CT scan, chest    Abdominal aortic aneurysm (HCC)    Thrombocytopenia (HCC)    History of aortic aneurysm repair    Benign prostatic hyperplasia    Dementia    Bradycardia    SOB (shortness of breath)    Pulmonary nodule    Ventricular bigeminy    Positional lightheadedness    Coronary artery disease involving native coronary artery of native heart with angina pectoris (HCC)    Hx of CABG    Bilateral carotid artery stenosis    Elevated bilirubin    Tracheobronchitis    Hyponatremia    CAD (coronary artery disease)    Urinary retention        Past Medical History:   Diagnosis Date    AAA [facility]ab[facility] aneurysm) ([facility])     Acid reflux     Acute exacerbation of chronic obstructive airways disease with asthma ([facility])     Acute serous otitis media of left ear     recurrence not specified     Anesthesia     "always has mental changes /lingers and lingers after anesthesia"    Anxiety     Aortic aneurysm without rupture ([facility])     Arm bruise     right inner forearm "recent IV"    Arthritis     spine and poss left hip    Asthma     bronchietasis    At risk for falls     BPH (benign prostatic hyperplasia)     pt and wife can't confirm 11/10    BPH without urinary obstruction     Cancer ([address] )     skin CA on nose    CAP (community acquired pneumonia)     Cataracts, bilateral     Change in bowel function     CHF (congestive heart failure) ([facility])     Clubbing of fingers     Colon polyps     Common cold     Contusion of elbow, left     initial encounter     COPD (chronic obstructive pulmonary disease) ([address] )     Coronary artery disease     Cough     Dementia     Depression     Diverticulosis     Dizziness     upon "standing quickly on occas"    Exercise counseling     Fatigue     Full dentures     "doesn't wear them"    Glaucoma screening     High cholesterol     History of abdominal aortic aneurysm (AAA) repair 08/2017    History of bacteremia     History of chronic obstructive lung disease     History of epistaxis     History of influenza vaccination     History of kidney stones     History of pneumonia     "many times" "at least 5 times" almost always goes to sepsis"    History of sepsis 10/2017    History of sinusitis     History of skin cancer     History of sleep apnea     History of urinary tract infection     [place] (hard of hearing)     Hydronephrosis with obstructing calculus     Influenza vaccine needed     Insomnia     Jock itch     left/saw doctor 11/8 and started on antifungal cream    Kidney stones     Left hip pain     Need for pneumococcal vaccination     Need for prophylactic vaccination and inoculation against influenza     Other emphysema ([facility] )     Pancreatitis, chronic ([address] )     pt and wife can't confirm 11/10/17    Pneumonia 10/26/2017    admitted [facility]    Pulmonary emphysema ([facility] )     S/P CABG x 3     approx 14 yrs ago    Screening for genitourinary condition     Screening for neurological condition     Sepsis ([facility] )     due to unspecified organism     Short-term memory loss     Sleep apnea     does not use CPAP      Special screening examination for neoplasm of prostate     TIA involving carotid artery     "before carotid surgery"    Ulcer     stomach, "years ago"    Unsteady gait     Use of cane as ambulatory aid     sometimes    Vitamin D deficiency     Wears glasses      Social History     Social History    Marital status: /Civil Union     Spouse name: N/A    Number of children: N/A    Years of education: N/A     Occupational History          Retired     Social History Main Topics    Smoking status: Former Smoker     Packs/day: 1 50     Years: 60 00     Quit date: 2014    Smokeless tobacco: Never Used      Comment: quit 3 yrs ago, used to be a 1-1 5 ppd smoker    Alcohol use No      Comment: quit 25 yrs ago    Drug use: No      Comment: No illicit drug use     Sexual activity: Not Currently     Other Topics Concern    Not on file     Social History Narrative    Retired     No living Will     No advance directives     Daily caffeine consumption - 4-5 servings a day       Family History   Problem Relation Age of Onset    Diabetes type II Mother         mellitus    Kidney failure Father     Diabetes type II Maternal Grandmother         mellitus     Hypertension Paternal Grandmother         benign essential      Past Surgical History:   Procedure Laterality Date    ABDOMINAL AORTIC ANEURYSM REPAIR  08/23/2017    ABDOMINAL AORTIC ANEURYSM REPAIR, ENDOVASCULAR      CARDIAC SURGERY      CABG x3    CAROTID ENDARTARECTOMY Left 11/1996    CATARACT EXTRACTION Bilateral     CORONARY ARTERY BYPASS GRAFT  01/2003    x3    CYSTOSCOPY      with insertion of ureteral stent     CYSTOSCOPY      with ureteroscopy with lithotripsy     EXCISIONAL HEMORRHOIDECTOMY      EYE SURGERY Bilateral     "for a wrinkle" after cataract surgery    HERNIA REPAIR      umbilical    LITHOTRIPSY      renal    MOUTH SURGERY      full mouth extraction     IN COLONOSCOPY FLX DX W/COLLJ SPEC WHEN PFRMD N/A 5/16/2017    Procedure: COLONOSCOPY with polypectomies/ hot snare and tattoo;  Surgeon: Madhu Velarde MD;  Location: [facility]; Service: Gastroenterology    IN CYSTOURETHROSCOPY N/A 11/30/2017    Procedure: Anne Campos;  Surgeon: Candy Tai MD;  [facility];  Service: Urology    IN ESOPHAGOGASTRODUODENOSCOPY TRANSORAL DIAGNOSTIC N/A 8/18/2016    Procedure: ESOPHAGOGASTRODUODENOSCOPY (EGD); Surgeon: Madhu Velarde MD;  [facility]: [facility];   Service: Gastroenterology    IN REMOVE BLADDER STONE,<2 5 CM N/A 11/30/2017    Procedure: Saran Levy;  Surgeon: Candy Tai MD;  [facility];  Service: Urology   [address]      and removal       Current Outpatient Prescriptions:     acetaminophen (TYLENOL) 500 mg tablet, , Disp: , Rfl:     aspirin 81 MG tablet, Take 81 mg by mouth daily Took within 24 hours , Disp: , Rfl:     atorvastatin (LIPITOR) 20 mg tablet, Take 1 tablet (20 mg total) by mouth daily at bedtime, Disp: 90 tablet, Rfl: 3    Bacillus Coagulans-Inulin (PROBIOTIC FORMULA) 1-250 BILLION-MG CAPS, Take 2 capsules by mouth, Disp: , Rfl:     Cholecalciferol (VITAMIN D-3 PO), Take 2,000 Units by mouth daily  , Disp: , Rfl:     cyanocobalamin (VITAMIN B-12) 1,000 mcg tablet, Take by mouth daily, Disp: , Rfl:     donepezil (ARICEPT) 10 mg tablet, Take 1 tablet (10 mg total) by mouth daily at bedtime for 30 days, Disp: 30 tablet, Rfl: 0    escitalopram (LEXAPRO) 20 mg tablet, Take 1 tablet (20 mg total) by mouth daily, Disp: 30 tablet, Rfl: 0    Fluticasone Furoate-Vilanterol (BREO ELLIPTA) 100-25 MCG/INH AEPB, Inhale 1 puff daily  , Disp: , Rfl:     KRILL OIL PO, Take 500 mg by mouth daily  , Disp: , Rfl:     metoprolol succinate (TOPROL-XL) 25 mg 24 hr tablet, Take 1 tablet (25 mg total) by mouth daily at bedtime, Disp: 90 tablet, Rfl: 3    montelukast (SINGULAIR) 10 mg tablet, Take 1 tablet (10 mg total) by mouth daily, Disp: 90 tablet, Rfl: 2    Multiple Vitamins-Minerals (CENTRUM SILVER ADULT 50+) TABS, Take 1 tablet by mouth daily, Disp: , Rfl:     phenazopyridine (PYRIDIUM) 200 mg tablet, Take 1 tablet (200 mg total) by mouth 3 (three) times a day with meals, Disp: 20 tablet, Rfl: 0    Potassium Citrate ER 15 MEQ (1620 MG) TBCR, TAKE 1 TABLET TWICE A DAY, Disp: 180 tablet, Rfl: 3    QUEtiapine (SEROquel) 50 mg tablet, , Disp: , Rfl:     tamsulosin (FLOMAX) 0 4 mg, Take 1 capsule (0 4 mg total) by mouth daily, Disp: 90 capsule, Rfl: 3    tiotropium (SPIRIVA HANDIHALER) 18 mcg inhalation capsule, Place 18 mcg into inhaler and inhale daily  , Disp: , Rfl:     traMADol (ULTRAM) 50 mg tablet, , Disp: , Rfl:   Allergies   Allergen Reactions    Augmentin [Amoxicillin-Pot Clavulanate] Diarrhea    Ciprofloxacin Hives    Morphine And Related Other (See Comments)     Change in mental status    Quetiapine Other (See Comments)     Severe nightmares on lower doses       Labs:     Chemistry        Component Value Date/Time     05/15/2018 0437     11/29/2014 1416    K 3 3 (L) 05/15/2018 0437    K 3 6 11/29/2014 1416     05/15/2018 0437     11/29/2014 1416    CO2 23 05/15/2018 0437    CO2 30 6 11/29/2014 1416    BUN 12 05/15/2018 0437    BUN 13 11/29/2014 1416    CREATININE 1 00 05/15/2018 0437    CREATININE 0 94 08/31/2015 1715        Component Value Date/Time    CALCIUM 8 3 05/15/2018 0437    CALCIUM 8 9 11/29/2014 1416    ALKPHOS 108 05/15/2018 0437    ALKPHOS 122 11/29/2014 1416    AST 18 05/15/2018 0437    AST 28 11/29/2014 1416    ALT 20 05/15/2018 0437    ALT 38 11/29/2014 1416    BILITOT 1 50 (H) 05/15/2018 0437    BILITOT 0 7 11/29/2014 1416            Lab Results   Component Value Date    CHOL 152 04/08/2016     Lab Results   Component Value Date    HDL 32 (L) 04/08/2016     Lab Results   Component Value Date    LDLCALC 104 (H) 04/08/2016     Lab Results   Component Value Date    TRIG 81 04/08/2016     No components found for: CHOLHDL    Imaging: No results found  ECG:        Review of Systems   Constitution: Negative  HENT: Negative  Eyes: Negative  Cardiovascular: Negative  Respiratory: Negative  Endocrine: Negative  Hematologic/Lymphatic: Negative  Skin: Negative  Musculoskeletal: Negative  Gastrointestinal: Negative  Genitourinary: Negative  Neurological: Negative  Psychiatric/Behavioral: Negative  Vitals:    08/01/18 1407   BP: 105/66   Pulse: 79     Vitals:    08/01/18 1407   Weight: 74 3 kg (163 lb 14 4 oz)     Height: 5' 8" (172 7 cm)   Body mass index is 24 92 kg/m²      Physical Exam:   General appearance:  Appears stated age, alert, well appearing and in no distress  HEENT:  PERRLA, EOMI, no scleral icterus, no conjunctival pallor  NECK:  Supple, No elevated JVP, no thyromegaly, no carotid bruits  HEART:  Regular rate and rhythm, normal S1/S2, no S3/S4, no murmur or rub  LUNGS:  Clear to auscultation bilaterally, no wheezes rales or rhonchi  ABDOMEN:  Soft, non-tender, positive bowel sounds, no rebound or guarding, no organomegaly   EXTREMITIES:  No edema, normal range of motion  VASCULAR:  Normal pedal pulses, good pulse volume   SKIN: No lesions or rashes on exposed skin  NEURO:  CN II-XII intact, no focal deficits

## 2018-08-03 NOTE — PROGRESS NOTES
PT Evaluation     Today's date: 2018  Patient name: Elli Hawkins  : 1940  MRN: [id number]  Referring provider: Neo Camejo MD  Dx:   Encounter Diagnosis     ICD-10-CM    1  Spinal stenosis, lumbar region with neurogenic claudication M48 [id number]                   Assessment  Impairments: abnormal gait, abnormal or restricted ROM, activity intolerance, impaired balance, impaired physical strength, lacks appropriate home exercise program and pain with function  Other impairment: decreased flexibility  Functional limitations: difficulty with dressing - shoes/socks/pants  Assessment details: The patient is a 77 y/o male who presents to PT s/p lumbar surgery on 7/10/18 by Dr Pedroza Company  He has complaints of constant pain since surgery with most pain being along his low back and into his LLE  He also demonstrates deficits with decreased L/S and BLE ROM and strength, decreased flexibility, decreased postural awareness, limited standing tolerance, antalgic gait with use of AD, difficulty with stair negotiation, difficulty dressing and pain and difficulty with completing his ADLs  Since surgery he has needed help with putting on his shoes and socks and he has been sitting to shower  No help needed with any other ADLs at this time  He is using [facility] for community ambulation and walking without AD in the house  He ambulates with slow sinan and decreased weight shift noted to LLE in stance phase  He continues to have difficulty with stair negotiation and goes up and down the steps with non-reciprocal gait pattern and use of HR and SPC  Limited standing tolerance is noted and he can only stand for about 5 minutes prior to pain and fatigue  Since surgery he has been limited with overall mobility and function  The patient would benefit from continued PT to address deficits and improve function    Tx to include ROM, stretching, strengthening, modalities, HEP, pt education, postural ed, lifting/body mechanics, neuro re-ed, balance/proprioception Te, MT and equipment  Understanding of Dx/Px/POC: good   Prognosis: fair    Goals  STGs:  1  Initiate and complete HEP with verbal cues  2   Decrease LBP by 25% in 4 weeks  3   Improve  BLE ROM by 5-10 degrees in 4 weeks  4   Improve BLE strength by 1/2 grade in 4 weeks  LTGs:  1  Patient to be I with HEP in 8 weeks  2   Decrease LBP to < or = to 2-3/10 with activity in 8 weeks to improve function  3   Improve L/S ROM to [facility] in 8 weeks to improve function  4   Improve BLE ROM to [facility] t[facility] in 8 weeks to improve function  5   Postural control is improved to maximal level of function in 8 weeks  6   Recreational performance in related activities is improved to maximal level of function in 8 weeks  7   Improve BLE strength to > or = to 4/5 t/o in 8 weeks to improve function  8   Stair negotiation is improved to maximal level of function in 8 weeks  9   ADL performance in related activities is improved to maximal level of function in 8 weeks  Plan  Patient would benefit from: skilled physical therapy  Planned modality interventions: cryotherapy and thermotherapy: hydrocollator packs  Planned therapy interventions: abdominal trunk stabilization, manual therapy, neuromuscular re-education, body mechanics training, patient education, postural training, self care, strengthening, stretching, therapeutic activities, flexibility, therapeutic exercise, home exercise program and balance  Frequency: 3x week (2-3 times/week)  Duration in weeks: 8  Plan of Care beginning date: 8/6/2018  Plan of Care expiration date: 9/6/2018  Treatment plan discussed with: patient and family        Subjective Evaluation    History of Present Illness  Onset date: August 2017  Mechanism of injury: The patient states that last August he had a AAA repaired and after this procedure he had HHPT  He received stretching to his legs and they feel like it was "too aggressive"    Since then he has had pain in his buttocks and into his L leg  Then on 18 his wife had called the ambulance to take him to the hospital because of the pain  He had some testing and was sent home same day  After the hospital visit he started to see Dr Kaylene Rivera and did have injections and nerve block  Some relief was noted after the nerve block for a few days  He did have course of PT from 18 to 18  He was discharged at that time secondary to he was going to have low back surgery  He had surgery on by Dr Ngoc Brar on 7/10/18 at  "[facility] [facility]  He was in the hospital for two nights  From there he was discharged to home with home health nursing and PT  He had three visits of PT before he was discharged  He was back to see the PA on 18 and per patient she is happy with how he is doing so far  He was referred back to OPPT and he now presents for his evaluation  He will be going back to see the doctor two weeks from today      Quality of life: good    Pain  At best pain ratin  At worst pain rating: 10 (in the morning)  Location: LBP, buttock pain and L leg pain to calf   Quality: dull ache, discomfort and sharp  Relieving factors: ice and medications  Aggravating factors: standing and walking  Progression: improved    Social Support  Steps to enter house: yes  1  Stairs in house: yes (FF)   Lives in: multiple-level home  Lives with: spouse    Employment status: not working  Treatments  Previous treatment: injection treatment, medication and physical therapy  Current treatment: medication  Patient Goals  Patient goals for therapy: decreased pain, increased motion, increased strength and independence with ADLs/IADLs  Patient goal: "To be able to strengthen the leg, to walk better "          Objective     Postural Observations  Seated posture: fair  Standing posture: fair  Correction of posture: has no consistent effect        Neurological Testing     Sensation     Lumbar   Left Intact: light touch    Right   Intact: light touch    Active Range of Motion   Left Hip   Flexion: 105 degrees with pain  Abduction: 26 degrees with pain    Right Hip   Flexion: 110 degrees   Abduction: 35 degrees   Left Knee   Flexion: 125 degrees   Extension: 0 degrees     Right Knee   Flexion: 125 degrees   Extension: 0 degrees     Additional Active Range of Motion Details  Seated can touch the floor  Extension: oakley to neutral with pain  Rotation seated: oakley 50%    Incision present along lumbar spine, well healed, no active bleeding or drainage noted  No redness or warmth  R SLR: 55  L SLR: 45    Strength/Myotome Testing     Left Hip   Planes of Motion   Flexion: 3+  Abduction: 3  Adduction: 4-  External rotation: 3+  Internal rotation: 3+    Right Hip   Planes of Motion   Flexion: 4+  Abduction: 4  Adduction: 4+  External rotation: 4+  Internal rotation: 4+    Left Knee   Flexion: 4-  Extension: 4-    Right Knee   Flexion: 4+  Extension: 4+    Ambulation     Ambulation: Level Surfaces   Ambulation with assistive device: independent  Ambulation without assistive device: independent    Additional Level Surfaces Ambulation Details  SPC for community distances  No AD in the house, only uses cane on the steps  Limited standing tolerance < 5 minutes, increased pain and fatigue  Ambulation: Stairs   Ascend stairs: independent  Pattern: non-reciprocal  Railings: one rail  Descend stairs: independent  Pattern: non-reciprocal  Railings: one rail    Additional Stairs Ambulation Details  Uses SPC on the steps  Observational Gait   Decreased walking speed, stride length and left stance time         Flowsheet Rows      Most Recent Value   PT/OT G-Codes   Current Score  26   FOTO information reviewed  Yes   Assessment Type  Evaluation   G code set  Other PT/OT Primary   Other PT Primary Current Status ()  CL   Other PT Primary Goal Status ()  CK          Precautions: None    Re-Eval DUE: 9/6/18    Specialty Daily Treatment Diary     Manual  8/6/18                                           Exercise Diary  8/6/18       NuStep Level 2  10 minutes       UBE - Retro        Stand - SLR x 3 ways - Mo Mo 1 x 10 each       Squats 1 x 10       Marches Mo 1 x 10       Stepups - F/L        SBB        MTP/LTP        PPT        PPT with marches        Supine SLR        Bridges        Hip Abd with TBand        Hip Add with Ball        Isometric Hip Flexion - Mo        Hooklying Heel Walk        S/L Hip Abd - Mo        Clamshells - Mo        LTR        SKTC            Modalities        HP to LB prn                          Reviewed HEP with patient for Stand SLR x 3 ways, marches, squats, and LAQ  He verbalized understanding

## 2018-08-06 ENCOUNTER — EVALUATION (OUTPATIENT)
Dept: PHYSICAL THERAPY | Facility: HOME HEALTHCARE | Age: 78
End: 2018-08-06
Payer: MEDICARE

## 2018-08-06 DIAGNOSIS — M48.062 SPINAL STENOSIS, LUMBAR REGION WITH NEUROGENIC CLAUDICATION: Primary | ICD-10-CM

## 2018-08-06 PROCEDURE — 97162 PT EVAL MOD COMPLEX 30 MIN: CPT | Performed by: PHYSICAL THERAPIST

## 2018-08-06 PROCEDURE — G8991 OTHER PT/OT GOAL STATUS: HCPCS | Performed by: PHYSICAL THERAPIST

## 2018-08-06 PROCEDURE — 97110 THERAPEUTIC EXERCISES: CPT | Performed by: PHYSICAL THERAPIST

## 2018-08-06 PROCEDURE — G8990 OTHER PT/OT CURRENT STATUS: HCPCS | Performed by: PHYSICAL THERAPIST

## 2018-08-06 PROCEDURE — 97535 SELF CARE MNGMENT TRAINING: CPT | Performed by: PHYSICAL THERAPIST

## 2018-08-06 NOTE — LETTER
2018    Avila Wright MD  [address] [place] [id number]    Patient: Samuel Gastelum   YOB: 1940   Date of Visit: 2018     Encounter Diagnosis     ICD-10-CM    1  Spinal stenosis, lumbar region with neurogenic claudication M48 062        Dear Dr Rashmi Tsang:    Please review the attached Plan of Care from Kati Chen recent visit  Please verify that you agree therapy should continue by signing the attached document and sending it back to our office  If you have any questions or concerns, please don't hesitate to call  Sincerely,    Pearl Hong PT      Referring Provider:      I certify that I have read the below Plan of Care and certify the need for these services furnished under this plan of treatment while under my care  Avila Wright MD  [address] [place] [id number]  VIA Facsimile: [phone]          PT Evaluation     Today's date: 2018  Patient name: Samuel Gastelum  : 1940  MRN: [id number]  Referring provider: Analia Hu MD  Dx:   Encounter Diagnosis     ICD-10-CM    1  Spinal stenosis, lumbar region with neurogenic claudication M48 062                   Assessment  Impairments: abnormal gait, abnormal or restricted ROM, activity intolerance, impaired balance, impaired physical strength, lacks appropriate home exercise program and pain with function  Other impairment: decreased flexibility  Functional limitations: difficulty with dressing - shoes/socks/pants  Assessment details: The patient is a 77 y/o male who presents to PT s/p lumbar surgery on 7/10/18 by Dr Rashmi Tsang  He has complaints of constant pain since surgery with most pain being along his low back and into his LLE    He also demonstrates deficits with decreased L/S and BLE ROM and strength, decreased flexibility, decreased postural awareness, limited standing tolerance, antalgic gait with use of AD, difficulty with stair negotiation, difficulty dressing and pain and difficulty with completing his ADLs  Since surgery he has needed help with putting on his shoes and socks and he has been sitting to shower  No help needed with any other ADLs at this time  He is using [facility] for community ambulation and walking without AD in the house  He ambulates with slow sinan and decreased weight shift noted to LLE in stance phase  He continues to have difficulty with stair negotiation and goes up and down the steps with non-reciprocal gait pattern and use of HR and SPC  Limited standing tolerance is noted and he can only stand for about 5 minutes prior to pain and fatigue  Since surgery he has been limited with overall mobility and function  The patient would benefit from continued PT to address deficits and improve function  Tx to include ROM, stretching, strengthening, modalities, HEP, pt education, postural ed, lifting/body mechanics, neuro re-ed, balance/proprioception Te, MT and equipment  Understanding of Dx/Px/POC: good   Prognosis: fair    Goals  STGs:  1  Initiate and complete HEP with verbal cues  2   Decrease LBP by 25% in 4 weeks  3   Improve  BLE ROM by 5-10 degrees in 4 weeks  4   Improve BLE strength by 1/2 grade in 4 weeks  LTGs:  1  Patient to be I with HEP in 8 weeks  2   Decrease LBP to < or = to 2-3/10 with activity in 8 weeks to improve function  3   Improve L/S ROM to [facility] t/o in 8 weeks to improve function  4   Improve BLE ROM to [facility] t/o in 8 weeks to improve function  5   Postural control is improved to maximal level of function in 8 weeks  6   Recreational performance in related activities is improved to maximal level of function in 8 weeks  7   Improve BLE strength to > or = to 4/5 t/o in 8 weeks to improve function  8   Stair negotiation is improved to maximal level of function in 8 weeks     9   ADL performance in related activities is improved to maximal level of function in 8 weeks  Plan  Patient would benefit from: skilled physical therapy  Planned modality interventions: cryotherapy and thermotherapy: hydrocollator packs  Planned therapy interventions: abdominal trunk stabilization, manual therapy, neuromuscular re-education, body mechanics training, patient education, postural training, self care, strengthening, stretching, therapeutic activities, flexibility, therapeutic exercise, home exercise program and balance  Frequency: 3x week (2-3 times/week)  Duration in weeks: 8  Plan of Care beginning date: 8/6/2018  Plan of Care expiration date: 9/6/2018  Treatment plan discussed with: patient and family        Subjective Evaluation    History of Present Illness  Onset date: August 2017  Mechanism of injury: The patient states that last August he had a AAA repaired and after this procedure he had HHPT  He received stretching to his legs and they feel like it was "too aggressive"  Since then he has had pain in his buttocks and into his L leg  Then on 2/25/18 his wife had called the ambulance to take him to the hospital because of the pain  He had some testing and was sent home same day  After the hospital visit he started to see Dr Sohail Piña and did have injections and nerve block  Some relief was noted after the nerve block for a few days  He did have course of PT from 6/5/18 to 7/6/18  He was discharged at that time secondary to he was going to have low back surgery  He had surgery on by Dr Berto Jacob on 7/10/18 at [address] [facility]  He was in the hospital for two nights  From there he was discharged to home with home health nursing and PT  He had three visits of PT before he was discharged  He was back to see the PA on 7/27/18 and per patient she is happy with how he is doing so far  He was referred back to OPPT and he now presents for his evaluation  He will be going back to see the doctor two weeks from today      Quality of life: good    Pain  At best pain ratin  At worst pain rating: 10 (in the morning)  Location: LBP, buttock pain and L leg pain to calf   Quality: dull ache, discomfort and sharp  Relieving factors: ice and medications  Aggravating factors: standing and walking  Progression: improved    Social Support  Steps to enter house: yes  1  Stairs in house: yes (FF)   Lives in: multiple-level home  Lives with: spouse    Employment status: not working  Treatments  Previous treatment: injection treatment, medication and physical therapy  Current treatment: medication  Patient Goals  Patient goals for therapy: decreased pain, increased motion, increased strength and independence with ADLs/IADLs  Patient goal: "To be able to strengthen the leg, to walk better "          Objective     Postural Observations  Seated posture: fair  Standing posture: fair  Correction of posture: has no consistent effect        Neurological Testing     Sensation     Lumbar   Left   Intact: light touch    Right   Intact: light touch    Active Range of Motion   Left Hip   Flexion: 105 degrees with pain  Abduction: 26 degrees with pain    Right Hip   Flexion: 110 degrees   Abduction: 35 degrees   Left Knee   Flexion: 125 degrees   Extension: 0 degrees     Right Knee   Flexion: 125 degrees   Extension: 0 degrees     Additional Active Range of Motion Details  Seated can touch the floor  Extension: oakley to neutral with pain  Rotation seated: oakley 50%    Incision present along lumbar spine, well healed, no active bleeding or drainage noted  No redness or warmth      R SLR: 55  L SLR: 45    Strength/Myotome Testing     Left Hip   Planes of Motion   Flexion: 3+  Abduction: 3  Adduction: 4-  External rotation: 3+  Internal rotation: 3+    Right Hip   Planes of Motion   Flexion: 4+  Abduction: 4  Adduction: 4+  External rotation: 4+  Internal rotation: 4+    Left Knee   Flexion: 4-  Extension: 4-    Right Knee   Flexion: 4+  Extension: 4+    Ambulation     Ambulation: Level Surfaces Ambulation with assistive device: independent  Ambulation without assistive device: independent    Additional Level Surfaces Ambulation Details  SPC for community distances  No AD in the house, only uses cane on the steps  Limited standing tolerance < 5 minutes, increased pain and fatigue  Ambulation: Stairs   Ascend stairs: independent  Pattern: non-reciprocal  Railings: one rail  Descend stairs: independent  Pattern: non-reciprocal  Railings: one rail    Additional Stairs Ambulation Details  Uses SPC on the steps  Observational Gait   Decreased walking speed, stride length and left stance time  Flowsheet Rows      Most Recent Value   PT/OT G-Codes   Current Score  26   FOTO information reviewed  Yes   Assessment Type  Evaluation   G code set  Other PT/OT Primary   Other PT Primary Current Status ()  CL   Other PT Primary Goal Status ()  CK          Precautions: None    Re-Eval DUE: 9/6/18    Specialty Daily Treatment Diary     Manual  8/6/18                                           Exercise Diary  8/6/18       NuStep Level 2  10 minutes       UBE - Retro        Stand - SLR x 3 ways - Mo Mo 1 x 10 each       Squats 1 x 10       Marches Mo 1 x 10       Stepups - F/L        SBB        MTP/LTP        PPT        PPT with marches        Supine SLR        Bridges        Hip Abd with TBand        Hip Add with Ball        Isometric Hip Flexion - Mo        Hooklying Heel Walk        S/L Hip Abd - Mo        Clamshells - Mo        LTR        SKTC            Modalities        HP to LB prn                          Reviewed HEP with patient for Stand SLR x 3 ways, marches, squats, and LAQ  He verbalized understanding

## 2018-08-07 ENCOUNTER — TRANSCRIBE ORDERS (OUTPATIENT)
Dept: PHYSICAL THERAPY | Facility: HOME HEALTHCARE | Age: 78
End: 2018-08-07

## 2018-08-08 ENCOUNTER — OFFICE VISIT (OUTPATIENT)
Dept: PHYSICAL THERAPY | Facility: HOME HEALTHCARE | Age: 78
End: 2018-08-08
Payer: MEDICARE

## 2018-08-08 DIAGNOSIS — M48.062 SPINAL STENOSIS, LUMBAR REGION WITH NEUROGENIC CLAUDICATION: Primary | ICD-10-CM

## 2018-08-08 PROCEDURE — 97140 MANUAL THERAPY 1/> REGIONS: CPT | Performed by: PHYSICAL THERAPIST

## 2018-08-08 PROCEDURE — 97110 THERAPEUTIC EXERCISES: CPT | Performed by: PHYSICAL THERAPIST

## 2018-08-08 NOTE — PROGRESS NOTES
Daily Note     Today's date: 2018  Patient name: Fiona Ventura  : 1940  MRN: [id number]  Referring provider: Fabrizio Humphries MD  Dx:   Encounter Diagnosis     ICD-10-CM    1  Spinal stenosis, lumbar region with neurogenic claudication M48 062                   Subjective: The patient states that he is having some pain into his leg today, "But it's not too bad "  He rates pain 2/10 today at start of session  Objective: See treatment diary below      Assessment: Initiated TE as per daily treatment diary  He had fair tolerance to initiation of TE though no complaints of increased pain with TE  Weakness is noted in BLE with TE   HP x 10 minutes to end session  He notes no pain at end of session  Progress as able with strengthening  Plan: Continue per plan of care       Precautions: None     Re-Eval DUE: 18     Specialty Daily Treatment Diary      Manual  18         BLE - GENTLE   Calf and Hams 8 mins                                                         Exercise Diary  18         NuStep Level 2  10 minutes Level 2  10 minutes         UBE - Retro             Stand - SLR x 3 ways - Mo Mo 1 x 10 each Mo 1 x 15 each         Squats 1 x 10 1 x 15         Marches Mo 1 x 10 Mo 1 x 15          Stepups - F/L   Mo - For - C 1 x 10         SBB             MTP/LTP   Yellow 1 x 10 each         PPT             PPT with marches             Supine SLR   Mo 1 x 10         Bridges   1 x 10         Hip Abd with TBand   Green 1 x 10         Hip Add with Ball   3" 1 x 10          Isometric Hip Flexion - Mo             Hooklying Heel Walk             S/L Hip Abd - Mo             SAQ   Mo 3" 1 x 10          LTR   Mo 1 x 10          SKTC                   Modalities   18         HP to LB prn   Seated   10 minutes

## 2018-08-10 ENCOUNTER — OFFICE VISIT (OUTPATIENT)
Dept: UROLOGY | Facility: MEDICAL CENTER | Age: 78
End: 2018-08-10
Payer: MEDICARE

## 2018-08-10 VITALS
SYSTOLIC BLOOD PRESSURE: 114 MMHG | WEIGHT: 163 LBS | HEIGHT: 68 IN | DIASTOLIC BLOOD PRESSURE: 70 MMHG | BODY MASS INDEX: 24.71 KG/M2

## 2018-08-10 DIAGNOSIS — R33.8 ACUTE URINARY RETENTION: Primary | ICD-10-CM

## 2018-08-10 DIAGNOSIS — Z71.89 OTHER SPECIFIED COUNSELING: ICD-10-CM

## 2018-08-10 LAB
POST-VOID RESIDUAL VOLUME, ML POC: 0 ML
SL AMB  POCT GLUCOSE, UA: NEGATIVE
SL AMB LEUKOCYTE ESTERASE,UA: NORMAL
SL AMB POCT BILIRUBIN,UA: NEGATIVE
SL AMB POCT BLOOD,UA: NEGATIVE
SL AMB POCT CLARITY,UA: CLEAR
SL AMB POCT COLOR,UA: YELLOW
SL AMB POCT KETONES,UA: NORMAL
SL AMB POCT NITRITE,UA: NEGATIVE
SL AMB POCT PH,UA: 6.5
SL AMB POCT SPECIFIC GRAVITY,UA: 1.01
SL AMB POCT URINE PROTEIN: NEGATIVE
SL AMB POCT UROBILINOGEN: 0.2

## 2018-08-10 PROCEDURE — 81003 URINALYSIS AUTO W/O SCOPE: CPT | Performed by: UROLOGY

## 2018-08-10 PROCEDURE — 51798 US URINE CAPACITY MEASURE: CPT | Performed by: UROLOGY

## 2018-08-10 PROCEDURE — 99213 OFFICE O/P EST LOW 20 MIN: CPT | Performed by: UROLOGY

## 2018-08-10 NOTE — PROGRESS NOTES
Assessment/Plan:    Acute urinary retention    The problem has resolved  The patient will transfer his care to an office closer to home  Diagnoses and all orders for this visit:    Acute urinary retention  -     POCT urine dip auto non-scope  -     POCT Measure PVR    Other specified counseling          Subjective:      Patient ID: Barby Baum is a 78 y o  male  HPI  Urinary retention:   The patient had back surgery on July 10, 2018 and went into retention on July 11, 20 18  I saw him on July 20, 2018  He had a catheter in at that time  It was removed and he was given a voiding trial   The patient returns today to reassess his urinary retention  He notes nocturia x 1  He denies other significant urinary symptoms  He denies gross hematuria, urinary tract infections or incontinence  He is taking tamsulosin for his symptoms  The patient is accompanied by his wife      The following portions of the patient's history were reviewed and updated as appropriate: allergies, current medications, past family history, past medical history, past social history, past surgical history and problem list     Review of Systems      Objective:      /70 (BP Location: Left arm, Patient Position: Sitting, Cuff Size: Standard)   Ht 5' 8" (1 727 m)   Wt 73 9 kg (163 lb)   BMI 24 78 kg/m²          Physical Exam

## 2018-08-10 NOTE — LETTER
August 10, 2018     Sahara Arizmendi DO  [facility]    Patient: Hayley Ortiz   YOB: 1940   Date of Visit: 8/10/2018       Dear Dr Chetna Montes: Thank you for referring Timbo Boykin to me for evaluation  Below are my notes for this consultation  If you have questions, please do not hesitate to call me  I look forward to following your patient along with you  Sincerely,        Suzie Lilly MD        CC: No Recipients  Suzie Lilly MD  8/10/2018 11:54 AM  Sign at close encounter  Assessment/Plan:    Acute urinary retention    The problem has resolved  The patient will transfer his care to an office closer to home  Diagnoses and all orders for this visit:    Acute urinary retention  -     POCT urine dip auto non-scope  -     POCT Measure PVR    Other specified counseling          Subjective:      Patient ID: Hayley Ortiz is a 78 y o  male  HPI  Urinary retention:   The patient had back surgery on July 10, 2018 and went into retention on July 11, 20 18  I saw him on July 20, 2018  He had a catheter in at that time  It was removed and he was given a voiding trial   The patient returns today to reassess his urinary retention  He notes nocturia x 1  He denies other significant urinary symptoms  He denies gross hematuria, urinary tract infections or incontinence  He is taking tamsulosin for his symptoms  The patient is accompanied by his wife      The following portions of the patient's history were reviewed and updated as appropriate: allergies, current medications, past family history, past medical history, past social history, past surgical history and problem list     Review of Systems      Objective:      /70 (BP Location: Left arm, Patient Position: Sitting, Cuff Size: Standard)   Ht 5' 8" (1 727 m)   Wt 73 9 kg (163 lb)   BMI 24 78 kg/m²           Physical Exam

## 2018-08-13 ENCOUNTER — OFFICE VISIT (OUTPATIENT)
Dept: PHYSICAL THERAPY | Facility: HOME HEALTHCARE | Age: 78
End: 2018-08-13
Payer: MEDICARE

## 2018-08-13 DIAGNOSIS — M48.062 SPINAL STENOSIS, LUMBAR REGION WITH NEUROGENIC CLAUDICATION: Primary | ICD-10-CM

## 2018-08-13 PROCEDURE — 97110 THERAPEUTIC EXERCISES: CPT

## 2018-08-13 PROCEDURE — 97140 MANUAL THERAPY 1/> REGIONS: CPT

## 2018-08-13 NOTE — PROGRESS NOTES
Daily Note     Today's date: 2018  Patient name: Charlaine Nageotte  : 1940  MRN: [id number]  Referring provider: Joelle Arizmendi MD  Dx:   Encounter Diagnosis     ICD-10-CM    1  Spinal stenosis, lumbar region with neurogenic claudication M48 062               Subjective: Pt reports he has some pain in his R thigh today  Objective: See treatment diary below      Assessment: Tolerated treatment well  Added UBE this session and increased reps with some exercises today  Verbal cues needed t/o TE to perform correctly  Pt with no c/o increased pain t/o session  Tightness noted Bilateral HS with manual therapy  Pt declined MHP this session  Patient would benefit from continued PT      Plan: Continue per plan of care       Precautions: None     Re-Eval DUE: 18     Specialty Daily Treatment Diary      Manual  18       BLE - GENTLE   Calf and Hams 8 mins Calf and HS   10 min gently                                                       Exercise Diary  18       NuStep Level 2  10 minutes Level 2  10 minutes  Level 2   10 min       UBE - Retro      5 min       Stand - SLR x 3 ways - Mo Mo 1 x 10 each Mo 1 x 15 each  Mo 1 x 15 ea       Squats 1 x 10 1 x 15  1 x 15       Marches Mo 1 x 10 Mo 1 x 15   Mo 1 x 15       Stepups - F/L   Mo - For - C 1 x 10  Mo For C 1 x 10       SBB             MTP/LTP   Yellow 1 x 10 each  Yellow   1 x 10 ea       PPT             PPT with marches             Supine SLR   Mo 1 x 10  Mo 1 x 15       Bridges   1 x 10  1 x 10       Hip Abd with TBand   Green 1 x 10  Green 1 x 15       Hip Add with Ball   3" 1 x 10   3" 1 x 15       Isometric Hip Flexion - Mo             Hooklying Heel Walk             S/L Hip Abd - Mo             SAQ   Mo 3" 1 x 10   3" 1 x 15       LTR   Mo 1 x 10   5" x 10       SKTC                   Modalities   18       HP to LB prn   Seated   10 minutes Pt declined

## 2018-08-15 ENCOUNTER — OFFICE VISIT (OUTPATIENT)
Dept: PHYSICAL THERAPY | Facility: HOME HEALTHCARE | Age: 78
End: 2018-08-15
Payer: MEDICARE

## 2018-08-15 DIAGNOSIS — M48.062 SPINAL STENOSIS, LUMBAR REGION WITH NEUROGENIC CLAUDICATION: Primary | ICD-10-CM

## 2018-08-15 PROCEDURE — 97110 THERAPEUTIC EXERCISES: CPT

## 2018-08-15 PROCEDURE — 97140 MANUAL THERAPY 1/> REGIONS: CPT

## 2018-08-15 PROCEDURE — 97150 GROUP THERAPEUTIC PROCEDURES: CPT

## 2018-08-15 NOTE — PROGRESS NOTES
Daily Note     Today's date: 8/15/2018  Patient name: Kenna Mcelroy  : 1940  MRN: [id number]  Referring provider: Tammi Murray MD  Dx:   Encounter Diagnosis     ICD-10-CM    1  Spinal stenosis, lumbar region with neurogenic claudication M48 062               Subjective: Pt reports he is a little sore and if he twists his L leg he gets pain shooting down it  Pt also reports he has a follow up appt to see the surgeon on Monday  Objective: See treatment diary below      Assessment: Tolerated treatment fairly well  Verbal cues needed t/o TE to perform correctly  Added PPT to program today  Pt with no c/o increased pain t/o session  Decreased flexibility BLE with manual therapy  Patient would benefit from continued PT      Plan: Continue per plan of care       Precautions: None     Re-Eval DUE: 18     Specialty Daily Treatment Diary      Manual  8/6/18 8/8/18  8/13/18  8/15/18     BLE - GENTLE   Calf and Hams 8 mins Calf and HS   10 min gently Calf and HS  10 min gently                                                     Exercise Diary  8/6/18 8/8/18  8/13/18  8/15/18     NuStep Level 2  10 minutes Level 2  10 minutes  Level 2   10 min  Level 2   10 min     UBE - Retro      5 min  6 min     Stand - SLR x 3 ways - Mo Mo 1 x 10 each Mo 1 x 15 each  Mo 1 x 15 ea  Mo 1 x 15 ea     Squats 1 x 10 1 x 15  1 x 15  1 x 15     Marches Mo 1 x 10 Mo 1 x 15   Mo 1 x 15  Mo 1 x 15     Stepups - F/L   Mo - For - C 1 x 10  Mo For C 1 x 10  Mo For C   1 x 15     SBB             MTP/LTP   Yellow 1 x 10 each  Yellow   1 x 10 ea  Yellow   1 x 15 ea     PPT        5" x 10     PPT with marches             Supine SLR   Mo 1 x 10  Mo 1 x 15  Mo 1 x 15     Bridges   1 x 10  1 x 10  1 x 10     Hip Abd with TBand   Green 1 x 10  Green 1 x 15  Green   1 x 15     Hip Add with Ball   3" 1 x 10   3" 1 x 15  3" 1 x 15     Isometric Hip Flexion - Mo             Hooklying Heel Walk             S/L Hip Abd - Mo             SAQ   Mo 3" 1 x 10   3" 1 x 15  3" 1 x 15     LTR   Mo 1 x 10   5" x 10  5" x 10     SKTC                   Modalities   8/8/18  8/13/18  8/15/18     HP to LB prn   Seated   10 minutes Pt declined  Pt declined

## 2018-08-17 ENCOUNTER — OFFICE VISIT (OUTPATIENT)
Dept: PHYSICAL THERAPY | Facility: HOME HEALTHCARE | Age: 78
End: 2018-08-17
Payer: MEDICARE

## 2018-08-17 DIAGNOSIS — M48.062 SPINAL STENOSIS, LUMBAR REGION WITH NEUROGENIC CLAUDICATION: Primary | ICD-10-CM

## 2018-08-17 PROCEDURE — 97140 MANUAL THERAPY 1/> REGIONS: CPT

## 2018-08-17 PROCEDURE — 97110 THERAPEUTIC EXERCISES: CPT

## 2018-08-17 NOTE — PROGRESS NOTES
Daily Note     Today's date: 2018  Patient name: Emilee Crowell  : 1940  MRN: [id number]  Referring provider: Wesley Maria MD  Dx:   Encounter Diagnosis     ICD-10-CM    1  Spinal stenosis, lumbar region with neurogenic claudication M48 062               Subjective: Pt reports he is ok right now no pain  Objective: See treatment diary below      Assessment: Tolerated treatment well  Verbal cues needed t/o TE to perform correctly  Pt with no c/o increased pain t/o session  Decreased flexibility BLE with manual therapy  Patient would benefit from continued PT      Plan: Continue per plan of care         Precautions: None     Re-Eval DUE: 18     Specialty Daily Treatment Diary      Manual  8/6/18 8/8/18  8/13/18  8/15/18  8/17/18   BLE - GENTLE   Calf and Hams 8 mins Calf and HS   10 min gently Calf and HS  10 min gently  Calf and HS   10 min gently                                                   Exercise Diary  8/6/18 8/8/18  8/13/18  8/15/18  8/17/18   NuStep Level 2  10 minutes Level 2  10 minutes  Level 2   10 min  Level 2   10 min  Level 2  10 min   UBE - Retro      5 min  6 min  6 min   Stand - SLR x 3 ways - Mo Mo 1 x 10 each Mo 1 x 15 each  Mo 1 x 15 ea  Mo 1 x 15 ea  Mo 1 x 15 ea   Squats 1 x 10 1 x 15  1 x 15  1 x 15  1 x 15   Marches Mo 1 x 10 Mo 1 x 15   Mo 1 x 15  Mo 1 x 15  Mo 1 x 15   Stepups - F/L   Mo - For - C 1 x 10  Mo For C 1 x 10  Mo For C   1 x 15  Mo For C   1 x 15   SBB             MTP/LTP   Yellow 1 x 10 each  Yellow   1 x 10 ea  Yellow   1 x 15 ea  Yellow   1 x 15 ea   PPT        5" x 10  5" x 10   PPT with marches             Supine SLR   Mo 1 x 10  Mo 1 x 15  Mo 1 x 15  Mo 1 x 15    Bridges   1 x 10  1 x 10  1 x 10  1 x 10   Hip Abd with TBand   Green 1 x 10  Green 1 x 15  Green   1 x 15  Green  1 x 15   Hip Add with Ball   3" 1 x 10   3" 1 x 15  3" 1 x 15  3" 1 x 15   Isometric Hip Flexion - Mo             Hooklying Heel Walk           S/L Hip Abd - Mo             SAQ   Mo 3" 1 x 10   3" 1 x 15  3" 1 x 15  3" 1 x 15   LTR   Mo 1 x 10   5" x 10  5" x 10  5" x 10   SKTC                   Modalities   8/8/18  8/13/18  8/15/18  8/17/18   HP to LB prn   Seated   10 minutes Pt declined  Pt declined   Pt declined

## 2018-08-21 ENCOUNTER — OFFICE VISIT (OUTPATIENT)
Dept: FAMILY MEDICINE CLINIC | Facility: CLINIC | Age: 78
End: 2018-08-21
Payer: MEDICARE

## 2018-08-21 VITALS
HEIGHT: 68 IN | WEIGHT: 167.2 LBS | BODY MASS INDEX: 25.34 KG/M2 | SYSTOLIC BLOOD PRESSURE: 100 MMHG | DIASTOLIC BLOOD PRESSURE: 58 MMHG

## 2018-08-21 DIAGNOSIS — Z23 NEED FOR INFLUENZA VACCINATION: Primary | ICD-10-CM

## 2018-08-21 DIAGNOSIS — Z00.00 MEDICARE ANNUAL WELLNESS VISIT, SUBSEQUENT: ICD-10-CM

## 2018-08-21 PROCEDURE — 90662 IIV NO PRSV INCREASED AG IM: CPT | Performed by: FAMILY MEDICINE

## 2018-08-21 PROCEDURE — G0439 PPPS, SUBSEQ VISIT: HCPCS | Performed by: FAMILY MEDICINE

## 2018-08-21 PROCEDURE — G0008 ADMIN INFLUENZA VIRUS VAC: HCPCS | Performed by: FAMILY MEDICINE

## 2018-08-21 NOTE — PROGRESS NOTES
Assessment and Plan:  Problem List Items Addressed This Visit     None        Health Maintenance Due   Topic Date Due    PT PLAN OF CARE  1940    SLP PLAN OF CARE  1940    Pneumococcal PPSV23/PCV13 65+ Years / High and Highest Risk (2 of 2 - PPSV23) 12/12/2016         HPI:  Ketan Epps is a 78 y o  male here for his Subsequent Wellness Visit      Patient Active Problem List   Diagnosis    Acute tracheobronchitis    COPD (chronic obstructive pulmonary disease) ([address] )    Hyperlipidemia    GERD (gastroesophageal reflux disease)    Abnormal CT scan, chest    Abdominal aortic aneurysm (HCC)    Thrombocytopenia (HCC)    History of aortic aneurysm repair    Benign prostatic hyperplasia    Dementia    Bradycardia    SOB (shortness of breath)    Pulmonary nodule    Ventricular bigeminy    Positional lightheadedness    Coronary artery disease involving native coronary artery of native heart with angina pectoris ([address] )    Hx of CABG    Bilateral carotid artery stenosis    Elevated bilirubin    Tracheobronchitis    Hyponatremia    CAD (coronary artery disease)    Acute urinary retention     Past Medical History:   Diagnosis Date    AAA [facility]ab[facility])     Acid reflux     Acute exacerbation of chronic obstructive airways disease with asthma (HCC)     Acute serous otitis media of left ear     recurrence not specified     Anesthesia     "always has mental changes /lingers and lingers after anesthesia"    Anxiety     Aortic aneurysm without rupture ([address] )     Arm bruise     right inner forearm "recent IV"    Arthritis     spine and poss left hip    Asthma     bronchietasis    At risk for falls     BPH (benign prostatic hyperplasia)     pt and wife can't confirm 11/10    BPH without urinary obstruction     Cancer ([address] )     skin CA on nose    CAP (community acquired pneumonia)     Cataracts, bilateral     Change in bowel function     CHF (congestive heart failure) (HCC)     Clubbing of fingers     Colon polyps     Common cold     Contusion of elbow, left     initial encounter     COPD (chronic obstructive pulmonary disease) (HCC)     Coronary artery disease     Cough     Dementia     Depression     Diverticulosis     Dizziness     upon "standing quickly on occas"    Exercise counseling     Fatigue     Full dentures     "doesn't wear them"    Glaucoma screening     High cholesterol     History of abdominal aortic aneurysm (AAA) repair 08/2017    History of bacteremia     History of chronic obstructive lung disease     History of epistaxis     History of influenza vaccination     History of kidney stones     History of pneumonia     "many times" "at least 5 times" almost always goes to sepsis"    History of sepsis 10/2017    History of sinusitis     History of skin cancer     History of sleep apnea     History of urinary tract infection     [place] (hard of hearing)     Hydronephrosis with obstructing calculus     Influenza vaccine needed     Insomnia     Jock itch     left/saw doctor 11/8 and started on antifungal cream    Kidney stones     Left hip pain     Need for pneumococcal vaccination     Need for prophylactic vaccination and inoculation against influenza     Other emphysema ([address] )     Pancreatitis, chronic ([address] )     pt and wife can't confirm 11/10/17    Pneumonia 10/26/2017    admitted [facility]    Pulmonary emphysema ([address] )     S/P CABG x 3     approx 14 yrs ago    Screening for genitourinary condition     Screening for neurological condition     Sepsis ([address] )     due to unspecified organism     Short-term memory loss     Sleep apnea     does not use CPAP      Special screening examination for neoplasm of prostate     TIA involving carotid artery     "before carotid surgery"    Ulcer     stomach, "years ago"    Unsteady gait     Use of cane as ambulatory aid     sometimes    Vitamin D deficiency     Wears glasses Past Surgical History:   Procedure Laterality Date    ABDOMINAL AORTIC ANEURYSM REPAIR  08/23/2017    ABDOMINAL AORTIC ANEURYSM REPAIR, ENDOVASCULAR      CARDIAC SURGERY      CABG x3    CAROTID ENDARTARECTOMY Left 11/1996    CATARACT EXTRACTION Bilateral     CORONARY ARTERY BYPASS GRAFT  01/2003    x3    CYSTOSCOPY      with insertion of ureteral stent     CYSTOSCOPY      with ureteroscopy with lithotripsy     EXCISIONAL HEMORRHOIDECTOMY      EYE SURGERY Bilateral     "for a wrinkle" after cataract surgery    HERNIA REPAIR      umbilical    LITHOTRIPSY      renal    MOUTH SURGERY      full mouth extraction     WA COLONOSCOPY FLX DX W/COLLJ SPEC WHEN PFRMD N/A 5/16/2017    Procedure: COLONOSCOPY with polypectomies/ hot snare and tattoo;  Surgeon: Mackenzie Carpenter MD;  [facility]; Service: Gastroenterology    WA CYSTOURETHROSCOPY N/A 11/30/2017    Procedure: Jen Rizzo;  Surgeon: Lee Willams MD;  [facility];  Service: Urology    WA ESOPHAGOGASTRODUODENOSCOPY TRANSORAL DIAGNOSTIC N/A 8/18/2016    Procedure: ESOPHAGOGASTRODUODENOSCOPY (EGD); Surgeon: Mackenzie Carpenter MD;  [facility];   Service: Gastroenterology    WA REMOVE BLADDER STONE,<2 5 CM N/A 11/30/2017    Procedure: [place];  Surgeon: Lee Willams MD;  [facility];  Service: Urology    TONSILLECTOMY     [address]      and [facility]     Family History   Problem Relation Age of Onset    Diabetes type II Mother         mellitus    Kidney failure Father     Diabetes type II Maternal Grandmother         mellitus     Hypertension Paternal Grandmother         benign essential      History   Smoking Status    Former Smoker    Packs/day: 1 50    Years: 60 00    Quit date: 2014   Smokeless Tobacco    Never Used     Comment: quit 3 yrs ago, used to be a 1-1 5 ppd smoker     History   Alcohol Use No     Comment: quit 25 yrs ago      History   Drug Use No Comment: No illicit drug use          Current Outpatient Prescriptions   Medication Sig Dispense Refill    acetaminophen (TYLENOL) 500 mg tablet       aspirin 81 MG tablet Take 81 mg by mouth daily Took within 24 hours       atorvastatin (LIPITOR) 20 mg tablet Take 1 tablet (20 mg total) by mouth daily at bedtime 90 tablet 3    Bacillus Coagulans-Inulin (PROBIOTIC FORMULA) 1-250 BILLION-MG CAPS Take 2 capsules by mouth      Cholecalciferol (VITAMIN D-3 PO) Take 2,000 Units by mouth daily   cyanocobalamin (VITAMIN B-12) 1,000 mcg tablet Take by mouth daily      donepezil (ARICEPT) 10 mg tablet Take 1 tablet (10 mg total) by mouth daily at bedtime for 30 days 30 tablet 0    escitalopram (LEXAPRO) 20 mg tablet Take 1 tablet (20 mg total) by mouth daily 30 tablet 0    Fluticasone Furoate-Vilanterol (BREO ELLIPTA) 100-25 MCG/INH AEPB Inhale 1 puff daily   KRILL OIL PO Take 500 mg by mouth daily   metoprolol succinate (TOPROL-XL) 25 mg 24 hr tablet Take 1 tablet (25 mg total) by mouth daily at bedtime 90 tablet 3    montelukast (SINGULAIR) 10 mg tablet Take 1 tablet (10 mg total) by mouth daily 90 tablet 2    Multiple Vitamins-Minerals (CENTRUM SILVER ADULT 50+) TABS Take 1 tablet by mouth daily      Potassium Citrate ER 15 MEQ (1620 MG) TBCR TAKE 1 TABLET TWICE A  tablet 3    QUEtiapine (SEROquel) 50 mg tablet       tamsulosin (FLOMAX) 0 4 mg Take 1 capsule (0 4 mg total) by mouth daily 90 capsule 3    tiotropium (SPIRIVA HANDIHALER) 18 mcg inhalation capsule Place 18 mcg into inhaler and inhale daily   traMADol (ULTRAM) 50 mg tablet        No current facility-administered medications for this visit        Allergies   Allergen Reactions    Augmentin [Amoxicillin-Pot Clavulanate] Diarrhea    Ciprofloxacin Hives    Morphine And Related Other (See Comments)     Change in mental status    Quetiapine Other (See Comments)     Severe nightmares on lower doses     Immunization History Administered Date(s) Administered    H1N1, All Formulations 11/13/2009    Influenza 11/07/2014, 12/08/2015, 10/17/2016    Influenza Split High Dose Preservative Free IM 08/23/2012, 12/08/2015, 10/17/2016, 09/01/2017    Influenza TIV (IM) 08/07/2014    Pneumococcal Conjugate 13-Valent 10/17/2016       Patient Care Team:  Nathalie Fisher DO as PCP - General  MD Nathalie Ellison, DO    Medicare Screening Tests and Risk Assessments:  Jaci Alvarado is here for his Subsequent Wellness visit  Health Risk Assessment:  Patient rates overall health as very good  Patient feels that their physical health rating is Much better  Eyesight was rated as Same  Hearing was rated as Slightly worse  Patient feels that their emotional and mental health rating is Same  Pain experienced by patient in the last 7 days has been Some  Patient's pain rating has been 2/10  Patient states that he has experienced no weight loss or gain in last 6 months  Emotional/Mental Health:  Patient has been feeling nervous/anxious  PHQ-9 Depression Screening:    Frequency of the following problems over the past two weeks:      1  Little interest or pleasure in doing things: 0 - not at all      2  Feeling down, depressed, or hopeless: 0 - not at all  PHQ-2 Score: 0          Broken Bones/Falls: Fall Risk Assessment:    In the past year, patient has experienced: No history of falling in past year          Bladder/Bowel:  Patient has not leaked urine accidently in the last six months  Patient reports no loss of bowel control  Immunizations:  Patient has had a flu vaccination within the last year  Patient has received a pneumonia shot  Patient has not received a shingles shot  Patient has not received tetanus/diphtheria shot  Home Safety:  Patient does not have trouble with stairs inside or outside of their home     Patient currently reports that there are no safety hazards present in home, working smoke alarms, working carbon monoxide detectors  Preventative Screenings:   prostate cancer screen performed, no colon cancer screen completed, cholesterol screen completed, glaucoma eye exam completed,     Nutrition:  Current diet: Regular and Frequent junk food with servings of the following:    Medications:  Patient is currently taking over-the-counter supplements  Patient is not able to manage medications  Lifestyle Choices:  Patient reports no tobacco use  Patient has smoked or used tobacco in the past   Patient has stopped his tobacco use  Tobacco use quit date: 4 years ago  Patient reports no alcohol use  Patient does not drive a vehicle  Patient wears seat belt  Activities of Daily Living:  Can get out of bed by his or her self, able to dress self, able to make own meals, able to do own shopping, able to bathe self, can do own laundry/housekeeping, unable to manage own money and other related tasks    Previous Hospitalizations:  Hospitalization or ED visit in past 12 months  Number of hospitalizations within the last year: 1-2        Advanced Directives:  Patient has not decided on power of   Patient has not completed advanced directive          Preventative Screening/Counseling:      Cardiovascular:      General: Risks and Benefits Discussed and Screening Current          Diabetes:      General: Risks and Benefits Discussed and Screening Current          Colorectal Cancer:      General: Risks and Benefits Discussed and Screening Current          Prostate Cancer:      General: Risks and Benefits Discussed and Screening Current          Osteoporosis:      General: Screening Not Indicated          AAA:      General: Risks and Benefits Discussed and Screening Current          Glaucoma:      General: Risks and Benefits Discussed and Screening Current          HIV:      General: Screening Not Indicated          Hepatitis C:      General: Screening Not Indicated        Advanced Directives:   Patient has living will for healthcare, has durable POA for healthcare, patient has an advanced directive  Information on ACP and/or AD provided  No 5 wishes given  End of life assessment reviewed with patient  Provider agrees with end of life decisions   Immunizations:      Influenza: Risks & Benefits Discussed and Influenza UTD This Year      Pneumococcal: Risks & Benefits Discussed and Pneumococcal Due Today      Shingrix: Risks & Benefits Discussed and Shingrix Vaccine Needed Today      Hepatitis B (Low risk patients): Prevention Counseling      Hepatitis B (Medium to high risk patients): Risks & Benefits Discussed and Patient Declines      Zostavax: Risks & Benefits Discussed and Zostavax Vaccine UTD      TD: Risks & Benefits Discussed and Td Vaccine UTD      TDAP: Risks & Benefits Discussed and Tdap Vaccine UTD          No exam data present  Physical Exam :  General ROS: negative  Physical Exam   Constitutional: He is oriented to person, place, and time  He appears well-developed and well-nourished  HENT:   Head: Normocephalic and atraumatic  Right Ear: External ear normal    Left Ear: External ear normal    Nose: Nose normal    Mouth/Throat: Oropharynx is clear and moist    Eyes: Conjunctivae and EOM are normal  Pupils are equal, round, and reactive to light  Neck: Normal range of motion  Neck supple  Cardiovascular: Normal rate, regular rhythm, normal heart sounds and intact distal pulses  Exam reveals no friction rub  No murmur heard  Pulmonary/Chest: Effort normal and breath sounds normal  No respiratory distress  He has no wheezes  He has no rales  He exhibits no tenderness  Abdominal: Soft  Bowel sounds are normal    Musculoskeletal: Normal range of motion  Neurological: He is alert and oriented to person, place, and time  Skin: Skin is warm and dry  Capillary refill takes 2 to 3 seconds  Psychiatric: He has a normal mood and affect   His behavior is normal  Judgment and thought content normal

## 2018-08-22 DIAGNOSIS — J44.9 CHRONIC OBSTRUCTIVE PULMONARY DISEASE, UNSPECIFIED COPD TYPE (HCC): Primary | ICD-10-CM

## 2018-08-22 RX ORDER — FLUTICASONE FUROATE AND VILANTEROL 100; 25 UG/1; UG/1
1 POWDER RESPIRATORY (INHALATION) DAILY
Qty: 180 EACH | Refills: 3 | Status: SHIPPED | OUTPATIENT
Start: 2018-08-22 | End: 2018-12-18 | Stop reason: ALTCHOICE

## 2018-08-23 ENCOUNTER — OFFICE VISIT (OUTPATIENT)
Dept: PHYSICAL THERAPY | Facility: HOME HEALTHCARE | Age: 78
End: 2018-08-23
Payer: MEDICARE

## 2018-08-23 DIAGNOSIS — M48.062 SPINAL STENOSIS, LUMBAR REGION WITH NEUROGENIC CLAUDICATION: Primary | ICD-10-CM

## 2018-08-23 PROCEDURE — 97150 GROUP THERAPEUTIC PROCEDURES: CPT

## 2018-08-23 PROCEDURE — 97110 THERAPEUTIC EXERCISES: CPT

## 2018-08-23 PROCEDURE — 97140 MANUAL THERAPY 1/> REGIONS: CPT

## 2018-08-24 ENCOUNTER — TELEPHONE (OUTPATIENT)
Dept: FAMILY MEDICINE CLINIC | Facility: CLINIC | Age: 78
End: 2018-08-24

## 2018-08-24 ENCOUNTER — OFFICE VISIT (OUTPATIENT)
Dept: PHYSICAL THERAPY | Facility: HOME HEALTHCARE | Age: 78
End: 2018-08-24
Payer: MEDICARE

## 2018-08-24 DIAGNOSIS — M48.062 SPINAL STENOSIS, LUMBAR REGION WITH NEUROGENIC CLAUDICATION: Primary | ICD-10-CM

## 2018-08-24 PROCEDURE — 97140 MANUAL THERAPY 1/> REGIONS: CPT

## 2018-08-24 PROCEDURE — 97110 THERAPEUTIC EXERCISES: CPT

## 2018-08-24 NOTE — PROGRESS NOTES
Daily Note     Today's date: 2018  Patient name: Rajesh Gilbert  : 1940  MRN: [id number]  Referring provider: Ranjit Castaneda MD  Dx:   Encounter Diagnosis     ICD-10-CM    1  Spinal stenosis, lumbar region with neurogenic claudication M48 062               Subjective: Pt reports he is doing ok today  Objective: See treatment diary below      Assessment: Tolerated treatment well  A few verbal cues needed t/o TE to perform correctly  Pt with no c/o pain t/o session  Pt ambulated into clinic without assistive device today  Pt declined MHP at end of session and reports no pain  Patient would benefit from continued PT      Plan: Continue per plan of care       Precautions: None     Re-Eval DUE: 18     Specialty Daily Treatment Diary      Manual  8-23-18 8/24/18  8/13/18  8/15/18  8/17/18   BLE - GENTLE Calf and HS  10' gentle Calf and Hams 10 mins Calf and HS   10 min gently Calf and HS  10 min gently  Calf and HS   10 min gently         Exercise Diary  8-23-18 8/24/18  8/13/18  8/15/18  8/17/18   NuStep Level 2  10 minutes Level 2  10 minutes  Level 2   10 min  Level 2   10 min  Level 2  10 min   UBE - Retro  6'  6 min  5 min  6 min  6 min   Stand - SLR x 3 ways - Mo Mo 1 x 15 each Mo 1 x 15 each  Mo 1 x 15 ea  Mo 1 x 15 ea  Mo 1 x 15 ea   Squats 1 x 15 1 x 15  1 x 15  1 x 15  1 x 15   Marches Mo 1 x 15 Mo 1 x 15   Mo 1 x 15  Mo 1 x 15  Mo 1 x 15   Stepups - F/L Mo Fwd C  1 x 15 Mo - For - C   1 x 15  Mo For C 1 x 10  Mo For C   1 x 15  Mo For C   1 x 15   SBB             MTP/LTP Green  1 x 15 Green 1 x 15 each  Yellow   1 x 10 ea  Yellow   1 x 15 ea  Yellow   1 x 15 ea   PPT  5" x 10  5" x 10    5" x 10  5" x 10   PPT with marches             Supine SLR Mo 1 x 15 Mo 1 x 15  Mo 1 x 15  Mo 1 x 15  Mo 1 x 15    Bridges 1 x 10 1 x 10  1 x 10  1 x 10  1 x 10   Hip Abd with TBand Green  1 x 15 Green 1 x 15  Green 1 x 15  Green   1 x 15  Green  1 x 15   Hip Add with Ball 3" 1 x 15 3" 1 x 15  3" 1 x 15  3" 1 x 15  3" 1 x 15   Isometric Hip Flexion - Mo             Hooklying Heel Walk             S/L Hip Abd - Mo             SAQ 3" Mo 1 x 15 Mo 3" 1 x 10   3" 1 x 15  3" 1 x 15  3" 1 x 15   LTR  5" x 10 Mo 1 x 10   5" x 10  5" x 10  5" x 10   [facility]                   Modalities  8-23-18 8/24/18  8/13/18  8/15/18  8/17/18   HP to LB prn Declined Pt declined  Pt declined  Pt declined   Pt declined

## 2018-08-27 ENCOUNTER — OFFICE VISIT (OUTPATIENT)
Dept: PHYSICAL THERAPY | Facility: HOME HEALTHCARE | Age: 78
End: 2018-08-27
Payer: MEDICARE

## 2018-08-27 DIAGNOSIS — M48.062 SPINAL STENOSIS, LUMBAR REGION WITH NEUROGENIC CLAUDICATION: Primary | ICD-10-CM

## 2018-08-27 PROCEDURE — 97110 THERAPEUTIC EXERCISES: CPT

## 2018-08-27 PROCEDURE — 97140 MANUAL THERAPY 1/> REGIONS: CPT

## 2018-08-31 ENCOUNTER — OFFICE VISIT (OUTPATIENT)
Dept: PHYSICAL THERAPY | Facility: HOME HEALTHCARE | Age: 78
End: 2018-08-31
Payer: MEDICARE

## 2018-08-31 DIAGNOSIS — M48.062 SPINAL STENOSIS, LUMBAR REGION WITH NEUROGENIC CLAUDICATION: Primary | ICD-10-CM

## 2018-08-31 PROCEDURE — 97110 THERAPEUTIC EXERCISES: CPT

## 2018-08-31 NOTE — PROGRESS NOTES
Daily Note     Today's date: 2018  Patient name: Elli Hawkins  : 1940  MRN: [id number]  Referring provider: Neo Camejo MD  Dx:   Encounter Diagnosis     ICD-10-CM    1  Spinal stenosis, lumbar region with neurogenic claudication M48 062                   Subjective: Patient states he has no pain or discomfort today  Patient feels the exercises are going "good" at home  He no longer uses his cane at home and he feels safe with his balance  Objective: See treatment diary below      Assessment: Tolerated treatment well  Patient would benefit from PT for stretching and strengthening  Few verbal cues needed for proper performance of exercises  He has a slow and  steady pace, but weakness still seen with some exercises  Tight hamstrings still present in both legs  Plan: Continue per plan of care  Progress treatment as tolerated      Precautions: None     Re-Eval DUE: 18     Specialty Daily Treatment Diary      Manual  18   BLE - GENTLE Calf and HS  10' gentle Calf and Hams 10 mins Calf and HS   10 min gently Calf and HS   x10 min  Calf and HS   10 min gently         Exercise Diary  18   NuStep Level 2  10 minutes Level 2  10 minutes  Level 2   10 min  Level 2   10 min  Level 2  10 min   UBE - Retro  6'  6 min  6 min 6 min  6 min   Stand - SLR x 3 ways - Mo Mo 1 x 15 each Mo 1 x 15 each  Mo 1 x 15 ea  Bik 1x15 ea  Mo 1 x 15 ea   Squats 1 x 15 1 x 15  1 x 15  1x15  1 x 15   Marches Mo 1 x 15 Mo 1 x 15   Mo 1 x 15  Mo 1x15  Mo 1 x 15   Stepups - F/L Mo Fwd C  1 x 15 Mo - For - C   1 x 15  Mo For C 1 x 15  Mo C- 1x15  Mo For C   1 x 15   SBB            MTP/LTP Green  1 x 15 Green 1 x 15 each  Green  1 x 15 ea  green   1x15  Yellow   1 x 15 ea   PPT  5" x 10  5" x 10  5" x 10  5" x 10  5" x 10   PPT with marches            Supine SLR Mo 1 x 15 Mo 1 x 15  Mo 1 x 15  Mo 1x15  Mo 1 x 15 Bridges 1 x 10 1 x 10  1 x 10  1x10  1 x 10   Hip Abd with TBand Green  1 x 15 Green 1 x 15  Green 1 x 15 Green x 15  Green  1 x 15   Hip Add with Ball 3" 1 x 15 3" 1 x 15  3" 1 x 15  3" 1x15  3" 1 x 15   Isometric Hip Flexion - Mo            Hooklying Heel Walk            S/L Hip Abd - Mo            SAQ 3" Mo 1 x 15 Mo 3" 1 x 10   3" 1 x 15  3" 1x15  3" 1 x 15   LTR  5" x 10 Mo 1 x 10   5" x 10  5" x10  5" x 10   [facility]                  Modalities  8-23-18 8/24/18 8/27/18 8/31/18 8/17/18   HP to LB prn Declined Pt declined  Pt declined  Pt declined   Pt declined

## 2018-09-05 ENCOUNTER — OFFICE VISIT (OUTPATIENT)
Dept: PHYSICAL THERAPY | Facility: HOME HEALTHCARE | Age: 78
End: 2018-09-05
Payer: MEDICARE

## 2018-09-05 DIAGNOSIS — M48.062 SPINAL STENOSIS, LUMBAR REGION WITH NEUROGENIC CLAUDICATION: Primary | ICD-10-CM

## 2018-09-05 PROCEDURE — 97110 THERAPEUTIC EXERCISES: CPT

## 2018-09-05 PROCEDURE — 97140 MANUAL THERAPY 1/> REGIONS: CPT

## 2018-09-05 NOTE — PROGRESS NOTES
Daily Note     Today's date: 2018  Patient name: Nacny Last  : 1940  MRN: [id number]  Referring provider: Lc Clemente MD  Dx:   Encounter Diagnosis     ICD-10-CM    1  Spinal stenosis, lumbar region with neurogenic claudication M48 062               Subjective: I am doing great and no longer use the SPC  No problem with steps either  Objective: See treatment diary below    Assessment: Tolerated treatment well  Able to advance appropriately with TE as per flow sheet  Pt denies any pain, weakness or instability  He reports consistency with HEP  Pt feels he will be ready for DC after next visits RE  Plan: RE due NV 9-10-18 with possible DC       Precautions: None     Re-Eval DUE: 18     Specialty Daily Treatment Diary      Manual  18   BLE - GENTLE Calf and HS  10' gentle Calf and Hams 10 mins Calf and HS   10 min gently Calf and HS   x10 min  Calf and HS   10 min gently         Exercise Diary  18   NuStep Level 2  10 minutes Level 2  10 minutes  Level 2   10 min  Level 2   10 min  Level 3  10 min   UBE - Retro  6'  6 min  6 min 6 min  8 min   Stand - SLR x 3 ways - Mo Mo 1 x 15 each Mo 1 x 15 each  Mo 1 x 15 ea  Bik 1x15 ea  Mo 1 x 15 ea   Squats 1 x 15 1 x 15  1 x 15  1x15  1 x 15   Marches Mo 1 x 15 Mo 1 x 15   Mo 1 x 15  Mo 1x15  Mo 1 x 15   Stepups - F/L Mo Fwd C  1 x 15 Mo - For - C   1 x 15  Mo For C 1 x 15  Mo C- 1x15  Mo For C   1 x 15   SBB             MTP/LTP Green  1 x 15 Green 1 x 15 each  Green  1 x 15 ea  green   1x15 Green   1 x 20 ea   PPT  5" x 10  5" x 10  5" x 10  5" x 10  5" x 10   PPT with marches          1 x 10   Supine SLR Mo 1 x 15 Mo 1 x 15  Mo 1 x 15  Mo 1x15  Mo 1 x 15    Bridges 1 x 10 1 x 10  1 x 10  1x10 W/add squeeze   1 x 10   Hip Abd with TBand Green  1 x 15 Green 1 x 15  Green 1 x 15 Green x 15  Green  1 x 15   Hip Add with Ball 3" 1 x 15 3" 1 x 15  3" 1 x 15  3" 1x15  DC   Isometric Hip Flexion - Mo             Hooklying Heel Walk             S/L Hip Abd - Mo             SAQ 3" Mo 1 x 15 Mo 3" 1 x 10   3" 1 x 15  3" 1x15  3" 1 x 15   LTR  5" x 10 Mo 1 x 10   5" x 10  5" x10  5" x 10   [facility]                   Modalities  8-23-18 8/24/18 8/27/18 8/31/18 9-5-18   HP to LB prn Declined Pt declined  Pt declined  Pt declined   Pt declined

## 2018-09-07 ENCOUNTER — APPOINTMENT (OUTPATIENT)
Dept: PHYSICAL THERAPY | Facility: HOME HEALTHCARE | Age: 78
End: 2018-09-07
Payer: MEDICARE

## 2018-09-10 ENCOUNTER — EVALUATION (OUTPATIENT)
Dept: PHYSICAL THERAPY | Facility: HOME HEALTHCARE | Age: 78
End: 2018-09-10
Payer: MEDICARE

## 2018-09-10 DIAGNOSIS — M48.062 SPINAL STENOSIS, LUMBAR REGION WITH NEUROGENIC CLAUDICATION: Primary | ICD-10-CM

## 2018-09-10 PROCEDURE — 97140 MANUAL THERAPY 1/> REGIONS: CPT | Performed by: PHYSICAL THERAPIST

## 2018-09-10 PROCEDURE — 97164 PT RE-EVAL EST PLAN CARE: CPT | Performed by: PHYSICAL THERAPIST

## 2018-09-10 PROCEDURE — G8991 OTHER PT/OT GOAL STATUS: HCPCS | Performed by: PHYSICAL THERAPIST

## 2018-09-10 PROCEDURE — G8992 OTHER PT/OT  D/C STATUS: HCPCS | Performed by: PHYSICAL THERAPIST

## 2018-09-10 PROCEDURE — 97110 THERAPEUTIC EXERCISES: CPT | Performed by: PHYSICAL THERAPIST

## 2018-09-10 NOTE — PROGRESS NOTES
PT RE-EVALUATION & DISCHARGE    Today's date: 9/10/2018  Patient name: Jeff Hernandes  : 1940  MRN: [id number]  Referring provider: Vasyl Daly MD  Dx:   Encounter Diagnosis     ICD-10-CM    1  Spinal stenosis, lumbar region with neurogenic claudication M48 [id number]                   Assessment  Impairments: abnormal gait, abnormal or restricted ROM, activity intolerance and impaired balance  Other impairment: decreased flexibility    Assessment details: The patient has been seen in PT for a total of 11 visits since [facility] with good PT attendance noted  He is now typically without pain in his back  Some discomfort at times with prolonged bending over but typically without pain  And he no longer has complaints of leg pain, now only soreness in his back  He has made good improvements since [facility] and is happy with his progress in PT  His BLE ROM and strength is now [facility] but still with some limitations noted with L/S ROM  He is no longer using the AD for community ambulation and can now go up and down the steps with reciprocal gait pattern and only with use of HR  No longer using the [facility] on the steps either  His gait is now improved and he is walking with a more normalized gait pattern  He notes increased ease with completing all his ADLs and he can now put on his shoes and socks easier  He is also now standing to shower and no longer using the shower chair  He notes that he has been sleeping better and pain is no longer waking him up overnight  He notes that he can now stand for longer periods of time without pain or needing to sit and rest   He also notes that it has been easier to get in and out of the car  He has shown improvements with overall mobility and function  At this time he is happy with his progress in PT and has met his goals  Will D/C PT at this time and he is to continue with HEP  He has demonstrated understanding of HEP  D/C PT              Understanding of Dx/Px/POC: good Prognosis: fair    Goals  STGs:  1  Initiate and complete HEP with verbal cues  - met  2  Decrease LBP by 25% in 4 weeks  - met  3  Improve  BLE ROM by 5-10 degrees in 4 weeks  - met  4  Improve BLE strength by 1/2 grade in 4 weeks  - met  LTGs:  1  Patient to be I with HEP in 8 weeks  - met  2  Decrease LBP to < or = to 2-3/10 with activity in 8 weeks to improve function  - met  3  Improve L/S ROM to [facility] in 8 weeks to improve function  - partially met  4  Improve BLE ROM to [facility] t/o in 8 weeks to improve function  - met  5  Postural control is improved to maximal level of function in 8 weeks  - partially met  6  Recreational performance in related activities is improved to maximal level of function in 8 weeks  - met  7  Improve BLE strength to > or = to 4/5 t/o in 8 weeks to improve function  - met  8  Stair negotiation is improved to maximal level of function in 8 weeks  - met  9  ADL performance in related activities is improved to maximal level of function in 8 weeks  - met    Plan  Planned modality interventions: cryotherapy and thermotherapy: hydrocollator packs  Planned therapy interventions: abdominal trunk stabilization, manual therapy, neuromuscular re-education, body mechanics training, patient education, postural training, self care, strengthening, stretching, therapeutic activities, flexibility, therapeutic exercise, home exercise program and balance  Plan of Care beginning date: 9/10/2018  Plan of Care expiration date: 9/10/2018  Treatment plan discussed with: patient and family  Plan details: The patient has met most goals set at his IE, is to continue with his HEP and be D/C from skilled PT  He may continue to show improvement through HEP for strengthening  Subjective Evaluation    History of Present Illness  Onset date: August 2017  Mechanism of injury: The patient states that he feels like therapy has been helping    He has less pain overall and is typically without pain, but the other day he was working in his basement and his back was sore  He does not have an appointment to go back and see the doctor, to call and schedule an appointment as needed  Quality of life: good    Pain  At best pain ratin  At worst pain ratin  Location: LBP  Quality: dull ache  Relieving factors: ice and medications  Progression: improved    Social Support  Steps to enter house: yes  1  Stairs in house: yes (FF)   Lives in: multiple-level home  Lives with: spouse    Employment status: not working  Treatments  Previous treatment: injection treatment, medication and physical therapy  Current treatment: medication  Patient Goals  Patient goals for therapy: decreased pain, increased motion, increased strength and independence with ADLs/IADLs  Patient goal: "To be able to strengthen the leg, to walk better "          Objective     Postural Observations  Seated posture: fair  Standing posture: fair  Correction of posture: has no consistent effect        Neurological Testing     Sensation     Lumbar   Left   Intact: light touch    Right   Intact: light touch    Active Range of Motion   Left Hip   Flexion: 110 degrees   Abduction: 35 degrees     Right Hip   Flexion: 110 degrees   Abduction: 35 degrees   Left Knee   Flexion: 125 degrees   Extension: 0 degrees     Right Knee   Flexion: 125 degrees   Extension: 0 degrees     Additional Active Range of Motion Details  Seated can touch the floor  Extension: oakley by 75%  Rotation seated: oakley 25%    Incision present along lumbar spine, well healed, no active bleeding or drainage noted  No redness or warmth      R SLR: 60  L SLR: 55    Strength/Myotome Testing     Left Hip   Planes of Motion   Flexion: 4+  Abduction: 4  Adduction: 4+  External rotation: 4+  Internal rotation: 4+    Right Hip   Planes of Motion   Flexion: 4+  Abduction: 4  Adduction: 4+  External rotation: 4+  Internal rotation: 4+    Left Knee   Flexion: 4+  Extension: 4+    Right Knee   Flexion: 4+  Extension: 4+    Ambulation     Ambulation: Level Surfaces   Ambulation without assistive device: independent    Additional Level Surfaces Ambulation Details  No longer using AD for community distances or on the steps          Ambulation: Stairs   Ascend stairs: independent  Pattern: reciprocal  Railings: one rail  Descend stairs: independent  Pattern: reciprocal  Railings: one rail    Observational Gait   Gait: within functional limits       Flowsheet Rows      Most Recent Value   PT/OT G-Codes   Current Score  94   Projected Score  42   FOTO information reviewed  Yes   Assessment Type  Discharge   G code set  Other PT/OT Primary   Other PT Primary Goal Status ()  CK   Other PT Primary Discharge Status ()  CI          Precautions: None     Re-Eval DUE: 10/10/18     Specialty Daily Treatment Diary      Manual  9/10/18 8/24/18  8/27/18  8/31/18 9-5-18   BLE - GENTLE Calf and HS  10 mis gentle Calf and Hams 10 mins Calf and HS   10 min gently Calf and HS   x10 min  Calf and HS   10 min gently         Exercise Diary  9/10/18 8/24/18  8/27/18  8/31/18 9-5-18   NuStep Level 2  10 minutes Level 2  10 minutes  Level 2   10 min  Level 2   10 min  Level 3  10 min   UBE - Retro 8 minutes  6 min  6 min 6 min  8 min   Stand - SLR x 3 ways - Mo Mo 1 x 15 each Mo 1 x 15 each  Mo 1 x 15 ea  Bik 1x15 ea  Mo 1 x 15 ea   Squats 1 x 15 1 x 15  1 x 15  1x15  1 x 15   Marches Mo 1 x 15 Mo 1 x 15   Mo 1 x 15  Mo 1x15  Mo 1 x 15   Stepups - F/L Mo Fwd C  1 x 15 Mo - For - C   1 x 15  Mo For C 1 x 15  Mo C- 1x15  Mo For C   1 x 15   SBB             MTP/LTP Green  1 x 20 each Green 1 x 15 each  Green  1 x 15 ea  green   1x15 Green   1 x 20 ea   PPT 5" x 15  5" x 10  5" x 10  5" x 10  5" x 10   PPT with marches 1 x 10        1 x 10   Supine SLR Mo 1 x 15 Mo 1 x 15  Mo 1 x 15  Mo 1x15  Mo 1 x 15    Bridges With add squeeze 1 x 15 1 x 10  1 x 10  1x10 W/add squeeze   1 x 10   Hip Abd with TBand Green  1 x 15 Green 1 x 15  Green 1 x 15 Green x 15  Green  1 x 15   Hip Add with Ball With bridges 3" 1 x 15  3" 1 x 15  3" 1x15  DC   Isometric Hip Flexion - Mo             Hooklying Heel Walk             S/L Hip Abd - Mo             SAQ 3" Mo 1 x 15 Mo 3" 1 x 10   3" 1 x 15  3" 1x15  3" 1 x 15   LTR 5" 1 x 10 Mo 1 x 10   5" x 10  5" x10  5" x 10   SKTC                   Modalities 9/10/18 8/24/18  8/27/18  8/31/18  9-5-18   HP to LB prn Declined Pt declined  Pt declined  Pt declined   Pt declined

## 2018-09-13 ENCOUNTER — APPOINTMENT (OUTPATIENT)
Dept: PHYSICAL THERAPY | Facility: HOME HEALTHCARE | Age: 78
End: 2018-09-13
Payer: MEDICARE

## 2018-10-01 ENCOUNTER — APPOINTMENT (OUTPATIENT)
Dept: LAB | Facility: HOSPITAL | Age: 78
End: 2018-10-01
Attending: INTERNAL MEDICINE
Payer: MEDICARE

## 2018-10-01 LAB
CHOLEST SERPL-MCNC: 139 MG/DL (ref 50–200)
HDLC SERPL-MCNC: 30 MG/DL (ref 40–60)
LDLC SERPL CALC-MCNC: 82 MG/DL (ref 0–100)
NONHDLC SERPL-MCNC: 109 MG/DL
TRIGL SERPL-MCNC: 136 MG/DL

## 2018-10-01 PROCEDURE — 80061 LIPID PANEL: CPT | Performed by: INTERNAL MEDICINE

## 2018-10-01 PROCEDURE — 36415 COLL VENOUS BLD VENIPUNCTURE: CPT | Performed by: INTERNAL MEDICINE

## 2018-10-29 ENCOUNTER — OFFICE VISIT (OUTPATIENT)
Dept: FAMILY MEDICINE CLINIC | Facility: CLINIC | Age: 78
End: 2018-10-29
Payer: MEDICARE

## 2018-10-29 VITALS
DIASTOLIC BLOOD PRESSURE: 58 MMHG | TEMPERATURE: 97.7 F | BODY MASS INDEX: 25.61 KG/M2 | SYSTOLIC BLOOD PRESSURE: 96 MMHG | WEIGHT: 169 LBS | HEIGHT: 68 IN

## 2018-10-29 DIAGNOSIS — J44.1 COPD WITH ACUTE EXACERBATION (HCC): Primary | ICD-10-CM

## 2018-10-29 PROCEDURE — 99214 OFFICE O/P EST MOD 30 MIN: CPT | Performed by: FAMILY MEDICINE

## 2018-10-29 RX ORDER — LEVOFLOXACIN 750 MG/1
750 TABLET ORAL EVERY 24 HOURS
Qty: 5 TABLET | Refills: 0 | Status: SHIPPED | OUTPATIENT
Start: 2018-10-29 | End: 2018-11-03

## 2018-10-29 NOTE — PROGRESS NOTES
Assessment/Plan:  Treated with levofloxacin patient has taken levofloxacin previously with no ill affects    No problem-specific Assessment & Plan notes found for this encounter  Diagnoses and all orders for this visit:    COPD with acute exacerbation ([facility] [address] )  -     levofloxacin (LEVAQUIN) 750 mg tablet; Take 1 tablet (750 mg total) by mouth every 24 hours for 5 days          Subjective:      Patient ID: Rodney Hawkins is a 78 y o  male  Patient here chief complaint is congestion cough about 2 weeks ago and was exposed to a child with the respiratory infection in [place] and he was coughed on several times throughout the trip proceedings on he has just felt kind of feverish in yet his temperatures have been normal and he has been coughing up copious amounts of of mucus        The following portions of the patient's history were reviewed and updated as appropriate:   He  has a past medical history of AAA (abdominal aortic aneurysm) ([address] ); Acid reflux; Acute exacerbation of chronic obstructive airways disease with asthma ([address] ); Acute serous otitis media of left ear; Anesthesia; Anxiety; Aortic aneurysm without rupture ([address] ); Arm bruise; Arthritis; Asthma; At risk for falls; BPH (benign prostatic hyperplasia); BPH without urinary obstruction; Cancer ([address] ); CAP (community acquired pneumonia); Cataracts, bilateral; Change in bowel function; CHF (congestive heart failure) ([address] ); Clubbing of fingers; Colon polyps; Common cold; Contusion of elbow, left; COPD (chronic obstructive pulmonary disease) ([address] ); Coronary artery disease; Cough; Dementia; Depression; Diverticulosis; Dizziness; Exercise counseling; Fatigue; Full dentures; Glaucoma screening; High cholesterol; History of abdominal aortic aneurysm (AAA) repair (08/2017); History of bacteremia; History of chronic obstructive lung disease; History of epistaxis; History of influenza vaccination; History of kidney stones; History of pneumonia;  History of sepsis (10/2017); History of sinusitis; History of skin cancer; History of sleep apnea; History of urinary tract infection; [place] (hard of hearing); Hydronephrosis with obstructing calculus; Influenza vaccine needed; Insomnia; Jock itch; Kidney stones; Left hip pain; Need for pneumococcal vaccination; Need for prophylactic vaccination and inoculation against influenza; Other emphysema ([place] [address] ); Pancreatitis, chronic ([place] [address] ); Pneumonia (10/26/2017); Pulmonary emphysema ([place] [address] ); S/P CABG x 3; Screening for genitourinary condition; Screening for neurological condition; Sepsis ([place] [address] ); Short-term memory loss; Sleep apnea; Special screening examination for neoplasm of prostate; TIA involving carotid artery; Ulcer; Unsteady gait; Use of cane as ambulatory aid; Vitamin D deficiency; and Wears glasses  He   Patient Active Problem List    Diagnosis Date Noted    Acute urinary retention 07/20/2018    Hyponatremia 05/15/2018    CAD (coronary artery disease) 05/15/2018    Elevated bilirubin 05/13/2018    Tracheobronchitis 05/13/2018    Coronary artery disease involving native coronary artery of native heart with angina pectoris ([facility] ) 05/11/2018    Hx of CABG 05/11/2018    Bilateral carotid artery stenosis 05/11/2018    Pulmonary nodule 03/07/2018    Ventricular bigeminy 03/07/2018    Positional lightheadedness 03/07/2018    Bradycardia 03/06/2018    SOB (shortness of breath) 03/06/2018    Dementia 01/29/2018    History of aortic aneurysm repair 09/01/2017    Thrombocytopenia ([facility] ) 06/02/2017    Abnormal CT scan, chest 06/01/2017    Abdominal aortic aneurysm (HCC) 06/01/2017    Acute tracheobronchitis 12/28/2016    COPD (chronic obstructive pulmonary disease) [facility] ) 12/28/2016    Hyperlipidemia 12/28/2016    GERD (gastroesophageal reflux disease) 12/28/2016    Benign prostatic hyperplasia 09/18/2015     He  has a past surgical history that includes Cardiac surgery; Ureteral stent placement;  Excisional hemorrhoidectomy; Mouth surgery; pr esophagogastroduodenoscopy transoral diagnostic (N/A, 8/18/2016); Cataract extraction (Bilateral); Lithotripsy; Hernia repair; pr colonoscopy flx dx w/collj spec when pfrmd (N/A, 5/16/2017); Abdominal aortic aneurysm repair (08/23/2017); CAROTID ENDARTARECTOMY (Left, 11/1996); Coronary artery bypass graft (01/2003); Eye surgery (Bilateral); Cystoscopy; pr cystourethroscopy (N/A, 11/30/2017); pr remove bladder stone,<2 5 cm (N/A, 11/30/2017); Cystoscopy; AAA repair, endovascular; and Tonsillectomy  His family history includes Diabetes type II in his maternal grandmother and mother; Hypertension in his paternal grandmother; Kidney failure in his father  He  reports that he quit smoking about 4 years ago  He has a 90 00 pack-year smoking history  He has never used smokeless tobacco  He reports that he does not drink alcohol or use drugs  Current Outpatient Prescriptions   Medication Sig Dispense Refill    acetaminophen (TYLENOL) 500 mg tablet       aspirin 81 MG tablet Take 81 mg by mouth daily Took within 24 hours       atorvastatin (LIPITOR) 20 mg tablet Take 1 tablet (20 mg total) by mouth daily at bedtime 90 tablet 3    Bacillus Coagulans-Inulin (PROBIOTIC FORMULA) 1-250 BILLION-MG CAPS Take 2 capsules by mouth      Cholecalciferol (VITAMIN D-3 PO) Take 2,000 Units by mouth daily   cyanocobalamin (VITAMIN B-12) 1,000 mcg tablet Take by mouth daily      donepezil (ARICEPT) 10 mg tablet Take 1 tablet (10 mg total) by mouth daily at bedtime for 30 days 30 tablet 0    escitalopram (LEXAPRO) 20 mg tablet Take 1 tablet (20 mg total) by mouth daily 30 tablet 0    fluticasone-vilanterol (BREO ELLIPTA) 100-25 mcg/inh inhaler Inhale 1 puff daily 180 each 3    KRILL OIL PO Take 500 mg by mouth daily        metoprolol succinate (TOPROL-XL) 25 mg 24 hr tablet Take 1 tablet (25 mg total) by mouth daily at bedtime 90 tablet 3    montelukast (SINGULAIR) 10 mg tablet Take 1 tablet (10 mg total) by mouth daily 90 tablet 2    Multiple Vitamins-Minerals (CENTRUM SILVER ADULT 50+) TABS Take 1 tablet by mouth daily      Potassium Citrate ER 15 MEQ (1620 MG) TBCR TAKE 1 TABLET TWICE A  tablet 3    QUEtiapine (SEROquel) 50 mg tablet       tamsulosin (FLOMAX) 0 4 mg Take 1 capsule (0 4 mg total) by mouth daily 90 capsule 3    tiotropium (SPIRIVA HANDIHALER) 18 mcg inhalation capsule Place 18 mcg into inhaler and inhale daily   levofloxacin (LEVAQUIN) 750 mg tablet Take 1 tablet (750 mg total) by mouth every 24 hours for 5 days 5 tablet 0     No current facility-administered medications for this visit  Current Outpatient Prescriptions on File Prior to Visit   Medication Sig    acetaminophen (TYLENOL) 500 mg tablet     aspirin 81 MG tablet Take 81 mg by mouth daily Took within 24 hours     atorvastatin (LIPITOR) 20 mg tablet Take 1 tablet (20 mg total) by mouth daily at bedtime    Bacillus Coagulans-Inulin (PROBIOTIC FORMULA) 1-250 BILLION-MG CAPS Take 2 capsules by mouth    Cholecalciferol (VITAMIN D-3 PO) Take 2,000 Units by mouth daily   cyanocobalamin (VITAMIN B-12) 1,000 mcg tablet Take by mouth daily    donepezil (ARICEPT) 10 mg tablet Take 1 tablet (10 mg total) by mouth daily at bedtime for 30 days    escitalopram (LEXAPRO) 20 mg tablet Take 1 tablet (20 mg total) by mouth daily    fluticasone-vilanterol (BREO ELLIPTA) 100-25 mcg/inh inhaler Inhale 1 puff daily    KRILL OIL PO Take 500 mg by mouth daily      metoprolol succinate (TOPROL-XL) 25 mg 24 hr tablet Take 1 tablet (25 mg total) by mouth daily at bedtime    montelukast (SINGULAIR) 10 mg tablet Take 1 tablet (10 mg total) by mouth daily    Multiple Vitamins-Minerals (CENTRUM SILVER ADULT 50+) TABS Take 1 tablet by mouth daily    Potassium Citrate ER 15 MEQ (1620 MG) TBCR TAKE 1 TABLET TWICE A DAY    QUEtiapine (SEROquel) 50 mg tablet     tamsulosin (FLOMAX) 0 4 mg Take 1 capsule (0 4 mg total) by mouth daily    tiotropium (SPIRIVA HANDIHALER) 18 mcg inhalation capsule Place 18 mcg into inhaler and inhale daily   [DISCONTINUED] traMADol (ULTRAM) 50 mg tablet      No current facility-administered medications on file prior to visit  He is allergic to augmentin [amoxicillin-pot clavulanate]; ciprofloxacin; morphine and related; and quetiapine       Review of Systems   Constitutional: Negative for activity change, appetite change, diaphoresis, fatigue and fever  HENT: Negative  Eyes: Negative  Respiratory: Positive for cough  Negative for apnea, chest tightness, shortness of breath and wheezing  Cardiovascular: Negative for chest pain, palpitations and leg swelling  Gastrointestinal: Negative for abdominal distention, abdominal pain, anal bleeding, constipation, diarrhea, nausea and vomiting  Endocrine: Negative for cold intolerance, heat intolerance, polydipsia, polyphagia and polyuria  Genitourinary: Negative for difficulty urinating, dysuria, flank pain, hematuria and urgency  Musculoskeletal: Negative for arthralgias, back pain, gait problem, joint swelling and myalgias  Skin: Negative for color change, rash and wound  Allergic/Immunologic: Negative for environmental allergies, food allergies and immunocompromised state  Neurological: Negative for dizziness, seizures, syncope, speech difficulty, numbness and headaches  Hematological: Negative for adenopathy  Does not bruise/bleed easily  Psychiatric/Behavioral: Negative for agitation, behavioral problems, hallucinations, sleep disturbance and suicidal ideas  Objective:      BP 96/58 (BP Location: Left arm, Patient Position: Sitting, Cuff Size: Standard)   Temp 97 7 °F (36 5 °C) (Tympanic)   Ht 5' 8" (1 727 m)   Wt 76 7 kg (169 lb)   BMI 25 70 kg/m²          Physical Exam   Constitutional: He is oriented to person, place, and time  He appears well-developed and well-nourished  No distress  HENT:   Head: Normocephalic     Right Ear: External ear normal    Left Ear: External ear normal    Nose: Nose normal    Mouth/Throat: Oropharynx is clear and moist    Eyes: Pupils are equal, round, and reactive to light  Conjunctivae and EOM are normal  Right eye exhibits no discharge  Left eye exhibits no discharge  No scleral icterus  Neck: Normal range of motion  No tracheal deviation present  No thyromegaly present  Cardiovascular: Normal rate, regular rhythm and normal heart sounds  Exam reveals no gallop and no friction rub  No murmur heard  Pulmonary/Chest: Effort normal  No respiratory distress  He has no wheezes  Course bronchial breath sounds   Abdominal: Soft  Bowel sounds are normal  He exhibits no mass  There is no tenderness  There is no guarding  Musculoskeletal: He exhibits no edema or deformity  Lymphadenopathy:     He has no cervical adenopathy  Neurological: He is alert and oriented to person, place, and time  No cranial nerve deficit  Skin: Skin is warm and dry  No rash noted  He is not diaphoretic  No erythema  Psychiatric: He has a normal mood and affect   Thought content normal

## 2018-11-08 ENCOUNTER — TELEPHONE (OUTPATIENT)
Dept: FAMILY MEDICINE CLINIC | Facility: CLINIC | Age: 78
End: 2018-11-08

## 2018-11-08 DIAGNOSIS — G30.0 EARLY ONSET ALZHEIMER'S DEMENTIA WITHOUT BEHAVIORAL DISTURBANCE (HCC): Primary | ICD-10-CM

## 2018-11-08 DIAGNOSIS — F02.80 EARLY ONSET ALZHEIMER'S DEMENTIA WITHOUT BEHAVIORAL DISTURBANCE (HCC): Primary | ICD-10-CM

## 2018-11-08 NOTE — TELEPHONE ENCOUNTER
Requesting referral to Neurologist -  Dr Mariposa Harris  for consultation (recommended by Dr Litzy Lehman) - Dx: Early dementia    Referral put into system    (Will need office notes faxed  - Fax [phone]

## 2018-11-21 ENCOUNTER — TRANSCRIBE ORDERS (OUTPATIENT)
Dept: ADMINISTRATIVE | Facility: HOSPITAL | Age: 78
End: 2018-11-21

## 2018-11-21 DIAGNOSIS — F03.90 SENILE DEMENTIA, UNCOMPLICATED (HCC): ICD-10-CM

## 2018-11-21 DIAGNOSIS — F40.240 CLAUSTROPHOBIA: Primary | ICD-10-CM

## 2018-11-26 NOTE — PROGRESS NOTES
11/27/2018      Chief Complaint   Patient presents with    Urinary Retention       Assessment and Plan    78 y o  male managed by Dr Rosamaria Spivey    1  BPH with a history of post operative urinary retention 7/10/18   - PVR 68mL   - continue Flomax 0 4mg nightly     2  Nephrolithiasis  - U/S 6/2018 with bilateral nonobstructing calculi  - will follow up in another 6 months with a KUB and ultrasound prior, if stable stone burden can begin annual visits  - stressed proper hydration      History of Present Illness  Itz Cooper is a 78 y o  male here for follow up evaluation of nephrolithiasis and BPH with history of postoperative urinary retention 7/10/2018  The patient underwent a void trial late July and has been emptying his bladder well since  PVR in the office today 60 mL  He continues on Flomax 0 4 mg nightly  He does have a history of nephrolithiasis with sepsis requiring urgent stent placement a number of years ago  His most recent imaging was an ultrasound from June 2018 revealing bilateral nonobstructing calculi the largest measuring 9 mm  The patient maintains adequate hydration with water  Review of Systems   Constitutional: Negative for activity change, chills and fever  Gastrointestinal: Negative for abdominal distention and abdominal pain  Musculoskeletal: Negative for back pain and gait problem  Psychiatric/Behavioral: Negative for behavioral problems and confusion         Past Medical History  Past Medical History:   Diagnosis Date    AAA (abdominal aortic aneurysm) (HCC)     Acid reflux     Acute exacerbation of chronic obstructive airways disease with asthma (HCC)     Acute serous otitis media of left ear     recurrence not specified     Anesthesia     "always has mental changes /lingers and lingers after anesthesia"    Anxiety     Aortic aneurysm without rupture (HCC)     Arm bruise     right inner forearm "recent IV"    Arthritis     spine and poss left hip    Asthma     bronchietasis    At risk for falls     BPH (benign prostatic hyperplasia)     pt and wife can't confirm 11/10    BPH without urinary obstruction     Cancer ([address] )     skin CA on nose    CAP (community acquired pneumonia)     Cataracts, bilateral     Change in bowel function     CH[facility]congest[facility] failure[facility]     Clubbing of fingers     Colon polyps     Common cold     Contusion of elbow, left     initial encounter     COPD (chronic obstructive pulmonary disease) (HCC)     Coronary artery disease     Cough     Dementia     Depression     Diverticulosis     Dizziness     upon "standing quickly on occas"    Exercise counseling     Fatigue     Full dentures     "doesn't wear them"    Glaucoma screening     High cholesterol     History of abdominal aortic aneurysm (AAA) repair 08/2017    History of bacteremia     History of chronic obstructive lung disease     History of epistaxis     History of influenza vaccination     History of kidney stones     History of pneumonia     "many times" "at least 5 times" almost always goes to sepsis"    History of sepsis 10/2017    History of sinusitis     History of skin cancer     History of sleep apnea     History of urinary tract infection     [place] (hard of hearing)     Hydronephrosis with obstructing calculus     Influenza vaccine needed     Insomnia     Jock itch     left/saw doctor 11/8 and started on antifungal cream    Kidney stones     Left hip pain     Need for pneumococcal vaccination     Need for prophylactic vaccination and inoculation against influenza     Other emphysema ([address] )     Pancreatitis, chronic ([address] )     pt and wife can't confirm 11/10/17    Pneumonia 10/26/2017    admitted [facility]    Pulmonary emphysema ([address] )     S/P CABG x 3     approx 14 yrs ago    Screening for genitourinary condition     Screening for neurological condition     Sepsis ([address] )     due to unspecified organism     Short-term memory loss     Sleep apnea     does not use CPAP   Special screening examination for neoplasm of prostate     TIA involving carotid artery     "before carotid surgery"    Ulcer     stomach, "years ago"    Unsteady gait     Use of cane as ambulatory aid     sometimes    Vitamin D deficiency     Wears glasses        Past Social History  Past Surgical History:   Procedure Laterality Date    ABDOMINAL AORTIC ANEURYSM REPAIR  08/23/2017    ABDOMINAL AORTIC ANEURYSM REPAIR, ENDOVASCULAR      CARDIAC SURGERY      CABG x3    CAROTID ENDARTARECTOMY Left 11/1996    CATARACT EXTRACTION Bilateral     CORONARY ARTERY BYPASS GRAFT  01/2003    x3    CYSTOSCOPY      with insertion of ureteral stent     CYSTOSCOPY      with ureteroscopy with lithotripsy     EXCISIONAL HEMORRHOIDECTOMY      EYE SURGERY Bilateral     "for a wrinkle" after cataract surgery    HERNIA REPAIR      umbilical    LITHOTRIPSY      renal    MOUTH SURGERY      full mouth extraction     NJ COLONOSCOPY FLX DX W/COLLJ SPEC WHEN PFRMD N/A 5/16/2017    Procedure: COLONOSCOPY with polypectomies/ hot snare and tattoo;  Surgeon: Ovi Daniel MD;  Location: [facility]; Service: Gastroenterology    NJ CYSTOURETHROSCOPY N/A 11/30/2017    Procedure: Tracy Mckeon;  Surgeon: Jane Liu MD;  [facility];  Service: Urology    NJ ESOPHAGOGASTRODUODENOSCOPY TRANSORAL DIAGNOSTIC N/A 8/18/2016    Procedure: ESOPHAGOGASTRODUODENOSCOPY (EGD); Surgeon: vOi Daniel MD;  [facility];   Service: Gastroenterology    NJ REMOVE BLADDER STONE,<2 5 CM N/A 11/30/2017    Procedure: Jose American;  Surgeon: Jane Liu MD;  [facility];  Service: Urology    TONSILLECTOMY      URETERAL STENT PLACEMENT      and removal     History   Smoking Status    Former Smoker    Packs/day: 1 50    Years: 60 00    Quit date: 2014   Smokeless Tobacco    Never Used     Comment: quit 3 yrs ago, used to be a 1-1 5 ppd smoker Past Family History  Family History   Problem Relation Age of Onset    Diabetes type II Mother         mellitus    Kidney failure Father     Diabetes type II Maternal Grandmother         mellitus     Hypertension Paternal Grandmother         benign essential        Past Social history  Social History     Social History    Marital status: /Civil Union     Spouse name: N/A    Number of children: N/A    Years of education: N/A     Occupational History          Retired     Social History Main Topics    Smoking status: Former Smoker     Packs/day: 1 50     Years: 60 00     Quit date: 2014    Smokeless tobacco: Never Used      Comment: quit 3 yrs ago, used to be a 1-1 5 ppd smoker    Alcohol use No      Comment: quit 25 yrs ago    Drug use: No      Comment: No illicit drug use     Sexual activity: Not Currently     Other Topics Concern    Not on file     Social History Narrative    Retired     No living Will     No advance directives     Daily caffeine consumption - 4-5 servings a day        Current Medications  Current Outpatient Prescriptions   Medication Sig Dispense Refill    acetaminophen (TYLENOL) 500 mg tablet       aspirin 81 MG tablet Take 81 mg by mouth daily Took within 24 hours       atorvastatin (LIPITOR) 20 mg tablet Take 1 tablet (20 mg total) by mouth daily at bedtime 90 tablet 3    Bacillus Coagulans-Inulin (PROBIOTIC FORMULA) 1-250 BILLION-MG CAPS Take 2 capsules by mouth      Cholecalciferol (VITAMIN D-3 PO) Take 2,000 Units by mouth daily   cyanocobalamin (VITAMIN B-12) 1,000 mcg tablet Take by mouth daily      escitalopram (LEXAPRO) 20 mg tablet Take 1 tablet (20 mg total) by mouth daily 30 tablet 0    fluticasone-vilanterol (BREO ELLIPTA) 100-25 mcg/inh inhaler Inhale 1 puff daily 180 each 3    KRILL OIL PO Take 500 mg by mouth daily        metoprolol succinate (TOPROL-XL) 25 mg 24 hr tablet Take 1 tablet (25 mg total) by mouth daily at bedtime 90 tablet 3    montelukast (SINGULAIR) 10 mg tablet Take 1 tablet (10 mg total) by mouth daily 90 tablet 2    Multiple Vitamins-Minerals (CENTRUM SILVER ADULT 50+) TABS Take 1 tablet by mouth daily      Potassium Citrate ER 15 MEQ (1620 MG) TBCR TAKE 1 TABLET TWICE A  tablet 3    QUEtiapine (SEROquel) 50 mg tablet       tamsulosin (FLOMAX) 0 4 mg Take 1 capsule (0 4 mg total) by mouth daily 90 capsule 3    tiotropium (SPIRIVA HANDIHALER) 18 mcg inhalation capsule Place 18 mcg into inhaler and inhale daily   donepezil (ARICEPT) 10 mg tablet Take 1 tablet (10 mg total) by mouth daily at bedtime for 30 days 30 tablet 0     No current facility-administered medications for this visit  Allergies  Allergies   Allergen Reactions    Augmentin [Amoxicillin-Pot Clavulanate] Diarrhea    Ciprofloxacin Hives    Morphine And Related Other (See Comments)     Change in mental status    Quetiapine Other (See Comments)     Severe nightmares on lower doses         The following portions of the patient's history were reviewed and updated as appropriate: allergies, current medications, past medical history, past social history, past surgical history and problem list       Vitals  Vitals:    11/27/18 1433   BP: 148/70   Pulse: 62   Weight: 77 3 kg (170 lb 6 4 oz)   Height: 5' 8" (1 727 m)           Physical Exam  Constitutional   General appearance: Patient is seated and in no acute distress, well appearing and well nourished  Head and Face   Head and face: Normal     Eyes   Conjunctiva and lids: No erythema, swelling or discharge  Ears, Nose, Mouth, and Throat   Hearing: Normal     Pulmonary   Respiratory effort: No increased work of breathing or signs of respiratory distress  Cardiovascular   Examination of extremities for edema and/or varicosities: Normal     Abdomen   Abdomen: Non-tender, no masses      Musculoskeletal   Gait and station: Normal     Skin   Skin and subcutaneous tissue: Warm, dry, and intact  No visible lesions or rashes  Psychiatric   Judgment and insight: Normal  Recent and remote memory:  Normal  Mood and affect: Normal      Results  Recent Results (from the past 1 hour(s))   POCT Measure PVR    Collection Time: 11/27/18  2:36 PM   Result Value Ref Range    POST-VOID RESIDUAL VOLUME, ML POC 68 mL   ]  Lab Results   Component Value Date    PSA 0 8 05/11/2018    PSA 0 8 05/27/2016    PSA 1 14 02/02/2014     Lab Results   Component Value Date    GLUCOSE 77 11/29/2014    CALCIUM 8 3 05/15/2018     11/29/2014    K 3 3 (L) 05/15/2018    CO2 23 05/15/2018     05/15/2018    BUN 12 05/15/2018    CREATININE 1 00 05/15/2018     Lab Results   Component Value Date    WBC 5 80 05/15/2018    HGB 14 0 05/15/2018    HCT 40 4 05/15/2018    MCV 94 05/15/2018     (L) 05/15/2018       Orders  Orders Placed This Encounter   Procedures    XR abdomen 1 view kub     Standing Status:   Future     Standing Expiration Date:   11/27/2022     Scheduling Instructions:      Bring along any outside films relating to this procedure  Order Specific Question:   Reason for Exam:     Answer:   calculi    US kidney and bladder     Standing Status:   Future     Standing Expiration Date:   11/27/2022     Scheduling Instructions:      "Prep required if being scheduled in conjunction with other studies, refer to those examination's Preps first before scheduling  All patients for US Kidney and Bladder they must drink 24 oz of water 60 minutes before your scheduled appointment time  This test requires you to have a FULL bladder  Please do not urinate before your test             Please bring your physician order, insurance cards, a form of photo ID and a list of your medications with you  Arrive 15 minutes prior to your appointment time in order to register              If you need to have lab work or a urinalysis, please do this AFTER your ultrasound  "            To schedule this appointment, please contact Central Scheduling at [phone]       Order Specific Question:   Reason for Exam:     Answer:   calculi    POCT Measure PVR

## 2018-11-27 ENCOUNTER — OFFICE VISIT (OUTPATIENT)
Dept: UROLOGY | Facility: CLINIC | Age: 78
End: 2018-11-27
Payer: MEDICARE

## 2018-11-27 VITALS
BODY MASS INDEX: 25.82 KG/M2 | HEIGHT: 68 IN | DIASTOLIC BLOOD PRESSURE: 70 MMHG | HEART RATE: 62 BPM | SYSTOLIC BLOOD PRESSURE: 148 MMHG | WEIGHT: 170.4 LBS

## 2018-11-27 DIAGNOSIS — N40.1 BENIGN PROSTATIC HYPERPLASIA WITH LOWER URINARY TRACT SYMPTOMS, SYMPTOM DETAILS UNSPECIFIED: ICD-10-CM

## 2018-11-27 DIAGNOSIS — R33.8 ACUTE URINARY RETENTION: Primary | ICD-10-CM

## 2018-11-27 DIAGNOSIS — N20.0 NEPHROLITHIASIS: ICD-10-CM

## 2018-11-27 LAB — POST-VOID RESIDUAL VOLUME, ML POC: 68 ML

## 2018-11-27 PROCEDURE — 99213 OFFICE O/P EST LOW 20 MIN: CPT | Performed by: UROLOGY

## 2018-11-27 PROCEDURE — 51798 US URINE CAPACITY MEASURE: CPT | Performed by: UROLOGY

## 2018-11-28 ENCOUNTER — HOSPITAL ENCOUNTER (OUTPATIENT)
Dept: MRI IMAGING | Facility: HOSPITAL | Age: 78
Discharge: HOME | End: 2018-11-28
Attending: PSYCHIATRY & NEUROLOGY
Payer: MEDICARE

## 2018-11-28 ENCOUNTER — TELEPHONE (OUTPATIENT)
Dept: CARDIOLOGY CLINIC | Facility: HOSPITAL | Age: 78
End: 2018-11-28

## 2018-11-28 DIAGNOSIS — F40.240 CLAUSTROPHOBIA: ICD-10-CM

## 2018-11-28 DIAGNOSIS — F03.90 SENILE DEMENTIA, UNCOMPLICATED (HCC): ICD-10-CM

## 2018-11-28 DIAGNOSIS — I49.8 VENTRICULAR BIGEMINY: Primary | ICD-10-CM

## 2018-11-28 PROCEDURE — 70551 MRI BRAIN STEM W/O DYE: CPT

## 2018-11-28 PROCEDURE — 76377 3D RENDER W/INTRP POSTPROCES: CPT

## 2018-11-28 RX ORDER — ATENOLOL 25 MG/1
25 TABLET ORAL
Qty: 90 TABLET | Refills: 3 | Status: SHIPPED | OUTPATIENT
Start: 2018-11-28 | End: 2019-10-03 | Stop reason: SDUPTHER

## 2018-11-28 NOTE — TELEPHONE ENCOUNTER
Call from patients wife informing that he is having vivid dreams and nightmares from Toprol XL and neurologist has asked if pt could be switched to atenolol  Per Dr Stacy Friedman pt ma switch to atenolol 25 mgm @ hs  Wife informed of same

## 2018-12-01 ENCOUNTER — TRANSCRIBE ORDERS (OUTPATIENT)
Dept: ADMINISTRATIVE | Facility: HOSPITAL | Age: 78
End: 2018-12-01

## 2018-12-01 ENCOUNTER — LAB (OUTPATIENT)
Dept: LAB | Facility: HOSPITAL | Age: 78
End: 2018-12-01
Attending: FAMILY MEDICINE
Payer: MEDICARE

## 2018-12-01 DIAGNOSIS — F03.90 SENILE DEMENTIA, UNCOMPLICATED (HCC): ICD-10-CM

## 2018-12-01 DIAGNOSIS — F40.240 CLAUSTROPHOBIA: ICD-10-CM

## 2018-12-01 DIAGNOSIS — F40.240 CLAUSTROPHOBIA: Primary | ICD-10-CM

## 2018-12-01 LAB
25(OH)D3 SERPL-MCNC: 30.4 NG/ML (ref 30–100)
ALBUMIN SERPL BCP-MCNC: 3.6 G/DL (ref 3.5–5)
ALP SERPL-CCNC: 113 U/L (ref 46–116)
ALT SERPL W P-5'-P-CCNC: 29 U/L (ref 12–78)
ANION GAP SERPL CALCULATED.3IONS-SCNC: 8 MMOL/L (ref 4–13)
AST SERPL W P-5'-P-CCNC: 20 U/L (ref 5–45)
BILIRUB SERPL-MCNC: 1.3 MG/DL (ref 0.2–1)
BUN SERPL-MCNC: 12 MG/DL (ref 5–25)
CALCIUM SERPL-MCNC: 9.1 MG/DL (ref 8.3–10.1)
CHLORIDE SERPL-SCNC: 105 MMOL/L (ref 100–108)
CO2 SERPL-SCNC: 30 MMOL/L (ref 21–32)
CREAT SERPL-MCNC: 1.07 MG/DL (ref 0.6–1.3)
ERYTHROCYTE [DISTWIDTH] IN BLOOD BY AUTOMATED COUNT: 14.6 % (ref 11.6–15.1)
GFR SERPL CREATININE-BSD FRML MDRD: 68 ML/MIN/1.73SQ M
GLUCOSE P FAST SERPL-MCNC: 109 MG/DL (ref 65–99)
HCT VFR BLD AUTO: 52.9 % (ref 36.5–49.3)
HGB BLD-MCNC: 17.4 G/DL (ref 12–17)
MCH RBC QN AUTO: 32.2 PG (ref 26.8–34.3)
MCHC RBC AUTO-ENTMCNC: 32.9 G/DL (ref 31.4–37.4)
MCV RBC AUTO: 98 FL (ref 82–98)
PLATELET # BLD AUTO: 133 THOUSANDS/UL (ref 149–390)
PMV BLD AUTO: 9.7 FL (ref 8.9–12.7)
POTASSIUM SERPL-SCNC: 4.5 MMOL/L (ref 3.5–5.3)
PROT SERPL-MCNC: 7.2 G/DL (ref 6.4–8.2)
RBC # BLD AUTO: 5.41 MILLION/UL (ref 3.88–5.62)
SODIUM SERPL-SCNC: 143 MMOL/L (ref 136–145)
TSH SERPL DL<=0.05 MIU/L-ACNC: 1.65 UIU/ML (ref 0.36–3.74)
VIT B12 SERPL-MCNC: 1452 PG/ML (ref 100–900)
WBC # BLD AUTO: 5.87 THOUSAND/UL (ref 4.31–10.16)

## 2018-12-01 PROCEDURE — 82607 VITAMIN B-12: CPT

## 2018-12-01 PROCEDURE — 85027 COMPLETE CBC AUTOMATED: CPT

## 2018-12-01 PROCEDURE — 84443 ASSAY THYROID STIM HORMONE: CPT

## 2018-12-01 PROCEDURE — 80053 COMPREHEN METABOLIC PANEL: CPT

## 2018-12-01 PROCEDURE — 36415 COLL VENOUS BLD VENIPUNCTURE: CPT

## 2018-12-01 PROCEDURE — 82306 VITAMIN D 25 HYDROXY: CPT

## 2018-12-03 ENCOUNTER — TRANSCRIBE ORDERS (OUTPATIENT)
Dept: ADMINISTRATIVE | Facility: HOSPITAL | Age: 78
End: 2018-12-03

## 2018-12-03 DIAGNOSIS — F40.240 CLAUSTROPHOBIA: ICD-10-CM

## 2018-12-03 DIAGNOSIS — F03.90 SENILE DEMENTIA, UNCOMPLICATED (HCC): Primary | ICD-10-CM

## 2018-12-03 DIAGNOSIS — I67.1 CEREBRAL ANEURYSM, NONRUPTURED: ICD-10-CM

## 2018-12-06 ENCOUNTER — HOSPITAL ENCOUNTER (OUTPATIENT)
Dept: CT IMAGING | Facility: HOSPITAL | Age: 78
Discharge: HOME | End: 2018-12-06
Attending: PSYCHIATRY & NEUROLOGY
Payer: MEDICARE

## 2018-12-06 DIAGNOSIS — I67.1 CEREBRAL ANEURYSM, NONRUPTURED: ICD-10-CM

## 2018-12-06 DIAGNOSIS — F03.90 SENILE DEMENTIA, UNCOMPLICATED (HCC): ICD-10-CM

## 2018-12-06 DIAGNOSIS — F40.240 CLAUSTROPHOBIA: ICD-10-CM

## 2018-12-06 PROCEDURE — 70496 CT ANGIOGRAPHY HEAD: CPT

## 2018-12-06 PROCEDURE — 70498 CT ANGIOGRAPHY NECK: CPT

## 2018-12-06 RX ADMIN — IOHEXOL 85 ML: 350 INJECTION, SOLUTION INTRAVENOUS at 13:31

## 2018-12-18 ENCOUNTER — OFFICE VISIT (OUTPATIENT)
Dept: FAMILY MEDICINE CLINIC | Facility: CLINIC | Age: 78
End: 2018-12-18
Payer: MEDICARE

## 2018-12-18 VITALS
BODY MASS INDEX: 26.19 KG/M2 | DIASTOLIC BLOOD PRESSURE: 62 MMHG | WEIGHT: 172.8 LBS | TEMPERATURE: 98.1 F | HEIGHT: 68 IN | SYSTOLIC BLOOD PRESSURE: 120 MMHG

## 2018-12-18 DIAGNOSIS — E78.2 MIXED HYPERLIPIDEMIA: ICD-10-CM

## 2018-12-18 DIAGNOSIS — J43.9 PULMONARY EMPHYSEMA, UNSPECIFIED EMPHYSEMA TYPE (HCC): Primary | ICD-10-CM

## 2018-12-18 PROCEDURE — 99213 OFFICE O/P EST LOW 20 MIN: CPT | Performed by: FAMILY MEDICINE

## 2018-12-18 RX ORDER — FLUTICASONE PROPIONATE AND SALMETEROL 55; 14 UG/1; UG/1
1 POWDER, METERED RESPIRATORY (INHALATION) 2 TIMES DAILY
COMMUNITY
End: 2020-08-17 | Stop reason: SDUPTHER

## 2018-12-18 RX ORDER — PHENOL 1.4 %
2 AEROSOL, SPRAY (ML) MUCOUS MEMBRANE
COMMUNITY
End: 2020-08-29 | Stop reason: HOSPADM

## 2018-12-18 NOTE — PROGRESS NOTES
Assessment/Plan:    No problem-specific Assessment & Plan notes found for this encounter  There are no diagnoses linked to this encounter  Subjective:      Patient ID: Yuliya Cooney is a 70 y o  male  Patient here for a follow-up visit feels well a little anxious right now because on most recent CT scan his right upper lung infiltrate has had a slight amount of growth I would say about 0 25 in in each diameter his pulmonologist is going to work this up but it has been present at least 2 years and probably much longer with very little growth on pretty comfortable advising the family that this may not be a malignancy he is doing okay with his medications will continue same        The following portions of the patient's history were reviewed and updated as appropriate:   He  has a past medical history of AAA (abdominal aortic aneurysm) ([facility] ); Acid reflux; Acute exacerbation of chronic obstructive airways disease with asthma ([address] ); Acute serous otitis media of left ear; Anesthesia; Anxiety; Aortic aneurysm without rupture ([address] ); Arm bruise; Arthritis; Asthma; At risk for falls; BPH (benign prostatic hyperplasia); BPH without urinary obstruction; Cancer ([address] ); CAP (community acquired pneumonia); Cataracts, bilateral; Change in bowel function; CHF (congestive heart failure) ([address] ); Clubbing of fingers; Colon polyps; Common cold; Contusion of elbow, left; COPD (chronic obstructive pulmonary disease) ([address] ); Coronary artery disease; Cough; Dementia; Depression; Diverticulosis; Dizziness; Exercise counseling; Fatigue; Full dentures; Glaucoma screening; High cholesterol; History of abdominal aortic aneurysm (AAA) repair (08/2017); History of bacteremia; History of chronic obstructive lung disease; History of epistaxis; History of influenza vaccination; History of kidney stones; History of pneumonia; History of sepsis (10/2017); History of sinusitis; History of skin cancer; History of sleep apnea;  History of urinary tract infection; [place] (hard of hearing); Hydronephrosis with obstructing calculus; Influenza vaccine needed; Insomnia; Jock itch; Kidney stones; Left hip pain; Need for pneumococcal vaccination; Need for prophylactic vaccination and inoculation against influenza; Other emphysema ([facility] ); Pancreatitis, chronic ([place] [address] ); Pneumonia (10/26/2017); Pulmonary emphysema ([place] [address] ); S/P CABG x 3; Screening for genitourinary condition; Screening for neurological condition; Sepsis ([place] [address] ); Short-term memory loss; Sleep apnea; Special screening examination for neoplasm of prostate; TIA involving carotid artery; Ulcer; Unsteady gait; Use of cane as ambulatory aid; Vitamin D deficiency; and Wears glasses  He   Patient Active Problem List    Diagnosis Date Noted    Acute urinary retention 07/20/2018    Hyponatremia 05/15/2018    CAD (coronary artery disease) 05/15/2018    Elevated bilirubin 05/13/2018    Tracheobronchitis 05/13/2018    Coronary artery disease involving native coronary artery of native heart with angina pectoris ([place] [address] ) 05/11/2018    Hx of CABG 05/11/2018    Bilateral carotid artery stenosis 05/11/2018    Pulmonary nodule 03/07/2018    Ventricular bigeminy 03/07/2018    Positional lightheadedness 03/07/2018    Bradycardia 03/06/2018    SOB (shortness of breath) 03/06/2018    Dementia 01/29/2018    History of aortic aneurysm repair 09/01/2017    Thrombocytopenia ([facility] ) 06/02/2017    Abnormal CT scan, chest 06/01/2017    Abdominal aortic aneurysm (HCC) 06/01/2017    Acute tracheobronchitis 12/28/2016    COPD (chronic obstructive pulmonary disease) ([facility] ) 12/28/2016    Hyperlipidemia 12/28/2016    GERD (gastroesophageal reflux disease) 12/28/2016    Benign prostatic hyperplasia 09/18/2015     He  has a past surgical history that includes Cardiac surgery; Ureteral stent placement; Excisional hemorrhoidectomy; Mouth surgery; pr esophagogastroduodenoscopy transoral diagnostic (N/A, 8/18/2016);  Cataract extraction (Bilateral); Lithotripsy; Hernia repair; pr colonoscopy flx dx w/collj spec when pfrmd (N/A, 5/16/2017); Abdominal aortic aneurysm repair (08/23/2017); CAROTID ENDARTARECTOMY (Left, 11/1996); Coronary artery bypass graft (01/2003); Eye surgery (Bilateral); Cystoscopy; pr cystourethroscopy (N/A, 11/30/2017); pr remove bladder stone,<2 5 cm (N/A, 11/30/2017); Cystoscopy; AAA repair, endovascular; and Tonsillectomy  His family history includes Diabetes type II in his maternal grandmother and mother; Hypertension in his paternal grandmother; Kidney failure in his father  He  reports that he quit smoking about 4 years ago  He has a 90 00 pack-year smoking history  He has never used smokeless tobacco  He reports that he does not drink alcohol or use drugs  Current Outpatient Prescriptions   Medication Sig Dispense Refill    acetaminophen (TYLENOL) 500 mg tablet       aspirin 81 MG tablet Take 81 mg by mouth daily Took within 24 hours       atenolol (TENORMIN) 25 mg tablet Take 1 tablet (25 mg total) by mouth daily at bedtime 90 tablet 3    atorvastatin (LIPITOR) 20 mg tablet Take 1 tablet (20 mg total) by mouth daily at bedtime 90 tablet 3    Bacillus Coagulans-Inulin (PROBIOTIC FORMULA) 1-250 BILLION-MG CAPS Take 2 capsules by mouth      Cholecalciferol (VITAMIN D-3 PO) Take 2,000 Units by mouth daily   donepezil (ARICEPT) 10 mg tablet Take 1 tablet (10 mg total) by mouth daily at bedtime for 30 days 30 tablet 0    escitalopram (LEXAPRO) 20 mg tablet Take 1 tablet (20 mg total) by mouth daily 30 tablet 0    Fluticasone-Salmeterol (AIRDUO RESPICLICK 15/77) 19-30 MCG/ACT AEPB Inhale 1 puff 2 (two) times a day AM & PM      KRILL OIL PO Take 500 mg by mouth daily        Melatonin 5 MG TABS Take 1 tablet by mouth daily at bedtime      montelukast (SINGULAIR) 10 mg tablet Take 1 tablet (10 mg total) by mouth daily 90 tablet 2    Multiple Vitamins-Minerals (CENTRUM SILVER ADULT 50+) TABS Take 1 tablet by mouth daily      Potassium Citrate ER 15 MEQ (1620 MG) TBCR TAKE 1 TABLET TWICE A  tablet 3    QUEtiapine (SEROquel) 50 mg tablet       tamsulosin (FLOMAX) 0 4 mg Take 1 capsule (0 4 mg total) by mouth daily 90 capsule 3    tiotropium (SPIRIVA HANDIHALER) 18 mcg inhalation capsule Place 18 mcg into inhaler and inhale daily   cyanocobalamin (VITAMIN B-12) 1,000 mcg tablet Take by mouth daily       No current facility-administered medications for this visit  Current Outpatient Prescriptions on File Prior to Visit   Medication Sig    acetaminophen (TYLENOL) 500 mg tablet     aspirin 81 MG tablet Take 81 mg by mouth daily Took within 24 hours     atenolol (TENORMIN) 25 mg tablet Take 1 tablet (25 mg total) by mouth daily at bedtime    atorvastatin (LIPITOR) 20 mg tablet Take 1 tablet (20 mg total) by mouth daily at bedtime    Bacillus Coagulans-Inulin (PROBIOTIC FORMULA) 1-250 BILLION-MG CAPS Take 2 capsules by mouth    Cholecalciferol (VITAMIN D-3 PO) Take 2,000 Units by mouth daily   donepezil (ARICEPT) 10 mg tablet Take 1 tablet (10 mg total) by mouth daily at bedtime for 30 days    escitalopram (LEXAPRO) 20 mg tablet Take 1 tablet (20 mg total) by mouth daily    KRILL OIL PO Take 500 mg by mouth daily   montelukast (SINGULAIR) 10 mg tablet Take 1 tablet (10 mg total) by mouth daily    Multiple Vitamins-Minerals (CENTRUM SILVER ADULT 50+) TABS Take 1 tablet by mouth daily    Potassium Citrate ER 15 MEQ (1620 MG) TBCR TAKE 1 TABLET TWICE A DAY    QUEtiapine (SEROquel) 50 mg tablet     tamsulosin (FLOMAX) 0 4 mg Take 1 capsule (0 4 mg total) by mouth daily    tiotropium (SPIRIVA HANDIHALER) 18 mcg inhalation capsule Place 18 mcg into inhaler and inhale daily      cyanocobalamin (VITAMIN B-12) 1,000 mcg tablet Take by mouth daily    [DISCONTINUED] fluticasone-vilanterol (BREO ELLIPTA) 100-25 mcg/inh inhaler Inhale 1 puff daily    [DISCONTINUED] metoprolol succinate (TOPROL-XL) 25 mg 24 hr tablet Take 1 tablet (25 mg total) by mouth daily at bedtime     No current facility-administered medications on file prior to visit  He is allergic to augmentin [amoxicillin-pot clavulanate]; ciprofloxacin; and morphine and related       Review of Systems   Constitutional: Negative for activity change, appetite change, diaphoresis, fatigue and fever  HENT: Negative  Eyes: Negative  Respiratory: Negative for apnea, cough, chest tightness, shortness of breath and wheezing  Cardiovascular: Negative for chest pain, palpitations and leg swelling  Gastrointestinal: Negative for abdominal distention, abdominal pain, anal bleeding, constipation, diarrhea, nausea and vomiting  Endocrine: Negative for cold intolerance, heat intolerance, polydipsia, polyphagia and polyuria  Genitourinary: Negative for difficulty urinating, dysuria, flank pain, hematuria and urgency  Musculoskeletal: Negative for arthralgias, back pain, gait problem, joint swelling and myalgias  Skin: Negative for color change, rash and wound  Allergic/Immunologic: Negative for environmental allergies, food allergies and immunocompromised state  Neurological: Negative for dizziness, seizures, syncope, speech difficulty, numbness and headaches  Hematological: Negative for adenopathy  Does not bruise/bleed easily  Psychiatric/Behavioral: Positive for confusion  Negative for agitation, behavioral problems, hallucinations, sleep disturbance and suicidal ideas  The patient is nervous/anxious  Objective:      /62 (BP Location: Left arm, Patient Position: Sitting, Cuff Size: Standard)   Temp 98 1 °F (36 7 °C) (Tympanic) Comment (Src): RIght Ear  Ht 5' 8" (1 727 m)   Wt 78 4 kg (172 lb 12 8 oz)   BMI 26 27 kg/m²          Physical Exam   Constitutional: He is oriented to person, place, and time  He appears well-developed and well-nourished  No distress  HENT:   Head: Normocephalic     Right Ear: External ear normal    Left Ear: External ear normal    Nose: Nose normal    Mouth/Throat: Oropharynx is clear and moist    Eyes: Pupils are equal, round, and reactive to light  Conjunctivae and EOM are normal  Right eye exhibits no discharge  Left eye exhibits no discharge  No scleral icterus  Neck: Normal range of motion  No tracheal deviation present  No thyromegaly present  Cardiovascular: Normal rate, regular rhythm and normal heart sounds  Exam reveals no gallop and no friction rub  No murmur heard  Pulmonary/Chest: Effort normal and breath sounds normal  No respiratory distress  He has no wheezes  Abdominal: Soft  Bowel sounds are normal  He exhibits no mass  There is no tenderness  There is no guarding  Musculoskeletal: He exhibits no edema or deformity  Lymphadenopathy:     He has no cervical adenopathy  Neurological: He is alert and oriented to person, place, and time  No cranial nerve deficit  Skin: Skin is warm and dry  No rash noted  He is not diaphoretic  No erythema  Psychiatric: He has a normal mood and affect   Thought content normal

## 2018-12-20 DIAGNOSIS — F03.91 DEMENTIA WITH BEHAVIORAL DISTURBANCE, UNSPECIFIED DEMENTIA TYPE (HCC): Primary | ICD-10-CM

## 2018-12-20 RX ORDER — QUETIAPINE FUMARATE 50 MG/1
50 TABLET, FILM COATED ORAL
Qty: 90 TABLET | Refills: 1 | Status: SHIPPED | OUTPATIENT
Start: 2018-12-20 | End: 2019-08-26 | Stop reason: SDUPTHER

## 2018-12-30 ENCOUNTER — APPOINTMENT (EMERGENCY)
Dept: CT IMAGING | Facility: HOSPITAL | Age: 78
DRG: 189 | End: 2018-12-30
Payer: MEDICARE

## 2018-12-30 ENCOUNTER — APPOINTMENT (EMERGENCY)
Dept: RADIOLOGY | Facility: HOSPITAL | Age: 78
DRG: 189 | End: 2018-12-30
Payer: MEDICARE

## 2018-12-30 ENCOUNTER — HOSPITAL ENCOUNTER (INPATIENT)
Facility: HOSPITAL | Age: 78
LOS: 2 days | Discharge: HOME | DRG: 189 | End: 2019-01-01
Attending: EMERGENCY MEDICINE | Admitting: INTERNAL MEDICINE
Payer: MEDICARE

## 2018-12-30 DIAGNOSIS — J20.9 ACUTE TRACHEOBRONCHITIS: ICD-10-CM

## 2018-12-30 DIAGNOSIS — R91.8 PULMONARY NODULES/LESIONS, MULTIPLE: ICD-10-CM

## 2018-12-30 DIAGNOSIS — J44.9 COPD (CHRONIC OBSTRUCTIVE PULMONARY DISEASE) (HCC): Primary | ICD-10-CM

## 2018-12-30 DIAGNOSIS — R09.02 HYPOXIA: ICD-10-CM

## 2018-12-30 DIAGNOSIS — R68.89 RIGORS: ICD-10-CM

## 2018-12-30 PROBLEM — J96.01 ACUTE RESPIRATORY FAILURE WITH HYPOXIA (HCC): Status: ACTIVE | Noted: 2018-12-30

## 2018-12-30 LAB
ALBUMIN SERPL BCP-MCNC: 3.7 G/DL (ref 3.5–5)
ALP SERPL-CCNC: 117 U/L (ref 46–116)
ALT SERPL W P-5'-P-CCNC: 29 U/L (ref 12–78)
ANION GAP SERPL CALCULATED.3IONS-SCNC: 11 MMOL/L (ref 4–13)
AST SERPL W P-5'-P-CCNC: 19 U/L (ref 5–45)
BASOPHILS # BLD AUTO: 0.05 THOUSANDS/ΜL (ref 0–0.1)
BASOPHILS NFR BLD AUTO: 1 % (ref 0–1)
BILIRUB SERPL-MCNC: 2.1 MG/DL (ref 0.2–1)
BILIRUB UR QL STRIP: NEGATIVE
BUN SERPL-MCNC: 13 MG/DL (ref 5–25)
CALCIUM SERPL-MCNC: 9 MG/DL (ref 8.3–10.1)
CHLORIDE SERPL-SCNC: 102 MMOL/L (ref 100–108)
CLARITY UR: CLEAR
CO2 SERPL-SCNC: 26 MMOL/L (ref 21–32)
COLOR UR: YELLOW
CREAT SERPL-MCNC: 1.2 MG/DL (ref 0.6–1.3)
DEPRECATED D DIMER PPP: 9345 NG/ML (FEU)
EOSINOPHIL # BLD AUTO: 0.03 THOUSAND/ΜL (ref 0–0.61)
EOSINOPHIL NFR BLD AUTO: 0 % (ref 0–6)
ERYTHROCYTE [DISTWIDTH] IN BLOOD BY AUTOMATED COUNT: 14.6 % (ref 11.6–15.1)
FLUAV AG SPEC QL IA: NEGATIVE
FLUBV AG SPEC QL IA: NEGATIVE
GFR SERPL CREATININE-BSD FRML MDRD: 58 ML/MIN/1.73SQ M
GLUCOSE SERPL-MCNC: 109 MG/DL (ref 65–140)
GLUCOSE UR STRIP-MCNC: NEGATIVE MG/DL
HCT VFR BLD AUTO: 52.8 % (ref 36.5–49.3)
HGB BLD-MCNC: 17.5 G/DL (ref 12–17)
HGB UR QL STRIP.AUTO: NEGATIVE
HOLD SPECIMEN: NORMAL
IMM GRANULOCYTES # BLD AUTO: 0.07 THOUSAND/UL (ref 0–0.2)
IMM GRANULOCYTES NFR BLD AUTO: 1 % (ref 0–2)
KETONES UR STRIP-MCNC: NEGATIVE MG/DL
LEUKOCYTE ESTERASE UR QL STRIP: NEGATIVE
LYMPHOCYTES # BLD AUTO: 1.07 THOUSANDS/ΜL (ref 0.6–4.47)
LYMPHOCYTES NFR BLD AUTO: 10 % (ref 14–44)
MAGNESIUM SERPL-MCNC: 1.8 MG/DL (ref 1.6–2.6)
MCH RBC QN AUTO: 32.3 PG (ref 26.8–34.3)
MCHC RBC AUTO-ENTMCNC: 33.1 G/DL (ref 31.4–37.4)
MCV RBC AUTO: 98 FL (ref 82–98)
MONOCYTES # BLD AUTO: 0.82 THOUSAND/ΜL (ref 0.17–1.22)
MONOCYTES NFR BLD AUTO: 8 % (ref 4–12)
NEUTROPHILS # BLD AUTO: 8.34 THOUSANDS/ΜL (ref 1.85–7.62)
NEUTS SEG NFR BLD AUTO: 80 % (ref 43–75)
NITRITE UR QL STRIP: NEGATIVE
NRBC BLD AUTO-RTO: 0 /100 WBCS
NT-PROBNP SERPL-MCNC: 156 PG/ML
PH UR STRIP.AUTO: 8 [PH] (ref 4.5–8)
PLATELET # BLD AUTO: 119 THOUSANDS/UL (ref 149–390)
PMV BLD AUTO: 10.1 FL (ref 8.9–12.7)
POTASSIUM SERPL-SCNC: 4.4 MMOL/L (ref 3.5–5.3)
PROCALCITONIN SERPL-MCNC: <0.05 NG/ML
PROT SERPL-MCNC: 7.5 G/DL (ref 6.4–8.2)
PROT UR STRIP-MCNC: NEGATIVE MG/DL
RBC # BLD AUTO: 5.41 MILLION/UL (ref 3.88–5.62)
SODIUM SERPL-SCNC: 139 MMOL/L (ref 136–145)
SP GR UR STRIP.AUTO: <=1.005 (ref 1–1.03)
TROPONIN I SERPL-MCNC: <0.02 NG/ML
UROBILINOGEN UR QL STRIP.AUTO: 0.2 E.U./DL
WBC # BLD AUTO: 10.38 THOUSAND/UL (ref 4.31–10.16)

## 2018-12-30 PROCEDURE — 87631 RESP VIRUS 3-5 TARGETS: CPT | Performed by: EMERGENCY MEDICINE

## 2018-12-30 PROCEDURE — 85379 FIBRIN DEGRADATION QUANT: CPT | Performed by: EMERGENCY MEDICINE

## 2018-12-30 PROCEDURE — 87040 BLOOD CULTURE FOR BACTERIA: CPT | Performed by: EMERGENCY MEDICINE

## 2018-12-30 PROCEDURE — 84484 ASSAY OF TROPONIN QUANT: CPT | Performed by: PHYSICIAN ASSISTANT

## 2018-12-30 PROCEDURE — 94664 DEMO&/EVAL PT USE INHALER: CPT

## 2018-12-30 PROCEDURE — 99285 EMERGENCY DEPT VISIT HI MDM: CPT

## 2018-12-30 PROCEDURE — B32S1ZZ COMPUTERIZED TOMOGRAPHY (CT SCAN) OF RIGHT PULMONARY ARTERY USING LOW OSMOLAR CONTRAST: ICD-10-PCS | Performed by: RADIOLOGY

## 2018-12-30 PROCEDURE — 96361 HYDRATE IV INFUSION ADD-ON: CPT

## 2018-12-30 PROCEDURE — 81003 URINALYSIS AUTO W/O SCOPE: CPT | Performed by: EMERGENCY MEDICINE

## 2018-12-30 PROCEDURE — 96374 THER/PROPH/DIAG INJ IV PUSH: CPT

## 2018-12-30 PROCEDURE — 96375 TX/PRO/DX INJ NEW DRUG ADDON: CPT

## 2018-12-30 PROCEDURE — 84145 PROCALCITONIN (PCT): CPT | Performed by: PHYSICIAN ASSISTANT

## 2018-12-30 PROCEDURE — 71275 CT ANGIOGRAPHY CHEST: CPT

## 2018-12-30 PROCEDURE — 94760 N-INVAS EAR/PLS OXIMETRY 1: CPT

## 2018-12-30 PROCEDURE — 83880 ASSAY OF NATRIURETIC PEPTIDE: CPT | Performed by: EMERGENCY MEDICINE

## 2018-12-30 PROCEDURE — 94640 AIRWAY INHALATION TREATMENT: CPT

## 2018-12-30 PROCEDURE — 99223 1ST HOSP IP/OBS HIGH 75: CPT | Performed by: PHYSICIAN ASSISTANT

## 2018-12-30 PROCEDURE — 83735 ASSAY OF MAGNESIUM: CPT | Performed by: EMERGENCY MEDICINE

## 2018-12-30 PROCEDURE — 80053 COMPREHEN METABOLIC PANEL: CPT | Performed by: EMERGENCY MEDICINE

## 2018-12-30 PROCEDURE — 85025 COMPLETE CBC W/AUTO DIFF WBC: CPT | Performed by: EMERGENCY MEDICINE

## 2018-12-30 PROCEDURE — 36415 COLL VENOUS BLD VENIPUNCTURE: CPT | Performed by: EMERGENCY MEDICINE

## 2018-12-30 PROCEDURE — B32T1ZZ COMPUTERIZED TOMOGRAPHY (CT SCAN) OF LEFT PULMONARY ARTERY USING LOW OSMOLAR CONTRAST: ICD-10-PCS | Performed by: RADIOLOGY

## 2018-12-30 PROCEDURE — 71045 X-RAY EXAM CHEST 1 VIEW: CPT

## 2018-12-30 PROCEDURE — 93005 ELECTROCARDIOGRAM TRACING: CPT

## 2018-12-30 PROCEDURE — 84484 ASSAY OF TROPONIN QUANT: CPT | Performed by: EMERGENCY MEDICINE

## 2018-12-30 PROCEDURE — 94660 CPAP INITIATION&MGMT: CPT

## 2018-12-30 RX ORDER — LEVALBUTEROL 1.25 MG/.5ML
1.25 SOLUTION, CONCENTRATE RESPIRATORY (INHALATION)
Status: DISCONTINUED | OUTPATIENT
Start: 2018-12-30 | End: 2019-01-01 | Stop reason: HOSPADM

## 2018-12-30 RX ORDER — LANOLIN ALCOHOL/MO/W.PET/CERES
3 CREAM (GRAM) TOPICAL
Status: DISCONTINUED | OUTPATIENT
Start: 2018-12-30 | End: 2019-01-01 | Stop reason: HOSPADM

## 2018-12-30 RX ORDER — ALBUTEROL SULFATE 2.5 MG/3ML
2.5 SOLUTION RESPIRATORY (INHALATION) EVERY 6 HOURS PRN
Status: DISCONTINUED | OUTPATIENT
Start: 2018-12-30 | End: 2019-01-01 | Stop reason: HOSPADM

## 2018-12-30 RX ORDER — SACCHAROMYCES BOULARDII 250 MG
250 CAPSULE ORAL 2 TIMES DAILY
Status: DISCONTINUED | OUTPATIENT
Start: 2018-12-30 | End: 2019-01-01 | Stop reason: HOSPADM

## 2018-12-30 RX ORDER — ATENOLOL 25 MG/1
25 TABLET ORAL
Status: DISCONTINUED | OUTPATIENT
Start: 2018-12-30 | End: 2019-01-01 | Stop reason: HOSPADM

## 2018-12-30 RX ORDER — CHLORAL HYDRATE 500 MG
1000 CAPSULE ORAL DAILY
Status: ON HOLD | COMMUNITY
End: 2018-12-30

## 2018-12-30 RX ORDER — KRILL/OM-3/DHA/EPA/PHOSPHO/AST 500MG-86MG
500 CAPSULE ORAL DAILY
Status: DISCONTINUED | OUTPATIENT
Start: 2018-12-30 | End: 2018-12-30

## 2018-12-30 RX ORDER — LANOLIN ALCOHOL/MO/W.PET/CERES
1000 CREAM (GRAM) TOPICAL DAILY
Status: DISCONTINUED | OUTPATIENT
Start: 2018-12-30 | End: 2019-01-01 | Stop reason: HOSPADM

## 2018-12-30 RX ORDER — ACETAMINOPHEN 325 MG/1
650 TABLET ORAL EVERY 8 HOURS PRN
Status: DISCONTINUED | OUTPATIENT
Start: 2018-12-30 | End: 2018-12-30

## 2018-12-30 RX ORDER — ASPIRIN 81 MG/1
81 TABLET, CHEWABLE ORAL DAILY
Status: DISCONTINUED | OUTPATIENT
Start: 2018-12-30 | End: 2019-01-01 | Stop reason: HOSPADM

## 2018-12-30 RX ORDER — ACETAMINOPHEN 325 MG/1
650 TABLET ORAL EVERY 6 HOURS PRN
Status: DISCONTINUED | OUTPATIENT
Start: 2018-12-30 | End: 2019-01-01 | Stop reason: HOSPADM

## 2018-12-30 RX ORDER — MELATONIN
2000 DAILY
Status: DISCONTINUED | OUTPATIENT
Start: 2018-12-30 | End: 2019-01-01 | Stop reason: HOSPADM

## 2018-12-30 RX ORDER — METHYLPREDNISOLONE SODIUM SUCCINATE 40 MG/ML
40 INJECTION, POWDER, LYOPHILIZED, FOR SOLUTION INTRAMUSCULAR; INTRAVENOUS DAILY
Status: DISCONTINUED | OUTPATIENT
Start: 2018-12-30 | End: 2018-12-31

## 2018-12-30 RX ORDER — QUETIAPINE FUMARATE 25 MG/1
50 TABLET, FILM COATED ORAL
Status: DISCONTINUED | OUTPATIENT
Start: 2018-12-30 | End: 2019-01-01 | Stop reason: HOSPADM

## 2018-12-30 RX ORDER — IPRATROPIUM BROMIDE AND ALBUTEROL SULFATE 2.5; .5 MG/3ML; MG/3ML
3 SOLUTION RESPIRATORY (INHALATION)
Status: DISCONTINUED | OUTPATIENT
Start: 2018-12-30 | End: 2018-12-30

## 2018-12-30 RX ORDER — SODIUM CHLORIDE 9 MG/ML
75 INJECTION, SOLUTION INTRAVENOUS ONCE
Status: COMPLETED | OUTPATIENT
Start: 2018-12-30 | End: 2018-12-30

## 2018-12-30 RX ORDER — GUAIFENESIN 600 MG
600 TABLET, EXTENDED RELEASE 12 HR ORAL EVERY 12 HOURS SCHEDULED
Status: DISCONTINUED | OUTPATIENT
Start: 2018-12-30 | End: 2019-01-01 | Stop reason: HOSPADM

## 2018-12-30 RX ORDER — CHLORAL HYDRATE 500 MG
1000 CAPSULE ORAL DAILY
Status: DISCONTINUED | OUTPATIENT
Start: 2018-12-30 | End: 2019-01-01 | Stop reason: HOSPADM

## 2018-12-30 RX ORDER — DONEPEZIL HYDROCHLORIDE 5 MG/1
10 TABLET, FILM COATED ORAL
Status: DISCONTINUED | OUTPATIENT
Start: 2018-12-30 | End: 2019-01-01 | Stop reason: HOSPADM

## 2018-12-30 RX ORDER — SODIUM CHLORIDE FOR INHALATION 0.9 %
3 VIAL, NEBULIZER (ML) INHALATION
Status: DISCONTINUED | OUTPATIENT
Start: 2018-12-30 | End: 2019-01-01 | Stop reason: HOSPADM

## 2018-12-30 RX ORDER — CEFTRIAXONE 1 G/50ML
1000 INJECTION, SOLUTION INTRAVENOUS ONCE
Status: COMPLETED | OUTPATIENT
Start: 2018-12-30 | End: 2018-12-30

## 2018-12-30 RX ORDER — METHYLPREDNISOLONE SODIUM SUCCINATE 125 MG/2ML
80 INJECTION, POWDER, LYOPHILIZED, FOR SOLUTION INTRAMUSCULAR; INTRAVENOUS ONCE
Status: COMPLETED | OUTPATIENT
Start: 2018-12-30 | End: 2018-12-30

## 2018-12-30 RX ORDER — MONTELUKAST SODIUM 10 MG/1
10 TABLET ORAL DAILY
Status: DISCONTINUED | OUTPATIENT
Start: 2018-12-31 | End: 2019-01-01 | Stop reason: HOSPADM

## 2018-12-30 RX ORDER — ATORVASTATIN CALCIUM 10 MG/1
20 TABLET, FILM COATED ORAL
Status: DISCONTINUED | OUTPATIENT
Start: 2018-12-30 | End: 2019-01-01 | Stop reason: HOSPADM

## 2018-12-30 RX ORDER — TAMSULOSIN HYDROCHLORIDE 0.4 MG/1
0.4 CAPSULE ORAL DAILY
Status: DISCONTINUED | OUTPATIENT
Start: 2018-12-30 | End: 2019-01-01 | Stop reason: HOSPADM

## 2018-12-30 RX ORDER — POTASSIUM CHLORIDE 750 MG/1
10 TABLET, EXTENDED RELEASE ORAL ONCE
Status: COMPLETED | OUTPATIENT
Start: 2018-12-30 | End: 2018-12-30

## 2018-12-30 RX ORDER — ESCITALOPRAM OXALATE 10 MG/1
20 TABLET ORAL DAILY
Status: DISCONTINUED | OUTPATIENT
Start: 2018-12-30 | End: 2019-01-01 | Stop reason: HOSPADM

## 2018-12-30 RX ORDER — LORAZEPAM 2 MG/ML
0.5 INJECTION INTRAMUSCULAR ONCE
Status: COMPLETED | OUTPATIENT
Start: 2018-12-30 | End: 2018-12-30

## 2018-12-30 RX ADMIN — LORAZEPAM 0.5 MG: 2 INJECTION, SOLUTION INTRAMUSCULAR; INTRAVENOUS at 11:02

## 2018-12-30 RX ADMIN — ASPIRIN 81 MG 81 MG: 81 TABLET ORAL at 16:38

## 2018-12-30 RX ADMIN — METHYLPREDNISOLONE SODIUM SUCCINATE 40 MG: 40 INJECTION, POWDER, FOR SOLUTION INTRAMUSCULAR; INTRAVENOUS at 18:08

## 2018-12-30 RX ADMIN — VITAMIN D, TAB 1000IU (100/BT) 2000 UNITS: 25 TAB at 16:38

## 2018-12-30 RX ADMIN — ISODIUM CHLORIDE 3 ML: 0.03 SOLUTION RESPIRATORY (INHALATION) at 23:32

## 2018-12-30 RX ADMIN — METHYLPREDNISOLONE SODIUM SUCCINATE 80 MG: 125 INJECTION, POWDER, FOR SOLUTION INTRAMUSCULAR; INTRAVENOUS at 12:54

## 2018-12-30 RX ADMIN — ESCITALOPRAM OXALATE 20 MG: 10 TABLET ORAL at 18:07

## 2018-12-30 RX ADMIN — SODIUM CHLORIDE 75 ML/HR: 0.9 INJECTION, SOLUTION INTRAVENOUS at 18:09

## 2018-12-30 RX ADMIN — Medication 250 MG: at 18:08

## 2018-12-30 RX ADMIN — IPRATROPIUM BROMIDE AND ALBUTEROL SULFATE 3 ML: 2.5; .5 SOLUTION RESPIRATORY (INHALATION) at 09:32

## 2018-12-30 RX ADMIN — IOHEXOL 85 ML: 350 INJECTION, SOLUTION INTRAVENOUS at 11:38

## 2018-12-30 RX ADMIN — CEFTRIAXONE 1000 MG: 1 INJECTION, SOLUTION INTRAVENOUS at 12:56

## 2018-12-30 RX ADMIN — Medication 1000 MG: at 18:08

## 2018-12-30 RX ADMIN — ATENOLOL 25 MG: 25 TABLET ORAL at 21:46

## 2018-12-30 RX ADMIN — MELATONIN TAB 3 MG 3 MG: 3 TAB at 21:45

## 2018-12-30 RX ADMIN — GUAIFENESIN 600 MG: 600 TABLET, EXTENDED RELEASE ORAL at 21:45

## 2018-12-30 RX ADMIN — ATORVASTATIN CALCIUM 20 MG: 10 TABLET, FILM COATED ORAL at 22:16

## 2018-12-30 RX ADMIN — LEVALBUTEROL 1.25 MG: 1.25 SOLUTION, CONCENTRATE RESPIRATORY (INHALATION) at 23:33

## 2018-12-30 RX ADMIN — POTASSIUM CHLORIDE 10 MEQ: 750 TABLET, EXTENDED RELEASE ORAL at 16:38

## 2018-12-30 RX ADMIN — DONEPEZIL HYDROCHLORIDE 10 MG: 5 TABLET, FILM COATED ORAL at 21:45

## 2018-12-30 RX ADMIN — SODIUM CHLORIDE 500 ML: 0.9 INJECTION, SOLUTION INTRAVENOUS at 10:59

## 2018-12-30 RX ADMIN — TAMSULOSIN HYDROCHLORIDE 0.4 MG: 0.4 CAPSULE ORAL at 16:39

## 2018-12-30 NOTE — ASSESSMENT & PLAN NOTE
Stable currently  Wife notes Ativan given in ER seemed to worsen his confusion  Try to avoid benzodiazepines if possible

## 2018-12-30 NOTE — RESPIRATORY THERAPY NOTE
RT Protocol Note  Tony Montes De Oca 70 y o  male MRN: [id number]  Unit/Bed#: 426-01 Encounter: [id number]    Assessment    Principal Problem:    Chronic obstructive pulmonary disease with acute lower respiratory infection ([facility] )  Active Problems:    Acute tracheobronchitis    Hyperlipidemia    Abnormal CT scan, chest    Dementia    Coronary artery disease involving native coronary artery of native heart with angina pectoris ([facility])    Acute respiratory failure with hypoxia ([facility])      Home Pulmonary Medications:  Spiriva  Airduo Respiclick    Home Devices/Therapy:  (None)    Past Medical History:   Diagnosis Date    AAA (abdominal aortic aneurysm) ([facility])     Acid reflux     Acute exacerbation of chronic obstructive airways disease with asthma ([facility])     Acute serous otitis media of left ear     recurrence not specified     Anesthesia     "always has mental changes /lingers and lingers after anesthesia"    Anxiety     Aortic aneurysm without rupture ([facility] )     Arm bruise     right inner forearm "recent IV"    Arthritis     spine and poss left hip    Asthma     bronchietasis    At risk for falls     BPH (benign prostatic hyperplasia)     pt and wife can't confirm 11/10    BPH without urinary obstruction     Cancer ([facility] )     skin CA on nose    CAP (community acquired pneumonia)     Cataracts, bilateral     Change in bowel function     CHF (congestive heart failure) ([facility])     Clubbing of fingers     Colon polyps     Common cold     Contusion of elbow, left     initial encounter     COPD [facility]chronic obstructive pulmonary disease) [facility]     Coronary artery disease     Cough     Dementia     Depression     Diverticulosis     Dizziness     upon "standing quickly on occas"    Exercise counseling     Fatigue     Full dentures     "doesn't wear them"    Glaucoma screening     High cholesterol     History of abdominal aortic aneurysm (AAA) repair 08/2017    History of bacteremia     History of chronic obstructive lung disease     History of epistaxis     History of influenza vaccination     History of kidney stones     History of pneumonia     "many times" "at least 5 times" almost always goes to sepsis"    History of sepsis 10/2017    History of sinusitis     History of skin cancer     History of sleep apnea     History of urinary tract infection     [place] (hard of hearing)     Hydronephrosis with obstructing calculus     Influenza vaccine needed     Insomnia     Jock itch     left/saw doctor 11/8 and started on antifungal cream    Kidney stones     Left hip pain     Need for pneumococcal vaccination     Need for prophylactic vaccination and inoculation against influenza     Other emphysema ([address] )     Pancreatitis, chronic ([address] )     pt and wife can't confirm 11/10/17    Pneumonia 10/26/2017    admitted [facility]    Pulmonary emphysema ([address] )     S/P CABG x 3     approx 14 yrs ago    Screening for genitourinary condition     Screening for neurological condition     Sepsis ([facility] )     due to unspecified organism     Short-term memory loss     Sleep apnea     does not use CPAP      Special screening examination for neoplasm of prostate     TIA involving carotid artery     "before carotid surgery"    Ulcer     stomach, "years ago"    Unsteady gait     Use of cane as ambulatory aid     sometimes    Vitamin D deficiency     Wears glasses      Social History     Social History    Marital status: /Civil Union     Spouse name: N/A    Number of children: N/A    Years of education: N/A     Occupational History          Retired     Social History Main Topics    Smoking status: Former Smoker     Packs/day: 1 50     Years: 60 00     Quit date: 2014    Smokeless tobacco: Never Used      Comment: quit 3 yrs ago, used to be a 1-1 5 ppd smoker    Alcohol use No      Comment: quit 25 yrs ago   [facility] use: No      Comment: No illicit drug use     Sexual activity: Not Currently Other Topics Concern    None     Social History Narrative    Retired     No living Will     No advance directives     Daily caffeine consumption - 4-5 servings a day        Subjective         Objective    Physical Exam:   Assessment Type: Assess only  General Appearance: Alert, Awake  Respiratory Pattern: Normal  Chest Assessment: Chest expansion symmetrical  Bilateral Breath Sounds: Diminished, Clear  Cough: Strong, Moist, Productive  O2 Device: nasal cannula 3 l/m    Vitals:  Blood pressure 114/70, pulse 81, temperature (!) 75 8 °F (24 3 °C), temperature source Temporal, resp  rate 18, height 5' 8" (1 727 m), weight 75 8 kg (167 lb 1 7 oz), SpO2 92 %  Imaging and other studies: I have personally reviewed pertinent reports  O2 Device: nasal cannula 3 l/m     Plan  Respiratory - Home Bronchodilator Patient  Xopenex 1 25 mg   With 0 9% Sodium Chloride TID  Albuterol 0 083%  2 5 mg  Q6 PRN for shortness of breath and wheeze

## 2018-12-30 NOTE — ASSESSMENT & PLAN NOTE
CTA showing the followin  Persistent right upper lobeairspace consolidation and spiculated nodular density superior segment left lower lobe  Additionally there are irregular densities in the left upper lobe  Given persistence of these findings  neoplasm is not excluded  Recommend further characterization with PET/CT if not already performed  3   7 mm right lower lobe pulmonary nodule new from prior exam      Consider inpatient pulmonology consultation when available, however, patient is established with [facility] Pulmonology, Dr Ana Corey

## 2018-12-30 NOTE — ASSESSMENT & PLAN NOTE
Admit patient to med surg  Continue IV steroids at 40mg IV BID  Continue respiratory protocol with breathing treatments as needed  Continuous pulse oximetry overnight given borderline low oxygen saturations pre-treatment,    Give supplemental oxygen and titrate as needed  Monitor on telemetry while ruling out ACS, however, pulmonary etiology more likely

## 2018-12-30 NOTE — H&P
H&P- Warren Forman 1940, 70 y o  male MRN: [id number]    Unit/Bed#: Vanessa Gonzalez Encounter: [id number]    Primary Care Provider: aLke Bennett DO   Date and time admitted to hospital: 2018  8:52 AM        * Chronic obstructive pulmonary disease with acute lower respiratory infection ([address] )   Assessment & Plan    Admit patient to med surg  Continue IV steroids at 40mg IV BID  Continue respiratory protocol with breathing treatments as needed  Continuous pulse oximetry overnight given borderline low oxygen saturations pre-treatment,    Give supplemental oxygen and titrate as needed  Monitor on telemetry while ruling out ACS, however, pulmonary etiology more likely  Acute respiratory failure with hypoxia [facility])   Assessment & Plan    Patient with dyspnea, tachypnea and borderline oxygen saturation of 90% on room air upon arrival in the ED  He showed improvement in vapotherm and supplemental oxygen  Continue supplemental oxygen and continuous pulse oximetry overnight  Coronary artery disease involving native coronary artery of native heart with angina pectoris [facility])   Assessment & Plan    Continue beta blocker, aspirin, and statin therapy  Dementia   Assessment & Plan    Stable currently  Wife notes Ativan given in ER seemed to worsen his confusion  Try to avoid benzodiazepines if possible  Abnormal CT scan, chest   Assessment & Plan    CTA showing the followin  Persistent right upper lobeairspace consolidation and spiculated nodular density superior segment left lower lobe  Additionally there are irregular densities in the left upper lobe  Given persistence of these findings  neoplasm is not excluded  Recommend further characterization with PET/CT if not already performed  3   7 mm right lower lobe pulmonary nodule new from prior exam      Consider inpatient pulmonology consultation when available, however, patient is established with [facility], Dr Netta Francis  Hyperlipidemia   Assessment & Plan    Continue statin  Acute tracheobronchitis   Assessment & Plan    Likely viral    Patient was given a 1 time dose of Rocephin in the ER  Will hold off on antibiotics for now, pending procalcitonin level  Influenza rapid screen was negative, but PCR still pending  Influenza is definitely a possibility given sudden onset of symptoms  Provide supportive care with oxygen supplementation, respiratory protocol, mucinex, shira IV fluid hydration  VTE Prophylaxis: Enoxaparin (Lovenox)  / sequential compression device   Code Status: Full code      Anticipated Length of Stay:  Patient will be admitted on an Inpatient basis with an anticipated length of stay of  Greater than 2 midnights  Justification for Hospital Stay: need for IV steroids, close monitoring    Total Time for Visit, including Counseling / Coordination of Care: 1 hour  Greater than 50% of this total time spent on direct patient counseling and coordination of care  Chief Complaint:   Shortness of breath    History of Present Illness:    Yuliya Cooney is a 70 y o  male who presents with a one-day history of shortness of breath as well as cough  His wife noticed that he had rigors last night and seemed ill  He was not short of breath at that time however woke up abruptly at 3:00 a m  Complaining of shortness of breath  The patient also notes sinus and nasal congestion  His appetite has been slightly decreased  He feels generally weak and tired  He also admits to some lightheadedness and dizziness  The patient's wife is concerned that he may have pneumonia, as this is how his presentations have been in the past when he has this diagnosis  In the ER the patient was given nebulizer treatment as well as IV steroids and placed on Vapotherm  Overall he feels his symptoms are slightly improved  He denies history of sick contacts    Of note most of history is obtained from patient's wife as the reports of some mild dementia  Review of Systems:    Review of Systems   All other systems reviewed and are negative        Past Medical and Surgical History:     Past Medical History:   Diagnosis Date    AAA [facility]ab[facility] aneurysm) [facility])     Acid reflux     Acute exacerbation of chronic obstructive airways disease with asthma ([facility])     Acute serous otitis media of left ear     recurrence not specified     Anesthesia     "always has mental changes /lingers and lingers after anesthesia"    Anxiety     Aortic aneurysm without rupture ([facility])     Arm bruise     right inner forearm "recent IV"    Arthritis     spine and poss left hip    Asthma     bronchietasis    At risk for falls     BPH (benign prostatic hyperplasia)     pt and wife can't confirm 11/10    BPH without urinary obstruction     [facility]     skin CA on nose    CAP (community acquired pneumonia)     Cataracts, bilateral     Change in bowel function     CHF (congestive heart failure) ([facility])     Clubbing of fingers     Colon polyps     Common cold     Contusion of elbow, left     initial encounter     COPD (chronic obstructive pulmonary disease) [facility]     Coronary artery disease     Cough     Dementia     Depression     Diverticulosis     Dizziness     upon "standing quickly on occas"    Exercise counseling     Fatigue     Full dentures     "doesn't wear them"    Glaucoma screening     High cholesterol     History of abdominal aortic aneurysm (AAA) repair 08/2017    History of bacteremia     History of chronic obstructive lung disease     History of epistaxis     History of influenza vaccination     History of kidney stones     History of pneumonia     "many times" "at least 5 times" almost always goes to sepsis"    History of sepsis 10/2017    History of sinusitis     History of skin cancer     History of sleep apnea     History of urinary tract infection     [place] (hard of hearing)     Hydronephrosis with obstructing calculus     Influenza vaccine needed     Insomnia     Jock itch     left/saw doctor 11/8 and started on antifungal cream    Kidney stones     Left hip pain     Need for pneumococcal vaccination     Need for prophylactic vaccination and inoculation against influenza     Other emph[facility]sema [facility]     Pancreatitis, chronic ([facility] )     pt and wife can't confirm 11/10/17    Pneumonia 10/26/2017    admitted [facility]    Pulmonary emphysema ([facility] )     S/P CABG x 3     approx 14 yrs ago    Screening for genitourinary condition     Screening for neurological condition     Sepsis ([facility] )     due to unspecified organism     Short-term memory loss     Sleep apnea     does not use CPAP   Special screening examination for neoplasm of prostate     TIA involving carotid artery     "before carotid surgery"    Ulcer     stomach, "years ago"    Unsteady gait     Use of cane as ambulatory aid     sometimes    Vitamin D deficiency     Wears glasses        Past Surgical History:   Procedure Laterality Date    ABDOMINAL AORTIC ANEURYSM REPAIR  08/23/2017    ABDOMINAL AORTIC ANEURYSM REPAIR, ENDOVASCULAR      BACK SURGERY      spinal stenosis    CARDIAC SURGERY      CABG x3    CAROTID ENDARTARECTOMY Left 11/1996    CATARACT EXTRACTION Bilateral     CORONARY ARTERY BYPASS GRAFT  01/2003    x3    CYSTOSCOPY      with insertion of ureteral stent     CYSTOSCOPY      with ureteroscopy with lithotripsy     EXCISIONAL HEMORRHOIDECTOMY      EYE SURGERY Bilateral     "for a wrinkle" after cataract surgery    HERNIA REPAIR      umbilical    LITHOTRIPSY      renal    MOUTH SURGERY      full mouth extraction     AZ COLONOSCOPY FLX DX W/COLLJ SPEC WHEN PFRMD N/A 5/16/2017    Procedure: COLONOSCOPY with polypectomies/ hot snare and tattoo;  Surgeon: Severo Fernando MD;  Location: [facility];   Service: Gastroenterology    AZ CYSTOURETHROSCOPY N/A 11/30/2017    Procedure: CYSTOSCOPY;  Surgeon: Doe Gordon MD;  [facility];  Service: Urology    GA ESOPHAGOGASTRODUODENOSCOPY TRANSORAL DIAGNOSTIC N/A 8/18/2016    Procedure: ESOPHAGOGASTRODUODENOSCOPY (EGD); Surgeon: Mary Cooley MD;  Location: [facility]; Service: Gastroenterology    GA REMOVE BLADDER STONE,<2 5 CM N/A 11/30/2017    Procedure: Sepulveda Hoots;  Surgeon: Jordan Hurley MD;  [facility]: [facility];  Service: Urology    TONSILLECTOMY     [address]      and removal       Meds/Allergies:    Prior to Admission medications    Medication Sig Start Date End Date Taking? Authorizing Provider   aspirin 81 MG tablet Take 81 mg by mouth daily Took within 24 hours    Yes Historical Provider, MD   atenolol (TENORMIN) 25 mg tablet Take 1 tablet (25 mg total) by mouth daily at bedtime 11/28/18  Yes Mark Storm DO   atorvastatin (LIPITOR) 20 mg tablet Take 1 tablet (20 mg total) by mouth daily at bedtime 8/1/18  Yes Mark Storm DO   Bacillus Coagulans-Inulin (PROBIOTIC FORMULA) 1-250 BILLION-MG CAPS Take 2 capsules by mouth   Yes Historical Provider, MD   Cholecalciferol (VITAMIN D-3 PO) Take 2,000 Units by mouth daily  Yes Historical Provider, MD   cyanocobalamin (VITAMIN B-12) 1,000 mcg tablet Take by mouth daily   Yes Historical Provider, MD   donepezil (ARICEPT) 10 mg tablet Take 1 tablet (10 mg total) by mouth daily at bedtime for 30 days 1/29/18 12/30/18 Yes Tru Mcarthur DO   escitalopram (LEXAPRO) 20 mg tablet Take 1 tablet (20 mg total) by mouth daily 1/29/18  Yes Tru Mcarthur DO   Fluticasone-Salmeterol (AIRDUO RESPICLICK 98/20) 69-21 MCG/ACT AEPB Inhale 1 puff 2 (two) times a day AM & PM   Yes Historical Provider, MD   KRILL OIL PO Take 500 mg by mouth daily     Yes Historical Provider, MD   Melatonin 5 MG TABS Take 1 tablet by mouth daily at bedtime   Yes Historical Provider, MD   montelukast (SINGULAIR) 10 mg tablet Take 1 tablet (10 mg total) by mouth daily 8/1/18  Yes Tru Mcarthur DO Multiple Vitamins-Minerals (CENTRUM SILVER ADULT 50+) TABS Take 1 tablet by mouth daily   Yes Historical Provider, MD   Omega-3 Fatty Acids (FISH OIL) 1,000 mg Take 1,000 mg by mouth daily   Yes Historical Provider, MD   Potassium Citrate ER 15 MEQ (1620 MG) TBCR TAKE 1 TABLET TWICE A DAY 6/26/18  Yes Jane Liu MD   QUEtiapine (SEROquel) 50 mg tablet Take 1 tablet (50 mg total) by mouth daily at bedtime 12/20/18  Yes Eve Ruff, DO   tiotropium (SPIRIVA HANDIHALER) 18 mcg inhalation capsule Place 18 mcg into inhaler and inhale daily  Yes Historical Provider, MD   acetaminophen (TYLENOL) 500 mg tablet  7/10/18   Historical Provider, MD   tamsulosin (FLOMAX) 0 4 mg Take 1 capsule (0 4 mg total) by mouth daily 7/20/18   Jane Liu MD     I have reviewed home medications using allscripts  Allergies: Allergies   Allergen Reactions    Augmentin [Amoxicillin-Pot Clavulanate] Diarrhea    Ciprofloxacin Hives    Morphine And Related Other (See Comments)     Change in mental status       Social History:     Marital Status: /Civil Union     Substance Use History:   History   Alcohol Use No     Comment: quit 25 yrs ago     History   Smoking Status    Former Smoker    Packs/day: 1 50    Years: 60 00    Quit date: 2014   Smokeless Tobacco    Never Used     Comment: quit 3 yrs ago, used to be a 1-1 5 ppd smoker     History   Drug Use No     Comment: No illicit drug use        Family History:    non-contributory    Physical Exam:     Vitals:   Blood Pressure: 96/58 (12/30/18 1330)  Pulse: 87 (12/30/18 1330)  Temperature: 99 2 °F (37 3 °C) (12/30/18 1105)  Temp Source: Temporal (12/30/18 1105)  Respirations: 18 (12/30/18 1330)  Height: 5' 8" (172 7 cm) (12/30/18 0853)  Weight - Scale: 78 kg (172 lb) (12/30/18 0853)  SpO2: 92 % (12/30/18 1330)    Physical Exam   Constitutional: He is oriented to person, place, and time  He appears well-developed and well-nourished  No distress     On vapotherm oxygen  HENT:   Head: Normocephalic  Mouth/Throat: Oropharynx is clear and moist    Neck: No JVD present  Cardiovascular: Normal rate, regular rhythm and normal heart sounds  No murmur heard  Pulmonary/Chest: Effort normal  No respiratory distress  He has no wheezes  He has no rales  Slight decreased air movement throughout  Abdominal: Soft  Bowel sounds are normal  He exhibits no distension  There is no tenderness  Musculoskeletal: He exhibits no edema  Neurological: He is alert and oriented to person, place, and time  No cranial nerve deficit  Skin: Skin is warm and dry  He is not diaphoretic  No erythema  Psychiatric: He has a normal mood and affect  His behavior is normal    Nursing note and vitals reviewed  Additional Data:   Lab Results: I have personally reviewed pertinent reports  Results from last 7 days  Lab Units 12/30/18  0925   WBC Thousand/uL 10 38*   HEMOGLOBIN g/dL 17 5*   HEMATOCRIT % 52 8*   PLATELETS Thousands/uL 119*   NEUTROS PCT % 80*   LYMPHS PCT % 10*   MONOS PCT % 8   EOS PCT % 0       Results from last 7 days  Lab Units 12/30/18  0925   SODIUM mmol/L 139   POTASSIUM mmol/L 4 4   CHLORIDE mmol/L 102   CO2 mmol/L 26   BUN mg/dL 13   CREATININE mg/dL 1 20   ANION GAP mmol/L 11   CALCIUM mg/dL 9 0   ALBUMIN g/dL 3 7   TOTAL BILIRUBIN mg/dL 2 10*   ALK PHOS U/L 117*   ALT U/L 29   AST U/L 19   GLUCOSE RANDOM mg/dL 109                       Imaging: I have personally reviewed pertinent reports  CTA ED chest PE study   Final Result by Derick Fernandez MD (12/30 1210)   1  No acute pulmonary embolus  2   Persistent right upper lobeairspace consolidation and spiculated nodular density superior segment left lower lobe  Additionally there are irregular densities in the left upper lobe  Given persistence of these findings  neoplasm is not excluded  Recommend further characterization with PET/CT if not already performed     3   7 mm right lower lobe pulmonary nodule new from prior exam  Based on current Fleischner Society 2017 Guidelines on incidental pulmonary nodule, followup non-contrast CT is recommended at 6-12 months from the initial examination and, if stable at that    time, an additional followup is recommended for 18-24 months from the initial examination  4   Centrilobular emphysematous changes  5   Abdominal aortic aneurysm is redemonstrated only partially visualized on this exam          The study was marked in EPIC for significant notification  Workstation performed: [id number]         XR chest 1 view portable   ED Interpretation by Benny Mims MD (12/30 5426)   Read by me; Radiologist to provide formal interpretation  No acute process          EKG, Pathology, and Other Studies Reviewed on Admission:   · EKG: rate of 90, sinus rhythm, QTc 460  Allscripts / Epic Records Reviewed: Yes     ** Please Note: This note has been constructed using a voice recognition system   **

## 2018-12-30 NOTE — ASSESSMENT & PLAN NOTE
Likely viral    Patient was given a 1 time dose of Rocephin in the ER  Will hold off on antibiotics for now, pending procalcitonin level  Influenza rapid screen was negative, but PCR still pending  Influenza is definitely a possibility given sudden onset of symptoms  Provide supportive care with oxygen supplementation, respiratory protocol, mucinex, shira IV fluid hydration

## 2018-12-30 NOTE — ED NOTES
Gurdeep Lopez PA-C notified of patient's admission, currently at bedside to assess patient         René Zhu RN  12/30/18 4661

## 2018-12-30 NOTE — ED PROVIDER NOTES
History  Chief Complaint   Patient presents with    Shortness of Breath     sob and chills started this AM      77-year-old male presents with shortness of breath since last night accompanied by chills and rigors  No fever  He has had cough productive of clear phlegm; patient has a history of COPD but is not on home oxygen  He denies any chest pain or pleuritic pain  He has had no lower extremity edema  He has had some upper respiratory congestion but no sore throat  No ear pain  He denies any abdominal pain he had some nausea earlier today which resolved and a couple of loose stools  He denies any dysuria increased urinary frequency no back pain no headache but he has been lightheaded no numbness paresthesias or weakness  Prior to Admission Medications   Prescriptions Last Dose Informant Patient Reported? Taking? Bacillus Coagulans-Inulin (PROBIOTIC FORMULA) 1-250 BILLION-MG CAPS 12/29/2018 at Unknown time Self Yes Yes   Sig: Take 2 capsules by mouth   Cholecalciferol (VITAMIN D-3 PO) 12/30/2018 at Unknown time Self Yes Yes   Sig: Take 2,000 Units by mouth daily  Fluticasone-Salmeterol (AIRDUO RESPICLICK 60/73) 08-08 MCG/ACT AEPB 12/30/2018 at Unknown time Spouse/Significant Other Yes Yes   Sig: Inhale 1 puff 2 (two) times a day AM & PM   KRILL OIL PO  Self Yes Yes   Sig: Take 500 mg by mouth daily     Melatonin 5 MG TABS 12/29/2018 at Unknown time  Yes Yes   Sig: Take 1 tablet by mouth daily at bedtime   Multiple Vitamins-Minerals (CENTRUM SILVER ADULT 50+) TABS 12/29/2018 at Unknown time Self Yes Yes   Sig: Take 1 tablet by mouth daily   Omega-3 Fatty Acids (FISH OIL) 1,000 mg   Yes Yes   Sig: Take 1,000 mg by mouth daily   Potassium Citrate ER 15 MEQ (1620 MG) TBCR 12/30/2018 at Unknown time Self No Yes   Sig: TAKE 1 TABLET TWICE A DAY   QUEtiapine (SEROquel) 50 mg tablet 12/29/2018 at Unknown time  No Yes   Sig: Take 1 tablet (50 mg total) by mouth daily at bedtime   acetaminophen (TYLENOL) 500 mg tablet  Self Yes No   aspirin 81 MG tablet 12/29/2018 at Unknown time Self Yes Yes   Sig: Take 81 mg by mouth daily Took within 24 hours    atenolol (TENORMIN) 25 mg tablet 12/29/2018 at Unknown time  No Yes   Sig: Take 1 tablet (25 mg total) by mouth daily at bedtime   atorvastatin (LIPITOR) 20 mg tablet 12/29/2018 at Unknown time Self No Yes   Sig: Take 1 tablet (20 mg total) by mouth daily at bedtime   cyanocobalamin (VITAMIN B-12) 1,000 mcg tablet 12/30/2018 at Unknown time Self Yes Yes   Sig: Take by mouth daily   donepezil (ARICEPT) 10 mg tablet 12/30/2018 at Unknown time  No Yes   Sig: Take 1 tablet (10 mg total) by mouth daily at bedtime for 30 days   escitalopram (LEXAPRO) 20 mg tablet 12/30/2018 at Unknown time Self No Yes   Sig: Take 1 tablet (20 mg total) by mouth daily   montelukast (SINGULAIR) 10 mg tablet 12/30/2018 at Unknown time Self No Yes   Sig: Take 1 tablet (10 mg total) by mouth daily   tamsulosin (FLOMAX) 0 4 mg  Self No No   Sig: Take 1 capsule (0 4 mg total) by mouth daily   tiotropium (SPIRIVA HANDIHALER) 18 mcg inhalation capsule 12/30/2018 at Unknown time Self Yes Yes   Sig: Place 18 mcg into inhaler and inhale daily        Facility-Administered Medications: None       Past Medical History:   Diagnosis Date    AAA [facility]ab[facility] aortic aneurysm) ([facility])     Acid reflux     Acute exacerbation of chronic obstructive airways disease with asthma ([facility])     Acute serous otitis media of left ear     recurrence not specified     Anesthesia     "always has mental changes /lingers and lingers after anesthesia"    Anxiety     Aortic aneurysm without rupture (HCC)     Arm bruise     right inner forearm "recent IV"    Arthritis     spine and poss left hip    Asthma     bronchietasis    At risk for falls     BPH (benign prostatic hyperplasia)     pt and wife can't confirm 11/10    BPH without urinary obstruction     Cancer ([facility] [address] )     skin CA on nose    CAP (community acquired pneumonia)     Cataracts, bilateral     Change in bowel function     CHF (congestive heart failure) (HCC)     Clubbing of fingers     Colon polyps     Common cold     Contusion of elbow, left     initial encounter     COPD (chronic obstructive pulmonary disease) (HCC)     Coronary artery disease     Cough     Dementia     Depression     Diverticulosis     Dizziness     upon "standing quickly on occas"    Exercise counseling     Fatigue     Full dentures     "doesn't wear them"    Glaucoma screening     High cholesterol     History of abdominal aortic aneurysm (AAA) repair 08/2017    History of bacteremia     History of chronic obstructive lung disease     History of epistaxis     History of influenza vaccination     History of kidney stones     History of pneumonia     "many times" "at least 5 times" almost always goes to sepsis"    History of sepsis 10/2017    History of sinusitis     History of skin cancer     History of sleep apnea     History of urinary tract infection     [place] (hard of hearing)     Hydronephrosis with obstructing calculus     Influenza vaccine needed     Insomnia     Jock itch     left/saw doctor 11/8 and started on antifungal cream    Kidney stones     Left hip pain     Need for pneumococcal vaccination     Need for prophylactic vaccination and inoculation against influenza     Other emphysema ([address] )     Pancreatitis, chronic ([address] )     pt and wife can't confirm 11/10/17    Pneumonia 10/26/2017    admitted [facility]    Pulmonary emphysema ([address] )     S/P CABG x 3     approx 14 yrs ago    Screening for genitourinary condition     Screening for neurological condition     Sepsis ([address] )     due to unspecified organism     Short-term memory loss     Sleep apnea     does not use CPAP      Special screening examination for neoplasm of prostate     TIA involving carotid artery     "before carotid surgery"    Ulcer     stomach, "years ago"    Unsteady gait     Use of cane as ambulatory aid     sometimes    Vitamin D deficiency     Wears glasses        Past Surgical History:   Procedure Laterality Date    ABDOMINAL AORTIC ANEURYSM REPAIR  08/23/2017    ABDOMINAL AORTIC ANEURYSM REPAIR, ENDOVASCULAR      BACK SURGERY      spinal stenosis    CARDIAC SURGERY      CABG x3    CAROTID ENDARTARECTOMY Left 11/1996    CATARACT EXTRACTION Bilateral     CORONARY ARTERY BYPASS GRAFT  01/2003    x3    CYSTOSCOPY      with insertion of ureteral stent     CYSTOSCOPY      with ureteroscopy with lithotripsy     EXCISIONAL HEMORRHOIDECTOMY      EYE SURGERY Bilateral     "for a wrinkle" after cataract surgery    HERNIA REPAIR      umbilical    LITHOTRIPSY      renal    MOUTH SURGERY      full mouth extraction     [place] COLONOSCOPY FLX DX W/COLLJ SPEC WHEN PFRMD N/A 5/16/2017    Procedure: COLONOSCOPY with polypectomies/ hot snare and tattoo;  Surgeon: Dayana Deal MD;  Location: [facility]; Service: Gastroenterology    [place] CYSTOURETHROSCOPY N/A 11/30/2017    Procedure: Reinaldo Tillman;  Surgeon: Carlos A Morales MD;  [facility];  Service: Urology    [place] ESOPHAGOGASTRODUODENOSCOPY TRANSORAL DIAGNOSTIC N/A 8/18/2016    Procedure: ESOPHAGOGASTRODUODENOSCOPY (EGD); Surgeon: Dayana Deal MD;  [facility]: [facility]; Service: Gastroenterology    [place] REMOVE BLADDER STONE,<2 5 CM N/A 11/30/2017    Procedure: Louisa Rosado;  Surgeon: Carlos A Morales MD;  [facility];  Service: Urology    TONSILLECTOMY      URETERAL STENT PLACEMENT      and removal       Family History   Problem Relation Age of Onset    Diabetes type II Mother         mellitus    Kidney failure Father     Diabetes type II Maternal Grandmother         mellitus     Hypertension Paternal Grandmother         benign essential      I have reviewed and agree with the history as documented      Social History   Substance Use Topics    Smoking status: Former Smoker     Packs/day: 1 50 Years: 60 00     Quit date: 2014    Smokeless tobacco: Never Used      Comment: quit 3 yrs ago, used to be a 1-1 5 ppd smoker    Alcohol use No      Comment: quit 25 yrs ago        Review of Systems   Constitutional: Positive for activity change, appetite change and chills  Negative for diaphoresis and fever  HENT: Positive for congestion  Negative for postnasal drip, rhinorrhea, sinus pain, sinus pressure, sore throat, trouble swallowing and voice change  Eyes: Negative for discharge  Respiratory: Positive for cough and shortness of breath  Negative for wheezing  Cardiovascular: Negative for chest pain and leg swelling  Gastrointestinal: Positive for diarrhea and nausea  Negative for vomiting  Genitourinary: Negative for difficulty urinating and dysuria  Musculoskeletal: Negative for back pain  Skin: Negative for rash  Neurological: Positive for light-headedness  Negative for dizziness, weakness, numbness and headaches  Hematological: Does not bruise/bleed easily  Psychiatric/Behavioral: Negative for confusion  All other systems reviewed and are negative  Physical Exam  Physical Exam   Constitutional: He is oriented to person, place, and time  He appears well-developed  No distress  HENT:   Head: Normocephalic and atraumatic  Right Ear: External ear normal    Left Ear: External ear normal    Nose: Nose normal    Mouth/Throat: Oropharynx is clear and moist    TMS pale bilaterally   Eyes: Pupils are equal, round, and reactive to light  Conjunctivae and EOM are normal  Right eye exhibits no discharge  Left eye exhibits no discharge  No scleral icterus  Neck: Normal range of motion  Neck supple  Cardiovascular: Normal rate, regular rhythm, normal heart sounds and intact distal pulses  Pulmonary/Chest: He is in respiratory distress (tachypnea; speaks short sentences)  He has no wheezes  Diminished bibasilar   Abdominal: Soft   Bowel sounds are normal  He exhibits no distension and no mass  There is no tenderness  There is no rebound and no guarding  Back: no mildline or CVA tenderness   Musculoskeletal: Normal range of motion  He exhibits no edema, tenderness or deformity  Lymphadenopathy:     He has no cervical adenopathy  Neurological: He is alert and oriented to person, place, and time  No cranial nerve deficit or sensory deficit  He exhibits normal muscle tone  Coordination normal    Gait steady   Skin: Skin is warm and dry  Capillary refill takes less than 2 seconds  He is not diaphoretic  Psychiatric: He has a normal mood and affect  Nursing note and vitals reviewed        Vital Signs  ED Triage Vitals   Temperature Pulse Respirations Blood Pressure SpO2   12/30/18 0853 12/30/18 0857 12/30/18 0857 12/30/18 0857 12/30/18 0857   99 5 °F (37 5 °C) 89 19 116/66 90 %      Temp Source Heart Rate Source Patient Position - Orthostatic VS BP Location FiO2 (%)   12/30/18 0853 12/30/18 1000 12/30/18 1000 12/30/18 1000 --   Temporal Monitor Sitting Left arm       Pain Score       12/30/18 0853       No Pain           Vitals:    12/30/18 1145 12/30/18 1230 12/30/18 1330 12/30/18 1357   BP: 138/60 96/64 96/58 96/58   Pulse: 97 96 87 87   Patient Position - Orthostatic VS: Sitting Sitting  Sitting       Visual Acuity  Visual Acuity      Most Recent Value   L Pupil Size (mm)  2   R Pupil Size (mm)  2   L Pupil Shape  Round   R Pupil Shape  Round          ED Medications  Medications   LORazepam (ATIVAN) 2 mg/mL injection 0 5 mg (0 5 mg Intravenous Given 12/30/18 1102)   sodium chloride 0 9 % bolus 500 mL (0 mL Intravenous Stopped 12/30/18 1159)   iohexol (OMNIPAQUE) 350 MG/ML injection (MULTI-DOSE) 85 mL (85 mL Intravenous Given 12/30/18 1138)   cefTRIAXone (ROCEPHIN) IVPB (premix) 1,000 mg (0 mg Intravenous Stopped 12/30/18 1326)   methylPREDNISolone sodium succinate (Solu-MEDROL) injection 80 mg (80 mg Intravenous Given 12/30/18 1254)       Diagnostic Studies  Results Reviewed Procedure Component Value Units Date/Time    Procalcitonin [[id number]] Collected:  12/30/18 1320    Lab Status: In process Specimen:  Blood Updated:  12/30/18 1320    UA w Reflex to Microscopic w Reflex to Culture [[id number]] Collected:  12/30/18 1256    Lab Status:  Final result Specimen:  Urine from Urine, Clean Catch Updated:  12/30/18 1304     Color, UA Yellow     Clarity, UA Clear     Specific Gravity, UA <=1 005     pH, UA 8 0     Leukocytes, UA Negative     Nitrite, UA Negative     Protein, UA Negative mg/dl      Glucose, UA Negative mg/dl      Ketones, UA Negative mg/dl      Urobilinogen, UA 0 2 E U /dl      Bilirubin, UA Negative     Blood, UA Negative    [place] draw [[id number]] Collected:  12/30/18 0925    Lab Status:  Final result Specimen:  Blood Updated:  12/30/18 1101    Narrative: The following orders were created for panel order [place] draw  Procedure                               Abnormality         Status                     ---------                               -----------         ------                     Ariel Millin / Black tube on CZNE[[id number]]                       Final result               Lavender Top 7ml on GDVI[[id number]]                         Final result                 Please view results for these tests on the individual orders  D-Dimer [[id number]]  (Abnormal) Collected:  12/30/18 0925    Lab Status:  Final result Specimen:  Blood from Arm, Right Updated:  12/30/18 1028     D-Dimer, Quant 9,345 (H) ng/ml (FEU)     Rapid Influenza Screen with Reflex PCR [[id number]]  (Normal) Collected:  12/30/18 0925    Lab Status:  Final result Specimen:  Nasopharyngeal from Nasopharyngeal Swab Updated:  12/30/18 1000     Rapid Influenza A Ag Negative     Rapid Influenza B Ag Negative    INFLUENZA A/B AND RSV, PCR [[id number]] Collected:  12/30/18 0925    Lab Status:   In process Specimen:  Nasopharyngeal from Nasopharyngeal Swab Updated:  12/30/18 1000    Comprehensive metabolic panel [[id number]]  (Abnormal) Collected:  12/30/18 0925    Lab Status:  Final result Specimen:  Blood from Arm, Right Updated:  12/30/18 0956     Sodium 139 mmol/L      Potassium 4 4 mmol/L      Chloride 102 mmol/L      CO2 26 mmol/L      ANION GAP 11 mmol/L      BUN 13 mg/dL      Creatinine 1 20 mg/dL      Glucose 109 mg/dL      Calcium 9 0 mg/dL      AST 19 U/L      ALT 29 U/L      Alkaline Phosphatase 117 (H) U/L      Total Protein 7 5 g/dL      Albumin 3 7 g/dL      Total Bilirubin 2 10 (H) mg/dL      eGFR 58 ml/min/1 73sq m     Narrative:         National Kidney Disease Education Program recommendations are as follows:  GFR calculation is accurate only with a steady state creatinine  Chronic Kidney disease less than 60 ml/min/1 73 sq  meters  Kidney failure less than 15 ml/min/1 73 sq  meters      Magnesium [[id number]]  (Normal) Collected:  12/30/18 0925    Lab Status:  Final result Specimen:  Blood from Arm, Right Updated:  12/30/18 0956     Magnesium 1 8 mg/dL     B-type natriuretic peptide [[id number]]  (Normal) Collected:  12/30/18 0925    Lab Status:  Final result Specimen:  Blood from Arm, Right Updated:  12/30/18 0956     NT-proBNP 156 pg/mL     Troponin I [[id number]]  (Normal) Collected:  12/30/18 0925    Lab Status:  Final result Specimen:  Blood from Arm, Right Updated:  12/30/18 0952     Troponin I <0 02 ng/mL     CBC and differential [[id number]]  (Abnormal) Collected:  12/30/18 0925    Lab Status:  Final result Specimen:  Blood from Arm, Right Updated:  12/30/18 0945     WBC 10 38 (H) Thousand/uL      RBC 5 41 Million/uL      Hemoglobin 17 5 (H) g/dL      Hematocrit 52 8 (H) %      MCV 98 fL      MCH 32 3 pg      MCHC 33 1 g/dL      RDW 14 6 %      MPV 10 1 fL      Platelets 076 (L) Thousands/uL      nRBC 0 /100 WBCs      Neutrophils Relative 80 (H) %      Immat GRANS % 1 %      Lymphocytes Relative 10 (L) %      Monocytes Relative 8 %      Eosinophils Relative 0 %      Basophils Relative 1 % Neutrophils Absolute 8 34 (H) Thousands/µL      Immature Grans Absolute 0 07 Thousand/uL      Lymphocytes Absolute 1 07 Thousands/µL      Monocytes Absolute 0 82 Thousand/µL      Eosinophils Absolute 0 03 Thousand/µL      Basophils Absolute 0 05 Thousands/µL     Blood culture #2 [[id number]] Collected:  12/30/18 0925    Lab Status: In process Specimen:  Blood from Hand, Left Updated:  12/30/18 0931    Blood culture #1 [[id number]] Collected:  12/30/18 0925    Lab Status: In process Specimen:  Blood from Arm, Right Updated:  12/30/18 0931                 CTA ED chest PE study   Final Result by Rey Mcarthur MD (12/30 1210)   1  No acute pulmonary embolus  2   Persistent right upper lobeairspace consolidation and spiculated nodular density superior segment left lower lobe  Additionally there are irregular densities in the left upper lobe  Given persistence of these findings  neoplasm is not excluded  Recommend further characterization with PET/CT if not already performed  3   7 mm right lower lobe pulmonary nodule new from prior exam  Based on current Fleischner Society 2017 Guidelines on incidental pulmonary nodule, followup non-contrast CT is recommended at 6-12 months from the initial examination and, if stable at that    time, an additional followup is recommended for 18-24 months from the initial examination  4   Centrilobular emphysematous changes  5   Abdominal aortic aneurysm is redemonstrated only partially visualized on this exam          The study was marked in EPIC for significant notification  Workstation performed: [id number]         XR chest 1 view portable   ED Interpretation by Suzanne Patterson MD (12/30 8929)   Read by me; Radiologist to provide formal interpretation   No acute process                 Procedures  ECG 12 Lead Documentation  Date/Time: 12/30/2018 9:27 AM  Performed by: Candido Flores  Authorized by: Candido Flores     Indications / Diagnosis:  Sob  ECG reviewed by me, the ED Provider: yes    Patient location:  ED  Previous ECG:     Previous ECG:  Compared to current    Comparison ECG info:  5/13/18    Similarity:  No change  Rate:     ECG rate:  90    ECG rate assessment: normal    Rhythm:     Rhythm: sinus rhythm    QRS:     QRS axis:  Normal  Comments:      No acute ischemic changes           Phone Contacts  ED Phone Contact    ED Course  ED Course as of Dec 30 1446   Sun Dec 30, 2018   1255 Late entry patient was anxious after completion of aerogen tearful admin 0 5 lorazepam IV felt improved    [place], PAC here to determine placement  Will attempt wean of O2 to NC did well on NC to CT                                MDM  Number of Diagnoses or Management Options  Diagnosis management comments: Mdm: copd exac, pneumonia, chf ,acs    The patient presented with a condition in which there was a high probability of imminent or life-threatening deterioration, and critical care services (excluding separately billable procedures) totalled 30-74 minutes (reassessment of resp status; aerogen)  Disposition  Final diagnoses:   COPD (chronic obstructive pulmonary disease) ([facility] )   Hypoxia   Rigors   Pulmonary nodules/lesions, multiple     Time reflects when diagnosis was documented in both MDM as applicable and the Disposition within this note     Time User Action Codes Description Comment    12/30/2018 12:38 PM Meri Itzel Add [J44 9] COPD (chronic obstructive pulmonary disease) ([facility] [address] )     12/30/2018 12:38 PM Meri Itzel Add [R09 02] Hypoxia     12/30/2018 12:38 PM Meri Itzel Add [R68 89] Rigors     12/30/2018 12:38 PM Meri Itzel Add [R91 8] Pulmonary nodules/lesions, multiple       ED Disposition     ED Disposition Condition Comment    Admit  Case was discussed with Franco Neumann, [facility]  and the patient's admission status was agreed to be Admission Status: inpatient status to the service of Dr Zhane Quispe           Follow-up Information    None Patient's Medications   Discharge Prescriptions    No medications on file     No discharge procedures on file      ED Provider  Electronically Signed by           Sabina Mccord MD  12/30/18 200 Liborio Harrison MD  12/30/18 [id number]

## 2018-12-30 NOTE — ASSESSMENT & PLAN NOTE
Patient with dyspnea, tachypnea and borderline oxygen saturation of 90% on room air upon arrival in the ED  He showed improvement in vapotherm and supplemental oxygen  Continue supplemental oxygen and continuous pulse oximetry overnight

## 2018-12-31 LAB
ANION GAP SERPL CALCULATED.3IONS-SCNC: 11 MMOL/L (ref 4–13)
ATRIAL RATE: 90 BPM
BASOPHILS # BLD AUTO: 0.02 THOUSANDS/ΜL (ref 0–0.1)
BASOPHILS NFR BLD AUTO: 0 % (ref 0–1)
BUN SERPL-MCNC: 15 MG/DL (ref 5–25)
CALCIUM SERPL-MCNC: 8.1 MG/DL (ref 8.3–10.1)
CHLORIDE SERPL-SCNC: 107 MMOL/L (ref 100–108)
CO2 SERPL-SCNC: 21 MMOL/L (ref 21–32)
CREAT SERPL-MCNC: 0.94 MG/DL (ref 0.6–1.3)
EOSINOPHIL # BLD AUTO: 0 THOUSAND/ΜL (ref 0–0.61)
EOSINOPHIL NFR BLD AUTO: 0 % (ref 0–6)
ERYTHROCYTE [DISTWIDTH] IN BLOOD BY AUTOMATED COUNT: 14.6 % (ref 11.6–15.1)
FLUAV AG SPEC QL: NORMAL
FLUBV AG SPEC QL: NORMAL
GFR SERPL CREATININE-BSD FRML MDRD: 78 ML/MIN/1.73SQ M
GLUCOSE SERPL-MCNC: 141 MG/DL (ref 65–140)
HCT VFR BLD AUTO: 45.4 % (ref 36.5–49.3)
HGB BLD-MCNC: 14.9 G/DL (ref 12–17)
IMM GRANULOCYTES # BLD AUTO: 0.06 THOUSAND/UL (ref 0–0.2)
IMM GRANULOCYTES NFR BLD AUTO: 1 % (ref 0–2)
LYMPHOCYTES # BLD AUTO: 0.7 THOUSANDS/ΜL (ref 0.6–4.47)
LYMPHOCYTES NFR BLD AUTO: 10 % (ref 14–44)
MCH RBC QN AUTO: 32.1 PG (ref 26.8–34.3)
MCHC RBC AUTO-ENTMCNC: 32.8 G/DL (ref 31.4–37.4)
MCV RBC AUTO: 98 FL (ref 82–98)
MONOCYTES # BLD AUTO: 0.24 THOUSAND/ΜL (ref 0.17–1.22)
MONOCYTES NFR BLD AUTO: 3 % (ref 4–12)
NEUTROPHILS # BLD AUTO: 6.13 THOUSANDS/ΜL (ref 1.85–7.62)
NEUTS SEG NFR BLD AUTO: 86 % (ref 43–75)
NRBC BLD AUTO-RTO: 0 /100 WBCS
P AXIS: 69 DEGREES
PLATELET # BLD AUTO: 109 THOUSANDS/UL (ref 149–390)
PMV BLD AUTO: 9.8 FL (ref 8.9–12.7)
POTASSIUM SERPL-SCNC: 4.2 MMOL/L (ref 3.5–5.3)
PR INTERVAL: 200 MS
QRS AXIS: 65 DEGREES
QRSD INTERVAL: 86 MS
QT INTERVAL: 378 MS
QTC INTERVAL: 462 MS
RBC # BLD AUTO: 4.64 MILLION/UL (ref 3.88–5.62)
RSV B RNA SPEC QL NAA+PROBE: NORMAL
SODIUM SERPL-SCNC: 139 MMOL/L (ref 136–145)
T WAVE AXIS: 66 DEGREES
VENTRICULAR RATE: 90 BPM
WBC # BLD AUTO: 7.15 THOUSAND/UL (ref 4.31–10.16)

## 2018-12-31 PROCEDURE — 80048 BASIC METABOLIC PNL TOTAL CA: CPT | Performed by: PHYSICIAN ASSISTANT

## 2018-12-31 PROCEDURE — 94640 AIRWAY INHALATION TREATMENT: CPT

## 2018-12-31 PROCEDURE — G8979 MOBILITY GOAL STATUS: HCPCS | Performed by: PHYSICAL THERAPIST

## 2018-12-31 PROCEDURE — 97116 GAIT TRAINING THERAPY: CPT | Performed by: PHYSICAL THERAPIST

## 2018-12-31 PROCEDURE — 99232 SBSQ HOSP IP/OBS MODERATE 35: CPT | Performed by: INTERNAL MEDICINE

## 2018-12-31 PROCEDURE — 85025 COMPLETE CBC W/AUTO DIFF WBC: CPT | Performed by: PHYSICIAN ASSISTANT

## 2018-12-31 PROCEDURE — 93010 ELECTROCARDIOGRAM REPORT: CPT | Performed by: INTERNAL MEDICINE

## 2018-12-31 PROCEDURE — G8978 MOBILITY CURRENT STATUS: HCPCS | Performed by: PHYSICAL THERAPIST

## 2018-12-31 PROCEDURE — 94761 N-INVAS EAR/PLS OXIMETRY MLT: CPT

## 2018-12-31 PROCEDURE — 97162 PT EVAL MOD COMPLEX 30 MIN: CPT | Performed by: PHYSICAL THERAPIST

## 2018-12-31 PROCEDURE — 94760 N-INVAS EAR/PLS OXIMETRY 1: CPT

## 2018-12-31 RX ORDER — CEFUROXIME AXETIL 250 MG/1
500 TABLET ORAL EVERY 12 HOURS SCHEDULED
Status: DISCONTINUED | OUTPATIENT
Start: 2018-12-31 | End: 2019-01-01 | Stop reason: HOSPADM

## 2018-12-31 RX ORDER — CALCIUM CARBONATE 200(500)MG
500 TABLET,CHEWABLE ORAL DAILY PRN
Status: DISCONTINUED | OUTPATIENT
Start: 2018-12-31 | End: 2019-01-01 | Stop reason: HOSPADM

## 2018-12-31 RX ORDER — PREDNISONE 20 MG/1
40 TABLET ORAL DAILY
Status: DISCONTINUED | OUTPATIENT
Start: 2019-01-01 | End: 2019-01-01 | Stop reason: HOSPADM

## 2018-12-31 RX ADMIN — LEVALBUTEROL 1.25 MG: 1.25 SOLUTION, CONCENTRATE RESPIRATORY (INHALATION) at 14:12

## 2018-12-31 RX ADMIN — ASPIRIN 81 MG 81 MG: 81 TABLET ORAL at 10:16

## 2018-12-31 RX ADMIN — ISODIUM CHLORIDE 3 ML: 0.03 SOLUTION RESPIRATORY (INHALATION) at 09:27

## 2018-12-31 RX ADMIN — METHYLPREDNISOLONE SODIUM SUCCINATE 40 MG: 40 INJECTION, POWDER, FOR SOLUTION INTRAMUSCULAR; INTRAVENOUS at 10:16

## 2018-12-31 RX ADMIN — ESCITALOPRAM OXALATE 20 MG: 10 TABLET ORAL at 10:16

## 2018-12-31 RX ADMIN — QUETIAPINE FUMARATE 50 MG: 25 TABLET ORAL at 21:18

## 2018-12-31 RX ADMIN — CEFUROXIME AXETIL 500 MG: 250 TABLET ORAL at 21:17

## 2018-12-31 RX ADMIN — ISODIUM CHLORIDE 3 ML: 0.03 SOLUTION RESPIRATORY (INHALATION) at 14:12

## 2018-12-31 RX ADMIN — Medication 250 MG: at 10:15

## 2018-12-31 RX ADMIN — Medication 250 MG: at 17:08

## 2018-12-31 RX ADMIN — LEVALBUTEROL 1.25 MG: 1.25 SOLUTION, CONCENTRATE RESPIRATORY (INHALATION) at 19:47

## 2018-12-31 RX ADMIN — ATENOLOL 25 MG: 25 TABLET ORAL at 21:17

## 2018-12-31 RX ADMIN — QUETIAPINE FUMARATE 50 MG: 25 TABLET ORAL at 00:12

## 2018-12-31 RX ADMIN — Medication 1000 MG: at 10:15

## 2018-12-31 RX ADMIN — VITAMIN D, TAB 1000IU (100/BT) 2000 UNITS: 25 TAB at 10:14

## 2018-12-31 RX ADMIN — MELATONIN TAB 3 MG 3 MG: 3 TAB at 21:18

## 2018-12-31 RX ADMIN — LEVALBUTEROL 1.25 MG: 1.25 SOLUTION, CONCENTRATE RESPIRATORY (INHALATION) at 09:27

## 2018-12-31 RX ADMIN — ENOXAPARIN SODIUM 40 MG: 40 INJECTION SUBCUTANEOUS at 10:17

## 2018-12-31 RX ADMIN — GUAIFENESIN 600 MG: 600 TABLET, EXTENDED RELEASE ORAL at 21:18

## 2018-12-31 RX ADMIN — GUAIFENESIN 600 MG: 600 TABLET, EXTENDED RELEASE ORAL at 10:15

## 2018-12-31 RX ADMIN — ISODIUM CHLORIDE 3 ML: 0.03 SOLUTION RESPIRATORY (INHALATION) at 19:47

## 2018-12-31 RX ADMIN — DONEPEZIL HYDROCHLORIDE 10 MG: 5 TABLET, FILM COATED ORAL at 21:17

## 2018-12-31 RX ADMIN — TAMSULOSIN HYDROCHLORIDE 0.4 MG: 0.4 CAPSULE ORAL at 10:14

## 2018-12-31 RX ADMIN — MULTIPLE VITAMINS W/ MINERALS TAB 1 TABLET: TAB at 10:15

## 2018-12-31 RX ADMIN — ATORVASTATIN CALCIUM 20 MG: 10 TABLET, FILM COATED ORAL at 21:18

## 2018-12-31 RX ADMIN — MONTELUKAST SODIUM 10 MG: 10 TABLET, COATED ORAL at 10:17

## 2018-12-31 RX ADMIN — CYANOCOBALAMIN TAB 1000 MCG 1000 MCG: 1000 TAB at 10:16

## 2018-12-31 RX ADMIN — ANTACID TABLETS 500 MG: 500 TABLET, CHEWABLE ORAL at 21:18

## 2018-12-31 RX ADMIN — TIOTROPIUM BROMIDE 18 MCG: 18 CAPSULE ORAL; RESPIRATORY (INHALATION) at 10:16

## 2018-12-31 NOTE — UTILIZATION REVIEW
Initial Clinical Review    Admission: Date/Time/Statement: 12/30/18 @ 1308     Orders Placed This Encounter   Procedures    Inpatient Admission (expected length of stay for this patient is greater than two midnights)     Standing Status:   Standing     Number of Occurrences:   1     Order Specific Question:   Admitting Physician     Answer:   Brittani Grace     Order Specific Question:   Level of Care     Answer:   Med Surg [16]     Order Specific Question:   Estimated length of stay     Answer:   More than 2 Midnights     Order Specific Question:   Certification     Answer:   I certify that inpatient services are medically necessary for this patient for a duration of greater than two midnights  See H&P and MD Progress Notes for additional information about the patient's course of treatment  ED: Date/Time/Mode of Arrival:   ED Arrival Information     Expected Arrival Acuity Means of Arrival Escorted By Service Admission Type    - 12/30/2018 08:49 Emergent Walk-In Family Member General Medicine Emergency    Arrival Complaint    sob          Chief Complaint:   Chief Complaint   Patient presents with    Shortness of Breath     sob and chills started this AM        History of Illness:  70 y o  male who presents with a one-day history of shortness of breath as well as cough  His wife noticed that he had rigors last night and seemed ill  He was not short of breath at that time however woke up abruptly at 3:00 a m  Complaining of shortness of breath  The patient also notes sinus and nasal congestion  His appetite has been slightly decreased  He feels generally weak and tired  He also admits to some lightheadedness and dizziness    The patient's wife is concerned that he may have pneumonia, as this is how his presentations have been in the past when he has this diagnosis       ED Vital Signs:   ED Triage Vitals   Temperature Pulse Respirations Blood Pressure SpO2   12/30/18 0853 12/30/18 0857 12/30/18 9913 12/30/18 0857 12/30/18 0857   99 5 °F (37 5 °C) 89 19 116/66 90 %      Temp Source Heart Rate Source Patient Position - Orthostatic VS BP Location FiO2 (%)   12/30/18 0853 12/30/18 1000 12/30/18 1000 12/30/18 1000 --   Temporal Monitor Sitting Left arm       Pain Score       12/30/18 0853       No Pain        Wt Readings from Last 1 Encounters:   12/30/18 75 8 kg (167 lb 1 7 oz)       Vital Signs (abnormal): RR 15-25, BP 94//81, Pox 87-97%     Abnormal Labs/Diagnostic Test Results: WBC 10 38, Hgb 17 5, Hct 52 8, , Tl bili 2 10, Alk phos 117, D-dimer 9345    CXR --Emphysematous changes are noted   No focal consolidation, pleural effusion, or pneumothorax  CT chest PE study -- 1  No acute pulmonary embolus  2   Persistent right upper lobeairspace consolidation and spiculated nodular density superior segment left lower lobe  Additionally there are irregular densities in the left upper lobe  Given persistence of these findings  neoplasm is not excluded  Recommend further characterization with PET/CT if not already performed  3   7 mm right lower lobe pulmonary nodule new from prior exam  Based on current Fleischner Society 2017 Guidelines on incidental pulmonary nodule, followup non-contrast CT is recommended at 6-12 months from the initial examination and, if stable at that   time, an additional followup is recommended for 18-24 months from the initial examination  4   Centrilobular emphysematous changes  5   Abdominal aortic aneurysm is redemonstrated only partially visualized on this exam     EKG -- rate of 90, sinus rhythm, QTc 460          ED Treatment:   Medication Administration from 12/30/2018 0849 to 12/30/2018 1714       Date/Time Order Dose Route Action     12/30/2018 0932 ipratropium-albuterol (DUO-NEB) 0 5-2 5 mg/3 mL inhalation solution 3 mL 3 mL Nebulization Given     12/30/2018 1102 LORazepam (ATIVAN) 2 mg/mL injection 0 5 mg 0 5 mg Intravenous Given     12/30/2018 1059 sodium chloride 0 9 % bolus 500 mL 500 mL Intravenous New Bag     12/30/2018 1138 iohexol (OMNIPAQUE) 350 MG/ML injection (MULTI-DOSE) 85 mL 85 mL Intravenous Given     12/30/2018 1256 cefTRIAXone (ROCEPHIN) IVPB (premix) 1,000 mg 1,000 mg Intravenous New Bag     12/30/2018 1254 methylPREDNISolone sodium succinate (Solu-MEDROL) injection 80 mg 80 mg Intravenous Given     12/30/2018 1638 aspirin chewable tablet 81 mg 81 mg Oral Given     12/30/2018 1638 cholecalciferol (VITAMIN D3) tablet 2,000 Units 2,000 Units Oral Given     12/30/2018 1638 potassium chloride (K-DUR,KLOR-CON) CR tablet 10 mEq 10 mEq Oral Given     12/30/2018 1639 tamsulosin (FLOMAX) capsule 0 4 mg 0 4 mg Oral Given          Past Medical/Surgical History:   Past Medical History:   Diagnosis Date    AAA (abdominal aortic aneurysm) [facility])     Acid reflux     Acute exacerbation of chronic obstructive airways disease with asthma ([facility])     Acute serous otitis media of left ear     Anesthesia     Anxiety     Aortic aneurysm without rupture ([facility])     Arm bruise     Arthritis     Asthma     At risk for falls     BPH (benign prostatic hyperplasia)     BPH without urinary obstruction     Cancer ([address] )     CAP (community acquired pneumonia)     Cataracts, bilateral     Change in bowel function     CHF (congestive heart failure) ([facility])     Clubbing of fingers     Colon polyps     Common cold     Contusion of elbow, left     COPD (chronic obstructive pulmonary disease) ([address] )     Coronary artery disease     Cough     Dementia     Depression     Diverticulosis     Dizziness     Exercise counseling     Fatigue     Full dentures     Glaucoma screening     High cholesterol     History of abdominal aortic aneurysm (AAA) repair 08/2017    History of bacteremia     History of chronic obstructive lung disease     History of epistaxis     History of influenza vaccination     History of kidney stones     History of pneumonia     History of sepsis 10/2017    History of sinusitis     History of skin cancer     History of sleep apnea     History of urinary tract infection     [place] (hard of hearing)     Hydronephrosis with obstructing calculus     Influenza vaccine needed     Insomnia     Jock itch     Kidney stones     Left hip pain     Need for pneumococcal vaccination     Need for prophylactic vaccination and inoculation against influenza     Other emphysema ([place] [address] )     Pancreatitis, chronic ([place] [address] )     Pneumonia 10/26/2017    Pulmonary emphysema ([place] [address] )     S/P CABG x 3     Screening for genitourinary condition     Screening for neurological condition     Sepsis ([place] [address] )     Short-term memory loss     Sleep apnea     Special screening examination for neoplasm of prostate     TIA involving carotid artery     Ulcer     Unsteady gait     Use of cane as ambulatory aid     Vitamin D deficiency     Wears glasses        Admitting Diagnosis: COPD (chronic obstructive pulmonary disease) ([place] [address] ) [J44 9]  Rigors [R68 89]  SOB (shortness of breath) [R06 02]  Hypoxia [R09 02]  Pulmonary nodules/lesions, multiple [R91 8]    Age/Sex: 70 y o  male    Assessment/Plan:   * Chronic obstructive pulmonary disease with acute lower respiratory infection ([place] [address] )   Assessment & Plan     Admit patient to med surg  Continue IV steroids at 40mg IV BID  Continue respiratory protocol with breathing treatments as needed  Continuous pulse oximetry overnight given borderline low oxygen saturations pre-treatment,    Give supplemental oxygen and titrate as needed  Monitor on telemetry while ruling out ACS, however, pulmonary etiology more likely         Acute respiratory failure with hypoxia [facility])   Assessment & Plan     Patient with dyspnea, tachypnea and borderline oxygen saturation of 90% on room air upon arrival in the ED  He showed improvement in vapotherm and supplemental oxygen     Continue supplemental oxygen and continuous pulse oximetry overnight       Abnormal CT scan, chest   Assessment & Plan     CTA showing the followin   Persistent right upper lobeairspace consolidation and spiculated nodular density superior segment left lower lobe   Additionally there are irregular densities in the left upper lobe   Given persistence of these findings  neoplasm is not excluded     Recommend further characterization with PET/CT if not already performed  3   7 mm right lower lobe pulmonary nodule new from prior exam       Consider inpatient pulmonology consultation when available, however, patient is established with [facility], Dr Emilia Hargrove       Acute tracheobronchitis   Assessment & Plan     Likely viral    Patient was given a 1 time dose of Rocephin in the ER  Will hold off on antibiotics for now, pending procalcitonin level  Influenza rapid screen was negative, but PCR still pending  Influenza is definitely a possibility given sudden onset of symptoms  Provide supportive care with oxygen supplementation, respiratory protocol, mucinex, shira IV fluid hydration             Anticipated Length of Stay:  Patient will be admitted on an Inpatient basis with an anticipated length of stay of  Greater than 2 midnights     Justification for Hospital Stay: need for IV steroids, close monitoring    Admission Orders:  Scheduled Meds:   Current Facility-Administered Medications:  acetaminophen 650 mg Oral Q6H PRN   albuterol 2 5 mg Nebulization Q6H PRN   aspirin 81 mg Oral Daily   atenolol 25 mg Oral HS   atorvastatin 20 mg Oral HS   cholecalciferol 2,000 Units Oral Daily   cyanocobalamin 1,000 mcg Oral Daily   donepezil 10 mg Oral HS   enoxaparin 40 mg Subcutaneous Daily   escitalopram 20 mg Oral Daily   fish oil 1,000 mg Oral Daily   guaiFENesin 600 mg Oral Q12H DUY   levalbuterol 1 25 mg Nebulization TID   melatonin 3 mg Oral HS   methylPREDNISolone sodium succinate 40 mg Intravenous Daily   montelukast 10 mg Oral Daily   multivitamin-minerals 1 tablet Oral Daily   QUEtiapine 50 mg Oral HS   saccharomyces boulardii 250 mg Oral BID   sodium chloride 3 mL Nebulization TID   tamsulosin 0 4 mg Oral Daily   tiotropium 18 mcg Inhalation Daily     Telem  O2 2-3 lpm nc  Respiratory protocol  PT/OT evals  Cardiac diet  I/O  Up with assist  SCD's  IS q 1h while awake

## 2018-12-31 NOTE — PROGRESS NOTES
Progress Note - Tony Montes [place] 1940, 70 y o  male MRN: [id number]    Unit/Bed#: 426-01 Encounter: [id number]    Primary Care Provider: Miriam Bryson DO   Date and time admitted to hospital: 2018  8:52 AM        * Acute respiratory failure with hypoxia [facility])   Assessment & Plan    Patient with dyspnea, tachypnea and borderline oxygen saturation of 90% on room air upon arrival in the ED  Lungs today are clear, possible COPD exacerbation, changed to p o  Prednisone will assess ambulatory pulse ox tomorrow  Continue supplemental oxygen and continuous pulse oximetry overnight  Abnormal CT scan, chest   Assessment & Plan    CTA showing the followin  Persistent right upper lobeairspace consolidation and spiculated nodular density superior segment left lower lobe  Additionally there are irregular densities in the left upper lobe  Given persistence of these findings  neoplasm is not excluded  Recommend further characterization with PET/CT if not already performed  3   7 mm right lower lobe pulmonary nodule new from prior exam      Outpatient follow-up with Pulmonary Medicine     Dementia   Assessment & Plan    Stable currently  No benzodiazepines     Coronary artery disease involving native coronary artery of native heart with angina pectoris [facility])   Assessment & Plan    Continue beta blocker, aspirin, and statin therapy  Hyperlipidemia   Assessment & Plan    Continue statin  Chronic obstructive pulmonary disease with acute lower respiratory infection (HCC)resolved as of 2018   Assessment & Plan    Admit patient to med surg  Continue IV steroids at 40mg IV BID  Continue respiratory protocol with breathing treatments as needed  Continuous pulse oximetry overnight given borderline low oxygen saturations pre-treatment,    Give supplemental oxygen and titrate as needed  Monitor on telemetry while ruling out ACS, however, pulmonary etiology more likely  Code Status: Level 1 - Full Code      Subjective:   No pain, patient feels as though his breathing is markedly improved  Objective:     Vitals:   Temp (24hrs), Av 5 °F (33 6 °C), Min:75 8 °F (24 3 °C), Max:98 3 °F (36 8 °C)    Temp:  [75 8 °F (24 3 °C)-98 3 °F (36 8 °C)] 98 3 °F (36 8 °C)  HR:  [72-97] 97  Resp:  [17-22] 20  BP: (102-155)/(52-81) 105/52  SpO2:  [86 %-95 %] 94 %  Body mass index is 25 41 kg/m²  Input and Output Summary (last 24 hours): Intake/Output Summary (Last 24 hours) at 18 1549  Last data filed at 18 1300   Gross per 24 hour   Intake              360 ml   Output             1325 ml   Net             -965 ml       Physical Exam:     Physical Exam   Constitutional: He is oriented to person, place, and time  He appears well-developed and well-nourished  No distress  Pulmonary/Chest: Effort normal and breath sounds normal  No respiratory distress  He has no wheezes  Musculoskeletal: Normal range of motion  He exhibits no edema, tenderness or deformity  Neurological: He is alert and oriented to person, place, and time  No cranial nerve deficit  Coordination normal    Skin: He is not diaphoretic  Psychiatric: He has a normal mood and affect  His behavior is normal  Judgment and thought content normal    Nursing note and vitals reviewed  Additional Data:     Labs:      Results from last 7 days  Lab Units 18  0611   WBC Thousand/uL 7 15   HEMOGLOBIN g/dL 14 9   HEMATOCRIT % 45 4   PLATELETS Thousands/uL 109*   NEUTROS PCT % 86*   LYMPHS PCT % 10*   MONOS PCT % 3*   EOS PCT % 0       Results from last 7 days  Lab Units 18  0611 18  0925   POTASSIUM mmol/L 4 2 4 4   CHLORIDE mmol/L 107 102   CO2 mmol/L 21 26   BUN mg/dL 15 13   CREATININE mg/dL 0 94 1 20   CALCIUM mg/dL 8 1* 9 0   ALK PHOS U/L  --  117*   ALT U/L  --  29   AST U/L  --  19           * I Have Reviewed All Lab Data Listed Above    * Additional Pertinent Lab Tests Reviewed: [address] Admission Reviewed      Recent Cultures (last 7 days):       Results from last 7 days  Lab Units 12/30/18  0925   BLOOD CULTURE  No Growth at 24 hrs  No Growth at 24 hrs     INFLUENZA B PCR  None Detected   RSV PCR  None Detected       Last 24 Hours Medication List:     Current Facility-Administered Medications:  acetaminophen 650 mg Oral Q6H PRN Thelma Jon, PA-CECILIA   albuterol 2 5 mg Nebulization Q6H PRN Himanshu Sellers DO   aspirin 81 mg Oral Daily Rainie Werner, PA-C   atenolol 25 mg Oral HS Rainie Werner, PA-C   atorvastatin 20 mg Oral HS Rainie Werner, PA-C   cholecalciferol 2,000 Units Oral Daily Rainie Werner, PA-C   cyanocobalamin 1,000 mcg Oral Daily Rainie Werner, PA-C   donepezil 10 mg Oral HS Rainie Werner, PA-C   enoxaparin 40 mg Subcutaneous Daily Rainie Werner, PA-C   escitalopram 20 mg Oral Daily Rainie Werner, PA-C   fish oil 1,000 mg Oral Daily Rainie Werner, PA-C   guaiFENesin 600 mg Oral Q12H [address] Thelma Jon, PA-C   levalbuterol 1 25 mg Nebulization TID Himanshu Sellers DO   melatonin 3 mg Oral HS Rainie Werner, PA-C   montelukast 10 mg Oral Daily Thelma Jon, PA-C   multivitamin-minerals 1 tablet Oral Daily Rainie Werner, PA-C   predniSONE 40 mg Oral Daily Srinivas Marla,    QUEtiapine 50 mg Oral HS Rainie Werner, PA-C   saccharomyces boulardii 250 mg Oral BID Rainie Werner, PA-C   sodium chloride 3 mL Nebulization TID Himanshu Sellers,    tamsulosin 0 4 mg Oral Daily Jennyie Robbin, PA-C   tiotropium 18 mcg Inhalation Daily Thelma Jon PA-C        Today, Patient Was Seen By: Himanshu Sellers DO

## 2018-12-31 NOTE — PLAN OF CARE
Problem: PHYSICAL THERAPY ADULT  Goal: Performs mobility at highest level of function for planned discharge setting  See evaluation for individualized goals  Outcome: Progressing  Prognosis: Good  Problem List: Decreased endurance, Impaired balance, Decreased mobility  Assessment: Pt is a 70year old male presenting to [facility] due to 1 day hx of SOB with cough and rigors requiring VapoTherm  Pt found to have COPD with acute lower respiratory infection  Pt with complex PMH as listed above  Pt seen for moderate complexity PT evaluation presenting with fair to good strength balance endurance and functional mobility however is requiring 3L's Supplemental O2 to maintain SpO2 greater than 90%  Pt performing all bed mobility transfers and ambulation at a (S) to (I) level however would benefit from continued PT to improve endurance ambulation transfers and to ensure safe (I) stair negotiation to ensure safe return home  Recommendation: Home independently     PT - OK to Discharge: Yes (when medically stable for discharge)    See flowsheet documentation for full assessment

## 2018-12-31 NOTE — PHYSICAL THERAPY NOTE
Physical Therapy Evaluation    Patient Name: Moreno MACK Date: 12/31/2018     Problem List  Patient Active Problem List   Diagnosis    Acute tracheobronchitis    Chronic obstructive pulmonary disease with acute lower respiratory infection ([facility] )    Hyperlipidemia    GERD (gastroesophageal reflux disease)    Abnormal CT scan, chest    Abdominal aortic aneurysm (HCC)    Thrombocytopenia (HCC)    History of aortic aneurysm repair    Benign prostatic hyperplasia    Dementia    Bradycardia    SOB (shortness of breath)    Pulmonary nodule    Ventricular bigeminy    Positional lightheadedness    Coronary artery disease involving native coronary artery of native heart with angina pectoris ([facility] )    Hx of CABG    Bilateral carotid artery stenosis    Elevated bilirubin    Tracheobronchitis    Hyponatremia    CAD (coronary artery disease)    Acute urinary retention    Acute respiratory failure with hypoxia ([facility])        Past Medical History  Past Medical History:   Diagnosis Date    AAA [facility]abdominal aortic aneurysm) ([facility])     Acid reflux     Acute exacerbation of chronic obstructive airways disease with asthma ([facility])     Acute serous otitis media of left ear     recurrence not specified     Anesthesia     "always has mental changes /lingers and lingers after anesthesia"    Anxiety     Aortic aneurysm without rupture ([facility] )     Arm bruise     right inner forearm "recent IV"    Arthritis     spine and poss left hip    Asthma     bronchietasis    At risk for falls     BPH (benign prostatic hyperplasia)     pt and wife can't confirm 11/10    BPH without urinary obstruction     Cancer ([address] )     skin CA on nose    CAP (community acquired pneumonia)     Cataracts, bilateral     Change in bowel function     CHF (congestive heart failure) ([facility])     Clubbing of fingers     Colon polyps     Common cold     Contusion of elbow, left     initial encounter     COPD (chronic obstructive pulmonary disease) (HCC)     Coronary artery disease     Cough     Dementia     Depression     Diverticulosis     Dizziness     upon "standing quickly on occas"    Exercise counseling     Fatigue     Full dentures     "doesn't wear them"    Glaucoma screening     High cholesterol     History of abdominal aortic aneurysm (AAA) repair 08/2017    History of bacteremia     History of chronic obstructive lung disease     History of epistaxis     History of influenza vaccination     History of kidney stones     History of pneumonia     "many times" "at least 5 times" almost always goes to sepsis"    History of sepsis 10/2017    History of sinusitis     History of skin cancer     History of sleep apnea     History of urinary tract infection     [place] (hard of hearing)     Hydronephrosis with obstructing calculus     Influenza vaccine needed     Insomnia     Jock itch     left/saw doctor 11/8 and started on antifungal cream    Kidney stones     Left hip pain     Need for pneumococcal vaccination     Need for prophylactic vaccination and inoculation against influenza     Other emphysema ([address]     Pancreatitis, chronic ([address] )     pt and wife can't confirm 11/10/17    Pneumonia 10/26/2017    admitted [facility]    Pulmonary emphysema ([address] )     S/P CABG x 3     approx 14 yrs ago    Screening for genitourinary condition     Screening for neurological condition     Sepsis ([address] )     due to unspecified organism     Short-term memory loss     Sleep apnea     does not use CPAP      Special screening examination for neoplasm of prostate     TIA involving carotid artery     "before carotid surgery"    Ulcer     stomach, "years ago"    Unsteady gait     Use of cane as ambulatory aid     sometimes    Vitamin D deficiency     Wears glasses         Past Surgical History  Past Surgical History:   Procedure Laterality Date    ABDOMINAL AORTIC ANEURYSM REPAIR  08/23/2017    ABDOMINAL AORTIC ANEURYSM REPAIR, ENDOVASCULAR      BACK SURGERY      spinal stenosis    CARDIAC SURGERY      CABG x3    CAROTID ENDARTARECTOMY Left 11/1996    CATARACT EXTRACTION Bilateral     CORONARY ARTERY BYPASS GRAFT  01/2003    x3    CYSTOSCOPY      with insertion of ureteral stent     CYSTOSCOPY      with ureteroscopy with lithotripsy     EXCISIONAL HEMORRHOIDECTOMY      EYE SURGERY Bilateral     "for a wrinkle" after cataract surgery    HERNIA REPAIR      umbilical    LITHOTRIPSY      renal    MOUTH SURGERY      full mouth extraction     WY COLONOSCOPY FLX DX W/COLLJ SPEC WHEN PFRMD N/A 5/16/2017    Procedure: COLONOSCOPY with polypectomies/ hot snare and tattoo;  Surgeon: Gil Rincon MD;  [facility]; Service: Gastroenterology    WY CYSTOURETHROSCOPY N/A 11/30/2017    Procedure: Minnie Sandhoff;  Surgeon: Indy Shields MD;  [facility];  Service: Urology    WY ESOPHAGOGASTRODUODENOSCOPY TRANSORAL DIAGNOSTIC N/A 8/18/2016    Procedure: ESOPHAGOGASTRODUODENOSCOPY (EGD); Surgeon: Gil Rincon MD;  [facility]; Service: Gastroenterology    WY REMOVE BLADDER STONE,<2 5 CM N/A 11/30/2017    Procedure: Hattie Childs;  Surgeon: Indy Shields MD;  [facility];  Service: Urology    TONSILLECTOMY      URETERAL [address]      and removal           12/31/18 1051   Note Type   Note type Eval/Treat   Pain Assessment   Pain Assessment No/denies pain   Pain Score No Pain   Home Living   Type of [address] [place] [address] Multi-level;Bed/bath upstairs;Stairs to enter without rails  (1 BERNICE no HR, 12 steps with HR to 2nd)   Bathroom Shower/Tub Walk-in shower   Bathroom Toilet Standard   Bathroom Equipment Grab bars around toilet; Shower chair;Grab bars in shower   [address] Walker;Cane   Prior Function   Level of [place] Independent with ADLs and functional mobility  ((I) ambulation no A  D )   Lives With Spouse   Receives Help From Family   ADL Assistance Independent   IADLs Needs assistance  (spouse performs IADL's and driving)   Restrictions/Precautions   Other Precautions Chair Alarm;Telemetry;O2;Fall Risk   General   Family/Caregiver Present No   Cognition   Arousal/Participation Alert   Orientation Level Oriented X4   Following Commands Follows all commands and directions without difficulty   RLE Assessment   RLE Assessment WFL   LLE Assessment   LLE Assessment WFL   Coordination   Sensation WFL   Light Touch   RLE Light Touch Grossly intact   LLE Light Touch Grossly intact   Bed Mobility   Supine to Sit 7  Independent   Sit to Supine (seated in chair at bedside bell in reach alarm on)   Additional Comments No difficulty with bed mobility  Pt on 3L's O2 with SpO2 91% HR 84  Pt ambulated on 3L's with spO2 during ambulation 99% HR 61 and after ambulation 450ft no A D  93% HR 74  Transfers   Sit to Stand 7  Independent   Stand to Sit 7  Independent   Stand pivot 5  Supervision   Additional Comments no LOB or unsteadiness with transfers and ambulation  Ambulation with normal gait pattern no A D  Ambulation/Elevation   Gait pattern WNL   Gait Assistance 5  Supervision   Assistive Device None   Distance 450ft no A D  (S) no LOB or instability  Pt with no complaint of SOB but ambulated on 3L's O2 which baseline is room air at home   Balance   Static Sitting Good   Dynamic Sitting Good   Static Standing Good   Dynamic Standing Fair +   Ambulatory Fair +   Endurance Deficit   Endurance Deficit Yes   Endurance Deficit Description pt requiring supplemental O2 to maintain SpO2 greater than 90% with exertion   Activity Tolerance   Activity Tolerance Patient tolerated treatment well;Patient limited by fatigue   Assessment   Prognosis Good   Problem List Decreased endurance; Impaired balance;Decreased mobility   Assessment Pt is a 70year old male presenting to [facility] due to 1 day hx of SOB with cough and rigors requiring VapoTherm  Pt found to have COPD with acute lower respiratory infection  Pt with complex PMH as listed above  Pt seen for moderate complexity PT evaluation presenting with fair to good strength balance endurance and functional mobility however is requiring 3L's Supplemental O2 to maintain SpO2 greater than 90%  Pt performing all bed mobility transfers and ambulation at a (S) to (I) level however would benefit from continued PT to improve endurance ambulation transfers and to ensure safe (I) stair negotiation to ensure safe return home  Goals   Patient Goals To return home   STG Expiration Date 01/07/19   Short Term Goal #1 Improve standing and ambulatory balance to good to improve ambulation to 500ft no A D  no drop in SpO2 below 90% on room air  Short Term Goal #2 Pt will ascend/descend 11 steps with HR modified (I)   Plan   Treatment/Interventions Functional transfer training;Elevations; Endurance training;Patient/family training;Bed mobility;Gait training   PT Frequency (3-5x/wk)   Recommendation   Recommendation Home independently   PT - OK to Discharge Yes  (when medically stable for discharge)     No SCD's  Pt seated in chair at bedside with alarm on and call bell in reach

## 2018-12-31 NOTE — SOCIAL WORK
Visit with patient in his hospital room to initiate discharge planning  Patient lives in 2 story home with bathroom on 2nd floor  Has one flight of stairs to get to bathroom  Has 1 step to enter 1st floor of home  Is independent with all adl's and iadl's except does not drive  His spouse drives as needed  PCP is Dr Verónica Rachel and will follow with him on discharge  Uses [facility] in [place] for pharmacy  Patient currently on O2  Does not use O2 at home  Will need assessment for O2 use prior to discharge

## 2018-12-31 NOTE — ASSESSMENT & PLAN NOTE
Patient with dyspnea, tachypnea and borderline oxygen saturation of 90% on room air upon arrival in the ED  Lungs today are clear, possible COPD exacerbation, changed to p o  Prednisone will assess ambulatory pulse ox tomorrow  Continue supplemental oxygen and continuous pulse oximetry overnight

## 2018-12-31 NOTE — RESPIRATORY THERAPY NOTE
Home Oxygen Qualifying Test       Patient name: Mona Levi        : 1940   Date of Test:  2018  Diagnosis:      Home Oxygen Test:    **Medicare Guidelines require item(s) 1-5 on all ambulatory patients or 1 and 2 on non-ambulatory patients  1   Baseline SPO2 on Room Air at rest 91 %  2   SPO2 during exercise on Room Air 86 %  During exercise monitor SpO2  If SPO2 increases >=89% with ambulation do not add supplemental             oxygen  If <= 88% on room air add O2 via NC and titrate patient  Patient must be ambulated with O2 and titrated to > 88% with exertion  3   SPO2 on Oxygen at rest 94 % 2 lpm     4   SPO2 during exercise on Oxygen  92% a liter flow of 2 lpm     5   Exercise performed:          walking, distance 200 (feet)          [x]  Supplemental Home Oxygen is indicated  []  Client does not qualify for home oxygen  Respiratory Additional Notes- Pt  desated during ambulation  O2  At 2 lpm was placed on the patient and ambulation was continued  Pt  Did not desaturate below 91 on the oxygen      Eros De La Rosa, RT

## 2018-12-31 NOTE — PHYSICIAN ADVISOR
Current patient class: Inpatient  The patient is currently on Hospital Day: 2 at [address]      The patient was admitted to the hospital at [phone] on 12/30/18 for the following diagnosis:  COPD (chronic obstructive pulmonary disease) ([facility] ) [J44 9]  Rigors [R68 89]  SOB (shortness of breath) [R06 02]  Hypoxia [R09 02]  Pulmonary nodules/lesions, multiple [R91 8]       There is documentation in the medical record of an expected length of stay of at least 2 midnights  The patient is therefore expected to satisfy the 2 midnight benchmark and given the 2 midnight presumption is appropriate for INPATIENT ADMISSION  Given this expectation of a satisfying stay, CMS instructs us that the patient is most often appropriate for inpatient admission under part A provided medical necessity is documented in the chart  After review of the relevant documentation, labs, vital signs and test results, the patient is appropriate for INPATIENT ADMISSION  Admission to the hospital as an inpatient is a complex decision making process which requires the practitioner to consider the patients presenting complaint, history and physical examination and all relevant testing  With this in mind, in this case, the patient was deemed appropriate for INPATIENT ADMISSION  After review of the documentation and testing available at the time of the admission I concur with this clinical determination of medical necessity  Rationale is as follows: The patient is a 70 yrs old Male who presented to the ED at 12/30/2018  8:52 AM with a chief complaint of Shortness of Breath (sob and chills started this AM )      Reviewing the last progress note indicates the patient will require at least an additional midnight of hospitalization  Patient is noted to require supplemental oxygen, breathing treatments, and continued steroid administration   As such the patient will remain for a second midnight, and is appropriate for inpatient admission  Given the need for further hospitalization, and along with the documentation of medical necessity present in the chart, the patient is appropriate for inpatient admission  The patient is expected to satisfy the 2 midnight benchmark, and will require further acute medical care  The patient does have comorbid conditions which increases the risk for significant adverse outcome  Given this the patient is appropriate for inpatient admission        The patients vitals on arrival were ED Triage Vitals   Temperature Pulse Respirations Blood Pressure SpO2   12/30/18 0853 12/30/18 0857 12/30/18 0857 12/30/18 0857 12/30/18 0857   99 5 °F (37 5 °C) 89 19 116/66 90 %      Temp Source Heart Rate Source Patient Position - Orthostatic VS BP Location FiO2 (%)   12/30/18 0853 12/30/18 1000 12/30/18 1000 12/30/18 1000 --   Temporal Monitor Sitting Left arm       Pain Score       12/30/18 0853       No Pain           Past Medical History:   Diagnosis Date    AAA [facility]abdominal aortic aneurysm) [facility])     Acid reflux     Acute exacerbation of chronic obstructive airways disease with asthma (HCC)     Acute serous otitis media of left ear     recurrence not specified     Anesthesia     "always has mental changes /lingers and lingers after anesthesia"    Anxiety     Aortic aneurysm without rupture (HCC)     Arm bruise     right inner forearm "recent IV"    Arthritis     spine and poss left hip    Asthma     bronchietasis    At risk for falls     BPH (benign prostatic hyperplasia)     pt and wife can't confirm 11/10    BPH without urinary obstruction     Cancer ([address] )     skin CA on nose    CAP (community acquired pneumonia)     Cataracts, bilateral     Change in bowel function     CHF (congestive heart failure) ([facility])     Clubbing of fingers     Colon polyps     Common cold     Contusion of elbow, left     initial encounter     COPD (chronic obstructive pulmonary disease) ([address] )     Coronary artery disease     Cough     Dementia     Depression     Diverticulosis     Dizziness     upon "standing quickly on occas"    Exercise counseling     Fatigue     Full dentures     "doesn't wear them"    Glaucoma screening     High cholesterol     History of abdominal aortic aneurysm (AAA) repair 08/2017    History of bacteremia     History of chronic obstructive lung disease     History of epistaxis     History of influenza vaccination     History of kidney stones     History of pneumonia     "many times" "at least 5 times" almost always goes to sepsis"    History of sepsis 10/2017    History of sinusitis     History of skin cancer     History of sleep apnea     History of urinary tract infection     [place] (hard of hearing)     Hydronephrosis with obstructing calculus     Influenza vaccine needed     Insomnia     Jock itch     left/saw doctor 11/8 and started on antifungal cream    Kidney stones     Left hip pain     Need for pneumococcal vaccination     Need for prophylactic vaccination and inoculation against influenza     Other emphysema ([address] )     Pancreatitis, chronic ([address] )     pt and wife can't confirm 11/10/17    Pneumonia 10/26/2017    admitted [facility]    Pulmonary emphysema ([address] )     S/P CABG x 3     approx 14 yrs ago    Screening for genitourinary condition     Screening for neurological condition     Sepsis ([address] )     due to unspecified organism     Short-term memory loss     Sleep apnea     does not use CPAP      Special screening examination for neoplasm of prostate     TIA involving carotid artery     "before carotid surgery"    Ulcer     stomach, "years ago"    Unsteady gait     Use of cane as ambulatory aid     sometimes    Vitamin D deficiency     Wears glasses      Past Surgical History:   Procedure Laterality Date    ABDOMINAL AORTIC ANEURYSM REPAIR  08/23/2017    ABDOMINAL AORTIC ANEURYSM REPAIR, ENDOVASCULAR      BACK SURGERY      spinal stenosis    CARDIAC SURGERY      CABG x3    CAROTID ENDARTARECTOMY Left 11/1996    CATARACT EXTRACTION Bilateral     CORONARY ARTERY BYPASS GRAFT  01/2003    x3    CYSTOSCOPY      with insertion of ureteral stent     CYSTOSCOPY      with ureteroscopy with lithotripsy     EXCISIONAL HEMORRHOIDECTOMY      EYE SURGERY Bilateral     "for a wrinkle" after cataract surgery    HERNIA REPAIR      umbilical    LITHOTRIPSY      renal    MOUTH SURGERY      full mouth extraction     WY COLONOSCOPY FLX DX W/COLLJ SPEC WHEN PFRMD N/A 5/16/2017    Procedure: COLONOSCOPY with polypectomies/ hot snare and tattoo;  Surgeon: Atiya Tariq MD;  [facility]; Service: Gastroenterology    WY CYSTOURETHROSCOPY N/A 11/30/2017    Procedure: [address];  Surgeon: Alejandro Alegria MD;  [facility];  Service: Urology    WY ESOPHAGOGASTRODUODENOSCOPY TRANSORAL DIAGNOSTIC N/A 8/18/2016    Procedure: ESOPHAGOGASTRODUODENOSCOPY (EGD); Surgeon: Atiya Tariq MD;  [facility];   Service: Gastroenterology    WY REMOVE BLADDER STONE,<2 5 CM N/A 11/30/2017    Procedure: Lino Putnamato;  Surgeon: Alejandro Alegria MD;  [facility];  Service: Urology    TONSILLECTOMY     [address] [place] [address]      and removal           Consults have been placed to:   None    Vitals:    12/31/18 1443 12/31/18 1515 12/31/18 1520 12/31/18 1530   BP: 105/52      BP Location: Left arm      Pulse: 97      Resp: 20      Temp: 98 3 °F (36 8 °C)      TempSrc: Temporal      SpO2: 92% 94% (!) 86% 94%   Weight:       Height:           Most recent labs:    Recent Labs      12/30/18   0925   12/30/18   1738  12/31/18   0611   WBC  10 38*   --    --   7 15   HGB  17 5*   --    --   14 9   HCT  52 8*   --    --   45 4   PLT  119*   --    --   109*   K  4 4   --    --   4 2   CALCIUM  9 0   --    --   8 1*   BUN  13   --    --   15   CREATININE  1 20   --    --   0 94   TROPONINI  <0 02   < >  <0 02   --    AST  19   --    --    -- ALT  29   --    --    --    ALKPHOS  117*   --    --    --     < > = values in this interval not displayed         Scheduled Meds:  Current Facility-Administered Medications:  acetaminophen 650 mg Oral Q6H PRN Kee Briceño PA-C   albuterol 2 5 mg Nebulization Q6H PRN [facility],    aspirin 81 mg Oral Daily Teri Siegel, TAYE   atenolol 25 mg Oral HS [facility], TAYE   atorvastatin 20 mg Oral HS [facility], TAYE   cefuroxime 500 mg Oral Q12H [facility],    cholecalciferol 2,000 Units Oral Daily [place] Siegel, TAYE   cyanocobalamin 1,000 mcg Oral Daily Teri Siegel, TAYE   donepezil 10 mg Oral HS [facility], TAYE   enoxaparin 40 mg Subcutaneous Daily Teri Siegel, TAYE   escitalopram 20 mg Oral Daily Teri Siegel, TAYE   fish oil 1,000 mg Oral Daily Teri Siegel, TAYE   guaiFENesin 600 mg Oral Q12H [facility] Brice[facility] PA-C   levalbuterol 1 25 mg Nebulization TID [facility],    melatonin 3 mg Oral HS Teri Siegel PA-C   montelukast 10 mg [facility] Daily [facility] puga   multivitamin-minerals 1 tablet Oral Daily [place], [place]   [START ON 1/1/2019] predniSONE 40 mg Oral Daily Srinivas Gutiérrez DO   QUEtiapine 50 mg Oral HS Teri Siegel PA-C   saccharomyces boulardii 250 mg Oral BID Kee Briceño PA-C   sodium chloride 3 mL Nebulization TID [facility],    tamsulosin 0 4 mg Oral Daily Teri Siegel PA-C   tiotropium 18 mcg Inhalation Daily Teri Siegel PA-C     Continuous Infusions:   PRN Meds:   acetaminophen    albuterol    Surgical procedures (if appropriate):

## 2018-12-31 NOTE — PLAN OF CARE
Problem: DISCHARGE PLANNING - CARE MANAGEMENT  Goal: Discharge to post-acute care or home with appropriate resources  INTERVENTIONS:  - Conduct assessment to determine patient/family and health care team treatment goals, and need for post-acute services based on payer coverage, community resources, and patient preferences, and barriers to discharge  - Address psychosocial, clinical, and financial barriers to discharge as identified in assessment in conjunction with the patient/family and health care team  - Arrange appropriate level of post-acute services according to patients   needs and preference and payer coverage in collaboration with the physician and health care team  - Communicate with and update the patient/family, physician, and health care team regarding progress on the discharge plan  - Arrange appropriate transportation to post-acute venues  Outcome: [address] with spouse on discharge  Denies need for home care services on discharge  May need O2 set up for home

## 2018-12-31 NOTE — PLAN OF CARE
Problem: INFECTION - ADULT  Goal: Absence of fever/infection during neutropenic period  INTERVENTIONS:  - Monitor WBC     Outcome: Completed Date Met: 12/31/18

## 2018-12-31 NOTE — ASSESSMENT & PLAN NOTE
CTA showing the followin  Persistent right upper lobeairspace consolidation and spiculated nodular density superior segment left lower lobe  Additionally there are irregular densities in the left upper lobe  Given persistence of these findings  neoplasm is not excluded  Recommend further characterization with PET/CT if not already performed    3   7 mm right lower lobe pulmonary nodule new from prior exam      Outpatient follow-up with Pulmonary Medicine

## 2019-01-01 VITALS
BODY MASS INDEX: 25.33 KG/M2 | HEIGHT: 68 IN | OXYGEN SATURATION: 90 % | TEMPERATURE: 98.2 F | WEIGHT: 167.11 LBS | RESPIRATION RATE: 16 BRPM | HEART RATE: 65 BPM | SYSTOLIC BLOOD PRESSURE: 136 MMHG | DIASTOLIC BLOOD PRESSURE: 77 MMHG

## 2019-01-01 PROBLEM — E87.1 HYPONATREMIA: Status: RESOLVED | Noted: 2018-05-15 | Resolved: 2019-01-01

## 2019-01-01 PROBLEM — R42 POSITIONAL LIGHTHEADEDNESS: Status: RESOLVED | Noted: 2018-03-07 | Resolved: 2019-01-01

## 2019-01-01 PROBLEM — R33.8 ACUTE URINARY RETENTION: Status: RESOLVED | Noted: 2018-07-20 | Resolved: 2019-01-01

## 2019-01-01 PROCEDURE — 94760 N-INVAS EAR/PLS OXIMETRY 1: CPT

## 2019-01-01 PROCEDURE — 94761 N-INVAS EAR/PLS OXIMETRY MLT: CPT

## 2019-01-01 PROCEDURE — 94640 AIRWAY INHALATION TREATMENT: CPT

## 2019-01-01 PROCEDURE — 99239 HOSP IP/OBS DSCHRG MGMT >30: CPT | Performed by: INTERNAL MEDICINE

## 2019-01-01 RX ORDER — CEFUROXIME AXETIL 500 MG/1
500 TABLET ORAL EVERY 12 HOURS SCHEDULED
Qty: 10 TABLET | Refills: 0 | Status: SHIPPED | OUTPATIENT
Start: 2019-01-01 | End: 2019-01-06

## 2019-01-01 RX ADMIN — CEFUROXIME AXETIL 500 MG: 250 TABLET ORAL at 08:48

## 2019-01-01 RX ADMIN — ENOXAPARIN SODIUM 40 MG: 40 INJECTION SUBCUTANEOUS at 08:49

## 2019-01-01 RX ADMIN — ISODIUM CHLORIDE 3 ML: 0.03 SOLUTION RESPIRATORY (INHALATION) at 08:34

## 2019-01-01 RX ADMIN — MULTIPLE VITAMINS W/ MINERALS TAB 1 TABLET: TAB at 08:49

## 2019-01-01 RX ADMIN — Medication 250 MG: at 08:50

## 2019-01-01 RX ADMIN — Medication 1000 MG: at 08:49

## 2019-01-01 RX ADMIN — TAMSULOSIN HYDROCHLORIDE 0.4 MG: 0.4 CAPSULE ORAL at 08:50

## 2019-01-01 RX ADMIN — VITAMIN D, TAB 1000IU (100/BT) 2000 UNITS: 25 TAB at 08:48

## 2019-01-01 RX ADMIN — CYANOCOBALAMIN TAB 1000 MCG 1000 MCG: 1000 TAB at 08:48

## 2019-01-01 RX ADMIN — LEVALBUTEROL 1.25 MG: 1.25 SOLUTION, CONCENTRATE RESPIRATORY (INHALATION) at 08:34

## 2019-01-01 RX ADMIN — GUAIFENESIN 600 MG: 600 TABLET, EXTENDED RELEASE ORAL at 08:49

## 2019-01-01 RX ADMIN — PREDNISONE 40 MG: 20 TABLET ORAL at 08:49

## 2019-01-01 RX ADMIN — MONTELUKAST SODIUM 10 MG: 10 TABLET, COATED ORAL at 08:49

## 2019-01-01 RX ADMIN — ASPIRIN 81 MG 81 MG: 81 TABLET ORAL at 08:48

## 2019-01-01 RX ADMIN — TIOTROPIUM BROMIDE 18 MCG: 18 CAPSULE ORAL; RESPIRATORY (INHALATION) at 08:53

## 2019-01-01 RX ADMIN — ESCITALOPRAM OXALATE 20 MG: 10 TABLET ORAL at 08:49

## 2019-01-01 NOTE — DISCHARGE SUMMARY
Discharge- Rut Gaviria 1940, 70 y o  male MRN: [id number]    Unit/Bed#: 426-01 Encounter: [id number]    Primary Care Provider: Kevon Khan DO   Date and time admitted to hospital: 2018  8:52 AM        * Acute respiratory failure with hypoxia [facility])   Assessment & Plan    Patient with dyspnea, tachypnea and borderline oxygen saturation of 90% on room air upon arrival in the ED  Lungs today are clear, there is no evidence of any bronchospasm, no further steroids needed  Patient has qualified for home oxygen  There is a questionable acute tracheobronchitis, no infiltrate seen on imaging, influenza and RSV testing negative  Patient has had marked clinical improvement with the initiation of oxygen, CT chest is abnormal, needs close outpatient follow-up with his pulmonologist as soon as possible  My suspicion is that patient has had some degree of chronic hypoxia due to abnormal CT scan and underlying emphysema  Patient will complete 5 additional days of p o  Ceftin for possible acute tracheobronchitis  Abnormal CT scan, chest   Assessment & Plan    CTA showing the followin  Persistent right upper lobeairspace consolidation and spiculated nodular density superior segment left lower lobe  Additionally there are irregular densities in the left upper lobe  Given persistence of these findings  neoplasm is not excluded  Recommend further characterization with PET/CT if not already performed  3   7 mm right lower lobe pulmonary nodule new from prior exam      Outpatient follow-up with Pulmonary Medicine     Dementia   Assessment & Plan    Stable currently  Continue medical management     Coronary artery disease involving native coronary artery of native heart with angina pectoris [facility])   Assessment & Plan    Continue beta blocker, aspirin, and statin therapy  Hyperlipidemia   Assessment & Plan    Continue statin         Discharging Physician / Practitioner: Nika Bangura   PCP: Nata Vasquez DO  Admission Date:   Admission Orders     Ordered        18 1308  Inpatient Admission (expected length of stay for this patient is greater than two midnights)  Once             Discharge Date: 19    Resolved Problems  Date Reviewed: 2019          Resolved    Acute tracheobronchitis 2018     Resolved by  Tyson Morgan DO    Chronic obstructive pulmonary disease with acute lower respiratory infection ([address] ) 2018     Resolved by  Tyson Morgan DO          Home Oxygen Qualifying Test         Patient name: Lin Clark        : 1940   Date of Test:  2019             Diagnosis: Acute Respiratory Failure with Hypoxia      Home Oxygen Test:    **Medicare Guidelines require item(s) 1-5 on all ambulatory patients or 1 and 2 on non-ambulatory patients  1   Baseline SPO2 on Room Air at rest 87 %  2   SPO2 during exercise on Room Air n/a %  During exercise monitor SpO2  If SPO2 increases >=89% with ambulation do not add supplemental             oxygen  If <= 88% on room air add O2 via NC and titrate patient  Patient must be ambulated with O2 and titrated to > 88% with exertion  3   SPO2 on Oxygen at rest 93 % on  2 lpm      4   SPO2 during exercise on Oxygen  90% on a liter flow of 2 lpm      5   Exercise performed:          walking, duration 10 (min), distance 250 (feet)             Supplemental Home Oxygen is indicated  Client does not qualify for home oxygen  Respiratory Additional Notes- None     Tribune Fallow, RT          Consultations During Hospital Stay:  · None    Procedures Performed:     · None    Significant Findings / Test Results:   CT of the chest with the following findings:    Impression:     1  No acute pulmonary embolus  2   Persistent right upper lobeairspace consolidation and spiculated nodular density superior segment left lower lobe  Additionally there are irregular densities in the left upper lobe  Given persistence of these findings  neoplasm is not excluded  Recommend further characterization with PET/CT if not already performed  3   7 mm right lower lobe pulmonary nodule new from prior exam  Based on current Fleischner Society 2017 Guidelines on incidental pulmonary nodule, followup non-contrast CT is recommended at 6-12 months from the initial examination and, if stable at that   time, an additional followup is recommended for 18-24 months from the initial examination  4   Centrilobular emphysematous changes  5   Abdominal aortic aneurysm is redemonstrated only partially visualized on this exam     Test Results Pending at Discharge (will require follow up): · None     Outpatient Tests Requested:  · Outpatient referral to his pulmonologist for consideration of PET-CT    Complications:  None    Reason for Admission:  Shortness of breath, please refer to admitting history physical    Hospital Course:     Artemio Robertson is a 70 y o  male patient who originally presented to the hospital on 12/30/2018 due to increasing shortness of breath, concern for possible pneumonia, please refer to admitting history and physical for details of presentation  Please see above list of diagnoses and related plan for additional information  Condition at Discharge: good     Discharge Day Visit / Exam:     Subjective:  Patient overall feels excellent, he has no acute complaints  Vitals: Blood Pressure: 136/77 (01/01/19 0703)  Pulse: 65 (01/01/19 0703)  Temperature: 98 2 °F (36 8 °C) (01/01/19 0703)  Temp Source: Temporal (01/01/19 0703)  Respirations: 16 (01/01/19 0703)  Height: 5' 8" (172 7 cm) (12/30/18 1721)  Weight - Scale: 75 8 kg (167 lb 1 7 oz) (12/30/18 1721)  SpO2: 90 % (01/01/19 1055)  Exam:   Physical Exam   Constitutional: He is oriented to person, place, and time  He appears well-developed and well-nourished  No distress     HENT: Head: Normocephalic and atraumatic  Mouth/Throat: Oropharynx is clear and moist  No oropharyngeal exudate  Eyes: Pupils are equal, round, and reactive to light  Conjunctivae and EOM are normal  Right eye exhibits no discharge  Left eye exhibits no discharge  No scleral icterus  Cardiovascular: Normal rate and regular rhythm  Pulmonary/Chest: Effort normal and breath sounds normal  No respiratory distress  He has no wheezes  He exhibits no tenderness  Patient is breath sounds are clear and symmetric bilaterally, there is no expiratory wheezing, no wheezes rales or rhonchi at all with excellent respiratory effort   Abdominal: Soft  Bowel sounds are normal  He exhibits no distension  There is no tenderness  Musculoskeletal: Normal range of motion  Neurological: He is alert and oriented to person, place, and time  No cranial nerve deficit  Coordination normal    Skin: Skin is warm and dry  He is not diaphoretic  No erythema  Psychiatric: He has a normal mood and affect  His behavior is normal  Judgment and thought content normal    Nursing note and vitals reviewed  Discussion with Family:  Will discuss discharge plan with patient's wife when she is available for     Discharge instructions/Information to patient and family:   See after visit summary for information provided to patient and family  Provisions for Follow-Up Care:  See after visit summary for information related to follow-up care and any pertinent home health orders  Disposition:     Home    Planned Readmission: no     Discharge Statement:  I spent 35 minutes discharging the patient  This time was spent on the day of discharge  I had direct contact with the patient on the day of discharge  Greater than 50% of the total time was spent examining patient, answering all patient questions, arranging and discussing plan of care with patient as well as directly providing post-discharge instructions    Additional time then spent on discharge activities  Discharge Medications:  See after visit summary for reconciled discharge medications provided to patient and family        ** Please Note: This note has been constructed using a voice recognition system **

## 2019-01-01 NOTE — INCIDENTAL FINDINGS
The following findings require follow up:  Radiographic finding   Finding: Impression:   1  No acute pulmonary embolus  2   Persistent right upper lobeairspace consolidation and spiculated nodular density superior segment left lower lobe  Additionally there are irregular densities in the left upper lobe  Given persistence of these findings  neoplasm is not excluded  Recommend further characterization with PET/CT if not already performed  3   7 mm right lower lobe pulmonary nodule new from prior exam  Based on current Fleischner Society 2017 Guidelines on incidental pulmonary nodule, followup non-contrast CT is recommended at 6-12 months from the initial examination and, if stable at that   time, an additional followup is recommended for 18-24 months from the initial examination  4   Centrilobular emphysematous changes    5   Abdominal aortic aneurysm is redemonstrated only partially visualized on this exam      Follow up required:  Pulmonary follow-up   Follow up should be done within 1 month(s)    Please notify the following clinician to assist with the follow up:   Dr Serena Lorenzo

## 2019-01-01 NOTE — ASSESSMENT & PLAN NOTE
Patient with dyspnea, tachypnea and borderline oxygen saturation of 90% on room air upon arrival in the ED  Lungs today are clear, there is no evidence of any bronchospasm, no further steroids needed  Patient has qualified for home oxygen  There is a questionable acute tracheobronchitis, no infiltrate seen on imaging, influenza and RSV testing negative  Patient has had marked clinical improvement with the initiation of oxygen, CT chest is abnormal, needs close outpatient follow-up with his pulmonologist as soon as possible  My suspicion is that patient has had some degree of chronic hypoxia due to abnormal CT scan and underlying emphysema  Patient will complete 5 additional days of p o  Ceftin for possible acute tracheobronchitis

## 2019-01-01 NOTE — RESPIRATORY THERAPY NOTE
Home Oxygen Qualifying Test       Patient name: Guru Cabrera        : 1940   Date of Test:  2019  Diagnosis: Acute Respiratory Failure with Hypoxia     Home Oxygen Test:    **Medicare Guidelines require item(s) 1-5 on all ambulatory patients or 1 and 2 on non-ambulatory patients  1   Baseline SPO2 on Room Air at rest 87 %  2   SPO2 during exercise on Room Air n/a %  During exercise monitor SpO2  If SPO2 increases >=89% with ambulation do not add supplemental             oxygen  If <= 88% on room air add O2 via NC and titrate patient  Patient must be ambulated with O2 and titrated to > 88% with exertion  3   SPO2 on Oxygen at rest 93 % on  2 lpm     4   SPO2 during exercise on Oxygen  90% on a liter flow of 2 lpm     5   Exercise performed:          walking, duration 10 (min), distance 250 (feet)          [x]  Supplemental Home Oxygen is indicated  []  Client does not qualify for home oxygen        Respiratory Additional Notes- None    Cuco Hernandez, RT

## 2019-01-01 NOTE — PLAN OF CARE
Problem: PAIN - ADULT  Goal: Verbalizes/displays adequate comfort level or baseline comfort level  Interventions:  - Encourage patient to monitor pain and request assistance  - Assess pain using appropriate pain scale  - Administer analgesics based on type and severity of pain and evaluate response  - Implement non-pharmacological measures as appropriate and evaluate response  - Consider cultural and social influences on pain and pain management  - Notify physician/advanced practitioner if interventions unsuccessful or patient reports new pain   Outcome: Progressing      Problem: INFECTION - ADULT  Goal: Absence or prevention of progression during hospitalization  INTERVENTIONS:  - Assess and monitor for signs and symptoms of infection  - Monitor lab/diagnostic results  - Monitor all insertion sites, i e  indwelling lines, tubes, and drains  - Administer medications as ordered  - Instruct and encourage patient and family to use good hand hygiene technique  - Identify and instruct in appropriate isolation precautions for identified infection/condition   Outcome: Progressing      Problem: SAFETY ADULT  Goal: Patient will remain free of falls  INTERVENTIONS:  - Assess patient frequently for physical needs  -  Identify cognitive and physical deficits and behaviors that affect risk of falls  -  [place] fall precautions as indicated by assessment   Low fall risk    - Educate patient/family on patient safety including physical limitations  - Instruct patient to call for assistance with activity based on assessment  - Modify environment to reduce risk of injury  - Consider OT/PT consult to assist with strengthening/mobility     Outcome: Progressing    Goal: Maintain or return to baseline ADL function  INTERVENTIONS:  -  Assess patient's ability to carry out ADLs; assess patient's baseline for ADL function and identify physical deficits which impact ability to perform ADLs (bathing, care of mouth/teeth, toileting, grooming, dressing, etc )  - Assess/evaluate cause of self-care deficits   - Assess range of motion  - Assess patient's mobility; develop plan if impaired  - Assess patient's need for assistive devices and provide as appropriate  - Encourage maximum independence but intervene and supervise when necessary  ¯ Involve family in performance of ADLs  ¯ Assess for home care needs following discharge   ¯ Request OT consult to assist with ADL evaluation and planning for discharge  ¯ Provide patient education as appropriate   Outcome: Progressing    Goal: Maintain or return mobility status to optimal level  INTERVENTIONS:  - Assess patient's baseline mobility status (ambulation, transfers, stairs, etc )    - Identify cognitive and physical deficits and behaviors that affect mobility  - Identify mobility aids required to assist with transfers and/or ambulation (gait belt, sit-to-stand, lift, walker, cane, etc )  - [place] fall precautions as indicated by assessment  - Record patient progress and toleration of activity level on Mobility SBAR; progress patient to next Phase/Stage  - Instruct patient to call for assistance with activity based on assessment  - Request Rehabilitation consult to assist with strengthening/weightbearing, etc    Outcome: Progressing      Problem: DISCHARGE PLANNING  Goal: Discharge to home or other facility with appropriate resources  INTERVENTIONS:  - Identify barriers to discharge w/patient and caregiver  - Arrange for needed discharge resources and transportation as appropriate  - Identify discharge learning needs (meds, wound care, etc )    - Refer to Case Management Department for coordinating discharge planning if the patient needs post-hospital services based on physician/advanced practitioner order or complex needs related to functional status, cognitive ability, or social support system   Outcome: Progressing      Problem: Knowledge Deficit  Goal: Patient/family/caregiver demonstrates understanding of disease process, treatment plan, medications, and discharge instructions  Complete learning assessment and assess knowledge base  Interventions:  - Provide teaching at level of understanding  - Provide teaching via preferred learning methods   Outcome: Progressing      Problem: Potential for Falls  Goal: Patient will remain free of falls  INTERVENTIONS:  - Assess patient frequently for physical needs  -  Identify cognitive and physical deficits and behaviors that affect risk of falls  -  [place] fall precautions as indicated by assessment   Low fall risk    - Educate patient/family on patient safety including physical limitations  - Instruct patient to call for assistance with activity based on assessment  - Modify environment to reduce risk of injury  - Consider OT/PT consult to assist with strengthening/mobility     Outcome: Progressing      Problem: DISCHARGE PLANNING - CARE MANAGEMENT  Goal: Discharge to post-acute care or home with appropriate resources  INTERVENTIONS:  - Conduct assessment to determine patient/family and health care team treatment goals, and need for post-acute services based on payer coverage, community resources, and patient preferences, and barriers to discharge  - Address psychosocial, clinical, and financial barriers to discharge as identified in assessment in conjunction with the patient/family and health care team  - Arrange appropriate level of post-acute services according to patients   needs and preference and payer coverage in collaboration with the physician and health care team  - Communicate with and update the patient/family, physician, and health care team regarding progress on the discharge plan  - Arrange appropriate transportation to post-acute venues   Outcome: Progressing

## 2019-01-02 ENCOUNTER — TELEPHONE (OUTPATIENT)
Dept: FAMILY MEDICINE CLINIC | Facility: CLINIC | Age: 79
End: 2019-01-02

## 2019-01-02 ENCOUNTER — TRANSITIONAL CARE MANAGEMENT (OUTPATIENT)
Dept: FAMILY MEDICINE CLINIC | Facility: CLINIC | Age: 79
End: 2019-01-02

## 2019-01-02 NOTE — TELEPHONE ENCOUNTER
Pt coming in for TCM Friday - Asking if you would please discuss giving up his 's license    Hospital doctor told him if he wanted to drive it would be okay

## 2019-01-04 ENCOUNTER — OFFICE VISIT (OUTPATIENT)
Dept: FAMILY MEDICINE CLINIC | Facility: CLINIC | Age: 79
End: 2019-01-04
Payer: MEDICARE

## 2019-01-04 VITALS
OXYGEN SATURATION: 98 % | WEIGHT: 172.8 LBS | BODY MASS INDEX: 26.19 KG/M2 | HEIGHT: 68 IN | SYSTOLIC BLOOD PRESSURE: 122 MMHG | DIASTOLIC BLOOD PRESSURE: 64 MMHG

## 2019-01-04 DIAGNOSIS — J44.9 COPD, SEVERE (HCC): ICD-10-CM

## 2019-01-04 DIAGNOSIS — K21.9 GASTROESOPHAGEAL REFLUX DISEASE WITHOUT ESOPHAGITIS: Primary | ICD-10-CM

## 2019-01-04 LAB
BACTERIA BLD CULT: NORMAL
BACTERIA BLD CULT: NORMAL

## 2019-01-04 PROCEDURE — 99495 TRANSJ CARE MGMT MOD F2F 14D: CPT | Performed by: FAMILY MEDICINE

## 2019-01-04 RX ORDER — RANITIDINE 150 MG/1
150 TABLET ORAL 2 TIMES DAILY
Qty: 60 TABLET | Refills: 3 | Status: SHIPPED | OUTPATIENT
Start: 2019-01-04 | End: 2019-04-02 | Stop reason: SDUPTHER

## 2019-01-04 NOTE — PROGRESS NOTES
Assessment/Plan:      Diagnoses and all orders for this visit:    Gastroesophageal reflux disease without esophagitis  -     ranitidine (ZANTAC) 150 mg tablet; Take 1 tablet (150 mg total) by mouth 2 (two) times a day         Subjective:     Patient ID: Ela Schneider is a 70 y o  male  pedro recent hospitalization  On continuous O2 but needs a portable unit because of the danger of falling from legs becoming entangled in the tubing        Review of Systems   Constitutional: Negative for activity change, appetite change, diaphoresis, fatigue and fever  HENT: Negative  Eyes: Negative  Respiratory: Positive for cough and wheezing  Negative for apnea, chest tightness and shortness of breath  Cardiovascular: Negative for chest pain, palpitations and leg swelling  Gastrointestinal: Negative for abdominal distention, abdominal pain, anal bleeding, constipation, diarrhea, nausea and vomiting  Endocrine: Negative for cold intolerance, heat intolerance, polydipsia, polyphagia and polyuria  Genitourinary: Negative for difficulty urinating, dysuria, flank pain, hematuria and urgency  Musculoskeletal: Negative for arthralgias, back pain, gait problem, joint swelling and myalgias  Skin: Negative for color change, rash and wound  Allergic/Immunologic: Negative for environmental allergies, food allergies and immunocompromised state  Neurological: Negative for dizziness, seizures, syncope, speech difficulty, numbness and headaches  Hematological: Negative for adenopathy  Does not bruise/bleed easily  Psychiatric/Behavioral: Negative for agitation, behavioral problems, hallucinations, sleep disturbance and suicidal ideas  Objective:     Physical Exam   Constitutional: He is oriented to person, place, and time  He appears well-developed and well-nourished  No distress  HENT:   Head: Normocephalic     Right Ear: External ear normal    Left Ear: External ear normal    Nose: Nose normal  Mouth/Throat: Oropharynx is clear and moist    Eyes: Pupils are equal, round, and reactive to light  Conjunctivae and EOM are normal  Right eye exhibits no discharge  Left eye exhibits no discharge  No scleral icterus  Neck: Normal range of motion  No tracheal deviation present  No thyromegaly present  Cardiovascular: Normal rate, regular rhythm and normal heart sounds  Exam reveals no gallop and no friction rub  No murmur heard  Pulmonary/Chest: Effort normal  No respiratory distress  He has wheezes  Abdominal: Soft  Bowel sounds are normal  He exhibits no mass  There is no tenderness  There is no guarding  Musculoskeletal: He exhibits no edema or deformity  Lymphadenopathy:     He has no cervical adenopathy  Neurological: He is alert and oriented to person, place, and time  No cranial nerve deficit  Skin: Skin is warm and dry  No rash noted  He is not diaphoretic  No erythema  Psychiatric: He has a normal mood and affect  Thought content normal          Vitals:    01/04/19 1423   BP: 122/64   BP Location: Left arm   Patient Position: Sitting   Cuff Size: Standard   SpO2: 98%   Weight: 78 4 kg (172 lb 12 8 oz)   Height: 5' 8" (1 727 m)       The following portions of the patient's history were reviewed and updated as appropriate:   He  has a past medical history of AAA (abdominal aortic aneurysm) ([address] ); Acid reflux; Acute exacerbation of chronic obstructive airways disease with asthma ([address] ); Acute serous otitis media of left ear; Anesthesia; Anxiety; Aortic aneurysm without rupture ([address] ); Arm bruise; Arthritis; Asthma; At risk for falls; BPH (benign prostatic hyperplasia); BPH without urinary obstruction; Cancer ([address] ); CAP (community acquired pneumonia); Cataracts, bilateral; Change in bowel function; CHF (congestive heart failure) ([address] ); Clubbing of fingers; Colon polyps; Common cold; Contusion of elbow, left; COPD (chronic obstructive pulmonary disease) ([address] );  Coronary artery disease; Cough; Dementia; Depression; Diverticulosis; Dizziness; Exercise counseling; Fatigue; Full dentures; Glaucoma screening; High cholesterol; History of abdominal aortic aneurysm (AAA) repair (08/2017); History of bacteremia; History of chronic obstructive lung disease; History of epistaxis; History of influenza vaccination; History of kidney stones; History of pneumonia; History of sepsis (10/2017); History of sinusitis; History of skin cancer; History of sleep apnea; History of urinary tract infection; [place] (hard of hearing); Hydronephrosis with obstructing calculus; Influenza vaccine needed; Insomnia; Jock itch; Kidney stones; Left hip pain; Need for pneumococcal vaccination; Need for prophylactic vaccination and inoculation against influenza; Other emphysema ([facility] ); Pancreatitis, chronic ([facility] ); Pneumonia (10/26/2017); Pulmonary emphysema ([facility] ); S/P CABG x 3; Screening for genitourinary condition; Screening for neurological condition; Sepsis ([facility] ); Short-term memory loss; Sleep apnea; Special screening examination for neoplasm of prostate; TIA involving carotid artery; Ulcer; Unsteady gait; Use of cane as ambulatory aid; Vitamin D deficiency; and Wears glasses    He   Patient Active Problem List    Diagnosis Date Noted    Acute respiratory failure with hypoxia ([facility] ) 12/30/2018    CAD (coronary artery disease) 05/15/2018    Elevated bilirubin 05/13/2018    Tracheobronchitis 05/13/2018    Coronary artery disease involving native coronary artery of native heart with angina pectoris ([facility] ) 05/11/2018    Hx of CABG 05/11/2018    Bilateral carotid artery stenosis 05/11/2018    Pulmonary nodule 03/07/2018    Ventricular bigeminy 03/07/2018    Bradycardia 03/06/2018    SOB (shortness of breath) 03/06/2018    Dementia 01/29/2018    History of aortic aneurysm repair 09/01/2017    Thrombocytopenia ([address] ) 06/02/2017    Abnormal CT scan, chest 06/01/2017    Abdominal aortic aneurysm ([facility] ) 06/01/2017    Hyperlipidemia 12/28/2016    GERD (gastroesophageal reflux disease) 12/28/2016    Benign prostatic hyperplasia 09/18/2015     He  has a past surgical history that includes Cardiac surgery; Ureteral stent placement; Excisional hemorrhoidectomy; Mouth surgery; pr esophagogastroduodenoscopy transoral diagnostic (N/A, 8/18/2016); Cataract extraction (Bilateral); Lithotripsy; Hernia repair; pr colonoscopy flx dx w/collj spec when pfrmd (N/A, 5/16/2017); Abdominal aortic aneurysm repair (08/23/2017); CAROTID ENDARTARECTOMY (Left, 11/1996); Coronary artery bypass graft (01/2003); Eye surgery (Bilateral); Cystoscopy; pr cystourethroscopy (N/A, 11/30/2017); pr remove bladder stone,<2 5 cm (N/A, 11/30/2017); Cystoscopy; AAA repair, endovascular; Tonsillectomy; and Back surgery  His family history includes Diabetes type II in his maternal grandmother and mother; Hypertension in his paternal grandmother; Kidney failure in his father  He  reports that he quit smoking about 5 years ago  He has a 90 00 pack-year smoking history  He has never used smokeless tobacco  He reports that he does not drink alcohol or use drugs  Current Outpatient Prescriptions   Medication Sig Dispense Refill    acetaminophen (TYLENOL) 500 mg tablet       aspirin 81 MG tablet Take 81 mg by mouth daily Took within 24 hours       atenolol (TENORMIN) 25 mg tablet Take 1 tablet (25 mg total) by mouth daily at bedtime 90 tablet 3    atorvastatin (LIPITOR) 20 mg tablet Take 1 tablet (20 mg total) by mouth daily at bedtime 90 tablet 3    Bacillus Coagulans-Inulin (PROBIOTIC FORMULA) 1-250 BILLION-MG CAPS Take 2 capsules by mouth      cefuroxime (CEFTIN) 500 mg tablet Take 1 tablet (500 mg total) by mouth every 12 (twelve) hours for 5 days 10 tablet 0    Cholecalciferol (VITAMIN D-3 PO) Take 2,000 Units by mouth daily        cyanocobalamin (VITAMIN B-12) 1,000 mcg tablet Take by mouth daily      donepezil (ARICEPT) 10 mg tablet Take 1 tablet (10 mg total) by mouth daily at bedtime for 30 days 30 tablet 0    escitalopram (LEXAPRO) 20 mg tablet Take 1 tablet (20 mg total) by mouth daily 30 tablet 0    Fluticasone-Salmeterol (AIRDUO RESPICLICK 72/61) 60-86 MCG/ACT AEPB Inhale 1 puff 2 (two) times a day AM & PM      KRILL OIL PO Take 500 mg by mouth daily   Melatonin 5 MG TABS Take 1 tablet by mouth daily at bedtime      montelukast (SINGULAIR) 10 mg tablet Take 1 tablet (10 mg total) by mouth daily 90 tablet 2    Multiple Vitamins-Minerals (CENTRUM SILVER ADULT 50+) TABS Take 1 tablet by mouth daily      Potassium Citrate ER 15 MEQ (1620 MG) TBCR TAKE 1 TABLET TWICE A  tablet 3    QUEtiapine (SEROquel) 50 mg tablet Take 1 tablet (50 mg total) by mouth daily at bedtime 90 tablet 1    tamsulosin (FLOMAX) 0 4 mg Take 1 capsule (0 4 mg total) by mouth daily 90 capsule 3    tiotropium (SPIRIVA HANDIHALER) 18 mcg inhalation capsule Place 18 mcg into inhaler and inhale daily   ranitidine (ZANTAC) 150 mg tablet Take 1 tablet (150 mg total) by mouth 2 (two) times a day 60 tablet 3     No current facility-administered medications for this visit  Current Outpatient Prescriptions on File Prior to Visit   Medication Sig    acetaminophen (TYLENOL) 500 mg tablet     aspirin 81 MG tablet Take 81 mg by mouth daily Took within 24 hours     atenolol (TENORMIN) 25 mg tablet Take 1 tablet (25 mg total) by mouth daily at bedtime    atorvastatin (LIPITOR) 20 mg tablet Take 1 tablet (20 mg total) by mouth daily at bedtime    Bacillus Coagulans-Inulin (PROBIOTIC FORMULA) 1-250 BILLION-MG CAPS Take 2 capsules by mouth    cefuroxime (CEFTIN) 500 mg tablet Take 1 tablet (500 mg total) by mouth every 12 (twelve) hours for 5 days    Cholecalciferol (VITAMIN D-3 PO) Take 2,000 Units by mouth daily      cyanocobalamin (VITAMIN B-12) 1,000 mcg tablet Take by mouth daily    donepezil (ARICEPT) 10 mg tablet Take 1 tablet (10 mg total) by mouth daily at bedtime for 30 days  escitalopram (LEXAPRO) 20 mg tablet Take 1 tablet (20 mg total) by mouth daily    Fluticasone-Salmeterol (AIRDUO RESPICLICK 04/20) 67-31 MCG/ACT AEPB Inhale 1 puff 2 (two) times a day AM & PM    KRILL OIL PO Take 500 mg by mouth daily   Melatonin 5 MG TABS Take 1 tablet by mouth daily at bedtime    montelukast (SINGULAIR) 10 mg tablet Take 1 tablet (10 mg total) by mouth daily    Multiple Vitamins-Minerals (CENTRUM SILVER ADULT 50+) TABS Take 1 tablet by mouth daily    Potassium Citrate ER 15 MEQ (1620 MG) TBCR TAKE 1 TABLET TWICE A DAY    QUEtiapine (SEROquel) 50 mg tablet Take 1 tablet (50 mg total) by mouth daily at bedtime    tamsulosin (FLOMAX) 0 4 mg Take 1 capsule (0 4 mg total) by mouth daily    tiotropium (SPIRIVA HANDIHALER) 18 mcg inhalation capsule Place 18 mcg into inhaler and inhale daily  No current facility-administered medications on file prior to visit  He is allergic to augmentin [amoxicillin-pot clavulanate]; ciprofloxacin; and morphine and related       Transitional Care Management Review:  Anabel Turpin is a 70 y o  male here for TCM follow up  During the TCM phone call patient stated:    TCM Call (since 12/4/2018)     Date and time call was made  1/2/2019 11:33 AM    Hospital care reviewed  Other diagnostic studies pending (CT NEEDED )    Patient was hospitialized at  [address] [place] [address]    Date of Admission  12/30/18    Date of discharge  01/01/19    Diagnosis  HYPOXIA    Disposition  Home    Were the patients medications reviewed and updated  No    Current Symptoms  None      TCM Call (since 12/4/2018)     Post hospital issues  None    Should patient be enrolled in anticoag monitoring? No    Scheduled for follow up?   Yes (PERRY 1/4/19)    Patients specialists  [facility]    Did you obtain your prescribed medications  Yes    Do you need help managing your prescriptions or medications  No    Is transportation to your appointment needed  No    I have advised the patient to call PCP with any new or worsening symptoms  [address] or [facility] other    Support System  Spouse    The type of support provided  Emotional; Physical    Do you have social support  Yes, some    Are you recieving any outpatient services  No    Are you recieving home care services  No    Are you using any community resources  No    Current waiver services  No    Have you fallen in the last 12 months  No    Interperter language line needed  No    Counseling  Patient              Fransico Shelby, DO

## 2019-01-25 DIAGNOSIS — F32.A DEPRESSION, UNSPECIFIED DEPRESSION TYPE: ICD-10-CM

## 2019-01-25 DIAGNOSIS — F03.90 DEMENTIA ARISING IN THE SENIUM AND PRESENIUM (HCC): ICD-10-CM

## 2019-01-25 RX ORDER — DONEPEZIL HYDROCHLORIDE 10 MG/1
10 TABLET, FILM COATED ORAL
Qty: 90 TABLET | Refills: 1 | Status: SHIPPED | OUTPATIENT
Start: 2019-01-25 | End: 2019-08-16 | Stop reason: SDUPTHER

## 2019-02-13 DIAGNOSIS — F32.A DEPRESSION, UNSPECIFIED DEPRESSION TYPE: ICD-10-CM

## 2019-02-13 RX ORDER — ESCITALOPRAM OXALATE 20 MG/1
20 TABLET ORAL DAILY
Qty: 90 TABLET | Refills: 3 | Status: SHIPPED | OUTPATIENT
Start: 2019-02-13 | End: 2020-02-10

## 2019-02-18 DIAGNOSIS — F02.80 EARLY ONSET ALZHEIMER'S DEMENTIA WITHOUT BEHAVIORAL DISTURBANCE (HCC): Primary | ICD-10-CM

## 2019-02-18 DIAGNOSIS — F03.90 DEMENTIA ARISING IN THE SENIUM AND PRESENIUM (HCC): ICD-10-CM

## 2019-02-18 DIAGNOSIS — G30.0 EARLY ONSET ALZHEIMER'S DEMENTIA WITHOUT BEHAVIORAL DISTURBANCE (HCC): Primary | ICD-10-CM

## 2019-03-01 ENCOUNTER — OFFICE VISIT (OUTPATIENT)
Dept: CARDIOLOGY CLINIC | Facility: HOSPITAL | Age: 79
End: 2019-03-01
Payer: MEDICARE

## 2019-03-01 VITALS
BODY MASS INDEX: 26.52 KG/M2 | SYSTOLIC BLOOD PRESSURE: 112 MMHG | HEIGHT: 68 IN | WEIGHT: 175 LBS | DIASTOLIC BLOOD PRESSURE: 68 MMHG

## 2019-03-01 DIAGNOSIS — I25.10 CORONARY ARTERY DISEASE INVOLVING NATIVE CORONARY ARTERY OF NATIVE HEART WITHOUT ANGINA PECTORIS: ICD-10-CM

## 2019-03-01 DIAGNOSIS — I65.23 BILATERAL CAROTID ARTERY STENOSIS: ICD-10-CM

## 2019-03-01 DIAGNOSIS — I49.8 VENTRICULAR BIGEMINY: ICD-10-CM

## 2019-03-01 DIAGNOSIS — R00.1 BRADYCARDIA: ICD-10-CM

## 2019-03-01 DIAGNOSIS — E78.2 MIXED HYPERLIPIDEMIA: ICD-10-CM

## 2019-03-01 DIAGNOSIS — I71.4 ABDOMINAL AORTIC ANEURYSM (AAA) WITHOUT RUPTURE (HCC): Primary | ICD-10-CM

## 2019-03-01 DIAGNOSIS — Z95.1 HX OF CABG: ICD-10-CM

## 2019-03-01 PROCEDURE — 99214 OFFICE O/P EST MOD 30 MIN: CPT | Performed by: INTERNAL MEDICINE

## 2019-03-01 NOTE — PROGRESS NOTES
Cardiology Follow Up    Debbie Ortiz  1940  [id number]  [address] [facility] [address] [address] [phone]    1  Abdominal aortic aneurysm (AAA) without rupture ([facility] )     2  Bilateral carotid artery stenosis     3  Coronary artery disease involving native coronary artery of native heart without angina pectoris     4  Ventricular bigeminy     5  Bradycardia     6  Hx of CABG     7  Mixed hyperlipidemia         Discussion/Summary:  Overall he has been doing well  Coronary disease stable without active angina  Good functional capacity for his age  We talked about diet exercise and strategies to keep himself physically fit  Blood pressures been well controlled  Lipids have been doing well continue current medications  Vascular surgery at [facility] follows his aneurysm repair  Interval History:78year-old gentleman history of coronary artery disease status post CABG in 2003, peripheral arterial disease status post carotid endarterectomy and abdominal aortic aneurysm repaired with EVAR, hypertension, hyperlipidemia, prior tobacco abuse presents to establish care with me in the office  He had some periods of palpitations and frequent PVCs along with lightheadedness  I ordered 2 week event recorder that did not show any significant abnormalities  Lowest heart rate was 50  No indication for pacemaker  No atrial fibrillation  Overall he has been doing well since her last appointment  He has been doing quite well since his last visit  He was hospitalized with a tracheobronchitis but from a cardiac standpoint he has been doing well  Denies any chest pain, shortness of breath, palpitations, lightheadedness, dizziness, or syncope  Overall he has been doing well      Problem List     Acute tracheobronchitis    COPD (chronic obstructive pulmonary disease) ([facility] ) Hyperlipidemia    GERD (gastroesophageal reflux disease)    Abnormal CT scan, chest    Abdominal aortic aneurysm (HCC)    Thrombocytopenia (HCC)    History of aortic aneurysm repair    Benign prostatic hyperplasia    Dementia    Bradycardia    SOB (shortness of breath)    Pulmonary nodule    Ventricular bigeminy    Positional lightheadedness    Coronary artery disease involving native coronary artery of native heart with angina pectoris (HCC)    Hx of CABG    Bilateral carotid artery stenosis    Elevated bilirubin    Tracheobronchitis    Hyponatremia    CAD (coronary artery disease)    Urinary retention        Past Medical History:   Diagnosis Date    AAA [facility]abdominal [facility] aneurysm) ([facility])     Acid reflux     Acute exacerbation of chronic obstructive airways disease with asthma ([facility])     Acute serous otitis media of left ear     recurrence not specified     Anesthesia     "always has mental changes /lingers and lingers after anesthesia"    Anxiety     Aortic aneurysm without rupture ([facility])     Arm bruise     right inner forearm "recent IV"    Arthritis     spine and poss left hip    Asthma     bronchietasis    At risk for falls     BPH (benign prostatic hyperplasia)     pt and wife can't confirm 11/10    BPH without urinary obstruction     Cancer ([address] )     skin CA on nose    CAP (community acquired pneumonia)     Cataracts, bilateral     Change in bowel function     CHF (congestive heart failure) ([facility])     Clubbing of fingers     Colon polyps     Common cold     Contusion of elbow, left     initial encounter     COPD (chronic obstructive pulmonary disease) ([address] )     Coronary artery disease     Cough     Dementia     Depression     Diverticulosis     Dizziness     upon "standing quickly on occas"    Exercise counseling     Fatigue     Full dentures     "doesn't wear them"    Glaucoma screening     High cholesterol     History of abdominal aortic aneurysm (AAA) repair 08/2017    History of bacteremia     History of chronic obstructive lung disease     History of epistaxis     History of influenza vaccination     History of kidney stones     History of pneumonia     "many times" "at least 5 times" almost always goes to sepsis"    History of sepsis 10/2017    History of sinusitis     History of skin cancer     History of sleep apnea     History of urinary tract infection     [place] (hard of hearing)     Hydronephrosis with obstructing calculus     Influenza vaccine needed     Insomnia     Jock itch     left/saw doctor 11/8 and started on antifungal cream    Kidney stones     Left hip pain     Need for pneumococcal vaccination     Need for prophylactic vaccination and inoculation against influenza     Other emphysema [facility] )     Pancreatitis, chronic ([facility] )     pt and wife can't confirm 11/10/17    Pneumonia 10/26/2017    admitted [facility]    Pulmonary emphysema ([facility] )     S/P CABG x 3     approx 14 yrs ago    Screening for genitourinary condition     Screening for neurological condition     Sepsis ([facility] )     due to unspecified organism     Short-term memory loss     Sleep apnea     does not use CPAP      Special screening examination for neoplasm of prostate     TIA involving carotid artery     "before carotid surgery"    Ulcer     stomach, "years ago"    Unsteady gait     Use of cane as ambulatory aid     sometimes    Vitamin D deficiency     Wears glasses      Social History     Socioeconomic History    Marital status: /Civil Union     Spouse name: Not on file    Number of children: Not on file    Years of education: Not on file    Highest education level: Not on file   Occupational History    Occupation:      Comment: Retired   Social Needs    Financial resource strain: Not on file    Food insecurity:     Worry: Not on file     Inability: Not on file   aScentias needs:     Medical: Not on file     Non-medical: Not on file   Tobacco Use  Smoking status: Former Smoker     Packs/day: 1 50     Years: 60 00     Pack years: 90 00     Last attempt to quit: 2014     Years since quittin 1    Smokeless tobacco: Never Used    Tobacco comment: quit 3 yrs ago, used to be a 1-1 5 ppd smoker   Substance and Sexual Activity    Alcohol use: No     Comment: quit 25 yrs ago    Drug use: No     Comment: No illicit drug use     Sexual activity: Not Currently   Lifestyle    Physical activity:     Days per week: Not on file     Minutes per session: Not on file    Stress: Not on file   Relationships    Social connections:     Talks on phone: Not on file     Gets together: Not on file     Attends Protestant service: Not on file     Active member of club or organization: Not on file     Attends meetings of clubs or organizations: Not on file     Relationship status: Not on file    Intimate partner violence:     Fear of current or ex partner: Not on file     Emotionally abused: Not on file     Physically abused: Not on file     Forced sexual activity: Not on file   Other Topics Concern    Not on file   Social History Narrative    Retired     No living Will     No advance directives     Daily caffeine consumption - 4-5 servings a day       Family History   Problem Relation Age of Onset    Diabetes type II Mother         mellitus    Kidney failure Father     Diabetes type II Maternal Grandmother         mellitus     Hypertension Paternal Grandmother         benign essential      Past Surgical History:   Procedure Laterality Date    ABDOMINAL AORTIC ANEURYSM REPAIR  2017    ABDOMINAL AORTIC ANEURYSM REPAIR, ENDOVASCULAR      BACK SURGERY      spinal stenosis    CARDIAC SURGERY      CABG x3    CAROTID ENDARTARECTOMY Left 1996    CATARACT EXTRACTION Bilateral     CORONARY ARTERY BYPASS GRAFT  01/2003    x3    CYSTOSCOPY      with insertion of ureteral stent     CYSTOSCOPY      with ureteroscopy with lithotripsy     EXCISIONAL HEMORRHOIDECTOMY      EYE SURGERY Bilateral     "for a wrinkle" after cataract surgery    HERNIA REPAIR      umbilical    LITHOTRIPSY      renal    MOUTH SURGERY      full mouth extraction     MD COLONOSCOPY FLX DX W/COLLJ SPEC WHEN PFRMD N/A 5/16/2017    Procedure: COLONOSCOPY with polypectomies/ hot snare and tattoo;  Surgeon: Janine Mar MD;  Location: [facility]; Service: Gastroenterology    MD CYSTOURETHROSCOPY N/A 11/30/2017    Procedure: Jarett Ornelas;  Surgeon: Sandeep Novak MD;  [facility];  Service: Urology    MD ESOPHAGOGASTRODUODENOSCOPY TRANSORAL DIAGNOSTIC N/A 8/18/2016    Procedure: ESOPHAGOGASTRODUODENOSCOPY (EGD); Surgeon: Janine Mar MD;  Location: [facility]; Service: Gastroenterology    MD REMOVE BLADDER STONE,<2 5 CM N/A 11/30/2017    Procedure: Diehl Daft;  Surgeon: Sandeep Novak MD;  [facility];  Service: Urology    TONSILLECTOMY     [address]      and removal       Current Outpatient Medications:     acetaminophen (TYLENOL) 500 mg tablet, , Disp: , Rfl:     aspirin 81 MG tablet, Take 81 mg by mouth daily Took within 24 hours , Disp: , Rfl:     atenolol (TENORMIN) 25 mg tablet, Take 1 tablet (25 mg total) by mouth daily at bedtime, Disp: 90 tablet, Rfl: 3    atorvastatin (LIPITOR) 20 mg tablet, Take 1 tablet (20 mg total) by mouth daily at bedtime, Disp: 90 tablet, Rfl: 3    Bacillus Coagulans-Inulin (PROBIOTIC FORMULA) 1-250 BILLION-MG CAPS, Take 2 capsules by mouth, Disp: , Rfl:     Cholecalciferol (VITAMIN D-3 PO), Take 2,000 Units by mouth daily  , Disp: , Rfl:     cyanocobalamin (VITAMIN B-12) 1,000 mcg tablet, Take by mouth daily, Disp: , Rfl:     donepezil (ARICEPT) 10 mg tablet, Take 1 tablet (10 mg total) by mouth daily at bedtime, Disp: 90 tablet, Rfl: 1    escitalopram (LEXAPRO) 20 mg tablet, Take 1 tablet (20 mg total) by mouth daily, Disp: 90 tablet, Rfl: 3    Fluticasone-Salmeterol (AIRDUO RESPICLICK 68/01) 20-80 MCG/ACT AEPB, Inhale 1 puff 2 (two) times a day AM & PM, Disp: , Rfl:     KRILL OIL PO, Take 500 mg by mouth daily  , Disp: , Rfl:     Melatonin 5 MG TABS, Take 1 tablet by mouth daily at bedtime, Disp: , Rfl:     montelukast (SINGULAIR) 10 mg tablet, Take 1 tablet (10 mg total) by mouth daily, Disp: 90 tablet, Rfl: 2    Multiple Vitamins-Minerals (CENTRUM SILVER ADULT 50+) TABS, Take 1 tablet by mouth daily, Disp: , Rfl:     Potassium Citrate ER 15 MEQ (1620 MG) TBCR, TAKE 1 TABLET TWICE A DAY, Disp: 180 tablet, Rfl: 3    QUEtiapine (SEROquel) 50 mg tablet, Take 1 tablet (50 mg total) by mouth daily at bedtime, Disp: 90 tablet, Rfl: 1    ranitidine (ZANTAC) 150 mg tablet, Take 1 tablet (150 mg total) by mouth 2 (two) times a day, Disp: 60 tablet, Rfl: 3    tamsulosin (FLOMAX) 0 4 mg, Take 1 capsule (0 4 mg total) by mouth daily, Disp: 90 capsule, Rfl: 3    tiotropium (SPIRIVA HANDIHALER) 18 mcg inhalation capsule, Place 18 mcg into inhaler and inhale daily  , Disp: , Rfl:   Allergies   Allergen Reactions    Augmentin [Amoxicillin-Pot Clavulanate] Diarrhea    Ciprofloxacin Hives    Morphine And Related Other (See Comments)     Change in mental status       Labs:     Chemistry        Component Value Date/Time     11/29/2014 1416    K 4 2 12/31/2018 0611    K 3 6 11/29/2014 1416     12/31/2018 0611     11/29/2014 1416    CO2 21 12/31/2018 0611    CO2 30 6 11/29/2014 1416    BUN 15 12/31/2018 0611    BUN 13 11/29/2014 1416    CREATININE 0 94 12/31/2018 0611    CREATININE 0 94 08/31/2015 1715        Component Value Date/Time    CALCIUM 8 1 (L) 12/31/2018 0611    CALCIUM 8 9 11/29/2014 1416    ALKPHOS 117 (H) 12/30/2018 0925    ALKPHOS 122 11/29/2014 1416    AST 19 12/30/2018 0925    AST 28 11/29/2014 1416    ALT 29 12/30/2018 0925    ALT 38 11/29/2014 1416    BILITOT 0 7 11/29/2014 1416            No results found for: CHOL  Lab Results   Component Value Date    HDL 30 (L) 10/01/2018    HDL 32 (L) 04/08/2016     Lab Results   Component Value Date    LDLCALC 82 10/01/2018    LDLCALC 104 (H) 04/08/2016     Lab Results   Component Value Date    TRIG 136 10/01/2018    TRIG 81 04/08/2016     No results found for: CHOLHDL    Imaging: No results found  ECG:        Review of Systems   Constitution: Negative  HENT: Negative  Eyes: Negative  Cardiovascular: Negative  Respiratory: Negative  Endocrine: Negative  Hematologic/Lymphatic: Negative  Skin: Negative  Musculoskeletal: Negative  Gastrointestinal: Negative  Genitourinary: Negative  Neurological: Negative  Psychiatric/Behavioral: Negative  Vitals:    03/01/19 1428   BP: 112/68     Vitals:    03/01/19 1428   Weight: 79 4 kg (175 lb)     Height: 5' 8" (172 7 cm)   Body mass index is 26 61 kg/m²      Physical Exam:  General:  Alert and cooperative, appears stated age  HEENT:  PERRLA, EOMI, no scleral icterus, no conjunctival pallor  Neck:  No lymphadenopathy, no thyromegaly, no carotid bruits, no elevated JVP  Heart[de-identified]  Regular rate and rhythm, normal S1/S2, no S3/S4, no murmur  Lungs:  Clear to auscultation bilaterally   Abdomen:  Soft, non-tender, positive bowel sounds, no rebound or guarding,   no organomegaly   Extremities:  No clubbing, cyanosis or edema   Vascular:  2+ pedal pulses  Skin:  No rashes or lesions on exposed skin  Neurologic:  Cranial nerves II-XII grossly intact without focal deficits

## 2019-04-02 ENCOUNTER — LAB (OUTPATIENT)
Dept: LAB | Facility: MEDICAL CENTER | Age: 79
End: 2019-04-02
Payer: MEDICARE

## 2019-04-02 ENCOUNTER — OFFICE VISIT (OUTPATIENT)
Dept: FAMILY MEDICINE CLINIC | Facility: CLINIC | Age: 79
End: 2019-04-02
Payer: MEDICARE

## 2019-04-02 VITALS
SYSTOLIC BLOOD PRESSURE: 116 MMHG | HEIGHT: 68 IN | DIASTOLIC BLOOD PRESSURE: 64 MMHG | BODY MASS INDEX: 26.67 KG/M2 | WEIGHT: 176 LBS

## 2019-04-02 DIAGNOSIS — K21.9 GASTROESOPHAGEAL REFLUX DISEASE WITHOUT ESOPHAGITIS: ICD-10-CM

## 2019-04-02 DIAGNOSIS — E80.6 HYPERBILIRUBINEMIA: ICD-10-CM

## 2019-04-02 DIAGNOSIS — F03.90 DEMENTIA WITHOUT BEHAVIORAL DISTURBANCE, UNSPECIFIED DEMENTIA TYPE (HCC): Primary | ICD-10-CM

## 2019-04-02 LAB
ALBUMIN SERPL BCP-MCNC: 4.1 G/DL (ref 3.5–5)
ALP SERPL-CCNC: 108 U/L (ref 46–116)
ALT SERPL W P-5'-P-CCNC: 24 U/L (ref 12–78)
ANION GAP SERPL CALCULATED.3IONS-SCNC: 5 MMOL/L (ref 4–13)
AST SERPL W P-5'-P-CCNC: 18 U/L (ref 5–45)
BILIRUB DIRECT SERPL-MCNC: 0.34 MG/DL (ref 0–0.2)
BILIRUB SERPL-MCNC: 1.67 MG/DL (ref 0.2–1)
BUN SERPL-MCNC: 12 MG/DL (ref 5–25)
CALCIUM SERPL-MCNC: 9.1 MG/DL (ref 8.3–10.1)
CHLORIDE SERPL-SCNC: 108 MMOL/L (ref 100–108)
CO2 SERPL-SCNC: 26 MMOL/L (ref 21–32)
CREAT SERPL-MCNC: 1.24 MG/DL (ref 0.6–1.3)
GFR SERPL CREATININE-BSD FRML MDRD: 56 ML/MIN/1.73SQ M
GLUCOSE SERPL-MCNC: 91 MG/DL (ref 65–140)
POTASSIUM SERPL-SCNC: 4.3 MMOL/L (ref 3.5–5.3)
PROT SERPL-MCNC: 7.7 G/DL (ref 6.4–8.2)
SODIUM SERPL-SCNC: 139 MMOL/L (ref 136–145)

## 2019-04-02 PROCEDURE — 82248 BILIRUBIN DIRECT: CPT

## 2019-04-02 PROCEDURE — 99213 OFFICE O/P EST LOW 20 MIN: CPT | Performed by: FAMILY MEDICINE

## 2019-04-02 PROCEDURE — 80053 COMPREHEN METABOLIC PANEL: CPT | Performed by: FAMILY MEDICINE

## 2019-04-02 PROCEDURE — 36415 COLL VENOUS BLD VENIPUNCTURE: CPT

## 2019-04-02 RX ORDER — RANITIDINE 150 MG/1
150 TABLET ORAL 2 TIMES DAILY
Qty: 180 TABLET | Refills: 1 | Status: SHIPPED | OUTPATIENT
Start: 2019-04-02 | End: 2019-09-12 | Stop reason: SDUPTHER

## 2019-04-02 RX ORDER — MEMANTINE HYDROCHLORIDE 5 MG/1
5 TABLET ORAL 2 TIMES DAILY
COMMUNITY
End: 2020-10-29 | Stop reason: HOSPADM

## 2019-04-12 DIAGNOSIS — J30.1 ALLERGIC RHINITIS DUE TO POLLEN, UNSPECIFIED SEASONALITY: ICD-10-CM

## 2019-04-12 RX ORDER — MONTELUKAST SODIUM 10 MG/1
10 TABLET ORAL DAILY
Qty: 90 TABLET | Refills: 2 | Status: SHIPPED | OUTPATIENT
Start: 2019-04-12 | End: 2019-07-12 | Stop reason: SDUPTHER

## 2019-04-18 DIAGNOSIS — K21.9 GASTROESOPHAGEAL REFLUX DISEASE WITHOUT ESOPHAGITIS: ICD-10-CM

## 2019-04-18 RX ORDER — RANITIDINE 150 MG/1
TABLET ORAL
Qty: 60 TABLET | Refills: 3 | Status: SHIPPED | OUTPATIENT
Start: 2019-04-18 | End: 2019-07-01

## 2019-04-26 ENCOUNTER — APPOINTMENT (OUTPATIENT)
Dept: LAB | Facility: HOSPITAL | Age: 79
End: 2019-04-26
Payer: MEDICARE

## 2019-04-26 ENCOUNTER — TRANSCRIBE ORDERS (OUTPATIENT)
Dept: ADMINISTRATIVE | Facility: HOSPITAL | Age: 79
End: 2019-04-26

## 2019-04-26 DIAGNOSIS — Z98.890 POST-OPERATIVE STATE: ICD-10-CM

## 2019-04-26 DIAGNOSIS — Z98.890 POST-OPERATIVE STATE: Primary | ICD-10-CM

## 2019-04-26 LAB
ANION GAP SERPL CALCULATED.3IONS-SCNC: 7 MMOL/L (ref 4–13)
BUN SERPL-MCNC: 14 MG/DL (ref 5–25)
CALCIUM SERPL-MCNC: 9.1 MG/DL (ref 8.3–10.1)
CHLORIDE SERPL-SCNC: 104 MMOL/L (ref 100–108)
CO2 SERPL-SCNC: 28 MMOL/L (ref 21–32)
CREAT SERPL-MCNC: 1.16 MG/DL (ref 0.6–1.3)
GFR SERPL CREATININE-BSD FRML MDRD: 61 ML/MIN/1.73SQ M
GLUCOSE SERPL-MCNC: 120 MG/DL (ref 65–140)
POTASSIUM SERPL-SCNC: 4 MMOL/L (ref 3.5–5.3)
SODIUM SERPL-SCNC: 139 MMOL/L (ref 136–145)

## 2019-04-26 PROCEDURE — 80048 BASIC METABOLIC PNL TOTAL CA: CPT

## 2019-04-26 PROCEDURE — 36415 COLL VENOUS BLD VENIPUNCTURE: CPT

## 2019-05-17 ENCOUNTER — TRANSCRIBE ORDERS (OUTPATIENT)
Dept: ADMINISTRATIVE | Facility: HOSPITAL | Age: 79
End: 2019-05-17

## 2019-05-17 DIAGNOSIS — I71.4 ABDOMINAL AORTIC ANEURYSM WITHOUT RUPTURE (HCC): Primary | ICD-10-CM

## 2019-05-20 ENCOUNTER — HOSPITAL ENCOUNTER (OUTPATIENT)
Dept: RADIOLOGY | Facility: HOSPITAL | Age: 79
Discharge: HOME | End: 2019-05-20
Payer: MEDICARE

## 2019-05-20 ENCOUNTER — HOSPITAL ENCOUNTER (OUTPATIENT)
Dept: ULTRASOUND IMAGING | Facility: HOSPITAL | Age: 79
Discharge: HOME | End: 2019-05-20
Payer: MEDICARE

## 2019-05-20 DIAGNOSIS — N20.0 NEPHROLITHIASIS: ICD-10-CM

## 2019-05-20 PROCEDURE — 74018 RADEX ABDOMEN 1 VIEW: CPT

## 2019-05-20 PROCEDURE — 76770 US EXAM ABDO BACK WALL COMP: CPT

## 2019-05-23 ENCOUNTER — HOSPITAL ENCOUNTER (OUTPATIENT)
Dept: CT IMAGING | Facility: HOSPITAL | Age: 79
Discharge: HOME | End: 2019-05-23
Payer: MEDICARE

## 2019-05-23 DIAGNOSIS — I71.4 ABDOMINAL AORTIC ANEURYSM WITHOUT RUPTURE (HCC): ICD-10-CM

## 2019-05-23 PROCEDURE — 74174 CTA ABD&PLVS W/CONTRAST: CPT

## 2019-05-23 RX ADMIN — IOHEXOL 100 ML: 350 INJECTION, SOLUTION INTRAVENOUS at 11:39

## 2019-06-06 ENCOUNTER — OFFICE VISIT (OUTPATIENT)
Dept: UROLOGY | Facility: CLINIC | Age: 79
End: 2019-06-06
Payer: MEDICARE

## 2019-06-06 VITALS
DIASTOLIC BLOOD PRESSURE: 80 MMHG | HEIGHT: 68 IN | BODY MASS INDEX: 26.52 KG/M2 | SYSTOLIC BLOOD PRESSURE: 112 MMHG | WEIGHT: 175 LBS

## 2019-06-06 DIAGNOSIS — R33.9 URINARY RETENTION: Primary | ICD-10-CM

## 2019-06-06 DIAGNOSIS — N20.0 RENAL CALCULI: ICD-10-CM

## 2019-06-06 LAB — POST-VOID RESIDUAL VOLUME, ML POC: 47 ML

## 2019-06-06 PROCEDURE — 99214 OFFICE O/P EST MOD 30 MIN: CPT | Performed by: PHYSICIAN ASSISTANT

## 2019-06-06 PROCEDURE — 1124F ACP DISCUSS-NO DSCNMKR DOCD: CPT | Performed by: PHYSICIAN ASSISTANT

## 2019-06-06 PROCEDURE — 51798 US URINE CAPACITY MEASURE: CPT | Performed by: PHYSICIAN ASSISTANT

## 2019-06-06 RX ORDER — POTASSIUM CITRATE 15 MEQ/1
1 TABLET, EXTENDED RELEASE ORAL 2 TIMES DAILY
Qty: 180 TABLET | Refills: 3 | Status: SHIPPED | OUTPATIENT
Start: 2019-06-06 | End: 2020-02-20 | Stop reason: HOSPADM

## 2019-06-06 RX ORDER — TAMSULOSIN HYDROCHLORIDE 0.4 MG/1
0.4 CAPSULE ORAL DAILY
Qty: 90 CAPSULE | Refills: 3 | Status: SHIPPED | OUTPATIENT
Start: 2019-06-06 | End: 2020-07-27

## 2019-06-14 ENCOUNTER — TELEPHONE (OUTPATIENT)
Dept: FAMILY MEDICINE CLINIC | Facility: CLINIC | Age: 79
End: 2019-06-14

## 2019-06-14 ENCOUNTER — HOSPITAL ENCOUNTER (INPATIENT)
Facility: HOSPITAL | Age: 79
LOS: 4 days | Discharge: HOME | DRG: 193 | End: 2019-06-18
Attending: EMERGENCY MEDICINE | Admitting: INTERNAL MEDICINE
Payer: MEDICARE

## 2019-06-14 ENCOUNTER — APPOINTMENT (EMERGENCY)
Dept: RADIOLOGY | Facility: HOSPITAL | Age: 79
DRG: 193 | End: 2019-06-14
Payer: MEDICARE

## 2019-06-14 DIAGNOSIS — J18.9 RIGHT UPPER LOBE PNEUMONIA: Primary | ICD-10-CM

## 2019-06-14 DIAGNOSIS — J96.01 ACUTE RESPIRATORY FAILURE WITH HYPOXIA (HCC): ICD-10-CM

## 2019-06-14 DIAGNOSIS — J18.9 COMMUNITY ACQUIRED PNEUMONIA OF RIGHT UPPER LOBE OF LUNG: ICD-10-CM

## 2019-06-14 DIAGNOSIS — J44.9 CHRONIC OBSTRUCTIVE PULMONARY DISEASE, UNSPECIFIED COPD TYPE (HCC): ICD-10-CM

## 2019-06-14 PROBLEM — R79.89 ELEVATED SERUM CREATININE: Status: ACTIVE | Noted: 2019-06-14

## 2019-06-14 LAB
ALBUMIN SERPL BCP-MCNC: 3.8 G/DL (ref 3.5–5)
ALP SERPL-CCNC: 120 U/L (ref 46–116)
ALT SERPL W P-5'-P-CCNC: 20 U/L (ref 12–78)
ANION GAP SERPL CALCULATED.3IONS-SCNC: 10 MMOL/L (ref 4–13)
AST SERPL W P-5'-P-CCNC: 17 U/L (ref 5–45)
ATRIAL RATE: 85 BPM
BASOPHILS # BLD AUTO: 0.06 THOUSANDS/ΜL (ref 0–0.1)
BASOPHILS NFR BLD AUTO: 1 % (ref 0–1)
BILIRUB SERPL-MCNC: 2.4 MG/DL (ref 0.2–1)
BILIRUB UR QL STRIP: NEGATIVE
BUN SERPL-MCNC: 14 MG/DL (ref 5–25)
CALCIUM SERPL-MCNC: 9.1 MG/DL (ref 8.3–10.1)
CHLORIDE SERPL-SCNC: 101 MMOL/L (ref 100–108)
CLARITY UR: CLEAR
CO2 SERPL-SCNC: 26 MMOL/L (ref 21–32)
COLOR UR: YELLOW
CREAT SERPL-MCNC: 1.31 MG/DL (ref 0.6–1.3)
EOSINOPHIL # BLD AUTO: 0.01 THOUSAND/ΜL (ref 0–0.61)
EOSINOPHIL NFR BLD AUTO: 0 % (ref 0–6)
ERYTHROCYTE [DISTWIDTH] IN BLOOD BY AUTOMATED COUNT: 14.4 % (ref 11.6–15.1)
GFR SERPL CREATININE-BSD FRML MDRD: 53 ML/MIN/1.73SQ M
GLUCOSE SERPL-MCNC: 111 MG/DL (ref 65–140)
GLUCOSE UR STRIP-MCNC: NEGATIVE MG/DL
HCT VFR BLD AUTO: 52.8 % (ref 36.5–49.3)
HGB BLD-MCNC: 17.4 G/DL (ref 12–17)
HGB UR QL STRIP.AUTO: NEGATIVE
HOLD SPECIMEN: NORMAL
IMM GRANULOCYTES # BLD AUTO: 0.07 THOUSAND/UL (ref 0–0.2)
IMM GRANULOCYTES NFR BLD AUTO: 1 % (ref 0–2)
KETONES UR STRIP-MCNC: NEGATIVE MG/DL
LACTATE SERPL-SCNC: 1.6 MMOL/L (ref 0.5–2)
LEUKOCYTE ESTERASE UR QL STRIP: NEGATIVE
LYMPHOCYTES # BLD AUTO: 1.15 THOUSANDS/ΜL (ref 0.6–4.47)
LYMPHOCYTES NFR BLD AUTO: 10 % (ref 14–44)
MCH RBC QN AUTO: 31.9 PG (ref 26.8–34.3)
MCHC RBC AUTO-ENTMCNC: 33 G/DL (ref 31.4–37.4)
MCV RBC AUTO: 97 FL (ref 82–98)
MONOCYTES # BLD AUTO: 0.91 THOUSAND/ΜL (ref 0.17–1.22)
MONOCYTES NFR BLD AUTO: 8 % (ref 4–12)
NEUTROPHILS # BLD AUTO: 9.86 THOUSANDS/ΜL (ref 1.85–7.62)
NEUTS SEG NFR BLD AUTO: 80 % (ref 43–75)
NITRITE UR QL STRIP: NEGATIVE
NRBC BLD AUTO-RTO: 0 /100 WBCS
P AXIS: 63 DEGREES
PH UR STRIP.AUTO: 7 [PH]
PLATELET # BLD AUTO: 122 THOUSANDS/UL (ref 149–390)
PMV BLD AUTO: 10 FL (ref 8.9–12.7)
POTASSIUM SERPL-SCNC: 4.3 MMOL/L (ref 3.5–5.3)
PR INTERVAL: 212 MS
PROCALCITONIN SERPL-MCNC: 0.18 NG/ML
PROT SERPL-MCNC: 7.3 G/DL (ref 6.4–8.2)
PROT UR STRIP-MCNC: NEGATIVE MG/DL
QRS AXIS: 71 DEGREES
QRSD INTERVAL: 86 MS
QT INTERVAL: 386 MS
QTC INTERVAL: 459 MS
RBC # BLD AUTO: 5.45 MILLION/UL (ref 3.88–5.62)
SODIUM SERPL-SCNC: 137 MMOL/L (ref 136–145)
SP GR UR STRIP.AUTO: <=1.005 (ref 1–1.03)
T WAVE AXIS: 78 DEGREES
TROPONIN I SERPL-MCNC: <0.02 NG/ML
UROBILINOGEN UR QL STRIP.AUTO: 0.2 E.U./DL
VENTRICULAR RATE: 85 BPM
WBC # BLD AUTO: 12.06 THOUSAND/UL (ref 4.31–10.16)

## 2019-06-14 PROCEDURE — 85025 COMPLETE CBC W/AUTO DIFF WBC: CPT

## 2019-06-14 PROCEDURE — 94762 N-INVAS EAR/PLS OXIMTRY CONT: CPT

## 2019-06-14 PROCEDURE — 83605 ASSAY OF LACTIC ACID: CPT | Performed by: EMERGENCY MEDICINE

## 2019-06-14 PROCEDURE — 99284 EMERGENCY DEPT VISIT MOD MDM: CPT | Performed by: EMERGENCY MEDICINE

## 2019-06-14 PROCEDURE — 87040 BLOOD CULTURE FOR BACTERIA: CPT | Performed by: EMERGENCY MEDICINE

## 2019-06-14 PROCEDURE — 87449 NOS EACH ORGANISM AG IA: CPT | Performed by: PHYSICIAN ASSISTANT

## 2019-06-14 PROCEDURE — 94760 N-INVAS EAR/PLS OXIMETRY 1: CPT

## 2019-06-14 PROCEDURE — 93005 ELECTROCARDIOGRAM TRACING: CPT

## 2019-06-14 PROCEDURE — 36415 COLL VENOUS BLD VENIPUNCTURE: CPT

## 2019-06-14 PROCEDURE — 81003 URINALYSIS AUTO W/O SCOPE: CPT | Performed by: EMERGENCY MEDICINE

## 2019-06-14 PROCEDURE — 71046 X-RAY EXAM CHEST 2 VIEWS: CPT

## 2019-06-14 PROCEDURE — 99285 EMERGENCY DEPT VISIT HI MDM: CPT

## 2019-06-14 PROCEDURE — 93010 ELECTROCARDIOGRAM REPORT: CPT | Performed by: INTERNAL MEDICINE

## 2019-06-14 PROCEDURE — 84484 ASSAY OF TROPONIN QUANT: CPT

## 2019-06-14 PROCEDURE — 80053 COMPREHEN METABOLIC PANEL: CPT

## 2019-06-14 PROCEDURE — 96365 THER/PROPH/DIAG IV INF INIT: CPT

## 2019-06-14 PROCEDURE — 99223 1ST HOSP IP/OBS HIGH 75: CPT | Performed by: FAMILY MEDICINE

## 2019-06-14 PROCEDURE — 94640 AIRWAY INHALATION TREATMENT: CPT

## 2019-06-14 PROCEDURE — 84145 PROCALCITONIN (PCT): CPT | Performed by: PHYSICIAN ASSISTANT

## 2019-06-14 RX ORDER — ACETAMINOPHEN 325 MG/1
650 TABLET ORAL EVERY 6 HOURS PRN
Status: DISCONTINUED | OUTPATIENT
Start: 2019-06-14 | End: 2019-06-18 | Stop reason: HOSPADM

## 2019-06-14 RX ORDER — CEFEPIME HYDROCHLORIDE 2 G/50ML
2000 INJECTION, SOLUTION INTRAVENOUS ONCE
Status: COMPLETED | OUTPATIENT
Start: 2019-06-14 | End: 2019-06-14

## 2019-06-14 RX ORDER — QUETIAPINE FUMARATE 25 MG/1
50 TABLET, FILM COATED ORAL
Status: DISCONTINUED | OUTPATIENT
Start: 2019-06-14 | End: 2019-06-18 | Stop reason: HOSPADM

## 2019-06-14 RX ORDER — IPRATROPIUM BROMIDE AND ALBUTEROL SULFATE 2.5; .5 MG/3ML; MG/3ML
3 SOLUTION RESPIRATORY (INHALATION) ONCE
Status: COMPLETED | OUTPATIENT
Start: 2019-06-14 | End: 2019-06-14

## 2019-06-14 RX ORDER — ASPIRIN 81 MG/1
81 TABLET, CHEWABLE ORAL DAILY
Status: DISCONTINUED | OUTPATIENT
Start: 2019-06-15 | End: 2019-06-18 | Stop reason: HOSPADM

## 2019-06-14 RX ORDER — FAMOTIDINE 20 MG/1
20 TABLET, FILM COATED ORAL 2 TIMES DAILY
Status: DISCONTINUED | OUTPATIENT
Start: 2019-06-14 | End: 2019-06-18 | Stop reason: HOSPADM

## 2019-06-14 RX ORDER — ATENOLOL 25 MG/1
25 TABLET ORAL
Status: DISCONTINUED | OUTPATIENT
Start: 2019-06-14 | End: 2019-06-18 | Stop reason: HOSPADM

## 2019-06-14 RX ORDER — CHOLECALCIFEROL (VITAMIN D3) 125 MCG
500 CAPSULE ORAL DAILY
Status: DISCONTINUED | OUTPATIENT
Start: 2019-06-15 | End: 2019-06-18 | Stop reason: HOSPADM

## 2019-06-14 RX ORDER — DONEPEZIL HYDROCHLORIDE 5 MG/1
10 TABLET, FILM COATED ORAL
Status: DISCONTINUED | OUTPATIENT
Start: 2019-06-14 | End: 2019-06-18 | Stop reason: HOSPADM

## 2019-06-14 RX ORDER — SACCHAROMYCES BOULARDII 250 MG
250 CAPSULE ORAL 2 TIMES DAILY
Status: DISCONTINUED | OUTPATIENT
Start: 2019-06-14 | End: 2019-06-18 | Stop reason: HOSPADM

## 2019-06-14 RX ORDER — CEFTRIAXONE 1 G/50ML
1000 INJECTION, SOLUTION INTRAVENOUS EVERY 24 HOURS
Status: DISCONTINUED | OUTPATIENT
Start: 2019-06-15 | End: 2019-06-18 | Stop reason: HOSPADM

## 2019-06-14 RX ORDER — ALBUTEROL SULFATE 2.5 MG/3ML
2.5 SOLUTION RESPIRATORY (INHALATION) EVERY 6 HOURS PRN
Status: DISCONTINUED | OUTPATIENT
Start: 2019-06-14 | End: 2019-06-18 | Stop reason: HOSPADM

## 2019-06-14 RX ORDER — ONDANSETRON 2 MG/ML
4 INJECTION INTRAMUSCULAR; INTRAVENOUS EVERY 6 HOURS PRN
Status: DISCONTINUED | OUTPATIENT
Start: 2019-06-14 | End: 2019-06-18 | Stop reason: HOSPADM

## 2019-06-14 RX ORDER — YOHIMBE BARK 500 MG
4 CAPSULE ORAL
Status: ON HOLD | COMMUNITY
End: 2019-07-01 | Stop reason: CLARIF

## 2019-06-14 RX ORDER — ESCITALOPRAM OXALATE 10 MG/1
20 TABLET ORAL DAILY
Status: DISCONTINUED | OUTPATIENT
Start: 2019-06-15 | End: 2019-06-18 | Stop reason: HOSPADM

## 2019-06-14 RX ORDER — KRILL/OM-3/DHA/EPA/PHOSPHO/AST 500MG-86MG
500 CAPSULE ORAL DAILY
Status: DISCONTINUED | OUTPATIENT
Start: 2019-06-15 | End: 2019-06-14

## 2019-06-14 RX ORDER — ATORVASTATIN CALCIUM 10 MG/1
20 TABLET, FILM COATED ORAL
Status: DISCONTINUED | OUTPATIENT
Start: 2019-06-14 | End: 2019-06-18 | Stop reason: HOSPADM

## 2019-06-14 RX ORDER — TAMSULOSIN HYDROCHLORIDE 0.4 MG/1
0.4 CAPSULE ORAL
Status: DISCONTINUED | OUTPATIENT
Start: 2019-06-14 | End: 2019-06-18 | Stop reason: HOSPADM

## 2019-06-14 RX ORDER — LANOLIN ALCOHOL/MO/W.PET/CERES
6 CREAM (GRAM) TOPICAL
Status: DISCONTINUED | OUTPATIENT
Start: 2019-06-14 | End: 2019-06-18 | Stop reason: HOSPADM

## 2019-06-14 RX ORDER — FLUTICASONE FUROATE AND VILANTEROL 200; 25 UG/1; UG/1
1 POWDER RESPIRATORY (INHALATION) DAILY
Status: DISCONTINUED | OUTPATIENT
Start: 2019-06-15 | End: 2019-06-18 | Stop reason: HOSPADM

## 2019-06-14 RX ORDER — MONTELUKAST SODIUM 10 MG/1
10 TABLET ORAL DAILY
Status: DISCONTINUED | OUTPATIENT
Start: 2019-06-15 | End: 2019-06-18 | Stop reason: HOSPADM

## 2019-06-14 RX ORDER — SODIUM CHLORIDE 9 MG/ML
100 INJECTION, SOLUTION INTRAVENOUS CONTINUOUS
Status: DISCONTINUED | OUTPATIENT
Start: 2019-06-14 | End: 2019-06-15

## 2019-06-14 RX ORDER — MEMANTINE HYDROCHLORIDE 5 MG/1
5 TABLET ORAL DAILY
Status: DISCONTINUED | OUTPATIENT
Start: 2019-06-15 | End: 2019-06-18 | Stop reason: HOSPADM

## 2019-06-14 RX ORDER — MELATONIN
2000 DAILY
Status: DISCONTINUED | OUTPATIENT
Start: 2019-06-15 | End: 2019-06-18 | Stop reason: HOSPADM

## 2019-06-14 RX ADMIN — SODIUM CHLORIDE 100 ML/HR: 0.9 INJECTION, SOLUTION INTRAVENOUS at 17:53

## 2019-06-14 RX ADMIN — MELATONIN 6 MG: at 21:26

## 2019-06-14 RX ADMIN — AZITHROMYCIN MONOHYDRATE 500 MG: 500 INJECTION, POWDER, LYOPHILIZED, FOR SOLUTION INTRAVENOUS at 14:44

## 2019-06-14 RX ADMIN — QUETIAPINE FUMARATE 50 MG: 25 TABLET ORAL at 21:26

## 2019-06-14 RX ADMIN — IPRATROPIUM BROMIDE AND ALBUTEROL SULFATE 3 ML: 2.5; .5 SOLUTION RESPIRATORY (INHALATION) at 13:24

## 2019-06-14 RX ADMIN — ATORVASTATIN CALCIUM 20 MG: 10 TABLET, FILM COATED ORAL at 21:26

## 2019-06-14 RX ADMIN — ATENOLOL 25 MG: 25 TABLET ORAL at 21:26

## 2019-06-14 RX ADMIN — TAMSULOSIN HYDROCHLORIDE 0.4 MG: 0.4 CAPSULE ORAL at 17:52

## 2019-06-14 RX ADMIN — Medication 250 MG: at 17:52

## 2019-06-14 RX ADMIN — CEFEPIME HYDROCHLORIDE 2000 MG: 2 INJECTION, SOLUTION INTRAVENOUS at 13:22

## 2019-06-14 RX ADMIN — SODIUM CHLORIDE 1000 ML: 0.9 INJECTION, SOLUTION INTRAVENOUS at 13:21

## 2019-06-14 RX ADMIN — FAMOTIDINE 20 MG: 20 TABLET ORAL at 17:52

## 2019-06-14 RX ADMIN — DONEPEZIL HYDROCHLORIDE 10 MG: 5 TABLET, FILM COATED ORAL at 21:26

## 2019-06-15 LAB
ALBUMIN SERPL BCP-MCNC: 2.9 G/DL (ref 3.5–5)
ALP SERPL-CCNC: 89 U/L (ref 46–116)
ALT SERPL W P-5'-P-CCNC: 12 U/L (ref 12–78)
ANION GAP SERPL CALCULATED.3IONS-SCNC: 12 MMOL/L (ref 4–13)
AST SERPL W P-5'-P-CCNC: 13 U/L (ref 5–45)
BACTERIA SPT RESP CULT: ABNORMAL
BASOPHILS # BLD AUTO: 0.03 THOUSANDS/ΜL (ref 0–0.1)
BASOPHILS NFR BLD AUTO: 0 % (ref 0–1)
BILIRUB SERPL-MCNC: 2.5 MG/DL (ref 0.2–1)
BUN SERPL-MCNC: 13 MG/DL (ref 5–25)
CALCIUM SERPL-MCNC: 8.6 MG/DL (ref 8.3–10.1)
CHLORIDE SERPL-SCNC: 107 MMOL/L (ref 100–108)
CO2 SERPL-SCNC: 22 MMOL/L (ref 21–32)
CREAT SERPL-MCNC: 1.12 MG/DL (ref 0.6–1.3)
EOSINOPHIL # BLD AUTO: 0.08 THOUSAND/ΜL (ref 0–0.61)
EOSINOPHIL NFR BLD AUTO: 1 % (ref 0–6)
ERYTHROCYTE [DISTWIDTH] IN BLOOD BY AUTOMATED COUNT: 14.6 % (ref 11.6–15.1)
GFR SERPL CREATININE-BSD FRML MDRD: 63 ML/MIN/1.73SQ M
GLUCOSE SERPL-MCNC: 96 MG/DL (ref 65–140)
GRAM STN SPEC: ABNORMAL
HCT VFR BLD AUTO: 45.9 % (ref 36.5–49.3)
HGB BLD-MCNC: 14.8 G/DL (ref 12–17)
IMM GRANULOCYTES # BLD AUTO: 0.02 THOUSAND/UL (ref 0–0.2)
IMM GRANULOCYTES NFR BLD AUTO: 0 % (ref 0–2)
L PNEUMO1 AG UR QL IA.RAPID: NEGATIVE
LYMPHOCYTES # BLD AUTO: 1.15 THOUSANDS/ΜL (ref 0.6–4.47)
LYMPHOCYTES NFR BLD AUTO: 17 % (ref 14–44)
MAGNESIUM SERPL-MCNC: 1.9 MG/DL (ref 1.6–2.6)
MCH RBC QN AUTO: 31.6 PG (ref 26.8–34.3)
MCHC RBC AUTO-ENTMCNC: 32.2 G/DL (ref 31.4–37.4)
MCV RBC AUTO: 98 FL (ref 82–98)
MONOCYTES # BLD AUTO: 0.72 THOUSAND/ΜL (ref 0.17–1.22)
MONOCYTES NFR BLD AUTO: 11 % (ref 4–12)
NEUTROPHILS # BLD AUTO: 4.78 THOUSANDS/ΜL (ref 1.85–7.62)
NEUTS SEG NFR BLD AUTO: 71 % (ref 43–75)
NRBC BLD AUTO-RTO: 0 /100 WBCS
PHOSPHATE SERPL-MCNC: 2.4 MG/DL (ref 2.3–4.1)
PLATELET # BLD AUTO: 92 THOUSANDS/UL (ref 149–390)
PMV BLD AUTO: 10.7 FL (ref 8.9–12.7)
POTASSIUM SERPL-SCNC: 4 MMOL/L (ref 3.5–5.3)
PROCALCITONIN SERPL-MCNC: 0.17 NG/ML
PROT SERPL-MCNC: 6.1 G/DL (ref 6.4–8.2)
RBC # BLD AUTO: 4.68 MILLION/UL (ref 3.88–5.62)
S PNEUM AG UR QL: NEGATIVE
SODIUM SERPL-SCNC: 141 MMOL/L (ref 136–145)
WBC # BLD AUTO: 6.78 THOUSAND/UL (ref 4.31–10.16)

## 2019-06-15 PROCEDURE — 84100 ASSAY OF PHOSPHORUS: CPT | Performed by: PHYSICIAN ASSISTANT

## 2019-06-15 PROCEDURE — 87205 SMEAR GRAM STAIN: CPT | Performed by: PHYSICIAN ASSISTANT

## 2019-06-15 PROCEDURE — 85025 COMPLETE CBC W/AUTO DIFF WBC: CPT | Performed by: PHYSICIAN ASSISTANT

## 2019-06-15 PROCEDURE — 94668 MNPJ CHEST WALL SBSQ: CPT

## 2019-06-15 PROCEDURE — 99232 SBSQ HOSP IP/OBS MODERATE 35: CPT | Performed by: PHYSICIAN ASSISTANT

## 2019-06-15 PROCEDURE — 80053 COMPREHEN METABOLIC PANEL: CPT | Performed by: PHYSICIAN ASSISTANT

## 2019-06-15 PROCEDURE — 84145 PROCALCITONIN (PCT): CPT | Performed by: PHYSICIAN ASSISTANT

## 2019-06-15 PROCEDURE — 83735 ASSAY OF MAGNESIUM: CPT | Performed by: PHYSICIAN ASSISTANT

## 2019-06-15 PROCEDURE — 99233 SBSQ HOSP IP/OBS HIGH 50: CPT | Performed by: FAMILY MEDICINE

## 2019-06-15 RX ORDER — GUAIFENESIN 600 MG
600 TABLET, EXTENDED RELEASE 12 HR ORAL EVERY 12 HOURS SCHEDULED
Status: DISCONTINUED | OUTPATIENT
Start: 2019-06-15 | End: 2019-06-18 | Stop reason: HOSPADM

## 2019-06-15 RX ADMIN — FLUTICASONE FUROATE AND VILANTEROL TRIFENATATE 1 PUFF: 200; 25 POWDER RESPIRATORY (INHALATION) at 09:18

## 2019-06-15 RX ADMIN — ASPIRIN 81 MG 81 MG: 81 TABLET ORAL at 09:19

## 2019-06-15 RX ADMIN — Medication 250 MG: at 09:19

## 2019-06-15 RX ADMIN — ATORVASTATIN CALCIUM 20 MG: 10 TABLET, FILM COATED ORAL at 21:08

## 2019-06-15 RX ADMIN — ATENOLOL 25 MG: 25 TABLET ORAL at 21:08

## 2019-06-15 RX ADMIN — QUETIAPINE FUMARATE 50 MG: 25 TABLET ORAL at 21:08

## 2019-06-15 RX ADMIN — MULTIPLE VITAMINS W/ MINERALS TAB 1 TABLET: TAB at 09:19

## 2019-06-15 RX ADMIN — TIOTROPIUM BROMIDE 18 MCG: 18 CAPSULE ORAL; RESPIRATORY (INHALATION) at 09:16

## 2019-06-15 RX ADMIN — VITAMIN D, TAB 1000IU (100/BT) 2000 UNITS: 25 TAB at 09:18

## 2019-06-15 RX ADMIN — CEFTRIAXONE 1000 MG: 1 INJECTION, SOLUTION INTRAVENOUS at 12:11

## 2019-06-15 RX ADMIN — GUAIFENESIN 600 MG: 600 TABLET, EXTENDED RELEASE ORAL at 09:19

## 2019-06-15 RX ADMIN — GUAIFENESIN 600 MG: 600 TABLET, EXTENDED RELEASE ORAL at 21:08

## 2019-06-15 RX ADMIN — CYANOCOBALAMIN TAB 500 MCG 500 MCG: 500 TAB at 09:19

## 2019-06-15 RX ADMIN — FAMOTIDINE 20 MG: 20 TABLET ORAL at 09:19

## 2019-06-15 RX ADMIN — MEMANTINE 5 MG: 5 TABLET ORAL at 09:19

## 2019-06-15 RX ADMIN — MELATONIN 6 MG: at 21:09

## 2019-06-15 RX ADMIN — TAMSULOSIN HYDROCHLORIDE 0.4 MG: 0.4 CAPSULE ORAL at 15:40

## 2019-06-15 RX ADMIN — ENOXAPARIN SODIUM 40 MG: 40 INJECTION SUBCUTANEOUS at 09:18

## 2019-06-15 RX ADMIN — FAMOTIDINE 20 MG: 20 TABLET ORAL at 18:45

## 2019-06-15 RX ADMIN — SODIUM CHLORIDE 100 ML/HR: 0.9 INJECTION, SOLUTION INTRAVENOUS at 02:19

## 2019-06-15 RX ADMIN — AZITHROMYCIN MONOHYDRATE 500 MG: 500 INJECTION, POWDER, LYOPHILIZED, FOR SOLUTION INTRAVENOUS at 14:32

## 2019-06-15 RX ADMIN — MONTELUKAST 10 MG: 10 TABLET, FILM COATED ORAL at 09:18

## 2019-06-15 RX ADMIN — Medication 250 MG: at 18:45

## 2019-06-15 RX ADMIN — DONEPEZIL HYDROCHLORIDE 10 MG: 5 TABLET, FILM COATED ORAL at 21:09

## 2019-06-15 RX ADMIN — ESCITALOPRAM OXALATE 20 MG: 10 TABLET ORAL at 09:18

## 2019-06-16 LAB
ANION GAP SERPL CALCULATED.3IONS-SCNC: 10 MMOL/L (ref 4–13)
BASOPHILS # BLD AUTO: 0.04 THOUSANDS/ΜL (ref 0–0.1)
BASOPHILS NFR BLD AUTO: 1 % (ref 0–1)
BUN SERPL-MCNC: 10 MG/DL (ref 5–25)
CALCIUM SERPL-MCNC: 8.5 MG/DL (ref 8.3–10.1)
CHLORIDE SERPL-SCNC: 107 MMOL/L (ref 100–108)
CO2 SERPL-SCNC: 23 MMOL/L (ref 21–32)
CREAT SERPL-MCNC: 0.99 MG/DL (ref 0.6–1.3)
EOSINOPHIL # BLD AUTO: 0.08 THOUSAND/ΜL (ref 0–0.61)
EOSINOPHIL NFR BLD AUTO: 1 % (ref 0–6)
ERYTHROCYTE [DISTWIDTH] IN BLOOD BY AUTOMATED COUNT: 14.5 % (ref 11.6–15.1)
GFR SERPL CREATININE-BSD FRML MDRD: 74 ML/MIN/1.73SQ M
GLUCOSE SERPL-MCNC: 100 MG/DL (ref 65–140)
HCT VFR BLD AUTO: 46.9 % (ref 36.5–49.3)
HGB BLD-MCNC: 15.1 G/DL (ref 12–17)
IMM GRANULOCYTES # BLD AUTO: 0.03 THOUSAND/UL (ref 0–0.2)
IMM GRANULOCYTES NFR BLD AUTO: 0 % (ref 0–2)
LYMPHOCYTES # BLD AUTO: 1.17 THOUSANDS/ΜL (ref 0.6–4.47)
LYMPHOCYTES NFR BLD AUTO: 17 % (ref 14–44)
MCH RBC QN AUTO: 31.3 PG (ref 26.8–34.3)
MCHC RBC AUTO-ENTMCNC: 32.2 G/DL (ref 31.4–37.4)
MCV RBC AUTO: 97 FL (ref 82–98)
MONOCYTES # BLD AUTO: 0.91 THOUSAND/ΜL (ref 0.17–1.22)
MONOCYTES NFR BLD AUTO: 14 % (ref 4–12)
NEUTROPHILS # BLD AUTO: 4.48 THOUSANDS/ΜL (ref 1.85–7.62)
NEUTS SEG NFR BLD AUTO: 67 % (ref 43–75)
NRBC BLD AUTO-RTO: 0 /100 WBCS
PLATELET # BLD AUTO: 94 THOUSANDS/UL (ref 149–390)
PMV BLD AUTO: 10.8 FL (ref 8.9–12.7)
POTASSIUM SERPL-SCNC: 3.8 MMOL/L (ref 3.5–5.3)
RBC # BLD AUTO: 4.82 MILLION/UL (ref 3.88–5.62)
SODIUM SERPL-SCNC: 140 MMOL/L (ref 136–145)
WBC # BLD AUTO: 6.71 THOUSAND/UL (ref 4.31–10.16)

## 2019-06-16 PROCEDURE — 97535 SELF CARE MNGMENT TRAINING: CPT | Performed by: DEVELOPMENTAL THERAPIST

## 2019-06-16 PROCEDURE — 80048 BASIC METABOLIC PNL TOTAL CA: CPT | Performed by: PHYSICIAN ASSISTANT

## 2019-06-16 PROCEDURE — 97166 OT EVAL MOD COMPLEX 45 MIN: CPT | Performed by: DEVELOPMENTAL THERAPIST

## 2019-06-16 PROCEDURE — 94761 N-INVAS EAR/PLS OXIMETRY MLT: CPT

## 2019-06-16 PROCEDURE — G8988 SELF CARE GOAL STATUS: HCPCS | Performed by: DEVELOPMENTAL THERAPIST

## 2019-06-16 PROCEDURE — 94762 N-INVAS EAR/PLS OXIMTRY CONT: CPT

## 2019-06-16 PROCEDURE — 94760 N-INVAS EAR/PLS OXIMETRY 1: CPT

## 2019-06-16 PROCEDURE — 94668 MNPJ CHEST WALL SBSQ: CPT

## 2019-06-16 PROCEDURE — 97163 PT EVAL HIGH COMPLEX 45 MIN: CPT | Performed by: PHYSICAL THERAPIST

## 2019-06-16 PROCEDURE — 85025 COMPLETE CBC W/AUTO DIFF WBC: CPT | Performed by: PHYSICIAN ASSISTANT

## 2019-06-16 PROCEDURE — 99232 SBSQ HOSP IP/OBS MODERATE 35: CPT | Performed by: PHYSICIAN ASSISTANT

## 2019-06-16 PROCEDURE — G8987 SELF CARE CURRENT STATUS: HCPCS | Performed by: DEVELOPMENTAL THERAPIST

## 2019-06-16 RX ADMIN — CYANOCOBALAMIN TAB 500 MCG 500 MCG: 500 TAB at 09:18

## 2019-06-16 RX ADMIN — MONTELUKAST 10 MG: 10 TABLET, FILM COATED ORAL at 09:18

## 2019-06-16 RX ADMIN — MEMANTINE 5 MG: 5 TABLET ORAL at 09:18

## 2019-06-16 RX ADMIN — TIOTROPIUM BROMIDE 18 MCG: 18 CAPSULE ORAL; RESPIRATORY (INHALATION) at 09:20

## 2019-06-16 RX ADMIN — ESCITALOPRAM OXALATE 20 MG: 10 TABLET ORAL at 09:18

## 2019-06-16 RX ADMIN — FAMOTIDINE 20 MG: 20 TABLET ORAL at 09:18

## 2019-06-16 RX ADMIN — TAMSULOSIN HYDROCHLORIDE 0.4 MG: 0.4 CAPSULE ORAL at 16:13

## 2019-06-16 RX ADMIN — DONEPEZIL HYDROCHLORIDE 10 MG: 5 TABLET, FILM COATED ORAL at 21:12

## 2019-06-16 RX ADMIN — ATORVASTATIN CALCIUM 20 MG: 10 TABLET, FILM COATED ORAL at 21:11

## 2019-06-16 RX ADMIN — FAMOTIDINE 20 MG: 20 TABLET ORAL at 17:36

## 2019-06-16 RX ADMIN — ENOXAPARIN SODIUM 40 MG: 40 INJECTION SUBCUTANEOUS at 09:18

## 2019-06-16 RX ADMIN — Medication 250 MG: at 09:18

## 2019-06-16 RX ADMIN — ASPIRIN 81 MG 81 MG: 81 TABLET ORAL at 09:18

## 2019-06-16 RX ADMIN — VITAMIN D, TAB 1000IU (100/BT) 2000 UNITS: 25 TAB at 09:18

## 2019-06-16 RX ADMIN — Medication 250 MG: at 17:36

## 2019-06-16 RX ADMIN — AZITHROMYCIN MONOHYDRATE 500 MG: 500 INJECTION, POWDER, LYOPHILIZED, FOR SOLUTION INTRAVENOUS at 13:13

## 2019-06-16 RX ADMIN — ATENOLOL 25 MG: 25 TABLET ORAL at 21:11

## 2019-06-16 RX ADMIN — QUETIAPINE FUMARATE 50 MG: 25 TABLET ORAL at 21:12

## 2019-06-16 RX ADMIN — GUAIFENESIN 600 MG: 600 TABLET, EXTENDED RELEASE ORAL at 21:12

## 2019-06-16 RX ADMIN — MELATONIN 6 MG: at 21:12

## 2019-06-16 RX ADMIN — FLUTICASONE FUROATE AND VILANTEROL TRIFENATATE 1 PUFF: 200; 25 POWDER RESPIRATORY (INHALATION) at 09:19

## 2019-06-16 RX ADMIN — GUAIFENESIN 600 MG: 600 TABLET, EXTENDED RELEASE ORAL at 09:18

## 2019-06-16 RX ADMIN — MULTIPLE VITAMINS W/ MINERALS TAB 1 TABLET: TAB at 09:18

## 2019-06-16 RX ADMIN — CEFTRIAXONE 1000 MG: 1 INJECTION, SOLUTION INTRAVENOUS at 12:07

## 2019-06-17 ENCOUNTER — APPOINTMENT (INPATIENT)
Dept: CT IMAGING | Facility: HOSPITAL | Age: 79
DRG: 193 | End: 2019-06-17
Payer: MEDICARE

## 2019-06-17 ENCOUNTER — APPOINTMENT (INPATIENT)
Dept: NON INVASIVE DIAGNOSTICS | Facility: HOSPITAL | Age: 79
DRG: 193 | End: 2019-06-17
Payer: MEDICARE

## 2019-06-17 PROCEDURE — 99223 1ST HOSP IP/OBS HIGH 75: CPT | Performed by: PHYSICIAN ASSISTANT

## 2019-06-17 PROCEDURE — 94762 N-INVAS EAR/PLS OXIMTRY CONT: CPT

## 2019-06-17 PROCEDURE — 93306 TTE W/DOPPLER COMPLETE: CPT

## 2019-06-17 PROCEDURE — 94760 N-INVAS EAR/PLS OXIMETRY 1: CPT

## 2019-06-17 PROCEDURE — 94761 N-INVAS EAR/PLS OXIMETRY MLT: CPT

## 2019-06-17 PROCEDURE — 93306 TTE W/DOPPLER COMPLETE: CPT | Performed by: INTERNAL MEDICINE

## 2019-06-17 PROCEDURE — 99232 SBSQ HOSP IP/OBS MODERATE 35: CPT | Performed by: PHYSICIAN ASSISTANT

## 2019-06-17 PROCEDURE — 97116 GAIT TRAINING THERAPY: CPT | Performed by: PHYSICAL THERAPIST

## 2019-06-17 PROCEDURE — 71250 CT THORAX DX C-: CPT

## 2019-06-17 RX ORDER — METHYLPREDNISOLONE SODIUM SUCCINATE 40 MG/ML
40 INJECTION, POWDER, LYOPHILIZED, FOR SOLUTION INTRAMUSCULAR; INTRAVENOUS ONCE
Status: COMPLETED | OUTPATIENT
Start: 2019-06-17 | End: 2019-06-17

## 2019-06-17 RX ORDER — METHYLPREDNISOLONE SODIUM SUCCINATE 40 MG/ML
40 INJECTION, POWDER, LYOPHILIZED, FOR SOLUTION INTRAMUSCULAR; INTRAVENOUS DAILY
Status: DISCONTINUED | OUTPATIENT
Start: 2019-06-18 | End: 2019-06-18 | Stop reason: HOSPADM

## 2019-06-17 RX ADMIN — MEMANTINE 5 MG: 5 TABLET ORAL at 09:14

## 2019-06-17 RX ADMIN — ESCITALOPRAM OXALATE 20 MG: 10 TABLET ORAL at 09:14

## 2019-06-17 RX ADMIN — MELATONIN 6 MG: at 21:44

## 2019-06-17 RX ADMIN — ATORVASTATIN CALCIUM 20 MG: 10 TABLET, FILM COATED ORAL at 21:44

## 2019-06-17 RX ADMIN — Medication 250 MG: at 17:33

## 2019-06-17 RX ADMIN — FLUTICASONE FUROATE AND VILANTEROL TRIFENATATE 1 PUFF: 200; 25 POWDER RESPIRATORY (INHALATION) at 09:11

## 2019-06-17 RX ADMIN — GUAIFENESIN 600 MG: 600 TABLET, EXTENDED RELEASE ORAL at 09:14

## 2019-06-17 RX ADMIN — CYANOCOBALAMIN TAB 500 MCG 500 MCG: 500 TAB at 09:14

## 2019-06-17 RX ADMIN — ASPIRIN 81 MG 81 MG: 81 TABLET ORAL at 09:13

## 2019-06-17 RX ADMIN — ATENOLOL 25 MG: 25 TABLET ORAL at 21:44

## 2019-06-17 RX ADMIN — CEFTRIAXONE 1000 MG: 1 INJECTION, SOLUTION INTRAVENOUS at 11:58

## 2019-06-17 RX ADMIN — TIOTROPIUM BROMIDE 18 MCG: 18 CAPSULE ORAL; RESPIRATORY (INHALATION) at 09:12

## 2019-06-17 RX ADMIN — GUAIFENESIN 600 MG: 600 TABLET, EXTENDED RELEASE ORAL at 21:44

## 2019-06-17 RX ADMIN — FAMOTIDINE 20 MG: 20 TABLET ORAL at 17:33

## 2019-06-17 RX ADMIN — FAMOTIDINE 20 MG: 20 TABLET ORAL at 09:14

## 2019-06-17 RX ADMIN — MULTIPLE VITAMINS W/ MINERALS TAB 1 TABLET: TAB at 09:14

## 2019-06-17 RX ADMIN — TAMSULOSIN HYDROCHLORIDE 0.4 MG: 0.4 CAPSULE ORAL at 17:33

## 2019-06-17 RX ADMIN — QUETIAPINE FUMARATE 50 MG: 25 TABLET ORAL at 21:44

## 2019-06-17 RX ADMIN — Medication 250 MG: at 09:14

## 2019-06-17 RX ADMIN — ENOXAPARIN SODIUM 40 MG: 40 INJECTION SUBCUTANEOUS at 09:13

## 2019-06-17 RX ADMIN — AZITHROMYCIN MONOHYDRATE 500 MG: 500 INJECTION, POWDER, LYOPHILIZED, FOR SOLUTION INTRAVENOUS at 14:44

## 2019-06-17 RX ADMIN — MONTELUKAST 10 MG: 10 TABLET, FILM COATED ORAL at 09:14

## 2019-06-17 RX ADMIN — VITAMIN D, TAB 1000IU (100/BT) 2000 UNITS: 25 TAB at 09:14

## 2019-06-17 RX ADMIN — METHYLPREDNISOLONE SODIUM SUCCINATE 40 MG: 40 INJECTION, POWDER, FOR SOLUTION INTRAMUSCULAR; INTRAVENOUS at 09:13

## 2019-06-17 RX ADMIN — DONEPEZIL HYDROCHLORIDE 10 MG: 5 TABLET, FILM COATED ORAL at 21:44

## 2019-06-18 VITALS
HEIGHT: 68 IN | WEIGHT: 168.4 LBS | HEART RATE: 78 BPM | BODY MASS INDEX: 25.52 KG/M2 | DIASTOLIC BLOOD PRESSURE: 67 MMHG | SYSTOLIC BLOOD PRESSURE: 120 MMHG | RESPIRATION RATE: 18 BRPM | OXYGEN SATURATION: 94 % | TEMPERATURE: 97.4 F

## 2019-06-18 LAB
ANION GAP SERPL CALCULATED.3IONS-SCNC: 11 MMOL/L (ref 4–13)
BASOPHILS # BLD AUTO: 0.02 THOUSANDS/ΜL (ref 0–0.1)
BASOPHILS NFR BLD AUTO: 0 % (ref 0–1)
BUN SERPL-MCNC: 16 MG/DL (ref 5–25)
CALCIUM SERPL-MCNC: 9.2 MG/DL (ref 8.3–10.1)
CHLORIDE SERPL-SCNC: 107 MMOL/L (ref 100–108)
CO2 SERPL-SCNC: 23 MMOL/L (ref 21–32)
CREAT SERPL-MCNC: 0.91 MG/DL (ref 0.6–1.3)
EOSINOPHIL # BLD AUTO: 0.03 THOUSAND/ΜL (ref 0–0.61)
EOSINOPHIL NFR BLD AUTO: 1 % (ref 0–6)
ERYTHROCYTE [DISTWIDTH] IN BLOOD BY AUTOMATED COUNT: 14.3 % (ref 11.6–15.1)
GFR SERPL CREATININE-BSD FRML MDRD: 82 ML/MIN/1.73SQ M
GLUCOSE SERPL-MCNC: 105 MG/DL (ref 65–140)
HCT VFR BLD AUTO: 45.4 % (ref 36.5–49.3)
HGB BLD-MCNC: 15.3 G/DL (ref 12–17)
IMM GRANULOCYTES # BLD AUTO: 0.03 THOUSAND/UL (ref 0–0.2)
IMM GRANULOCYTES NFR BLD AUTO: 1 % (ref 0–2)
LYMPHOCYTES # BLD AUTO: 1.27 THOUSANDS/ΜL (ref 0.6–4.47)
LYMPHOCYTES NFR BLD AUTO: 20 % (ref 14–44)
MCH RBC QN AUTO: 32.3 PG (ref 26.8–34.3)
MCHC RBC AUTO-ENTMCNC: 33.7 G/DL (ref 31.4–37.4)
MCV RBC AUTO: 96 FL (ref 82–98)
MONOCYTES # BLD AUTO: 0.87 THOUSAND/ΜL (ref 0.17–1.22)
MONOCYTES NFR BLD AUTO: 13 % (ref 4–12)
NEUTROPHILS # BLD AUTO: 4.26 THOUSANDS/ΜL (ref 1.85–7.62)
NEUTS SEG NFR BLD AUTO: 65 % (ref 43–75)
NRBC BLD AUTO-RTO: 0 /100 WBCS
PLATELET # BLD AUTO: 130 THOUSANDS/UL (ref 149–390)
PMV BLD AUTO: 10 FL (ref 8.9–12.7)
POTASSIUM SERPL-SCNC: 3.6 MMOL/L (ref 3.5–5.3)
RBC # BLD AUTO: 4.74 MILLION/UL (ref 3.88–5.62)
SODIUM SERPL-SCNC: 141 MMOL/L (ref 136–145)
WBC # BLD AUTO: 6.48 THOUSAND/UL (ref 4.31–10.16)

## 2019-06-18 PROCEDURE — 94762 N-INVAS EAR/PLS OXIMTRY CONT: CPT

## 2019-06-18 PROCEDURE — 99239 HOSP IP/OBS DSCHRG MGMT >30: CPT | Performed by: FAMILY MEDICINE

## 2019-06-18 PROCEDURE — 80048 BASIC METABOLIC PNL TOTAL CA: CPT | Performed by: PHYSICIAN ASSISTANT

## 2019-06-18 PROCEDURE — 97116 GAIT TRAINING THERAPY: CPT

## 2019-06-18 PROCEDURE — 94760 N-INVAS EAR/PLS OXIMETRY 1: CPT

## 2019-06-18 PROCEDURE — 85025 COMPLETE CBC W/AUTO DIFF WBC: CPT | Performed by: PHYSICIAN ASSISTANT

## 2019-06-18 PROCEDURE — 99232 SBSQ HOSP IP/OBS MODERATE 35: CPT | Performed by: PHYSICIAN ASSISTANT

## 2019-06-18 RX ORDER — PREDNISONE 20 MG/1
TABLET ORAL
Qty: 20 TABLET | Refills: 0 | Status: ON HOLD | OUTPATIENT
Start: 2019-06-18 | End: 2019-07-01 | Stop reason: CLARIF

## 2019-06-18 RX ORDER — CEFDINIR 300 MG/1
300 CAPSULE ORAL EVERY 12 HOURS SCHEDULED
Qty: 4 CAPSULE | Refills: 0 | Status: SHIPPED | OUTPATIENT
Start: 2019-06-18 | End: 2019-06-21 | Stop reason: ALTCHOICE

## 2019-06-18 RX ADMIN — ESCITALOPRAM OXALATE 20 MG: 10 TABLET ORAL at 08:05

## 2019-06-18 RX ADMIN — ASPIRIN 81 MG 81 MG: 81 TABLET ORAL at 08:05

## 2019-06-18 RX ADMIN — CEFTRIAXONE 1000 MG: 1 INJECTION, SOLUTION INTRAVENOUS at 13:48

## 2019-06-18 RX ADMIN — Medication 250 MG: at 08:05

## 2019-06-18 RX ADMIN — MULTIPLE VITAMINS W/ MINERALS TAB 1 TABLET: TAB at 08:05

## 2019-06-18 RX ADMIN — METHYLPREDNISOLONE SODIUM SUCCINATE 40 MG: 40 INJECTION, POWDER, FOR SOLUTION INTRAMUSCULAR; INTRAVENOUS at 08:05

## 2019-06-18 RX ADMIN — MEMANTINE 5 MG: 5 TABLET ORAL at 08:05

## 2019-06-18 RX ADMIN — TIOTROPIUM BROMIDE 18 MCG: 18 CAPSULE ORAL; RESPIRATORY (INHALATION) at 08:04

## 2019-06-18 RX ADMIN — FAMOTIDINE 20 MG: 20 TABLET ORAL at 08:05

## 2019-06-18 RX ADMIN — FLUTICASONE FUROATE AND VILANTEROL TRIFENATATE 1 PUFF: 200; 25 POWDER RESPIRATORY (INHALATION) at 08:03

## 2019-06-18 RX ADMIN — CYANOCOBALAMIN TAB 500 MCG 500 MCG: 500 TAB at 08:05

## 2019-06-18 RX ADMIN — VITAMIN D, TAB 1000IU (100/BT) 2000 UNITS: 25 TAB at 08:05

## 2019-06-18 RX ADMIN — AZITHROMYCIN MONOHYDRATE 500 MG: 500 INJECTION, POWDER, LYOPHILIZED, FOR SOLUTION INTRAVENOUS at 14:29

## 2019-06-18 RX ADMIN — ENOXAPARIN SODIUM 40 MG: 40 INJECTION SUBCUTANEOUS at 08:06

## 2019-06-18 RX ADMIN — MONTELUKAST 10 MG: 10 TABLET, FILM COATED ORAL at 08:05

## 2019-06-18 RX ADMIN — GUAIFENESIN 600 MG: 600 TABLET, EXTENDED RELEASE ORAL at 08:05

## 2019-06-19 ENCOUNTER — TRANSITIONAL CARE MANAGEMENT (OUTPATIENT)
Dept: FAMILY MEDICINE CLINIC | Facility: CLINIC | Age: 79
End: 2019-06-19

## 2019-06-19 LAB
BACTERIA BLD CULT: NORMAL
BACTERIA BLD CULT: NORMAL

## 2019-06-21 ENCOUNTER — OFFICE VISIT (OUTPATIENT)
Dept: FAMILY MEDICINE CLINIC | Facility: CLINIC | Age: 79
End: 2019-06-21
Payer: MEDICARE

## 2019-06-21 VITALS
HEIGHT: 68 IN | DIASTOLIC BLOOD PRESSURE: 84 MMHG | OXYGEN SATURATION: 94 % | WEIGHT: 175.4 LBS | BODY MASS INDEX: 26.58 KG/M2 | SYSTOLIC BLOOD PRESSURE: 120 MMHG

## 2019-06-21 DIAGNOSIS — IMO0001 TRANSITION OF CARE PERFORMED WITH SHARING OF CLINICAL SUMMARY: Primary | ICD-10-CM

## 2019-06-21 PROCEDURE — 99495 TRANSJ CARE MGMT MOD F2F 14D: CPT | Performed by: FAMILY MEDICINE

## 2019-07-01 ENCOUNTER — APPOINTMENT (EMERGENCY)
Dept: CT IMAGING | Facility: HOSPITAL | Age: 79
DRG: 314 | End: 2019-07-01
Payer: MEDICARE

## 2019-07-01 ENCOUNTER — APPOINTMENT (EMERGENCY)
Dept: RADIOLOGY | Facility: HOSPITAL | Age: 79
DRG: 314 | End: 2019-07-01
Payer: MEDICARE

## 2019-07-01 ENCOUNTER — APPOINTMENT (INPATIENT)
Dept: CT IMAGING | Facility: HOSPITAL | Age: 79
DRG: 314 | End: 2019-07-01
Payer: MEDICARE

## 2019-07-01 ENCOUNTER — HOSPITAL ENCOUNTER (INPATIENT)
Facility: HOSPITAL | Age: 79
LOS: 1 days | Discharge: HOME | DRG: 314 | End: 2019-07-02
Attending: EMERGENCY MEDICINE | Admitting: FAMILY MEDICINE
Payer: MEDICARE

## 2019-07-01 ENCOUNTER — APPOINTMENT (EMERGENCY)
Dept: ULTRASOUND IMAGING | Facility: HOSPITAL | Age: 79
DRG: 314 | End: 2019-07-01
Payer: MEDICARE

## 2019-07-01 ENCOUNTER — APPOINTMENT (INPATIENT)
Dept: NON INVASIVE DIAGNOSTICS | Facility: HOSPITAL | Age: 79
DRG: 314 | End: 2019-07-01
Payer: MEDICARE

## 2019-07-01 DIAGNOSIS — I95.9 HYPOTENSION: ICD-10-CM

## 2019-07-01 DIAGNOSIS — R91.1 PULMONARY NODULE: ICD-10-CM

## 2019-07-01 DIAGNOSIS — R50.9 FEVER: Primary | ICD-10-CM

## 2019-07-01 DIAGNOSIS — J18.9 PNEUMONIA: ICD-10-CM

## 2019-07-01 DIAGNOSIS — J96.21 ACUTE ON CHRONIC RESPIRATORY FAILURE WITH HYPOXIA (HCC): ICD-10-CM

## 2019-07-01 DIAGNOSIS — R06.02 SOB (SHORTNESS OF BREATH): ICD-10-CM

## 2019-07-01 PROBLEM — R41.89 EPISODE OF UNRESPONSIVENESS: Status: ACTIVE | Noted: 2019-07-01

## 2019-07-01 PROBLEM — I25.10 CORONARY ARTERY DISEASE INVOLVING NATIVE CORONARY ARTERY OF NATIVE HEART WITHOUT ANGINA PECTORIS: Status: ACTIVE | Noted: 2018-05-11

## 2019-07-01 PROBLEM — R79.89 D-DIMER, ELEVATED: Status: ACTIVE | Noted: 2019-07-01

## 2019-07-01 PROBLEM — D72.829 LEUKOCYTOSIS: Status: ACTIVE | Noted: 2019-07-01

## 2019-07-01 PROBLEM — J96.11 CHRONIC RESPIRATORY FAILURE WITH HYPOXIA (HCC): Status: ACTIVE | Noted: 2018-12-30

## 2019-07-01 LAB
ALBUMIN SERPL BCP-MCNC: 3.4 G/DL (ref 3.5–5)
ALP SERPL-CCNC: 114 U/L (ref 46–116)
ALT SERPL W P-5'-P-CCNC: 45 U/L (ref 12–78)
ANION GAP SERPL CALCULATED.3IONS-SCNC: 7 MMOL/L (ref 4–13)
AST SERPL W P-5'-P-CCNC: 38 U/L (ref 5–45)
ATRIAL RATE: 72 BPM
ATRIAL RATE: 75 BPM
BACTERIA UR QL AUTO: ABNORMAL /HPF
BASOPHILS # BLD AUTO: 0.05 THOUSANDS/ΜL (ref 0–0.1)
BASOPHILS NFR BLD AUTO: 0 % (ref 0–1)
BILIRUB SERPL-MCNC: 1.6 MG/DL (ref 0.2–1)
BILIRUB UR QL STRIP: NEGATIVE
BUN SERPL-MCNC: 13 MG/DL (ref 5–25)
CALCIUM SERPL-MCNC: 8.2 MG/DL (ref 8.3–10.1)
CHLORIDE SERPL-SCNC: 105 MMOL/L (ref 100–108)
CLARITY UR: CLEAR
CO2 SERPL-SCNC: 25 MMOL/L (ref 21–32)
COLOR UR: YELLOW
CREAT SERPL-MCNC: 1.37 MG/DL (ref 0.6–1.3)
DEPRECATED D DIMER PPP: 9210 NG/ML (FEU)
EOSINOPHIL # BLD AUTO: 0.04 THOUSAND/ΜL (ref 0–0.61)
EOSINOPHIL NFR BLD AUTO: 0 % (ref 0–6)
ERYTHROCYTE [DISTWIDTH] IN BLOOD BY AUTOMATED COUNT: 14.6 % (ref 11.6–15.1)
GFR SERPL CREATININE-BSD FRML MDRD: 50 ML/MIN/1.73SQ M
GLUCOSE SERPL-MCNC: 116 MG/DL (ref 65–140)
GLUCOSE UR STRIP-MCNC: NEGATIVE MG/DL
HCT VFR BLD AUTO: 49.9 % (ref 36.5–49.3)
HGB BLD-MCNC: 16.1 G/DL (ref 12–17)
HGB UR QL STRIP.AUTO: ABNORMAL
IMM GRANULOCYTES # BLD AUTO: 0.09 THOUSAND/UL (ref 0–0.2)
IMM GRANULOCYTES NFR BLD AUTO: 1 % (ref 0–2)
KETONES UR STRIP-MCNC: NEGATIVE MG/DL
L PNEUMO1 AG UR QL IA.RAPID: NEGATIVE
LACTATE SERPL-SCNC: 1.9 MMOL/L (ref 0.5–2)
LEUKOCYTE ESTERASE UR QL STRIP: NEGATIVE
LIPASE SERPL-CCNC: 105 U/L (ref 73–393)
LYMPHOCYTES # BLD AUTO: 1.05 THOUSANDS/ΜL (ref 0.6–4.47)
LYMPHOCYTES NFR BLD AUTO: 9 % (ref 14–44)
MAGNESIUM SERPL-MCNC: 2.1 MG/DL (ref 1.6–2.6)
MCH RBC QN AUTO: 32.1 PG (ref 26.8–34.3)
MCHC RBC AUTO-ENTMCNC: 32.3 G/DL (ref 31.4–37.4)
MCV RBC AUTO: 99 FL (ref 82–98)
MONOCYTES # BLD AUTO: 1.07 THOUSAND/ΜL (ref 0.17–1.22)
MONOCYTES NFR BLD AUTO: 9 % (ref 4–12)
NEUTROPHILS # BLD AUTO: 9.84 THOUSANDS/ΜL (ref 1.85–7.62)
NEUTS SEG NFR BLD AUTO: 81 % (ref 43–75)
NITRITE UR QL STRIP: NEGATIVE
NON-SQ EPI CELLS URNS QL MICRO: ABNORMAL /HPF
NRBC BLD AUTO-RTO: 0 /100 WBCS
P AXIS: 58 DEGREES
P AXIS: 72 DEGREES
PH UR STRIP.AUTO: 7.5 [PH]
PLATELET # BLD AUTO: 138 THOUSANDS/UL (ref 149–390)
PMV BLD AUTO: 9.9 FL (ref 8.9–12.7)
POTASSIUM SERPL-SCNC: 5.1 MMOL/L (ref 3.5–5.3)
PR INTERVAL: 214 MS
PR INTERVAL: 226 MS
PROT SERPL-MCNC: 6.8 G/DL (ref 6.4–8.2)
PROT UR STRIP-MCNC: NEGATIVE MG/DL
QRS AXIS: 53 DEGREES
QRS AXIS: 75 DEGREES
QRSD INTERVAL: 88 MS
QRSD INTERVAL: 88 MS
QT INTERVAL: 402 MS
QT INTERVAL: 416 MS
QTC INTERVAL: 448 MS
QTC INTERVAL: 455 MS
RBC # BLD AUTO: 5.02 MILLION/UL (ref 3.88–5.62)
RBC #/AREA URNS AUTO: ABNORMAL /HPF
S PNEUM AG UR QL: NEGATIVE
SODIUM SERPL-SCNC: 137 MMOL/L (ref 136–145)
SP GR UR STRIP.AUTO: 1.01 (ref 1–1.03)
T WAVE AXIS: 74 DEGREES
T WAVE AXIS: 81 DEGREES
TROPONIN I SERPL-MCNC: <0.02 NG/ML
TROPONIN I SERPL-MCNC: <0.02 NG/ML
TSH SERPL DL<=0.05 MIU/L-ACNC: 1.03 UIU/ML (ref 0.36–3.74)
UROBILINOGEN UR QL STRIP.AUTO: 0.2 E.U./DL
VENTRICULAR RATE: 72 BPM
VENTRICULAR RATE: 75 BPM
WBC # BLD AUTO: 12.14 THOUSAND/UL (ref 4.31–10.16)
WBC #/AREA URNS AUTO: ABNORMAL /HPF

## 2019-07-01 PROCEDURE — 99285 EMERGENCY DEPT VISIT HI MDM: CPT | Performed by: EMERGENCY MEDICINE

## 2019-07-01 PROCEDURE — 80053 COMPREHEN METABOLIC PANEL: CPT | Performed by: EMERGENCY MEDICINE

## 2019-07-01 PROCEDURE — 83735 ASSAY OF MAGNESIUM: CPT | Performed by: EMERGENCY MEDICINE

## 2019-07-01 PROCEDURE — 83690 ASSAY OF LIPASE: CPT | Performed by: EMERGENCY MEDICINE

## 2019-07-01 PROCEDURE — 94760 N-INVAS EAR/PLS OXIMETRY 1: CPT

## 2019-07-01 PROCEDURE — 84443 ASSAY THYROID STIM HORMONE: CPT | Performed by: FAMILY MEDICINE

## 2019-07-01 PROCEDURE — 1123F ACP DISCUSS/DSCN MKR DOCD: CPT | Performed by: PHYSICIAN ASSISTANT

## 2019-07-01 PROCEDURE — 99221 1ST HOSP IP/OBS SF/LOW 40: CPT | Performed by: INTERNAL MEDICINE

## 2019-07-01 PROCEDURE — 36415 COLL VENOUS BLD VENIPUNCTURE: CPT | Performed by: EMERGENCY MEDICINE

## 2019-07-01 PROCEDURE — 71045 X-RAY EXAM CHEST 1 VIEW: CPT

## 2019-07-01 PROCEDURE — 94640 AIRWAY INHALATION TREATMENT: CPT

## 2019-07-01 PROCEDURE — 70450 CT HEAD/BRAIN W/O DYE: CPT

## 2019-07-01 PROCEDURE — 84484 ASSAY OF TROPONIN QUANT: CPT | Performed by: EMERGENCY MEDICINE

## 2019-07-01 PROCEDURE — 94664 DEMO&/EVAL PT USE INHALER: CPT

## 2019-07-01 PROCEDURE — NC001 PR NO CHARGE: Performed by: EMERGENCY MEDICINE

## 2019-07-01 PROCEDURE — 93005 ELECTROCARDIOGRAM TRACING: CPT

## 2019-07-01 PROCEDURE — 93970 EXTREMITY STUDY: CPT

## 2019-07-01 PROCEDURE — 83605 ASSAY OF LACTIC ACID: CPT | Performed by: EMERGENCY MEDICINE

## 2019-07-01 PROCEDURE — 87081 CULTURE SCREEN ONLY: CPT | Performed by: FAMILY MEDICINE

## 2019-07-01 PROCEDURE — 99285 EMERGENCY DEPT VISIT HI MDM: CPT

## 2019-07-01 PROCEDURE — 93970 EXTREMITY STUDY: CPT | Performed by: SURGERY

## 2019-07-01 PROCEDURE — 96374 THER/PROPH/DIAG INJ IV PUSH: CPT

## 2019-07-01 PROCEDURE — 71260 CT THORAX DX C+: CPT

## 2019-07-01 PROCEDURE — 85025 COMPLETE CBC W/AUTO DIFF WBC: CPT | Performed by: EMERGENCY MEDICINE

## 2019-07-01 PROCEDURE — 85379 FIBRIN DEGRADATION QUANT: CPT | Performed by: EMERGENCY MEDICINE

## 2019-07-01 PROCEDURE — 87449 NOS EACH ORGANISM AG IA: CPT | Performed by: FAMILY MEDICINE

## 2019-07-01 PROCEDURE — 81001 URINALYSIS AUTO W/SCOPE: CPT | Performed by: EMERGENCY MEDICINE

## 2019-07-01 PROCEDURE — 74177 CT ABD & PELVIS W/CONTRAST: CPT

## 2019-07-01 PROCEDURE — 76705 ECHO EXAM OF ABDOMEN: CPT

## 2019-07-01 PROCEDURE — 96365 THER/PROPH/DIAG IV INF INIT: CPT

## 2019-07-01 PROCEDURE — 87040 BLOOD CULTURE FOR BACTERIA: CPT | Performed by: EMERGENCY MEDICINE

## 2019-07-01 PROCEDURE — 96367 TX/PROPH/DG ADDL SEQ IV INF: CPT

## 2019-07-01 PROCEDURE — 96361 HYDRATE IV INFUSION ADD-ON: CPT

## 2019-07-01 PROCEDURE — 93010 ELECTROCARDIOGRAM REPORT: CPT | Performed by: INTERNAL MEDICINE

## 2019-07-01 RX ORDER — SACCHAROMYCES BOULARDII 250 MG
250 CAPSULE ORAL 2 TIMES DAILY
Status: DISCONTINUED | OUTPATIENT
Start: 2019-07-01 | End: 2019-07-02 | Stop reason: HOSPADM

## 2019-07-01 RX ORDER — SODIUM CHLORIDE 9 MG/ML
75 INJECTION, SOLUTION INTRAVENOUS CONTINUOUS
Status: DISCONTINUED | OUTPATIENT
Start: 2019-07-01 | End: 2019-07-02

## 2019-07-01 RX ORDER — MEMANTINE HYDROCHLORIDE 5 MG/1
5 TABLET ORAL DAILY
Status: DISCONTINUED | OUTPATIENT
Start: 2019-07-01 | End: 2019-07-02 | Stop reason: HOSPADM

## 2019-07-01 RX ORDER — QUETIAPINE FUMARATE 25 MG/1
50 TABLET, FILM COATED ORAL
Status: DISCONTINUED | OUTPATIENT
Start: 2019-07-01 | End: 2019-07-02 | Stop reason: HOSPADM

## 2019-07-01 RX ORDER — IPRATROPIUM BROMIDE AND ALBUTEROL SULFATE 2.5; .5 MG/3ML; MG/3ML
3 SOLUTION RESPIRATORY (INHALATION)
Status: DISCONTINUED | OUTPATIENT
Start: 2019-07-01 | End: 2019-07-01

## 2019-07-01 RX ORDER — ATORVASTATIN CALCIUM 10 MG/1
20 TABLET, FILM COATED ORAL
Status: DISCONTINUED | OUTPATIENT
Start: 2019-07-01 | End: 2019-07-02 | Stop reason: HOSPADM

## 2019-07-01 RX ORDER — ATENOLOL 25 MG/1
25 TABLET ORAL
Status: DISCONTINUED | OUTPATIENT
Start: 2019-07-01 | End: 2019-07-02 | Stop reason: HOSPADM

## 2019-07-01 RX ORDER — ALBUTEROL SULFATE 2.5 MG/3ML
2.5 SOLUTION RESPIRATORY (INHALATION) EVERY 6 HOURS PRN
Status: DISCONTINUED | OUTPATIENT
Start: 2019-07-01 | End: 2019-07-02 | Stop reason: HOSPADM

## 2019-07-01 RX ORDER — HEPARIN SODIUM 5000 [USP'U]/ML
5000 INJECTION, SOLUTION INTRAVENOUS; SUBCUTANEOUS EVERY 8 HOURS SCHEDULED
Status: DISCONTINUED | OUTPATIENT
Start: 2019-07-01 | End: 2019-07-02 | Stop reason: HOSPADM

## 2019-07-01 RX ORDER — ACETAMINOPHEN 325 MG/1
650 TABLET ORAL EVERY 6 HOURS PRN
Status: DISCONTINUED | OUTPATIENT
Start: 2019-07-01 | End: 2019-07-02 | Stop reason: HOSPADM

## 2019-07-01 RX ORDER — DEXAMETHASONE SODIUM PHOSPHATE 10 MG/ML
10 INJECTION, SOLUTION INTRAMUSCULAR; INTRAVENOUS ONCE
Status: COMPLETED | OUTPATIENT
Start: 2019-07-01 | End: 2019-07-01

## 2019-07-01 RX ORDER — DONEPEZIL HYDROCHLORIDE 5 MG/1
10 TABLET, FILM COATED ORAL
Status: DISCONTINUED | OUTPATIENT
Start: 2019-07-01 | End: 2019-07-02 | Stop reason: HOSPADM

## 2019-07-01 RX ORDER — ASPIRIN 81 MG/1
81 TABLET, CHEWABLE ORAL DAILY
Status: DISCONTINUED | OUTPATIENT
Start: 2019-07-01 | End: 2019-07-02 | Stop reason: HOSPADM

## 2019-07-01 RX ORDER — ESCITALOPRAM OXALATE 10 MG/1
20 TABLET ORAL DAILY
Status: DISCONTINUED | OUTPATIENT
Start: 2019-07-01 | End: 2019-07-02 | Stop reason: HOSPADM

## 2019-07-01 RX ORDER — TAMSULOSIN HYDROCHLORIDE 0.4 MG/1
0.4 CAPSULE ORAL DAILY
Status: DISCONTINUED | OUTPATIENT
Start: 2019-07-01 | End: 2019-07-02 | Stop reason: HOSPADM

## 2019-07-01 RX ORDER — FAMOTIDINE 20 MG/1
20 TABLET, FILM COATED ORAL DAILY
Status: DISCONTINUED | OUTPATIENT
Start: 2019-07-01 | End: 2019-07-01

## 2019-07-01 RX ORDER — CEFEPIME HYDROCHLORIDE 2 G/50ML
2000 INJECTION, SOLUTION INTRAVENOUS ONCE
Status: COMPLETED | OUTPATIENT
Start: 2019-07-01 | End: 2019-07-01

## 2019-07-01 RX ORDER — LANOLIN ALCOHOL/MO/W.PET/CERES
9 CREAM (GRAM) TOPICAL
Status: DISCONTINUED | OUTPATIENT
Start: 2019-07-01 | End: 2019-07-02 | Stop reason: HOSPADM

## 2019-07-01 RX ORDER — FLUTICASONE FUROATE AND VILANTEROL 200; 25 UG/1; UG/1
1 POWDER RESPIRATORY (INHALATION)
Status: DISCONTINUED | OUTPATIENT
Start: 2019-07-01 | End: 2019-07-02 | Stop reason: HOSPADM

## 2019-07-01 RX ORDER — MONTELUKAST SODIUM 10 MG/1
10 TABLET ORAL EVERY EVENING
Status: DISCONTINUED | OUTPATIENT
Start: 2019-07-01 | End: 2019-07-02 | Stop reason: HOSPADM

## 2019-07-01 RX ORDER — FAMOTIDINE 20 MG/1
20 TABLET, FILM COATED ORAL
Status: DISCONTINUED | OUTPATIENT
Start: 2019-07-01 | End: 2019-07-02 | Stop reason: HOSPADM

## 2019-07-01 RX ORDER — CEFEPIME HYDROCHLORIDE 2 G/50ML
2000 INJECTION, SOLUTION INTRAVENOUS EVERY 12 HOURS
Status: DISCONTINUED | OUTPATIENT
Start: 2019-07-01 | End: 2019-07-01

## 2019-07-01 RX ADMIN — TAMSULOSIN HYDROCHLORIDE 0.4 MG: 0.4 CAPSULE ORAL at 12:03

## 2019-07-01 RX ADMIN — ASPIRIN 81 MG 81 MG: 81 TABLET ORAL at 12:03

## 2019-07-01 RX ADMIN — VANCOMYCIN HYDROCHLORIDE 1250 MG: 1 INJECTION, POWDER, LYOPHILIZED, FOR SOLUTION INTRAVENOUS at 09:03

## 2019-07-01 RX ADMIN — SODIUM CHLORIDE 75 ML/HR: 0.9 INJECTION, SOLUTION INTRAVENOUS at 16:39

## 2019-07-01 RX ADMIN — CEFEPIME HYDROCHLORIDE 2000 MG: 2 INJECTION, SOLUTION INTRAVENOUS at 08:27

## 2019-07-01 RX ADMIN — IOHEXOL 100 ML: 350 INJECTION, SOLUTION INTRAVENOUS at 07:33

## 2019-07-01 RX ADMIN — TIOTROPIUM BROMIDE 18 MCG: 18 CAPSULE ORAL; RESPIRATORY (INHALATION) at 14:30

## 2019-07-01 RX ADMIN — ATORVASTATIN CALCIUM 20 MG: 10 TABLET, FILM COATED ORAL at 17:27

## 2019-07-01 RX ADMIN — SODIUM CHLORIDE 2367 ML: 0.9 INJECTION, SOLUTION INTRAVENOUS at 06:47

## 2019-07-01 RX ADMIN — VANCOMYCIN HYDROCHLORIDE 750 MG: 750 INJECTION, POWDER, LYOPHILIZED, FOR SOLUTION INTRAVENOUS at 19:04

## 2019-07-01 RX ADMIN — Medication 250 MG: at 17:27

## 2019-07-01 RX ADMIN — FAMOTIDINE 20 MG: 20 TABLET ORAL at 12:03

## 2019-07-01 RX ADMIN — ATENOLOL 25 MG: 25 TABLET ORAL at 22:12

## 2019-07-01 RX ADMIN — HEPARIN SODIUM 5000 UNITS: 5000 INJECTION INTRAVENOUS; SUBCUTANEOUS at 22:12

## 2019-07-01 RX ADMIN — MELATONIN 9 MG: at 22:12

## 2019-07-01 RX ADMIN — CEFEPIME HYDROCHLORIDE 2000 MG: 2 INJECTION, POWDER, FOR SOLUTION INTRAVENOUS at 20:07

## 2019-07-01 RX ADMIN — Medication 250 MG: at 12:03

## 2019-07-01 RX ADMIN — QUETIAPINE FUMARATE 50 MG: 25 TABLET ORAL at 22:12

## 2019-07-01 RX ADMIN — ENOXAPARIN SODIUM 40 MG: 40 INJECTION SUBCUTANEOUS at 12:03

## 2019-07-01 RX ADMIN — FLUTICASONE FUROATE AND VILANTEROL TRIFENATATE 1 PUFF: 200; 25 POWDER RESPIRATORY (INHALATION) at 14:30

## 2019-07-01 RX ADMIN — ESCITALOPRAM OXALATE 20 MG: 10 TABLET ORAL at 12:03

## 2019-07-01 RX ADMIN — MULTIPLE VITAMINS W/ MINERALS TAB 1 TABLET: TAB at 12:03

## 2019-07-01 RX ADMIN — MEMANTINE 5 MG: 5 TABLET ORAL at 12:03

## 2019-07-01 RX ADMIN — MONTELUKAST 10 MG: 10 TABLET, FILM COATED ORAL at 17:27

## 2019-07-01 RX ADMIN — IPRATROPIUM BROMIDE AND ALBUTEROL SULFATE 3 ML: 2.5; .5 SOLUTION RESPIRATORY (INHALATION) at 06:43

## 2019-07-01 RX ADMIN — DONEPEZIL HYDROCHLORIDE 10 MG: 5 TABLET, FILM COATED ORAL at 22:11

## 2019-07-01 RX ADMIN — HEPARIN SODIUM 5000 UNITS: 5000 INJECTION INTRAVENOUS; SUBCUTANEOUS at 16:39

## 2019-07-01 RX ADMIN — DEXAMETHASONE SODIUM PHOSPHATE 10 MG: 10 INJECTION, SOLUTION INTRAMUSCULAR; INTRAVENOUS at 06:43

## 2019-07-01 NOTE — ASSESSMENT & PLAN NOTE
Possible SIRS - Prior to admission EMS reported fever of 101 3, associated with leukocytosis - not observed  Patient received cefepime and vancomycin for suspected postobstructive pneumonia in setting of inlarging pulmonary nodule  Will continue cefepime   Obtain MRSA cultures  Monitor CBC/VS  Check and monitor procalcitonin

## 2019-07-01 NOTE — PLAN OF CARE
Problem: Potential for Falls  Goal: Patient will remain free of falls  Description  INTERVENTIONS:  - Assess patient frequently for physical needs  -  Identify cognitive and physical deficits and behaviors that affect risk of falls  -  [place] fall precautions as indicated by assessment   - Educate patient/family on patient safety including physical limitations  - Instruct patient to call for assistance with activity based on assessment  - Modify environment to reduce risk of injury  - Consider OT/PT consult to assist with strengthening/mobility  Outcome: Progressing     Problem: Prexisting or High Potential for Compromised Skin Integrity  Goal: Skin integrity is maintained or improved  Description  INTERVENTIONS:  - Identify patients at risk for skin breakdown  - Assess and monitor skin integrity  - Assess and monitor nutrition and hydration status  - Monitor labs (i e  albumin)  - Assess for incontinence   - Turn and reposition patient  - Assist with mobility/ambulation  - Relieve pressure over bony prominences  - Avoid friction and shearing  - Provide appropriate hygiene as needed including keeping skin clean and dry  - Evaluate need for skin moisturizer/barrier cream  - Collaborate with interdisciplinary team (i e  Nutrition, Rehabilitation, etc )   - Patient/family teaching  Outcome: Progressing     Problem: Nutrition/Hydration-ADULT  Goal: Nutrient/Hydration intake appropriate for improving, restoring or maintaining nutritional needs  Description  Monitor and assess patient's nutrition/hydration status for malnutrition (ex- brittle hair, bruises, dry skin, pale skin and conjunctiva, muscle wasting, smooth red tongue, and disorientation)  Collaborate with interdisciplinary team and initiate plan and interventions as ordered  Monitor patient's weight and dietary intake as ordered or per policy  Utilize nutrition screening tool and intervene per policy   Determine patient's food preferences and provide high-protein, high-caloric foods as appropriate  INTERVENTIONS:  - Monitor oral intake, urinary output, labs, and treatment plans  - Assess nutrition and hydration status and recommend course of action  - Evaluate amount of meals eaten  - Assist patient with eating if necessary   - Allow adequate time for meals  - Recommend/ encourage appropriate diets, oral nutritional supplements, and vitamin/mineral supplements  - Provide specific nutrition/hydration education as appropriate  - Include patient/family/caregiver in decisions related to nutrition   Outcome: Progressing     Problem: MUSCULOSKELETAL - ADULT  Goal: Maintain or return mobility to safest level of function  Description  INTERVENTIONS:  - Assess patient's ability to carry out ADLs; assess patient's baseline for ADL function and identify physical deficits which impact ability to perform ADLs (bathing, care of mouth/teeth, toileting, grooming, dressing, etc )  - Assess/evaluate cause of self-care deficits   - Assess range of motion  - Assess patient's mobility; develop plan if impaired  - Assess patient's need for assistive devices and provide as appropriate  - Encourage maximum independence but intervene and supervise when necessary  - Involve family in performance of ADLs  - Assess for home care needs following discharge   - Request OT consult to assist with ADL evaluation and planning for discharge  - Provide patient education as appropriate  Outcome: Progressing     Problem: SAFETY ADULT  Goal: Patient will remain free of falls  Description  INTERVENTIONS:  - Assess patient frequently for physical needs  -  Identify cognitive and physical deficits and behaviors that affect risk of falls    -  [place] fall precautions as indicated by assessment   - Educate patient/family on patient safety including physical limitations  - Instruct patient to call for assistance with activity based on assessment  - Modify environment to reduce risk of injury  - Consider OT/PT consult to assist with strengthening/mobility  Outcome: Progressing  Goal: Maintain or return to baseline ADL function  Description  INTERVENTIONS:  -  Assess patient's ability to carry out ADLs; assess patient's baseline for ADL function and identify physical deficits which impact ability to perform ADLs (bathing, care of mouth/teeth, toileting, grooming, dressing, etc )  - Assess/evaluate cause of self-care deficits   - Assess range of motion  - Assess patient's mobility; develop plan if impaired  - Assess patient's need for assistive devices and provide as appropriate  - Encourage maximum independence but intervene and supervise when necessary  ¯ Involve family in performance of ADLs  ¯ Assess for home care needs following discharge   ¯ Request OT consult to assist with ADL evaluation and planning for discharge  ¯ Provide patient education as appropriate  Outcome: Progressing  Goal: Maintain or return mobility status to optimal level  Description  INTERVENTIONS:  - Assess patient's baseline mobility status (ambulation, transfers, stairs, etc )    - Identify cognitive and physical deficits and behaviors that affect mobility  - Identify mobility aids required to assist with transfers and/or ambulation (gait belt, sit-to-stand, lift, walker, cane, etc )  - [place] fall precautions as indicated by assessment  - Record patient progress and toleration of activity level on Mobility SBAR; progress patient to next Phase/Stage  - Instruct patient to call for assistance with activity based on assessment  - Request Rehabilitation consult to assist with strengthening/weightbearing, etc   Outcome: Progressing     Problem: DISCHARGE PLANNING  Goal: Discharge to home or other facility with appropriate resources  Description  INTERVENTIONS:  - Identify barriers to discharge w/patient and caregiver  - Arrange for needed discharge resources and transportation as appropriate  - Identify discharge learning needs (meds, wound care, etc )  - Refer to Case Management Department for coordinating discharge planning if the patient needs post-hospital services based on physician/advanced practitioner order or complex needs related to functional status, cognitive ability, or social support system   Outcome: Progressing     Problem: Knowledge Deficit  Goal: Patient/family/caregiver demonstrates understanding of disease process, treatment plan, medications, and discharge instructions  Description  Complete learning assessment and assess knowledge base    Interventions:  - Provide teaching at level of understanding  - Provide teaching via preferred learning methods  Outcome: Progressing

## 2019-07-01 NOTE — PROGRESS NOTES
Vancomycin Assessment    Willma Angelucci is a 70 y o  male who is currently receiving vancomycin 750mg IV Q12h for Pneumonia     Relevant clinical data and objective history reviewed:  Creatinine   Date Value Ref Range Status   07/01/2019 1 37 (H) 0 60 - 1 30 mg/dL Final     Comment:     Standardized to IDMS reference method   06/18/2019 0 91 0 60 - 1 30 mg/dL Final     Comment:     Standardized to IDMS reference method   06/16/2019 0 99 0 60 - 1 30 mg/dL Final     Comment:     Standardized to IDMS reference method   08/31/2015 0 94 0 60 - 1 30 mg/dL Final     Comment:     Standardized to IDMS reference method   11/29/2014 1 05 0 60 - 1 30 mg/dL Final     Comment:     Standardized to IDMS reference method   10/25/2014 1 02 0 60 - 1 30 mg/dL Final     Comment:     Standardized to IDMS reference method     /70 (BP Location: Right arm)   Pulse 76   Temp 97 7 °F (36 5 °C) (Temporal)   Resp 18   Ht 5' 9" (1 753 m)   Wt 79 kg (174 lb 2 6 oz)   SpO2 92%   BMI 25 72 kg/m²   I/O last 3 completed shifts: In: 200 [I V :200]  Out: -   Lab Results   Component Value Date/Time    BUN 13 07/01/2019 05:54 AM    BUN 13 11/29/2014 02:16 PM    WBC 12 14 (H) 07/01/2019 05:54 AM    WBC 6 31 08/31/2015 05:15 PM    HGB 16 1 07/01/2019 05:54 AM    HGB 13 8 08/31/2015 05:15 PM    HCT 49 9 (H) 07/01/2019 05:54 AM    HCT 40 9 08/31/2015 05:15 PM    MCV 99 (H) 07/01/2019 05:54 AM    MCV 94 08/31/2015 05:15 PM     (L) 07/01/2019 05:54 AM     08/31/2015 05:15 PM     Temp Readings from Last 3 Encounters:   07/01/19 97 7 °F (36 5 °C) (Temporal)   06/18/19 (!) 97 4 °F (36 3 °C) (Temporal)   01/01/19 98 2 °F (36 8 °C) (Temporal)     Vancomycin Days of Therapy: 1    Assessment/Plan  The patient is currently on vancomycin utilizing scheduled dosing based on actual body weight  Baseline risks associated with therapy include: pre-existing renal impairment and advanced age  The patient will be starting on 750mg IV Q12h  Pharmacy will also follow closely for s/sx of nephrotoxicity, infusion reactions and appropriateness of therapy  BMP and CBC will be ordered per protocol  Plan for trough as patient approaches steady state, prior to the 4th  dose at approximately 0630 on 7/3/19  Due to infection severity, will target a trough of 15-20 (appropriate for most indications)   Pharmacy will continue to follow the patients culture results and clinical progress daily      Allie Headley, Pharmacist

## 2019-07-01 NOTE — ASSESSMENT & PLAN NOTE
Per wife, the patient has been noted to occasionally have oxygen saturations in low 80's  Patient at this time well-appearing with oxygen saturations in normal range  Continue home inhalers  Respiratory protocol  Will consult Pulmonology

## 2019-07-01 NOTE — ED PROVIDER NOTES
History  Chief Complaint   Patient presents with    Fever - 75 years or older     Pt c/o fever for the past 3 days - hx of being tx for pneumonia ~ 2 weeks ago       42-year-old male presents by ambulance due to fever increased weakness and  Feeling under the weather  He was recently admitted from 6/14-6/18  For a right upper lobe pneumonia  He was continued on Rocephin and Zithromax is Legionella pneumococcal antigen was negative completed 5 days of antibiotics and completed 2 days of Ceftin as an outpatient;  Patient was discharged on his baseline 2 L of nasal cannula oxygen  He followed up with Dr Sravan Montanez and was doing well  Over the last couple of days his wife is noted fluctuating blood pressures at home his appetite has been diminished EMS was called due to the fact that he would need to take sips of water today  Upon EMS of arrival his blood pressure was 88/32 satting 95% on his baseline 2 L blood pressure improved to 132/70 after a 200 mL fluid bolus he was febrile at the scene of 101 3  Patient currently feels improved he denies any headache or visual disturbance no lightheadedness he continues to have a cough he does feel slightly short of breath had no nausea vomiting or diarrhea no abdominal pain he has chronic back pain denies worsening lower extremity edema he denies any dysuria increased urinary frequency he has had no trauma or falls  Prior to Admission Medications   Prescriptions Last Dose Informant Patient Reported? Taking? Bacillus Coagulans-Inulin (PROBIOTIC FORMULA) 1-250 BILLION-MG CAPS 6/30/2019 at Unknown time Self Yes Yes   Sig: Take 2 capsules by mouth daily Record states dose is currently 4 billion   Cholecalciferol (VITAMIN D-3 PO) 6/30/2019 at Unknown time Self Yes Yes   Sig: Take 2,000 Units by mouth daily     Fluticasone-Salmeterol (AIRDUO RESPICLICK 41/63) 01-47 MCG/ACT AEPB 6/30/2019 at Unknown time Spouse/Significant Other Yes Yes   Sig: Inhale 1 puff 2 (two) times a day AM & PM   KRILL OIL PO 6/30/2019 at Unknown time Self Yes Yes   Sig: Take 500 mg by mouth daily     Melatonin 10 MG TABS 6/30/2019 at Unknown time  Yes Yes   Sig: Take 1 tablet by mouth daily at bedtime    Multiple Vitamins-Minerals (CENTRUM SILVER ADULT 50+) TABS 6/30/2019 at Unknown time Self Yes Yes   Sig: Take 1 tablet by mouth daily   Potassium Citrate ER 15 MEQ (1620 MG) TBCR 6/30/2019 at Unknown time  No Yes   Sig: Take 1 tablet by mouth 2 (two) times a day for 90 days   QUEtiapine (SEROquel) 50 mg tablet 6/30/2019 at Unknown time  No Yes   Sig: Take 1 tablet (50 mg total) by mouth daily at bedtime   acetaminophen (TYLENOL) 500 mg tablet 6/30/2019 at Unknown time Self Yes Yes   Sig: Take 650 mg by mouth every 6 (six) hours as needed for headaches    aspirin 81 MG tablet 6/30/2019 at Unknown time Self Yes Yes   Sig: Take 81 mg by mouth daily Took within 24 hours    atenolol (TENORMIN) 25 mg tablet 6/30/2019 at Unknown time  No Yes   Sig: Take 1 tablet (25 mg total) by mouth daily at bedtime   atorvastatin (LIPITOR) 20 mg tablet 6/30/2019 at Unknown time Self No Yes   Sig: Take 1 tablet (20 mg total) by mouth daily at bedtime   cyanocobalamin (VITAMIN B-12) 1,000 mcg tablet 6/30/2019 at Unknown time Self Yes Yes   Sig: Take 1,000 mcg by mouth 3 (three) times a week MWF   donepezil (ARICEPT) 10 mg tablet 6/30/2019 at Unknown time  No Yes   Sig: Take 1 tablet (10 mg total) by mouth daily at bedtime   Patient taking differently: Take 10 mg by mouth daily    escitalopram (LEXAPRO) 20 mg tablet 6/30/2019 at Unknown time  No Yes   Sig: Take 1 tablet (20 mg total) by mouth daily   memantine (NAMENDA) 5 mg tablet 6/30/2019 at Unknown time Spouse/Significant Other Yes Yes   Sig: Take 5 mg by mouth 2 (two) times a day In the evening    montelukast (SINGULAIR) 10 mg tablet 6/30/2019 at Unknown time  No Yes   Sig: Take 1 tablet (10 mg total) by mouth daily   ranitidine (ZANTAC) 150 mg tablet 6/30/2019 at Unknown time No Yes   Sig: Take 1 tablet (150 mg total) by mouth 2 (two) times a day   tamsulosin (FLOMAX) 0 4 mg 6/30/2019 at Unknown time  No Yes   Sig: Take 1 capsule (0 4 mg total) by mouth daily for 90 days   tiotropium (SPIRIVA HANDIHALER) 18 mcg inhalation capsule 6/30/2019 at Unknown time Self Yes Yes   Sig: Place 18 mcg into inhaler and inhale daily        Facility-Administered Medications: None       Past Medical History:   Diagnosis Date    AAA [facility]     Acid reflux     Acute serous otitis media of left ear     recurrence not specified     Anesthesia     "always has mental changes /lingers and lingers after anesthesia"    Anxiety     Aortic aneurysm without rupture ([facility]     Arm bruise     right inner forearm "recent IV"    At risk for falls     BPH without urinary obstruction     Cancer ([facility]     skin CA on nose    CAP (community acquired pneumonia)     Cataracts, bilateral     Change in bowel function     Clubbing of fingers     Colon polyps     Common cold     Contusion of elbow, left     initial encounter     COPD (chronic obstructive pulmonary disease) ([facility]     Coronary artery disease     Cough     Dementia     Depression     Dizziness     upon "standing quickly on occas"    Exercise counseling     Fatigue     Full dentures     "doesn't wear them"    Glaucoma screening     High cholesterol     History of abdominal aortic aneurysm (AAA) repair 08/2017    History of bacteremia     History of chronic obstructive lung disease     History of epistaxis     History of influenza vaccination     History of kidney stones     History of pneumonia     "many times" "at least 5 times" almost always goes to sepsis"    History of sepsis 10/2017    History of sinusitis     History of skin cancer     History of sleep apnea     History of transfusion     History of urinary tract infection     [place]     Hydronephrosis with obstructing calculus  Influenza vaccine needed     Jock itch     left/saw doctor 11/8 and started on antifungal cream    Kidney stones     Left hip pain     Need for pneumococcal vaccination     Need for prophylactic vaccination and inoculation against influenza     Other [facility]sema [facility] [address]     Pancreatitis, chronic ([facility] [address] [facility]     pt and wife can't confirm 11/10/17    Pneumonia 10/26/2017    admitted [facility]    Pulmonary emphysema ([facility] [address] [facility]     Screening for genitourinary condition     Screening for neurological condition     Sepsis ([facility] [address] [facility]     due to unspecified organism     Short-term memory loss     Sleep apnea     does not use CPAP   Special screening examination for neoplasm of prostate     TIA involving carotid artery     "before carotid surgery"    Ulcer     stomach, "years ago"    Unsteady gait     Use of cane as ambulatory aid     sometimes    Vitamin D deficiency     Wears glasses        Past Surgical History:   Procedure Laterality Date    ABDOMINAL AORTIC ANEURYSM REPAIR  08/23/2017    ABDOMINAL AORTIC ANEURYSM REPAIR, ENDOVASCULAR      BACK SURGERY      spinal stenosis    CARDIAC SURGERY      CABG x3    CAROTID ENDARTARECTOMY Left 11/1996    CATARACT EXTRACTION Bilateral     CORONARY ARTERY BYPASS GRAFT  01/2003    x3    CYSTOSCOPY      with insertion of ureteral stent     CYSTOSCOPY      with ureteroscopy with lithotripsy     EXCISIONAL HEMORRHOIDECTOMY      EYE SURGERY Bilateral     "for a wrinkle" after cataract surgery    HERNIA REPAIR      umbilical    LITHOTRIPSY      renal    MOUTH SURGERY      full mouth extraction     OH COLONOSCOPY FLX DX W/COLLJ SPEC WHEN PFRMD N/A 5/16/2017    Procedure: COLONOSCOPY with polypectomies/ hot snare and tattoo;  Surgeon: Joselin Steward MD;  [facility]: [facility];   Service: Gastroenterology    OH CYSTOURETHROSCOPY N/A 11/30/2017    Procedure: Shelley Page;  Surgeon: Maria Isabel Millan MD;  [facility];  Service: Urology    OH ESOPHAGOGASTRODUODENOSCOPY TRANSORAL DIAGNOSTIC N/A 2016    Procedure: ESOPHAGOGASTRODUODENOSCOPY (EGD); Surgeon: Itz Orozco MD;  [facility]; Service: Gastroenterology    SC REMOVE BLADDER STONE,<2 5 CM N/A 2017    Procedure: Gumaro Napoles;  Surgeon: Rex Russo MD;  [facility];  Service: Urology    SKIN BIOPSY      TONSILLECTOMY      URETERAL STENT PLACEMENT      and removal       Family History   Problem Relation Age of Onset    Diabetes type II Mother         mellitus    Kidney failure Father     Diabetes type II Maternal Grandmother         mellitus     Hypertension Paternal Grandmother         benign essential      I have reviewed and agree with the history as documented  Social History     Tobacco Use    Smoking status: Former Smoker     Packs/day: 1 50     Years: 60 00     Pack years: 90 00     Last attempt to quit:      Years since quittin 4    Smokeless tobacco: Never Used    Tobacco comment: quit 3 yrs ago, used to be a 1-1 5 ppd smoker   Substance Use Topics    Alcohol use: Not Currently     Alcohol/week: 0 0 standard drinks     Frequency: Never     Drinks per session: 1 or 2     Binge frequency: Never     Comment: quit 25 yrs ago    Drug use: No     Comment: No illicit drug use         Review of Systems   Constitutional: Positive for activity change, appetite change, fatigue and fever  Negative for chills  HENT: Negative for congestion, ear pain, rhinorrhea, sneezing and sore throat  Eyes: Negative for discharge  Respiratory: Positive for shortness of breath  Negative for cough  Cardiovascular: Negative for chest pain and leg swelling  Gastrointestinal: Negative for abdominal pain, blood in stool, diarrhea, nausea and vomiting  Endocrine: Negative for polyuria  Genitourinary: Negative for dysuria, frequency and urgency  Musculoskeletal: Positive for back pain  Negative for myalgias  Skin: Negative for rash     Neurological: Positive for weakness  Negative for dizziness and numbness  Hematological: Negative for adenopathy  Psychiatric/Behavioral: Negative for confusion  All other systems reviewed and are negative  Physical Exam  Physical Exam   Constitutional: He appears well-developed  No distress  HENT:   Head: Normocephalic and atraumatic  Right Ear: External ear normal    Left Ear: External ear normal    Nose: Nose normal    Mouth/Throat: Oropharynx is clear and moist  No oropharyngeal exudate  TMs pale bilaterally edentulous   Eyes: Pupils are equal, round, and reactive to light  Conjunctivae and EOM are normal  Right eye exhibits no discharge  Left eye exhibits no discharge  Neck: Normal range of motion  Neck supple  Cardiovascular: Normal rate and regular rhythm  Pulmonary/Chest: No stridor  No respiratory distress  Diminished bibasilar breath sounds   Abdominal: Soft  Bowel sounds are normal  He exhibits no distension and no mass  There is no tenderness  There is no rebound and no guarding  Musculoskeletal: Normal range of motion  He exhibits no edema  Lymphadenopathy:     He has no cervical adenopathy  Neurological: He is alert  No cranial nerve deficit or sensory deficit  He exhibits normal muscle tone  Coordination normal    Oriented the hospital self and family   Skin: Skin is warm and dry  Capillary refill takes less than 2 seconds  He is not diaphoretic  Psychiatric: He has a normal mood and affect  Vitals reviewed        Vital Signs  ED Triage Vitals   Temperature Pulse Respirations Blood Pressure SpO2   07/01/19 0539 07/01/19 0539 07/01/19 0539 07/01/19 0539 07/01/19 0539   99 1 °F (37 3 °C) 75 18 114/62 92 %      Temp Source Heart Rate Source Patient Position - Orthostatic VS BP Location FiO2 (%)   07/01/19 0539 07/01/19 0539 07/01/19 0539 07/01/19 0539 --   Temporal Monitor Lying Left arm       Pain Score       07/01/19 1100       No Pain           Vitals:    07/01/19 1030 07/01/19 1100 07/01/19 1519 07/01/19 1535   BP: 148/67 138/72 154/70    Pulse: 78 76 70 76   Patient Position - Orthostatic VS: Sitting Lying Sitting          Visual Acuity      ED Medications  Medications   aspirin chewable tablet 81 mg (81 mg Oral Given 7/1/19 1203)   atenolol (TENORMIN) tablet 25 mg (has no administration in time range)   atorvastatin (LIPITOR) tablet 20 mg (20 mg Oral Given 7/1/19 1727)   donepezil (ARICEPT) tablet 10 mg (has no administration in time range)   escitalopram (LEXAPRO) tablet 20 mg (20 mg Oral Given 7/1/19 1203)   fluticasone-vilanterol (BREO ELLIPTA) 200-25 MCG/INH inhaler 1 puff (1 puff Inhalation Given 7/1/19 1430)   melatonin tablet 9 mg (has no administration in time range)   memantine (NAMENDA) tablet 5 mg (5 mg Oral Given 7/1/19 1203)   montelukast (SINGULAIR) tablet 10 mg (10 mg Oral Given 7/1/19 1727)   multivitamin-minerals (CENTRUM) tablet 1 tablet (1 tablet Oral Given 7/1/19 1203)   QUEtiapine (SEROquel) tablet 50 mg (has no administration in time range)   tamsulosin (FLOMAX) capsule 0 4 mg (0 4 mg Oral Given 7/1/19 1203)   tiotropium (SPIRIVA) capsule for inhaler 18 mcg (18 mcg Inhalation Given 7/1/19 1430)   saccharomyces boulardii (FLORASTOR) capsule 250 mg (250 mg Oral Given 7/1/19 1727)   acetaminophen (TYLENOL) tablet 650 mg (has no administration in time range)   famotidine (PEPCID) tablet 20 mg (20 mg Oral Given 7/1/19 1203)   sodium chloride 0 9 % infusion (75 mL/hr Intravenous New Bag 7/1/19 1639)   cefepime (MAXIPIME) 2,000 mg in dextrose 5 % 50 mL IVPB (2,000 mg Intravenous New Bag 7/1/19 2007)   albuterol inhalation solution 2 5 mg (has no administration in time range)   heparin (porcine) subcutaneous injection 5,000 Units (5,000 Units Subcutaneous Given 7/1/19 1639)   vancomycin (VANCOCIN) 750 mg in sodium chloride 0 9 % 250 mL IVPB (0 mg Intravenous Stopped 7/1/19 2005)   dexamethasone (PF) (DECADRON) injection 10 mg (10 mg Intravenous Given 7/1/19 0643)   sodium chloride 0 9 % bolus 2,367 mL (0 mL/kg × 78 9 kg Intravenous Stopped 7/1/19 1034)   iohexol (OMNIPAQUE) 350 MG/ML injection (SINGLE-DOSE) 100 mL (100 mL Intravenous Given 7/1/19 0733)   vancomycin (VANCOCIN) 1,250 mg in sodium chloride 0 9 % 250 mL IVPB (0 mg/kg × 78 9 kg Intravenous Stopped 7/1/19 1034)   cefepime (MAXIPIME) IVPB (premix) 2,000 mg (0 mg Intravenous Stopped 7/1/19 0858)       Diagnostic Studies  Results Reviewed     Procedure Component Value Units Date/Time    Legionella antigen, urine [[id number]]  (Normal) Collected:  07/01/19 0655    Lab Status:  Final result Specimen:  Urine, Clean Catch Updated:  07/01/19 1641     Legionella Urinary Antigen Negative    Strep Pneumoniae, Urine [[id number]]  (Normal) Collected:  07/01/19 0655    Lab Status:  Final result Specimen:  Urine, Clean Catch Updated:  07/01/19 1641     Strep pneumoniae antigen, urine Negative    TSH, 3rd generation [[id number]]  (Normal) Collected:  07/01/19 0554    Lab Status:  Final result Specimen:  Blood from Arm, Right Updated:  07/01/19 1206     TSH 3RD GENERATON 1 032 uIU/mL     Narrative:       Patients undergoing fluorescein dye angiography may retain small amounts of fluorescein in the body for 48-72 hours post procedure  Samples containing fluorescein can produce falsely depressed TSH values  If the patient had this procedure,a specimen should be resubmitted post fluorescein clearance        Troponin I [[id number]]  (Normal) Collected:  07/01/19 0932    Lab Status:  Final result Specimen:  Blood from Arm, Right Updated:  07/01/19 1045     Troponin I <0 02 ng/mL     Lipase [[id number]]  (Normal) Collected:  07/01/19 0554    Lab Status:  Final result Specimen:  Blood from Arm, Right Updated:  07/01/19 0925     Lipase 105 u/L     Urine Microscopic [[id number]]  (Abnormal) Collected:  07/01/19 0655    Lab Status:  Final result Specimen:  Urine, Clean Catch Updated:  07/01/19 0722     RBC, UA 4-10 /hpf      WBC, UA 0-5 /hpf Epithelial Cells Occasional /hpf      Bacteria, UA Occasional /hpf     UA w Reflex to Microscopic w Reflex to Culture [[id number]]  (Abnormal) Collected:  07/01/19 0655    Lab Status:  Final result Specimen:  Urine, Clean Catch Updated:  07/01/19 0711     Color, UA Yellow     Clarity, UA Clear     Specific Gravity, UA 1 010     pH, UA 7 5     Leukocytes, UA Negative     Nitrite, UA Negative     Protein, UA Negative mg/dl      Glucose, UA Negative mg/dl      Ketones, UA Negative mg/dl      Urobilinogen, UA 0 2 E U /dl      Bilirubin, UA Negative     Blood, UA Small    D-Dimer [[id number]]  (Abnormal) Collected:  07/01/19 0554    Lab Status:  Final result Specimen:  Blood from Arm, Right Updated:  07/01/19 0644     D-Dimer, Quant 9,210 ng/ml (FEU)     Lactic acid, plasma [[id number]]  (Normal) Collected:  07/01/19 0554    Lab Status:  Final result Specimen:  Blood from Arm, Right Updated:  07/01/19 9513     LACTIC ACID 1 9 mmol/L     Narrative:       Result may be elevated if tourniquet was used during collection      Troponin I [[id number]]  (Normal) Collected:  07/01/19 0554    Lab Status:  Final result Specimen:  Blood from Arm, Right Updated:  07/01/19 0628     Troponin I <0 02 ng/mL     Comprehensive metabolic panel [[id number]]  (Abnormal) Collected:  07/01/19 0554    Lab Status:  Final result Specimen:  Blood from Arm, Right Updated:  07/01/19 6978     Sodium 137 mmol/L      Potassium 5 1 mmol/L      Chloride 105 mmol/L      CO2 25 mmol/L      ANION GAP 7 mmol/L      BUN 13 mg/dL      Creatinine 1 37 mg/dL      Glucose 116 mg/dL      Calcium 8 2 mg/dL      AST 38 U/L      ALT 45 U/L      Alkaline Phosphatase 114 U/L      Total Protein 6 8 g/dL      Albumin 3 4 g/dL      Total Bilirubin 1 60 mg/dL      eGFR 50 ml/min/1 73sq m     Narrative:       Ne guidelines for Chronic Kidney Disease (CKD):     Stage 1 with normal or high GFR (GFR > 90 mL/min/1 73 square meters)    Stage 2 Mild CKD (GFR = 60-89 mL/min/1 73 square meters)    Stage 3A Moderate CKD (GFR = 45-59 mL/min/1 73 square meters)    Stage 3B Moderate CKD (GFR = 30-44 mL/min/1 73 square meters)    Stage 4 Severe CKD (GFR = 15-29 mL/min/1 73 square meters)    Stage 5 End Stage CKD (GFR <15 mL/min/1 73 square meters)  Note: GFR calculation is accurate only with a steady state creatinine    Magnesium [[id number]]  (Normal) Collected:  07/01/19 0554    Lab Status:  Final result Specimen:  Blood from Arm, Right Updated:  07/01/19 0620     Magnesium 2 1 mg/dL     CBC and differential [[id number]]  (Abnormal) Collected:  07/01/19 0554    Lab Status:  Final result Specimen:  Blood from Arm, Right Updated:  07/01/19 0615     WBC 12 14 Thousand/uL      RBC 5 02 Million/uL      Hemoglobin 16 1 g/dL      Hematocrit 49 9 %      MCV 99 fL      MCH 32 1 pg      MCHC 32 3 g/dL      RDW 14 6 %      MPV 9 9 fL      Platelets 050 Thousands/uL      nRBC 0 /100 WBCs      Neutrophils Relative 81 %      Immat GRANS % 1 %      Lymphocytes Relative 9 %      Monocytes Relative 9 %      Eosinophils Relative 0 %      Basophils Relative 0 %      Neutrophils Absolute 9 84 Thousands/µL      Immature Grans Absolute 0 09 Thousand/uL      Lymphocytes Absolute 1 05 Thousands/µL      Monocytes Absolute 1 07 Thousand/µL      Eosinophils Absolute 0 04 Thousand/µL      Basophils Absolute 0 05 Thousands/µL     Blood culture #2 [[id number]] Collected:  07/01/19 0554    Lab Status: In process Specimen:  Blood from Arm, Right Updated:  07/01/19 0606    Blood culture #1 [[id number]] Collected:  07/01/19 0600    Lab Status: In process Specimen:  Blood from Arm, Left Updated:  07/01/19 0606                 CT head wo contrast   Final Result by Mili Ott MD (07/01 1646)         1  No acute intracranial abnormality  Microangiopathic changes  2   Mild to moderate, chronic ventriculomegaly is unchanged from previous studies                    Workstation performed: [id number] gallbladder   Final Result by Anabella Hurtado MD (07/01 0107)      Unremarkable exam   Normal gallbladder  Workstation performed: [id number]         CT chest abdomen pelvis w contrast   Final Result by Lana Cockayne, MD (07/01 0815)         1  Continued increase in size of right lower lobe nodule, concerning for neoplasm  This now measures 1 4 x 1 4 cm  Per Epic note 6/18/2019, the patient is having a PET CT as an outpatient  2   5 mm polypoid lesion within the gallbladder, not evident on prior imaging  Differential considerations include focal sludge, noncalcified calculus, or polyp  Further evaluation right upper quadrant ultrasound recommended  The study was marked in EPIC for significant notification  Workstation performed: [id number]         XR chest 1 view portable   ED Interpretation by Rolly Erickson MD (07/01 7917)   Read by me; Radiologist to provide formal interpretation  Vs 6/14/19 RUL infiltrate improves      Final Result by Tang Mina MD (40/54 1313)      1  Chronic air trapping, emphysematous changes, and right apical scarring without acute cardiopulmonary findings  2   Recently seen right upper lobe pneumonia has resolved  3   Right lower lobe nodule better demonstrated on subsequently performed chest CT  Report from that exam recommendations PET/CT  Note: I agree with the preliminary interpretation by the ED care provider documented in Epic              Workstation performed: [id number]         VAS lower limb venous duplex study, complete bilateral    (Results Pending)   NM inpatient order    (Results Pending)              Procedures  ECG 12 Lead Documentation Only  Date/Time: 7/1/2019 6:09 AM  Performed by: Rolly Erickson MD  Authorized by: Rolly Erickson MD     Indications / Diagnosis:  Weakness  ECG reviewed by me, the ED Provider: yes    Patient location:  ED  Previous ECG:     Previous ECG:  Compared to current Comparison ECG info:  6/14/19    Similarity:  No change  Rate:     ECG rate:  75    ECG rate assessment: normal    Rhythm:     Rhythm: sinus rhythm    QRS:     QRS axis:  Normal  Comments:      No acute ischemic changes           ED Course  ED Course as of Jul 01 2110 Mon Jul 01, 2019   0645 CXR show improvement of RUL pneumonia  Patient with intermitant hypotn with worsening back pain  Will CTA c/a/p to recheck AAA and eval for infection; dexamathasone to cover for adrenal insufficiency      0917 Secondary to fever leukocytosis worsening lung mass (post-obtructive) rx vanco/maxipime lipase and GB result pending        0919 SLIM tiger texted for admit awating call back Dr Iram Sabillon assumes care                                  MDM  Number of Diagnoses or Management Options  Diagnosis management comments: Mdm:   Will initiate fluid bolus due to intermittent hypotension patient otherwise is well-appearing recheck lactic acid chest x-ray electrolytes and evaluate for acute coronary syndrome        Disposition  Final diagnoses:   Fever   Hypotension - intermittant   Pneumonia - recent RUL     Time reflects when diagnosis was documented in both MDM as applicable and the Disposition within this note     Time User Action Codes Description Comment    7/1/2019  8:58 AM Gary Headings Add [R50 9] Fever     7/1/2019  8:58 AM Gary Headings Add [I95 9] Hypotension     7/1/2019  8:59 AM Gary Headings Modify [I95 9] Hypotension intermittant    7/1/2019  8:59 AM Gary Headings Add [J18 9] Pneumonia     7/1/2019  8:59 AM Gary Headings Modify [J18 9] Pneumonia recent RUL    7/1/2019  8:59 AM Gael Monroy Mix Add [K82 8] Gallbladder sludge     7/1/2019  3:17 PM [place], Ale Add [R06 02] SOB (shortness of breath)     7/1/2019  3:17 PM [place], Ale Add [R91 1] Pulmonary nodule     7/1/2019  3:17 PM [place], Ale Add [J96 21] Acute on chronic respiratory failure with hypoxia ([facility] [address] )     7/1/2019  9:07 PM Tony Hodaavelina Remove [K82 8] Gallbladder sludge       ED Disposition     ED Disposition Condition Date/Time Comment    Admit Stable Mon Jul 1, 2019 10:04 AM Case was discussed with Dr Gayathri Juárez and the patient's admission status was agreed to be Admission Status: inpatient status to the service of Dr Bryant Anderson   Follow-up Information    None         Current Discharge Medication List      CONTINUE these medications which have NOT CHANGED    Details   acetaminophen (TYLENOL) 500 mg tablet Take 650 mg by mouth every 6 (six) hours as needed for headaches       aspirin 81 MG tablet Take 81 mg by mouth daily Took within 24 hours       atenolol (TENORMIN) 25 mg tablet Take 1 tablet (25 mg total) by mouth daily at bedtime  Qty: 90 tablet, Refills: 3    Associated Diagnoses: Ventricular bigeminy      atorvastatin (LIPITOR) 20 mg tablet Take 1 tablet (20 mg total) by mouth daily at bedtime  Qty: 90 tablet, Refills: 3    Associated Diagnoses: Mixed hyperlipidemia      Bacillus Coagulans-Inulin (PROBIOTIC FORMULA) 1-250 BILLION-MG CAPS Take 2 capsules by mouth daily Record states dose is currently 4 billion      Cholecalciferol (VITAMIN D-3 PO) Take 2,000 Units by mouth daily  cyanocobalamin (VITAMIN B-12) 1,000 mcg tablet Take 1,000 mcg by mouth 3 (three) times a week MWF      donepezil (ARICEPT) 10 mg tablet Take 1 tablet (10 mg total) by mouth daily at bedtime  Qty: 90 tablet, Refills: 1    Associated Diagnoses: 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      escitalopram (LEXAPRO) 20 mg tablet Take 1 tablet (20 mg total) by mouth daily  Qty: 90 tablet, Refills: 3    Associated Diagnoses: Depression, unspecified depression type      Fluticasone-Salmeterol (AIRDUO RESPICLICK 18/33) 11-18 MCG/ACT AEPB Inhale 1 puff 2 (two) times a day AM & PM      KRILL OIL PO Take 500 mg by mouth daily        Melatonin 10 MG TABS Take 1 tablet by mouth daily at bedtime       memantine (NAMENDA) 5 mg tablet Take 5 mg by mouth 2 (two) times a day In the evening       montelukast (SINGULAIR) 10 mg tablet Take 1 tablet (10 mg total) by mouth daily  Qty: 90 tablet, Refills: 2    Associated Diagnoses: Allergic rhinitis due to pollen, unspecified seasonality      Multiple Vitamins-Minerals (CENTRUM SILVER ADULT 50+) TABS Take 1 tablet by mouth daily      Potassium Citrate ER 15 MEQ (1620 MG) TBCR Take 1 tablet by mouth 2 (two) times a day for 90 days  Qty: 180 tablet, Refills: 3    Associated Diagnoses: Renal calculi      QUEtiapine (SEROquel) 50 mg tablet Take 1 tablet (50 mg total) by mouth daily at bedtime  Qty: 90 tablet, Refills: 1    Associated Diagnoses: Dementia with behavioral disturbance, unspecified dementia type      ranitidine (ZANTAC) 150 mg tablet Take 1 tablet (150 mg total) by mouth 2 (two) times a day  Qty: 180 tablet, Refills: 1    Associated Diagnoses: Gastroesophageal reflux disease without esophagitis      tamsulosin (FLOMAX) 0 4 mg Take 1 capsule (0 4 mg total) by mouth daily for 90 days  Qty: 90 capsule, Refills: 3    Associated Diagnoses: Urinary retention      tiotropium (SPIRIVA HANDIHALER) 18 mcg inhalation capsule Place 18 mcg into inhaler and inhale daily  STOP taking these medications       Lactobacillus (ACIDOPHILUS) 100 MG CAPS Comments:   Reason for Stopping:             No discharge procedures on file      ED Provider  Electronically Signed by           Bladimir Greenfield MD  07/01/19 5557

## 2019-07-01 NOTE — CONSULTS
Consultation - Pulmonary Medicine   Aracelis Martines 70 y o  male MRN: [id number]  Unit/Bed#: 418-01 Encounter: [id number]      Assessment:  Self-limited hypotension   Acute kidney injury secondary to dehydration  Recent pneumonia  Lung mass    Plan:   I see no evidence of a new pneumonia on his CT scan  I do see his known right lower lobe nodule which shows no evidence of postobstructive process  I do not see any evidence of a pneumonia requiring new course of antibiotics  His UA was negative  He has been afebrile since admission  I would favor discontinuing his antibiotics if his cultures and procalcitonin are negative  He follows with Pulmonary at [facility] and needs to schedule a PET scan for evaluation of this right lower lobe nodule  His smoking is very remote making a lung cancer fairly unlikely  Given its enlarging size it certainly needs follow-up  With regards to his breathing I reviewed old pulmonary function tests which showed no evidence of COPD in fact there were normal   Continue him on his current inhaled regiment with no escalation in no IV steroids  With regards to his episode of hypotension and dizziness consider reassessing his outpatient medication and monitor on telemetry  History of Present Illness   Physician Requesting Consult: Stefanie Dash MD  Reason for Consult / Principal Problem:  Lung mass    HPI: Aracelis Martines is a 70y o  year old male who presents with recent admission of pneumonia sent home on oxygen  Prior to that he had no significant dyspnea and since discharge has had no severe dyspnea  He had an episode of dizziness and hypotension which required his wife to call 911  At that time he had a fever of 101 but has subsequently been afebrile  He denies any shortness of breath cough her symptoms currently  He denies any nausea vomiting or diarrhea    He has a history of a lung nodule which is being followed by his outpatient pulmonologist and he needs follow-up  Inpatient consult to Pulmonology  Consult performed by: Shashi Ramires DO  Consult ordered by: Payton Ruiz MD          Review of Systems   Constitutional: Negative  HENT: Negative  Eyes: Negative  Respiratory: Negative for shortness of breath  Cardiovascular: Negative  Gastrointestinal: Negative  Endocrine: Negative  Genitourinary: Negative  Allergic/Immunologic: Negative  Neurological: Negative  Hematological: Negative  Psychiatric/Behavioral: Negative          Historical Information   Past Medical History:   Diagnosis Date    [facility])     Acid reflux     Acute serous otitis media of left ear     recurrence not specified     Anesthesia     "always has mental changes /lingers and lingers after anesthesia"    Anxiety     Aortic aneurysm without rupture (HCC)     Arm bruise     right inner forearm "recent IV"    At risk for falls     BPH without urinary obstruction     Cancer (HCC)     skin CA on nose    CAP (community acquired pneumonia)     Cataracts, bilateral     Change in bowel function     Clubbing of fingers     Colon polyps     Common cold     Contusion of elbow, left     initial encounter     COPD [facility]chronic obstructive pulmonary disease) ([facility])     Coronary artery disease     Cough     Dementia     Depression     Dizziness     upon "standing quickly on occas"    Exercise counseling     Fatigue     Full dentures     "doesn't wear them"    Glaucoma screening     High cholesterol     History of abdominal aortic aneurysm (AAA) repair 08/2017    History of bacteremia     History of chronic obstructive lung disease     History of epistaxis     History of influenza vaccination     History of kidney stones     History of pneumonia     "many times" "at least 5 times" almost always goes to sepsis"    History of sepsis 10/2017    History of sinusitis     History of skin cancer     History of sleep apnea     History of transfusion     History of urinary tract infection     [place] (hard of hearing)     Hydronephrosis with obstructing calculus     Influenza vaccine needed     Jock itch     left/saw doctor 11/8 and started on antifungal cream    Kidney stones     Left hip pain     Need for pneumococcal vaccination     Need for prophylactic vaccination and inoculation against influenza     [facility]     Pancreatitis, chronic ([facility] )     pt and wife can't confirm 11/10/17    Pneumonia 10/26/2017    admitted [facility]    Pulmonary emphysema ([facility] )     Screening for genitourinary condition     Screening for neurological condition     Sepsis ([facility] )     due to unspecified organism     Short-term memory loss     Sleep apnea     does not use CPAP   Special screening examination for neoplasm of prostate     TIA involving carotid artery     "before carotid surgery"    Ulcer     stomach, "years ago"    Unsteady gait     Use of cane as ambulatory aid     sometimes    Vitamin D deficiency     Wears glasses      Past Surgical History:   Procedure Laterality Date    ABDOMINAL AORTIC ANEURYSM REPAIR  08/23/2017    ABDOMINAL AORTIC ANEURYSM REPAIR, ENDOVASCULAR      BACK SURGERY      spinal stenosis    CARDIAC SURGERY      CABG x3    CAROTID ENDARTARECTOMY Left 11/1996    CATARACT EXTRACTION Bilateral     CORONARY ARTERY BYPASS GRAFT  01/2003    x3    CYSTOSCOPY      with insertion of ureteral stent     CYSTOSCOPY      with ureteroscopy with lithotripsy     EXCISIONAL HEMORRHOIDECTOMY      EYE SURGERY Bilateral     "for a wrinkle" after cataract surgery    HERNIA REPAIR      umbilical    LITHOTRIPSY      renal    MOUTH SURGERY      full mouth extraction     [place] COLONOSCOPY FLX DX W/COLLJ SPEC WHEN PFRMD N/A 5/16/2017    Procedure: COLONOSCOPY with polypectomies/ hot snare and tattoo;  Surgeon: Antonio Varghese MD;  [facility];   Service: Gastroenterology    KS CYSTOURETHROSCOPY N/A 11/30/2017 Procedure: CYSTOSCOPY;  Surgeon: Jourdan Diaz MD;  [facility];  Service: Urology    IL ESOPHAGOGASTRODUODENOSCOPY TRANSORAL DIAGNOSTIC N/A 2016    Procedure: ESOPHAGOGASTRODUODENOSCOPY (EGD); Surgeon: Therese Arredondo MD;  [facility]: [facility]; Service: Gastroenterology    IL REMOVE BLADDER STONE,<2 5 CM N/A 2017    Procedure: Jerson Rodriguez;  Surgeon: Jourdan Diaz MD;  [facility];  Service: Urology    SKIN BIOPSY      TONSILLECTOMY      URETERAL STENT PLACEMENT      and removal     Social History   Social History     Substance and Sexual Activity   Alcohol Use Not Currently    Alcohol/week: 0 0 standard drinks    Frequency: Never    Drinks per session: 1 or 2    Binge frequency: Never    Comment: quit 25 yrs ago     Social History     Substance and Sexual Activity   Drug Use No    Comment: No illicit drug use      Social History     Tobacco Use   Smoking Status Former Smoker    Packs/day: 1 50    Years: 60 00    Pack years: 90 00    Last attempt to quit:     Years since quittin 4   Smokeless Tobacco Never Used   Tobacco Comment    quit 3 yrs ago, used to be a 1-1 5 ppd smoker     Occupational History:  Worked in [employer]    Family History: non-contributory    Meds/Allergies   all current active meds have been reviewed    Allergies   Allergen Reactions    Augmentin [Amoxicillin-Pot Clavulanate] Diarrhea    Ciprofloxacin Hives    Morphine And Related Other (See Comments)     Change in mental status    Wellbutrin [Bupropion]        Objective   Vitals: Blood pressure 154/70, pulse 76, temperature 97 7 °F (36 5 °C), temperature source Temporal, resp  rate 18, height 5' 9" (1 753 m), weight 79 kg (174 lb 2 6 oz), SpO2 92 %  ,Body mass index is 25 72 kg/m²      Intake/Output Summary (Last 24 hours) at 2019 1712  Last data filed at 2019 1414  Gross per 24 hour   Intake 3047 ml   Output 500 ml   Net 2547 ml     Invasive Devices Peripheral Intravenous Line            Peripheral IV 07/01/19 Left Antecubital less than 1 day    Peripheral IV 07/01/19 Right Antecubital less than 1 day                Physical Exam   Constitutional: He is oriented to person, place, and time  He appears well-developed and well-nourished  HENT:   Head: Normocephalic and atraumatic  Eyes: Pupils are equal, round, and reactive to light  EOM are normal    Neck: Normal range of motion  Neck supple  Cardiovascular: Normal rate and regular rhythm  No murmur heard  Pulmonary/Chest: Effort normal and breath sounds normal  No respiratory distress  He has no wheezes  He has no rales  Abdominal: Soft  Musculoskeletal: He exhibits no edema  Neurological: He is alert and oriented to person, place, and time  Skin: Skin is warm and dry         Lab Results:   ABG: No results found for: PHART, CSE8ISP, PO2ART, YSD9VNC, O6GAEUQB, BEART, SOURCE, CBC:   Lab Results   Component Value Date    WBC 12 14 (H) 07/01/2019    HGB 16 1 07/01/2019    HCT 49 9 (H) 07/01/2019    MCV 99 (H) 07/01/2019     (L) 07/01/2019    MCH 32 1 07/01/2019    MCHC 32 3 07/01/2019    RDW 14 6 07/01/2019    MPV 9 9 07/01/2019    NRBC 0 07/01/2019   , CMP:   Lab Results   Component Value Date    SODIUM 137 07/01/2019    K 5 1 07/01/2019     07/01/2019    CO2 25 07/01/2019    BUN 13 07/01/2019    CREATININE 1 37 (H) 07/01/2019    CALCIUM 8 2 (L) 07/01/2019    AST 38 07/01/2019    ALT 45 07/01/2019    ALKPHOS 114 07/01/2019    EGFR 50 07/01/2019     Imaging Studies: I have personally reviewed pertinent films in PACS  EKG, Pathology, and Other Studies: I have personally reviewed pertinent films in PACS      Code Status: Level 1 - Full Code  Advance Directive and Living Will:      Power of :    POLST:      None

## 2019-07-01 NOTE — ED PROVIDER NOTES
Spoke with Dr Dimitris Can and accepts patient  Orders placed        [address], DO  07/01/19 1015

## 2019-07-01 NOTE — ASSESSMENT & PLAN NOTE
As reported by wife, she had called EMS  The patient was found to be febrile of 101 3 and hypotensive with SBP in the 80's responded to IVF bolus  Patient alert and at baseline on admission  - admitted to [facility] with telemetry  - Obtain CT brain  - Monitor VS  - PT/OT  - Infectious workup - Treatment for possible postobstructive pneumonia in setting of SOB, fever, enlarging pulmonary nodule  - Due to elevated D-dimer DVT/PE workup  - Gentle IVF

## 2019-07-01 NOTE — ASSESSMENT & PLAN NOTE
Patient reported by wife to have intermittent hypoxia in low 80's, appears to her more SOB than baseline  Patient with normal O2 sats since admission  Continue close monitoring  Pulmonology consult

## 2019-07-01 NOTE — ASSESSMENT & PLAN NOTE
Baseline Cr around 0 9-1, current 1 34  Per wife, the patient had been with decreased PO intake  Monitor BMP  Gentle IVF - the patient did receive 2 L IV NS

## 2019-07-01 NOTE — ASSESSMENT & PLAN NOTE
Per CT chest report, "increase in size of right lower lobe nodule, concerning for neoplasm   This now measures 1 4 x 1 4 cm "  Pulmonology consulted for additional recommendations  Plan for outpatient PET scan

## 2019-07-01 NOTE — ASSESSMENT & PLAN NOTE
Markedly elevated around 9,000 but same value also on 12/30/2019  Patient had a CT chest, abd, pelvis (not PE angiogram) on admission, no evidence of PE based on this study, unable to repeat contrast study especially due to ANAND  Obtain venous duplex  VQ scan

## 2019-07-01 NOTE — RESPIRATORY THERAPY NOTE
RT Protocol Note  Trayc Olivares 70 y o  male MRN: [id number]  Unit/Bed#: 374-57 Encounter: [id number]    Assessment    Active Problems:    COPD (chronic obstructive pulmonary disease) ([facility] )    Acute kidney injury ([facility] )    Abdominal aortic aneurysm (HCC)    Dementia    Pulmonary nodule    Coronary artery disease involving native coronary artery of native heart without angina pectoris    Acute on chronic respiratory failure with hypoxia (HCC)    Leukocytosis    D-dimer, elevated      Home Pulmonary Medications:  Airduo  Spiriva  Home Devices/Therapy: Home O2 (2L)    Past Medical History:   Diagnosis Date    AAA (abdominal aortic aneurysm) (HCC)     Acid reflux     Acute serous otitis media of left ear     recurrence not specified     Anesthesia     "always has mental changes /lingers and lingers after anesthesia"    Anxiety     Aortic aneurysm without rupture ([place] [address] )     Arm bruise     right inner forearm "recent IV"    At risk for falls     BPH without urinary obstruction     Cancer ([facility] )     skin CA on nose    CAP (community acquired pneumonia)     Cataracts, bilateral     Change in bowel function     Clubbing of fingers     Colon polyps     Common cold     Contusion of elbow, left     initial encounter     COPD (chronic obstructive pulmonary disease) ([facility] )     Coronary artery disease     Cough     Dementia     Depression     Dizziness     upon "standing quickly on occas"    Exercise counseling     Fatigue     Full dentures     "doesn't wear them"    Glaucoma screening     High cholesterol     History of abdominal aortic aneurysm (AAA) repair 08/2017    History of bacteremia     History of chronic obstructive lung disease     History of epistaxis     History of influenza vaccination     History of kidney stones     History of pneumonia     "many times" "at least 5 times" almost always goes to sepsis"    History of sepsis 10/2017    History of sinusitis     History of skin cancer     History of sleep apnea     History of transfusion     History of urinary tract infection     [place] (hard [place] hearing)     Hydronephrosis with obstructing calculus     Influenza vaccine needed     Jock itch     left/saw doctor  and started on antifungal cream    Kidney stones     Left hip pain     Need for pneumococcal vaccination     Need for prophylactic vaccination and inoculation against influenza     Other emphysema ([facility]     [facility] chronic [facility] )     pt and wife can't confirm 11/10/17    Pneumonia 10/26/2017    admitted [facility]    Pulmonary emph[facility]sema [facility]     Screening for genitourinary condition     Screening for neurological condition     Sepsis ([facility] )     due to unspecified organism     Short-term memory loss     Sleep apnea     does not use CPAP      Special screening examination for neoplasm of prostate     TIA involving carotid artery     "before carotid surgery"    Ulcer     stomach, "years ago"    Unsteady gait     Use of cane as ambulatory aid     sometimes    Vitamin D deficiency     Wears glasses      Social History     Socioeconomic History    Marital status: /Civil Union     Spouse name: None    Number of children: None    Years of education: None    Highest education level: None   Occupational History    Occupation:      Comment: Retired   Social Needs    Financial resource strain: None    Food insecurity:     Worry: None     Inability: None    Transportation needs:     Medical: None     Non-medical: None   Tobacco Use    Smoking status: Former Smoker     Packs/day: 1 50     Years: 60 00     Pack years: 90 00     Last attempt to quit: 2014     Years since quittin 4    Smokeless tobacco: Never Used    Tobacco comment: quit 3 yrs ago, used to be a 1-1 5 ppd smoker   Substance and Sexual Activity    Alcohol use: Not Currently     Alcohol/week: 0 0 standard drinks     Frequency: Never     Drinks per session: 1 or 2     Binge frequency: Never     Comment: quit 25 yrs ago    Drug use: No     Comment: No illicit drug use     Sexual activity: Not Currently     Partners: Female   Lifestyle    Physical activity:     Days per week: None     Minutes per session: None    Stress: None   Relationships    Social connections:     Talks on phone: None     Gets together: None     Attends Hindu service: None     Active member of club or organization: None     Attends meetings of clubs or organizations: None     Relationship status: None    Intimate partner violence:     Fear of current or ex partner: None     Emotionally abused: None     Physically abused: None     Forced sexual activity: None   Other Topics Concern    None   Social History Narrative    Retired     No living Will     No advance directives     Daily caffeine consumption - 4-5 servings a day        Subjective         Objective    Physical Exam:   Assessment Type: Assess only  General Appearance: Awake, Alert  Respiratory Pattern: Normal  Chest Assessment: Chest expansion symmetrical  Bilateral Breath Sounds: Diminished, Clear    Vitals:  Blood pressure 154/70, pulse 76, temperature 97 7 °F (36 5 °C), temperature source Temporal, resp  rate 18, height 5' 9" (1 753 m), weight 79 kg (174 lb 2 6 oz), SpO2 92 %  Imaging and other studies: I have personally reviewed pertinent reports  Plan    Respiratory Plan: Home Bronchodilator Patient pathway          [id number] Albuterol for SOB/ Wheezing

## 2019-07-02 ENCOUNTER — APPOINTMENT (INPATIENT)
Dept: CT IMAGING | Facility: HOSPITAL | Age: 79
DRG: 314 | End: 2019-07-02
Payer: MEDICARE

## 2019-07-02 VITALS
RESPIRATION RATE: 19 BRPM | WEIGHT: 172.4 LBS | DIASTOLIC BLOOD PRESSURE: 69 MMHG | SYSTOLIC BLOOD PRESSURE: 136 MMHG | HEIGHT: 69 IN | TEMPERATURE: 96.8 F | OXYGEN SATURATION: 95 % | HEART RATE: 67 BPM | BODY MASS INDEX: 25.53 KG/M2

## 2019-07-02 LAB
ALBUMIN SERPL BCP-MCNC: 3.2 G/DL (ref 3.5–5)
ALP SERPL-CCNC: 99 U/L (ref 46–116)
ALT SERPL W P-5'-P-CCNC: 37 U/L (ref 12–78)
ANION GAP SERPL CALCULATED.3IONS-SCNC: 8 MMOL/L (ref 4–13)
AST SERPL W P-5'-P-CCNC: 23 U/L (ref 5–45)
BASOPHILS # BLD AUTO: 0.03 THOUSANDS/ΜL (ref 0–0.1)
BASOPHILS NFR BLD AUTO: 0 % (ref 0–1)
BILIRUB SERPL-MCNC: 1.7 MG/DL (ref 0.2–1)
BUN SERPL-MCNC: 14 MG/DL (ref 5–25)
CALCIUM SERPL-MCNC: 8.8 MG/DL (ref 8.3–10.1)
CHLORIDE SERPL-SCNC: 109 MMOL/L (ref 100–108)
CO2 SERPL-SCNC: 23 MMOL/L (ref 21–32)
CREAT SERPL-MCNC: 1.06 MG/DL (ref 0.6–1.3)
EOSINOPHIL # BLD AUTO: 0 THOUSAND/ΜL (ref 0–0.61)
EOSINOPHIL NFR BLD AUTO: 0 % (ref 0–6)
ERYTHROCYTE [DISTWIDTH] IN BLOOD BY AUTOMATED COUNT: 14.6 % (ref 11.6–15.1)
GFR SERPL CREATININE-BSD FRML MDRD: 68 ML/MIN/1.73SQ M
GLUCOSE SERPL-MCNC: 121 MG/DL (ref 65–140)
GLUCOSE SERPL-MCNC: 124 MG/DL (ref 65–140)
HCT VFR BLD AUTO: 48.7 % (ref 36.5–49.3)
HGB BLD-MCNC: 15.8 G/DL (ref 12–17)
IMM GRANULOCYTES # BLD AUTO: 0.06 THOUSAND/UL (ref 0–0.2)
IMM GRANULOCYTES NFR BLD AUTO: 1 % (ref 0–2)
LYMPHOCYTES # BLD AUTO: 0.78 THOUSANDS/ΜL (ref 0.6–4.47)
LYMPHOCYTES NFR BLD AUTO: 10 % (ref 14–44)
MCH RBC QN AUTO: 32 PG (ref 26.8–34.3)
MCHC RBC AUTO-ENTMCNC: 32.4 G/DL (ref 31.4–37.4)
MCV RBC AUTO: 99 FL (ref 82–98)
MONOCYTES # BLD AUTO: 0.48 THOUSAND/ΜL (ref 0.17–1.22)
MONOCYTES NFR BLD AUTO: 6 % (ref 4–12)
NEUTROPHILS # BLD AUTO: 6.86 THOUSANDS/ΜL (ref 1.85–7.62)
NEUTS SEG NFR BLD AUTO: 83 % (ref 43–75)
NRBC BLD AUTO-RTO: 0 /100 WBCS
PLATELET # BLD AUTO: 139 THOUSANDS/UL (ref 149–390)
PMV BLD AUTO: 10.2 FL (ref 8.9–12.7)
POTASSIUM SERPL-SCNC: 4 MMOL/L (ref 3.5–5.3)
PROCALCITONIN SERPL-MCNC: <0.05 NG/ML
PROT SERPL-MCNC: 6.7 G/DL (ref 6.4–8.2)
RBC # BLD AUTO: 4.93 MILLION/UL (ref 3.88–5.62)
SODIUM SERPL-SCNC: 140 MMOL/L (ref 136–145)
WBC # BLD AUTO: 8.21 THOUSAND/UL (ref 4.31–10.16)

## 2019-07-02 PROCEDURE — G8979 MOBILITY GOAL STATUS: HCPCS | Performed by: PHYSICAL THERAPIST

## 2019-07-02 PROCEDURE — 82948 REAGENT STRIP/BLOOD GLUCOSE: CPT

## 2019-07-02 PROCEDURE — 97166 OT EVAL MOD COMPLEX 45 MIN: CPT

## 2019-07-02 PROCEDURE — 97162 PT EVAL MOD COMPLEX 30 MIN: CPT | Performed by: PHYSICAL THERAPIST

## 2019-07-02 PROCEDURE — G8987 SELF CARE CURRENT STATUS: HCPCS

## 2019-07-02 PROCEDURE — 84145 PROCALCITONIN (PCT): CPT | Performed by: FAMILY MEDICINE

## 2019-07-02 PROCEDURE — G8989 SELF CARE D/C STATUS: HCPCS

## 2019-07-02 PROCEDURE — 99238 HOSP IP/OBS DSCHRG MGMT 30/<: CPT | Performed by: FAMILY MEDICINE

## 2019-07-02 PROCEDURE — 99232 SBSQ HOSP IP/OBS MODERATE 35: CPT | Performed by: PHYSICIAN ASSISTANT

## 2019-07-02 PROCEDURE — 80053 COMPREHEN METABOLIC PANEL: CPT | Performed by: FAMILY MEDICINE

## 2019-07-02 PROCEDURE — G8988 SELF CARE GOAL STATUS: HCPCS

## 2019-07-02 PROCEDURE — 71275 CT ANGIOGRAPHY CHEST: CPT

## 2019-07-02 PROCEDURE — 85025 COMPLETE CBC W/AUTO DIFF WBC: CPT | Performed by: FAMILY MEDICINE

## 2019-07-02 PROCEDURE — G8978 MOBILITY CURRENT STATUS: HCPCS | Performed by: PHYSICAL THERAPIST

## 2019-07-02 RX ADMIN — FAMOTIDINE 20 MG: 20 TABLET ORAL at 06:31

## 2019-07-02 RX ADMIN — ASPIRIN 81 MG 81 MG: 81 TABLET ORAL at 08:30

## 2019-07-02 RX ADMIN — HEPARIN SODIUM 5000 UNITS: 5000 INJECTION INTRAVENOUS; SUBCUTANEOUS at 05:53

## 2019-07-02 RX ADMIN — ACETAMINOPHEN 650 MG: 325 TABLET, FILM COATED ORAL at 04:45

## 2019-07-02 RX ADMIN — MEMANTINE 5 MG: 5 TABLET ORAL at 08:30

## 2019-07-02 RX ADMIN — Medication 250 MG: at 08:30

## 2019-07-02 RX ADMIN — MULTIPLE VITAMINS W/ MINERALS TAB 1 TABLET: TAB at 08:30

## 2019-07-02 RX ADMIN — FLUTICASONE FUROATE AND VILANTEROL TRIFENATATE 1 PUFF: 200; 25 POWDER RESPIRATORY (INHALATION) at 08:31

## 2019-07-02 RX ADMIN — TIOTROPIUM BROMIDE 18 MCG: 18 CAPSULE ORAL; RESPIRATORY (INHALATION) at 08:30

## 2019-07-02 RX ADMIN — IOHEXOL 85 ML: 350 INJECTION, SOLUTION INTRAVENOUS at 08:25

## 2019-07-02 RX ADMIN — VANCOMYCIN HYDROCHLORIDE 750 MG: 750 INJECTION, POWDER, LYOPHILIZED, FOR SOLUTION INTRAVENOUS at 06:31

## 2019-07-02 RX ADMIN — CEFEPIME HYDROCHLORIDE 2000 MG: 2 INJECTION, POWDER, FOR SOLUTION INTRAVENOUS at 08:31

## 2019-07-02 RX ADMIN — TAMSULOSIN HYDROCHLORIDE 0.4 MG: 0.4 CAPSULE ORAL at 08:30

## 2019-07-02 RX ADMIN — ESCITALOPRAM OXALATE 20 MG: 10 TABLET ORAL at 08:30

## 2019-07-02 NOTE — OCCUPATIONAL THERAPY NOTE
Occupational Therapy Evaluation     Patient Name: Latoya Farrell  SSPKA'N Date: 7/2/2019  Problem List  Patient Active Problem List   Diagnosis    COPD [facility]ronic obstructive [facility] disease) [facility]    Hyperlipidemia    GERD (gastroesophageal reflux disease)    Acute kidney injury ([facility]    Abnormal CT scan, chest    Abdominal aortic aneurysm (HCC)    Thrombocytopenia (HCC)    History of aortic aneurysm repair    Benign prostatic hyperplasia    Dementia    Bradycardia    SOB (shortness of breath)    Pulmonary nodule    Ventricular bigeminy    Coronary artery disease involving native coronary artery of native heart without angina pectoris    Hx of CABG    Bilateral carotid artery stenosis    Elevated bilirubin    Tracheobronchitis    CAD (coronary artery disease)    Acute on chronic respiratory failure with hypoxia ([facility] [address]    CAP (community acquired pneumonia)    Elevated serum creatinine    Leukocytosis    D-dimer, elevated    Episode of unresponsiveness     Past Medical History  Past Medical History:   Diagnosis Date    [facility]     Acid reflux     Acute serous otitis media of left ear     recurrence not specified     Anesthesia     "always has mental changes /lingers and lingers after anesthesia"    Anxiety     Aortic aneurysm without rupture ([facility] [address]     Arm bruise     right inner forearm "recent IV"    At risk for falls     BPH without urinary obstruction     Cancer ([address] )     skin CA on nose    CAP (community acquired pneumonia)     Cataracts, bilateral     Change in bowel function     Clubbing of fingers     Colon polyps     Common cold     Contusion of elbow, left     initial encounter     COPD (chronic obstructive pulmonary disease) ([address]     Coronary artery disease     Cough     Dementia     Depression     Dizziness     upon "standing quickly on occas"    Exercise counseling     Fatigue     Full dentures     "doesn't wear them"  Glaucoma screening     High cholesterol     History of abdominal aortic aneurysm (AAA) repair 08/2017    History of bacteremia     History of chronic obstructive lung disease     History of epistaxis     History of influenza vaccination     History of kidney stones     History of pneumonia     "many times" "at least 5 times" almost always goes to sepsis"    History of sepsis 10/2017    History of sinusitis     History of skin cancer     History of sleep apnea     History of transfusion     History of urinary tract infection     [place] (hard of hearing)     Hydronephrosis with obstructing calculus     Influenza vaccine needed     Jock itch     left/saw doctor 11/8 and started on antifungal cream    Kidney stones     Left hip pain     Need for pneumococcal vaccination     Need for prophylactic vaccination and inoculation against influenza     Other emphysema ([address]     Pancreatitis, chronic ([address] )     pt and wife can't confirm 11/10/17    Pneumonia 10/26/2017    admitted [facility]    Pulmonary emphysema ([address] )     Screening for genitourinary condition     Screening for neurological condition     Sepsis ([address] )     due to unspecified organism     Short-term memory loss     Sleep apnea     does not use CPAP      Special screening examination for neoplasm of prostate     TIA involving carotid artery     "before carotid surgery"    Ulcer     stomach, "years ago"    Unsteady gait     Use of cane as ambulatory aid     sometimes    Vitamin D deficiency     Wears glasses      Past Surgical History  Past Surgical History:   Procedure Laterality Date    ABDOMINAL AORTIC ANEURYSM REPAIR  08/23/2017    ABDOMINAL AORTIC ANEURYSM REPAIR, ENDOVASCULAR      BACK SURGERY      spinal stenosis    CARDIAC SURGERY      CABG x3    CAROTID ENDARTARECTOMY Left 11/1996    CATARACT EXTRACTION Bilateral     CORONARY ARTERY BYPASS GRAFT  01/2003    x3    CYSTOSCOPY      with insertion of ureteral stent     CYSTOSCOPY      with ureteroscopy with lithotripsy     EXCISIONAL HEMORRHOIDECTOMY      EYE SURGERY Bilateral     "for a wrinkle" after cataract surgery    HERNIA REPAIR      umbilical    LITHOTRIPSY      renal    MOUTH SURGERY      full mouth extraction     TX COLONOSCOPY FLX DX W/COLLJ SPEC WHEN PFRMD N/A 5/16/2017    Procedure: COLONOSCOPY with polypectomies/ hot snare and tattoo;  Surgeon: Hank Tillman MD;  [facility]: [facility]; Service: Gastroenterology    TX CYSTOURETHROSCOPY N/A 11/30/2017    Procedure: Ulanda Course;  Surgeon: Shun Longo MD;  [facility];  Service: Urology    TX ESOPHAGOGASTRODUODENOSCOPY TRANSORAL DIAGNOSTIC N/A 8/18/2016    Procedure: ESOPHAGOGASTRODUODENOSCOPY (EGD); Surgeon: Hank Tillman MD;  [facility]; Service: Gastroenterology    TX REMOVE BLADDER STONE,<2 5 CM N/A 11/30/2017    Procedure: Severiano Rings;  Surgeon: Shun Longo MD;  [facility];  Service: Urology    SKIN BIOPSY      TONSILLECTOMY      URETERAL STENT PLACEMENT      and removal             07/02/19 0831   Note Type   Note type Eval only   Restrictions/Precautions   Weight Bearing Precautions Per Order No   Other Precautions Contact/isolation;Telemetry; Fall Risk;Multiple lines;O2   Pain Assessment   Pain Assessment No/denies pain   Home Living   Type of [address] [place] [address] Two level;Stairs to enter with rails  (1 BERNICE c HR; 12 steps to 2nd c HR)   Bathroom Shower/Tub Walk-in shower   Bathroom Toilet Standard   Bathroom Equipment Grab bars in shower;Grab bars around toilet   [address] Walker;Cane;Grab bars   Additional Comments pt reports no device during functional mobility; wears 2L O2 at baseline   Prior Function   Level of [place] Independent with ADLs and functional mobility   Lives With Spouse   ADL Assistance Independent   IADLs Independent   Falls in the last 6 months 1 to 4   Vocational Retired Comments pt's wife drives   Psychosocial   Psychosocial (WDL) WDL   Subjective   Subjective "well sure let's do this"   ADL   Where Assessed Edge of bed   LB Dressing Assistance 7  Independent   Toileting Assistance  7  Independent   Additional Comments utilizes urinal independently and perfoms LB dressing with no difficulties   Bed Mobility   Supine to Sit 7  Independent   Sit to Supine 7  Independent   Additional Comments pt on 2L O2 during session, SpO2 ranegd 95% throughout session   Transfers   Sit to Stand 7  Independent   Stand to Sit 7  Independent   Stand pivot 7  Independent   Additional Comments pt performed functional transfers with no device, good stability, no LOB   Functional Mobility   Functional Mobility 7  Independent   Additional Comments performed functional mobility ~250ft with no difficulties and no LOB; no SOB during session   Balance   Static Sitting Good   Dynamic Sitting Good   Static Standing Good   Dynamic Standing Good   Ambulatory Good   Activity Tolerance   Activity Tolerance Patient tolerated treatment well   RUE Assessment   RUE Assessment WFL   LUE Assessment   LUE Assessment WFL   Hand Function   Gross Motor Coordination Functional   Fine Motor Coordination Functional   Sensation   Light Touch No apparent deficits   Sharp/Dull No apparent deficits   Cognition   Overall Cognitive Status WFL   Arousal/Participation Alert   Attention Within functional limits   Orientation Level Oriented X4   Memory Within functional limits   Following Commands Follows all commands and directions without difficulty   Assessment   Assessment pt seen for OT evaluation this date and performed at (I) level with no difficulties during session; pt with good stability and no SOB; pt is safe to return home with family (A) PRN; will complete OT orders at this time   Goals   Patient Goals to go home today   Recommendation   OT Discharge Recommendation Home with family support   Barthel Index   Feeding 10 Bathing 5   Grooming Score 5   Dressing Score 10   Bladder Score 10   Bowels Score 10   Toilet Use Score 10   Transfers (Bed/Chair) Score 15   Mobility (Level Surface) Score 15   Stairs Score 0   Barthel Index Score 90     Pt is performing at [facility]; OT services will be discontinued at this time  Pt left supine in bed at end of session; all needs within reach; all lines intact

## 2019-07-02 NOTE — SOCIAL WORK
Uses [facility] in [address] [place] [address]  Denies problem obtaining his meds as needed  Patient/caregiver received discharge checklist   Content reviewed  Patient/caregiver encouraged to participate in discharge plan of care prior to discharge home

## 2019-07-02 NOTE — PROGRESS NOTES
Vancomycin Assessment    Kenna Mcelroy is a 70 y o  male who is currently receiving vancomycin 750mg IV Q12h for Pneumonia     Relevant clinical data and objective history reviewed:  Creatinine   Date Value Ref Range Status   07/02/2019 1 06 0 60 - 1 30 mg/dL Final     Comment:     Standardized to IDMS reference method   07/01/2019 1 37 (H) 0 60 - 1 30 mg/dL Final     Comment:     Standardized to IDMS reference method   06/18/2019 0 91 0 60 - 1 30 mg/dL Final     Comment:     Standardized to IDMS reference method   08/31/2015 0 94 0 60 - 1 30 mg/dL Final     Comment:     Standardized to IDMS reference method   11/29/2014 1 05 0 60 - 1 30 mg/dL Final     Comment:     Standardized to IDMS reference method   10/25/2014 1 02 0 60 - 1 30 mg/dL Final     Comment:     Standardized to IDMS reference method     /69 (BP Location: Left arm)   Pulse 67   Temp (!) 96 8 °F (36 °C) (Temporal)   Resp 19   Ht 5' 9" (1 753 m)   Wt 78 2 kg (172 lb 6 4 oz)   SpO2 95%   BMI 25 46 kg/m²   I/O last 3 completed shifts: In: 7273 [P O :360; I V :200; IV Piggyback:6597]  Out: 0683 [Urine:1650]  Lab Results   Component Value Date/Time    BUN 14 07/02/2019 04:33 AM    BUN 13 11/29/2014 02:16 PM    WBC 8 21 07/02/2019 04:33 AM    WBC 6 31 08/31/2015 05:15 PM    HGB 15 8 07/02/2019 04:33 AM    HGB 13 8 08/31/2015 05:15 PM    HCT 48 7 07/02/2019 04:33 AM    HCT 40 9 08/31/2015 05:15 PM    MCV 99 (H) 07/02/2019 04:33 AM    MCV 94 08/31/2015 05:15 PM     (L) 07/02/2019 04:33 AM     08/31/2015 05:15 PM     Temp Readings from Last 3 Encounters:   07/02/19 (!) 96 8 °F (36 °C) (Temporal)   06/18/19 (!) 97 4 °F (36 3 °C) (Temporal)   01/01/19 98 2 °F (36 8 °C) (Temporal)     Vancomycin Days of Therapy: 2    Assessment/Plan  The patient is currently on vancomycin utilizing scheduled dosing  Baseline risks associated with therapy include: pre-existing renal impairment and advanced age    The patient is receiving 750mg IV Q12h with the most recent vancomycin level not yet being drawn based on a goal of 15-20 (appropriate for most indications) ; therefore, after clinical evaluation will be changed to 1000mg IV Q12h   Pharmacy will continue to follow closely for s/sx of nephrotoxicity, infusion reactions and appropriateness of therapy  BMP and CBC will be ordered per protocol  Plan for trough as patient approaches steady state, prior to the 3rd  dose at approximately 1530 on 7/3/19  Pharmacy will continue to follow the patients culture results and clinical progress daily      Adele Balderas, Pharmacist

## 2019-07-02 NOTE — UTILIZATION REVIEW
Initial Clinical Review    Admission: Date/Time/Statement: 7/1/19 @ 1005     Orders Placed This Encounter   Procedures    Inpatient Admission (expected length of stay for this patient Order details is greater than two midnights)     Standing Status:   Standing     Number of Occurrences:   1     Order Specific Question:   Admitting Physician     Answer:   Cassandra Patrick [[id number]]     Order Specific Question:   Level of Care     Answer:   Med Surg [16]     Order Specific Question:   Estimated length of stay     Answer:   More than 2 Midnights     Order Specific Question:   Certification     Answer:   I certify that inpatient services are medically necessary for this patient for a duration of greater than two midnights  See H&P and MD Progress Notes for additional information about the patient's course of treatment  ED Arrival Information     Expected Arrival Acuity Means of Arrival Escorted By Service Admission Type    - 7/1/2019 05:31 Urgent Ambulance [facility] pass Ambulance General Medicine Urgent    Arrival Complaint    -        Chief Complaint   Patient presents with    Fever - 76 years or older     Pt c/o fever for the past 3 days - hx of being tx for pneumonia ~ 2 weeks ago     Assessment/Plan: 70year old male to the ED from  Home via EMS for fever, increase in weakness  Admitted to inpatient for episode of unresponsivenss, hypoxia, leukocytosis  Admitted 6/14-6/18  for a right upper lobe pneumonia  Discharged on baseline home O2 2LNC  Appeared to be recovering well until a couple days prior to this visit when his wife noted fluctuating home bps, poor appetite  Given fluid bolus for bp 88/32 prehospital, with improvement to 132/70  Febrile  He has cough, with diminished breath sounds is oriented to himself and family, which is his baseline per wife  Head CT  Infectious workup, rule out DVT/PE, gentle IVfluids  IV antibiotics, MRSa cultuture, check/monitor procalcitonin   pulm consult for pulmonary nodule increased in size  Acute kidney injury ([facility] [address] )  Assessment & Plan  Baseline Cr around 0 9-1, current 1 34  Per wife, the patient had been with decreased PO intake  Monitor BMP  Gentle IVF - the patient did receive 2 L IV NS   COPD (chronic obstructive pulmonary disease) ([facility] [address] )  Assessment & Plan  Per wife, the patient has been noted to occasionally have oxygen saturations in low 80's  Patient at this time well-appearing with oxygen saturations in normal range  Continue home inhalers  Respiratory protocol  Will consult Pulmonology  Pulmonary consult: see no evidence of a new pneumonia on his CT scan  I do see his known right lower lobe nodule which shows no evidence of postobstructive process  I do not see any evidence of a pneumonia requiring new course of antibiotics  His UA was negative  He has been afebrile since admission  I would favor discontinuing his antibiotics if his cultures and procalcitonin are negative     ED Triage Vitals   Temperature Pulse Respirations Blood Pressure SpO2   07/01/19 0539 07/01/19 0539 07/01/19 0539 07/01/19 0539 07/01/19 0539   99 1 °F (37 3 °C) 75 18 114/62 92 %      Temp Source Heart Rate Source Patient Position - Orthostatic VS BP Location FiO2 (%)   07/01/19 0539 07/01/19 0539 07/01/19 0539 07/01/19 0539 --   Temporal Monitor Lying Left arm       Pain Score       07/01/19 1100       No Pain        Wt Readings from Last 1 Encounters:   07/02/19 78 2 kg (172 lb 6 4 oz)     Additional Vital Signs:  Date/Time  Temp  Pulse  Resp  BP  MAP (mmHg)  SpO2  O2 Device  Patient Position - Orthostatic VS   07/02/19 0719  96 8 °F (36 °C)Abnormal   67  19  136/69    95 %  Nasal cannula  Lying   07/02/19 0439  97 8 °F (36 6 °C)  80  22  144/91  110  91 %       07/02/19 0400    71        94 %  Nasal cannula     07/01/19 2206  97 6 °F (36 4 °C)  93  20  131/68    93 %  Nasal cannula  Sitting   07/01/19 1641              Nasal cannula     07/01/19 1535    76  18     92 %       07/01/19 1519  97 7 °F (36 5 °C)  70  18  154/70    94 %  Nasal cannula  Sitting   07/01/19 1100  97 5 °F (36 4 °C)  76  18  138/72    90 %  Nasal cannula  Lying   07/01/19 1030    78  19  148/67    91 %  Nasal cannula  Sitting   07/01/19 0900    74  15  115/60    91 %  Nasal cannula  Lying   07/01/19 0800    80  20  111/60               Pertinent Labs/Diagnostic Test Results:   EKG:  Sinus rhythm with 1st degree A-V block  Septal infarct (cited on or before 01-JUL-2019)  Abnormal ECG  When compared with ECG of 01-JUL-2019 06:09,  T wave amplitude has decreased in Lateral leads  CTA No evidence for acute pulmonary embolus  Moderate to severe chronic COPD   Stable wedge-shaped scarlike opacity in the right upper lobe  There is an oval solid nodule in the right lower lobe on image 3/29 which is 12 x 9 mm increasing compared to priors  CT head: No acute intracranial abnormality   Microangiopathic changes  Mild to moderate, chronic ventriculomegaly is unchanged from previous studies  Ultrasound Gallbladder: Unremarkable exam   Normal gallbladder  CT C/A/P:   Continued increase in size of right lower lobe nodule, concerning for neoplasm   This now measures 1 4 x 1 4 cm    5 mm polypoid lesion within the gallbladder, not evident on prior imaging     CXR:  Chronic air trapping, emphysematous changes, and right apical scarring without acute cardiopulmonary findings  Recently seen right upper lobe pneumonia has resolved    Right lower lobe nodule better demonstrated on subsequently performed chest CT       Results from last 7 days   Lab Units 07/02/19  0433 07/01/19  0554   WBC Thousand/uL 8 21 12 14*   HEMOGLOBIN g/dL 15 8 16 1   HEMATOCRIT % 48 7 49 9*   PLATELETS Thousands/uL 139* 138*   NEUTROS ABS Thousands/µL 6 86 9 84*         Results from last 7 days   Lab Units 07/02/19  0433 07/01/19  0554   SODIUM mmol/L 140 137   POTASSIUM mmol/L 4 0 5 1   CHLORIDE mmol/L 109* 105   CO2 mmol/L 23 25 ANION GAP mmol/L 8 7   BUN mg/dL 14 13   CREATININE mg/dL 1 06 1 37*   EGFR ml/min/1 73sq m 68 50   CALCIUM mg/dL 8 8 8 2*   MAGNESIUM mg/dL  --  2 1     Results from last 7 days   Lab Units 07/02/19  0433 07/01/19  0554   AST U/L 23 38   ALT U/L 37 45   ALK PHOS U/L 99 114   TOTAL PROTEIN g/dL 6 7 6 8   ALBUMIN g/dL 3 2* 3 4*   TOTAL BILIRUBIN mg/dL 1 70* 1 60*     Results from last 7 days   Lab Units 07/02/19  0438   POC GLUCOSE mg/dl 124     Results from last 7 days   Lab Units 07/02/19  0433 07/01/19  0554   GLUCOSE RANDOM mg/dL 121 116       Results from last 7 days   Lab Units 07/01/19  0932 07/01/19  0554   TROPONIN I ng/mL <0 02 <0 02     Results from last 7 days   Lab Units 07/01/19  0554   D DIMER QUANT ng/ml (FEU) 9,210*       Results from last 7 days   Lab Units 07/01/19  0554   LACTIC ACID mmol/L 1 9       Results from last 7 days   Lab Units 07/01/19  0554   LIPASE u/L 105             Results from last 7 days   Lab Units 07/01/19  0655   CLARITY UA  Clear   COLOR UA  Yellow   SPEC GRAV UA  1 010   PH UA  7 5   GLUCOSE UA mg/dl Negative   KETONES UA mg/dl Negative   BLOOD UA  Small*   PROTEIN UA mg/dl Negative   NITRITE UA  Negative   BILIRUBIN UA  Negative   UROBILINOGEN UA E U /dl 0 2   LEUKOCYTES UA  Negative   WBC UA /hpf 0-5   RBC UA /hpf 4-10*   BACTERIA UA /hpf Occasional   EPITHELIAL CELLS WET PREP /hpf Occasional     Results from last 7 days   Lab Units 07/01/19  0655   STREP PNEUMONIAE ANTIGEN, URINE  Negative   LEGIONELLA URINARY ANTIGEN  Negative       ED Treatment:   Medication Administration from 07/01/2019 0531 to 07/01/2019 1054       Date/Time Order Dose Route Action     07/01/2019 0643 dexamethasone (PF) (DECADRON) injection 10 mg 10 mg Intravenous Given     07/01/2019 0647 sodium chloride 0 9 % bolus 2,367 mL 2,367 mL Intravenous New Bag     07/01/2019 0643 ipratropium-albuterol (DUO-NEB) 0 5-2 5 mg/3 mL inhalation solution 3 mL 3 mL Nebulization Given     07/01/2019 0720 iohexol (OMNIPAQUE) 350 MG/ML injection (SINGLE-DOSE) 100 mL 100 mL Intravenous Given     07/01/2019 0903 vancomycin (VANCOCIN) 1,250 mg in sodium chloride 0 9 % 250 mL IVPB 1,250 mg Intravenous New Bag     07/01/2019 0858 cefepime (MAXIPIME) IVPB (premix) 2,000 mg 0 mg Intravenous Stopped     07/01/2019 0827 cefepime (MAXIPIME) IVPB (premix) 2,000 mg 2,000 mg Intravenous New Bag        Past Medical History:   Diagnosis Date    AAA [facility]abdominal [facility] aneurysm) [facility])     Acid reflux     Acute serous otitis media of left ear     recurrence not specified     Anesthesia     "always has mental changes /lingers and lingers after anesthesia"    Anxiety     Aortic aneurysm without rupture ([facility])     Arm bruise     right inner forearm "recent IV"    At risk for falls     BPH without urinary obstruction     Cancer ([facility] [address] )     skin CA on nose    CAP (community acquired pneumonia)     Cataracts, bilateral     Change in bowel function     Clubbing of fingers     Colon polyps     Common cold     Contusion of elbow, left     initial encounter     COPD (chronic obstructive pulmonary disease) ([facility]     Coronary artery disease     Cough     Dementia     Depression     Dizziness     upon "standing quickly on occas"    Exercise counseling     Fatigue     Full dentures     "doesn't wear them"    Glaucoma screening     High cholesterol     History of abdominal aortic aneurysm (AAA) repair 08/2017    History of bacteremia     History of chronic obstructive lung disease     History of epistaxis     History of influenza vaccination     History of kidney stones     History of pneumonia     "many times" "at least 5 times" almost always goes to sepsis"    History of sepsis 10/2017    History of sinusitis     History of skin cancer     History of sleep apnea     History of transfusion     History of urinary tract infection     [place] (hard of hearing)     Hydronephrosis with obstructing calculus     Influenza vaccine needed     Jock itch     left/saw doctor 11/8 and started on antifungal cream    Kidney stones     Left hip pain     Need for pneumococcal vaccination     Need for prophylactic vaccination and inoculation against influenza     Other emphysema ([facility] )     Pancreatitis, chronic ([facility] )     pt and wife can't confirm 11/10/17    Pneumonia 10/26/2017    admitted [facility]    Pulmonary emphysema ([facility] )     Screening for genitourinary condition     Screening for neurological condition     Sepsis ([facility] )     due to unspecified organism     Short-term memory loss     Sleep apnea     does not use CPAP   Special screening examination for neoplasm of prostate     TIA involving carotid artery     "before carotid surgery"    Ulcer     stomach, "years ago"    Unsteady gait     Use of cane as ambulatory aid     sometimes    Vitamin D deficiency     Wears glasses      Admitting Diagnosis: Pneumonia [J18 9]  Weakness [R53 1]  Hypotension [I95 9]  Fever [R50 9]  Gallbladder sludge [K82 8]  Age/Sex: 70 y o  male  Admission Orders: Up with assist  MRSA  Procalcitoning  Sputum cultur and gram stain    Current Facility-Administered Medications:  acetaminophen 650 mg Oral Q6H PRN   albuterol 2 5 mg Nebulization Q6H PRN   aspirin 81 mg Oral Daily   atenolol 25 mg Oral HS   atorvastatin 20 mg Oral After Dinner   donepezil 10 mg Oral HS   escitalopram 20 mg Oral Daily   famotidine 20 mg Oral Daily Before Breakfast   fluticasone-vilanterol 1 puff Inhalation Daily   heparin (porcine) 5,000 Units Subcutaneous Q8H [address]   melatonin 9 mg Oral HS   memantine 5 mg Oral Daily   montelukast 10 mg Oral QPM   multivitamin-minerals 1 tablet Oral Daily   QUEtiapine 50 mg Oral HS   saccharomyces boulardii 250 mg Oral BID   tamsulosin 0 4 mg Oral Daily   tiotropium 18 mcg Inhalation Daily       IP CONSULT TO CASE MANAGEMENT  IP CONSULT TO PULMONOLOGY  IP CONSULT TO PHARMACY    Network Utilization Review Department  Phone: [phone];  Fax [phone]  [email or web address]  ATTENTION: Please call with any questions or concerns to [phone]  and carefully listen to the prompts so that you are directed to the right person  Send all requests for admission clinical reviews, approved or denied determinations and any other requests to fax [phone]   All voicemails are confidential

## 2019-07-02 NOTE — PLAN OF CARE
Problem: PHYSICAL THERAPY ADULT  Goal: Performs mobility at highest level of function for planned discharge setting  See evaluation for individualized goals  7/2/2019 1315 by Stella Bolton PT  Outcome: Adequate for Discharge  Note:   Prognosis: Good  Problem List: Decreased strength, Decreased endurance, Impaired balance, Decreased mobility  Assessment: Pt is a 70year old male with PMH including COPD CAD TIA AAA CABG and carotid endarterectomy presenting to [facility] due to fever increased weakness and just not feeling well  Pt seen for moderate complexity PT evaluation presenting with fair to good strength balance endurance mobility transfers and ambulation with no drop in spO2 on 2L's performing all bed mobility transfers and ambulation at a (S) to (I) level no A D  Pt would benefit from continued activity in PT to improve strength balance endurance ambulation and to ensure safe (I) stair negotiation  Pt will be safe to return home when medically stable for discharge  Recommendation: Home independently     PT - OK to Discharge: Yes    See flowsheet documentation for full assessment       7/2/2019 1314 by Stella Bolton, PT  Reactivated

## 2019-07-02 NOTE — PROGRESS NOTES
Progress Note - Pulmonary   Latoya Farrell 70 y o  male MRN: [id number]  Unit/Bed#: 418-01 Encounter: [id number]    Assessment/Plan:    1  Acute on chronic hypoxic respiratory failure  1  Maintain SpO2 greater than or equal to 88%  2  Pulmonary toilet:  Increase activity as tolerated, out of bed as tolerated, cough and deep breathing exercise encouraged  2  COPD without exacerbation  1  Continue home pulmonary regimen   2  No need for additional steroids or scheduled nebulizers  3  Recent pneumonia  1  Antibiotics discontinued  2  No signs of new pneumonia on recent CTA PE study  3  Procalcitonin and blood cultures pending  4  Lung mass  1  Will need outpatient PET-CT  2  Follows with [facility] pulmonary    Appointment with [facility] pulmonary 07/31/2019 per discharge instructions    Chief Complaint:    I am great      Subjective:    Patient was seen and examined today  No overnight events reported  Patient offers no new complaints or symptoms  Patient denies shortness of breath at rest or with ambulation  He continues to wear 2 L nasal cannula  He denies cough  Denies recent dizziness  No fevers, chills, headache  Denies nausea, vomiting, abdominal pain, or leg swelling  He will follow-up with [facility] for his lung nodule  Patient states that on average he smoked pack a day for 60 years with a quit date 5 years ago  Objective:    Vitals: Blood pressure 136/69, pulse 67, temperature (!) 96 8 °F (36 °C), temperature source Temporal, resp  rate 19, height 5' 9" (1 753 m), weight 78 2 kg (172 lb 6 4 oz), SpO2 95 %  2 L nasal cannula,Body mass index is 25 46 kg/m²        Intake/Output Summary (Last 24 hours) at 7/2/2019 1106  Last data filed at 7/2/2019 0928  Gross per 24 hour   Intake 600 ml   Output 2175 ml   Net -1575 ml       Invasive Devices     Peripheral Intravenous Line            Peripheral IV 07/01/19 Proximal;Right;Ventral (anterior) Forearm less than 1 day                Physical Exam:     Physical Exam   Constitutional: He is oriented to person, place, and time  He appears well-developed and well-nourished  No distress  HENT:   Head: Normocephalic and atraumatic  Right Ear: External ear normal    Left Ear: External ear normal    Nose: Nose normal    Eyes: Pupils are equal, round, and reactive to light  EOM are normal  Right eye exhibits no discharge  Left eye exhibits no discharge  Neck: Normal range of motion  Neck supple  No tracheal deviation present  Cardiovascular: Normal rate, regular rhythm, normal heart sounds and intact distal pulses  No murmur heard  Pulmonary/Chest: Effort normal and breath sounds normal  He has no wheezes  Abdominal: Soft  Bowel sounds are normal  He exhibits no distension  Musculoskeletal: Normal range of motion  He exhibits no edema or deformity  Neurological: He is alert and oriented to person, place, and time  No cranial nerve deficit  Skin: Skin is warm and dry  No rash noted  He is not diaphoretic  No erythema  Psychiatric: He has a normal mood and affect  His behavior is normal  Judgment and thought content normal    Vitals reviewed  Labs: I have personally reviewed pertinent lab results  , ABG: No results found for: PHART, GHJ0IKY, PO2ART, ILK6HIO, G3NXOUWB, BEART, SOURCE, BNP: No results found for: BNP, CBC:   Lab Results   Component Value Date    WBC 8 21 07/02/2019    HGB 15 8 07/02/2019    HCT 48 7 07/02/2019    MCV 99 (H) 07/02/2019     (L) 07/02/2019    MCH 32 0 07/02/2019    MCHC 32 4 07/02/2019    RDW 14 6 07/02/2019    MPV 10 2 07/02/2019    NRBC 0 07/02/2019   , CMP:   Lab Results   Component Value Date    SODIUM 140 07/02/2019    K 4 0 07/02/2019     (H) 07/02/2019    CO2 23 07/02/2019    BUN 14 07/02/2019    CREATININE 1 06 07/02/2019    CALCIUM 8 8 07/02/2019    AST 23 07/02/2019    ALT 37 07/02/2019    ALKPHOS 99 07/02/2019    EGFR 68 07/02/2019   , PT/INR: No results found for: PT, INR, Troponin: No results found for: TROPONINI        Imaging and other studies: I have personally reviewed pertinent reports  and I have personally reviewed pertinent films in PACS     CTA chest PE study 07/02/2019  No pulmonary embolism  Moderate to severe centrilobular and panlobular emphysema  Stable linear and wedge-shaped scar-like opacity in the right apex  Nodule in the right lower lobe measuring 12 x 9 mm

## 2019-07-02 NOTE — NURSING NOTE
Patient awoke with nose bleed R nare  Bleeding resolved after pressure applied  Patient began to present with rigors  Afebrile  Patient states [facility] is how I felt with my fever last time  Tylenol given

## 2019-07-02 NOTE — SOCIAL WORK
Visit with patient in his hospital room to initiate discharge planning  Patient will return home with family today  Lives in 2 story home with bathrooms on both floors  1step to enter home  Patient will returnhome with family on discharge denies need for any services on discharge  Family will transport him home  Patient/caregiver received discharge checklist   Content reviewed  Patient/caregiver encouraged to participate in discharge plan of care prior to discharge home

## 2019-07-02 NOTE — PLAN OF CARE
Problem: Potential for Falls  Goal: Patient will remain free of falls  Description  INTERVENTIONS:  - Assess patient frequently for physical needs  -  Identify cognitive and physical deficits and behaviors that affect risk of falls  -  [place] fall precautions as indicated by assessment   - Educate patient/family on patient safety including physical limitations  - Instruct patient to call for assistance with activity based on assessment  - Modify environment to reduce risk of injury  - Consider OT/PT consult to assist with strengthening/mobility  Outcome: Progressing     Problem: Prexisting or High Potential for Compromised Skin Integrity  Goal: Skin integrity is maintained or improved  Description  INTERVENTIONS:  - Identify patients at risk for skin breakdown  - Assess and monitor skin integrity  - Assess and monitor nutrition and hydration status  - Monitor labs (i e  albumin)  - Assess for incontinence   - Turn and reposition patient  - Assist with mobility/ambulation  - Relieve pressure over bony prominences  - Avoid friction and shearing  - Provide appropriate hygiene as needed including keeping skin clean and dry  - Evaluate need for skin moisturizer/barrier cream  - Collaborate with interdisciplinary team (i e  Nutrition, Rehabilitation, etc )   - Patient/family teaching  Outcome: Progressing     Problem: Nutrition/Hydration-ADULT  Goal: Nutrient/Hydration intake appropriate for improving, restoring or maintaining nutritional needs  Description  Monitor and assess patient's nutrition/hydration status for malnutrition (ex- brittle hair, bruises, dry skin, pale skin and conjunctiva, muscle wasting, smooth red tongue, and disorientation)  Collaborate with interdisciplinary team and initiate plan and interventions as ordered  Monitor patient's weight and dietary intake as ordered or per policy  Utilize nutrition screening tool and intervene per policy   Determine patient's food preferences and provide high-protein, high-caloric foods as appropriate  INTERVENTIONS:  - Monitor oral intake, urinary output, labs, and treatment plans  - Assess nutrition and hydration status and recommend course of action  - Evaluate amount of meals eaten  - Assist patient with eating if necessary   - Allow adequate time for meals  - Recommend/ encourage appropriate diets, oral nutritional supplements, and vitamin/mineral supplements  - Provide specific nutrition/hydration education as appropriate  - Include patient/family/caregiver in decisions related to nutrition   Outcome: Progressing     Problem: MUSCULOSKELETAL - ADULT  Goal: Maintain or return mobility to safest level of function  Description  INTERVENTIONS:  - Assess patient's ability to carry out ADLs; assess patient's baseline for ADL function and identify physical deficits which impact ability to perform ADLs (bathing, care of mouth/teeth, toileting, grooming, dressing, etc )  - Assess/evaluate cause of self-care deficits   - Assess range of motion  - Assess patient's mobility; develop plan if impaired  - Assess patient's need for assistive devices and provide as appropriate  - Encourage maximum independence but intervene and supervise when necessary  - Involve family in performance of ADLs  - Assess for home care needs following discharge   - Request OT consult to assist with ADL evaluation and planning for discharge  - Provide patient education as appropriate  Outcome: Progressing     Problem: SAFETY ADULT  Goal: Patient will remain free of falls  Description  INTERVENTIONS:  - Assess patient frequently for physical needs  -  Identify cognitive and physical deficits and behaviors that affect risk of falls    -  [place] fall precautions as indicated by assessment   - Educate patient/family on patient safety including physical limitations  - Instruct patient to call for assistance with activity based on assessment  - Modify environment to reduce risk of injury  - Consider OT/PT consult to assist with strengthening/mobility  Outcome: Progressing  Goal: Maintain or return to baseline ADL function  Description  INTERVENTIONS:  -  Assess patient's ability to carry out ADLs; assess patient's baseline for ADL function and identify physical deficits which impact ability to perform ADLs (bathing, care of mouth/teeth, toileting, grooming, dressing, etc )  - Assess/evaluate cause of self-care deficits   - Assess range of motion  - Assess patient's mobility; develop plan if impaired  - Assess patient's need for assistive devices and provide as appropriate  - Encourage maximum independence but intervene and supervise when necessary  ¯ Involve family in performance of ADLs  ¯ Assess for home care needs following discharge   ¯ Request OT consult to assist with ADL evaluation and planning for discharge  ¯ Provide patient education as appropriate  Outcome: Progressing  Goal: Maintain or return mobility status to optimal level  Description  INTERVENTIONS:  - Assess patient's baseline mobility status (ambulation, transfers, stairs, etc )    - Identify cognitive and physical deficits and behaviors that affect mobility  - Identify mobility aids required to assist with transfers and/or ambulation (gait belt, sit-to-stand, lift, walker, cane, etc )  - [place] fall precautions as indicated by assessment  - Record patient progress and toleration of activity level on Mobility SBAR; progress patient to next Phase/Stage  - Instruct patient to call for assistance with activity based on assessment  - Request Rehabilitation consult to assist with strengthening/weightbearing, etc   Outcome: Progressing     Problem: DISCHARGE PLANNING  Goal: Discharge to home or other facility with appropriate resources  Description  INTERVENTIONS:  - Identify barriers to discharge w/patient and caregiver  - Arrange for needed discharge resources and transportation as appropriate  - Identify discharge learning needs (meds, wound care, etc )  - Refer to Case Management Department for coordinating discharge planning if the patient needs post-hospital services based on physician/advanced practitioner order or complex needs related to functional status, cognitive ability, or social support system   Outcome: Progressing     Problem: Knowledge Deficit  Goal: Patient/family/caregiver demonstrates understanding of disease process, treatment plan, medications, and discharge instructions  Description  Complete learning assessment and assess knowledge base    Interventions:  - Provide teaching at level of understanding  - Provide teaching via preferred learning methods  Outcome: Progressing

## 2019-07-02 NOTE — DISCHARGE SUMMARY
Discharge Summary - Emilee Crowell 70 y o  male MRN: [id number]    Unit/Bed#: 931-08 Encounter: [id number]    Admission Date:   Admission Orders (From admission, onward)    Ordered        07/01/19 1005  Inpatient Admission (expected length of stay for this patient Order details is greater than two midnights)  Once               Admitting Diagnosis: Pneumonia [J18 9]  Weakness [R53 1]  Hypotension [I95 9]  Fever [R50 9]  Gallbladder sludge [K82 8]    HPI: Emilee Crowell is a 70 y o  male with history AAA, COPD, dementia and coronary artery disease as well as a pulmonary nodule, recently hospitalized for pneumonia, who presents with 4 day history of progressively worsening shortness of breath and looking "not like himself" per wife  Patient's wife stated that early this morning the patient had an episode of responsiveness and would not respond when she was calling out his name, she stated that that prompted her to call EMS  On EMS arrival the patient was found to be hypotensive with systolic blood pressure in 80s and febrile with fever of 101 3  The patient received an IV fluid bolus with his blood pressure normalizing  Patient's wife states that the patient appeared and well overall, he was noted for decreased appetite and some mental status changes  The wife stated that she has been keeping a log of patient's blood pressure and oxygen saturations and at times he was noted to have oxygen saturations in low 80s  Patient's wife also stated that his blood pressure appeared low with systolic in the 30W at times  Patient's wife stated she was not aware fevers in the patient  Procedures Performed:   Orders Placed This Encounter   Procedures    ED ECG Documentation Only       Summary of Hospital Course:     Self limited hypotension    Likely secondary to dehydration, patient admitted to medical floor, started on IV fluids, and a CTA PE study was completed which did not show evidence of pneumonia or PE    He received vancomycin and cefepime, however procalcitonin was negative and after consultation with Pulmonary Medicine it was not recommended that antibiotics be continued or steroids initiated  Patient stated that he does not drink enough fluids, and his blood pressure normalized and will be discharged home  He will follow up with his PCP after monitor his blood pressure at home to assess need for titration of atenolol 25 mg p o  HS    Lung nodule  Patient has follow-up with Pulmonary Medicine at [facility] next week  Significant Findings, Care, Treatment and Services Provided:     CTA PE  No P E, no infiltrate    Complications: none    Discharge Diagnosis: see above    Resolved Problems  Date Reviewed: 7/1/2019    None          Condition at Discharge: fair         Discharge instructions/Information to patient and family:   See after visit summary for information provided to patient and family  Provisions for Follow-Up Care:  See after visit summary for information related to follow-up care and any pertinent home health orders  PCP: Bonnie Quigley DO    Disposition: Home    Planned Readmission: No    Discharge Statement   I spent 40 minutes discharging the patient  This time was spent on the day of discharge  I had direct contact with the patient on the day of discharge  Additional documentation is required if more than 30 minutes were spent on discharge  Discharge Medications:  See after visit summary for reconciled discharge medications provided to patient and family

## 2019-07-02 NOTE — PHYSICIAN ADVISOR
Current patient class: Inpatient  The patient is currently on Hospital Day: 2 at [address]       The patient is  documented to require at least a 2nd midnight in the hospital  As such the patient is  expected to satisfy the 2 midnight benchmark and is therefore eligible for inpatient admission  After review of the relevant documentation, labs, vital signs and test results, the patient is appropriate for INPATIENT ADMISSION  Admission to the hospital as an inpatient is a complex decision making process which requires the practitioner to consider the patients presenting complaint, history and physical examination and all relevant testing  With this in mind, in this case, the patient was deemed appropriate for INPATIENT ADMISSION  After review of the documentation and testing available at the time of the admission I concur with this clinical determination of medical necessity  Rationale is as follows: The patient is a 70 yrs Male who presented to the ED at 7/1/2019  5:32 AM with a chief complaint of Fever - 75 years or older (Pt c/o fever for the past 3 days - hx of being tx for pneumonia ~ 2 weeks ago)  EMS was called for episode of unresponsiveness - patient was found to be febrile , hypotensive , he was given IV fluids   Also reported to have intermittent hypoxia in low 80s and more SOB than baseline   Labs showed elevated D dimer and leukocytosis  And ANAND - he was stated on IV fluids and IV antibiotics   He was seen in consultation by pulmonary service for lung mass and IV antibiotics discontinued   Hypotension  And ANAND resolved after IV fluids and he had no further episodes of fever       The patients vitals on arrival were ED Triage Vitals   Temperature Pulse Respirations Blood Pressure SpO2   07/01/19 0539 07/01/19 0539 07/01/19 0539 07/01/19 0539 07/01/19 0539   99 1 °F (37 3 °C) 75 18 114/62 92 %      Temp Source Heart Rate Source Patient Position - Orthostatic VS BP Location FiO2 (%)   07/01/19 0539 07/01/19 0539 07/01/19 0539 07/01/19 0539 --   Temporal Monitor Lying Left arm       Pain Score       07/01/19 1100       No Pain           Past Medical History:   Diagnosis Date    [facility])     Acid reflux     Acute serous otitis media of left ear     recurrence not specified     Anesthesia     "always has mental changes /lingers and lingers after anesthesia"    Anxiety     Aortic aneurysm without rupture ([facility])     Arm bruise     right inner forearm "recent IV"    At risk for falls     BPH without urinary obstruction     Cancer ([address] )     skin CA on nose    CAP (community acquired pneumonia)     Cataracts, bilateral     Change in bowel function     Clubbing of fingers     Colon polyps     Common cold     Contusion of elbow, left     initial encounter     COPD (chronic obstructive pulmonary disease) ([facility])     Coronary artery disease     Cough     Dementia     Depression     Dizziness     upon "standing quickly on occas"    Exercise counseling     Fatigue     Full dentures     "doesn't wear them"    Glaucoma screening     High cholesterol     History of abdominal aortic aneurysm (AAA) repair 08/2017    History of bacteremia     History of chronic obstructive lung disease     History of epistaxis     History of influenza vaccination     History of kidney stones     History of pneumonia     "many times" "at least 5 times" almost always goes to sepsis"    History of sepsis 10/2017    History of sinusitis     History of skin cancer     History of sleep apnea     History of transfusion     History of urinary tract infection     [place] (hard of hearing)     Hydronephrosis with obstructing calculus     Influenza vaccine needed     Jock itch     left/saw doctor 11/8 and started on antifungal cream    Kidney stones     Left hip pain     Need for pneumococcal vaccination     Need for prophylactic vaccination and inoculation against influenza     Other emphysema [facility]     Pancreatitis, chronic ([facility] )     pt and wife can't confirm 11/10/17    Pneumonia 10/26/2017    admitted [facility]    Pulmonary emphysema ([facility] )     Screening for genitourinary condition     Screening for neurological condition     Sepsis [facility]     due to unspecified organism     Short-term memory loss     Sleep apnea     does not use CPAP   Special screening examination for neoplasm of prostate     TIA involving carotid artery     "before carotid surgery"    Ulcer     stomach, "years ago"    Unsteady gait     Use of cane as ambulatory aid     sometimes    Vitamin D deficiency     Wears glasses      Past Surgical History:   Procedure Laterality Date    ABDOMINAL AORTIC ANEURYSM REPAIR  08/23/2017    ABDOMINAL AORTIC ANEURYSM REPAIR, ENDOVASCULAR      BACK SURGERY      spinal stenosis    CARDIAC SURGERY      CABG x3    CAROTID ENDARTARECTOMY Left 11/1996    CATARACT EXTRACTION Bilateral     CORONARY ARTERY BYPASS GRAFT  01/2003    x3    CYSTOSCOPY      with insertion of ureteral stent     CYSTOSCOPY      with ureteroscopy with lithotripsy     EXCISIONAL HEMORRHOIDECTOMY      EYE SURGERY Bilateral     "for a wrinkle" after cataract surgery    HERNIA REPAIR      umbilical    LITHOTRIPSY      renal    MOUTH SURGERY      full mouth extraction     OK COLONOSCOPY FLX DX W/COLLJ SPEC WHEN PFRMD N/A 5/16/2017    Procedure: COLONOSCOPY with polypectomies/ hot snare and tattoo;  Surgeon: Sisi Waddell MD;  [facility]; Service: Gastroenterology    OK CYSTOURETHROSCOPY N/A 11/30/2017    Procedure: Yuly Goff;  Surgeon: Thaddeus Dancer, MD;  [facility];  Service: Urology    OK ESOPHAGOGASTRODUODENOSCOPY TRANSORAL DIAGNOSTIC N/A 8/18/2016    Procedure: ESOPHAGOGASTRODUODENOSCOPY (EGD); Surgeon: Sisi Waddell MD;  [facility];   Service: Gastroenterology    OK REMOVE BLADDER STONE,<2 5 CM N/A 11/30/2017 Procedure: Rhea Carrera;  Surgeon: Traci Rivas MD;  [facility];  Service: Urology    SKIN BIOPSY      TONSILLECTOMY      URETERAL STENT PLACEMENT      and removal       The patient was admitted to the hospital at [phone] on 7/1/19 for the following diagnosis:  Pneumonia [J18 9]  Weakness [R53 1]  Hypotension [I95 9]  Fever [R50 9]  Gallbladder sludge [K82 8]    Consults have been placed to:   IP CONSULT TO CASE MANAGEMENT  IP CONSULT TO PULMONOLOGY  IP CONSULT TO PHARMACY    Vitals:    07/02/19 0400 07/02/19 0439 07/02/19 0600 07/02/19 0719   BP:  144/91  136/69   BP Location:    Left arm   Pulse: 71 80  67   Resp:  22  19   Temp:  97 8 °F (36 6 °C)  (!) 96 8 °F (36 °C)   TempSrc:    Temporal   SpO2: 94% 91%  95%   Weight:   78 2 kg (172 lb 6 4 oz)    Height:           Most recent labs:    Recent Labs     07/01/19  0554 07/01/19  0932 07/02/19  0433   WBC 12 14*  --  8 21   HGB 16 1  --  15 8   HCT 49 9*  --  48 7   *  --  139*   K 5 1  --  4 0   CALCIUM 8 2*  --  8 8   BUN 13  --  14   CREATININE 1 37*  --  1 06   LIPASE 105  --   --    TROPONINI <0 02 <0 02  --    AST 38  --  23   ALT 45  --  37   ALKPHOS 114  --  99       Scheduled Meds:  Current Facility-Administered Medications:  acetaminophen 650 mg Oral Q6H PRN Ael Perla MD   albuterol 2 5 mg Nebulization Q6H PRN David Garcia MD   aspirin 81 mg Oral Daily Ale Perla MD   atenolol 25 mg Oral HS David Garcia MD   atorvastatin 20 mg Oral After Ella Ackerman MD   donepezil 10 mg Oral HS David Garcia MD   escitalopram 20 mg Oral Daily David Garcia MD   famotidine 20 mg Oral Daily Before Olaf Rivera MD   fluticasone-vilanterol 1 puff Inhalation Daily David Garcia MD   heparin (porcine) 5,000 Units Subcutaneous Q8H [address] Ale Perla MD   melatonin 9 mg Oral HS David Garcia MD   memantine 5 mg Oral Daily David Garcia MD   montelukast 10 mg Oral QPM David Garcia MD multivitamin-minerals 1 tablet Oral Daily Ale Perla MD   QUEtiapine 50 mg Oral HS Gael Hall MD   saccharomyces boulardii 250 mg Oral BID Gael Hall MD   tamsulosin 0 4 mg Oral Daily Gael Hall MD   tiotropium 18 mcg Inhalation Daily Ale Perla MD     Continuous Infusions:   PRN Meds:   acetaminophen    albuterol    Surgical procedures (if appropriate):

## 2019-07-02 NOTE — PHYSICAL THERAPY NOTE
Physical Therapy Evaluation    Patient Name: Eual Sacks    FNYSC'N Date: 7/2/2019     Problem List  Patient Active Problem List   Diagnosis    [facility]    Hyperlipidemia    GERD (gastroesophageal reflux disease)    Acute kidney injury ([facility]    Abnormal CT scan, chest    Abdominal aortic aneurysm (HCC)    Thrombocytopenia (HCC)    History of aortic aneurysm repair    Benign prostatic hyperplasia    Dementia    Bradycardia    SOB (shortness of breath)    Pulmonary nodule    Ventricular bigeminy    Coronary artery disease involving native coronary artery of native heart without angina pectoris    Hx of CABG    Bilateral carotid artery stenosis    Elevated bilirubin    Tracheobronchitis    CAD (coronary artery disease)    Acute on chronic [facility] failure with [facility]    CAP (community acquired pneumonia)    Elevated serum creatinine    Leukocytosis    D-dimer, elevated    Episode of unresponsiveness        Past Medical History  Past Medical History:   Diagnosis Date    [facility]     Acid reflux     Acute serous otitis media of left ear     recurrence not specified     Anesthesia     "always has mental changes /lingers and lingers after anesthesia"    Anxiety     [facility] without [facility]     Arm bruise     right inner forearm "recent IV"    At risk for falls     BPH without urinary obstruction     Cancer ([facility]     skin CA on nose    CAP (community acquired pneumonia)     Cataracts, bilateral     Change in bowel function     Clubbing of fingers     Colon polyps     Common cold     Contusion of elbow, left     initial encounter     COPD (chronic obstructive pulmonary disease) [facility]     Coronary artery disease     Cough     Dementia     Depression     Dizziness     upon "standing quickly on occas"    Exercise counseling  Fatigue     Full dentures     "doesn't wear them"    Glaucoma screening     High cholesterol     History of abdominal aortic aneurysm (AAA) repair 08/2017    History of bacteremia     History of chronic obstructive lung disease     History of epistaxis     History of influenza vaccination     History of kidney stones     History of pneumonia     "many times" "at least 5 times" almost always goes to sepsis"    History of sepsis 10/2017    History of sinusitis     History of skin cancer     History of sleep apnea     History of transfusion     History of urinary tract infection     [place] (hard of hearing)     Hydronephrosis with obstructing calculus     Influenza vaccine needed     Jock itch     left/saw doctor 11/8 and started on antifungal cream    Kidney stones     Left hip pain     Need for pneumococcal vaccination     Need for prophylactic vaccination and inoculation against influenza     Other emphysema ([address] )     Pancreatitis, chronic ([address] )     pt and wife can't confirm 11/10/17    Pneumonia 10/26/2017    admitted [facility]    Pulmonary emphysema ([address] )     Screening for genitourinary condition     Screening for neurological condition     Sepsis [facility]     due to unspecified organism     Short-term memory loss     Sleep apnea     does not use CPAP      Special screening examination for neoplasm of prostate     TIA involving carotid artery     "before carotid surgery"    Ulcer     stomach, "years ago"    Unsteady gait     Use of cane as ambulatory aid     sometimes    Vitamin D deficiency     Wears glasses         Past Surgical History  Past Surgical History:   Procedure Laterality Date    ABDOMINAL AORTIC ANEURYSM REPAIR  08/23/2017    ABDOMINAL AORTIC ANEURYSM REPAIR, ENDOVASCULAR      BACK SURGERY      spinal stenosis    CARDIAC SURGERY      CABG x3    CAROTID ENDARTARECTOMY Left 11/1996    CATARACT EXTRACTION Bilateral     CORONARY ARTERY BYPASS GRAFT  01/2003    x3    CYSTOSCOPY      with insertion of ureteral stent     CYSTOSCOPY      with ureteroscopy with lithotripsy     EXCISIONAL HEMORRHOIDECTOMY      EYE SURGERY Bilateral     "for a wrinkle" after cataract surgery    HERNIA REPAIR      umbilical    LITHOTRIPSY      renal    MOUTH SURGERY      full mouth extraction     AK COLONOSCOPY FLX DX W/COLLJ SPEC WHEN PFRMD N/A 5/16/2017    Procedure: COLONOSCOPY with polypectomies/ hot snare and tattoo;  Surgeon: Reagan Alvarez MD;  [facility]; Service: Gastroenterology    AK CYSTOURETHROSCOPY N/A 11/30/2017    Procedure: Yeny Limber;  Surgeon: Elaine Saavedra MD;  [facility];  Service: Urology    AK ESOPHAGOGASTRODUODENOSCOPY TRANSORAL DIAGNOSTIC N/A 8/18/2016    Procedure: ESOPHAGOGASTRODUODENOSCOPY (EGD); Surgeon: Reagan Alvarez MD;  [facility]; Service: Gastroenterology    AK REMOVE BLADDER STONE,<2 5 CM N/A 11/30/2017    Procedure: Monique Ash;  Surgeon: Elaine Saavedra MD;  [facility];  Service: Urology    SKIN BIOPSY      TONSILLECTOMY      URETERAL STENT PLACEMENT      and removal           07/02/19 0926   Note Type   Note type Eval/Treat   Pain Assessment   Pain Assessment No/denies pain   Pain Score No Pain   Home Living   Type of Home House   Home Layout Multi-level;Bed/bath upstairs;Stairs to enter with rails  (1 BERNICE with HR, 12 steps with HR to 2nd floor)   Bathroom Shower/Tub Walk-in shower   Bathroom Toilet Standard   Bathroom Equipment Grab bars in shower;Grab bars around toilet   [address] Walker;Cane   Prior Function   Level of [address] with ADLs and functional mobility  ((I) ambulation no A  D )   Lives With Spouse   ADL Assistance Independent   IADLs Independent   Comments spouse drives   Restrictions/Precautions   Weight Bearing Precautions Per Order No   Other Precautions Contact/isolation;Telemetry; Fall Risk;O2;Multiple lines General   Family/Caregiver Present No   Cognition   Arousal/Participation Alert   Orientation Level Oriented X4   Following Commands Follows all commands and directions without difficulty   RLE Assessment   RLE Assessment WFL   LLE Assessment   LLE Assessment WFL   Coordination   Sensation WFL   Light Touch   RLE Light Touch Grossly intact   LLE Light Touch Grossly intact   Bed Mobility   Supine to Sit 7  Independent   Sit to Supine   (seated at EOB with bell in reach)   Additional Comments Pt on 2L's O2 throughout session with spO2 94-95% HR 74 at rest and after ambulation 94% HR 84  pt with minimal complaint of SOB   Transfers   Sit to Stand 5  Supervision   Stand to Sit 5  Supervision   Stand pivot 5  Supervision   Additional Comments no LOB or instability with transfers and ambulation   Ambulation/Elevation   Gait pattern WNL   Gait Assistance 5  Supervision   Assistive Device None   Distance 250ft no A D  (S) no LOB or instability and no drop in spO2 below 94% on 2L's  Balance   Static Sitting Good   Dynamic Sitting Good   Static Standing Good   Dynamic Standing Good   Ambulatory Fair +   Endurance Deficit   Endurance Deficit Yes   Endurance Deficit Description ambulation and activity limited by fatigue   Activity Tolerance   Activity Tolerance Patient tolerated treatment well   Assessment   Prognosis Good   Problem List Decreased strength;Decreased endurance; Impaired balance;Decreased mobility   Assessment Pt is a 70year old male with PMH including COPD CAD TIA AAA CABG and carotid endarterectomy presenting to [facility] due to fever increased weakness and just not feeling well  Pt seen for moderate complexity PT evaluation presenting with fair to good strength balance endurance mobility transfers and ambulation with no drop in spO2 on 2L's performing all bed mobility transfers and ambulation at a (S) to (I) level no A D   Pt would benefit from continued activity in PT to improve strength balance endurance ambulation and to ensure safe (I) stair negotiation  Pt will be safe to return home when medically stable for discharge  Goals   Patient Goals To return home   STG Expiration Date 07/09/19   Short Term Goal #1 Improve bilateral LE strength to 5/5 to improve all transfers to (I) no A D  Short Term Goal #2 Improve ambulatory balance to good no A D  to improve ambulation to 450ft no A D  (I) and to improve step negotiation to 11 steps with HR modified (I) no drop in SpO2 below 90% on 2L's   Plan   Treatment/Interventions Functional transfer training;LE strengthening/ROM; Therapeutic exercise; Endurance training;Patient/family training;Bed mobility;Gait training;Elevations   PT Frequency   (3-5x/wk)   Recommendation   Recommendation Home independently   PT - OK to Discharge Yes   no SCD's  Pt seated at EOB with call bell in reach

## 2019-07-03 ENCOUNTER — TRANSITIONAL CARE MANAGEMENT (OUTPATIENT)
Dept: FAMILY MEDICINE CLINIC | Facility: CLINIC | Age: 79
End: 2019-07-03

## 2019-07-03 LAB — MRSA NOSE QL CULT: NORMAL

## 2019-07-06 LAB
BACTERIA BLD CULT: NORMAL
BACTERIA BLD CULT: NORMAL

## 2019-07-09 ENCOUNTER — OFFICE VISIT (OUTPATIENT)
Dept: FAMILY MEDICINE CLINIC | Facility: CLINIC | Age: 79
End: 2019-07-09
Payer: MEDICARE

## 2019-07-09 VITALS
DIASTOLIC BLOOD PRESSURE: 44 MMHG | HEART RATE: 86 BPM | BODY MASS INDEX: 25.92 KG/M2 | TEMPERATURE: 98.8 F | HEIGHT: 69 IN | SYSTOLIC BLOOD PRESSURE: 104 MMHG | OXYGEN SATURATION: 89 % | WEIGHT: 175 LBS

## 2019-07-09 DIAGNOSIS — IMO0001 TRANSITION OF CARE PERFORMED WITH SHARING OF CLINICAL SUMMARY: Primary | ICD-10-CM

## 2019-07-09 DIAGNOSIS — E86.0 DEHYDRATION: ICD-10-CM

## 2019-07-09 DIAGNOSIS — I95.89 HYPOTENSION DUE TO HYPOVOLEMIA: ICD-10-CM

## 2019-07-09 DIAGNOSIS — E86.1 HYPOTENSION DUE TO HYPOVOLEMIA: ICD-10-CM

## 2019-07-09 PROCEDURE — 99495 TRANSJ CARE MGMT MOD F2F 14D: CPT | Performed by: FAMILY MEDICINE

## 2019-07-09 NOTE — PROGRESS NOTES
Assessment/Plan:      Diagnoses and all orders for this visit:    Transition of care performed with sharing of clinical summary    Dehydration         Subjective:     Patient ID: Aracelis Martines is a 70 y o  male  Patient here for transition of care I have reviewed his hospital record he was admitted with possible sepsis but pro calcitonin levels were negative I he did receive a admission dose of vancomycin and cefepime but this was not continued because of lack of evidence of sepsis he was however dehydrated and was given fluid boluses and was subsequently discharged from the hospital at home the big problem is that he does not like to drink fluids and he constantly battles with his wife about this on so on patient becomes hypotensive and then ends up in the hospital on this last hospital admission he also had a a non ST elevation MI type 2 because of his hypotension he does not seem to be in any distress now and is well hydrated      Review of Systems   Constitutional: Negative for activity change, appetite change, diaphoresis, fatigue and fever  HENT: Negative  Eyes: Negative  Respiratory: Negative for apnea, cough, chest tightness, shortness of breath and wheezing  Cardiovascular: Negative for chest pain, palpitations and leg swelling  Gastrointestinal: Negative for abdominal distention, abdominal pain, anal bleeding, constipation, diarrhea, nausea and vomiting  Endocrine: Negative for cold intolerance, heat intolerance, polydipsia, polyphagia and polyuria  Genitourinary: Negative for difficulty urinating, dysuria, flank pain, hematuria and urgency  Musculoskeletal: Negative for arthralgias, back pain, gait problem, joint swelling and myalgias  Skin: Negative for color change, rash and wound  Allergic/Immunologic: Negative for environmental allergies, food allergies and immunocompromised state     Neurological: Negative for dizziness, seizures, syncope, speech difficulty, numbness and headaches  Hematological: Negative for adenopathy  Does not bruise/bleed easily  Psychiatric/Behavioral: Positive for confusion  Negative for agitation, behavioral problems, hallucinations, sleep disturbance and suicidal ideas  Objective:     Physical Exam   Constitutional: He is oriented to person, place, and time  He appears well-developed and well-nourished  No distress  HENT:   Head: Normocephalic  Right Ear: External ear normal    Left Ear: External ear normal    Nose: Nose normal    Mouth/Throat: Oropharynx is clear and moist    Eyes: Pupils are equal, round, and reactive to light  Conjunctivae and EOM are normal  Right eye exhibits no discharge  Left eye exhibits no discharge  No scleral icterus  Neck: Normal range of motion  No tracheal deviation present  No thyromegaly present  Cardiovascular: Normal rate, regular rhythm and normal heart sounds  Exam reveals no gallop and no friction rub  No murmur heard  Pulmonary/Chest: Effort normal and breath sounds normal  No respiratory distress  He has no wheezes  Abdominal: Soft  Bowel sounds are normal  He exhibits no mass  There is no tenderness  There is no guarding  Musculoskeletal: He exhibits no edema or deformity  Lymphadenopathy:     He has no cervical adenopathy  Neurological: He is alert and oriented to person, place, and time  No cranial nerve deficit  Skin: Skin is warm and dry  No rash noted  He is not diaphoretic  No erythema  Psychiatric: He has a normal mood and affect   Thought content normal          Vitals:    07/09/19 1556   BP: (!) 104/44   BP Location: Left arm   Patient Position: Sitting   Cuff Size: Standard   Pulse: 86   Temp: 98 8 °F (37 1 °C)   TempSrc: Tympanic   SpO2: (!) 89%   Weight: 79 4 kg (175 lb)   Height: 5' 9" (1 753 m)       The following portions of the patient's history were reviewed and updated as appropriate:   He  has a past medical history of AAA (abdominal aortic aneurysm) ([address] ), Acid reflux, Acute serous otitis media of left ear, Anesthesia, Anxiety, Aortic aneurysm without rupture ([address] ), Arm bruise, At risk for falls, BPH without urinary obstruction, Cancer ([address] ), CAP (community acquired pneumonia), Cataracts, bilateral, Change in bowel function, Clubbing of fingers, Colon polyps, Common cold, Contusion of elbow, left, COPD (chronic obstructive pulmonary disease) ([address] ), Coronary artery disease, Cough, Dementia, Depression, Dizziness, Exercise counseling, Fatigue, Full dentures, Glaucoma screening, High cholesterol, History of abdominal aortic aneurysm (AAA) repair (08/2017), History of bacteremia, History of chronic obstructive lung disease, History of epistaxis, History of influenza vaccination, History of kidney stones, History of pneumonia, History of sepsis (10/2017), History of sinusitis, History of skin cancer, History of sleep apnea, History of transfusion, History of urinary tract infection, [place] (hard of hearing), Hydronephrosis with obstructing calculus, Influenza vaccine needed, Jock itch, Kidney stones, Left hip pain, Need for pneumococcal vaccination, Need for prophylactic vaccination and inoculation against influenza, Other emphysema ([address] ), Pancreatitis, chronic ([address] ), Pneumonia (10/26/2017), Pulmonary emphysema ([address] ), Screening for genitourinary condition, Screening for neurological condition, Sepsis ([address] ), Short-term memory loss, Sleep apnea, Special screening examination for neoplasm of prostate, TIA involving carotid artery, Ulcer, Unsteady gait, Use of cane as ambulatory aid, Vitamin D deficiency, and Wears glasses    He   Patient Active Problem List    Diagnosis Date Noted    Leukocytosis 07/01/2019    D-dimer, elevated 07/01/2019    Episode of unresponsiveness 07/01/2019    CAP (community acquired pneumonia) 06/14/2019    Elevated serum creatinine 06/14/2019    Acute on chronic respiratory failure with hypoxia ([address] ) 12/30/2018    CAD (coronary artery disease) 05/15/2018    Elevated bilirubin 05/13/2018    Tracheobronchitis 05/13/2018    Coronary artery disease involving native coronary artery of native heart without angina pectoris 05/11/2018    Hx of CABG 05/11/2018    Bilateral carotid artery stenosis 05/11/2018    Pulmonary nodule 03/07/2018    Ventricular bigeminy 03/07/2018    Bradycardia 03/06/2018    SOB (shortness of breath) 03/06/2018    Dementia 01/29/2018    History of aortic aneurysm repair 09/01/2017    Thrombocytopenia ([facility] ) 06/02/2017    Abnormal CT scan, chest 06/01/2017    Abdominal aortic aneurysm ([facility] ) 06/01/2017    Acute kidney injury ([facility] ) 05/31/2017    COPD (chronic obstructive pulmonary disease) ([facility] ) 12/28/2016    Hyperlipidemia 12/28/2016    GERD (gastroesophageal reflux disease) 12/28/2016    Benign prostatic hyperplasia 09/18/2015     He  has a past surgical history that includes Cardiac surgery; Ureteral stent placement; Excisional hemorrhoidectomy; Mouth surgery; pr esophagogastroduodenoscopy transoral diagnostic (N/A, 8/18/2016); Cataract extraction (Bilateral); Lithotripsy; Hernia repair; pr colonoscopy flx dx w/collj spec when pfrmd (N/A, 5/16/2017); Abdominal aortic aneurysm repair (08/23/2017); CAROTID ENDARTARECTOMY (Left, 11/1996); Coronary artery bypass graft (01/2003); Eye surgery (Bilateral); Cystoscopy; pr cystourethroscopy (N/A, 11/30/2017); pr remove bladder stone,<2 5 cm (N/A, 11/30/2017); Cystoscopy; AAA repair, endovascular; Tonsillectomy; Back surgery; and Skin biopsy  His family history includes Diabetes type II in his maternal grandmother and mother; Hypertension in his paternal grandmother; Kidney failure in his father  He  reports that he quit smoking about 5 years ago  He has a 90 00 pack-year smoking history  He has never used smokeless tobacco  He reports that he drank alcohol  He reports that he does not use drugs    Current Outpatient Medications   Medication Sig Dispense Refill    acetaminophen (TYLENOL) 500 mg tablet Take 650 mg by mouth every 6 (six) hours as needed for headaches       aspirin 81 MG tablet Take 81 mg by mouth daily Took within 24 hours       atenolol (TENORMIN) 25 mg tablet Take 1 tablet (25 mg total) by mouth daily at bedtime 90 tablet 3    atorvastatin (LIPITOR) 20 mg tablet Take 1 tablet (20 mg total) by mouth daily at bedtime 90 tablet 3    Bacillus Coagulans-Inulin (PROBIOTIC FORMULA) 1-250 BILLION-MG CAPS Take 2 capsules by mouth daily Record states dose is currently 4 billion      Cholecalciferol (VITAMIN D-3 PO) Take 2,000 Units by mouth daily   cyanocobalamin (VITAMIN B-12) 1,000 mcg tablet Take 1,000 mcg by mouth 3 (three) times a week MWF      donepezil (ARICEPT) 10 mg tablet Take 1 tablet (10 mg total) by mouth daily at bedtime (Patient taking differently: Take 10 mg by mouth daily ) 90 tablet 1    escitalopram (LEXAPRO) 20 mg tablet Take 1 tablet (20 mg total) by mouth daily 90 tablet 3    Fluticasone-Salmeterol (AIRDUO RESPICLICK 07/32) 87-04 MCG/ACT AEPB Inhale 1 puff 2 (two) times a day AM & PM      KRILL OIL PO Take 500 mg by mouth daily        Melatonin 10 MG TABS Take 1 tablet by mouth daily at bedtime       memantine (NAMENDA) 5 mg tablet Take 5 mg by mouth 2 (two) times a day In the evening       montelukast (SINGULAIR) 10 mg tablet Take 1 tablet (10 mg total) by mouth daily 90 tablet 2    Multiple Vitamins-Minerals (CENTRUM SILVER ADULT 50+) TABS Take 1 tablet by mouth daily      Potassium Citrate ER 15 MEQ (1620 MG) TBCR Take 1 tablet by mouth 2 (two) times a day for 90 days 180 tablet 3    QUEtiapine (SEROquel) 50 mg tablet Take 1 tablet (50 mg total) by mouth daily at bedtime 90 tablet 1    ranitidine (ZANTAC) 150 mg tablet Take 1 tablet (150 mg total) by mouth 2 (two) times a day 180 tablet 1    tamsulosin (FLOMAX) 0 4 mg Take 1 capsule (0 4 mg total) by mouth daily for 90 days 90 capsule 3    tiotropium (SPIRIVA HANDIHALER) 18 mcg inhalation capsule Place 18 mcg into inhaler and inhale daily  No current facility-administered medications for this visit  Current Outpatient Medications on File Prior to Visit   Medication Sig    acetaminophen (TYLENOL) 500 mg tablet Take 650 mg by mouth every 6 (six) hours as needed for headaches     aspirin 81 MG tablet Take 81 mg by mouth daily Took within 24 hours     atenolol (TENORMIN) 25 mg tablet Take 1 tablet (25 mg total) by mouth daily at bedtime    atorvastatin (LIPITOR) 20 mg tablet Take 1 tablet (20 mg total) by mouth daily at bedtime    Bacillus Coagulans-Inulin (PROBIOTIC FORMULA) 1-250 BILLION-MG CAPS Take 2 capsules by mouth daily Record states dose is currently 4 billion    Cholecalciferol (VITAMIN D-3 PO) Take 2,000 Units by mouth daily   cyanocobalamin (VITAMIN B-12) 1,000 mcg tablet Take 1,000 mcg by mouth 3 (three) times a week MWF    donepezil (ARICEPT) 10 mg tablet Take 1 tablet (10 mg total) by mouth daily at bedtime (Patient taking differently: Take 10 mg by mouth daily )    escitalopram (LEXAPRO) 20 mg tablet Take 1 tablet (20 mg total) by mouth daily    Fluticasone-Salmeterol (AIRDUO RESPICLICK 97/37) 20-26 MCG/ACT AEPB Inhale 1 puff 2 (two) times a day AM & PM    KRILL OIL PO Take 500 mg by mouth daily      Melatonin 10 MG TABS Take 1 tablet by mouth daily at bedtime     memantine (NAMENDA) 5 mg tablet Take 5 mg by mouth 2 (two) times a day In the evening     montelukast (SINGULAIR) 10 mg tablet Take 1 tablet (10 mg total) by mouth daily    Multiple Vitamins-Minerals (CENTRUM SILVER ADULT 50+) TABS Take 1 tablet by mouth daily    Potassium Citrate ER 15 MEQ (1620 MG) TBCR Take 1 tablet by mouth 2 (two) times a day for 90 days    QUEtiapine (SEROquel) 50 mg tablet Take 1 tablet (50 mg total) by mouth daily at bedtime    ranitidine (ZANTAC) 150 mg tablet Take 1 tablet (150 mg total) by mouth 2 (two) times a day    tamsulosin (FLOMAX) 0 4 mg Take 1 capsule (0 4 mg total) by mouth daily for 90 days    tiotropium (SPIRIVA HANDIHALER) 18 mcg inhalation capsule Place 18 mcg into inhaler and inhale daily  No current facility-administered medications on file prior to visit  He is allergic to augmentin [amoxicillin-pot clavulanate]; ciprofloxacin; morphine and related; and wellbutrin [bupropion]       Transitional Care Management Review:  Hayley Ortiz is a 70 y o  male here for TCM follow up  During the TCM phone call patient stated:    TCM Call (since 6/8/2019)     Date and time call was made  7/3/2019  1:34 PM    Patient was hospitialized at  [address] [place] [address]    Date of Admission  06/14/19    Date of discharge  06/18/19    Diagnosis  SOB; CAP (community acquired pneumonia)    Disposition  Home    Were the patients medications reviewed and updated  No    Current Symptoms  None      TCM Call (since 6/8/2019)     Post hospital issues  None    Should patient be enrolled in anticoag monitoring? No    Scheduled for follow up?   Yes    Patients specialists  Pulmonlolgist    Pulmonologist name  DR [facility]    Did you obtain your prescribed medications  Yes    Do you need help managing your prescriptions or medications  No    Is transportation to your appointment needed  No    I have advised the patient to call PCP with any new or worsening symptoms  EDWIN Rose or [facility] other    Support System  Spouse    The type of support provided  Emotional; Physical    Do you have social support  Yes, as much as I need    Are you recieving any outpatient services  No    Are you recieving home care services  No    Are you using any community resources  No    Have you fallen in the last 12 months  No    Interperter language line needed  No    Counseling  Family    Comments  SPOKE TO Anthony Harper, DO

## 2019-07-12 DIAGNOSIS — J30.1 ALLERGIC RHINITIS DUE TO POLLEN, UNSPECIFIED SEASONALITY: ICD-10-CM

## 2019-07-12 RX ORDER — MONTELUKAST SODIUM 10 MG/1
10 TABLET ORAL DAILY
Qty: 90 TABLET | Refills: 2 | Status: SHIPPED | OUTPATIENT
Start: 2019-07-12 | End: 2020-04-10 | Stop reason: SINTOL

## 2019-07-15 DIAGNOSIS — E78.2 MIXED HYPERLIPIDEMIA: ICD-10-CM

## 2019-07-15 RX ORDER — ATORVASTATIN CALCIUM 20 MG/1
20 TABLET, FILM COATED ORAL
Qty: 90 TABLET | Refills: 3 | Status: SHIPPED | OUTPATIENT
Start: 2019-07-15 | End: 2020-05-15 | Stop reason: SDUPTHER

## 2019-08-08 PROCEDURE — 85025 COMPLETE CBC W/AUTO DIFF WBC: CPT | Performed by: FAMILY MEDICINE

## 2019-08-08 PROCEDURE — 80048 BASIC METABOLIC PNL TOTAL CA: CPT | Performed by: FAMILY MEDICINE

## 2019-08-16 DIAGNOSIS — F03.90 DEMENTIA ARISING IN THE SENIUM AND PRESENIUM (HCC): ICD-10-CM

## 2019-08-16 RX ORDER — DONEPEZIL HYDROCHLORIDE 10 MG/1
TABLET, FILM COATED ORAL
Qty: 90 TABLET | Refills: 1 | Status: SHIPPED | OUTPATIENT
Start: 2019-08-16 | End: 2020-02-11

## 2019-08-26 DIAGNOSIS — F03.91 DEMENTIA WITH BEHAVIORAL DISTURBANCE, UNSPECIFIED DEMENTIA TYPE (HCC): ICD-10-CM

## 2019-08-26 RX ORDER — QUETIAPINE FUMARATE 50 MG/1
50 TABLET, FILM COATED ORAL
Qty: 90 TABLET | Refills: 1 | Status: SHIPPED | OUTPATIENT
Start: 2019-08-26 | End: 2020-02-10

## 2019-08-27 ENCOUNTER — OFFICE VISIT (OUTPATIENT)
Dept: FAMILY MEDICINE CLINIC | Facility: CLINIC | Age: 79
End: 2019-08-27
Payer: MEDICARE

## 2019-08-27 VITALS
HEIGHT: 69 IN | SYSTOLIC BLOOD PRESSURE: 122 MMHG | DIASTOLIC BLOOD PRESSURE: 60 MMHG | BODY MASS INDEX: 26.36 KG/M2 | WEIGHT: 178 LBS

## 2019-08-27 DIAGNOSIS — Z00.00 MEDICARE ANNUAL WELLNESS VISIT, SUBSEQUENT: Primary | ICD-10-CM

## 2019-08-27 PROCEDURE — G0439 PPPS, SUBSEQ VISIT: HCPCS | Performed by: FAMILY MEDICINE

## 2019-08-27 NOTE — PATIENT INSTRUCTIONS
Obesity   AMBULATORY CARE:   Obesity  is when your body mass index (BMI) is greater than 30  Your healthcare provider will use your height and weight to measure your BMI  The risks of obesity include  many health problems, such as injuries or physical disability  You may need tests to check for the following:  · Diabetes     · High blood pressure or high cholesterol     · Heart disease     · Gallbladder or liver disease     · Cancer of the colon, breast, prostate, liver, or kidney     · Sleep apnea     · Arthritis or gout  Seek care immediately if:   · You have a severe headache, confusion, or difficulty speaking  · You have weakness on one side of your body  · You have chest pain, sweating, or shortness of breath  Contact your healthcare provider if:   · You have symptoms of gallbladder or liver disease, such as pain in your upper abdomen  · You have knee or hip pain and discomfort while walking  · You have symptoms of diabetes, such as intense hunger and thirst, and frequent urination  · You have symptoms of sleep apnea, such as snoring or daytime sleepiness  · You have questions or concerns about your condition or care  Treatment for obesity  focuses on helping you lose weight to improve your health  Even a small decrease in BMI can reduce the risk for many health problems  Your healthcare provider will help you set a weight-loss goal   · Lifestyle changes  are the first step in treating obesity  These include making healthy food choices and getting regular physical activity  Your healthcare provider may suggest a weight-loss program that involves coaching, education, and therapy  · Medicine  may help you lose weight when it is used with a healthy diet and physical activity  · Surgery  can help you lose weight if you are very obese and have other health problems  There are several types of weight-loss surgery  Ask your healthcare provider for more information    Be successful losing weight:   · Set small, realistic goals  An example of a small goal is to walk for 20 minutes 5 days a week  Anther goal is to lose 5% of your body weight  · Tell friends, family members, and coworkers about your goals  and ask for their support  Ask a friend to lose weight with you, or join a weight-loss support group  · Identify foods or triggers that may cause you to overeat , and find ways to avoid them  Remove tempting high-calorie foods from your home and workplace  Place a bowl of fresh fruit on your kitchen counter  If stress causes you to eat, then find other ways to cope with stress  · Keep a diary to track what you eat and drink  Also write down how many minutes of physical activity you do each day  Weigh yourself once a week and record it in your diary  Eating changes: You will need to eat 500 to 1,000 fewer calories each day than you currently eat to lose 1 to 2 pounds a week  The following changes will help you cut calories:  · Eat smaller portions  Use small plates, no larger than 9 inches in diameter  Fill your plate half full of fruits and vegetables  Measure your food using measuring cups until you know what a serving size looks like  · Eat 3 meals and 1 or 2 snacks each day  Plan your meals in advance  Yesy Carrillo and eat at home most of the time  Eat slowly  · Eat fruits and vegetables at every meal   They are low in calories and high in fiber, which makes you feel full  Do not add butter, margarine, or cream sauce to vegetables  Use herbs to season steamed vegetables  · Eat less fat and fewer fried foods  Eat more baked or grilled chicken and fish  These protein sources are lower in calories and fat than red meat  Limit fast food  Dress your salads with olive oil and vinegar instead of bottled dressing  · Limit the amount of sugar you eat  Do not drink sugary beverages  Limit alcohol  Activity changes:  Physical activity is good for your body in many ways   It helps you burn calories and build strong muscles  It decreases stress and depression, and improves your mood  It can also help you sleep better  Talk to your healthcare provider before you begin an exercise program   · Exercise for at least 30 minutes 5 days a week  Start slowly  Set aside time each day for physical activity that you enjoy and that is convenient for you  It is best to do both weight training and an activity that increases your heart rate, such as walking, bicycling, or swimming  · Find ways to be more active  Do yard work and housecleaning  Walk up the stairs instead of using elevators  Spend your leisure time going to events that require walking, such as outdoor festivals or fairs  This extra physical activity can help you lose weight and keep it off  Follow up with your healthcare provider as directed: You may need to meet with a dietitian  Write down your questions so you remember to ask them during your visits  © 2017 [address] [employer] Information is for End User's use only and may not be sold, redistributed or otherwise used for commercial purposes  All illustrations and images included in CareNotes® are the copyrighted property of [employer]  or Babak Deshpande  The above information is an  only  It is not intended as medical advice for individual conditions or treatments  Talk to your doctor, nurse or pharmacist before following any medical regimen to see if it is safe and effective for you  Urinary Incontinence   WHAT YOU NEED TO KNOW:   What is urinary incontinence? Urinary incontinence (UI) is when you lose control of your bladder  What causes UI? UI occurs because your bladder cannot store or empty urine properly  The following are the most common types of UI:  · Stress incontinence  is when you leak urine due to increased bladder pressure  This may happen when you cough, sneeze, or exercise       · Urge incontinence  is when you feel the need to urinate right away and leak urine accidentally  · Mixed incontinence  is when you have both stress and urge UI  What are the signs and symptoms of UI?   · You feel like your bladder does not empty completely when you urinate  · You urinate often and need to urinate immediately  · You leak urine when you sleep, or you wake up with the urge to urinate  · You leak urine when you cough, sneeze, exercise, or laugh  How is UI diagnosed? Your healthcare provider will ask how often you leak urine and whether you have stress or urge symptoms  Tell him which medicines you take, how often you urinate, and how much liquid you drink each day  You may need any of the following tests:  · Urine tests  may show infection or kidney function  · A pelvic exam  may be done to check for blockages  A pelvic exam will also show if your bladder, uterus, or other organs have moved out of place  · An x-ray, ultrasound, or CT  may show problems with parts of your urinary system  You may be given contrast liquid to help your organs show up better in the pictures  Tell the healthcare provider if you have ever had an allergic reaction to contrast liquid  Do not enter the MRI room with anything metal  Metal can cause serious injury  Tell the healthcare provider if you have any metal in or on your body  · A bladder scan  will show how much urine is left in your bladder after you urinate  You will be asked to urinate and then healthcare providers will use a small ultrasound machine to check the urine left in your bladder  · Cystometry  is used to check the function of your urinary system  Your healthcare provider checks the pressure in your bladder while filling it with fluid  Your bladder pressure may also be tested when your bladder is full and while you urinate  How is UI treated? · Medicines  can help strengthen your bladder control      · Electrical stimulation  is used to send a small amount of electrical energy to your pelvic floor muscles  This helps control your bladder function  Electrodes may be placed outside your body or in your rectum  For women, the electrodes may be placed in the vagina  · A bulking agent  may be injected into the wall of your urethra to make it thicker  This helps keep your urethra closed and decreases urine leakage  · Devices  such as a clamp, pessary, or tampon may help stop urine leaks  Ask your healthcare provider for more information about these and other devices  · Surgery  may be needed if other treatments do not work  Several types of surgery can help improve your bladder control  Ask your healthcare provider for more information about the surgery you may need  How can I manage my symptoms? · Do pelvic muscle exercises often  Your pelvic muscles help you stop urinating  Squeeze these muscles tight for 5 seconds, then relax for 5 seconds  Gradually work up to squeezing for 10 seconds  Do 3 sets of 15 repetitions a day, or as directed  This will help strengthen your pelvic muscles and improve bladder control  · A catheter  may be used to help empty your bladder  A catheter is a tiny, plastic tube that is put into your bladder to drain your urine  Your healthcare provider may tell you to use a catheter to prevent your bladder from getting too full and leaking urine  · Keep a UI record  Write down how often you leak urine and how much you leak  Make a note of what you were doing when you leaked urine  · Train your bladder  Go to the bathroom at set times, such as every 2 hours, even if you do not feel the urge to go  You can also try to hold your urine when you feel the urge to go  For example, hold your urine for 5 minutes when you feel the urge to go  As that becomes easier, hold your urine for 10 minutes  · Drink liquids as directed  Ask your healthcare provider how much liquid to drink each day and which liquids are best for you   You may need to limit the amount of liquid you drink to help control your urine leakage  Limit or do not have drinks that contain caffeine or alcohol  Do not drink any liquid right before you go to bed  · Prevent constipation  Eat a variety of high-fiber foods  Good examples are high-fiber cereals, beans, vegetables, and whole-grain breads  Prune juice may help make your bowel movement softer  Walking is the best way to trigger your intestines to have a bowel movement  · Exercise regularly and maintain a healthy weight  Ask your healthcare provider how much you should weigh and about the best exercise plan for you  Weight loss and exercise will decrease pressure on your bladder and help you control your leakage  Ask him to help you create a weight loss plan if you are overweight  When should I seek immediate care? · You have severe pain  · You are confused or cannot think clearly  When should I contact my healthcare provider? · You have a fever  · You see blood in your urine  · You have pain when you urinate  · You have new or worse pain, even after treatment  · Your mouth feels dry or you have vision changes  · Your urine is cloudy or smells bad  · You have questions or concerns about your condition or care  CARE AGREEMENT:   You have the right to help plan your care  Learn about your health condition and how it may be treated  Discuss treatment options with your caregivers to decide what care you want to receive  You always have the right to refuse treatment  The above information is an  only  It is not intended as medical advice for individual conditions or treatments  Talk to your doctor, nurse or pharmacist before following any medical regimen to see if it is safe and effective for you  © 2017 [address] [employer] Information is for End User's use only and may not be sold, redistributed or otherwise used for commercial purposes   All illustrations and images included in CareNotes® are the copyrighted property of [employer]  or Babak Deshpande  Cigarette Smoking and Your Health   AMBULATORY CARE:   Risks to your health if you smoke:  Nicotine and other chemicals found in tobacco damage every cell in your body  Even if you are a light smoker, you have an increased risk for cancer, heart disease, and lung disease  If you are pregnant or have diabetes, smoking increases your risk for complications  Benefits to your health if you stop smoking:   · You decrease respiratory symptoms such as coughing, wheezing, and shortness of breath  · You reduce your risk for cancers of the lung, mouth, throat, kidney, bladder, pancreas, stomach, and cervix  If you already have cancer, you increase the benefits of chemotherapy  You also reduce your risk for cancer returning or a second cancer from developing  · You reduce your risk for heart disease, blood clots, heart attack, and stroke  · You reduce your risk for lung infections, and diseases such as pneumonia, asthma, chronic bronchitis, and emphysema  · Your circulation improves  More oxygen can be delivered to your body  If you have diabetes, you lower your risk for complications, such as kidney, artery, and eye diseases  You also lower your risk for nerve damage  Nerve damage can lead to amputations, poor vision, and blindness  · You improve your body's ability to heal and to fight infections  Benefits to the health of others if you stop smoking:  Tobacco is harmful to nonsmokers who breathe in your secondhand smoke  The following are ways the health of others around you may improve when you stop smoking:  · You lower the risks for lung cancer and heart disease in nonsmoking adults  · If you are pregnant, you lower the risk for miscarriage, early delivery, low birth weight, and stillbirth  You also lower your baby's risk for SIDS, obesity, developmental delay, and neurobehavioral problems, such as ADHD  · If you have children, you lower their risk for ear infections, colds, pneumonia, bronchitis, and asthma  For more information and support to stop smoking:   · Smokefree  gov  Phone: 6- 353 - 286-9766  Web Address: www [employer]  Follow up with your healthcare provider as directed:  Write down your questions so you remember to ask them during your visits  © 2017 [address] [employer] Information is for End User's use only and may not be sold, redistributed or otherwise used for commercial purposes  All illustrations and images included in CareNotes® are the copyrighted property of [employer]  or Babak Deshpande  The above information is an  only  It is not intended as medical advice for individual conditions or treatments  Talk to your doctor, nurse or pharmacist before following any medical regimen to see if it is safe and effective for you  Fall Prevention   WHAT YOU NEED TO KNOW:   What is fall prevention? Fall prevention includes ways to make your home and other areas safer  It also includes ways you can move more carefully to prevent a fall  What increases my risk for falls? · Lack of vitamin D    · Not getting enough sleep each night    · Trouble walking or keeping your balance, or foot problems    · Health conditions that cause changes in your blood pressure, vision, or muscle strength and coordination    · Medicines that make you dizzy, weak, or sleepy    · Problems seeing clearly    · Shoes that have high heels or are not supportive    · Tripping hazards, such as items left on the floor, no handrails on the stairs, or broken steps  How can I help protect myself from falls? · Stand or sit up slowly  This may help you keep your balance and prevent falls  If you need to get up during the night, sit up first  Be sure you are fully awake before you stand  Turn on the light before you start walking  Go slowly in case you are still sleepy   Make sure you will not trip over any pets sleeping in the bedroom  · Use assistive devices as directed  Your healthcare provider may suggest that you use a cane or walker to help you keep your balance  You may need to have grab bars put in your bathroom near the toilet or in the shower  · Wear shoes that fit well and have soles that   Wear shoes both inside and outside  Use slippers with good   Do not wear shoes with high heels  · Wear a personal alarm  This is a device that allows you to call 911 if you fall and need help  Ask your healthcare provider for more information  · Stay active  Exercise can help strengthen your muscles and improve your balance  Your healthcare provider may recommend water aerobics or walking  He or she may also recommend physical therapy to improve your coordination  Never start an exercise program without talking to your healthcare provider first      · Manage medical conditions  Keep all appointments with your healthcare providers  Visit your eye doctor as directed  How can I make my home safer? · Add items to prevent falls in the bathroom  Put nonslip strips on your bath or shower floor to prevent you from slipping  Use a bath mat if you do not have carpet in the bathroom  This will prevent you from falling when you step out of the bath or shower  Use a shower seat so you do not need to stand while you shower  Sit on the toilet or a chair in your bathroom to dry yourself and put on clothing  This will prevent you from losing your balance from drying or dressing yourself while you are standing  · Keep paths clear  Remove books, shoes, and other objects from walkways and stairs  Place cords for telephones and lamps out of the way so that you do not need to walk over them  Tape them down if you cannot move them  Remove small rugs  If you cannot remove a rug, secure it with double-sided tape  This will prevent you from tripping  · Install bright lights in your home  Use night lights to help light paths to the bathroom or kitchen  Always turn on the light before you start walking  · Keep items you use often on shelves within reach  Do not use a step stool to help you reach an item  · Paint or place reflective tape on the edges of your stairs  This will help you see the stairs better  Call 911 or have someone else call if:   · You have fallen and are unconscious  · You have fallen and cannot move part of your body  Contact your healthcare provider if:   · You have fallen and have pain or a headache  · You have questions or concerns about your condition or care  CARE AGREEMENT:   You have the right to help plan your care  Learn about your health condition and how it may be treated  Discuss treatment options with your caregivers to decide what care you want to receive  You always have the right to refuse treatment  The above information is an  only  It is not intended as medical advice for individual conditions or treatments  Talk to your doctor, nurse or pharmacist before following any medical regimen to see if it is safe and effective for you  © 2017 [address] [facility] Information is for End User's use only and may not be sold, redistributed or otherwise used for commercial purposes  All illustrations and images included in CareNotes® are the copyrighted property of [employer]  or Babak Deshpande  Advance Directives   WHAT YOU NEED TO KNOW:   What are advance directives? Advance directives are legal documents that state your wishes and plans for medical care  These plans are made ahead of time in case you lose your ability to make decisions for yourself  Advance directives can apply to any medical decision, such as the treatments you want, and if you want to donate organs  What are the types of advance directives? There are many types of advance directives, and each state has rules about how to use them   You may choose a combination of any of the following:  · Living will: This is a written record of the treatment you want  You can also choose which treatments you do not want, which to limit, and which to stop at a certain time  This includes surgery, medicine, IV fluid, and tube feedings  · Durable power of  for healthcare [facility]): This is a written record that states who you want to make healthcare choices for you when you are unable to make them for yourself  This person, called a proxy, is usually a family member or a friend  You may choose more than 1 proxy  · Do not resuscitate (DNR) order:  A DNR order is used in case your heart stops beating or you stop breathing  It is a request not to have certain forms of treatment, such as CPR  A DNR order may be included in other types of advance directives  · Medical directive: This covers the care that you want if you are in a coma, near death, or unable to make decisions for yourself  You can list the treatments you want for each condition  Treatment may include pain medicine, surgery, blood transfusions, dialysis, IV or tube feedings, and a ventilator (breathing machine)  · Values history: This document has questions about your views, beliefs, and how you feel and think about life  This information can help others choose the care that you would choose  Why are advance directives important? An advance directive helps you control your care  Although spoken wishes may be used, it is better to have your wishes written down  Spoken wishes can be misunderstood, or not followed  Treatments may be given even if you do not want them  An advance directive may make it easier for your family to make difficult choices about your care  How do I decide what to put in my advance directives? · Make informed decisions:  Make sure you fully understand treatments or care you may receive   Think about the benefits and problems your decisions could cause for you or your family  Talk to healthcare providers if you have concerns or questions before you write down your wishes  You may also want to talk with your [employer] or , or a   Check your state laws to make sure that what you put in your advance directive is legal      · Sign all forms:  Sign and date your advance directive when you have finished  You may also need 2 witnesses to sign the forms  Witnesses cannot be your doctor or his staff, your spouse, heirs or beneficiaries, people you owe money to, or your chosen proxy  Talk to your family, proxy, and healthcare providers about your advance directive  Give each person a copy, and keep one for yourself in a place you can get to easily  Do not keep it hidden or locked away  · Review and revise your plans: You can revise your advance directive at any time, as long as you are able to make decisions  Review your plan every year, and when there are changes in your life, or your health  When you make changes, let your family, proxy, and healthcare providers know  Give each a new copy  Where can I find more information? · [employer]  [address] , [address]   Phone: 1- 526 - 435-7757  Phone: 1- 923 - [phone]  Web Address: http://www  aafp org  · [address] [facility])  [address] [facility] [place], [address]  Phone: 3- [phone] - [phone]  Phone: [phone]  Web Address: Gab saldivar  [facility] AGREEMENT:   You have the right to help plan your care  To help with this plan, you must learn about your health condition and treatment options  You must also learn about advance directives and how they are used  Work with your healthcare providers to decide what care will be used to treat you  You always have the right to refuse treatment  The above information is an  only   It is not intended as medical advice for individual conditions or treatments  Talk to your doctor, nurse or pharmacist before following any medical regimen to see if it is safe and effective for you  © 2017 [address] [employer] Information is for End User's use only and may not be sold, redistributed or otherwise used for commercial purposes  All illustrations and images included in CareNotes® are the copyrighted property of [employer] M [employer]  or Babak Deshpande

## 2019-08-27 NOTE — PROGRESS NOTES
Assessment and Plan:     Problem List Items Addressed This Visit     None         History of Present Illness:     Patient presents for Welcome to Medicare visit  Patient Care Team:  Christi Del Rosario DO as PCP - MD Christi Espinoza, Francisco Javier Silver MD as Endoscopist     Review of Systems:     Review of Systems   Constitutional: Positive for activity change and fatigue  Negative for appetite change, diaphoresis and fever  HENT: Negative  Eyes: Negative  Respiratory: Positive for shortness of breath  Negative for apnea, cough, chest tightness and wheezing  Cardiovascular: Negative for chest pain, palpitations and leg swelling  Gastrointestinal: Negative for abdominal distention, abdominal pain, anal bleeding, bowel incontinence, constipation, diarrhea, nausea and vomiting  Endocrine: Negative for cold intolerance, heat intolerance, polydipsia, polyphagia and polyuria  Genitourinary: Positive for frequency and urgency  Negative for difficulty urinating, dysuria, flank pain and hematuria  Musculoskeletal: Positive for arthralgias  Negative for back pain, gait problem, joint swelling and myalgias  Skin: Negative for color change, rash and wound  Allergic/Immunologic: Negative for environmental allergies, food allergies and immunocompromised state  Neurological: Negative for dizziness, seizures, syncope, speech difficulty, numbness and headaches  Hematological: Negative for adenopathy  Does not bruise/bleed easily  Psychiatric/Behavioral: Positive for confusion  Negative for agitation, behavioral problems, hallucinations, sleep disturbance and suicidal ideas  The patient is not nervous/anxious           Problem List:     Patient Active Problem List   Diagnosis    [facility] obstructive [facility] disease[facility]    Hyperlipidemia    GERD (gastroesophageal reflux disease)    [facility]    Abnormal CT scan, chest    Abdominal aortic aneurysm ([facility] )    Thrombocytopenia ([facility] )    History of aortic aneurysm repair    Benign prostatic hyperplasia    Dementia    Bradycardia    SOB (shortness of breath)    Pulmonary nodule    Ventricular bigeminy    Coronary artery disease involving native coronary artery of native heart without angina pectoris    Hx of CABG    Bilateral carotid artery stenosis    Elevated bilirubin    Tracheobronchitis    CAD (coronary artery disease)    Acute on chronic respiratory failure with hypoxia (HCC)    CAP (community acquired pneumonia)    Elevated serum creatinine    Leukocytosis    D-dimer, elevated    Episode of unresponsiveness      Past Medical and Surgical History:     Past Medical History:   Diagnosis Date    [facility]     Acid reflux     Acute serous otitis media of left ear     recurrence not specified     Anesthesia     "always has mental changes /lingers and lingers after anesthesia"    Anxiety     Aortic aneurysm without rupture ([facility]     Arm bruise     right inner forearm "recent IV"    At risk for falls     BPH without urinary obstruction     Cancer ([facility] )     skin CA on nose    CAP (community acquired pneumonia)     Cataracts, bilateral     Change in bowel function     Clubbing of fingers     Colon polyps     Common cold     Contusion of elbow, left     initial encounter     COPD (chronic obstructive pulmonary disease) ([facility] )     Coronary artery disease     Cough     Dementia     Depression     Dizziness     upon "standing quickly on occas"    Exercise counseling     Fatigue     Full dentures     "doesn't wear them"    Glaucoma screening     High cholesterol     History of abdominal aortic aneurysm (AAA) repair 08/2017    History of bacteremia     History of chronic obstructive lung disease     History of epistaxis     History of influenza vaccination     History of kidney stones     History of pneumonia     "many times" "at least 5 times" almost always goes to sepsis"    History of sepsis 10/2017    History of sinusitis     History of skin cancer     History of sleep apnea     History of transfusion     History of urinary tract infection     [place] (hard of hearing)     Hydronephrosis with obstructing calculus     Influenza vaccine needed     Jock itch     left/saw doctor 11/8 and started on antifungal cream    Kidney stones     Left hip pain     Need for pneumococcal vaccination     Need for prophylactic vaccination and inoculation against influenza     Other emphysema [facility]     Pancreatitis, chronic ([facility] )     pt and wife can't confirm 11/10/17    Pneumonia 10/26/2017    admitted [facility]    Pulmonary emphysema ([facility]     Screening for genitourinary condition     Screening for neurological condition     Sepsis ([facility] )     due to unspecified organism     Short-term memory loss     Sleep apnea     does not use CPAP      Special screening examination for neoplasm of prostate     TIA involving carotid artery     "before carotid surgery"    Ulcer     stomach, "years ago"    Unsteady gait     Use of cane as ambulatory aid     sometimes    Vitamin D deficiency     Wears glasses      Past Surgical History:   Procedure Laterality Date    ABDOMINAL AORTIC ANEURYSM REPAIR  08/23/2017    ABDOMINAL AORTIC ANEURYSM REPAIR, ENDOVASCULAR      BACK SURGERY      spinal stenosis    CARDIAC SURGERY      CABG x3    CAROTID ENDARTARECTOMY Left 11/1996    CATARACT EXTRACTION Bilateral     CORONARY ARTERY BYPASS GRAFT  01/2003    x3    CYSTOSCOPY      with insertion of ureteral stent     CYSTOSCOPY      with ureteroscopy with lithotripsy     EXCISIONAL HEMORRHOIDECTOMY      EYE SURGERY Bilateral     "for a wrinkle" after cataract surgery    HERNIA REPAIR      umbilical    LITHOTRIPSY      renal    MOUTH SURGERY      full mouth extraction     [place] COLONOSCOPY FLX DX W/COLLJ SPEC WHEN PFRMD N/A 5/16/2017    Procedure: COLONOSCOPY with polypectomies/ hot snare and tattoo;  Surgeon: Florence Ledesma MD;  Location: [facility]; Service: Gastroenterology    [place] CYSTOURETHROSCOPY N/A 2017    Procedure: Aiden Lopez;  Surgeon: Meagan Champion MD;  [facility];  Service: Urology    [place] ESOPHAGOGASTRODUODENOSCOPY TRANSORAL DIAGNOSTIC N/A 2016    Procedure: ESOPHAGOGASTRODUODENOSCOPY (EGD); Surgeon: Florence Ledesma MD;  [facility];   Service: Gastroenterology    [place] REMOVE BLADDER STONE,<2 5 CM N/A 2017    Procedure: Nikia Boss;  Surgeon: Meagan Champion MD;  [facility];  Service: Urology    SKIN BIOPSY      TONSILLECTOMY      URETERAL STENT PLACEMENT      and removal      Family History:     Family History   Problem Relation Age of Onset    Diabetes type II Mother         mellitus    Kidney failure Father     Diabetes type II Maternal Grandmother         mellitus     Hypertension Paternal Grandmother         benign essential       Social History:     Social History     Tobacco Use   Smoking Status Former Smoker    Packs/day: 1 50    Years: 60 00    Pack years: 90 00    Last attempt to quit:     Years since quittin 6   Smokeless Tobacco Never Used   Tobacco Comment    quit 3 yrs ago, used to be a 1-1 5 ppd smoker     Social History     Substance and Sexual Activity   Alcohol Use Not Currently    Alcohol/week: 0 0 standard drinks    Frequency: Never    Drinks per session: 1 or 2    Binge frequency: Never    Comment: quit 25 yrs ago     Social History     Substance and Sexual Activity   Drug Use No    Comment: No illicit drug use       Medications and Allergies:     Current Outpatient Medications   Medication Sig Dispense Refill    acetaminophen (TYLENOL) 500 mg tablet Take 650 mg by mouth every 6 (six) hours as needed for headaches       aspirin 81 MG tablet Take 81 mg by mouth daily Took within 24 hours       atenolol (TENORMIN) 25 mg tablet Take 1 tablet (25 mg total) by mouth daily at bedtime 90 tablet 3    atorvastatin (LIPITOR) 20 mg tablet Take 1 tablet (20 mg total) by mouth daily at bedtime 90 tablet 3    Bacillus Coagulans-Inulin (PROBIOTIC FORMULA) 1-250 BILLION-MG CAPS Take 2 capsules by mouth daily Record states dose is currently 4 billion      Cholecalciferol (VITAMIN D-3 PO) Take 2,000 Units by mouth daily   cyanocobalamin (VITAMIN B-12) 1,000 mcg tablet Take 1,000 mcg by mouth 3 (three) times a week MWF      donepezil (ARICEPT) 10 mg tablet TAKE 1 TABLET DAILY AT     BEDTIME 90 tablet 1    escitalopram (LEXAPRO) 20 mg tablet Take 1 tablet (20 mg total) by mouth daily 90 tablet 3    Fluticasone-Salmeterol (AIRDUO RESPICLICK 26/46) 97-49 MCG/ACT AEPB Inhale 1 puff 2 (two) times a day AM & PM      KRILL OIL PO Take 500 mg by mouth daily   Melatonin 10 MG TABS Take 1 tablet by mouth daily at bedtime       memantine (NAMENDA) 5 mg tablet Take 5 mg by mouth 2 (two) times a day In the evening       montelukast (SINGULAIR) 10 mg tablet Take 1 tablet (10 mg total) by mouth daily 90 tablet 2    Multiple Vitamins-Minerals (CENTRUM SILVER ADULT 50+) TABS Take 1 tablet by mouth daily      Potassium Citrate ER 15 MEQ (1620 MG) TBCR Take 1 tablet by mouth 2 (two) times a day for 90 days 180 tablet 3    QUEtiapine (SEROquel) 50 mg tablet Take 1 tablet (50 mg total) by mouth daily at bedtime 90 tablet 1    ranitidine (ZANTAC) 150 mg tablet Take 1 tablet (150 mg total) by mouth 2 (two) times a day 180 tablet 1    tamsulosin (FLOMAX) 0 4 mg Take 1 capsule (0 4 mg total) by mouth daily for 90 days 90 capsule 3    tiotropium (SPIRIVA HANDIHALER) 18 mcg inhalation capsule Place 18 mcg into inhaler and inhale daily  No current facility-administered medications for this visit        Allergies   Allergen Reactions    Augmentin [Amoxicillin-Pot Clavulanate] Diarrhea    Ciprofloxacin Hives    Morphine And Related Other (See Comments)     Change in mental status    Wellbutrin [Bupropion]       Immunizations:     Immunization History   Administered Date(s) Administered    H1N1, All Formulations 11/13/2009    INFLUENZA 11/07/2014, 12/08/2015, 10/17/2016    Influenza Split High Dose Preservative Free IM 08/23/2012, 12/08/2015, 10/17/2016, 09/01/2017    Influenza TIV (IM) 08/07/2014    Influenza, high dose seasonal 0 5 mL 08/21/2018    Pneumococcal Conjugate 13-Valent 10/17/2016    Pneumococcal Polysaccharide PPV23 09/17/2018      Medicare Screening Tests and Risk Assessments:     Julio Tadeo is here for his Subsequent Wellness visit  Last Medicare Wellness visit information reviewed, patient interviewed and updates made to the record today  Health Risk Assessment:  Patient rates overall health as very good  Patient feels that their physical health rating is Slightly better  Eyesight was rated as Same  Hearing was rated as Slightly worse  Patient feels that their emotional and mental health rating is Same  Pain experienced by patient in the last 7 days has been Some  Patient's pain rating has been 4/10  Patient states that he has experienced no weight loss or gain in last 6 months  Emotional/Mental Health:  Patient has not been feeling nervous/anxious  PHQ-9 Depression Screening:    Frequency of the following problems over the past two weeks:      1  Little interest or pleasure in doing things: 0 - not at all      2  Feeling down, depressed, or hopeless: 0 - not at all  PHQ-2 Score: 0          Broken Bones/Falls: Fall Risk Assessment:    In the past year, patient has experienced: No history of falling in past year          Bladder/Bowel:  Patient has not leaked urine accidently in the last six months  Patient reports no loss of bowel control  Immunizations:  Patient has had a flu vaccination within the last year  Patient has received a pneumonia shot  Patient has not received a shingles shot        Home Safety:  Patient does not have trouble with stairs inside or outside of their home  Patient currently reports that there are no safety hazards present in home, working smoke alarms, working carbon monoxide detectors  Preventative Screenings:   prostate cancer screen performed, 5/11/2018  colon cancer screen completed, 5/16/2017  cholesterol screen completed, 10/1/2018  glaucoma eye exam completed, (Additional Comments: Pt follows with [facility] for all eyecare including glaucoma screening & is scheduled for a F/U in December)    Nutrition:  Current diet: Regular and Limited junk food with servings of the following:    Medications:  Patient is currently taking over-the-counter supplements  List of OTC medications includes: Tylenol, Aspirin, Probiotic, Vitamin D3, Vitamin B12, Melatonin, Centrum Silver MV  Patient is not able to manage medications  Lifestyle Choices:  Patient reports no tobacco use  Patient has smoked or used tobacco in the past   Patient has stopped his tobacco use  Tobacco use quit date: 2014  Patient reports no alcohol use  Patient does not drive a vehicle  Patient wears seat belt  Current level of exercise of physical activity described by patient as: Exercise & Physical activity is minimal - He is forced to go up and down stairs multiple times a day to use the bathroom  Activities of Daily Living:  Can get out of bed by his or her self, able to dress self, unable to make own meals, unable to do own shopping, able to bathe self, unable to do laundry/housekeeping, unable to manage own money and other related tasks    Previous Hospitalizations:  Hospitalization or ED visit in past 12 months  Number of hospitalizations within the last year: 3-4        Advanced Directives:  Patient has decided on a power of   Patient has spoken to designated power of   Patient has completed advanced directive          Preventative Screening/Counseling:      Cardiovascular:      General: Risks and Benefits Discussed and Screening Current          Diabetes:      General: Screening Not Indicated          Colorectal Cancer:      General: Screening Not Indicated          Prostate Cancer:      General: Screening Not Indicated          Osteoporosis:      General: Screening Not Indicated          AAA:      General: Screening Current          Glaucoma:      General: Risks and Benefits Discussed and Screening Current          HIV:      General: Screening Not Indicated          Hepatitis C:      General: Screening Not Indicated        Advanced Directives:   Patient has living will for healthcare, has durable POA for healthcare, patient has an advanced directive  Information on ACP and/or AD not provided  No 5 wishes given  No end of life assessment reviewed with patient  Provider does not agree with end of life deisions  Immunizations:      Influenza: Risks & Benefits Discussed and Influenza Recommended Annually      Pneumococcal: Risks & Benefits Discussed and Lifetime Vaccine Completed      Shingrix: Risks & Benefits Discussed and Patient Declines      Zostavax: Risks & Benefits Discussed and Patient Declines          No exam data present     Physical Exam:     /60 (BP Location: Left arm, Patient Position: Sitting, Cuff Size: Standard)   Ht 5' 9" (1 753 m)   Wt 80 7 kg (178 lb)   BMI 26 29 kg/m²     Physical Exam   Constitutional: He is oriented to person, place, and time  He appears well-developed and well-nourished  No distress  HENT:   Head: Normocephalic  Right Ear: External ear normal    Left Ear: External ear normal    Nose: Nose normal    Mouth/Throat: Oropharynx is clear and moist    Eyes: Pupils are equal, round, and reactive to light  Conjunctivae and EOM are normal  Right eye exhibits no discharge  Left eye exhibits no discharge  No scleral icterus  Neck: Normal range of motion  No tracheal deviation present  No thyromegaly present     Cardiovascular: Normal rate, regular rhythm and normal heart sounds  Exam reveals no gallop and no friction rub  No murmur heard  Pulmonary/Chest: Effort normal and breath sounds normal  No respiratory distress  He has no wheezes  Abdominal: Soft  Bowel sounds are normal  He exhibits no mass  There is no tenderness  There is no guarding  Musculoskeletal: He exhibits no edema or deformity  Lymphadenopathy:     He has no cervical adenopathy  Neurological: He is alert and oriented to person, place, and time  No cranial nerve deficit  Skin: Skin is warm and dry  No rash noted  He is not diaphoretic  No erythema  Psychiatric: He has a normal mood and affect   Thought content normal

## 2019-09-09 ENCOUNTER — APPOINTMENT (OUTPATIENT)
Dept: LAB | Facility: HOSPITAL | Age: 79
End: 2019-09-09
Attending: FAMILY MEDICINE
Payer: MEDICARE

## 2019-09-09 LAB
ANION GAP SERPL CALCULATED.3IONS-SCNC: 8 MMOL/L (ref 4–13)
BASOPHILS # BLD AUTO: 0.07 THOUSANDS/ΜL (ref 0–0.1)
BASOPHILS NFR BLD AUTO: 1 % (ref 0–1)
BUN SERPL-MCNC: 12 MG/DL (ref 5–25)
CALCIUM SERPL-MCNC: 9.1 MG/DL (ref 8.3–10.1)
CHLORIDE SERPL-SCNC: 106 MMOL/L (ref 100–108)
CO2 SERPL-SCNC: 27 MMOL/L (ref 21–32)
CREAT SERPL-MCNC: 1.35 MG/DL (ref 0.6–1.3)
EOSINOPHIL # BLD AUTO: 0.11 THOUSAND/ΜL (ref 0–0.61)
EOSINOPHIL NFR BLD AUTO: 2 % (ref 0–6)
ERYTHROCYTE [DISTWIDTH] IN BLOOD BY AUTOMATED COUNT: 14.6 % (ref 11.6–15.1)
GFR SERPL CREATININE-BSD FRML MDRD: 51 ML/MIN/1.73SQ M
GLUCOSE P FAST SERPL-MCNC: 110 MG/DL (ref 65–99)
HCT VFR BLD AUTO: 54.4 % (ref 36.5–49.3)
HGB BLD-MCNC: 17.8 G/DL (ref 12–17)
IMM GRANULOCYTES # BLD AUTO: 0.02 THOUSAND/UL (ref 0–0.2)
IMM GRANULOCYTES NFR BLD AUTO: 0 % (ref 0–2)
LYMPHOCYTES # BLD AUTO: 1.51 THOUSANDS/ΜL (ref 0.6–4.47)
LYMPHOCYTES NFR BLD AUTO: 27 % (ref 14–44)
MCH RBC QN AUTO: 32.7 PG (ref 26.8–34.3)
MCHC RBC AUTO-ENTMCNC: 32.7 G/DL (ref 31.4–37.4)
MCV RBC AUTO: 100 FL (ref 82–98)
MONOCYTES # BLD AUTO: 0.59 THOUSAND/ΜL (ref 0.17–1.22)
MONOCYTES NFR BLD AUTO: 11 % (ref 4–12)
NEUTROPHILS # BLD AUTO: 3.34 THOUSANDS/ΜL (ref 1.85–7.62)
NEUTS SEG NFR BLD AUTO: 59 % (ref 43–75)
NRBC BLD AUTO-RTO: 0 /100 WBCS
PLATELET # BLD AUTO: 121 THOUSANDS/UL (ref 149–390)
PMV BLD AUTO: 10.3 FL (ref 8.9–12.7)
POTASSIUM SERPL-SCNC: 4.4 MMOL/L (ref 3.5–5.3)
RBC # BLD AUTO: 5.44 MILLION/UL (ref 3.88–5.62)
SODIUM SERPL-SCNC: 141 MMOL/L (ref 136–145)
WBC # BLD AUTO: 5.64 THOUSAND/UL (ref 4.31–10.16)

## 2019-09-09 PROCEDURE — 85025 COMPLETE CBC W/AUTO DIFF WBC: CPT | Performed by: FAMILY MEDICINE

## 2019-09-09 PROCEDURE — 36415 COLL VENOUS BLD VENIPUNCTURE: CPT | Performed by: FAMILY MEDICINE

## 2019-09-09 PROCEDURE — 80048 BASIC METABOLIC PNL TOTAL CA: CPT | Performed by: FAMILY MEDICINE

## 2019-09-12 ENCOUNTER — IMMUNIZATIONS (OUTPATIENT)
Dept: FAMILY MEDICINE CLINIC | Facility: CLINIC | Age: 79
End: 2019-09-12
Payer: MEDICARE

## 2019-09-12 DIAGNOSIS — Z23 NEED FOR INFLUENZA VACCINATION: Primary | ICD-10-CM

## 2019-09-12 DIAGNOSIS — K21.9 GASTROESOPHAGEAL REFLUX DISEASE WITHOUT ESOPHAGITIS: ICD-10-CM

## 2019-09-12 PROCEDURE — 90662 IIV NO PRSV INCREASED AG IM: CPT | Performed by: FAMILY MEDICINE

## 2019-09-12 PROCEDURE — G0008 ADMIN INFLUENZA VIRUS VAC: HCPCS | Performed by: FAMILY MEDICINE

## 2019-09-12 RX ORDER — RANITIDINE 150 MG/1
150 TABLET ORAL 2 TIMES DAILY
Qty: 180 TABLET | Refills: 1 | Status: SHIPPED | OUTPATIENT
Start: 2019-09-12 | End: 2020-01-16 | Stop reason: RX

## 2019-10-03 DIAGNOSIS — I49.8 VENTRICULAR BIGEMINY: ICD-10-CM

## 2019-10-04 RX ORDER — ATENOLOL 25 MG/1
TABLET ORAL
Qty: 90 TABLET | Refills: 3 | Status: SHIPPED | OUTPATIENT
Start: 2019-10-04 | End: 2020-08-29 | Stop reason: HOSPADM

## 2019-10-14 ENCOUNTER — APPOINTMENT (OUTPATIENT)
Dept: LAB | Facility: HOSPITAL | Age: 79
End: 2019-10-14
Attending: FAMILY MEDICINE
Payer: MEDICARE

## 2019-10-18 ENCOUNTER — TELEPHONE (OUTPATIENT)
Dept: CARDIOLOGY CLINIC | Facility: HOSPITAL | Age: 79
End: 2019-10-18

## 2019-10-18 NOTE — TELEPHONE ENCOUNTER
Attempted to contact Sunni Chakraborty to schedule his next appoitment with cardiology  A letter has been sent to have him contact our office

## 2019-10-23 ENCOUNTER — TRANSCRIBE ORDERS (OUTPATIENT)
Dept: ADMINISTRATIVE | Facility: HOSPITAL | Age: 79
End: 2019-10-23

## 2019-10-23 DIAGNOSIS — G30.1 DEMENTIA OF THE ALZHEIMER'S TYPE, WITH LATE ONSET, WITH DELIRIUM (HCC): ICD-10-CM

## 2019-10-23 DIAGNOSIS — F05 DEMENTIA OF THE ALZHEIMER'S TYPE, WITH LATE ONSET, WITH DELIRIUM (HCC): ICD-10-CM

## 2019-10-23 DIAGNOSIS — G47.52 REM SLEEP BEHAVIOR DISORDER: Primary | ICD-10-CM

## 2019-10-23 DIAGNOSIS — F02.80 DEMENTIA OF THE ALZHEIMER'S TYPE, WITH LATE ONSET, WITH DELIRIUM (HCC): ICD-10-CM

## 2019-10-28 ENCOUNTER — OFFICE VISIT (OUTPATIENT)
Dept: FAMILY MEDICINE CLINIC | Facility: CLINIC | Age: 79
End: 2019-10-28
Payer: MEDICARE

## 2019-10-28 VITALS
OXYGEN SATURATION: 85 % | WEIGHT: 177.8 LBS | TEMPERATURE: 98.1 F | DIASTOLIC BLOOD PRESSURE: 60 MMHG | HEIGHT: 69 IN | SYSTOLIC BLOOD PRESSURE: 100 MMHG | BODY MASS INDEX: 26.33 KG/M2

## 2019-10-28 DIAGNOSIS — J44.1 COPD WITH ACUTE EXACERBATION (HCC): Primary | ICD-10-CM

## 2019-10-28 PROCEDURE — 99213 OFFICE O/P EST LOW 20 MIN: CPT | Performed by: FAMILY MEDICINE

## 2019-10-28 RX ORDER — CEFUROXIME AXETIL 500 MG/1
500 TABLET ORAL EVERY 12 HOURS SCHEDULED
Qty: 20 TABLET | Refills: 0 | Status: SHIPPED | OUTPATIENT
Start: 2019-10-28 | End: 2019-11-07

## 2019-10-28 NOTE — PROGRESS NOTES
Assessment/Plan:    Problem List Items Addressed This Visit     None      Visit Diagnoses     COPD with acute exacerbation ([facility] )    -  Primary           Diagnoses and all orders for this visit:    COPD with acute exacerbation ([facility] )        No problem-specific Assessment & Plan notes found for this encounter  Subjective:      Patient ID: Devin Mancuso is a 70 y o  male      Mr Baker Backbone here starting on Saturday he started exhibit symptoms of a lower respiratory tract infection his wife who has seen him decompensate many times Sol the warning signs she attempted to take him to the ER he would not go he is here today for follow-up he has been coughing up some yellowish greenish mucus so far no fever chills getting a little bit off of his usual baseline neurologic function so needs to be treated aggressively to prevent sepsis      The following portions of the patient's history were reviewed and updated as appropriate:   He has a past medical history of AAA (abdominal aortic aneurysm) ([facility] ), Acid reflux, Acute serous otitis media of left ear, Anesthesia, Anxiety, Aortic aneurysm without rupture ([facility] ), Arm bruise, At risk for falls, BPH without urinary obstruction, Cancer ([address] ), CAP (community acquired pneumonia), Cataracts, bilateral, Change in bowel function, Clubbing of fingers, Colon polyps, Common cold, Contusion of elbow, left, COPD (chronic obstructive pulmonary disease) ([facility] ), Coronary artery disease, Cough, Dementia ([facility] ), Depression, Dizziness, Exercise counseling, Fatigue, Full dentures, Glaucoma screening, High cholesterol, History of abdominal aortic aneurysm (AAA) repair (08/2017), History of bacteremia, History of chronic obstructive lung disease, History of epistaxis, History of influenza vaccination, History of kidney stones, History of pneumonia, History of sepsis (10/2017), History of sinusitis, History of skin cancer, History of sleep apnea, History of transfusion, History of urinary tract infection, [place] (hard of hearing), Hydronephrosis with obstructing calculus, Influenza vaccine needed, Jock itch, Kidney stones, Left hip pain, Need for pneumococcal vaccination, Need for prophylactic vaccination and inoculation against influenza, Other emphysema ([facility] ), Pancreatitis, chronic ([facility] ), Pneumonia (10/26/2017), Pulmonary emphysema ([facility] ), Screening for genitourinary condition, Screening for neurological condition, Sepsis ([facility] ), Short-term memory loss, Sleep apnea, Special screening examination for neoplasm of prostate, TIA involving carotid artery, Ulcer, Unsteady gait, Use of cane as ambulatory aid, Vitamin D deficiency, and Wears glasses  ,  does not have any pertinent problems on file  ,   has a past surgical history that includes Cardiac surgery; Ureteral stent placement; Excisional hemorrhoidectomy; Mouth surgery; pr esophagogastroduodenoscopy transoral diagnostic (N/A, 8/18/2016); Cataract extraction (Bilateral); Lithotripsy; Hernia repair; pr colonoscopy flx dx w/collj spec when pfrmd (N/A, 5/16/2017); Abdominal aortic aneurysm repair (08/23/2017); CAROTID ENDARTARECTOMY (Left, 11/1996); Coronary artery bypass graft (01/2003); Eye surgery (Bilateral); Cystoscopy; pr cystourethroscopy (N/A, 11/30/2017); pr remove bladder stone,<2 5 cm (N/A, 11/30/2017); Cystoscopy; AAA repair, endovascular; Tonsillectomy; Back surgery; and Skin biopsy  ,  family history includes Diabetes type II in his maternal grandmother and mother; Hypertension in his paternal grandmother; Kidney failure in his father  ,   reports that he quit smoking about 5 years ago  He has a 90 00 pack-year smoking history  He has never used smokeless tobacco  He reports that he drank alcohol  He reports that he does not use drugs  ,  is allergic to augmentin [amoxicillin-pot clavulanate]; ciprofloxacin; morphine and related; and wellbutrin [bupropion]     Current Outpatient Medications   Medication Sig Dispense Refill    acetaminophen (TYLENOL) 500 mg tablet Take 650 mg by mouth every 6 (six) hours as needed for headaches       aspirin 81 MG tablet Take 81 mg by mouth daily Took within 24 hours       atenolol (TENORMIN) 25 mg tablet TAKE 1 TABLET AT BEDTIME 90 tablet 3    atorvastatin (LIPITOR) 20 mg tablet Take 1 tablet (20 mg total) by mouth daily at bedtime 90 tablet 3    Bacillus Coagulans-Inulin (PROBIOTIC FORMULA) 1-250 BILLION-MG CAPS Take 2 capsules by mouth daily Record states dose is currently 4 billion      Cholecalciferol (VITAMIN D-3 PO) Take 2,000 Units by mouth daily   cyanocobalamin (VITAMIN B-12) 1,000 mcg tablet Take 1,000 mcg by mouth 3 (three) times a week MWF      donepezil (ARICEPT) 10 mg tablet TAKE 1 TABLET DAILY AT     BEDTIME 90 tablet 1    escitalopram (LEXAPRO) 20 mg tablet Take 1 tablet (20 mg total) by mouth daily (Patient taking differently: Take 10 mg by mouth daily ) 90 tablet 3    Fluticasone-Salmeterol (AIRDUO RESPICLICK 15/08) 13-54 MCG/ACT AEPB Inhale 1 puff 2 (two) times a day AM & PM      KRILL OIL PO Take 500 mg by mouth daily   Melatonin 10 MG TABS Take 3 tablets by mouth daily at bedtime       memantine (NAMENDA) 5 mg tablet Take 5 mg by mouth 2 (two) times a day In the evening       montelukast (SINGULAIR) 10 mg tablet Take 1 tablet (10 mg total) by mouth daily 90 tablet 2    Multiple Vitamins-Minerals (CENTRUM SILVER ADULT 50+) TABS Take 1 tablet by mouth daily      Potassium Citrate ER 15 MEQ (1620 MG) TBCR Take 1 tablet by mouth 2 (two) times a day for 90 days 180 tablet 3    QUEtiapine (SEROquel) 50 mg tablet Take 1 tablet (50 mg total) by mouth daily at bedtime 90 tablet 1    ranitidine (ZANTAC) 150 mg tablet Take 1 tablet (150 mg total) by mouth 2 (two) times a day 180 tablet 1    tamsulosin (FLOMAX) 0 4 mg Take 1 capsule (0 4 mg total) by mouth daily for 90 days 90 capsule 3    tiotropium (SPIRIVA HANDIHALER) 18 mcg inhalation capsule Place 18 mcg into inhaler and inhale daily  No current facility-administered medications for this visit  Review of Systems   Constitutional: Negative for activity change, appetite change, diaphoresis, fatigue and fever  HENT: Negative  Eyes: Negative  Respiratory: Positive for cough and shortness of breath  Negative for apnea, chest tightness and wheezing  Cardiovascular: Negative for chest pain, palpitations and leg swelling  Gastrointestinal: Negative for abdominal distention, abdominal pain, anal bleeding, constipation, diarrhea, nausea and vomiting  Endocrine: Negative for cold intolerance, heat intolerance, polydipsia, polyphagia and polyuria  Genitourinary: Negative for difficulty urinating, dysuria, flank pain, hematuria and urgency  Musculoskeletal: Negative for arthralgias, back pain, gait problem, joint swelling and myalgias  Skin: Negative for color change, rash and wound  Allergic/Immunologic: Negative for environmental allergies, food allergies and immunocompromised state  Neurological: Negative for dizziness, seizures, syncope, speech difficulty, numbness and headaches  Hematological: Negative for adenopathy  Does not bruise/bleed easily  Psychiatric/Behavioral: Negative for agitation, behavioral problems, hallucinations, sleep disturbance and suicidal ideas  Objective:  Vitals:    10/28/19 1449   BP: 100/60   BP Location: Left arm   Patient Position: Sitting   Cuff Size: Standard   Temp: 98 1 °F (36 7 °C)   TempSrc: Tympanic   SpO2: (!) 85%   Weight: 80 6 kg (177 lb 12 8 oz)   Height: 5' 9" (1 753 m)     Body mass index is 26 26 kg/m²  Physical Exam   Constitutional: He is oriented to person, place, and time  He appears well-developed and well-nourished  No distress  HENT:   Head: Normocephalic  Right Ear: External ear normal    Left Ear: External ear normal    Nose: Nose normal    Mouth/Throat: Oropharynx is clear and moist    Eyes: Pupils are equal, round, and reactive to light  Conjunctivae and EOM are normal  Right eye exhibits no discharge  Left eye exhibits no discharge  No scleral icterus  Neck: Normal range of motion  No tracheal deviation present  No thyromegaly present  Cardiovascular: Normal rate, regular rhythm and normal heart sounds  Exam reveals no gallop and no friction rub  No murmur heard  Pulmonary/Chest: Effort normal  No respiratory distress  He has no wheezes  Course bronchial breath sounds   Abdominal: Soft  Bowel sounds are normal  He exhibits no mass  There is no tenderness  There is no guarding  Musculoskeletal: He exhibits no edema or deformity  Lymphadenopathy:     He has no cervical adenopathy  Neurological: He is alert and oriented to person, place, and time  No cranial nerve deficit  Skin: Skin is warm and dry  No rash noted  He is not diaphoretic  No erythema  Psychiatric: He has a normal mood and affect   Thought content normal

## 2019-11-04 ENCOUNTER — HOSPITAL ENCOUNTER (OUTPATIENT)
Dept: NEUROLOGY | Facility: HOSPITAL | Age: 79
Discharge: HOME | End: 2019-11-04
Payer: MEDICARE

## 2019-11-04 DIAGNOSIS — F05 DEMENTIA OF THE ALZHEIMER'S TYPE, WITH LATE ONSET, WITH DELIRIUM (HCC): ICD-10-CM

## 2019-11-04 DIAGNOSIS — G47.52 REM SLEEP BEHAVIOR DISORDER: ICD-10-CM

## 2019-11-04 DIAGNOSIS — F02.80 DEMENTIA OF THE ALZHEIMER'S TYPE, WITH LATE ONSET, WITH DELIRIUM (HCC): ICD-10-CM

## 2019-11-04 DIAGNOSIS — G30.1 DEMENTIA OF THE ALZHEIMER'S TYPE, WITH LATE ONSET, WITH DELIRIUM (HCC): ICD-10-CM

## 2019-11-04 PROCEDURE — 95816 EEG AWAKE AND DROWSY: CPT

## 2019-11-06 PROCEDURE — 95816 EEG AWAKE AND DROWSY: CPT | Performed by: PSYCHIATRY & NEUROLOGY

## 2019-11-14 ENCOUNTER — TELEPHONE (OUTPATIENT)
Dept: FAMILY MEDICINE CLINIC | Facility: CLINIC | Age: 79
End: 2019-11-14

## 2019-11-14 DIAGNOSIS — J44.1 COPD WITH ACUTE EXACERBATION (HCC): Primary | ICD-10-CM

## 2019-11-14 RX ORDER — LEVOFLOXACIN 750 MG/1
750 TABLET ORAL EVERY 24 HOURS
Qty: 5 TABLET | Refills: 0 | Status: SHIPPED | OUTPATIENT
Start: 2019-11-14 | End: 2019-11-19

## 2019-11-15 ENCOUNTER — OFFICE VISIT (OUTPATIENT)
Dept: CARDIOLOGY CLINIC | Facility: HOSPITAL | Age: 79
End: 2019-11-15
Payer: MEDICARE

## 2019-11-15 VITALS
HEART RATE: 70 BPM | BODY MASS INDEX: 26.25 KG/M2 | DIASTOLIC BLOOD PRESSURE: 62 MMHG | SYSTOLIC BLOOD PRESSURE: 98 MMHG | HEIGHT: 69 IN | WEIGHT: 177.25 LBS | OXYGEN SATURATION: 95 %

## 2019-11-15 DIAGNOSIS — I71.4 ABDOMINAL AORTIC ANEURYSM (AAA) WITHOUT RUPTURE (HCC): ICD-10-CM

## 2019-11-15 DIAGNOSIS — I65.23 BILATERAL CAROTID ARTERY STENOSIS: ICD-10-CM

## 2019-11-15 DIAGNOSIS — R00.1 BRADYCARDIA: ICD-10-CM

## 2019-11-15 DIAGNOSIS — I49.8 VENTRICULAR BIGEMINY: ICD-10-CM

## 2019-11-15 DIAGNOSIS — E78.5 HYPERLIPIDEMIA, UNSPECIFIED HYPERLIPIDEMIA TYPE: ICD-10-CM

## 2019-11-15 DIAGNOSIS — F03.90 DEMENTIA WITHOUT BEHAVIORAL DISTURBANCE, UNSPECIFIED DEMENTIA TYPE (HCC): ICD-10-CM

## 2019-11-15 DIAGNOSIS — Z95.1 HX OF CABG: ICD-10-CM

## 2019-11-15 DIAGNOSIS — I25.10 CORONARY ARTERY DISEASE, ANGINA PRESENCE UNSPECIFIED, UNSPECIFIED VESSEL OR LESION TYPE, UNSPECIFIED WHETHER NATIVE OR TRANSPLANTED HEART: Primary | ICD-10-CM

## 2019-11-15 DIAGNOSIS — R06.00 DOE (DYSPNEA ON EXERTION): ICD-10-CM

## 2019-11-15 PROCEDURE — 99214 OFFICE O/P EST MOD 30 MIN: CPT | Performed by: INTERNAL MEDICINE

## 2019-11-15 PROCEDURE — 93000 ELECTROCARDIOGRAM COMPLETE: CPT | Performed by: INTERNAL MEDICINE

## 2019-11-15 NOTE — PROGRESS NOTES
Cardiology Follow Up    Alfredo Argueta  1940  [id number]  [address] [facility] [address] [address] [phone]    1  Coronary artery disease, angina presence unspecified, unspecified vessel or lesion type, unspecified whether native or transplanted heart  POCT ECG    NM myocardial perfusion spect (rx stress and/or rest)   2  Bilateral carotid artery stenosis  NM myocardial perfusion spect (rx stress and/or rest)   3  Abdominal aortic aneurysm (AAA) without rupture (HCC)  NM myocardial perfusion spect (rx stress and/or rest)   4  Dementia without behavioral disturbance, unspecified dementia type ([address] )     5  Bradycardia  NM myocardial perfusion spect (rx stress and/or rest)   6  Hyperlipidemia, unspecified hyperlipidemia type     7  Ventricular bigeminy     8  Hx of CABG     9  RODRIGUEZ (dyspnea on exertion)  NM myocardial perfusion spect (rx stress and/or rest)       Discussion/Summary:  He has had some progressive shortness of breath with exertion and chest burning  Most of the burning sounds like acid reflux but he said this was similar to his prior anginal symptoms  I have ordered a Carly Riff to evaluate for any ischemia  He shows no signs decompensated heart failure is euvolemic on exam   Blood pressures been well controlled  Does have severe COPD which is likely a contributing factor  Continue current medications  Interval History:78year-old gentleman history of coronary artery disease status post CABG in 2003, peripheral arterial disease status post carotid endarterectomy and abdominal aortic aneurysm repaired with EVAR, hypertension, hyperlipidemia, prior tobacco abuse presents to establish care with me in the office  Presents today with his wife for follow-up visit  It had increasing shortness of breath over the past several months    He describes breathlessness with doing even moderate work around the house  He leads a rather sedentary lifestyle  He denies any chest pain sounds anginal to me however he does describe at burning sensation that starts around the umbilicus which radiates upwards of feel similar to his prior anginal symptoms  There has been no lower extremity edema, PND, orthopnea      Problem List     Acute tracheobronchitis    COPD (chronic obstructive pulmonary disease) ([facility])    Hyperlipidemia    GERD (gastroesophageal reflux disease)    Abnormal CT scan, chest    Abdominal aortic aneurysm ([facility])    Thrombocytopenia ([facility])    History of aortic aneurysm repair    Benign prostatic hyperplasia    Dementia    Bradycardia    SOB (shortness of breath)    Pulmonary nodule    Ventricular bigeminy    Positional lightheadedness    Coronary artery disease involving native coronary artery of native heart with angina pectoris ([facility])    Hx of CABG    Bilateral carotid artery stenosis    Elevated bilirubin    Tracheobronchitis    Hyponatremia    CAD (coronary artery disease)    Urinary retention        Past Medical History:   Diagnosis Date    AAA (abdominal aortic aneurysm) ([facility])     Acid reflux     Acute serous otitis media of left ear     recurrence not specified     Anesthesia     "always has mental changes /lingers and lingers after anesthesia"    Anxiety     Aortic aneurysm without rupture ([facility])     Arm bruise     right inner forearm "recent IV"    At risk for falls     BPH without urinary obstruction     Cancer ([address] )     skin CA on nose    CAP (community acquired pneumonia)     Cataracts, bilateral     Change in bowel function     Clubbing of fingers     Colon polyps     Common cold     Contusion of elbow, left     initial encounter     COPD (chronic obstructive pulmonary disease) ([address] )     Coronary artery disease     Cough     Dementia ([address] )     Depression     Dizziness     upon "standing quickly on occas"    Exercise counseling     Fatigue     Full dentures     "doesn't wear them"    Glaucoma screening     High cholesterol     History of abdominal aortic aneurysm (AAA) repair 08/2017    History of bacteremia     History of chronic obstructive lung disease     History of epistaxis     History of influenza vaccination     History of kidney stones     History of pneumonia     "many times" "at least 5 times" almost always goes to sepsis"    History of sepsis 10/2017    History of sinusitis     History of skin cancer     History of sleep apnea     History of transfusion     History of urinary tract infection     [place] (hard of hearing)     Hydronephrosis with obstructing calculus     Influenza vaccine needed     Jock itch     left/saw doctor 11/8 and started on antifungal cream    Kidney stones     Left hip pain     Need for pneumococcal vaccination     Need for prophylactic vaccination and inoculation against influenza     Other emphysema ([facility]     Pancreatitis, chronic ([facility] )     pt and wife can't confirm 11/10/17    Pneumonia 10/26/2017    admitted [facility]    Pulmonary emphysema ([facility] )     Screening for genitourinary condition     Screening for neurological condition     Sepsis ([facility] )     due to unspecified organism     Short-term memory loss     Sleep apnea     does not use CPAP      Special screening examination for neoplasm of prostate     TIA involving carotid artery     "before carotid surgery"    Ulcer     stomach, "years ago"    Unsteady gait     Use of cane as ambulatory aid     sometimes    Vitamin D deficiency     Wears glasses      Social History     Socioeconomic History    Marital status: /Civil Union     Spouse name: Not on file    Number of children: Not on file    Years of education: Not on file    Highest education level: Not on file   Occupational History    Occupation:      Comment: Retired   Social Needs    Financial resource strain: Not on file    Food insecurity:     Worry: Not on file Inability: Not on file    Transportation needs:     Medical: Not on file     Non-medical: Not on file   Tobacco Use    Smoking status: Former Smoker     Packs/day: 1 50     Years: 60 00     Pack years: 90 00     Last attempt to quit:      Years since quittin 8    Smokeless tobacco: Never Used    Tobacco comment: quit 3 yrs ago, used to be a 1-1 5 ppd smoker   Substance and Sexual Activity    Alcohol use: Not Currently     Alcohol/week: 0 0 standard drinks     Frequency: Never     Drinks per session: 1 or 2     Binge frequency: Never     Comment: quit 25 yrs ago    Drug use: No     Comment: No illicit drug use     Sexual activity: Not Currently     Partners: Female   Lifestyle    Physical activity:     Days per week: Not on file     Minutes per session: Not on file    Stress: Not on file   Relationships    Social connections:     Talks on phone: Not on file     Gets together: Not on file     Attends Orthodoxy service: Not on file     Active member of club or organization: Not on file     Attends meetings of clubs or organizations: Not on file     Relationship status: Not on file    Intimate partner violence:     Fear of current or ex partner: Not on file     Emotionally abused: Not on file     Physically abused: Not on file     Forced sexual activity: Not on file   Other Topics Concern    Not on file   Social History Narrative    Retired     No living Will     No advance directives     Daily caffeine consumption - 4-5 servings a day       Family History   Problem Relation Age of Onset    Diabetes type II Mother         mellitus    Kidney failure Father     Diabetes type II Maternal Grandmother         mellitus     Hypertension Paternal Grandmother         benign essential      Past Surgical History:   Procedure Laterality Date    ABDOMINAL AORTIC ANEURYSM REPAIR  2017    ABDOMINAL AORTIC ANEURYSM REPAIR, ENDOVASCULAR      BACK SURGERY      spinal stenosis    CARDIAC SURGERY CABG x3    CAROTID ENDARTARECTOMY Left 11/1996    CATARACT EXTRACTION Bilateral     CORONARY ARTERY BYPASS GRAFT  01/2003    x3    CYSTOSCOPY      with insertion of ureteral stent     CYSTOSCOPY      with ureteroscopy with lithotripsy     EXCISIONAL HEMORRHOIDECTOMY      EYE SURGERY Bilateral     "for a wrinkle" after cataract surgery    HERNIA REPAIR      umbilical    LITHOTRIPSY      renal    MOUTH SURGERY      full mouth extraction     AL COLONOSCOPY FLX DX W/COLLJ SPEC WHEN PFRMD N/A 5/16/2017    Procedure: COLONOSCOPY with polypectomies/ hot snare and tattoo;  Surgeon: Laurel Cabot, MD;  [facility]; Service: Gastroenterology    AL CYSTOURETHROSCOPY N/A 11/30/2017    Procedure: Margot Brochure;  Surgeon: Baron Somers MD;  [facility];  Service: Urology    AL ESOPHAGOGASTRODUODENOSCOPY TRANSORAL DIAGNOSTIC N/A 8/18/2016    Procedure: ESOPHAGOGASTRODUODENOSCOPY (EGD); Surgeon: Laurel Cabot, MD;  [facility]: [facility]; Service: Gastroenterology    AL REMOVE BLADDER STONE,<2 5 CM N/A 11/30/2017    Procedure: Yeny Uribe;  Surgeon: Baron Somers MD;  [facility];  Service: Urology    SKIN BIOPSY      TONSILLECTOMY      URETERAL STENT PLACEMENT      and removal       Current Outpatient Medications:     acetaminophen (TYLENOL) 500 mg tablet, Take 650 mg by mouth every 6 (six) hours as needed for headaches , Disp: , Rfl:     aspirin 81 MG tablet, Take 81 mg by mouth daily Took within 24 hours , Disp: , Rfl:     atenolol (TENORMIN) 25 mg tablet, TAKE 1 TABLET AT BEDTIME, Disp: 90 tablet, Rfl: 3    atorvastatin (LIPITOR) 20 mg tablet, Take 1 tablet (20 mg total) by mouth daily at bedtime, Disp: 90 tablet, Rfl: 3    Bacillus Coagulans-Inulin (PROBIOTIC FORMULA) 1-250 BILLION-MG CAPS, Take 2 capsules by mouth daily Record states dose is currently 4 billion, Disp: , Rfl:     Cholecalciferol (VITAMIN D-3 PO), Take 2,000 Units by mouth daily  , Disp: , Rfl:     cyanocobalamin (VITAMIN B-12) 1,000 mcg tablet, Take 1,000 mcg by mouth 3 (three) times a week MWF, Disp: , Rfl:     donepezil (ARICEPT) 10 mg tablet, TAKE 1 TABLET DAILY AT     BEDTIME, Disp: 90 tablet, Rfl: 1    escitalopram (LEXAPRO) 20 mg tablet, Take 1 tablet (20 mg total) by mouth daily, Disp: 90 tablet, Rfl: 3    Fluticasone-Salmeterol (AIRDUO RESPICLICK 53/49) 01-23 MCG/ACT AEPB, Inhale 1 puff 2 (two) times a day AM & PM, Disp: , Rfl:     KRILL OIL PO, Take 500 mg by mouth daily  , Disp: , Rfl:     levofloxacin (LEVAQUIN) 750 mg tablet, Take 1 tablet (750 mg total) by mouth every 24 hours for 5 days, Disp: 5 tablet, Rfl: 0    Melatonin 10 MG TABS, Take 3 tablets by mouth daily at bedtime Taking 15mg, Disp: , Rfl:     memantine (NAMENDA) 5 mg tablet, Take 5 mg by mouth 2 (two) times a day In the evening , Disp: , Rfl:     montelukast (SINGULAIR) 10 mg tablet, Take 1 tablet (10 mg total) by mouth daily, Disp: 90 tablet, Rfl: 2    Multiple Vitamins-Minerals (CENTRUM SILVER ADULT 50+) TABS, Take 1 tablet by mouth daily, Disp: , Rfl:     Potassium Citrate ER 15 MEQ (1620 MG) TBCR, Take 1 tablet by mouth 2 (two) times a day for 90 days, Disp: 180 tablet, Rfl: 3    QUEtiapine (SEROquel) 50 mg tablet, Take 1 tablet (50 mg total) by mouth daily at bedtime, Disp: 90 tablet, Rfl: 1    ranitidine (ZANTAC) 150 mg tablet, Take 1 tablet (150 mg total) by mouth 2 (two) times a day, Disp: 180 tablet, Rfl: 1    tamsulosin (FLOMAX) 0 4 mg, Take 1 capsule (0 4 mg total) by mouth daily for 90 days, Disp: 90 capsule, Rfl: 3    tiotropium (SPIRIVA HANDIHALER) 18 mcg inhalation capsule, Place 18 mcg into inhaler and inhale daily  , Disp: , Rfl:   Allergies   Allergen Reactions    Augmentin [Amoxicillin-Pot Clavulanate] Diarrhea    Ciprofloxacin Hives    Morphine And Related Other (See Comments)     Change in mental status    Wellbutrin [Bupropion]        Labs:     Chemistry        Component Value Date/Time  11/29/2014 1416    K 4 1 10/14/2019 1017    K 3 6 11/29/2014 1416     10/14/2019 1017     11/29/2014 1416    CO2 26 10/14/2019 1017    CO2 30 6 11/29/2014 1416    BUN 10 10/14/2019 1017    BUN 13 11/29/2014 1416    CREATININE 1 23 10/14/2019 1017    CREATININE 0 94 08/31/2015 1715        Component Value Date/Time    CALCIUM 8 8 10/14/2019 1017    CALCIUM 8 9 11/29/2014 1416    ALKPHOS 99 07/02/2019 0433    ALKPHOS 122 11/29/2014 1416    AST 23 07/02/2019 0433    AST 28 11/29/2014 1416    ALT 37 07/02/2019 0433    ALT 38 11/29/2014 1416    BILITOT 0 7 11/29/2014 1416            No results found for: CHOL  Lab Results   Component Value Date    HDL 30 (L) 10/01/2018    HDL 32 (L) 04/08/2016     Lab Results   Component Value Date    LDLCALC 82 10/01/2018    LDLCALC 104 (H) 04/08/2016     Lab Results   Component Value Date    TRIG 136 10/01/2018    TRIG 81 04/08/2016     No results found for: CHOLHDL    Imaging: No results found  ECG:  Sinus rhythm 1st degree AV block PVCs      Review of Systems   Constitution: Negative  HENT: Negative  Eyes: Negative  Cardiovascular: Negative  Respiratory: Negative  Endocrine: Negative  Hematologic/Lymphatic: Negative  Skin: Negative  Musculoskeletal: Negative  Gastrointestinal: Negative  Genitourinary: Negative  Neurological: Negative  Psychiatric/Behavioral: Negative  Vitals:    11/15/19 1517   BP: 98/62   Pulse: 70   SpO2: 95%     Vitals:    11/15/19 1517   Weight: 80 4 kg (177 lb 4 oz)     Height: 5' 9" (175 3 cm)   Body mass index is 26 18 kg/m²      Physical Exam:  General:  Alert and cooperative, appears stated age  HEENT:  PERRLA, EOMI, no scleral icterus, no conjunctival pallor  Neck:  No lymphadenopathy, no thyromegaly, no carotid bruits, no elevated JVP  Heart[de-identified]  Regular rate and rhythm, normal S1/S2, no S3/S4, no murmur  Lungs:  Clear to auscultation bilaterally   Abdomen:  Soft, non-tender, positive bowel sounds, no rebound or guarding,   no organomegaly   Extremities:  No clubbing, cyanosis or edema   Vascular:  2+ pedal pulses  Skin:  No rashes or lesions on exposed skin  Neurologic:  Cranial nerves II-XII grossly intact without focal deficits

## 2019-11-18 ENCOUNTER — APPOINTMENT (EMERGENCY)
Dept: RADIOLOGY | Facility: HOSPITAL | Age: 79
End: 2019-11-18
Payer: MEDICARE

## 2019-11-18 ENCOUNTER — APPOINTMENT (EMERGENCY)
Dept: CT IMAGING | Facility: HOSPITAL | Age: 79
End: 2019-11-18
Payer: MEDICARE

## 2019-11-18 ENCOUNTER — HOSPITAL ENCOUNTER (EMERGENCY)
Facility: HOSPITAL | Age: 79
Discharge: HOME | End: 2019-11-18
Attending: EMERGENCY MEDICINE | Admitting: EMERGENCY MEDICINE
Payer: MEDICARE

## 2019-11-18 VITALS
RESPIRATION RATE: 18 BRPM | HEIGHT: 69 IN | HEART RATE: 73 BPM | SYSTOLIC BLOOD PRESSURE: 120 MMHG | BODY MASS INDEX: 27.36 KG/M2 | WEIGHT: 184.75 LBS | DIASTOLIC BLOOD PRESSURE: 70 MMHG | OXYGEN SATURATION: 95 % | TEMPERATURE: 98.4 F

## 2019-11-18 DIAGNOSIS — R06.82 TACHYPNEA: ICD-10-CM

## 2019-11-18 DIAGNOSIS — R25.1 TREMULOUSNESS: ICD-10-CM

## 2019-11-18 DIAGNOSIS — R06.02 SOB (SHORTNESS OF BREATH): Primary | ICD-10-CM

## 2019-11-18 LAB
ALBUMIN SERPL BCP-MCNC: 3.7 G/DL (ref 3.5–5)
ALP SERPL-CCNC: 107 U/L (ref 46–116)
ALT SERPL W P-5'-P-CCNC: 20 U/L (ref 12–78)
ANION GAP SERPL CALCULATED.3IONS-SCNC: 10 MMOL/L (ref 4–13)
AST SERPL W P-5'-P-CCNC: 23 U/L (ref 5–45)
ATRIAL RATE: 73 BPM
BASE EX.OXY STD BLDV CALC-SCNC: 45.8 % (ref 60–80)
BASE EXCESS BLDV CALC-SCNC: -2.2 MMOL/L
BASOPHILS # BLD AUTO: 0.04 THOUSANDS/ΜL (ref 0–0.1)
BASOPHILS NFR BLD AUTO: 1 % (ref 0–1)
BILIRUB SERPL-MCNC: 1.8 MG/DL (ref 0.2–1)
BILIRUB UR QL STRIP: NEGATIVE
BUN SERPL-MCNC: 12 MG/DL (ref 5–25)
CALCIUM SERPL-MCNC: 9.2 MG/DL (ref 8.3–10.1)
CHLORIDE SERPL-SCNC: 104 MMOL/L (ref 100–108)
CLARITY UR: CLEAR
CO2 SERPL-SCNC: 25 MMOL/L (ref 21–32)
COLOR UR: YELLOW
CREAT SERPL-MCNC: 1.19 MG/DL (ref 0.6–1.3)
EOSINOPHIL # BLD AUTO: 0.07 THOUSAND/ΜL (ref 0–0.61)
EOSINOPHIL NFR BLD AUTO: 1 % (ref 0–6)
ERYTHROCYTE [DISTWIDTH] IN BLOOD BY AUTOMATED COUNT: 13.9 % (ref 11.6–15.1)
GFR SERPL CREATININE-BSD FRML MDRD: 59 ML/MIN/1.73SQ M
GLUCOSE SERPL-MCNC: 107 MG/DL (ref 65–140)
GLUCOSE SERPL-MCNC: 94 MG/DL (ref 65–140)
GLUCOSE UR STRIP-MCNC: NEGATIVE MG/DL
HCO3 BLDV-SCNC: 21.5 MMOL/L (ref 24–30)
HCT VFR BLD AUTO: 54.4 % (ref 36.5–49.3)
HGB BLD-MCNC: 17.9 G/DL (ref 12–17)
HGB UR QL STRIP.AUTO: NEGATIVE
IMM GRANULOCYTES # BLD AUTO: 0.02 THOUSAND/UL (ref 0–0.2)
IMM GRANULOCYTES NFR BLD AUTO: 0 % (ref 0–2)
KETONES UR STRIP-MCNC: NEGATIVE MG/DL
LEUKOCYTE ESTERASE UR QL STRIP: NEGATIVE
LYMPHOCYTES # BLD AUTO: 1.58 THOUSANDS/ΜL (ref 0.6–4.47)
LYMPHOCYTES NFR BLD AUTO: 26 % (ref 14–44)
MAGNESIUM SERPL-MCNC: 2 MG/DL (ref 1.6–2.6)
MCH RBC QN AUTO: 32.3 PG (ref 26.8–34.3)
MCHC RBC AUTO-ENTMCNC: 32.9 G/DL (ref 31.4–37.4)
MCV RBC AUTO: 98 FL (ref 82–98)
MONOCYTES # BLD AUTO: 0.61 THOUSAND/ΜL (ref 0.17–1.22)
MONOCYTES NFR BLD AUTO: 10 % (ref 4–12)
NEUTROPHILS # BLD AUTO: 3.67 THOUSANDS/ΜL (ref 1.85–7.62)
NEUTS SEG NFR BLD AUTO: 62 % (ref 43–75)
NITRITE UR QL STRIP: NEGATIVE
NRBC BLD AUTO-RTO: 0 /100 WBCS
NT-PROBNP SERPL-MCNC: 218 PG/ML
O2 CT BLDV-SCNC: 12 ML/DL
P AXIS: 65 DEGREES
PCO2 BLDV: 34.8 MM HG (ref 42–50)
PH BLDV: 7.41 [PH] (ref 7.3–7.4)
PH UR STRIP.AUTO: 7 [PH]
PHOSPHATE SERPL-MCNC: 2.1 MG/DL (ref 2.3–4.1)
PLATELET # BLD AUTO: 122 THOUSANDS/UL (ref 149–390)
PMV BLD AUTO: 10 FL (ref 8.9–12.7)
PO2 BLDV: 24.8 MM HG (ref 35–45)
POTASSIUM SERPL-SCNC: 4.2 MMOL/L (ref 3.5–5.3)
PR INTERVAL: 204 MS
PROT SERPL-MCNC: 7.4 G/DL (ref 6.4–8.2)
PROT UR STRIP-MCNC: NEGATIVE MG/DL
QRS AXIS: 62 DEGREES
QRSD INTERVAL: 86 MS
QT INTERVAL: 410 MS
QTC INTERVAL: 451 MS
RBC # BLD AUTO: 5.55 MILLION/UL (ref 3.88–5.62)
SODIUM SERPL-SCNC: 139 MMOL/L (ref 136–145)
SP GR UR STRIP.AUTO: <=1.005 (ref 1–1.03)
T WAVE AXIS: 56 DEGREES
TROPONIN I SERPL-MCNC: <0.02 NG/ML
TROPONIN I SERPL-MCNC: <0.02 NG/ML
UROBILINOGEN UR QL STRIP.AUTO: 0.2 E.U./DL
VENTRICULAR RATE: 73 BPM
WBC # BLD AUTO: 5.99 THOUSAND/UL (ref 4.31–10.16)

## 2019-11-18 PROCEDURE — 84484 ASSAY OF TROPONIN QUANT: CPT | Performed by: EMERGENCY MEDICINE

## 2019-11-18 PROCEDURE — 71046 X-RAY EXAM CHEST 2 VIEWS: CPT

## 2019-11-18 PROCEDURE — 83735 ASSAY OF MAGNESIUM: CPT | Performed by: EMERGENCY MEDICINE

## 2019-11-18 PROCEDURE — 83880 ASSAY OF NATRIURETIC PEPTIDE: CPT | Performed by: EMERGENCY MEDICINE

## 2019-11-18 PROCEDURE — 70450 CT HEAD/BRAIN W/O DYE: CPT

## 2019-11-18 PROCEDURE — 80053 COMPREHEN METABOLIC PANEL: CPT | Performed by: EMERGENCY MEDICINE

## 2019-11-18 PROCEDURE — 99285 EMERGENCY DEPT VISIT HI MDM: CPT | Performed by: EMERGENCY MEDICINE

## 2019-11-18 PROCEDURE — 36415 COLL VENOUS BLD VENIPUNCTURE: CPT | Performed by: EMERGENCY MEDICINE

## 2019-11-18 PROCEDURE — 82805 BLOOD GASES W/O2 SATURATION: CPT | Performed by: EMERGENCY MEDICINE

## 2019-11-18 PROCEDURE — 81003 URINALYSIS AUTO W/O SCOPE: CPT | Performed by: EMERGENCY MEDICINE

## 2019-11-18 PROCEDURE — 82948 REAGENT STRIP/BLOOD GLUCOSE: CPT

## 2019-11-18 PROCEDURE — 99285 EMERGENCY DEPT VISIT HI MDM: CPT

## 2019-11-18 PROCEDURE — 93005 ELECTROCARDIOGRAM TRACING: CPT

## 2019-11-18 PROCEDURE — 93010 ELECTROCARDIOGRAM REPORT: CPT | Performed by: INTERNAL MEDICINE

## 2019-11-18 PROCEDURE — 85025 COMPLETE CBC W/AUTO DIFF WBC: CPT | Performed by: EMERGENCY MEDICINE

## 2019-11-18 PROCEDURE — 84100 ASSAY OF PHOSPHORUS: CPT | Performed by: EMERGENCY MEDICINE

## 2019-11-18 RX ORDER — ACETAMINOPHEN 325 MG/1
650 TABLET ORAL ONCE
Status: COMPLETED | OUTPATIENT
Start: 2019-11-18 | End: 2019-11-18

## 2019-11-18 RX ADMIN — ACETAMINOPHEN 650 MG: 325 TABLET, FILM COATED ORAL at 14:03

## 2019-11-18 NOTE — ED NOTES
Patient encouraged to provide urine specimen  Urinal at bedside        Augie Ritter RN  11/18/19 [id number]

## 2019-11-18 NOTE — ED PROVIDER NOTES
History  Chief Complaint   Patient presents with    Shortness of Breath     pt c/o shortness of breath since early this morning and visible shaking with a bad headache     HPI  This is a 72-year-old man comes in for evaluation of shortness of breath  Patient has been dealing with respiratory infection, he it is 10 days of Ceftin and he was still having some cough, started with Levaquin 3 days ago  Patient patient's wife states that approximately 2 hours prior to arrival, patient started to have shortness of breath, tachypnea and tremors and shaking  On evaluation here in the emergency department, patient was breathing quickly, was tremulous  The patient states that he has only had similar symptoms when he has a fever  He is afebrile now  Denies any alcohol use  Patient does have a history of COPD for which he is on oxygen chronically, does have history of ACS, does have a history of AAA status post repair  Patient has also had intermittent headache x1 month  He does endorse a headache now  Denies any vision changes, denies any weakness in his arms or legs, denies any nausea or vomiting  Prior to Admission Medications   Prescriptions Last Dose Informant Patient Reported? Taking? Bacillus Coagulans-Inulin (PROBIOTIC FORMULA) 1-250 BILLION-MG CAPS 11/18/2019 at Unknown time Self Yes Yes   Sig: Take 2 capsules by mouth daily Record states dose is currently 4 billion   Cholecalciferol (VITAMIN D-3 PO) 11/18/2019 at Unknown time Self Yes Yes   Sig: Take 2,000 Units by mouth daily  Fluticasone-Salmeterol (AIRDUO RESPICLICK 07/37) 57-62 MCG/ACT AEPB  Spouse/Significant Other Yes No   Sig: Inhale 1 puff 2 (two) times a day AM & PM   KRILL OIL PO  Self Yes No   Sig: Take 500 mg by mouth daily     Melatonin 10 MG TABS 11/17/2019 at Unknown time Self Yes Yes   Sig: Take 3 tablets by mouth daily at bedtime Taking 15mg   Multiple Vitamins-Minerals (CENTRUM SILVER ADULT 50+) TABS  Self Yes No   Sig: Take 1 tablet by mouth daily   Potassium Citrate ER 15 MEQ (1620 MG) TBCR   No No   Sig: Take 1 tablet by mouth 2 (two) times a day for 90 days   QUEtiapine (SEROquel) 50 mg tablet   No No   Sig: Take 1 tablet (50 mg total) by mouth daily at bedtime   acetaminophen (TYLENOL) 500 mg tablet  Self Yes No   Sig: Take 650 mg by mouth every 6 (six) hours as needed for headaches    aspirin 81 MG tablet 11/18/2019 at Unknown time Self Yes Yes   Sig: Take 81 mg by mouth daily Took within 24 hours    atenolol (TENORMIN) 25 mg tablet 11/17/2019 at Unknown time  No Yes   Sig: TAKE 1 TABLET AT BEDTIME   atorvastatin (LIPITOR) 20 mg tablet 11/17/2019 at Unknown time  No Yes   Sig: Take 1 tablet (20 mg total) by mouth daily at bedtime   cyanocobalamin (VITAMIN B-12) 1,000 mcg tablet 11/18/2019 at Unknown time Self Yes Yes   Sig: Take 1,000 mcg by mouth 3 (three) times a week MWF   donepezil (ARICEPT) 10 mg tablet 11/17/2019 at Unknown time  No Yes   Sig: TAKE 1 TABLET DAILY AT     BEDTIME   escitalopram (LEXAPRO) 20 mg tablet 11/18/2019 at Unknown time  No Yes   Sig: Take 1 tablet (20 mg total) by mouth daily   levofloxacin (LEVAQUIN) 750 mg tablet 11/18/2019 at Unknown time  No Yes   Sig: Take 1 tablet (750 mg total) by mouth every 24 hours for 5 days   memantine (NAMENDA) 5 mg tablet  Spouse/Significant Other Yes No   Sig: Take 5 mg by mouth 2 (two) times a day In the evening    montelukast (SINGULAIR) 10 mg tablet   No No   Sig: Take 1 tablet (10 mg total) by mouth daily   ranitidine (ZANTAC) 150 mg tablet 11/18/2019 at Unknown time  No Yes   Sig: Take 1 tablet (150 mg total) by mouth 2 (two) times a day   tamsulosin (FLOMAX) 0 4 mg   No No   Sig: Take 1 capsule (0 4 mg total) by mouth daily for 90 days   tiotropium (SPIRIVA HANDIHALER) 18 mcg inhalation capsule  Self Yes No   Sig: Place 18 mcg into inhaler and inhale daily        Facility-Administered Medications: None       Past Medical History:   Diagnosis Date    AAA (abdominal aortic aneurysm) (HCC)     Acid reflux     Acute serous otitis media of left ear     recurrence not specified     Anesthesia     "always has mental changes /lingers and lingers after anesthesia"    Anxiety     Aortic aneurysm without rupture (HCC)     Arm bruise     right inner forearm "recent IV"    At risk for falls     BPH without urinary obstruction     Cancer (HCC)     skin CA on nose    CAP (community acquired pneumonia)     Cataracts, bilateral     Change in bowel function     Clubbing of fingers     Colon polyps     Common cold     Contusion of elbow, left     initial encounter     COPD (chronic obstructive pulmonary disease) (HCC)     Coronary artery disease     Cough     Dementia (HCC)     Depression     Dizziness     upon "standing quickly on occas"    Exercise counseling     Fatigue     Full dentures     "doesn't wear them"    Glaucoma screening     High cholesterol     History of abdominal aortic aneurysm (AAA) repair 08/2017    History of bacteremia     History of chronic obstructive lung disease     History of epistaxis     History of influenza vaccination     History of kidney stones     History of pneumonia     "many times" "at least 5 times" almost always goes to sepsis"    History of sepsis 10/2017    History of sinusitis     History of skin cancer     History of sleep apnea     History of transfusion     History of urinary tract infection     Crow (hard of hearing)     Hydronephrosis with obstructing calculus     Influenza vaccine needed     Jock itch     left/saw doctor 11/8 and started on antifungal cream    Kidney stones     Left hip pain     Need for pneumococcal vaccination     Need for prophylactic vaccination and inoculation against influenza     Other emphysema ([address]     Pancreatitis, chronic ([address] )     pt and wife can't confirm 11/10/17    Pneumonia 10/26/2017    admitted [facility]    Pulmonary emphysema ([address] )     Screening for genitourinary condition     Screening for neurological condition     Sepsis ([facility] [address]     due to unspecified organism     Short-term memory loss     Sleep apnea     does not use CPAP   Special screening examination for neoplasm of prostate     TIA involving carotid artery     "before carotid surgery"    Ulcer     stomach, "years ago"    Unsteady gait     Use of cane as ambulatory aid     sometimes    Vitamin D deficiency     Wears glasses        Past Surgical History:   Procedure Laterality Date    ABDOMINAL AORTIC ANEURYSM REPAIR  08/23/2017    ABDOMINAL AORTIC ANEURYSM REPAIR, ENDOVASCULAR      BACK SURGERY      spinal stenosis    CARDIAC SURGERY      CABG x3    CAROTID ENDARTARECTOMY Left 11/1996    CATARACT EXTRACTION Bilateral     CORONARY ARTERY BYPASS GRAFT  01/2003    x3    CYSTOSCOPY      with insertion of ureteral stent     CYSTOSCOPY      with ureteroscopy with lithotripsy     EXCISIONAL HEMORRHOIDECTOMY      EYE SURGERY Bilateral     "for a wrinkle" after cataract surgery    HERNIA REPAIR      umbilical    LITHOTRIPSY      renal    MOUTH SURGERY      full mouth extraction     NE COLONOSCOPY FLX DX W/COLLJ SPEC WHEN PFRMD N/A 5/16/2017    Procedure: COLONOSCOPY with polypectomies/ hot snare and tattoo;  Surgeon: Pablo Cornelius MD;  [facility]; Service: Gastroenterology    NE CYSTOURETHROSCOPY N/A 11/30/2017    Procedure: Kera Pope;  Surgeon: Dorita Yanes MD;  [facility];  Service: Urology    NE ESOPHAGOGASTRODUODENOSCOPY TRANSORAL DIAGNOSTIC N/A 8/18/2016    Procedure: ESOPHAGOGASTRODUODENOSCOPY (EGD); Surgeon: Pablo Cornelius MD;  [facility];   Service: Gastroenterology    NE REMOVE BLADDER STONE,<2 5 CM N/A 11/30/2017    Procedure: Chun Tesfaye;  Surgeon: Dorita Yanes MD;  [facility];  Service: Urology    SKIN BIOPSY      TONSILLECTOMY      URETERAL STENT PLACEMENT      and removal       Family History   Problem Relation Age of Onset    Diabetes type II Mother         mellitus    Kidney failure Father     Diabetes type II Maternal Grandmother         mellitus     Hypertension Paternal Grandmother         benign essential      I have reviewed and agree with the history as documented  Social History     Tobacco Use    Smoking status: Former Smoker     Packs/day: 1 50     Years: 60 00     Pack years: 90 00     Last attempt to quit:      Years since quittin 9    Smokeless tobacco: Never Used    Tobacco comment: quit 3 yrs ago, used to be a 1-1 5 ppd smoker   Substance Use Topics    Alcohol use: Not Currently     Alcohol/week: 0 0 standard drinks     Frequency: Never     Drinks per session: 1 or 2     Binge frequency: Never     Comment: quit 25 yrs ago    Drug use: No     Comment: No illicit drug use         Review of Systems   Constitutional: Negative  Negative for chills and fever  HENT: Negative  Negative for ear pain and sore throat  Eyes: Negative  Negative for pain and discharge  Respiratory: Positive for shortness of breath  Negative for chest tightness  Cardiovascular: Negative  Negative for chest pain and palpitations  Gastrointestinal: Negative  Negative for abdominal pain, nausea and vomiting  Endocrine: Negative  Negative for polyphagia and polyuria  Genitourinary: Negative  Negative for dysuria and flank pain  Musculoskeletal: Negative  Negative for arthralgias and back pain  Skin: Negative  Negative for color change and wound  Allergic/Immunologic: Negative  Negative for food allergies and immunocompromised state  Neurological: Positive for headaches  Negative for weakness  Hematological: Negative  Negative for adenopathy  Does not bruise/bleed easily  Psychiatric/Behavioral: Negative  Negative for suicidal ideas  The patient is not nervous/anxious  Physical Exam  Physical Exam   Constitutional: He is oriented to person, place, and time   He appears well-developed and well-nourished  No distress  HENT:   Head: Normocephalic and atraumatic  Right Ear: External ear normal    Left Ear: External ear normal    Mouth/Throat: Oropharynx is clear and moist    Eyes: Pupils are equal, round, and reactive to light  Conjunctivae and EOM are normal  Right eye exhibits no discharge  Left eye exhibits no discharge  No scleral icterus  Neck: Normal range of motion  Neck supple  No tracheal deviation present  No thyromegaly present  Cardiovascular: Normal rate, regular rhythm and intact distal pulses  Exam reveals no gallop and no friction rub  No murmur heard  Pulmonary/Chest: Effort normal  No stridor  No respiratory distress  He has wheezes  He has no rales  Abdominal: Soft  Bowel sounds are normal  He exhibits no distension  There is no tenderness  There is no rebound and no guarding  Musculoskeletal: Normal range of motion  He exhibits no edema or deformity  Neurological: He is alert and oriented to person, place, and time  No cranial nerve deficit  Skin: Skin is warm and dry  No rash noted  He is not diaphoretic  No erythema  Psychiatric: He has a normal mood and affect  His behavior is normal  Thought content normal    Nursing note and vitals reviewed        Vital Signs  ED Triage Vitals [11/18/19 1149]   Temperature Pulse Respirations Blood Pressure SpO2   98 4 °F (36 9 °C) 72 (!) 28 128/96 94 %      Temp Source Heart Rate Source Patient Position - Orthostatic VS BP Location FiO2 (%)   Temporal Monitor Lying Right arm --      Pain Score       7           Vitals:    11/18/19 1600 11/18/19 1615 11/18/19 1630 11/18/19 1645   BP: 114/56 120/68 113/77 120/70   Pulse: 73 74 71 73   Patient Position - Orthostatic VS:  Lying Lying Lying         Visual Acuity      ED Medications  Medications   acetaminophen (TYLENOL) tablet 650 mg (650 mg Oral Given 11/18/19 1403)       Diagnostic Studies  Results Reviewed     Procedure Component Value Units Date/Time Troponin I [[id number]]  (Normal) Collected:  11/18/19 1540    Lab Status:  Final result Specimen:  Blood from Arm, Left Updated:  11/18/19 1606     Troponin I <0 02 ng/mL     UA (URINE) with reflex to Scope [[id number]] Collected:  11/18/19 1314    Lab Status:  Final result Specimen:  Urine, Clean Catch Updated:  11/18/19 1320     Color, UA Yellow     Clarity, UA Clear     Specific Gravity, UA <=1 005     pH, UA 7 0     Leukocytes, UA Negative     Nitrite, UA Negative     Protein, UA Negative mg/dl      Glucose, UA Negative mg/dl      Ketones, UA Negative mg/dl      Urobilinogen, UA 0 2 E U /dl      Bilirubin, UA Negative     Blood, UA Negative    Magnesium [[id number]]  (Normal) Collected:  11/18/19 1214    Lab Status:  Final result Specimen:  Blood from Arm, Right Updated:  11/18/19 1253     Magnesium 2 0 mg/dL     Phosphorus [[id number]]  (Abnormal) Collected:  11/18/19 1214    Lab Status:  Final result Specimen:  Blood from Arm, Right Updated:  11/18/19 1253     Phosphorus 2 1 mg/dL     NT-BNP PRO [[id number]]  (Normal) Collected:  11/18/19 1214    Lab Status:  Final result Specimen:  Blood from Arm, Right Updated:  11/18/19 1253     NT-proBNP 218 pg/mL     Troponin I [[id number]]  (Normal) Collected:  11/18/19 1214    Lab Status:  Final result Specimen:  Blood from Arm, Right Updated:  11/18/19 1250     Troponin I <0 02 ng/mL     Comprehensive metabolic panel [[id number]]  (Abnormal) Collected:  11/18/19 1214    Lab Status:  Final result Specimen:  Blood from Arm, Right Updated:  11/18/19 1243     Sodium 139 mmol/L      Potassium 4 2 mmol/L      Chloride 104 mmol/L      CO2 25 mmol/L      ANION GAP 10 mmol/L      BUN 12 mg/dL      Creatinine 1 19 mg/dL      Glucose 107 mg/dL      Calcium 9 2 mg/dL      AST 23 U/L      ALT 20 U/L      Alkaline Phosphatase 107 U/L      Total Protein 7 4 g/dL      Albumin 3 7 g/dL      Total Bilirubin 1 80 mg/dL      eGFR 59 ml/min/1 73sq m     Narrative:       National Kidney Disease Foundation guidelines for Chronic Kidney Disease (CKD):     Stage 1 with normal or high GFR (GFR > 90 mL/min/1 73 square meters)    Stage 2 Mild CKD (GFR = 60-89 mL/min/1 73 square meters)    Stage 3A Moderate CKD (GFR = 45-59 mL/min/1 73 square meters)    Stage 3B Moderate CKD (GFR = 30-44 mL/min/1 73 square meters)    Stage 4 Severe CKD (GFR = 15-29 mL/min/1 73 square meters)    Stage 5 End Stage CKD (GFR <15 mL/min/1 73 square meters)  Note: GFR calculation is accurate only with a steady state creatinine    Blood gas, venous [[id number]]  (Abnormal) Collected:  11/18/19 1214    Lab Status:  Final result Specimen:  Blood from Arm, Right Updated:  11/18/19 1230     pH, Rick 7 409     pCO2, Rick 34 8 mm Hg      pO2, Rick 24 8 mm Hg      HCO3, Rick 21 5 mmol/L      Base Excess, Rick -2 2 mmol/L      O2 Content, Rick 12 0 ml/dL      O2 HGB, VENOUS 45 8 %     CBC and differential [[id number]]  (Abnormal) Collected:  11/18/19 1214    Lab Status:  Final result Specimen:  Blood from Arm, Right Updated:  11/18/19 1230     WBC 5 99 Thousand/uL      RBC 5 55 Million/uL      Hemoglobin 17 9 g/dL      Hematocrit 54 4 %      MCV 98 fL      MCH 32 3 pg      MCHC 32 9 g/dL      RDW 13 9 %      MPV 10 0 fL      Platelets 088 Thousands/uL      nRBC 0 /100 WBCs      Neutrophils Relative 62 %      Immat GRANS % 0 %      Lymphocytes Relative 26 %      Monocytes Relative 10 %      Eosinophils Relative 1 %      Basophils Relative 1 %      Neutrophils Absolute 3 67 Thousands/µL      Immature Grans Absolute 0 02 Thousand/uL      Lymphocytes Absolute 1 58 Thousands/µL      Monocytes Absolute 0 61 Thousand/µL      Eosinophils Absolute 0 07 Thousand/µL      Basophils Absolute 0 04 Thousands/µL     Fingerstick Glucose (POCT) [[id number]]  (Normal) Collected:  11/18/19 1215    Lab Status:  Final result Updated:  11/18/19 1217     POC Glucose 94 mg/dl                  XR chest 2 views   Final Result by Jay Obrien MD (11/18 1403) Emphysema without superimposed acute cardiopulmonary findings  Workstation performed: [id number]         CT head without contrast   Final Result by Boyd Aiken MD (11/18 1319)      No acute intracranial abnormality  Stable ventricular prominence                  Workstation performed: [id number]                    Procedures  Procedures       ED Course  ED Course as of Nov 25 1925   [facility] Nov 18, 2019   1214 EKG: normal EKG, normal sinus rhythm, frequent PVC's noted, rate is 73, qtc 408       6699 Patient is in agreement with admission  Case was discussed with admitting via [facility] text  Patient will be admitted observation  I did discuss with the patient the patient's spouse who states that if it will only be an observation, the as they cannot afford it  I did discuss with the patient and this patient's wife that when I 1st evaluated the patient, he looked sick, he was breathing quickly he had tremors in all 4 extremities  His symptoms resolved spontaneously without treatment  I did discuss with patient that I do not know what was going on and that is reason to admit for observation  1615 I spoke with the admitting team, based on the patient's symptoms, he does warrant an observation in the hospital and I got no pushed back from the admitting team   Admitting team concurred with my evaluation that the patient should be observed  White told the patient that, he states that his insurance will not pay for it  At that time I got a delta troponin EKG  EKG shows frequent PVCs with out any ischemic changes or changes from his previous EKG  His troponin was negative  I told the patient that I do not understand why his symptoms specifically resolved and it was still my recommendation that he come in overnight  The patient in the patient's wife stated that they did know what was going on either, but they did not want to stay for an observation only admission    After shared decision making, I will discharge the patient even though it is my recommendation that he stay  He is 70years old, he is aware the risks, notably I discussed that I do not know exactly what was causing the symptoms, sometimes when we do not know, observation for 23 hours is indicated to make sure nothing changes  The family heard my request and with will still go home  [id number] Patient also has elevated heart score 5, again recommended admission, patient was still go home  HEART Risk Score      Most Recent Value   History  0 Filed at: 11/18/2019 1618   ECG  1 Filed at: 11/18/2019 1618   Age  2 Filed at: 11/18/2019 1618   Risk Factors  2 Filed at: 11/18/2019 1618   Troponin  0 Filed at: 11/18/2019 1618   Heart Score Risk Calculator   History  0 Filed at: 11/18/2019 1618   ECG  1 Filed at: 11/18/2019 1618   Age  2 Filed at: 11/18/2019 1618   Risk Factors  2 Filed at: 11/18/2019 1618   Troponin  0 Filed at: 11/18/2019 1618   HEART Score  5 Filed at: 11/18/2019 1618   HEART Score  5 Filed at: 11/18/2019 1618                            [facility]  Number of Diagnoses or Management Options  SOB (shortness of breath): Tachypnea:   Tremulousness:   Diagnosis management comments: 53-year-old man presents for shortness of breath  Will evaluate with CBC, CMP, troponin EKG  Will get a magnesium and phosphorus and VBG  Will get a chest x-ray  With headache will get a CT of his head    Likely admission to the hospital       Disposition  Final diagnoses:   SOB (shortness of breath)   Tachypnea   Tremulousness     Time reflects when diagnosis was documented in both MDM as applicable and the Disposition within this note     Time User Action Codes Description Comment    11/18/2019  2:49 PM Milka Ingles Add [R06 02] SOB (shortness of breath)     11/18/2019  2:49 PM Milka Ingles Add [R06 82] Tachypnea     11/18/2019  2:49 PM Milka Ingles Add [R25 1] Tremulousness       ED Disposition     ED Disposition Condition Date/Time Comment    Discharge Stable Mon Nov 18, 2019  4:19 PM         Follow-up Information     Follow up With Specialties Details Why Contact Info Additional Information    Caryle Forth, DO Family Medicine Schedule an appointment as soon as possible for a visit in 1 day follow up being seen in the emergency department [address]  [facility] [address] [facility] Emergency Department Emergency Medicine Go to  As needed, If symptoms worsen Nito Matos [id number] [phone] [place] [facility], [address], [place], [address] [facility], [id number]          Discharge Medication List as of 11/18/2019  4:24 PM      CONTINUE these medications which have NOT CHANGED    Details   aspirin 81 MG tablet Take 81 mg by mouth daily Took within 24 hours , Historical Med      atenolol (TENORMIN) 25 mg tablet TAKE 1 TABLET AT BEDTIME, Normal      atorvastatin (LIPITOR) 20 mg tablet Take 1 tablet (20 mg total) by mouth daily at bedtime, Starting Mon 7/15/2019, Normal      Bacillus Coagulans-Inulin (PROBIOTIC FORMULA) 1-250 BILLION-MG CAPS Take 2 capsules by mouth daily Record states dose is currently 4 billion, Historical Med      Cholecalciferol (VITAMIN D-3 PO) Take 2,000 Units by mouth daily  , Until Discontinued, Historical Med      cyanocobalamin (VITAMIN B-12) 1,000 mcg tablet Take 1,000 mcg by mouth 3 (three) times a week MWF, Historical Med      donepezil (ARICEPT) 10 mg tablet TAKE 1 TABLET DAILY AT     BEDTIME, Normal      escitalopram (LEXAPRO) 20 mg tablet Take 1 tablet (20 mg total) by mouth daily, Starting Wed 2/13/2019, Normal      levofloxacin (LEVAQUIN) 750 mg tablet Take 1 tablet (750 mg total) by mouth every 24 hours for 5 days, Starting Thu 11/14/2019, Until Tue 11/19/2019, Normal      Melatonin 10 MG TABS Take 3 tablets by mouth daily at bedtime Taking 15mg, Historical Med      ranitidine (ZANTAC) 150 mg tablet Take 1 tablet (150 mg total) by mouth 2 (two) times a day, Starting Thu 9/12/2019, Normal      acetaminophen (TYLENOL) 500 mg tablet Take 650 mg by mouth every 6 (six) hours as needed for headaches , Starting Tue 7/10/2018, Historical Med      Fluticasone-Salmeterol (AIRDUO RESPICLICK 81/60) 56-29 MCG/ACT AEPB Inhale 1 puff 2 (two) times a day AM & PM, Historical Med      KRILL OIL PO Take 500 mg by mouth daily  , Historical Med      memantine (NAMENDA) 5 mg tablet Take 5 mg by mouth 2 (two) times a day In the evening , Historical Med      montelukast (SINGULAIR) 10 mg tablet Take 1 tablet (10 mg total) by mouth daily, Starting Fri 7/12/2019, Normal      Multiple Vitamins-Minerals (CENTRUM SILVER ADULT 50+) TABS Take 1 tablet by mouth daily, Historical Med      Potassium Citrate ER 15 MEQ (1620 MG) TBCR Take 1 tablet by mouth 2 (two) times a day for 90 days, Starting Thu 6/6/2019, Until Fri 11/15/2019, Normal      QUEtiapine (SEROquel) 50 mg tablet Take 1 tablet (50 mg total) by mouth daily at bedtime, Starting Mon 8/26/2019, Normal      tamsulosin (FLOMAX) 0 4 mg Take 1 capsule (0 4 mg total) by mouth daily for 90 days, Starting Thu 6/6/2019, Until Fri 11/15/2019, Normal      tiotropium (SPIRIVA HANDIHALER) 18 mcg inhalation capsule Place 18 mcg into inhaler and inhale daily  , Historical Med           Outpatient Discharge Orders   Holter monitor - 48 hour   Standing Status: Future Number of Occurrences: 1 Standing Exp   Date: 11/18/23       ED Provider  Electronically Signed by           Josiah De León MD  11/25/19 1923

## 2019-11-18 NOTE — CASE MANAGEMENT
I self referred the patient to discuss resources that might be available to him  He denied needing any help at this time  The patient lives in a three story house with his wife  There is two set of stairs in the house/ He has O2 at night @ 2 lpm  He has a inogen he uses at night  He gets his O2 from [employer]  He does not receive meals on wheels or home health services at this time  He takes care of his own ADL's  He does not drive his wife drives him to where he needs to go  He uses [employer] [facility]  He has no trouble getting or paying for his medications  He has Quirino Ribera  He did not call his PCP prior to coming to the ED today

## 2019-11-19 ENCOUNTER — OFFICE VISIT (OUTPATIENT)
Dept: FAMILY MEDICINE CLINIC | Facility: CLINIC | Age: 79
End: 2019-11-19
Payer: MEDICARE

## 2019-11-19 ENCOUNTER — HOSPITAL ENCOUNTER (OUTPATIENT)
Dept: NON INVASIVE DIAGNOSTICS | Facility: HOSPITAL | Age: 79
Discharge: HOME | End: 2019-11-19
Attending: EMERGENCY MEDICINE
Payer: MEDICARE

## 2019-11-19 VITALS
OXYGEN SATURATION: 89 % | HEIGHT: 69 IN | BODY MASS INDEX: 26.51 KG/M2 | SYSTOLIC BLOOD PRESSURE: 100 MMHG | DIASTOLIC BLOOD PRESSURE: 70 MMHG | WEIGHT: 179 LBS

## 2019-11-19 DIAGNOSIS — J44.9 CHRONIC OBSTRUCTIVE PULMONARY DISEASE, UNSPECIFIED COPD TYPE (HCC): Primary | ICD-10-CM

## 2019-11-19 DIAGNOSIS — D69.6 THROMBOCYTOPENIA (HCC): ICD-10-CM

## 2019-11-19 DIAGNOSIS — R25.1 TREMULOUSNESS: ICD-10-CM

## 2019-11-19 DIAGNOSIS — I25.10 CORONARY ARTERY DISEASE, ANGINA PRESENCE UNSPECIFIED, UNSPECIFIED VESSEL OR LESION TYPE, UNSPECIFIED WHETHER NATIVE OR TRANSPLANTED HEART: ICD-10-CM

## 2019-11-19 PROCEDURE — 99214 OFFICE O/P EST MOD 30 MIN: CPT | Performed by: FAMILY MEDICINE

## 2019-11-19 PROCEDURE — 93225 XTRNL ECG REC<48 HRS REC: CPT

## 2019-11-19 PROCEDURE — 93226 XTRNL ECG REC<48 HR SCAN A/R: CPT

## 2019-11-19 RX ORDER — AMOXICILLIN 500 MG/1
500 CAPSULE ORAL EVERY 8 HOURS SCHEDULED
Qty: 30 CAPSULE | Refills: 0 | Status: SHIPPED | OUTPATIENT
Start: 2019-11-19 | End: 2019-11-29

## 2019-11-19 NOTE — PROGRESS NOTES
BMI Counseling: Body mass index is 26 43 kg/m²  The BMI is above normal  Nutrition recommendations include decreasing portion sizes, encouraging healthy choices of fruits and vegetables, decreasing fast food intake, moderation in carbohydrate intake and increasing intake of lean protein  Exercise recommendations include exercising 3-5 times per week  No pharmacotherapy was ordered  Patient referred to PCP due to patient being overweight  Assessment/Plan:  Patient's symptoms have started since being given a prescription for Levaquin for his respiratory infection on concerned that perhaps the Levaquin is the offending agent going to stop it he only has 1 dose left to go I instructed his wife not to give him this dose on we will substitute amoxicillin for the Levaquin that he is currently taking to finish up a course of treatment for a respiratory tract infection    Problem List Items Addressed This Visit        Respiratory    COPD (chronic obstructive pulmonary disease) ([facility] ) - Primary       Cardiovascular and Mediastinum    CAD (coronary artery disease)           Diagnoses and all orders for this visit:    Chronic obstructive pulmonary disease, unspecified COPD type ([facility] )    Coronary artery disease, angina presence unspecified, unspecified vessel or lesion type, unspecified whether native or transplanted heart        No problem-specific Assessment & Plan notes found for this encounter  Subjective:      Patient ID: Salvador Chou is a 70 y o  male      Mr Dick Vasquez here accompanied by his wife was in the emergency room was evaluated by Dr Azeem Hirsch he started about 2 days ago having a problem with a severe tremor primarily in the right hand but to a lesser extent to the left it was so bad that he had trouble holding a cup of coffee on his wife to come down to the emergency room they evaluated him as a stroke alert nothing was noted on his CT scan of his brain on his labs looked good no sign of sepsis the only new issue for him is that we put him on Levaquin for a respiratory infection a few days ago and since then he has had a gradual decline in his mental status and also developed the tremor      The following portions of the patient's history were reviewed and updated as appropriate:   He has a past medical history of AAA (abdominal aortic aneurysm) ([facility] ), Acid reflux, Acute serous otitis media of left ear, Anesthesia, Anxiety, Aortic aneurysm without rupture ([address] ), Arm bruise, At risk for falls, BPH without urinary obstruction, Cancer ([address] ), CAP (community acquired pneumonia), Cataracts, bilateral, Change in bowel function, Clubbing of fingers, Colon polyps, Common cold, Contusion of elbow, left, COPD (chronic obstructive pulmonary disease) ([address] ), Coronary artery disease, Cough, Dementia ([address] ), Depression, Dizziness, Exercise counseling, Fatigue, Full dentures, Glaucoma screening, High cholesterol, History of abdominal aortic aneurysm (AAA) repair (08/2017), History of bacteremia, History of chronic obstructive lung disease, History of epistaxis, History of influenza vaccination, History of kidney stones, History of pneumonia, History of sepsis (10/2017), History of sinusitis, History of skin cancer, History of sleep apnea, History of transfusion, History of urinary tract infection, [place] (hard of hearing), Hydronephrosis with obstructing calculus, Influenza vaccine needed, Jock itch, Kidney stones, Left hip pain, Need for pneumococcal vaccination, Need for prophylactic vaccination and inoculation against influenza, Other emphysema ([address] ), Pancreatitis, chronic ([address] ), Pneumonia (10/26/2017), Pulmonary emphysema ([address] ), Screening for genitourinary condition, Screening for neurological condition, Sepsis ([address] ), Short-term memory loss, Sleep apnea, Special screening examination for neoplasm of prostate, TIA involving carotid artery, Ulcer, Unsteady gait, Use of cane as ambulatory aid, Vitamin D deficiency, and Wears glasses  ,  does not have any pertinent problems on file  ,   has a past surgical history that includes Cardiac surgery; Ureteral stent placement; Excisional hemorrhoidectomy; Mouth surgery; pr esophagogastroduodenoscopy transoral diagnostic (N/A, 8/18/2016); Cataract extraction (Bilateral); Lithotripsy; Hernia repair; pr colonoscopy flx dx w/collj spec when pfrmd (N/A, 5/16/2017); Abdominal aortic aneurysm repair (08/23/2017); CAROTID ENDARTARECTOMY (Left, 11/1996); Coronary artery bypass graft (01/2003); Eye surgery (Bilateral); Cystoscopy; pr cystourethroscopy (N/A, 11/30/2017); pr remove bladder stone,<2 5 cm (N/A, 11/30/2017); Cystoscopy; AAA repair, endovascular; Tonsillectomy; Back surgery; and Skin biopsy  ,  family history includes Diabetes type II in his maternal grandmother and mother; Hypertension in his paternal grandmother; Kidney failure in his father  ,   reports that he quit smoking about 5 years ago  He has a 90 00 pack-year smoking history  He has never used smokeless tobacco  He reports that he drank alcohol  He reports that he does not use drugs  ,  is allergic to augmentin [amoxicillin-pot clavulanate]; ciprofloxacin; morphine and related; and wellbutrin [bupropion]     Current Outpatient Medications   Medication Sig Dispense Refill    acetaminophen (TYLENOL) 500 mg tablet Take 650 mg by mouth every 6 (six) hours as needed for headaches       aspirin 81 MG tablet Take 81 mg by mouth daily Took within 24 hours       atenolol (TENORMIN) 25 mg tablet TAKE 1 TABLET AT BEDTIME 90 tablet 3    atorvastatin (LIPITOR) 20 mg tablet Take 1 tablet (20 mg total) by mouth daily at bedtime 90 tablet 3    Bacillus Coagulans-Inulin (PROBIOTIC FORMULA) 1-250 BILLION-MG CAPS Take 2 capsules by mouth daily Record states dose is currently 4 billion      Cholecalciferol (VITAMIN D-3 PO) Take 2,000 Units by mouth daily        cyanocobalamin (VITAMIN B-12) 1,000 mcg tablet Take 1,000 mcg by mouth 3 (three) times a week MWF      donepezil (ARICEPT) 10 mg tablet TAKE 1 TABLET DAILY AT     BEDTIME 90 tablet 1    escitalopram (LEXAPRO) 20 mg tablet Take 1 tablet (20 mg total) by mouth daily 90 tablet 3    Fluticasone-Salmeterol (AIRDUO RESPICLICK 84/16) 28-39 MCG/ACT AEPB Inhale 1 puff 2 (two) times a day AM & PM      KRILL OIL PO Take 500 mg by mouth daily   levofloxacin (LEVAQUIN) 750 mg tablet Take 1 tablet (750 mg total) by mouth every 24 hours for 5 days 5 tablet 0    Melatonin 10 MG TABS Take 3 tablets by mouth daily at bedtime Taking 15mg      memantine (NAMENDA) 5 mg tablet Take 5 mg by mouth 2 (two) times a day In the evening       montelukast (SINGULAIR) 10 mg tablet Take 1 tablet (10 mg total) by mouth daily 90 tablet 2    Multiple Vitamins-Minerals (CENTRUM SILVER ADULT 50+) TABS Take 1 tablet by mouth daily      QUEtiapine (SEROquel) 50 mg tablet Take 1 tablet (50 mg total) by mouth daily at bedtime 90 tablet 1    ranitidine (ZANTAC) 150 mg tablet Take 1 tablet (150 mg total) by mouth 2 (two) times a day 180 tablet 1    tiotropium (SPIRIVA HANDIHALER) 18 mcg inhalation capsule Place 18 mcg into inhaler and inhale daily   Potassium Citrate ER 15 MEQ (1620 MG) TBCR Take 1 tablet by mouth 2 (two) times a day for 90 days 180 tablet 3    tamsulosin (FLOMAX) 0 4 mg Take 1 capsule (0 4 mg total) by mouth daily for 90 days 90 capsule 3     No current facility-administered medications for this visit  Review of Systems   Constitutional: Negative for activity change, appetite change, diaphoresis, fatigue and fever  HENT: Negative  Eyes: Negative  Respiratory: Positive for cough  Negative for apnea, chest tightness, shortness of breath and wheezing  Cardiovascular: Negative for chest pain, palpitations and leg swelling  Gastrointestinal: Negative for abdominal distention, abdominal pain, anal bleeding, constipation, diarrhea, nausea and vomiting     Endocrine: Negative for cold intolerance, heat intolerance, polydipsia, polyphagia and polyuria  Genitourinary: Positive for difficulty urinating  Negative for dysuria, flank pain, hematuria and urgency  Musculoskeletal: Negative for arthralgias, back pain, gait problem, joint swelling and myalgias  Skin: Negative for color change, rash and wound  Allergic/Immunologic: Negative for environmental allergies, food allergies and immunocompromised state  Neurological: Negative for dizziness, seizures, syncope, speech difficulty, numbness and headaches  Hematological: Negative for adenopathy  Does not bruise/bleed easily  Psychiatric/Behavioral: Positive for confusion  Negative for agitation, behavioral problems, hallucinations, sleep disturbance and suicidal ideas  Objective:  Vitals:    11/19/19 1431   BP: 100/70   BP Location: Left arm   Patient Position: Sitting   Cuff Size: Standard   SpO2: (!) 89%   Weight: 81 2 kg (179 lb)   Height: 5' 9" (1 753 m)     Body mass index is 26 43 kg/m²  Physical Exam   Constitutional: He is oriented to person, place, and time  He appears well-developed and well-nourished  No distress  HENT:   Head: Normocephalic  Right Ear: External ear normal    Left Ear: External ear normal    Nose: Nose normal    Mouth/Throat: Oropharynx is clear and moist    Eyes: Pupils are equal, round, and reactive to light  Conjunctivae and EOM are normal  Right eye exhibits no discharge  Left eye exhibits no discharge  No scleral icterus  Neck: Normal range of motion  No tracheal deviation present  No thyromegaly present  Cardiovascular: Normal rate, regular rhythm and normal heart sounds  Exam reveals no gallop and no friction rub  No murmur heard  Pulmonary/Chest: Effort normal and breath sounds normal  No respiratory distress  He has no wheezes  Abdominal: Soft  Bowel sounds are normal  He exhibits no mass  There is no tenderness  There is no guarding     Musculoskeletal: He exhibits no edema or deformity  Lymphadenopathy:     He has no cervical adenopathy  Neurological: He is alert and oriented to person, place, and time  No cranial nerve deficit  Skin: Skin is warm and dry  No rash noted  He is not diaphoretic  No erythema  Psychiatric: He has a normal mood and affect   Thought content normal

## 2019-11-20 LAB
ATRIAL RATE: 76 BPM
P AXIS: 57 DEGREES
PR INTERVAL: 214 MS
QRS AXIS: 59 DEGREES
QRSD INTERVAL: 80 MS
QT INTERVAL: 418 MS
QTC INTERVAL: 470 MS
T WAVE AXIS: 61 DEGREES
VENTRICULAR RATE: 76 BPM

## 2019-11-20 PROCEDURE — 93010 ELECTROCARDIOGRAM REPORT: CPT | Performed by: INTERNAL MEDICINE

## 2019-11-22 PROCEDURE — 93227 XTRNL ECG REC<48 HR R&I: CPT | Performed by: INTERNAL MEDICINE

## 2019-11-26 NOTE — ANESTHESIA PREPROCEDURE EVALUATION
Review of Systems/Medical History  Patient summary reviewed        Cardiovascular  Exercise tolerance: good,  Hyperlipidemia, Hypertension well controlled, CAD, , CHF ,   Comment: LEFT VENTRICLE:  Systolic function was normal  Ejection fraction was estimated to be 60 %  There was mild hypokinesis of the basal-mid anterolateral wall and possibly the  anterior wall which was not well visualized  Doppler parameters were consistent  with abnormal left ventricular relaxation (grade 1 diastolic dysfunction)  ,  Pulmonary  Smoker ex-smoker more than 10 packs per year , Pneumonia, COPD moderate , Asthma: Asthma type of rescue: chronic medication usage, Sleep apnea , ,        GI/Hepatic    GERD ,        Kidney stones,        Endo/Other  Negative endo/other ROS Arthritis     GYN       Hematology   Musculoskeletal       Neurology    TIA,    Psychology   Anxiety, Depression ,            Physical Exam    Airway    Mallampati score: I  TM Distance: <3 FB  Neck ROM: full     Dental   Comment: edentulous,     Cardiovascular  Rhythm: regular, Rate: normal,     Pulmonary  Decreased breath sounds,     Other Findings        Anesthesia Plan  ASA Score- 3       Anesthesia Type- general with ASA Monitors  Additional Monitors:   Airway Plan:     Comment: Pt experienced significant cognitive dysfunction during percutaneous aneurysm repair over the summer          Induction- intravenous  Informed Consent- Anesthetic plan and risks discussed with patient  Yes

## 2019-11-29 ENCOUNTER — HOSPITAL ENCOUNTER (OUTPATIENT)
Dept: NUCLEAR MEDICINE | Facility: HOSPITAL | Age: 79
Discharge: HOME | End: 2019-11-29
Attending: INTERNAL MEDICINE
Payer: MEDICARE

## 2019-11-29 DIAGNOSIS — I65.23 BILATERAL CAROTID ARTERY STENOSIS: ICD-10-CM

## 2019-11-29 DIAGNOSIS — R06.00 DOE (DYSPNEA ON EXERTION): ICD-10-CM

## 2019-11-29 DIAGNOSIS — I71.4 ABDOMINAL AORTIC ANEURYSM (AAA) WITHOUT RUPTURE (HCC): ICD-10-CM

## 2019-11-29 DIAGNOSIS — R00.1 BRADYCARDIA: ICD-10-CM

## 2019-11-29 DIAGNOSIS — I25.10 CORONARY ARTERY DISEASE, ANGINA PRESENCE UNSPECIFIED, UNSPECIFIED VESSEL OR LESION TYPE, UNSPECIFIED WHETHER NATIVE OR TRANSPLANTED HEART: ICD-10-CM

## 2019-11-29 PROCEDURE — A9502 TC99M TETROFOSMIN: HCPCS

## 2019-11-29 PROCEDURE — 93017 CV STRESS TEST TRACING ONLY: CPT

## 2019-11-29 PROCEDURE — 93018 CV STRESS TEST I&R ONLY: CPT | Performed by: INTERNAL MEDICINE

## 2019-11-29 PROCEDURE — 78452 HT MUSCLE IMAGE SPECT MULT: CPT

## 2019-11-29 PROCEDURE — 93016 CV STRESS TEST SUPVJ ONLY: CPT | Performed by: INTERNAL MEDICINE

## 2019-11-29 PROCEDURE — 78452 HT MUSCLE IMAGE SPECT MULT: CPT | Performed by: INTERNAL MEDICINE

## 2019-11-29 RX ADMIN — REGADENOSON 0.4 MG: 0.08 INJECTION, SOLUTION INTRAVENOUS at 09:36

## 2019-12-31 ENCOUNTER — TRANSCRIBE ORDERS (OUTPATIENT)
Dept: ADMINISTRATIVE | Facility: HOSPITAL | Age: 79
End: 2019-12-31

## 2019-12-31 DIAGNOSIS — R10.30 LOWER ABDOMINAL PAIN: Primary | ICD-10-CM

## 2020-01-03 ENCOUNTER — HOSPITAL ENCOUNTER (OUTPATIENT)
Dept: CT IMAGING | Facility: HOSPITAL | Age: 80
Discharge: HOME | End: 2020-01-03
Attending: ANESTHESIOLOGY
Payer: MEDICARE

## 2020-01-03 DIAGNOSIS — R10.30 LOWER ABDOMINAL PAIN: ICD-10-CM

## 2020-01-03 PROCEDURE — 74176 CT ABD & PELVIS W/O CONTRAST: CPT

## 2020-01-16 ENCOUNTER — APPOINTMENT (OUTPATIENT)
Dept: LAB | Facility: MEDICAL CENTER | Age: 80
End: 2020-01-16
Payer: MEDICARE

## 2020-01-16 ENCOUNTER — OFFICE VISIT (OUTPATIENT)
Dept: FAMILY MEDICINE CLINIC | Facility: CLINIC | Age: 80
End: 2020-01-16
Payer: MEDICARE

## 2020-01-16 VITALS
BODY MASS INDEX: 26.22 KG/M2 | DIASTOLIC BLOOD PRESSURE: 78 MMHG | HEIGHT: 69 IN | WEIGHT: 177 LBS | SYSTOLIC BLOOD PRESSURE: 138 MMHG

## 2020-01-16 DIAGNOSIS — J44.9 CHRONIC OBSTRUCTIVE PULMONARY DISEASE, UNSPECIFIED COPD TYPE (HCC): Primary | ICD-10-CM

## 2020-01-16 DIAGNOSIS — K21.9 GASTROESOPHAGEAL REFLUX DISEASE WITHOUT ESOPHAGITIS: ICD-10-CM

## 2020-01-16 DIAGNOSIS — I71.4 ABDOMINAL AORTIC ANEURYSM (AAA) WITHOUT RUPTURE (HCC): ICD-10-CM

## 2020-01-16 DIAGNOSIS — F03.90 DEMENTIA WITHOUT BEHAVIORAL DISTURBANCE, UNSPECIFIED DEMENTIA TYPE (HCC): ICD-10-CM

## 2020-01-16 PROBLEM — J18.9 CAP (COMMUNITY ACQUIRED PNEUMONIA): Status: RESOLVED | Noted: 2019-06-14 | Resolved: 2020-01-16

## 2020-01-16 PROBLEM — J96.21 ACUTE ON CHRONIC RESPIRATORY FAILURE WITH HYPOXIA (HCC): Status: RESOLVED | Noted: 2018-12-30 | Resolved: 2020-01-16

## 2020-01-16 PROBLEM — N17.9 ACUTE KIDNEY INJURY (HCC): Status: RESOLVED | Noted: 2017-05-31 | Resolved: 2020-01-16

## 2020-01-16 PROCEDURE — 99214 OFFICE O/P EST MOD 30 MIN: CPT | Performed by: FAMILY MEDICINE

## 2020-01-16 RX ORDER — PANTOPRAZOLE SODIUM 40 MG/1
40 TABLET, DELAYED RELEASE ORAL DAILY
Qty: 90 TABLET | Refills: 1 | Status: SHIPPED | OUTPATIENT
Start: 2020-01-16 | End: 2020-04-14

## 2020-01-16 NOTE — PROGRESS NOTES
BMI Counseling: Body mass index is 26 14 kg/m²  Follow-up plan was not completed due to elderly patient (74 years old) where weight reduction/weight gain would complicate underlying health condition such as: illness or physical disability and mental illness, dementia, or confusion  Assessment/Plan:  Patient will get a BMP today to assess his kidney function and will begin his radiation therapy    Problem List Items Addressed This Visit        Respiratory    COPD (chronic obstructive pulmonary disease) ([facility] ) - Primary     Encouraged patient wears oxygen even when he goes out from his home            Cardiovascular and Mediastinum    Abdominal aortic aneurysm [facility])     Patient will follow-up with Dr Beckie Garzon and Auditory    Dementia [facility])     Continue donepezil                Diagnoses and all orders for this visit:    Chronic obstructive pulmonary disease, unspecified COPD type ([place] [address] )    Abdominal aortic aneurysm (AAA) without rupture ([place] [address] )    Dementia without behavioral disturbance, unspecified dementia type ([place] [address] )        COPD (chronic obstructive pulmonary disease) ([place] [address] )  Encouraged patient wears oxygen even when he goes out from his home    Abdominal aortic aneurysm [facility])  Patient will follow-up with Dr Pineda Fermin    [facility])  Continue donepezil        Subjective:      Patient ID: Chris Bruce is a 70 y o  male      Patient is here for follow-up of visit currently beginning radiation treatment for a lung nodule he anticipates needing 5 treatments and has been tad 2 to an marked for IR T doing well has not been wearing his oxygen when he leaves the home I did speak with him in make him aware of the need to do so      The following portions of the patient's history were reviewed and updated as appropriate:   He has a past medical history of AAA [facility]abdominal aortic [facility]) [facility] ), Acid reflux, Acute serous otitis media of left ear, Anesthesia, Anxiety, Aortic aneurysm without rupture ([facility] ), Arm bruise, At risk for falls, BPH without urinary obstruction, Cancer ([facility] ), CAP (community acquired pneumonia), Cataracts, bilateral, Change in bowel function, Clubbing of fingers, Colon polyps, Common cold, Contusion of elbow, left, COPD (chronic obstructive pulmonary disease) ([facility] ), Coronary artery disease, Cough, Dementia ([facility] ), Depression, Dizziness, Exercise counseling, Fatigue, Full dentures, Glaucoma screening, High cholesterol, History of abdominal aortic aneurysm (AAA) repair (08/2017), History of bacteremia, History of chronic obstructive lung disease, History of epistaxis, History of influenza vaccination, History of kidney stones, History of pneumonia, History of sepsis (10/2017), History of sinusitis, History of skin cancer, History of sleep apnea, History of transfusion, History of urinary tract infection, [place] (hard of hearing), Hydronephrosis with obstructing calculus, Influenza vaccine needed, Jock itch, Kidney stones, Left hip pain, Need for pneumococcal vaccination, Need for prophylactic vaccination and inoculation against influenza, Other emphysema ([facility] ), Pancreatitis, chronic ([facility] ), Pneumonia (10/26/2017), Pulmonary emphysema ([facility] ), Screening for genitourinary condition, Screening for neurological condition, Sepsis ([facility] ), Short-term memory loss, Sleep apnea, Special screening examination for neoplasm of prostate, TIA involving carotid artery, Ulcer, Unsteady gait, Use of cane as ambulatory aid, Vitamin D deficiency, and Wears glasses  ,  does not have any pertinent problems on file  ,   has a past surgical history that includes Cardiac surgery; Ureteral stent placement; Excisional hemorrhoidectomy; Mouth surgery; pr esophagogastroduodenoscopy transoral diagnostic (N/A, 8/18/2016); Cataract extraction (Bilateral); Lithotripsy; Hernia repair; pr colonoscopy flx dx w/collj spec when pfrmd (N/A, 5/16/2017);  Abdominal aortic aneurysm repair (08/23/2017); CAROTID ENDARTARECTOMY (Left, 11/1996); Coronary artery bypass graft (01/2003); Eye surgery (Bilateral); Cystoscopy; pr cystourethroscopy (N/A, 11/30/2017); pr remove bladder stone,<2 5 cm (N/A, 11/30/2017); Cystoscopy; AAA repair, endovascular; Tonsillectomy; Back surgery; and Skin biopsy  ,  family history includes Diabetes type II in his maternal grandmother and mother; Hypertension in his paternal grandmother; Kidney failure in his father  ,   reports that he quit smoking about 6 years ago  He has a 90 00 pack-year smoking history  He has never used smokeless tobacco  He reports that he drank alcohol  He reports that he does not use drugs  ,  is allergic to augmentin [amoxicillin-pot clavulanate]; ciprofloxacin; morphine and related; levofloxacin; and wellbutrin [bupropion]     Current Outpatient Medications   Medication Sig Dispense Refill    acetaminophen (TYLENOL) 500 mg tablet Take 650 mg by mouth every 6 (six) hours as needed for headaches       aspirin 81 MG tablet Take 81 mg by mouth daily Took within 24 hours       atenolol (TENORMIN) 25 mg tablet TAKE 1 TABLET AT BEDTIME 90 tablet 3    atorvastatin (LIPITOR) 20 mg tablet Take 1 tablet (20 mg total) by mouth daily at bedtime 90 tablet 3    Bacillus Coagulans-Inulin (PROBIOTIC FORMULA) 1-250 BILLION-MG CAPS Take 2 capsules by mouth daily Record states dose is currently 4 billion      Cholecalciferol (VITAMIN D-3 PO) Take 2,000 Units by mouth daily   cyanocobalamin (VITAMIN B-12) 1,000 mcg tablet Take 1,000 mcg by mouth 3 (three) times a week MWF      donepezil (ARICEPT) 10 mg tablet TAKE 1 TABLET DAILY AT     BEDTIME 90 tablet 1    escitalopram (LEXAPRO) 20 mg tablet Take 1 tablet (20 mg total) by mouth daily 90 tablet 3    Fluticasone-Salmeterol (AIRDUO RESPICLICK 70/22) 34-55 MCG/ACT AEPB Inhale 1 puff 2 (two) times a day AM & PM      KRILL OIL PO Take 500 mg by mouth daily        Melatonin 10 MG TABS Take 3 tablets by mouth daily at bedtime Taking 15mg      memantine (NAMENDA) 5 mg tablet Take 5 mg by mouth 2 (two) times a day In the evening       montelukast (SINGULAIR) 10 mg tablet Take 1 tablet (10 mg total) by mouth daily 90 tablet 2    Multiple Vitamins-Minerals (CENTRUM SILVER ADULT 50+) TABS Take 1 tablet by mouth daily      Potassium Citrate ER 15 MEQ (1620 MG) TBCR Take 1 tablet by mouth 2 (two) times a day for 90 days 180 tablet 3    QUEtiapine (SEROquel) 50 mg tablet Take 1 tablet (50 mg total) by mouth daily at bedtime 90 tablet 1    tamsulosin (FLOMAX) 0 4 mg Take 1 capsule (0 4 mg total) by mouth daily for 90 days 90 capsule 3    tiotropium (SPIRIVA HANDIHALER) 18 mcg inhalation capsule Place 18 mcg into inhaler and inhale daily  No current facility-administered medications for this visit  Review of Systems   Constitutional: Positive for activity change and fatigue  Negative for appetite change, diaphoresis and fever  HENT: Negative  Eyes: Negative  Respiratory: Positive for shortness of breath  Negative for apnea, cough, chest tightness and wheezing  Cardiovascular: Negative for chest pain, palpitations and leg swelling  Gastrointestinal: Negative for abdominal distention, abdominal pain, anal bleeding, constipation, diarrhea, nausea and vomiting  Endocrine: Negative for cold intolerance, heat intolerance, polydipsia, polyphagia and polyuria  Genitourinary: Negative for difficulty urinating, dysuria, flank pain, hematuria and urgency  Musculoskeletal: Negative for arthralgias, back pain, gait problem, joint swelling and myalgias  Skin: Negative for color change, rash and wound  Allergic/Immunologic: Negative for environmental allergies, food allergies and immunocompromised state  Neurological: Negative for dizziness, seizures, syncope, speech difficulty, numbness and headaches  Hematological: Negative for adenopathy  Does not bruise/bleed easily  Psychiatric/Behavioral: Positive for confusion   Negative for agitation, behavioral problems, hallucinations, sleep disturbance and suicidal ideas  Objective:  Vitals:    01/16/20 1136   BP: 138/78   BP Location: Right arm   Patient Position: Sitting   Cuff Size: Standard   Weight: 80 3 kg (177 lb)   Height: 5' 9" (1 753 m)     Body mass index is 26 14 kg/m²  Physical Exam   Constitutional: He is oriented to person, place, and time  He appears well-developed and well-nourished  No distress  HENT:   Head: Normocephalic  Right Ear: External ear normal    Left Ear: External ear normal    Nose: Nose normal    Mouth/Throat: Oropharynx is clear and moist    Eyes: Pupils are equal, round, and reactive to light  Conjunctivae and EOM are normal  Right eye exhibits no discharge  Left eye exhibits no discharge  No scleral icterus  Neck: Normal range of motion  No tracheal deviation present  No thyromegaly present  Cardiovascular: Normal rate, regular rhythm and normal heart sounds  Exam reveals no gallop and no friction rub  No murmur heard  Pulmonary/Chest: Effort normal and breath sounds normal  No respiratory distress  He has no wheezes  Abdominal: Soft  Bowel sounds are normal  He exhibits no mass  There is no tenderness  There is no guarding  Musculoskeletal: He exhibits no edema or deformity  Lymphadenopathy:     He has no cervical adenopathy  Neurological: He is alert and oriented to person, place, and time  No cranial nerve deficit  Skin: Skin is warm and dry  No rash noted  He is not diaphoretic  No erythema  Psychiatric: He has a normal mood and affect   Thought content normal

## 2020-01-21 ENCOUNTER — TRANSCRIBE ORDERS (OUTPATIENT)
Dept: ADMINISTRATIVE | Facility: HOSPITAL | Age: 80
End: 2020-01-21

## 2020-01-21 ENCOUNTER — APPOINTMENT (OUTPATIENT)
Dept: LAB | Facility: HOSPITAL | Age: 80
End: 2020-01-21
Payer: MEDICARE

## 2020-01-21 DIAGNOSIS — F02.80 LATE ONSET ALZHEIMER'S DISEASE WITHOUT BEHAVIORAL DISTURBANCE (HCC): Primary | ICD-10-CM

## 2020-01-21 DIAGNOSIS — G30.1 LATE ONSET ALZHEIMER'S DISEASE WITHOUT BEHAVIORAL DISTURBANCE (HCC): ICD-10-CM

## 2020-01-21 DIAGNOSIS — G47.52 REM SLEEP BEHAVIOR DISORDER: ICD-10-CM

## 2020-01-21 DIAGNOSIS — Z79.899 ENCOUNTER FOR LONG-TERM (CURRENT) USE OF OTHER MEDICATIONS: ICD-10-CM

## 2020-01-21 DIAGNOSIS — G30.1 LATE ONSET ALZHEIMER'S DISEASE WITHOUT BEHAVIORAL DISTURBANCE (HCC): Primary | ICD-10-CM

## 2020-01-21 DIAGNOSIS — F02.80 LATE ONSET ALZHEIMER'S DISEASE WITHOUT BEHAVIORAL DISTURBANCE (HCC): ICD-10-CM

## 2020-01-21 LAB
ALBUMIN SERPL BCP-MCNC: 3.9 G/DL (ref 3.5–5)
ALP SERPL-CCNC: 113 U/L (ref 46–116)
ALT SERPL W P-5'-P-CCNC: 22 U/L (ref 12–78)
AST SERPL W P-5'-P-CCNC: 20 U/L (ref 5–45)
BILIRUB DIRECT SERPL-MCNC: 0.3 MG/DL (ref 0–0.2)
BILIRUB SERPL-MCNC: 2 MG/DL (ref 0.2–1)
PROT SERPL-MCNC: 7.5 G/DL (ref 6.4–8.2)

## 2020-01-21 PROCEDURE — 36415 COLL VENOUS BLD VENIPUNCTURE: CPT

## 2020-01-21 PROCEDURE — 80076 HEPATIC FUNCTION PANEL: CPT

## 2020-02-09 DIAGNOSIS — F32.A DEPRESSION, UNSPECIFIED DEPRESSION TYPE: ICD-10-CM

## 2020-02-09 DIAGNOSIS — F03.90 DEMENTIA ARISING IN THE SENIUM AND PRESENIUM (HCC): ICD-10-CM

## 2020-02-09 DIAGNOSIS — F03.91 DEMENTIA WITH BEHAVIORAL DISTURBANCE, UNSPECIFIED DEMENTIA TYPE (HCC): ICD-10-CM

## 2020-02-10 ENCOUNTER — OFFICE VISIT (OUTPATIENT)
Dept: CARDIOLOGY CLINIC | Facility: HOSPITAL | Age: 80
End: 2020-02-10
Payer: MEDICARE

## 2020-02-10 VITALS
HEART RATE: 75 BPM | HEIGHT: 69 IN | OXYGEN SATURATION: 95 % | BODY MASS INDEX: 25.8 KG/M2 | DIASTOLIC BLOOD PRESSURE: 76 MMHG | WEIGHT: 174.16 LBS | SYSTOLIC BLOOD PRESSURE: 120 MMHG

## 2020-02-10 DIAGNOSIS — R00.1 BRADYCARDIA: ICD-10-CM

## 2020-02-10 DIAGNOSIS — I49.8 VENTRICULAR BIGEMINY: ICD-10-CM

## 2020-02-10 DIAGNOSIS — E78.5 HYPERLIPIDEMIA, UNSPECIFIED HYPERLIPIDEMIA TYPE: ICD-10-CM

## 2020-02-10 DIAGNOSIS — J44.9 CHRONIC OBSTRUCTIVE PULMONARY DISEASE, UNSPECIFIED COPD TYPE (HCC): ICD-10-CM

## 2020-02-10 DIAGNOSIS — I25.10 CORONARY ARTERY DISEASE, ANGINA PRESENCE UNSPECIFIED, UNSPECIFIED VESSEL OR LESION TYPE, UNSPECIFIED WHETHER NATIVE OR TRANSPLANTED HEART: Primary | ICD-10-CM

## 2020-02-10 DIAGNOSIS — Z95.1 HX OF CABG: ICD-10-CM

## 2020-02-10 DIAGNOSIS — R06.02 SOB (SHORTNESS OF BREATH): ICD-10-CM

## 2020-02-10 PROCEDURE — 1036F TOBACCO NON-USER: CPT | Performed by: INTERNAL MEDICINE

## 2020-02-10 PROCEDURE — 99214 OFFICE O/P EST MOD 30 MIN: CPT | Performed by: INTERNAL MEDICINE

## 2020-02-10 PROCEDURE — 3008F BODY MASS INDEX DOCD: CPT | Performed by: INTERNAL MEDICINE

## 2020-02-10 PROCEDURE — 1160F RVW MEDS BY RX/DR IN RCRD: CPT | Performed by: INTERNAL MEDICINE

## 2020-02-10 PROCEDURE — 4040F PNEUMOC VAC/ADMIN/RCVD: CPT | Performed by: INTERNAL MEDICINE

## 2020-02-10 RX ORDER — ESCITALOPRAM OXALATE 20 MG/1
TABLET ORAL
Qty: 90 TABLET | Refills: 3 | Status: SHIPPED | OUTPATIENT
Start: 2020-02-10 | End: 2020-08-10 | Stop reason: ALTCHOICE

## 2020-02-10 RX ORDER — QUETIAPINE FUMARATE 50 MG/1
TABLET, FILM COATED ORAL
Qty: 90 TABLET | Refills: 1 | Status: SHIPPED | OUTPATIENT
Start: 2020-02-10 | End: 2020-02-20 | Stop reason: HOSPADM

## 2020-02-10 NOTE — PROGRESS NOTES
Cardiology Follow Up    Jasen Payton  1940  [id number]  [address] [facility] [address] [id number] [phone]    1  Coronary artery disease, angina presence unspecified, unspecified vessel or lesion type, unspecified whether native or transplanted heart  Echo complete with contrast if indicated   2  Ventricular bigeminy  Echo complete with contrast if indicated   3  Chronic obstructive pulmonary disease, unspecified COPD type ([address] )  Echo complete with contrast if indicated   4  Bradycardia  Echo complete with contrast if indicated   5  Hyperlipidemia, unspecified hyperlipidemia type  Echo complete with contrast if indicated   6  Hx of CABG     7  SOB (shortness of breath)         Discussion/Summary:  Overall he has been doing well from a cardiac standpoint  Coronary artery disease stable without complaints of angina  He does have dementia which limits his activities  Blood pressure and lipids have been controlled  He is due for an echocardiogram due to high burden PVCs summer  No further cardiac testing I will see him back in 8 months  Interval History:78year-old gentleman history of coronary artery disease status post CABG in 2003, peripheral arterial disease status post carotid endarterectomy and abdominal aortic aneurysm repaired with EVAR, hypertension, hyperlipidemia, prior tobacco abuse presents to establish care with me in the office  Shortness of breath has resolved since her last visit he has the stable changes from chronic COPD  Denies any anginal sounding chest pain  Leads a rather sedentary lifestyle watching TV most of the day  He does have dementia which is slowly progressive  Denies any palpitations, lower extremity edema, PND, orthopnea  There has been no syncope  He does not remain well hydrated    Problem List     Acute tracheobronchitis    COPD (chronic obstructive pulmonary disease) (HCC)    Hyperlipidemia    GERD (gastroesophageal reflux disease)    Abnormal CT scan, chest    Abdominal aortic aneurysm (HCC)    Thrombocytopenia (HCC)    History of aortic aneurysm repair    Benign prostatic hyperplasia    Dementia    Bradycardia    SOB (shortness of breath)    Pulmonary nodule    Ventricular bigeminy    Positional lightheadedness    Coronary artery disease involving native coronary artery of native heart with angina pectoris (HCC)    Hx of CABG    Bilateral carotid artery stenosis    Elevated bilirubin    Tracheobronchitis    Hyponatremia    CAD (coronary artery disease)    Urinary retention        Past Medical History:   Diagnosis Date    AAA [facility]abdominal aortic aneurysm) ([facility])     Acid reflux     Acute serous otitis media of left ear     recurrence not specified     Anesthesia     "always has mental changes /lingers and lingers after anesthesia"    Anxiety     Aortic aneurysm without rupture ([facility])     Arm bruise     right inner forearm "recent IV"    At risk for falls     BPH without urinary obstruction     Cancer ([address] )     skin CA on nose    CAP (community acquired pneumonia)     Cataracts, bilateral     Change in bowel function     Clubbing of fingers     Colon polyps     Common cold     Contusion of elbow, left     initial encounter     COPD [facility]ch[facility] obstructive [facility] disease) [facility]     Coronary artery disease     Cough     Dementia ([address] )     Depression     Dizziness     upon "standing quickly on occas"    Exercise counseling     Fatigue     Full dentures     "doesn't wear them"    Glaucoma screening     High cholesterol     History of abdominal aortic aneurysm (AAA) repair 08/2017    History of bacteremia     History of chronic obstructive lung disease     History of epistaxis     History of influenza vaccination     History of kidney stones     History of pneumonia     "many times" "at least 5 times" almost always goes to sepsis"    History of sepsis 10/2017    History of sinusitis     History of skin cancer     History of sleep apnea     History of transfusion     History of urinary tract infection     [place] (hard of hearing)     Hydronephrosis with obstructing calculus     Influenza vaccine needed     Jock itch     left/saw doctor  and started on antifungal cream    Kidney stones     Left hip pain     Need for pneumococcal vaccination     Need for prophylactic vaccination and inoculation against influenza     Other emphysema [facility] )     Pancreatitis, chronic ([facility] )     pt and wife can't confirm 11/10/17    Pneumonia 10/26/2017    admitted [facility]    Pulmonary emphysema ([facility] )     Screening for genitourinary condition     Screening for neurological condition     Sepsis ([facility] )     due to unspecified organism     Short-term memory loss     Sleep apnea     does not use CPAP      Special screening examination for neoplasm of prostate     TIA involving carotid artery     "before carotid surgery"    Ulcer     stomach, "years ago"    Unsteady gait     Use of cane as ambulatory aid     sometimes    Vitamin D deficiency     Wears glasses      Social History     Socioeconomic History    Marital status: /Civil Union     Spouse name: Not on file    Number of children: Not on file    Years of education: Not on file    Highest education level: Not on file   Occupational History    Occupation:      Comment: Retired   Social Needs    Financial resource strain: Not on file    Food insecurity:     Worry: Not on file     Inability: Not on file    Transportation needs:     Medical: Not on file     Non-medical: Not on file   Tobacco Use    Smoking status: Former Smoker     Packs/day: 1 50     Years: 60 00     Pack years: 90 00     Last attempt to quit:      Years since quittin 1    Smokeless tobacco: Never Used    Tobacco comment: quit 3 yrs ago, used to be a 1-1 5 ppd smoker   Substance and Sexual Activity    Alcohol use: Not Currently     Alcohol/week: 0 0 standard drinks     Frequency: Never     Drinks per session: 1 or 2     Binge frequency: Never     Comment: quit 25 yrs ago    Drug use: No     Comment: No illicit drug use     Sexual activity: Not Currently     Partners: Female   Lifestyle    Physical activity:     Days per week: Not on file     Minutes per session: Not on file    Stress: Not on file   Relationships    Social connections:     Talks on phone: Not on file     Gets together: Not on file     Attends Sabianist service: Not on file     Active member of club or organization: Not on file     Attends meetings of clubs or organizations: Not on file     Relationship status: Not on file    Intimate partner violence:     Fear of current or ex partner: Not on file     Emotionally abused: Not on file     Physically abused: Not on file     Forced sexual activity: Not on file   Other Topics Concern    Not on file   Social History Narrative    Retired     No living Will     No advance directives     Daily caffeine consumption - 4-5 servings a day       Family History   Problem Relation Age of Onset    Diabetes type II Mother         mellitus    Kidney failure Father     Diabetes type II Maternal Grandmother         mellitus     Hypertension Paternal Grandmother         benign essential      Past Surgical History:   Procedure Laterality Date    ABDOMINAL AORTIC ANEURYSM REPAIR  08/23/2017    ABDOMINAL AORTIC ANEURYSM REPAIR, ENDOVASCULAR      BACK SURGERY      spinal stenosis    CARDIAC SURGERY      CABG x3    CAROTID ENDARTARECTOMY Left 11/1996    CATARACT EXTRACTION Bilateral     CORONARY ARTERY BYPASS GRAFT  01/2003    x3    CYSTOSCOPY      with insertion of ureteral stent     CYSTOSCOPY      with ureteroscopy with lithotripsy     EXCISIONAL HEMORRHOIDECTOMY      EYE SURGERY Bilateral     "for a wrinkle" after cataract surgery    HERNIA REPAIR      umbilical    LITHOTRIPSY renal    MOUTH SURGERY      full mouth extraction     ND COLONOSCOPY FLX DX W/COLLJ SPEC WHEN PFRMD N/A 5/16/2017    Procedure: COLONOSCOPY with polypectomies/ hot snare and tattoo;  Surgeon: Mimi Poe MD;  Location: [facility]; Service: Gastroenterology    ND CYSTOURETHROSCOPY N/A 11/30/2017    Procedure: Malgorzata Samano;  Surgeon: Elizabeth Mcleod MD;  [facility];  Service: Urology    ND ESOPHAGOGASTRODUODENOSCOPY TRANSORAL DIAGNOSTIC N/A 8/18/2016    Procedure: ESOPHAGOGASTRODUODENOSCOPY (EGD); Surgeon: Mimi Poe MD;  [facility]; Service: Gastroenterology    ND REMOVE BLADDER STONE,<2 5 CM N/A 11/30/2017    Procedure: Lucianfabienne Martin;  Surgeon: Elizabeth Mcleod MD;  [facility];  Service: Urology    SKIN BIOPSY      TONSILLECTOMY      URETERAL STENT PLACEMENT      and removal       Current Outpatient Medications:     acetaminophen (TYLENOL) 500 mg tablet, Take 650 mg by mouth every 6 (six) hours as needed for headaches , Disp: , Rfl:     aspirin 81 MG tablet, Take 81 mg by mouth daily Took within 24 hours , Disp: , Rfl:     atenolol (TENORMIN) 25 mg tablet, TAKE 1 TABLET AT BEDTIME, Disp: 90 tablet, Rfl: 3    atorvastatin (LIPITOR) 20 mg tablet, Take 1 tablet (20 mg total) by mouth daily at bedtime, Disp: 90 tablet, Rfl: 3    Bacillus Coagulans-Inulin (PROBIOTIC FORMULA) 1-250 BILLION-MG CAPS, Take 1 capsule by mouth daily Record states dose is currently 4 billion, Disp: , Rfl:     Cholecalciferol (VITAMIN D-3 PO), Take 2,000 Units by mouth daily  , Disp: , Rfl:     cyanocobalamin (VITAMIN B-12) 1,000 mcg tablet, Take 1,000 mcg by mouth 3 (three) times a week MWF, Disp: , Rfl:     donepezil (ARICEPT) 10 mg tablet, TAKE 1 TABLET DAILY AT     BEDTIME, Disp: 90 tablet, Rfl: 1    escitalopram (LEXAPRO) 20 mg tablet, TAKE 1 TABLET DAILY, Disp: 90 tablet, Rfl: 3    Fluticasone-Salmeterol (AIRDUO RESPICLICK 48/54) 07-31 MCG/ACT AEPB, Inhale 1 puff 2 (two) times a day AM & PM, Disp: , Rfl:     KRILL OIL PO, Take 500 mg by mouth daily  , Disp: , Rfl:     Melatonin 10 MG TABS, Take 2 tablets by mouth daily at bedtime Taking 15mg, Disp: , Rfl:     memantine (NAMENDA) 5 mg tablet, Take 5 mg by mouth 2 (two) times a day In the evening , Disp: , Rfl:     montelukast (SINGULAIR) 10 mg tablet, Take 1 tablet (10 mg total) by mouth daily, Disp: 90 tablet, Rfl: 2    Multiple Vitamins-Minerals (CENTRUM SILVER ADULT 50+) TABS, Take 1 tablet by mouth daily, Disp: , Rfl:     pantoprazole (PROTONIX) 40 mg tablet, Take 1 tablet (40 mg total) by mouth daily, Disp: 90 tablet, Rfl: 1    Potassium Citrate ER 15 MEQ (1620 MG) TBCR, Take 1 tablet by mouth 2 (two) times a day for 90 days, Disp: 180 tablet, Rfl: 3    QUEtiapine (SEROquel) 50 mg tablet, TAKE 1 TABLET DAILY AT     BEDTIME, Disp: 90 tablet, Rfl: 1    tamsulosin (FLOMAX) 0 4 mg, Take 1 capsule (0 4 mg total) by mouth daily for 90 days, Disp: 90 capsule, Rfl: 3    tiotropium (SPIRIVA HANDIHALER) 18 mcg inhalation capsule, Place 18 mcg into inhaler and inhale daily  , Disp: , Rfl:   Allergies   Allergen Reactions    Augmentin [Amoxicillin-Pot Clavulanate] Diarrhea    Ciprofloxacin Hives    Morphine And Related Other (See Comments)     Change in mental status    Levofloxacin      Headache    Wellbutrin [Bupropion]        Labs:     Chemistry        Component Value Date/Time     11/29/2014 1416    K 4 3 01/16/2020 1229    K 3 6 11/29/2014 1416     (H) 01/16/2020 1229     11/29/2014 1416    CO2 26 01/16/2020 1229    CO2 30 6 11/29/2014 1416    BUN 10 01/16/2020 1229    BUN 13 11/29/2014 1416    CREATININE 1 11 01/16/2020 1229    CREATININE 0 94 08/31/2015 1715        Component Value Date/Time    CALCIUM 9 3 01/16/2020 1229    CALCIUM 8 9 11/29/2014 1416    ALKPHOS 113 01/21/2020 1002    ALKPHOS 122 11/29/2014 1416    AST 20 01/21/2020 1002    AST 28 11/29/2014 1416    ALT 22 01/21/2020 1002    ALT 38 11/29/2014 1416    BILITOT 0 7 11/29/2014 1416            No results found for: CHOL  Lab Results   Component Value Date    HDL 30 (L) 10/01/2018    HDL 32 (L) 04/08/2016     Lab Results   Component Value Date    LDLCALC 82 10/01/2018    LDLCALC 104 (H) 04/08/2016     Lab Results   Component Value Date    TRIG 136 10/01/2018    TRIG 81 04/08/2016     No results found for: CHOLHDL    Imaging: No results found  ECG:  Sinus rhythm 1st degree AV block PVCs      Review of Systems   Constitution: Negative  HENT: Negative  Eyes: Negative  Cardiovascular: Negative  Respiratory: Negative  Endocrine: Negative  Hematologic/Lymphatic: Negative  Skin: Negative  Musculoskeletal: Negative  Gastrointestinal: Negative  Genitourinary: Negative  Neurological: Negative  Psychiatric/Behavioral: Negative  Vitals:    02/10/20 1235   BP: 120/76   Pulse: 75   SpO2: 95%     Vitals:    02/10/20 1235   Weight: 79 kg (174 lb 2 6 oz)     Height: 5' 9" (175 3 cm)   Body mass index is 25 72 kg/m²  Physical Exam:  Vital signs reviewed  General:  Alert and cooperative, appears stated age, no acute distress  HEENT:  PERRLA, EOMI, no scleral icterus, no conjunctival pallor  Neck:  No lymphadenopathy, no thyromegaly, no carotid bruits, no elevated JVP  Heart:  Regular rate and rhythm, normal S1/S2, no S3/S4, no murmur, rubs or gallops  PMI nondisplaced  Lungs:  Clear to auscultation bilaterally, no wheezes rales or rhonchi  Abdomen:  Soft, non-tender, positive bowel sounds, no rebound or guarding,   no organomegaly   Extremities:  Normal range of motion    No clubbing, cyanosis or edema   Vascular:  2+ pedal pulses  Skin:  No rashes or lesions on exposed skin  Neurologic:  Cranial nerves II-XII grossly intact without focal deficits

## 2020-02-11 RX ORDER — DONEPEZIL HYDROCHLORIDE 10 MG/1
TABLET, FILM COATED ORAL
Qty: 90 TABLET | Refills: 1 | Status: SHIPPED | OUTPATIENT
Start: 2020-02-11 | End: 2020-08-19

## 2020-02-18 ENCOUNTER — HOSPITAL ENCOUNTER (INPATIENT)
Facility: HOSPITAL | Age: 80
LOS: 2 days | Discharge: HOME | DRG: 246 | End: 2020-02-20
Attending: INTERNAL MEDICINE | Admitting: INTERNAL MEDICINE
Payer: MEDICARE

## 2020-02-18 ENCOUNTER — APPOINTMENT (OUTPATIENT)
Dept: NON INVASIVE DIAGNOSTICS | Facility: HOSPITAL | Age: 80
End: 2020-02-18
Payer: MEDICARE

## 2020-02-18 ENCOUNTER — APPOINTMENT (EMERGENCY)
Dept: CT IMAGING | Facility: HOSPITAL | Age: 80
End: 2020-02-18
Payer: MEDICARE

## 2020-02-18 ENCOUNTER — HOSPITAL ENCOUNTER (OUTPATIENT)
Facility: HOSPITAL | Age: 80
Setting detail: OBSERVATION
End: 2020-02-18
Attending: EMERGENCY MEDICINE | Admitting: INTERNAL MEDICINE
Payer: MEDICARE

## 2020-02-18 VITALS
SYSTOLIC BLOOD PRESSURE: 119 MMHG | HEART RATE: 67 BPM | RESPIRATION RATE: 18 BRPM | DIASTOLIC BLOOD PRESSURE: 73 MMHG | HEIGHT: 69 IN | TEMPERATURE: 97.3 F | OXYGEN SATURATION: 94 % | WEIGHT: 176.15 LBS | BODY MASS INDEX: 26.09 KG/M2

## 2020-02-18 DIAGNOSIS — D69.6 THROMBOCYTOPENIA (HCC): ICD-10-CM

## 2020-02-18 DIAGNOSIS — I21.9 TYPE 1 MYOCARDIAL INFARCTION (HCC): Primary | ICD-10-CM

## 2020-02-18 DIAGNOSIS — F02.80 DEMENTIA OF THE ALZHEIMER'S TYPE, WITH LATE ONSET, WITH DELIRIUM (HCC): ICD-10-CM

## 2020-02-18 DIAGNOSIS — Z86.79 HISTORY OF AORTIC ANEURYSM REPAIR: ICD-10-CM

## 2020-02-18 DIAGNOSIS — R07.9 CHEST PAIN: Primary | ICD-10-CM

## 2020-02-18 DIAGNOSIS — J44.9 CHRONIC OBSTRUCTIVE PULMONARY DISEASE, UNSPECIFIED COPD TYPE (HCC): ICD-10-CM

## 2020-02-18 DIAGNOSIS — I71.4 ABDOMINAL AORTIC ANEURYSM (AAA) WITHOUT RUPTURE (HCC): ICD-10-CM

## 2020-02-18 DIAGNOSIS — Z98.890 HISTORY OF AORTIC ANEURYSM REPAIR: ICD-10-CM

## 2020-02-18 DIAGNOSIS — E78.5 HYPERLIPIDEMIA, UNSPECIFIED HYPERLIPIDEMIA TYPE: ICD-10-CM

## 2020-02-18 DIAGNOSIS — G30.1 DEMENTIA OF THE ALZHEIMER'S TYPE, WITH LATE ONSET, WITH DELIRIUM (HCC): ICD-10-CM

## 2020-02-18 DIAGNOSIS — E78.2 MIXED HYPERLIPIDEMIA: ICD-10-CM

## 2020-02-18 DIAGNOSIS — F05 DEMENTIA OF THE ALZHEIMER'S TYPE, WITH LATE ONSET, WITH DELIRIUM (HCC): ICD-10-CM

## 2020-02-18 DIAGNOSIS — R31.29 MICROSCOPIC HEMATURIA: ICD-10-CM

## 2020-02-18 DIAGNOSIS — I25.10 CORONARY ARTERY DISEASE, ANGINA PRESENCE UNSPECIFIED, UNSPECIFIED VESSEL OR LESION TYPE, UNSPECIFIED WHETHER NATIVE OR TRANSPLANTED HEART: ICD-10-CM

## 2020-02-18 PROBLEM — N32.89 BLADDER DISTENSION: Status: ACTIVE | Noted: 2020-02-18

## 2020-02-18 PROBLEM — J96.11 CHRONIC RESPIRATORY FAILURE WITH HYPOXIA (HCC): Status: ACTIVE | Noted: 2020-02-18

## 2020-02-18 PROBLEM — N32.89 BLADDER WALL THICKENING: Status: ACTIVE | Noted: 2020-02-18

## 2020-02-18 PROBLEM — I49.1 PREMATURE ATRIAL CONTRACTIONS: Status: ACTIVE | Noted: 2020-02-18

## 2020-02-18 PROBLEM — R71.8 ELEVATED MCV: Status: ACTIVE | Noted: 2020-02-18

## 2020-02-18 PROBLEM — I51.89 DIASTOLIC DYSFUNCTION: Status: ACTIVE | Noted: 2020-02-18

## 2020-02-18 PROBLEM — I49.3 VENTRICULAR ECTOPY: Status: ACTIVE | Noted: 2020-02-18

## 2020-02-18 PROBLEM — I44.0 1ST DEGREE AV BLOCK: Status: ACTIVE | Noted: 2020-02-18

## 2020-02-18 LAB
ALBUMIN SERPL BCP-MCNC: 3.6 G/DL (ref 3.5–5)
ALP SERPL-CCNC: 117 U/L (ref 46–116)
ALT SERPL W P-5'-P-CCNC: 44 U/L (ref 12–78)
ANION GAP SERPL CALCULATED.3IONS-SCNC: 10 MMOL/L (ref 4–13)
ANION GAP SERPL CALCULATED.3IONS-SCNC: 12 MMOL/L (ref 4–13)
APTT PPP: 32 SECONDS (ref 23–37)
APTT PPP: 65 SECONDS (ref 23–37)
APTT PPP: 72 SECONDS (ref 23–37)
AST SERPL W P-5'-P-CCNC: 41 U/L (ref 5–45)
ATRIAL RATE: 65 BPM
ATRIAL RATE: 69 BPM
BACTERIA UR QL AUTO: ABNORMAL /HPF
BASOPHILS # BLD AUTO: 0.03 THOUSANDS/ΜL (ref 0–0.1)
BASOPHILS NFR BLD AUTO: 1 % (ref 0–1)
BILIRUB SERPL-MCNC: 1.1 MG/DL (ref 0.2–1)
BILIRUB UR QL STRIP: NEGATIVE
BUN SERPL-MCNC: 9 MG/DL (ref 5–25)
BUN SERPL-MCNC: 9 MG/DL (ref 5–25)
CALCIUM SERPL-MCNC: 8.6 MG/DL (ref 8.3–10.1)
CALCIUM SERPL-MCNC: 8.8 MG/DL (ref 8.3–10.1)
CHLORIDE SERPL-SCNC: 105 MMOL/L (ref 100–108)
CHLORIDE SERPL-SCNC: 106 MMOL/L (ref 100–108)
CLARITY UR: CLEAR
CO2 SERPL-SCNC: 25 MMOL/L (ref 21–32)
CO2 SERPL-SCNC: 27 MMOL/L (ref 21–32)
COLOR UR: YELLOW
CREAT SERPL-MCNC: 1.05 MG/DL (ref 0.6–1.3)
CREAT SERPL-MCNC: 1.27 MG/DL (ref 0.6–1.3)
D DIMER PPP FEU-MCNC: 8.01 UG/ML FEU
EOSINOPHIL # BLD AUTO: 0.1 THOUSAND/ΜL (ref 0–0.61)
EOSINOPHIL NFR BLD AUTO: 2 % (ref 0–6)
ERYTHROCYTE [DISTWIDTH] IN BLOOD BY AUTOMATED COUNT: 13.9 % (ref 11.6–15.1)
ERYTHROCYTE [DISTWIDTH] IN BLOOD BY AUTOMATED COUNT: 13.9 % (ref 11.6–15.1)
GFR SERPL CREATININE-BSD FRML MDRD: 54 ML/MIN/1.73SQ M
GFR SERPL CREATININE-BSD FRML MDRD: 68 ML/MIN/1.73SQ M
GLUCOSE SERPL-MCNC: 110 MG/DL (ref 65–140)
GLUCOSE SERPL-MCNC: 126 MG/DL (ref 65–140)
GLUCOSE UR STRIP-MCNC: NEGATIVE MG/DL
HCT VFR BLD AUTO: 48.8 % (ref 36.5–49.3)
HCT VFR BLD AUTO: 50.5 % (ref 36.5–49.3)
HGB BLD-MCNC: 16.4 G/DL (ref 12–17)
HGB BLD-MCNC: 16.9 G/DL (ref 12–17)
HGB UR QL STRIP.AUTO: NEGATIVE
IMM GRANULOCYTES # BLD AUTO: 0.02 THOUSAND/UL (ref 0–0.2)
IMM GRANULOCYTES NFR BLD AUTO: 0 % (ref 0–2)
INR PPP: 0.92 (ref 0.84–1.19)
KETONES UR STRIP-MCNC: NEGATIVE MG/DL
LEUKOCYTE ESTERASE UR QL STRIP: ABNORMAL
LYMPHOCYTES # BLD AUTO: 1.74 THOUSANDS/ΜL (ref 0.6–4.47)
LYMPHOCYTES NFR BLD AUTO: 31 % (ref 14–44)
MAGNESIUM SERPL-MCNC: 1.8 MG/DL (ref 1.6–2.6)
MCH RBC QN AUTO: 33.4 PG (ref 26.8–34.3)
MCH RBC QN AUTO: 33.5 PG (ref 26.8–34.3)
MCHC RBC AUTO-ENTMCNC: 33.5 G/DL (ref 31.4–37.4)
MCHC RBC AUTO-ENTMCNC: 33.6 G/DL (ref 31.4–37.4)
MCV RBC AUTO: 100 FL (ref 82–98)
MCV RBC AUTO: 99 FL (ref 82–98)
MONOCYTES # BLD AUTO: 0.55 THOUSAND/ΜL (ref 0.17–1.22)
MONOCYTES NFR BLD AUTO: 10 % (ref 4–12)
NEUTROPHILS # BLD AUTO: 3.15 THOUSANDS/ΜL (ref 1.85–7.62)
NEUTS SEG NFR BLD AUTO: 56 % (ref 43–75)
NITRITE UR QL STRIP: NEGATIVE
NON-SQ EPI CELLS URNS QL MICRO: ABNORMAL /HPF
NRBC BLD AUTO-RTO: 0 /100 WBCS
P AXIS: 56 DEGREES
P AXIS: 64 DEGREES
PH UR STRIP.AUTO: 8 [PH]
PHOSPHATE SERPL-MCNC: 3.1 MG/DL (ref 2.3–4.1)
PLATELET # BLD AUTO: 100 THOUSANDS/UL (ref 149–390)
PLATELET # BLD AUTO: 103 THOUSANDS/UL (ref 149–390)
PMV BLD AUTO: 10.2 FL (ref 8.9–12.7)
PMV BLD AUTO: 9.7 FL (ref 8.9–12.7)
POTASSIUM SERPL-SCNC: 3.6 MMOL/L (ref 3.5–5.3)
POTASSIUM SERPL-SCNC: 4.4 MMOL/L (ref 3.5–5.3)
PR INTERVAL: 228 MS
PR INTERVAL: 246 MS
PROT SERPL-MCNC: 6.7 G/DL (ref 6.4–8.2)
PROT UR STRIP-MCNC: NEGATIVE MG/DL
PROTHROMBIN TIME: 12.4 SECONDS (ref 11.6–14.5)
QRS AXIS: 58 DEGREES
QRS AXIS: 74 DEGREES
QRSD INTERVAL: 80 MS
QRSD INTERVAL: 92 MS
QT INTERVAL: 434 MS
QT INTERVAL: 448 MS
QTC INTERVAL: 465 MS
QTC INTERVAL: 465 MS
RBC # BLD AUTO: 4.91 MILLION/UL (ref 3.88–5.62)
RBC # BLD AUTO: 5.04 MILLION/UL (ref 3.88–5.62)
RBC #/AREA URNS AUTO: ABNORMAL /HPF
SODIUM SERPL-SCNC: 141 MMOL/L (ref 136–145)
SODIUM SERPL-SCNC: 144 MMOL/L (ref 136–145)
SP GR UR STRIP.AUTO: 1.01 (ref 1–1.03)
T WAVE AXIS: 65 DEGREES
T WAVE AXIS: 74 DEGREES
TROPONIN I SERPL-MCNC: 14.7 NG/ML
TROPONIN I SERPL-MCNC: 24.75 NG/ML
TROPONIN I SERPL-MCNC: 26.07 NG/ML
TROPONIN I SERPL-MCNC: <0.02 NG/ML
TROPONIN I SERPL-MCNC: >40 NG/ML
TROPONIN I SERPL-MCNC: >40 NG/ML
UROBILINOGEN UR QL STRIP.AUTO: 1 E.U./DL
VENTRICULAR RATE: 65 BPM
VENTRICULAR RATE: 69 BPM
WBC # BLD AUTO: 5.59 THOUSAND/UL (ref 4.31–10.16)
WBC # BLD AUTO: 5.89 THOUSAND/UL (ref 4.31–10.16)
WBC #/AREA URNS AUTO: ABNORMAL /HPF

## 2020-02-18 PROCEDURE — 84484 ASSAY OF TROPONIN QUANT: CPT | Performed by: EMERGENCY MEDICINE

## 2020-02-18 PROCEDURE — 93306 TTE W/DOPPLER COMPLETE: CPT | Performed by: INTERNAL MEDICINE

## 2020-02-18 PROCEDURE — 99223 1ST HOSP IP/OBS HIGH 75: CPT | Performed by: INTERNAL MEDICINE

## 2020-02-18 PROCEDURE — 84100 ASSAY OF PHOSPHORUS: CPT | Performed by: NURSE PRACTITIONER

## 2020-02-18 PROCEDURE — 85379 FIBRIN DEGRADATION QUANT: CPT | Performed by: EMERGENCY MEDICINE

## 2020-02-18 PROCEDURE — 99215 OFFICE O/P EST HI 40 MIN: CPT | Performed by: INTERNAL MEDICINE

## 2020-02-18 PROCEDURE — 71275 CT ANGIOGRAPHY CHEST: CPT

## 2020-02-18 PROCEDURE — 93970 EXTREMITY STUDY: CPT

## 2020-02-18 PROCEDURE — 85730 THROMBOPLASTIN TIME PARTIAL: CPT | Performed by: EMERGENCY MEDICINE

## 2020-02-18 PROCEDURE — 84484 ASSAY OF TROPONIN QUANT: CPT | Performed by: INTERNAL MEDICINE

## 2020-02-18 PROCEDURE — 96360 HYDRATION IV INFUSION INIT: CPT

## 2020-02-18 PROCEDURE — 80048 BASIC METABOLIC PNL TOTAL CA: CPT | Performed by: NURSE PRACTITIONER

## 2020-02-18 PROCEDURE — 85025 COMPLETE CBC W/AUTO DIFF WBC: CPT | Performed by: EMERGENCY MEDICINE

## 2020-02-18 PROCEDURE — 85730 THROMBOPLASTIN TIME PARTIAL: CPT | Performed by: INTERNAL MEDICINE

## 2020-02-18 PROCEDURE — 83735 ASSAY OF MAGNESIUM: CPT | Performed by: NURSE PRACTITIONER

## 2020-02-18 PROCEDURE — 99235 HOSP IP/OBS SAME DATE MOD 70: CPT | Performed by: INTERNAL MEDICINE

## 2020-02-18 PROCEDURE — 93010 ELECTROCARDIOGRAM REPORT: CPT | Performed by: INTERNAL MEDICINE

## 2020-02-18 PROCEDURE — 87086 URINE CULTURE/COLONY COUNT: CPT | Performed by: INTERNAL MEDICINE

## 2020-02-18 PROCEDURE — 36415 COLL VENOUS BLD VENIPUNCTURE: CPT | Performed by: EMERGENCY MEDICINE

## 2020-02-18 PROCEDURE — 99285 EMERGENCY DEPT VISIT HI MDM: CPT | Performed by: EMERGENCY MEDICINE

## 2020-02-18 PROCEDURE — 85610 PROTHROMBIN TIME: CPT | Performed by: EMERGENCY MEDICINE

## 2020-02-18 PROCEDURE — 93005 ELECTROCARDIOGRAM TRACING: CPT

## 2020-02-18 PROCEDURE — 85027 COMPLETE CBC AUTOMATED: CPT | Performed by: NURSE PRACTITIONER

## 2020-02-18 PROCEDURE — 80053 COMPREHEN METABOLIC PANEL: CPT | Performed by: EMERGENCY MEDICINE

## 2020-02-18 PROCEDURE — 81001 URINALYSIS AUTO W/SCOPE: CPT | Performed by: EMERGENCY MEDICINE

## 2020-02-18 PROCEDURE — 93306 TTE W/DOPPLER COMPLETE: CPT

## 2020-02-18 PROCEDURE — 99285 EMERGENCY DEPT VISIT HI MDM: CPT

## 2020-02-18 PROCEDURE — 74177 CT ABD & PELVIS W/CONTRAST: CPT

## 2020-02-18 PROCEDURE — 84484 ASSAY OF TROPONIN QUANT: CPT | Performed by: NURSE PRACTITIONER

## 2020-02-18 RX ORDER — HEPARIN SODIUM 1000 [USP'U]/ML
4000 INJECTION, SOLUTION INTRAVENOUS; SUBCUTANEOUS AS NEEDED
Status: CANCELLED | OUTPATIENT
Start: 2020-02-18

## 2020-02-18 RX ORDER — ASPIRIN 81 MG/1
81 TABLET, CHEWABLE ORAL DAILY
Status: CANCELLED | OUTPATIENT
Start: 2020-02-19

## 2020-02-18 RX ORDER — CLOPIDOGREL BISULFATE 75 MG/1
600 TABLET ORAL DAILY
Status: COMPLETED | OUTPATIENT
Start: 2020-02-19 | End: 2020-02-19

## 2020-02-18 RX ORDER — SODIUM CHLORIDE 9 MG/ML
75 INJECTION, SOLUTION INTRAVENOUS CONTINUOUS
Status: DISCONTINUED | OUTPATIENT
Start: 2020-02-18 | End: 2020-02-18 | Stop reason: HOSPADM

## 2020-02-18 RX ORDER — TAMSULOSIN HYDROCHLORIDE 0.4 MG/1
0.4 CAPSULE ORAL DAILY
Status: DISCONTINUED | OUTPATIENT
Start: 2020-02-18 | End: 2020-02-18 | Stop reason: HOSPADM

## 2020-02-18 RX ORDER — HEPARIN SODIUM 1000 [USP'U]/ML
2000 INJECTION, SOLUTION INTRAVENOUS; SUBCUTANEOUS AS NEEDED
Status: CANCELLED | OUTPATIENT
Start: 2020-02-18

## 2020-02-18 RX ORDER — ATENOLOL 25 MG/1
25 TABLET ORAL
Status: DISCONTINUED | OUTPATIENT
Start: 2020-02-18 | End: 2020-02-18 | Stop reason: HOSPADM

## 2020-02-18 RX ORDER — MONTELUKAST SODIUM 10 MG/1
10 TABLET ORAL DAILY
Status: DISCONTINUED | OUTPATIENT
Start: 2020-02-19 | End: 2020-02-20 | Stop reason: HOSPADM

## 2020-02-18 RX ORDER — ATORVASTATIN CALCIUM 40 MG/1
40 TABLET, FILM COATED ORAL
Status: DISCONTINUED | OUTPATIENT
Start: 2020-02-18 | End: 2020-02-18 | Stop reason: HOSPADM

## 2020-02-18 RX ORDER — NITROGLYCERIN 0.4 MG/1
0.4 TABLET SUBLINGUAL
Status: DISCONTINUED | OUTPATIENT
Start: 2020-02-18 | End: 2020-02-20 | Stop reason: HOSPADM

## 2020-02-18 RX ORDER — CLOPIDOGREL BISULFATE 75 MG/1
600 TABLET ORAL DAILY
Status: CANCELLED | OUTPATIENT
Start: 2020-02-18 | End: 2020-02-19

## 2020-02-18 RX ORDER — PANTOPRAZOLE SODIUM 40 MG/1
40 TABLET, DELAYED RELEASE ORAL DAILY
Status: CANCELLED | OUTPATIENT
Start: 2020-02-19

## 2020-02-18 RX ORDER — MAGNESIUM HYDROXIDE/ALUMINUM HYDROXICE/SIMETHICONE 120; 1200; 1200 MG/30ML; MG/30ML; MG/30ML
30 SUSPENSION ORAL ONCE
Status: COMPLETED | OUTPATIENT
Start: 2020-02-18 | End: 2020-02-18

## 2020-02-18 RX ORDER — HEPARIN SODIUM 10000 [USP'U]/100ML
3-20 INJECTION, SOLUTION INTRAVENOUS
Status: CANCELLED | OUTPATIENT
Start: 2020-02-18

## 2020-02-18 RX ORDER — HEPARIN SODIUM 1000 [USP'U]/ML
2000 INJECTION, SOLUTION INTRAVENOUS; SUBCUTANEOUS AS NEEDED
Status: DISCONTINUED | OUTPATIENT
Start: 2020-02-18 | End: 2020-02-19

## 2020-02-18 RX ORDER — PANTOPRAZOLE SODIUM 40 MG/1
40 TABLET, DELAYED RELEASE ORAL DAILY
Status: DISCONTINUED | OUTPATIENT
Start: 2020-02-18 | End: 2020-02-18 | Stop reason: HOSPADM

## 2020-02-18 RX ORDER — FLUTICASONE FUROATE AND VILANTEROL 100; 25 UG/1; UG/1
1 POWDER RESPIRATORY (INHALATION)
Status: DISCONTINUED | OUTPATIENT
Start: 2020-02-18 | End: 2020-02-18 | Stop reason: HOSPADM

## 2020-02-18 RX ORDER — ATENOLOL 25 MG/1
25 TABLET ORAL
Status: CANCELLED | OUTPATIENT
Start: 2020-02-18

## 2020-02-18 RX ORDER — DONEPEZIL HYDROCHLORIDE 10 MG/1
10 TABLET, FILM COATED ORAL
Status: DISCONTINUED | OUTPATIENT
Start: 2020-02-18 | End: 2020-02-20 | Stop reason: HOSPADM

## 2020-02-18 RX ORDER — SODIUM CHLORIDE 9 MG/ML
75 INJECTION, SOLUTION INTRAVENOUS CONTINUOUS
Status: DISCONTINUED | OUTPATIENT
Start: 2020-02-18 | End: 2020-02-20

## 2020-02-18 RX ORDER — DONEPEZIL HYDROCHLORIDE 5 MG/1
10 TABLET, FILM COATED ORAL
Status: DISCONTINUED | OUTPATIENT
Start: 2020-02-18 | End: 2020-02-18 | Stop reason: HOSPADM

## 2020-02-18 RX ORDER — TAMSULOSIN HYDROCHLORIDE 0.4 MG/1
0.4 CAPSULE ORAL DAILY
Status: CANCELLED | OUTPATIENT
Start: 2020-02-19

## 2020-02-18 RX ORDER — MEMANTINE HYDROCHLORIDE 5 MG/1
5 TABLET ORAL 2 TIMES DAILY
Status: DISCONTINUED | OUTPATIENT
Start: 2020-02-18 | End: 2020-02-18 | Stop reason: HOSPADM

## 2020-02-18 RX ORDER — ESCITALOPRAM OXALATE 10 MG/1
10 TABLET ORAL DAILY
Status: CANCELLED | OUTPATIENT
Start: 2020-02-19

## 2020-02-18 RX ORDER — QUETIAPINE FUMARATE 25 MG/1
50 TABLET, FILM COATED ORAL
Status: DISCONTINUED | OUTPATIENT
Start: 2020-02-18 | End: 2020-02-18

## 2020-02-18 RX ORDER — FLUTICASONE FUROATE AND VILANTEROL 100; 25 UG/1; UG/1
1 POWDER RESPIRATORY (INHALATION)
Status: CANCELLED | OUTPATIENT
Start: 2020-02-19

## 2020-02-18 RX ORDER — MEMANTINE HYDROCHLORIDE 5 MG/1
5 TABLET ORAL 2 TIMES DAILY
Status: CANCELLED | OUTPATIENT
Start: 2020-02-18

## 2020-02-18 RX ORDER — ASPIRIN 81 MG/1
81 TABLET, CHEWABLE ORAL DAILY
Status: DISCONTINUED | OUTPATIENT
Start: 2020-02-18 | End: 2020-02-18 | Stop reason: HOSPADM

## 2020-02-18 RX ORDER — ASPIRIN 81 MG/1
81 TABLET, CHEWABLE ORAL DAILY
Status: DISCONTINUED | OUTPATIENT
Start: 2020-02-19 | End: 2020-02-20 | Stop reason: HOSPADM

## 2020-02-18 RX ORDER — SODIUM CHLORIDE 9 MG/ML
75 INJECTION, SOLUTION INTRAVENOUS CONTINUOUS
Status: CANCELLED | OUTPATIENT
Start: 2020-02-18

## 2020-02-18 RX ORDER — HEPARIN SODIUM 1000 [USP'U]/ML
4000 INJECTION, SOLUTION INTRAVENOUS; SUBCUTANEOUS AS NEEDED
Status: DISCONTINUED | OUTPATIENT
Start: 2020-02-18 | End: 2020-02-19

## 2020-02-18 RX ORDER — CLOPIDOGREL BISULFATE 75 MG/1
75 TABLET ORAL DAILY
Status: CANCELLED | OUTPATIENT
Start: 2020-02-19

## 2020-02-18 RX ORDER — ATORVASTATIN CALCIUM 40 MG/1
40 TABLET, FILM COATED ORAL
Status: DISCONTINUED | OUTPATIENT
Start: 2020-02-19 | End: 2020-02-20 | Stop reason: HOSPADM

## 2020-02-18 RX ORDER — LIDOCAINE HYDROCHLORIDE 20 MG/ML
15 SOLUTION OROPHARYNGEAL ONCE
Status: COMPLETED | OUTPATIENT
Start: 2020-02-18 | End: 2020-02-18

## 2020-02-18 RX ORDER — CLOPIDOGREL BISULFATE 75 MG/1
75 TABLET ORAL DAILY
Status: DISCONTINUED | OUTPATIENT
Start: 2020-02-19 | End: 2020-02-18 | Stop reason: HOSPADM

## 2020-02-18 RX ORDER — TAMSULOSIN HYDROCHLORIDE 0.4 MG/1
0.4 CAPSULE ORAL DAILY
Status: DISCONTINUED | OUTPATIENT
Start: 2020-02-19 | End: 2020-02-20 | Stop reason: HOSPADM

## 2020-02-18 RX ORDER — ESCITALOPRAM OXALATE 10 MG/1
10 TABLET ORAL DAILY
Status: DISCONTINUED | OUTPATIENT
Start: 2020-02-19 | End: 2020-02-20 | Stop reason: HOSPADM

## 2020-02-18 RX ORDER — DONEPEZIL HYDROCHLORIDE 5 MG/1
10 TABLET, FILM COATED ORAL
Status: CANCELLED | OUTPATIENT
Start: 2020-02-18

## 2020-02-18 RX ORDER — HEPARIN SODIUM 10000 [USP'U]/100ML
3-20 INJECTION, SOLUTION INTRAVENOUS
Status: DISCONTINUED | OUTPATIENT
Start: 2020-02-18 | End: 2020-02-18 | Stop reason: HOSPADM

## 2020-02-18 RX ORDER — MONTELUKAST SODIUM 10 MG/1
10 TABLET ORAL DAILY
Status: DISCONTINUED | OUTPATIENT
Start: 2020-02-18 | End: 2020-02-18 | Stop reason: HOSPADM

## 2020-02-18 RX ORDER — ATORVASTATIN CALCIUM 40 MG/1
40 TABLET, FILM COATED ORAL
Status: CANCELLED | OUTPATIENT
Start: 2020-02-18

## 2020-02-18 RX ORDER — HEPARIN SODIUM 1000 [USP'U]/ML
4000 INJECTION, SOLUTION INTRAVENOUS; SUBCUTANEOUS AS NEEDED
Status: DISCONTINUED | OUTPATIENT
Start: 2020-02-18 | End: 2020-02-18 | Stop reason: HOSPADM

## 2020-02-18 RX ORDER — ATENOLOL 25 MG/1
25 TABLET ORAL
Status: DISCONTINUED | OUTPATIENT
Start: 2020-02-18 | End: 2020-02-20 | Stop reason: HOSPADM

## 2020-02-18 RX ORDER — CLOPIDOGREL BISULFATE 75 MG/1
600 TABLET ORAL DAILY
Status: DISCONTINUED | OUTPATIENT
Start: 2020-02-18 | End: 2020-02-18

## 2020-02-18 RX ORDER — PANTOPRAZOLE SODIUM 40 MG/1
40 TABLET, DELAYED RELEASE ORAL
Status: DISCONTINUED | OUTPATIENT
Start: 2020-02-19 | End: 2020-02-20 | Stop reason: HOSPADM

## 2020-02-18 RX ORDER — FLUTICASONE FUROATE AND VILANTEROL 100; 25 UG/1; UG/1
1 POWDER RESPIRATORY (INHALATION)
Status: DISCONTINUED | OUTPATIENT
Start: 2020-02-19 | End: 2020-02-20 | Stop reason: HOSPADM

## 2020-02-18 RX ORDER — MEMANTINE HYDROCHLORIDE 5 MG/1
5 TABLET ORAL 2 TIMES DAILY
Status: DISCONTINUED | OUTPATIENT
Start: 2020-02-19 | End: 2020-02-20 | Stop reason: HOSPADM

## 2020-02-18 RX ORDER — HEPARIN SODIUM 1000 [USP'U]/ML
2000 INJECTION, SOLUTION INTRAVENOUS; SUBCUTANEOUS AS NEEDED
Status: DISCONTINUED | OUTPATIENT
Start: 2020-02-18 | End: 2020-02-18 | Stop reason: HOSPADM

## 2020-02-18 RX ORDER — CLOPIDOGREL BISULFATE 75 MG/1
75 TABLET ORAL DAILY
Status: DISCONTINUED | OUTPATIENT
Start: 2020-02-19 | End: 2020-02-20 | Stop reason: HOSPADM

## 2020-02-18 RX ORDER — ATORVASTATIN CALCIUM 10 MG/1
20 TABLET, FILM COATED ORAL
Status: DISCONTINUED | OUTPATIENT
Start: 2020-02-18 | End: 2020-02-18

## 2020-02-18 RX ORDER — ESCITALOPRAM OXALATE 10 MG/1
20 TABLET ORAL DAILY
Status: DISCONTINUED | OUTPATIENT
Start: 2020-02-18 | End: 2020-02-18 | Stop reason: HOSPADM

## 2020-02-18 RX ORDER — HEPARIN SODIUM 10000 [USP'U]/100ML
3-20 INJECTION, SOLUTION INTRAVENOUS
Status: DISCONTINUED | OUTPATIENT
Start: 2020-02-18 | End: 2020-02-19

## 2020-02-18 RX ORDER — MONTELUKAST SODIUM 10 MG/1
10 TABLET ORAL DAILY
Status: CANCELLED | OUTPATIENT
Start: 2020-02-19

## 2020-02-18 RX ADMIN — MEMANTINE 5 MG: 5 TABLET ORAL at 17:39

## 2020-02-18 RX ADMIN — ATORVASTATIN CALCIUM 40 MG: 40 TABLET, FILM COATED ORAL at 17:39

## 2020-02-18 RX ADMIN — TAMSULOSIN HYDROCHLORIDE 0.4 MG: 0.4 CAPSULE ORAL at 08:43

## 2020-02-18 RX ADMIN — SODIUM CHLORIDE 75 ML/HR: 0.9 INJECTION, SOLUTION INTRAVENOUS at 21:24

## 2020-02-18 RX ADMIN — SODIUM CHLORIDE 75 ML/HR: 0.9 INJECTION, SOLUTION INTRAVENOUS at 08:45

## 2020-02-18 RX ADMIN — ALUMINUM HYDROXIDE, MAGNESIUM HYDROXIDE, AND SIMETHICONE 30 ML: 200; 200; 20 SUSPENSION ORAL at 00:47

## 2020-02-18 RX ADMIN — ATENOLOL 25 MG: 25 TABLET ORAL at 21:24

## 2020-02-18 RX ADMIN — FLUTICASONE FUROATE AND VILANTEROL TRIFENATATE 1 PUFF: 100; 25 POWDER RESPIRATORY (INHALATION) at 08:45

## 2020-02-18 RX ADMIN — NITROGLYCERIN 0.5 INCH: 20 OINTMENT TOPICAL at 01:08

## 2020-02-18 RX ADMIN — IOHEXOL 100 ML: 350 INJECTION, SOLUTION INTRAVENOUS at 01:35

## 2020-02-18 RX ADMIN — ESCITALOPRAM OXALATE 20 MG: 10 TABLET ORAL at 08:43

## 2020-02-18 RX ADMIN — HEPARIN SODIUM AND DEXTROSE 12 UNITS/KG/HR: 10000; 5 INJECTION INTRAVENOUS at 08:21

## 2020-02-18 RX ADMIN — CLOPIDOGREL BISULFATE 600 MG: 75 TABLET ORAL at 10:25

## 2020-02-18 RX ADMIN — MEMANTINE 5 MG: 5 TABLET ORAL at 08:43

## 2020-02-18 RX ADMIN — MONTELUKAST 10 MG: 10 TABLET, FILM COATED ORAL at 08:43

## 2020-02-18 RX ADMIN — LIDOCAINE HYDROCHLORIDE 15 ML: 20 SOLUTION ORAL; TOPICAL at 00:48

## 2020-02-18 RX ADMIN — Medication 400 MG: at 08:49

## 2020-02-18 RX ADMIN — HEPARIN SODIUM AND DEXTROSE 12 UNITS/KG/HR: 10000; 5 INJECTION INTRAVENOUS at 21:24

## 2020-02-18 RX ADMIN — ASPIRIN 81 MG 81 MG: 81 TABLET ORAL at 08:43

## 2020-02-18 RX ADMIN — SODIUM CHLORIDE 500 ML: 0.9 INJECTION, SOLUTION INTRAVENOUS at 00:49

## 2020-02-18 RX ADMIN — TIOTROPIUM BROMIDE 18 MCG: 18 CAPSULE ORAL; RESPIRATORY (INHALATION) at 08:45

## 2020-02-18 RX ADMIN — PANTOPRAZOLE SODIUM 40 MG: 40 TABLET, DELAYED RELEASE ORAL at 08:43

## 2020-02-18 NOTE — PLAN OF CARE
Problem: Potential for Falls  Goal: Patient will remain free of falls  Description  INTERVENTIONS:  - Assess patient frequently for physical needs  -  Identify cognitive and physical deficits and behaviors that affect risk of falls    -  [place] fall precautions as indicated by assessment   - Educate patient/family on patient safety including physical limitations  - Instruct patient to call for assistance with activity based on assessment  - Modify environment to reduce risk of injury  - Consider OT/PT consult to assist with strengthening/mobility  Outcome: Progressing     Problem: CARDIOVASCULAR - ADULT  Goal: Maintains optimal cardiac output and hemodynamic stability  Description  INTERVENTIONS:  - Monitor I/O, vital signs and rhythm  - Monitor for S/S and trends of decreased cardiac output  - Administer and titrate ordered vasoactive medications to optimize hemodynamic stability  - Assess quality of pulses, skin color and temperature  - Assess for signs of decreased coronary artery perfusion  - Instruct patient to report change in severity of symptoms  Outcome: Progressing  Goal: Absence of cardiac dysrhythmias or at baseline rhythm  Description  INTERVENTIONS:  - Continuous cardiac monitoring, vital signs, obtain 12 lead EKG if ordered  - Administer antiarrhythmic and heart rate control medications as ordered  - Monitor electrolytes and administer replacement therapy as ordered  Outcome: Progressing     Problem: PAIN - ADULT  Goal: Verbalizes/displays adequate comfort level or baseline comfort level  Description  Interventions:  - Encourage patient to monitor pain and request assistance  - Assess pain using appropriate pain scale  - Administer analgesics based on type and severity of pain and evaluate response  - Implement non-pharmacological measures as appropriate and evaluate response  - Consider cultural and social influences on pain and pain management  - Notify physician/advanced practitioner if interventions unsuccessful or patient reports new pain  Outcome: Progressing     Problem: INFECTION - ADULT  Goal: Absence or prevention of progression during hospitalization  Description  INTERVENTIONS:  - Assess and monitor for signs and symptoms of infection  - Monitor lab/diagnostic results  - Monitor all insertion sites, i e  indwelling lines, tubes, and drains  - [place] appropriate cooling/warming therapies per order  - Administer medications as ordered  - Instruct and encourage patient and family to use good hand hygiene technique  - Identify and instruct in appropriate isolation precautions for identified infection/condition   Outcome: Progressing     Problem: SAFETY ADULT  Goal: Maintain or return to baseline ADL function  Description  INTERVENTIONS:  -  Assess patient's ability to carry out ADLs; assess patient's baseline for ADL function and identify physical deficits which impact ability to perform ADLs (bathing, care of mouth/teeth, toileting, grooming, dressing, etc )  - Assess/evaluate cause of self-care deficits   - Assess range of motion  - Assess patient's mobility; develop plan if impaired  - Assess patient's need for assistive devices and provide as appropriate  - Encourage maximum independence but intervene and supervise when necessary  - Involve family in performance of ADLs  - Assess for home care needs following discharge   - Consider OT consult to assist with ADL evaluation and planning for discharge  - Provide patient education as appropriate  Outcome: Progressing  Goal: Maintain or return mobility status to optimal level  Description  INTERVENTIONS:  - Assess patient's baseline mobility status (ambulation, transfers, stairs, etc )    - Identify cognitive and physical deficits and behaviors that affect mobility  - Identify mobility aids required to assist with transfers and/or ambulation (gait belt, sit-to-stand, lift, walker, cane, etc )  - [place] fall precautions as indicated by assessment  - Record patient progress and toleration of activity level on Mobility SBAR; progress patient to next Phase/Stage  - Instruct patient to call for assistance with activity based on assessment  - Consider rehabilitation consult to assist with strengthening/weightbearing, etc   Outcome: Progressing     Problem: DISCHARGE PLANNING  Goal: Discharge to home or other facility with appropriate resources  Description  INTERVENTIONS:  - Identify barriers to discharge w/patient and caregiver  - Arrange for needed discharge resources and transportation as appropriate  - Identify discharge learning needs (meds, wound care, etc )  - Refer to Case Management Department for coordinating discharge planning if the patient needs post-hospital services based on physician/advanced practitioner order or complex needs related to functional status, cognitive ability, or social support system   Outcome: Progressing     Problem: Knowledge Deficit  Goal: Patient/family/caregiver demonstrates understanding of disease process, treatment plan, medications, and discharge instructions  Description  Complete learning assessment and assess knowledge base    Interventions:  - Provide teaching at level of understanding  - Provide teaching via preferred learning methods  Outcome: Progressing

## 2020-02-18 NOTE — PLAN OF CARE
Problem: Potential for Falls  Goal: Patient will remain free of falls  Description  INTERVENTIONS:  - Assess patient frequently for physical needs  -  Identify cognitive and physical deficits and behaviors that affect risk of falls    -  [place] fall precautions as indicated by assessment   - Educate patient/family on patient safety including physical limitations  - Instruct patient to call for assistance with activity based on assessment  - Modify environment to reduce risk of injury  - Consider OT/PT consult to assist with strengthening/mobility  Outcome: Progressing     Problem: CARDIOVASCULAR - ADULT  Goal: Maintains optimal cardiac output and hemodynamic stability  Description  INTERVENTIONS:  - Monitor I/O, vital signs and rhythm  - Monitor for S/S and trends of decreased cardiac output  - Administer and titrate ordered vasoactive medications to optimize hemodynamic stability  - Assess quality of pulses, skin color and temperature  - Assess for signs of decreased coronary artery perfusion  - Instruct patient to report change in severity of symptoms  Outcome: Progressing  Goal: Absence of cardiac dysrhythmias or at baseline rhythm  Description  INTERVENTIONS:  - Continuous cardiac monitoring, vital signs, obtain 12 lead EKG if ordered  - Administer antiarrhythmic and heart rate control medications as ordered  - Monitor electrolytes and administer replacement therapy as ordered  Outcome: Progressing     Problem: PAIN - ADULT  Goal: Verbalizes/displays adequate comfort level or baseline comfort level  Description  Interventions:  - Encourage patient to monitor pain and request assistance  - Assess pain using appropriate pain scale  - Administer analgesics based on type and severity of pain and evaluate response  - Implement non-pharmacological measures as appropriate and evaluate response  - Consider cultural and social influences on pain and pain management  - Notify physician/advanced practitioner if interventions unsuccessful or patient reports new pain  Outcome: Progressing     Problem: INFECTION - ADULT  Goal: Absence or prevention of progression during hospitalization  Description  INTERVENTIONS:  - Assess and monitor for signs and symptoms of infection  - Monitor lab/diagnostic results  - Monitor all insertion sites, i e  indwelling lines, tubes, and drains  - [place] appropriate cooling/warming therapies per order  - Administer medications as ordered  - Instruct and encourage patient and family to use good hand hygiene technique  - Identify and instruct in appropriate isolation precautions for identified infection/condition   Outcome: Progressing     Problem: SAFETY ADULT  Goal: Maintain or return to baseline ADL function  Description  INTERVENTIONS:  -  Assess patient's ability to carry out ADLs; assess patient's baseline for ADL function and identify physical deficits which impact ability to perform ADLs (bathing, care of mouth/teeth, toileting, grooming, dressing, etc )  - Assess/evaluate cause of self-care deficits   - Assess range of motion  - Assess patient's mobility; develop plan if impaired  - Assess patient's need for assistive devices and provide as appropriate  - Encourage maximum independence but intervene and supervise when necessary  - Involve family in performance of ADLs  - Assess for home care needs following discharge   - Consider OT consult to assist with ADL evaluation and planning for discharge  - Provide patient education as appropriate  Outcome: Progressing  Goal: Maintain or return mobility status to optimal level  Description  INTERVENTIONS:  - Assess patient's baseline mobility status (ambulation, transfers, stairs, etc )    - Identify cognitive and physical deficits and behaviors that affect mobility  - Identify mobility aids required to assist with transfers and/or ambulation (gait belt, sit-to-stand, lift, walker, cane, etc )  - [place] fall precautions as indicated by assessment  - Record patient progress and toleration of activity level on Mobility SBAR; progress patient to next Phase/Stage  - Instruct patient to call for assistance with activity based on assessment  - Consider rehabilitation consult to assist with strengthening/weightbearing, etc   Outcome: Progressing     Problem: DISCHARGE PLANNING  Goal: Discharge to home or other facility with appropriate resources  Description  INTERVENTIONS:  - Identify barriers to discharge w/patient and caregiver  - Arrange for needed discharge resources and transportation as appropriate  - Identify discharge learning needs (meds, wound care, etc )  - Refer to Case Management Department for coordinating discharge planning if the patient needs post-hospital services based on physician/advanced practitioner order or complex needs related to functional status, cognitive ability, or social support system   Outcome: Progressing     Problem: Knowledge Deficit  Goal: Patient/family/caregiver demonstrates understanding of disease process, treatment plan, medications, and discharge instructions  Description  Complete learning assessment and assess knowledge base    Interventions:  - Provide teaching at level of understanding  - Provide teaching via preferred learning methods  Outcome: Progressing

## 2020-02-18 NOTE — H&P
H&P- María Li 1940, 70 y o  male MRN: [id number]    Unit/Bed#: RM06 Encounter: [id number]    Primary Care Provider: Jamal Chacon DO   Date and time admitted to hospital: 2/18/2020 12:37 AM        * Chest pain  Assessment & Plan  Chief complaint is chest pain-"Feels like someone is sitting on his chest  "  Was given nitro x2 and 325 of aspirin in ER  Admit to [facility] with telemetry  Monitor per protocol  Trend troponins with EKGs for minimum of 3  Initial troponin less than 0 02 EKG no signs of ischemia  Cardiology consult  Stress diet ordered  Echo ordered    COPD (chronic obstructive pulmonary disease) ([facility] [address] )  Assessment & Plan  No signs of acute exacerbation  Continue prior to admission inhalers  Respiratory protocol in place    Dementia of the Alzheimer's type, with late onset, with delirium ([facility] [address] )  Assessment & Plan  Continue prior to admission medications including Aricept Lexapro in the [place]  Provide supportive care    Thrombocytopenia ([facility] [address] )  Assessment & Plan  As evidence by platelet count of 284  Monitor CBC  Lovenox ordered for DVT prophylaxis    Abdominal aortic aneurysm [facility])  Assessment & Plan  CT from January 2020-"Unchanged appearance of the patient's infrarenal abdominal aortic aneurysm after endograft repair "    CTA of the chest-"Redemonstration of a fusiform abdominal aortic aneurysm which measures approximately 5 9 x 5 7 cm in diameter, similar to the prior, status post endovascular repair    No discrete evidence of endoleak  "    Hx of CABG  Assessment & Plan  History of CABG x3 in 2003  [facility]    GERD (gastroesophageal reflux disease)  Assessment & Plan  Continue prior to admission Protonix    Hyperlipidemia  Assessment & Plan  Continue prior to admission Lipitor            VTE Prophylaxis: Enoxaparin (Lovenox)  / sequential compression device   Code Status: FULL  POLST: POLST is not applicable to this patient    Anticipated Length of Stay: Patient will be admitted on an Observation basis with an anticipated length of stay of  Less than 2 midnights  Justification for Hospital Stay:  Chest pain    Total Time for Visit, including Counseling / Coordination of Care: 45 minutes  Greater than 50% of this total time spent on direct patient counseling and coordination of care  Chief Complaint:  Feels like someone is sitting on chest-chest pain    History of Present Illness:    Sulma Peterson is a 70 y o  male who presented to the emergency room for evaluation of chest pain-patient reports feels like someone sitting on chest   Patient has a significant past medical history including coronary bypass x3 in 2003, coronary artery disease, AAA with repair, carotid endarterectomy, pancreatitis, kidney stones, urinary retention, COPD, TIA, and Alzheimer's dementia  Presented to the emergency room with a chief complaint of chest pain  Patient received nitro x2 had mild relief with that also received aspirin 325  CTA chest was collected, images and labs were collected-see below  Patient be admitted for observation med surge telemetry  Review of Systems:    Review of Systems   Respiratory: Positive for shortness of breath  Cardiovascular: Positive for chest pain  Gastrointestinal: Positive for nausea  All other systems reviewed and are negative        Past Medical and Surgical History:     Past Medical History:   Diagnosis Date    AAA (abdominal aortic aneurysm) (HCC)     Acid reflux     Acute serous otitis media of left ear     recurrence not specified     Anesthesia     "always has mental changes /lingers and lingers after anesthesia"    Anxiety     Aortic aneurysm without rupture (HCC)     Arm bruise     right inner forearm "recent IV"    At risk for falls     BPH without urinary obstruction     Cancer (HCC)     skin CA on nose    CAP (community acquired pneumonia)     Cataracts, bilateral     Change in bowel function     Clubbing of fingers     Colon polyps     Common cold     Contusion of elbow, left     initial encounter     COPD (chronic obstructive pulmonary disease) (HCC)     Coronary artery disease     Cough     Dementia (HCC)     Depression     Dizziness     upon "standing quickly on occas"    Exercise counseling     Fatigue     Full dentures     "doesn't wear them"    Glaucoma screening     High cholesterol     History of abdominal aortic aneurysm (AAA) repair 08/2017    History of bacteremia     History of chronic obstructive lung disease     History of epistaxis     History of influenza vaccination     History of kidney stones     History of pneumonia     "many times" "at least 5 times" almost always goes to sepsis"    History of sepsis 10/2017    History of sinusitis     History of skin cancer     History of sleep apnea     History of transfusion     History of urinary tract infection     [place] (hard of hearing)     Hydronephrosis with obstructing calculus     Influenza vaccine needed     Jock itch     left/saw doctor 11/8 and started on antifungal cream    Kidney stones     Left hip pain     Need for pneumococcal vaccination     Need for prophylactic vaccination and inoculation against influenza     Other emphysema ([address] )     Pancreatitis, chronic ([address] )     pt and wife can't confirm 11/10/17    Pneumonia 10/26/2017    admitted [facility]    Pulmonary emphysema ([address] )     Screening for genitourinary condition     Screening for neurological condition     Sepsis ([address] )     due to unspecified organism     Short-term memory loss     Sleep apnea     does not use CPAP      Special screening examination for neoplasm of prostate     TIA involving carotid artery     "before carotid surgery"    Ulcer     stomach, "years ago"    Unsteady gait     Use of cane as ambulatory aid     sometimes    Vitamin D deficiency     Wears glasses        Past Surgical History:   Procedure Laterality Date    ABDOMINAL AORTIC ANEURYSM REPAIR  08/23/2017    ABDOMINAL AORTIC ANEURYSM REPAIR, ENDOVASCULAR      BACK SURGERY      spinal stenosis    CARDIAC SURGERY      CABG x3    CAROTID ENDARTARECTOMY Left 11/1996    CATARACT EXTRACTION Bilateral     CORONARY ARTERY BYPASS GRAFT  01/2003    x3    CYSTOSCOPY      with insertion of ureteral stent     CYSTOSCOPY      with ureteroscopy with lithotripsy     EXCISIONAL HEMORRHOIDECTOMY      EYE SURGERY Bilateral     "for a wrinkle" after cataract surgery    HERNIA REPAIR      umbilical    LITHOTRIPSY      renal    MOUTH SURGERY      full mouth extraction     MD COLONOSCOPY FLX DX W/COLLJ SPEC WHEN PFRMD N/A 5/16/2017    Procedure: COLONOSCOPY with polypectomies/ hot snare and tattoo;  Surgeon: Sandip Huertas MD;  [facility]; Service: Gastroenterology    MD CYSTOURETHROSCOPY N/A 11/30/2017    Procedure: Evangelist Schneider;  Surgeon: Himanshu Jean-Baptiste MD;  [facility];  Service: Urology    MD ESOPHAGOGASTRODUODENOSCOPY TRANSORAL DIAGNOSTIC N/A 8/18/2016    Procedure: ESOPHAGOGASTRODUODENOSCOPY (EGD); Surgeon: Sandip Huertas MD;  [facility]: [facility]; Service: Gastroenterology    MD REMOVE BLADDER STONE,<2 5 CM N/A 11/30/2017    Procedure: Jae Peterson;  Surgeon: Himanshu Jean-Baptiste MD;  [facility];  Service: Urology    SKIN BIOPSY      TONSILLECTOMY      URETERAL STENT PLACEMENT      and removal       Meds/Allergies:    Prior to Admission medications    Medication Sig Start Date End Date Taking?  Authorizing Provider   acetaminophen (TYLENOL) 500 mg tablet Take 650 mg by mouth every 6 (six) hours as needed for headaches  7/10/18   Historical Provider, MD   aspirin 81 MG tablet Take 81 mg by mouth daily Took within 24 hours     Historical Provider, MD   atenolol (TENORMIN) 25 mg tablet TAKE 1 TABLET AT BEDTIME 10/4/19   Juan Marquez MD   atorvastatin (LIPITOR) 20 mg tablet Take 1 tablet (20 mg total) by mouth daily at bedtime 7/15/19   Mark Storm, DO   Bacillus Coagulans-Inulin (PROBIOTIC FORMULA) 1-250 BILLION-MG CAPS Take 1 capsule by mouth daily Record states dose is currently 4 billion    Historical Provider, MD   Cholecalciferol (VITAMIN D-3 PO) Take 2,000 Units by mouth daily  Historical Provider, MD   cyanocobalamin (VITAMIN B-12) 1,000 mcg tablet Take 1,000 mcg by mouth 3 (three) times a week [facility]    Historical Provider, MD   donepezil (ARICEPT) 10 mg tablet TAKE 1 TABLET DAILY AT     BEDTIME 2/11/20   Lorna Alcantara, DO   escitalopram (LEXAPRO) 20 mg tablet TAKE 1 TABLET DAILY 2/10/20   Lorna Alcantara, DO   Fluticasone-Salmeterol (AIRDUO RESPICLICK 67/84) 88-26 MCG/ACT AEPB Inhale 1 puff 2 (two) times a day AM & PM    Historical Provider, MD   KRILL OIL PO Take 500 mg by mouth daily  Historical Provider, MD   Melatonin 10 MG TABS Take 2 tablets by mouth daily at bedtime Taking 15mg    Historical Provider, MD   memantine (NAMENDA) 5 mg tablet Take 5 mg by mouth 2 (two) times a day In the evening     Historical Provider, MD   montelukast (SINGULAIR) 10 mg tablet Take 1 tablet (10 mg total) by mouth daily 7/12/19   Lorna Alcantara, DO   Multiple Vitamins-Minerals (CENTRUM SILVER ADULT 50+) TABS Take 1 tablet by mouth daily    Historical Provider, MD   pantoprazole (PROTONIX) 40 mg tablet Take 1 tablet (40 mg total) by mouth daily 1/16/20   Lorna Alcantara,    Potassium Citrate ER 15 MEQ (1620 MG) TBCR Take 1 tablet by mouth 2 (two) times a day for 90 days 6/6/19 2/10/20  Leonel Guevara PA-C   QUEtiapine (SEROquel) 50 mg tablet TAKE 1 TABLET DAILY AT     BEDTIME 2/10/20   Lorna Alcantara, DO   tamsulosin [facility]) 0 4 mg Take 1 capsule (0 4 mg total) by mouth daily for 90 days 6/6/19 2/10/20  Leonel Guevara PA-C   tiotropium (SPIRIVA HANDIHALER) 18 mcg inhalation capsule Place 18 mcg into inhaler and inhale daily  Historical Provider, MD     I have reviewed home medications using allscripts      Allergies: Allergies   Allergen Reactions    Augmentin [Amoxicillin-Pot Clavulanate] Diarrhea    Ciprofloxacin Hives    Morphine And Related Other (See Comments)     Change in mental status    Levofloxacin      Headache    Wellbutrin [Bupropion]        Social History:     Marital Status: /Civil Union   Occupation:  Retired  Patient Pre-hospital Living Situation:  And known  Patient Pre-hospital Level of Mobility:  Limited  Patient Pre-hospital Diet Restrictions:  Denies  Substance Use History:   Social History     Substance and Sexual Activity   Alcohol Use Not Currently    Alcohol/week: 0 0 standard drinks    Frequency: Never    Drinks per session: 1 or 2    Binge frequency: Never    Comment: quit 25 yrs ago     Social History     Tobacco Use   Smoking Status Former Smoker    Packs/day: 1 50    Years: 60 00    Pack years: 90 00    Last attempt to quit:     Years since quittin 1   Smokeless Tobacco Never Used   Tobacco Comment    quit 3 yrs ago, used to be a 1-1 5 ppd smoker     Social History     Substance and Sexual Activity   Drug Use No    Comment: No illicit drug use        Family History:    Family History   Problem Relation Age of Onset    Diabetes type II Mother         mellitus    Kidney failure Father     Diabetes type II Maternal Grandmother         mellitus     Hypertension Paternal Grandmother         benign essential        Physical Exam:     Vitals:   Blood Pressure: 125/77 (20)  Pulse: 69 (20)  Temperature: (!) 97 4 °F (36 3 °C) (20)  Temp Source: Temporal (20)  Respirations: 17 (20)  Height: 5' 9" (175 3 cm) (20)  Weight - Scale: 83 kg (182 lb 15 7 oz) (20)  SpO2: 91 % (20)    Physical Exam   Constitutional: He is oriented to person, place, and time  He appears well-developed and well-nourished  HENT:   Head: Normocephalic and atraumatic     Eyes: Pupils are equal, round, and reactive to light  EOM are normal    Neck: Normal range of motion  Neck supple  Cardiovascular: Normal rate, regular rhythm, normal heart sounds and intact distal pulses  Pulmonary/Chest: Effort normal and breath sounds normal    Abdominal: Soft  Bowel sounds are normal  He exhibits no distension  There is no tenderness  Musculoskeletal: He exhibits no edema, tenderness or deformity  Neurological: He is alert and oriented to person, place, and time  No cranial nerve deficit  GCS eye subscore is 4  GCS verbal subscore is 5  GCS motor subscore is 6  Skin: Skin is warm and dry  Capillary refill takes less than 2 seconds  No rash noted  No erythema  No pallor  Psychiatric: He has a normal mood and affect  His behavior is normal  Thought content normal      Additional Data:     Lab Results: I have personally reviewed pertinent reports  Results from last 7 days   Lab Units 02/18/20  0041   WBC Thousand/uL 5 59   HEMOGLOBIN g/dL 16 9   HEMATOCRIT % 50 5*   PLATELETS Thousands/uL 103*   NEUTROS PCT % 56   LYMPHS PCT % 31   MONOS PCT % 10   EOS PCT % 2     Results from last 7 days   Lab Units 02/18/20  0041   POTASSIUM mmol/L 3 6   CHLORIDE mmol/L 105   CO2 mmol/L 27   BUN mg/dL 9   CREATININE mg/dL 1 27   CALCIUM mg/dL 8 6   ALK PHOS U/L 117*   ALT U/L 44   AST U/L 41     Results from last 7 days   Lab Units 02/18/20  0041   INR  0 92       Imaging: I have personally reviewed pertinent reports  Pe Study With Ct Abdomen And Pelvis With Contrast    Result Date: 2/18/2020  Narrative: CT PULMONARY ANGIOGRAM OF THE CHEST AND CT ABDOMEN AND PELVIS WITH INTRAVENOUS CONTRAST INDICATION:   Abdominal pain, unspecified PE suspected, high pretest prob Elevated D-dimer  COMPARISON:  CT abdomen and pelvis dated 1/3/2020, CTA chest dated 7/2/2019 TECHNIQUE:  CT examination of the chest, abdomen and pelvis was performed    Thin section CT angiographic technique was used in the chest in order to evaluate for pulmonary embolus and coronal 3D MIP postprocessing was performed on the acquisition scanner  Axial, sagittal, and coronal 2D reformatted images were created from the source data and submitted for interpretation  Radiation dose length product (DLP) for this visit:  883 17 mGy-cm   This examination, like all CT scans performed in the [facility], was performed utilizing techniques to minimize radiation dose exposure, including the use of iterative  reconstruction and automated exposure control  IV Contrast:  100 mL of iohexol (OMNIPAQUE) Enteric Contrast:  Enteric contrast was not administered  FINDINGS: CHEST PULMONARY ARTERIAL TREE:  No pulmonary embolus is seen  LUNGS:  Extensive bilateral pulmonary emphysematous changes  Some suspected focal scarring is redemonstrated in the right upper lobe, similar to the prior  There is redemonstration of a 11 to 12 mm pulmonary nodule in the posterior right lower lobe (axial image 143, series 2), similar to the prior  Mild atelectasis/scarring suspected dependently and in the bilateral lower lung fields, lungs otherwise appear grossly clear  PLEURA:  Paraseptal emphysematous changes, most prominent in the upper lobes HEART/AORTA:  Moderate aortic and moderate to severe coronary atherosclerosis  Status post CABG  No thoracic aortic aneurysm  MEDIASTINUM AND DAVID:  Increased AP diameter of the trachea, probably related to COPD  Otherwise grossly unremarkable CHEST WALL AND LOWER NECK:   Median sternotomy changes, otherwise grossly unremarkable ABDOMEN LIVER/BILIARY TREE:  Unremarkable  GALLBLADDER:  Normal caliber, no discrete stones or gallbladder inflammation is seen  SPLEEN:  Unremarkable  PANCREAS:  Unremarkable  ADRENAL GLANDS:  Unremarkable  KIDNEYS/URETERS:  Bilateral nonobstructing stones, largest measures approximately 4 mm in size in the lower pole of the left kidney  Bilateral renal cortical scarring is noted  No discrete suspicious appearing renal lesion is seen  No hydronephrosis  Circumaortic left renal vein incidentally noted  STOMACH AND BOWEL:  Scattered colonic diverticulosis  No discrete evidence of acute diverticulitis  No discrete evidence of bowel obstruction  Otherwise grossly unremarkable  APPENDIX:  No findings to suggest appendicitis  ABDOMINOPELVIC CAVITY:  No ascites or free intraperitoneal air  No lymphadenopathy  VESSELS:  Redemonstration of a fusiform abdominal aortic aneurysm which measures approximately 5 9 x 5 7 cm in diameter, similar to the prior, status post endovascular repair  No discrete evidence of endoleak  PELVIS REPRODUCTIVE ORGANS:  Enlarged and nodular prostate with prostate volume estimated at 62 mL  URINARY BLADDER:  Bladder is partially distended  Mild circumferential bladder wall thickening noted, possibly exaggerated by underdistention  Otherwise grossly unremarkable  ABDOMINAL WALL/INGUINAL REGIONS:  Grossly unremarkable OSSEOUS STRUCTURES: Mild degenerative changes of the bilateral hips  No acute fracture or destructive osseous lesion  Impression: No pulmonary embolus is seen  No acute process seen in the chest  Extensive bilateral pulmonary emphysematous changes  Some suspected focal scarring is redemonstrated in the right upper lobe, similar to the prior  There is redemonstration of a 11 to 12 mm pulmonary nodule in the posterior right lower lobe (axial image 143, series 2), similar to the prior  Moderate aortic and moderate to severe coronary atherosclerosis  Status post CABG  No thoracic aortic aneurysm  Partially distended bladder  Mild circumferential bladder wall thickening noted, possibly exaggerated by underdistention  Consider a superimposed cystitis in the appropriate clinical setting    No acute process seen in the abdomen/pelvis, otherwise Redemonstration of a fusiform abdominal aortic aneurysm which measures approximately 5 9 x 5 7 cm in diameter, similar to the prior, status post endovascular repair  No discrete evidence of endoleak  Renal cortical scarring with bilateral nephrolithiasis, no hydronephrosis  Colonic diverticulosis without evidence of acute diverticulitis, enlarged prostate, and other findings as above  Workstation performed: [id number]       EKG, Pathology, and Other Studies Reviewed on Admission:   · EKG:  Reviewed no ST changes    Epic / Care Everywhere Records Reviewed: Yes     ** Please Note: This note has been constructed using a voice recognition system   **

## 2020-02-18 NOTE — EMTALA/ACUTE CARE TRANSFER
[facility]  [address] [facility] [address]  Dept: [phone]      ACUTE CARE TRANSFER CONSENT    NAME Alberto Garcia                                         1940                              MRN [id number]    I have been informed of my rights regarding examination, treatment, and transfer   by Dr ARNEL DEE [facility], DO    Benefits:  Cardiac catheterization for Type 1 myocardial infarction    Risks:  Ambulance transportation risk      Consent for Transfer:  I acknowledge that my medical condition has been evaluated and explained to me by the treating physician or other qualified medical person and/or my attending physician, who has recommended that I be transferred to the service of Dr Aldon [facility]) at [address] [facility]  The above potential benefits of such transfer, the potential risks associated with such transfer, and the probable risks of not being transferred have been explained to me, and I fully understand them  The doctor has explained that, in my case, the benefits of transfer outweigh the risks  I agree to be transferred  I authorize the performance of emergency medical procedures and treatments upon me in both transit and upon arrival at the receiving facility  Additionally, I authorize the release of any and all medical records to the receiving facility and request they be transported with me, if possible  I understand that the safest mode of transportation during a medical emergency is an ambulance and that the Hospital advocates the use of this mode of transport  Risks of traveling to the receiving facility by car, including absence of medical control, life sustaining equipment, such as oxygen, and medical personnel has been explained to me and I fully understand them      (STEFANIE CORRECT BOX BELOW)  [  ]  I consent to the stated transfer and to be transported by ambulance/helicopter  [  ]  I consent to the stated transfer, but refuse transportation by ambulance and accept full responsibility for my transportation by car  I understand the risks of non-ambulance transfers and I exonerate the Hospital and its staff from any deterioration in my condition that results from this refusal     X___________________________________________    DATE  20  TIME________  Signature of patient or legally responsible individual signing on patient behalf           RELATIONSHIP TO PATIENT_________________________          Provider Certification    NAME Trent Lovett                                        [facility] 1940                              MRN [id number]    A medical screening exam was performed on the above named patient  Based on the examination:    Condition Necessitating Transfer:  Type 1 myocardial infarction requiring cardiac catheterization    Patient Condition:  Hemodynamically stable    Reason for Transfer:  Type 1 myocardial infarction requiring cardiac catheterization     Transfer Requirements: Facility-[address] campus on the service of Dr Vallejo Standard [facility] Attending Physician)  · Space available and qualified personnel available for treatment as acknowledged by    · Agreed to accept transfer and to provide appropriate medical treatment as acknowledged by          · Appropriate medical records of the examination and treatment of the patient are provided at the time of transfer   [address] _______  · Transfer will be performed by qualified personnel from    and appropriate transfer equipment as required, including the use of necessary and appropriate life support measures      Provider Certification: I have examined the patient and explained the following risks and benefits of being transferred/refusing transfer to the patient/family:         Based on these reasonable risks and benefits to the patient and/or the unborn child(christal), and based upon the information available at the time of the patients examination, I certify that the medical benefits reasonably to be expected from the provision of appropriate medical treatments at another medical facility outweigh the increasing risks, if any, to the individuals medical condition, and in the case of labor to the unborn child, from effecting the transfer      X____________________________________________ DATE 02/18/20        TIME_______      ORIGINAL - SEND TO MEDICAL RECORDS   COPY - SEND WITH PATIENT DURING TRANSFER

## 2020-02-18 NOTE — ASSESSMENT & PLAN NOTE
· Increase statin to high-intensity statin therapy with atorvastatin 40 mg PO Qdaily with dinner with CAD and a type 1 myocardial infarction

## 2020-02-18 NOTE — ED PROVIDER NOTES
History  Chief Complaint   Patient presents with    Chest Pain     cheast pain started an hr ago, woke him from a sleep  Feels like someone is sitting on his chest       62-year-old male with a history of CABG x3 presents complaining of substernal chest pain which radiates through to his back and stomach  He states this has been present for 1 hour  He states that awoke him from sleep  Patient denies eating or drinking anything which would cause pain  Patient complains of 8/10 chest pain upon arrival to the emerged part  Patient was given 2 sublingual nitroglycerin by EMS along with 324 baby aspirin      History provided by:  Patient  Chest Pain   Pain location:  Substernal area  Pain quality: aching, burning and pressure    Pain radiates to:  Upper back  Pain radiates to the back: yes    Pain severity:  Severe  Onset quality:  Sudden  Duration:  1 hour  Timing:  Intermittent  Progression:  Unchanged  Context: at rest    Context: no movement    Relieved by:  Nothing  Worsened by:  Nothing tried  Ineffective treatments:  None tried  Associated symptoms: no abdominal pain, no AICD problem, no altered mental status, no anorexia, no anxiety, no back pain and no claudication    Risk factors: no aortic disease, no birth control and no coronary artery disease        Prior to Admission Medications   Prescriptions Last Dose Informant Patient Reported? Taking? Bacillus Coagulans-Inulin (PROBIOTIC FORMULA) 1-250 BILLION-MG CAPS Past Week at Unknown time Self Yes Yes   Sig: Take 1 capsule by mouth daily Record states dose is currently 4 billion   Cholecalciferol (VITAMIN D-3 PO) Past Week at Unknown time Self Yes Yes   Sig: Take 2,000 Units by mouth daily  Fluticasone-Salmeterol (AIRDUO RESPICLICK 02/10) 41-81 MCG/ACT AEPB 2/17/2020 at Unknown time Spouse/Significant Other Yes Yes   Sig: Inhale 1 puff 2 (two) times a day AM & PM   KRILL OIL PO 2/17/2020 at Unknown time Self Yes Yes   Sig: Take 500 mg by mouth daily  Melatonin 10 MG TABS Past Week at Unknown time Self Yes Yes   Sig: Take 2 tablets by mouth daily at bedtime Taking 15mg   Multiple Vitamins-Minerals (CENTRUM SILVER ADULT 50+) TABS 2/17/2020 at Unknown time Self Yes Yes   Sig: Take 1 tablet by mouth daily   Potassium Citrate ER 15 MEQ (1620 MG) TBCR   No No   Sig: Take 1 tablet by mouth 2 (two) times a day for 90 days   QUEtiapine (SEROquel) 50 mg tablet Unknown at Unknown time  No No   Sig: TAKE 1 TABLET DAILY AT     BEDTIME   Patient not taking: Reported on 2/18/2020   acetaminophen (TYLENOL) 500 mg tablet 2/17/2020 at Unknown time Self Yes Yes   Sig: Take 650 mg by mouth every 6 (six) hours as needed for headaches    aspirin 81 MG tablet 2/18/2020 at Unknown time Self Yes Yes   Sig: Take 81 mg by mouth daily Took within 24 hours    atenolol (TENORMIN) 25 mg tablet Not Taking at Unknown time  No No   Sig: TAKE 1 TABLET AT BEDTIME   atorvastatin (LIPITOR) 20 mg tablet 2/17/2020 at Unknown time  No Yes   Sig: Take 1 tablet (20 mg total) by mouth daily at bedtime   cyanocobalamin (VITAMIN B-12) 1,000 mcg tablet Past Week at Unknown time Self Yes Yes   Sig: Take 1,000 mcg by mouth 3 (three) times a week MWF   donepezil (ARICEPT) 10 mg tablet 2/17/2020 at Unknown time  No Yes   Sig: TAKE 1 TABLET DAILY AT     BEDTIME   escitalopram (LEXAPRO) 20 mg tablet 2/17/2020 at Unknown time  No Yes   Sig: TAKE 1 TABLET DAILY   memantine (NAMENDA) 5 mg tablet Past Week at Unknown time Spouse/Significant Other Yes Yes   Sig: Take 5 mg by mouth 2 (two) times a day In the evening    montelukast (SINGULAIR) 10 mg tablet 2/17/2020 at Unknown time  No Yes   Sig: Take 1 tablet (10 mg total) by mouth daily   pantoprazole (PROTONIX) 40 mg tablet 2/17/2020 at Unknown time  No Yes   Sig: Take 1 tablet (40 mg total) by mouth daily   tamsulosin (FLOMAX) 0 4 mg 2/17/2020 at Unknown time  No Yes   Sig: Take 1 capsule (0 4 mg total) by mouth daily for 90 days   tiotropium (Helane Jerrell) 18 mcg inhalation capsule 2/17/2020 at Unknown time Self Yes Yes   Sig: Place 18 mcg into inhaler and inhale daily        Facility-Administered Medications: None       Past Medical History:   Diagnosis Date    AAA [facility]abdominal aortic aneurysm) ([facility])     Acid reflux     Acute serous otitis media of left ear     recurrence not specified     Anesthesia     "always has mental changes /lingers and lingers after anesthesia"    Anxiety     Aortic aneurysm without rupture (HCC)     Arm bruise     right inner forearm "recent IV"    At risk for falls     BPH without urinary obstruction     Cancer (HCC)     skin CA on nose    CAP (community acquired pneumonia)     Cataracts, bilateral     Change in bowel function     Clubbing of fingers     Colon polyps     Common cold     Contusion of elbow, left     initial encounter     COPD (chronic obstructive pulmonary disease) (HCC)     Coronary artery disease     Cough     Dementia (HCC)     Depression     Dizziness     upon "standing quickly on occas"    Exercise counseling     Fatigue     Full dentures     "doesn't wear them"    Glaucoma screening     High cholesterol     History of abdominal aortic aneurysm (AAA) repair 08/2017    History of bacteremia     History of chronic obstructive lung disease     History of epistaxis     History of influenza vaccination     History of kidney stones     History of pneumonia     "many times" "at least 5 times" almost always goes to sepsis"    History of sepsis 10/2017    History of sinusitis     History of skin cancer     History of sleep apnea     History of transfusion     History of urinary tract infection     [place] (hard of hearing)     Hydronephrosis with obstructing calculus     Influenza vaccine needed     Jock itch     left/saw doctor 11/8 and started on antifungal cream    Kidney stones     Left hip pain     Need for pneumococcal vaccination     Need for prophylactic vaccination and inoculation against influenza     Other emph[facility]se[facility]     Pancreatitis, chronic ([facility] [address] [facility]     pt and wife can't confirm 11/10/17    Pneumonia 10/26/2017    admitted [facility]    Pulmonary emphysema ([facility] [address] [facility]     Screening for genitourinary condition     Screening for neurological condition     [facility]     due to unspecified organism     Short-term memory loss     Sleep apnea     does not use CPAP   Special screening examination for neoplasm of prostate     TIA involving carotid artery     "before carotid surgery"    Ulcer     stomach, "years ago"    Unsteady gait     Use of cane as ambulatory aid     sometimes    Vitamin D deficiency     Wears glasses        Past Surgical History:   Procedure Laterality Date    ABDOMINAL AORTIC ANEURYSM REPAIR  08/23/2017    ABDOMINAL AORTIC ANEURYSM REPAIR, ENDOVASCULAR      BACK SURGERY      spinal stenosis    CARDIAC SURGERY      CABG x3    CAROTID ENDARTARECTOMY Left 11/1996    CATARACT EXTRACTION Bilateral     CORONARY ARTERY BYPASS GRAFT  01/2003    x3    CYSTOSCOPY      with insertion of ureteral stent     CYSTOSCOPY      with ureteroscopy with lithotripsy     EXCISIONAL HEMORRHOIDECTOMY      EYE SURGERY Bilateral     "for a wrinkle" after cataract surgery    HERNIA REPAIR      umbilical    LITHOTRIPSY      renal    MOUTH SURGERY      full mouth extraction     MO COLONOSCOPY FLX DX W/COLLJ SPEC WHEN PFRMD N/A 5/16/2017    Procedure: COLONOSCOPY with polypectomies/ hot snare and tattoo;  Surgeon: Claudia Thapa MD;  [facility]; Service: Gastroenterology    MO CYSTOURETHROSCOPY N/A 11/30/2017    Procedure: Mane Molina;  Surgeon: Lin Tapia MD;  [facility];  Service: Urology    MO ESOPHAGOGASTRODUODENOSCOPY TRANSORAL DIAGNOSTIC N/A 8/18/2016    Procedure: ESOPHAGOGASTRODUODENOSCOPY (EGD); Surgeon: Claudia Thapa MD;  [facility];   Service: Gastroenterology    MO REMOVE BLADDER STONE,<2 5 CM N/A 11/30/2017 Procedure: Kate Luna;  Surgeon: Carmelina Castleman, MD;  Location: [facility];  Service: Urology    SKIN BIOPSY      TONSILLECTOMY      URETERAL STENT PLACEMENT      and removal       Family History   Problem Relation Age of Onset    Diabetes type II Mother         mellitus    Kidney failure Father     Diabetes type II Maternal Grandmother         mellitus     Hypertension Paternal Grandmother         benign essential      I have reviewed and agree with the history as documented  Social History     Tobacco Use    Smoking status: Former Smoker     Packs/day: 1 50     Years: 60 00     Pack years: 90 00     Last attempt to quit:      Years since quittin 1    Smokeless tobacco: Never Used    Tobacco comment: quit 3 yrs ago, used to be a 1-1 5 ppd smoker   Substance Use Topics    Alcohol use: Not Currently     Alcohol/week: 0 0 standard drinks     Frequency: Never     Drinks per session: 1 or 2     Binge frequency: Never     Comment: quit 25 yrs ago    Drug use: No     Comment: No illicit drug use        Review of Systems   Constitutional: Negative  HENT: Negative  Eyes: Negative  Cardiovascular: Positive for chest pain  Negative for claudication  Gastrointestinal: Negative for abdominal pain and anorexia  Endocrine: Negative  Genitourinary: Negative  Musculoskeletal: Negative for back pain  Skin: Negative  Allergic/Immunologic: Negative  Neurological: Negative  Hematological: Negative  Psychiatric/Behavioral: Negative  All other systems reviewed and are negative  Physical Exam  Physical Exam   Constitutional: He appears well-developed and well-nourished  HENT:   Head: Normocephalic  Eyes: Pupils are equal, round, and reactive to light  Neck: Normal range of motion  No tracheal deviation present  No thyromegaly present  Cardiovascular: Regular rhythm and normal pulses  No extrasystoles are present  PMI is not displaced  Pulmonary/Chest: No accessory muscle usage  No respiratory distress  He has no wheezes  Abdominal: He exhibits no distension and no mass  There is no tenderness  There is no guarding  Musculoskeletal:        Right lower leg: He exhibits no edema  Left lower leg: He exhibits no edema  Neurological: He is not disoriented  No cranial nerve deficit  Skin: Skin is warm  Capillary refill takes less than 2 seconds  No rash noted  No erythema  Psychiatric: His mood appears not anxious  He is not agitated  Vitals reviewed        Vital Signs  ED Triage Vitals [02/18/20 0041]   Temperature Pulse Respirations Blood Pressure SpO2   (!) 97 4 °F (36 3 °C) 68 22 136/99 91 %      Temp Source Heart Rate Source Patient Position - Orthostatic VS BP Location FiO2 (%)   Temporal Monitor Sitting Right arm --      Pain Score       8           Vitals:    02/18/20 0429 02/18/20 0432 02/18/20 0724 02/18/20 1457   BP: 120/71 122/72 119/71 119/73   Pulse: 73 70 74 67   Patient Position - Orthostatic VS: Sitting            Visual Acuity  Visual Acuity      Most Recent Value   L Pupil Size (mm)  2   R Pupil Size (mm)  2   L Pupil Shape  Round   R Pupil Shape  Round          ED Medications  Medications   sodium chloride 0 9 % bolus 500 mL (0 mL Intravenous Stopped 2/18/20 0200)   aluminum-magnesium hydroxide-simethicone (MYLANTA) 200-200-20 mg/5 mL oral suspension 30 mL (30 mL Oral Given 2/18/20 0047)   Lidocaine Viscous HCl (XYLOCAINE) 2 % mucosal solution 15 mL (15 mL Swish & Spit Given 2/18/20 0048)   nitroglycerin (NITRO-BID) 2 % TD ointment 0 5 inch (0 5 inches Topical Given 2/18/20 0108)   iohexol (OMNIPAQUE) 350 MG/ML injection (SINGLE-DOSE) 100 mL (100 mL Intravenous Given 2/18/20 0135)   magnesium oxide (MAG-OX) tablet 400 mg (400 mg Oral Given 2/18/20 0849)       Diagnostic Studies  Results Reviewed     Procedure Component Value Units Date/Time    Urine Microscopic [[id number]]  (Abnormal) Collected:  02/18/20 4702 Lab Status:  Final result Specimen:  Urine, Clean Catch Updated:  02/18/20 0410     RBC, UA 0-1 /hpf      WBC, UA 0-1 /hpf      Epithelial Cells None Seen /hpf      Bacteria, UA Occasional /hpf     UA w Reflex to Microscopic w Reflex to Culture [[id number]]  (Abnormal) Collected:  02/18/20 0341    Lab Status:  Final result Specimen:  Urine, Clean Catch Updated:  02/18/20 0358     Color, UA Yellow     Clarity, UA Clear     Specific Gravity, UA 1 010     pH, UA 8 0     Leukocytes, UA Trace     Nitrite, UA Negative     Protein, UA Negative mg/dl      Glucose, UA Negative mg/dl      Ketones, UA Negative mg/dl      Urobilinogen, UA 1 0 E U /dl      Bilirubin, UA Negative     Blood, UA Negative    D-Dimer [[id number]]  (Abnormal) Collected:  02/18/20 0041    Lab Status:  Final result Specimen:  Blood from Arm, Right Updated:  02/18/20 0122     D-Dimer, Quant 8 01 ug/ml FEU     Troponin I [[id number]]  (Normal) Collected:  02/18/20 0041    Lab Status:  Final result Specimen:  Blood from Arm, Right Updated:  02/18/20 0104     Troponin I <0 02 ng/mL     Comprehensive metabolic panel [[id number]]  (Abnormal) Collected:  02/18/20 0041    Lab Status:  Final result Specimen:  Blood from Arm, Right Updated:  02/18/20 0102     Sodium 144 mmol/L      Potassium 3 6 mmol/L      Chloride 105 mmol/L      CO2 27 mmol/L      ANION GAP 12 mmol/L      BUN 9 mg/dL      Creatinine 1 27 mg/dL      Glucose 126 mg/dL      Calcium 8 6 mg/dL      AST 41 U/L      ALT 44 U/L      Alkaline Phosphatase 117 U/L      Total Protein 6 7 g/dL      Albumin 3 6 g/dL      Total Bilirubin 1 10 mg/dL      eGFR 54 ml/min/1 73sq m     Narrative:       [facility] [place] guidelines for Chronic Kidney Disease (CKD):     Stage 1 with normal or high GFR (GFR > 90 mL/min/1 73 square meters)    Stage 2 Mild CKD (GFR = 60-89 mL/min/1 73 square meters)    Stage 3A Moderate CKD (GFR = 45-59 mL/min/1 73 square meters)    Stage 3B Moderate CKD (GFR = 30-44 mL/min/1 73 square meters)    Stage 4 Severe CKD (GFR = 15-29 mL/min/1 73 square meters)    Stage 5 End Stage CKD (GFR <15 mL/min/1 73 square meters)  Note: GFR calculation is accurate only with a steady state creatinine    Protime-INR [[id number]]  (Normal) Collected:  02/18/20 0041    Lab Status:  Final result Specimen:  Blood from Arm, Right Updated:  02/18/20 0101     Protime 12 4 seconds      INR 0 92    APTT [[id number]]  (Normal) Collected:  02/18/20 0041    Lab Status:  Final result Specimen:  Blood from Arm, Right Updated:  02/18/20 0101     PTT 32 seconds     CBC and differential [[id number]]  (Abnormal) Collected:  02/18/20 0041    Lab Status:  Final result Specimen:  Blood from Arm, Right Updated:  02/18/20 0047     WBC 5 59 Thousand/uL      RBC 5 04 Million/uL      Hemoglobin 16 9 g/dL      Hematocrit 50 5 %       fL      MCH 33 5 pg      MCHC 33 5 g/dL      RDW 13 9 %      MPV 9 7 fL      Platelets 201 Thousands/uL      nRBC 0 /100 WBCs      Neutrophils Relative 56 %      Immat GRANS % 0 %      Lymphocytes Relative 31 %      Monocytes Relative 10 %      Eosinophils Relative 2 %      Basophils Relative 1 %      Neutrophils Absolute 3 15 Thousands/µL      Immature Grans Absolute 0 02 Thousand/uL      Lymphocytes Absolute 1 74 Thousands/µL      Monocytes Absolute 0 55 Thousand/µL      Eosinophils Absolute 0 10 Thousand/µL      Basophils Absolute 0 03 Thousands/µL                  VAS lower limb venous duplex study, complete bilateral   Final Result by Karel Person MD (02/19 4866)      PE Study with CT Abdomen and Pelvis with contrast   Final Result by Daphnie Castaneda DO (02/18 0907)      No pulmonary embolus is seen  No acute process seen in the chest       Extensive bilateral pulmonary emphysematous changes  Some suspected focal scarring is redemonstrated in the right upper lobe, similar to the prior    There is redemonstration of a 11 to 12 mm pulmonary nodule in the posterior right lower lobe (axial    image 143, series 2), similar to the prior  Moderate aortic and moderate to severe coronary atherosclerosis  Status post CABG  No thoracic aortic aneurysm  Partially distended bladder  Mild circumferential bladder wall thickening noted, possibly exaggerated by underdistention  Consider a superimposed cystitis in the appropriate clinical setting  No acute process seen in the abdomen/pelvis, otherwise      Redemonstration of a fusiform abdominal aortic aneurysm which measures approximately 5 9 x 5 7 cm in diameter, similar to the prior, status post endovascular repair  No discrete evidence of endoleak  Renal cortical scarring with bilateral nephrolithiasis, no hydronephrosis  Colonic diverticulosis without evidence of acute diverticulitis, enlarged prostate, and other findings as above        Workstation performed: [id number]                    Procedures  ECG 12 Lead Documentation Only  Date/Time: 2/18/2020 12:46 AM  Performed by: Lisa Ravi DO  Authorized by: Lisa Ravi DO     Indications / Diagnosis:  Chest pain   ECG reviewed by me, the ED Provider: yes    Patient location:  ED  Previous ECG:     Previous ECG:  Unavailable  Interpretation:     Interpretation: non-specific    Rate:     ECG rate:  69    ECG rate assessment: normal    Rhythm:     Rhythm: sinus rhythm    Ectopy:     Ectopy: none    ST segments:     ST segments:  Non-specific             ED Course  ED Course as of Feb 20 0752   Tue Feb 18, 2020   0406 Nitrite, UA: Negative         HEART Risk Score      Most Recent Value   History  1 Filed at: 02/18/2020 0046   ECG  1 Filed at: 02/18/2020 0046   Age  2 Filed at: 02/18/2020 0046   Risk Factors  2 Filed at: 02/18/2020 0046   Troponin  0 Filed at: 02/18/2020 0046   Heart Score Risk Calculator   History  1 Filed at: 02/18/2020 0046   ECG  1 Filed at: 02/18/2020 0046   Age  2 Filed at: 02/18/2020 0046   Risk Factors  2 Filed at: 02/18/2020 0046   Troponin  0 Filed at: 02/18/2020 0046   HEART Score  6 Filed at: 02/18/2020 0046   HEART Score  6 Filed at: 02/18/2020 9017                      Wells' Criteria for PE      Most Recent Value   Wells' Criteria for PE   Clinical signs and symptoms of DVT  3 Filed at: 02/18/2020 0119   PE is primary diagnosis or equally likely  3 Filed at: 02/18/2020 0119   HR >100  0 Filed at: 02/18/2020 0119   Immobilization at least 3 days or Surgery in the previous 4 weeks  1 5 Filed at: 02/18/2020 0119   Previous, objectively diagnosed PE or DVT  0 Filed at: 02/18/2020 0119   Hemoptysis  0 Filed at: 02/18/2020 0119   Malignancy with treatment within 6 months or palliative  0 Filed at: 02/18/2020 0119   Wells' Criteria Total  7 5 Filed at: 02/18/2020 0119            MDM  Number of Diagnoses or Management Options  Diagnosis management comments: Differential diagnosis 1  Acute coronary syndrome 2  GERD 3  AAA  Will perform labs CT scan chest/ abdo/ pelvis  On January 3, 2020 patient had CT scan the abdomen pelvis which revealed the following: "VESSELS:  Infrarenal abdominal aortic aneurysm status post endovascular repair    Sac diameter measures 5 7 cm --unchanged from prior   "        Amount and/or Complexity of Data Reviewed  Clinical lab tests: ordered and reviewed  Tests in the radiology section of CPT®: ordered and reviewed  Tests in the medicine section of CPT®: reviewed and ordered    Risk of Complications, Morbidity, and/or Mortality  Presenting problems: high  Diagnostic procedures: high  Management options: high          Disposition  Final diagnoses:   Chest pain   Dementia of the Alzheimer's type, with late onset, with delirium (HCC)   Hyperlipidemia, unspecified hyperlipidemia type   History of aortic aneurysm repair   Coronary artery disease, angina presence unspecified, unspecified vessel or lesion type, unspecified whether native or transplanted heart   Chronic obstructive pulmonary disease, unspecified COPD type ([facility] Time reflects when diagnosis was documented in both MDM as applicable and the Disposition within this note     Time User Action Codes Description Comment    2/18/2020  1:20 AM Aminta Kiersten Add [R07 9] Chest pain     2/18/2020  1:20 AM Aminta Kiersten Add [G30 1,  F02 80,  F05] Dementia of the Alzheimer's type, with late onset, with delirium ([facility] [address] )     2/18/2020  1:20 AM Aminta Kiersten Add [E78 5] Hyperlipidemia, unspecified hyperlipidemia type     2/18/2020  1:20 AM Aminta [place] Add [D30 225,  Z86 79] History of aortic aneurysm repair     2/18/2020  1:20 AM Aminta [place] Add [I25 10] Coronary artery disease, angina presence unspecified, unspecified vessel or lesion type, unspecified whether native or transplanted heart     2/18/2020  1:20 AM Aminta Kiersten Add [J44 9] Chronic obstructive pulmonary disease, unspecified COPD type ([facility] [address] )     2/18/2020 10:52 AM Latha Knows Add [R31 29] Microscopic hematuria     2/18/2020 12:13 PM [place] Add [I71 4] Abdominal aortic aneurysm (AAA) without rupture [facility])       ED Disposition     ED Disposition Condition Date/Time Comment    Admit Stable Tue Feb 18, 2020  1:17 AM       Follow-up Information    None         Discharge Medication List as of 2/18/2020  7:33 PM      CONTINUE these medications which have NOT CHANGED    Details   acetaminophen (TYLENOL) 500 mg tablet Take 650 mg by mouth every 6 (six) hours as needed for headaches , Starting Tue 7/10/2018, Historical Med      aspirin 81 MG tablet Take 81 mg by mouth daily Took within 24 hours , Historical Med      atenolol (TENORMIN) 25 mg tablet TAKE 1 TABLET AT BEDTIME, Normal      atorvastatin (LIPITOR) 20 mg tablet Take 1 tablet (20 mg total) by mouth daily at bedtime, Starting Mon 7/15/2019, Normal      Bacillus Coagulans-Inulin (PROBIOTIC FORMULA) 1-250 BILLION-MG CAPS Take 1 capsule by mouth daily Record states dose is currently 4 billion, Historical Med Cholecalciferol (VITAMIN D-3 PO) Take 2,000 Units by mouth daily  , Until Discontinued, Historical Med      cyanocobalamin (VITAMIN B-12) 1,000 mcg tablet Take 1,000 mcg by mouth 3 (three) times a week MWF, Historical Med      donepezil (ARICEPT) 10 mg tablet TAKE 1 TABLET DAILY AT     BEDTIME, Normal      escitalopram (LEXAPRO) 20 mg tablet TAKE 1 TABLET DAILY, Normal      Fluticasone-Salmeterol (AIRDUO RESPICLICK 37/42) 92-98 MCG/ACT AEPB Inhale 1 puff 2 (two) times a day AM & PM, Historical Med      KRILL OIL PO Take 500 mg by mouth daily  , Historical Med      Melatonin 10 MG TABS Take 2 tablets by mouth daily at bedtime Taking 15mg, Historical Med      memantine (NAMENDA) 5 mg tablet Take 5 mg by mouth 2 (two) times a day In the evening , Historical Med      montelukast (SINGULAIR) 10 mg tablet Take 1 tablet (10 mg total) by mouth daily, Starting Fri 7/12/2019, Normal      Multiple Vitamins-Minerals (CENTRUM SILVER ADULT 50+) TABS Take 1 tablet by mouth daily, Historical Med      pantoprazole (PROTONIX) 40 mg tablet Take 1 tablet (40 mg total) by mouth daily, Starting Thu 1/16/2020, Normal      tamsulosin (FLOMAX) 0 4 mg Take 1 capsule (0 4 mg total) by mouth daily for 90 days, Starting Thu 6/6/2019, Until Tue 2/18/2020, Normal      tiotropium (SPIRIVA HANDIHALER) 18 mcg inhalation capsule Place 18 mcg into inhaler and inhale daily  , Historical Med      Potassium Citrate ER 15 MEQ (1620 MG) TBCR Take 1 tablet by mouth 2 (two) times a day for 90 days, Starting Thu 6/6/2019, Until Mon 2/10/2020, Normal      QUEtiapine (SEROquel) 50 mg tablet TAKE 1 TABLET DAILY AT     BEDTIME, Normal               PDMP Review     None          ED Provider  Electronically Signed by           Sondra Herron, DO  02/18/20 [address], DO  02/18/20 [address], DO  02/20/20 2047

## 2020-02-18 NOTE — CONSULTS
Consultation - Cardiology   Addy Cabral 70 y o  male MRN: [id number]  Unit/Bed#: 056-72 Encounter: [id number]    Consults      Physician Requesting Consult: Abhishek Ernandez DO  Reason for Consult / Principal Problem: chest pain, coronary artery disease    Assessment/Plan:  1  Type I NSTEMI   - presented with substernal chest heaviness, jaw pain   - initial troponin negative, now 24 75, repeat pending   - EKG without any ischemic changes thus far    - patient with prior history of CABG    - recommend left heart catheterization to define his coronary anatomy   - risks and benefits of the procedure were discussed with the patient and he is agreeable   - he will be transferred to [facility] today    - heparin gtt started by the primary team, will load with plavix 600 mg    - continue beta-blocker, statin, aspirin    2  Known coronary artery disease s/p CABG   - as above, will proceed with a cardiac cath   - continue aspirin, statin, and beta-blocker therapy    3  Peripheral vascular disease   - s/p EVAR    - continue aspirin and statin     4  Hypertension   - blood pressure is well controlled   - continue current regimen    5  Dyslipidemia    - continue statin therapy     History of Present Illness   HPI: Addy Cabral is a 70y o  year old male coronary artery disease status post CABG, peripheral vascular disease-abdominal aortic aneurysm status post EVAR, hypertension, dyslipidemia, COPD, and dementia who follows with cardiologist Dr Oziel Nelson  Patient presented to the emergency department yesterday for the evaluation of chest pain  Patient awoke from sleep last night with a substernal chest heaviness  He reported felt like somebody had placed a few bricks on top of his chest   He had associated shortness of breath and nausea  Denies vomiting and diaphoresis  Over the past 1-2 weeks he reports that he has been developing a sharp left-sided chest pain with exertion which radiates around to his back  This is not similar to the pain that he experienced last night  He states he never had chest heaviness like this before  He denies any lower extremity edema, orthopnea, paroxysmal nocturnal dyspnea  He denies palpitations  Since admission years that have an elevated D-dimer and had a subsequent CT of the chest which was negative for pulmonary embolism or other acute abnormality  His initial troponin was negative but his 2nd troponin was elevated at 24 75  EKG showed normal sinus rhythm without any ischemic changes  Cardiology was consulted for further evaluation and management  Currently he is laying flat in bed, resting comfortably  He appears in no acute distress  He does complain that his chest and jaw feel sore      Review of Systems   Constitution: Negative for chills and fever  HENT: Negative  Cardiovascular: Positive for chest pain and dyspnea on exertion  Negative for leg swelling, orthopnea, palpitations, paroxysmal nocturnal dyspnea and syncope  Respiratory: Positive for shortness of breath  Negative for cough  Gastrointestinal: Negative for diarrhea, nausea and vomiting  Genitourinary: Negative  Neurological: Positive for light-headedness  Negative for dizziness  All other systems reviewed and are negative      Historical Information   Past Medical History:   Diagnosis Date    AAA (abdominal aortic aneurysm) (HCC)     Acid reflux     Acute serous otitis media of left ear     recurrence not specified     Anesthesia     "always has mental changes /lingers and lingers after anesthesia"    Anxiety     Aortic aneurysm without rupture (HCC)     Arm bruise     right inner forearm "recent IV"    At risk for falls     BPH without urinary obstruction     Cancer (HCC)     skin CA on nose    CAP (community acquired pneumonia)     Cataracts, bilateral     Change in bowel function     Clubbing of fingers     Colon polyps     Common cold     Contusion of elbow, left initial encounter     COPD (chronic obstructive pulmonary disease) (HCC)     Coronary artery disease     Cough     Dementia (HCC)     Depression     Dizziness     upon "standing quickly on occas"    Exercise counseling     Fatigue     Full dentures     "doesn't wear them"    Glaucoma screening     High cholesterol     History of abdominal aortic aneurysm (AAA) repair 08/2017    History of bacteremia     History of chronic obstructive lung disease     History of epistaxis     History of influenza vaccination     History of kidney stones     History of pneumonia     "many times" "at least 5 times" almost always goes to sepsis"    History of sepsis 10/2017    History of sinusitis     History of skin cancer     History of sleep apnea     History of transfusion     History of urinary tract infection     [place] (hard of hearing)     Hydronephrosis with obstructing calculus     Influenza vaccine needed     Jock itch     left/saw doctor 11/8 and started on antifungal cream    Kidney stones     Left hip pain     Need for pneumococcal vaccination     Need for prophylactic vaccination and inoculation against influenza     Other emphysema ([address] )     Pancreatitis, chronic ([address] )     pt and wife can't confirm 11/10/17    Pneumonia 10/26/2017    admitted [facility]    Pulmonary emphysema ([address] )     Screening for genitourinary condition     Screening for neurological condition     Sepsis ([address] )     due to unspecified organism     Short-term memory loss     Sleep apnea     does not use CPAP      Special screening examination for neoplasm of prostate     TIA involving carotid artery     "before carotid surgery"    Ulcer     stomach, "years ago"    Unsteady gait     Use of cane as ambulatory aid     sometimes    Vitamin D deficiency     Wears glasses      Past Surgical History:   Procedure Laterality Date    ABDOMINAL AORTIC ANEURYSM REPAIR  08/23/2017    ABDOMINAL AORTIC ANEURYSM REPAIR, ENDOVASCULAR      BACK SURGERY      spinal stenosis    CARDIAC SURGERY      CABG x3    CAROTID ENDARTARECTOMY Left 1996    CATARACT EXTRACTION Bilateral     CORONARY ARTERY BYPASS GRAFT  01/2003    x3    CYSTOSCOPY      with insertion of ureteral stent     CYSTOSCOPY      with ureteroscopy with lithotripsy     EXCISIONAL HEMORRHOIDECTOMY      EYE SURGERY Bilateral     "for a wrinkle" after cataract surgery    HERNIA REPAIR      umbilical    LITHOTRIPSY      renal    MOUTH SURGERY      full mouth extraction     VA COLONOSCOPY FLX DX W/COLLJ SPEC WHEN PFRMD N/A 2017    Procedure: COLONOSCOPY with polypectomies/ hot snare and tattoo;  Surgeon: Abhishek Jha MD;  [facility]; Service: Gastroenterology    VA CYSTOURETHROSCOPY N/A 2017    Procedure: Liberty Peterson;  Surgeon: Reggie Lipscomb MD;  [facility];  Service: Urology    VA ESOPHAGOGASTRODUODENOSCOPY TRANSORAL DIAGNOSTIC N/A 2016    Procedure: ESOPHAGOGASTRODUODENOSCOPY (EGD); Surgeon: Abhishek Jha MD;  [facility];   Service: Gastroenterology    VA REMOVE BLADDER STONE,<2 5 CM N/A 2017    Procedure: Ben Hairston;  Surgeon: Reggie Lipscomb MD;  [facility];  Service: Urology    SKIN BIOPSY      TONSILLECTOMY      URETERAL STENT PLACEMENT      and removal     Social History     Substance and Sexual Activity   Alcohol Use Not Currently    Alcohol/week: 0 0 standard drinks    Frequency: Never    Drinks per session: 1 or 2    Binge frequency: Never    Comment: quit 25 yrs ago     Social History     Substance and Sexual Activity   Drug Use No    Comment: No illicit drug use      Social History     Tobacco Use   Smoking Status Former Smoker    Packs/day: 1 50    Years: 60 00    Pack years: 90 00    Last attempt to quit:     Years since quittin 1   Smokeless Tobacco Never Used   Tobacco Comment    quit 3 yrs ago, used to be a 1-1 5 ppd smoker     Family History: non-contributory    Meds/Allergies   all current active meds have been reviewed    heparin (porcine) 3-20 Units/kg/hr (Order-Specific)   sodium chloride 75 mL/hr       Allergies   Allergen Reactions    Augmentin [Amoxicillin-Pot Clavulanate] Diarrhea    Ciprofloxacin Hives    Morphine And Related Other (See Comments)     Change in mental status    Levofloxacin      Headache    Wellbutrin [Bupropion]        Objective   Vitals: Blood pressure 119/71, pulse 74, temperature (!) 97 3 °F (36 3 °C), resp  rate 16, height 5' 9" (1 753 m), weight 79 9 kg (176 lb 2 4 oz), SpO2 90 %  , Body mass index is 26 01 kg/m² , Orthostatic Blood Pressures      Most Recent Value   Blood Pressure  119/71 filed at 02/18/2020 0724   Patient Position - Orthostatic VS  Sitting filed at 02/18/2020 1814        Systolic (79QKN), IPJ:061 , Min:116 , YTN:336     Diastolic (33GJW), ZKP:85, Min:67, Max:99      Intake/Output Summary (Last 24 hours) at 2/18/2020 7753  Last data filed at 2/18/2020 0200  Gross per 24 hour   Intake 500 ml   Output    Net 500 ml       Weight (last 2 days)     Date/Time   Weight    02/18/20 0732   79 9 (176 15)    02/18/20 0041   83 (182 98)              Invasive Devices     Peripheral Intravenous Line            Peripheral IV 02/18/20 Right Antecubital less than 1 day                  Physical Exam   Constitutional: He is oriented to person, place, and time  No distress  Nasal cannula in place  HENT:   Head: Normocephalic and atraumatic  Eyes: Pupils are equal, round, and reactive to light  Neck: Normal range of motion  Carotid bruit is not present  Cardiovascular: Normal rate, regular rhythm, S1 normal and S2 normal    No murmur heard  Pulses:       Radial pulses are 2+ on the right side, and 2+ on the left side  Dorsalis pedis pulses are 2+ on the right side, and 2+ on the left side  Pulmonary/Chest: Effort normal and breath sounds normal  No respiratory distress  He has no wheezes   He has no rales  Abdominal: Soft  There is no tenderness  Musculoskeletal: Normal range of motion  He exhibits no edema or deformity  Neurological: He is alert and oriented to person, place, and time  Skin: Skin is warm and dry  He is not diaphoretic  No erythema  Psychiatric: He has a normal mood and affect  His behavior is normal    Vitals reviewed  Laboratory Results:  Results from last 7 days   Lab Units 02/18/20  0546 02/18/20  0041   TROPONIN I ng/mL 24 75* <0 02       CBC with diff: Results from last 7 days   Lab Units 02/18/20  0546 02/18/20  0041   WBC Thousand/uL 5 89 5 59   HEMOGLOBIN g/dL 16 4 16 9   HEMATOCRIT % 48 8 50 5*   MCV fL 99* 100*   PLATELETS Thousands/uL 100* 103*   MCH pg 33 4 33 5   MCHC g/dL 33 6 33 5   RDW % 13 9 13 9   MPV fL 10 2 9 7   NRBC AUTO /100 WBCs  --  0         CMP:  Results from last 7 days   Lab Units 02/18/20  0546 02/18/20  0041   POTASSIUM mmol/L 4 4 3 6   CHLORIDE mmol/L 106 105   CO2 mmol/L 25 27   BUN mg/dL 9 9   CREATININE mg/dL 1 05 1 27   CALCIUM mg/dL 8 8 8 6   AST U/L  --  41   ALT U/L  --  44   ALK PHOS U/L  --  117*   EGFR ml/min/1 73sq m 68 54         BMP:  Results from last 7 days   Lab Units 02/18/20  0546 02/18/20  0041   POTASSIUM mmol/L 4 4 3 6   CHLORIDE mmol/L 106 105   CO2 mmol/L 25 27   BUN mg/dL 9 9   CREATININE mg/dL 1 05 1 27   CALCIUM mg/dL 8 8 8 6       BNP:  No results for input(s): BNP in the last 72 hours      Magnesium:   Results from last 7 days   Lab Units 02/18/20  0546   MAGNESIUM mg/dL 1 8       Coags:   Results from last 7 days   Lab Units 02/18/20  0041   PTT seconds 32   INR  0 92       TSH:       Hemoglobin A1C       Lipid Profile:         Cardiac testing:   Results for orders placed during the hospital encounter of 06/14/19   Echo complete with contrast if indicated    Narrative [address] [facility] [address] [address]  [phone]    Transthoracic Echocardiogram  2D, M-mode, Doppler, and Color Doppler    Study date:  2019    Patient: Chanell Longo  MR number: [id number]  Account number: [de-identified]  : 1940  Age: 70 years  Gender: Male  Status: Inpatient  Location: Bedside  Height: 68 in  Weight: 168 lb  BP: 126/ 72 mmHg    Indications: dyspnea, pneumonia    Diagnoses: J18 9 - Pneumonia, unspecified organism    Sonographer:  JEREMIAH Kern  Primary Physician:  Rica Randolph DO  Referring Physician:  MarcoA ntonio Nixon DO  Group:  [id number]  Interpreting Physician:  Mora Yeboah MD    SUMMARY    LEFT VENTRICLE:  Size was normal   Systolic function was normal  Ejection fraction was estimated to be 65 %  There were no regional wall motion abnormalities  Wall thickness was mildly increased  There was mild concentric hypertrophy  LEFT ATRIUM:  Size was at the upper limits of normal     MITRAL VALVE:  There was trace regurgitation  TRICUSPID VALVE:  There was mild regurgitation  Estimated peak PA pressure was 25 mmHg  HISTORY: PRIOR HISTORY: cabg     PROCEDURE: The procedure was performed at the bedside  This was a routine study  The transthoracic approach was used  The study included complete 2D imaging, M-mode, complete spectral Doppler, and color Doppler  The heart rate was 80 bpm,  at the start of the study  Echocardiographic views were limited due to poor acoustic window availability  This was a technically difficult study  LEFT VENTRICLE: Size was normal  Systolic function was normal  Ejection fraction was estimated to be 65 %  There were no regional wall motion abnormalities  Wall thickness was mildly increased  There was mild concentric hypertrophy  RIGHT VENTRICLE: The size was normal  Systolic function was normal  Wall thickness was normal     LEFT ATRIUM: Size was at the upper limits of normal     RIGHT ATRIUM: Size was normal     MITRAL VALVE: Valve structure was normal  There was normal leaflet separation  DOPPLER: The transmitral velocity was within the normal range  There was no evidence for stenosis  There was trace regurgitation  AORTIC VALVE: The valve was trileaflet  Leaflets exhibited normal thickness and normal cuspal separation  DOPPLER: Transaortic velocity was within the normal range  There was no evidence for stenosis  There was no regurgitation  TRICUSPID VALVE: The valve structure was normal  There was normal leaflet separation  DOPPLER: The transtricuspid velocity was within the normal range  There was no evidence for stenosis  There was mild regurgitation  Estimated peak PA  pressure was 25 mmHg  PULMONIC VALVE: Leaflets exhibited normal thickness, no calcification, and normal cuspal separation  DOPPLER: The transpulmonic velocity was within the normal range  There was no regurgitation  PERICARDIUM: There was no pericardial effusion  The pericardium was normal in appearance  AORTA: The root exhibited normal size  SYSTEM MEASUREMENT TABLES    2D  %FS: 29 03 %  Ao Diam: 3 46 cm  EDV(Teich): 90 42 ml  EF(Teich): 55 95 %  ESV(Teich): 39 83 ml  IVSd: 1 4 cm  LA Area: 13 45 cm2  LA Diam: 4 12 cm  LVEDV MOD A4C: 100 63 ml  LVEF MOD A4C: 64 83 %  LVESV MOD A4C: 35 39 ml  LVIDd: 4 46 cm  LVIDs: 3 16 cm  LVLd A4C: 8 3 cm  LVLs A4C: 6 46 cm  LVPWd: 1 32 cm  RA Area: 11 61 cm2  RV DIAM: 2 6 cm  SV MOD A4C: 65 23 ml  SV(Teich): 50 58 ml    CW  AV Vmax: 1 35 m/s  AV maxP 25 mmHg  TR Vmax: 2 5 m/s  TR maxP mmHg    PW  E' Sept: 0 07 m/s  E/E' Sept: 7 21  LVOT Vmax: 0 96 m/s  LVOT maxPG: 3 69 mmHg  MV A [place]: 0 78 m/s  MV Dec [place]: 3 98 m/s2  MV DecT: 122 03 ms  MV E [place]: 0 49 m/s  MV E/A Ratio: 0 62    [employer] Accredited Echocardiography Laboratory    Prepared and electronically signed by    Morgan Tate MD  Signed 2019 14:35:12       No results found for this or any previous visit  No results found for this or any previous visit    No results found for this or any previous visit  Imaging: I have personally reviewed pertinent reports  Pe Study With Ct Abdomen And Pelvis With Contrast    Result Date: 2/18/2020  Narrative: CT PULMONARY ANGIOGRAM OF THE CHEST AND CT ABDOMEN AND PELVIS WITH INTRAVENOUS CONTRAST INDICATION:   Abdominal pain, unspecified PE suspected, high pretest prob Elevated D-dimer  COMPARISON:  CT abdomen and pelvis dated 1/3/2020, CTA chest dated 7/2/2019 TECHNIQUE:  CT examination of the chest, abdomen and pelvis was performed  Thin section CT angiographic technique was used in the chest in order to evaluate for pulmonary embolus and coronal 3D MIP postprocessing was performed on the acquisition scanner  Axial, sagittal, and coronal 2D reformatted images were created from the source data and submitted for interpretation  Radiation dose length product (DLP) for this visit:  883 17 mGy-cm   This examination, like all CT scans performed in the [facility], was performed utilizing techniques to minimize radiation dose exposure, including the use of iterative  reconstruction and automated exposure control  IV Contrast:  100 mL of iohexol (OMNIPAQUE) Enteric Contrast:  Enteric contrast was not administered  FINDINGS: CHEST PULMONARY ARTERIAL TREE:  No pulmonary embolus is seen  LUNGS:  Extensive bilateral pulmonary emphysematous changes  Some suspected focal scarring is redemonstrated in the right upper lobe, similar to the prior  There is redemonstration of a 11 to 12 mm pulmonary nodule in the posterior right lower lobe (axial image 143, series 2), similar to the prior  Mild atelectasis/scarring suspected dependently and in the bilateral lower lung fields, lungs otherwise appear grossly clear  PLEURA:  Paraseptal emphysematous changes, most prominent in the upper lobes HEART/AORTA:  Moderate aortic and moderate to severe coronary atherosclerosis  Status post CABG  No thoracic aortic aneurysm   MEDIASTINUM AND DAVID:  Increased AP diameter of the trachea, probably related to COPD  Otherwise grossly unremarkable CHEST WALL AND LOWER NECK:   Median sternotomy changes, otherwise grossly unremarkable ABDOMEN LIVER/BILIARY TREE:  Unremarkable  GALLBLADDER:  Normal caliber, no discrete stones or gallbladder inflammation is seen  SPLEEN:  Unremarkable  PANCREAS:  Unremarkable  ADRENAL GLANDS:  Unremarkable  KIDNEYS/URETERS:  Bilateral nonobstructing stones, largest measures approximately 4 mm in size in the lower pole of the left kidney  Bilateral renal cortical scarring is noted  No discrete suspicious appearing renal lesion is seen  No hydronephrosis  Circumaortic left renal vein incidentally noted  STOMACH AND BOWEL:  Scattered colonic diverticulosis  No discrete evidence of acute diverticulitis  No discrete evidence of bowel obstruction  Otherwise grossly unremarkable  APPENDIX:  No findings to suggest appendicitis  ABDOMINOPELVIC CAVITY:  No ascites or free intraperitoneal air  No lymphadenopathy  VESSELS:  Redemonstration of a fusiform abdominal aortic aneurysm which measures approximately 5 9 x 5 7 cm in diameter, similar to the prior, status post endovascular repair  No discrete evidence of endoleak  PELVIS REPRODUCTIVE ORGANS:  Enlarged and nodular prostate with prostate volume estimated at 62 mL  URINARY BLADDER:  Bladder is partially distended  Mild circumferential bladder wall thickening noted, possibly exaggerated by underdistention  Otherwise grossly unremarkable  ABDOMINAL WALL/INGUINAL REGIONS:  Grossly unremarkable OSSEOUS STRUCTURES: Mild degenerative changes of the bilateral hips  No acute fracture or destructive osseous lesion  Impression: No pulmonary embolus is seen  No acute process seen in the chest  Extensive bilateral pulmonary emphysematous changes  Some suspected focal scarring is redemonstrated in the right upper lobe, similar to the prior    There is redemonstration of a 11 to 12 mm pulmonary nodule in the posterior right lower lobe (axial image 143, series 2), similar to the prior  Moderate aortic and moderate to severe coronary atherosclerosis  Status post CABG  No thoracic aortic aneurysm  Partially distended bladder  Mild circumferential bladder wall thickening noted, possibly exaggerated by underdistention  Consider a superimposed cystitis in the appropriate clinical setting  No acute process seen in the abdomen/pelvis, otherwise Redemonstration of a fusiform abdominal aortic aneurysm which measures approximately 5 9 x 5 7 cm in diameter, similar to the prior, status post endovascular repair  No discrete evidence of endoleak  Renal cortical scarring with bilateral nephrolithiasis, no hydronephrosis  Colonic diverticulosis without evidence of acute diverticulitis, enlarged prostate, and other findings as above   Workstation performed: [id number]       EKG reviewed personally: sinus rhythm with first degree AV block  Telemetry reviewed personally: sinus rhythm with 1st degree AV block, PVC's    Assessment:  Principal Problem:    Chest pain  Active Problems:    COPD (chronic obstructive pulmonary disease) (HCC)    Hyperlipidemia    GERD (gastroesophageal reflux disease)    Microscopic hematuria    Abdominal aortic aneurysm (HCC)    Thrombocytopenia (HCC)    Hx of CABG    Dementia of the Alzheimer's type, with late onset, with delirium ([facility] [address] )    Elevated MCV            Code Status: Level 1 - Full Code

## 2020-02-18 NOTE — ED NOTES
Had nitro X2 and  prior to arrival      Kandy Chavez [facility]  02/18/20 0998

## 2020-02-18 NOTE — ASSESSMENT & PLAN NOTE
· History of CAD/CABG  · Continue aspirin 81 mg PO Qdaily  · Continue beta-blocker therapy  · Increase statin to high-intensity statin therapy with atorvastatin 40 mg PO Qdaily with dinner with CAD and a type 1 myocardial infarction  · Initiate an IV heparin drip/infusion per the ACS protocol  · Give a plavix load of 600 mg PO x 1 dose and then continue 75 mg PO Qdaily  · The patient was seen in consultation by Cardiology  · The patient requires transfer to a higher level of care for a cardiac catheterization  The patient will be transferred to [address] [place] [address] on the service of Dr Lucia Dance [facility] Attending Physician)

## 2020-02-18 NOTE — SOCIAL WORK
Pt is being transferred to a higher level of care  As per physician pt is being transferred to [facility] because he needs a cardiac cath

## 2020-02-18 NOTE — ASSESSMENT & PLAN NOTE
Chief complaint is chest pain-"Feels like someone is sitting on his chest  "  Was given nitro x2 and 325 of aspirin in ER  Admit to med surge with telemetry  Monitor per protocol  Trend troponins with EKGs for minimum of 3  Initial troponin less than 0 02 EKG no signs of ischemia  Cardiology consult  Stress diet ordered  Echo ordered

## 2020-02-18 NOTE — ASSESSMENT & PLAN NOTE
CT from January 2020-"Unchanged appearance of the patient's infrarenal abdominal aortic aneurysm after endograft repair "    Awaiting formal read of CTA of the chest

## 2020-02-18 NOTE — ASSESSMENT & PLAN NOTE
· Telemetry monitoring  · Keep the patient's magnesium at 2 mg/dl and above in the setting of ventricular ectopy  · Keep the patient's potassium at 4 mmol/L and above in the setting of ventricular ectopy  · Continue PO atenolol  · Outpatient follow-up with Cardiology

## 2020-02-18 NOTE — ASSESSMENT & PLAN NOTE
Continue prior to admission medications including Aricept Lexapro in the [place]  Provide supportive care

## 2020-02-18 NOTE — DISCHARGE SUMMARY
Discharge- Cecilia Manual 1940, 70 y o  male MRN: [id number]    Unit/Bed#: 438-54 Encounter: [id number]    Primary Care Provider: Alethea Ott DO   Date and time admitted to hospital: 2/18/2020 12:37 AM        * Type 1 myocardial infarction [facility])  Assessment & Plan  · History of CAD/CABG  · Continue aspirin 81 mg PO Qdaily  · Continue beta-blocker therapy  · Increase statin to high-intensity statin therapy with atorvastatin 40 mg PO Qdaily with dinner with CAD and a type 1 myocardial infarction  · Initiate an IV heparin drip/infusion per the ACS protocol  · Give a plavix load of 600 mg PO x 1 dose and then continue 75 mg PO Qdaily  · The patient was seen in consultation by Cardiology  · The patient requires transfer to a higher level of care for a cardiac catheterization  The patient will be transferred to [address] [place] [address] on the service of Dr Juan Chris [facility] Attending Physician)  CAD (coronary artery disease)  Assessment & Plan  · History of CAD/CABG  · Continue aspirin 81 mg PO Qdaily  · Continue beta-blocker therapy  · Increase statin to high-intensity statin therapy with atorvastatin 40 mg PO Qdaily with dinner with CAD and a type 1 myocardial infarction  · Initiate an IV heparin drip/infusion per the ACS protocol  · Give a plavix load of 600 mg PO x 1 dose and then continue 75 mg PO Qdaily  · The patient was seen in consultation by Cardiology  · The patient requires transfer to a higher level of care for a cardiac catheterization  The patient will be transferred to [address] [place] [address] on the service of Dr Juan Chris [facility] Attending Physician)      1st degree AV block  Assessment & Plan  · Outpatient follow-up with Cardiology    Premature atrial contractions  Assessment & Plan  · Outpatient follow-up with Cardiology    Ventricular ectopy  Assessment & Plan  · Telemetry monitoring  · Keep the patient's magnesium at 2 mg/dl and above in the setting of ventricular ectopy  · Keep the patient's potassium at 4 mmol/L and above in the setting of ventricular ectopy  · Continue PO atenolol  · Outpatient follow-up with Cardiology    Diastolic dysfunction  Assessment & Plan  · Monitor the patient's volume status closely  · Continue PO atenolol    Elevated MCV  Assessment & Plan  · Check a vitamin B12 level and folate level    Thrombocytopenia (HCC)  Assessment & Plan  · Monitor for any signs of bleeding  · Follow the platelet count    Abdominal aortic aneurysm (HCC)  Assessment & Plan  · Outpatient follow-up with Vascular Surgery    Microscopic hematuria  Assessment & Plan  · Check a urine culture  · Outpatient Urology evaluation    Hypercholesterolemia  Assessment & Plan  · Increase statin to high-intensity statin therapy with atorvastatin 40 mg PO Qdaily with dinner with CAD and a type 1 myocardial infarction    Bladder distension/Bladder wall thickening  Assessment & Plan  · Check the patient post-void residual amounts every shift with bladder scanning, and follow the urinary retention protocol    · Check a urine culture  · Outpatient Urology evaluation    Elevated D-dimer level  Assessment & Plan  · No evidence of pulmonary embolism on CTA of the chest  · Check a venous duplex of the bilateral lower extremities    Diverticulosis  Assessment & Plan  · Outpatient follow-up with Gastroenterology      Discharging Physician / Practitioner: Gera Silva DO  PCP: Christen Lily, DO  Admission Date:   Admission Orders (From admission, onward)     Ordered        02/18/20 0341  Place in Observation  Once                   Discharge Date/Transfer Date to [address] [place] [address]: 02/18/20    Consultations During Hospital Stay:  · Cardiology    Procedures Performed:   · None    Significant Findings / Test Results:   Pe Study With Ct Abdomen And Pelvis With Contrast    Result Date: 2/18/2020  Impression: No pulmonary embolus is seen  No acute process seen in the chest  Extensive bilateral pulmonary emphysematous changes  Some suspected focal scarring is redemonstrated in the right upper lobe, similar to the prior  There is redemonstration of a 11 to 12 mm pulmonary nodule in the posterior right lower lobe (axial image 143, series 2), similar to the prior  Moderate aortic and moderate to severe coronary atherosclerosis  Status post CABG  No thoracic aortic aneurysm  Partially distended bladder  Mild circumferential bladder wall thickening noted, possibly exaggerated by underdistention  Consider a superimposed cystitis in the appropriate clinical setting  No acute process seen in the abdomen/pelvis, otherwise Redemonstration of a fusiform abdominal aortic aneurysm which measures approximately 5 9 x 5 7 cm in diameter, similar to the prior, status post endovascular repair  No discrete evidence of endoleak  Renal cortical scarring with bilateral nephrolithiasis, no hydronephrosis  Colonic diverticulosis without evidence of acute diverticulitis, enlarged prostate, and other findings as above  Workstation performed: [id number]     ECG 12 lead   Order: [id number]   Status:  Final result   Visible to patient:  Yes (MyChart)   Next appt:  04/23/2020 at 12:00 PM in Family Medicine Rica Randolph DO)   (important suggestion)  Newer results are available  Click to view them now      Ref Range & Units 2/18/20 0038   Ventricular Rate BPM 69    Atrial Rate BPM 69    CT Interval ms 228    QRSD Interval ms 80    QT Interval ms 434    QTC Interval ms 465    P Troy degrees 64    QRS Axis degrees 74    T Wave Axis degrees 74       Narrative & Impression     Sinus rhythm with 1st degree A-V block with Premature atrial complexes  When compared with ECG of 18-NOV-2019 15:34,  Premature ventricular complexes are no longer Present  Premature atrial complexes are now Present     Confirmed by Jayesh Marker ([id number]) on 2020 8:46:36 AM      Specimen Collected: 20 00:38   Last Resulted: 20 08:46           ECG 12 lead   Order: [id number]   Status:  Final result   Visible to patient:  Yes (Kathrine)   Next appt:  2020 at 12:00 PM in Family Medicine Patrick Del Angel DO)    Ref Range & Units 20 0748   Ventricular Rate BPM 65    Atrial Rate BPM 65    WV Interval ms 246    QRSD Interval ms 92    QT Interval ms 448    QTC Interval ms 465    P Axis degrees 56    QRS Axis degrees 58    T Wave Axis degrees 65       Narrative & Impression     Sinus rhythm with 1st degree A-V block  Otherwise normal ECG  When compared with ECG of 2020 06:03,  No significant change was found  Confirmed by Jorge Ochoa [phone]) on 2020 8:46:05 AM      Specimen Collected: 20 07:48   Last Resulted: 20 08:46           Echo complete with contrast if indicated   Status: Final result   PACS Images      Show images for Echo complete with contrast if indicated   Study Result     [address] [facility] [address] [place]  [phone]     Transthoracic Echocardiogram  2D, M-mode, Doppler, and Color Doppler     Study date:  2020     Patient: Kenney Ocampo  MR number: [id number]  Account number: [de-identified]  : 1940  Age: 70 years  Gender: Male  Status: Outpatient  Location: Bedside  Height: 69 in  Weight: 181 9 lb  BP: 122/ 72 mmHg     Indications: Chest pain     Diagnoses: 786 59 - CHEST PAIN NEC     Sonographer:  JEREMIAH Spencer  Primary Physician:  Patrick Del Angel DO  Referring Physician:  Warren Higgins DO  Group:  [facility]  Interpreting Physician:  Alma Delia Bryant MD     SUMMARY     LEFT VENTRICLE:  Size was normal   Systolic function was normal  Ejection fraction was estimated to be 60 %  There were no regional wall motion abnormalities  Wall thickness was mildly increased    There was mild concentric hypertrophy  Doppler parameters were consistent with abnormal left ventricular relaxation (grade 1 diastolic dysfunction)      MITRAL VALVE:  There was trace regurgitation      TRICUSPID VALVE:  There was mild regurgitation      HISTORY: PRIOR HISTORY: CABG     PROCEDURE: The procedure was performed at the bedside  This was a stat study  The transthoracic approach was used  The study included complete 2D imaging, M-mode, complete spectral Doppler, and color Doppler  The heart rate was 80 bpm, at  the start of the study  This was a technically difficult study      LEFT VENTRICLE: Size was normal  Systolic function was normal  Ejection fraction was estimated to be 60 %  There were no regional wall motion abnormalities  Wall thickness was mildly increased  There was mild concentric hypertrophy  DOPPLER: Doppler parameters were consistent with abnormal left ventricular relaxation (grade 1 diastolic dysfunction)      RIGHT VENTRICLE: The size was normal  Systolic function was normal  Wall thickness was normal      LEFT ATRIUM: Size was normal      RIGHT ATRIUM: Size was normal      MITRAL VALVE: Valve structure was normal  There was normal leaflet separation  DOPPLER: The transmitral velocity was within the normal range  There was no evidence for stenosis  There was trace regurgitation      AORTIC VALVE: The valve was trileaflet  Leaflets exhibited normal thickness and normal cuspal separation  DOPPLER: Transaortic velocity was within the normal range  There was no evidence for stenosis  There was no regurgitation      TRICUSPID VALVE: The valve structure was normal  There was normal leaflet separation  DOPPLER: The transtricuspid velocity was within the normal range  There was no evidence for stenosis  There was mild regurgitation  Estimated peak PA  pressure was 20 mmHg      PULMONIC VALVE: Leaflets exhibited normal thickness, no calcification, and normal cuspal separation   DOPPLER: The transpulmonic velocity was within the normal range  There was no regurgitation      PERICARDIUM: There was no pericardial effusion  The pericardium was normal in appearance      AORTA: The root exhibited normal size      SYSTEM MEASUREMENT TABLES     2D  %FS: 27 27 %  Ao Diam: 3 48 cm  EDV(Teich): 94 84 ml  EF(Teich): 53 17 %  ESV(Teich): 44 42 ml  IVSd: 1 34 cm  LA Area: 17 12 cm2  LA Diam: 3 87 cm  LVEDV MOD A4C: 71 16 ml  LVEF MOD A4C: 60 78 %  LVESV MOD A4C: 27 91 ml  LVIDd: 4 55 cm  LVIDs: 3 31 cm  LVLd A4C: 8 cm  LVLs A4C: 7 01 cm  LVPWd: 1 31 cm  RA Area: 13 78 cm2  RVIDd: 3 04 cm  RWT: 0 58  SV MOD A4C: 43 25 ml  SV(Teich): 50 42 ml     CW  TR Vmax: 2 26 m/s  TR maxP 4 mmHg     MM  TAPSE: 3 47 cm     PW  E' Sept: 0 06 m/s  E/E' Sept: 7 93  MV A Nakul: 0 77 m/s  MV Dec Hart: 2 55 m/s2  MV DecT: 181 24 ms  MV E Nakul: 0 46 m/s  MV E/A Ratio: 0 6     [facility] [place] [facility] Accredited Echocardiography Laboratory     Prepared and electronically signed by  Latricia Arevalo MD  Signed [id number] 09:36:25        Microbiology Results (last 21 days)     Procedure Component Value - Date/Time   Urine culture [[id number]] Collected: 20 1051   Lab Status: In process Specimen: Urine, Other Updated: 20 1110       Incidental Findings:   · None     Complications:  None    Reason for Admission:  Chest pain    Hospital Course:     Trent Lovett is a 70 y o  male patient who originally presented to the hospital on 2020 due to chest pain  Please see above list of diagnoses and related plan for additional information  Condition at Discharge: stable     Discharge Day Visit / Exam:     Subjective: The patient was seen and examined    The patient continues to experience substernal chest pain at rest     Vitals: Blood Pressure: 119/71 (20 07)  Pulse: 74 (20 07)  Temperature: (!) 97 3 °F (36 3 °C) (20)  Temp Source: Temporal (20 004)  Respirations: 16 (20 07)  Height: 5' 9" (175 3 cm) (02/18/20 0041)  Weight - Scale: 79 9 kg (176 lb 2 4 oz) (02/18/20 0732)  SpO2: 90 % (02/18/20 0724)  Exam:   Physical Exam  General:  NAD, follows commands  HEENT:  NC/AT, mucous membranes moist  Neck:  Supple, No JVP elevation  CV:  + S1, + S2, RRR  Pulm:  Lung fields are CTA bilaterally  Abd:  Soft, Non-tender, Non-distended  Ext:  No clubbing/cyanosis/edema  Skin:  No rashes  Neuro:  Awake, alert, oriented  Psych:  Normal mood and affect    Discussion with Family:  I updated the patient's wife on the telephone, who was in agreement for the plan to transfer the patient to a higher level of care for a cardiac catheterization  Discharge instructions/Information to patient and family:   See after visit summary for information provided to patient and family  Provisions for Follow-Up Care:  See after visit summary for information related to follow-up care and any pertinent home health orders  Disposition:  The patient requires transfer to a higher level of care for a cardiac catheterization  The patient will be transferred to [address] [place] [address] on the service of Dr Niño [facility] Attending Physician)  Discharge Statement:  I spent 60 minutes discharging the patient  This time was spent on the day of discharge  I had direct contact with the patient on the day of discharge  Greater than 50% of the total time was spent examining patient, answering all patient questions, arranging and discussing plan of care with patient as well as directly providing post-discharge instructions  Additional time then spent on discharge activities  Discharge Medications:  See after visit summary for reconciled discharge medications provided to patient and family        ** Please Note: This note has been constructed using a voice recognition system **

## 2020-02-18 NOTE — OCCUPATIONAL THERAPY NOTE
Occupational Therapy         Patient Name: Margy Mcrae  EAXOC'C Date: 2/18/2020    Order received and chart review performed; pt is to be transferred to higher level of care; will complete OT orders at this time

## 2020-02-18 NOTE — ASSESSMENT & PLAN NOTE
· History of CAD/CABG  · Continue aspirin 81 mg PO Qdaily  · Continue beta-blocker therapy  · Increase statin to high-intensity statin therapy with atorvastatin 40 mg PO Qdaily with dinner with CAD and a type 1 myocardial infarction  · Initiate an IV heparin drip/infusion per the ACS protocol  · Give a plavix load of 600 mg PO x 1 dose and then continue 75 mg PO Qdaily  · The patient was seen in consultation by Cardiology  · The patient requires transfer to a higher level of care for a cardiac catheterization  The patient will be transferred to [address] [place] [address] on the service of Dr Vicki Merlin [facility] Attending Physician)

## 2020-02-19 ENCOUNTER — APPOINTMENT (INPATIENT)
Dept: NON INVASIVE DIAGNOSTICS | Facility: HOSPITAL | Age: 80
DRG: 246 | End: 2020-02-19
Attending: INTERNAL MEDICINE
Payer: MEDICARE

## 2020-02-19 LAB
APTT PPP: 38 SECONDS (ref 23–37)
APTT PPP: 62 SECONDS (ref 23–37)
BACTERIA UR CULT: NORMAL
BASOPHILS # BLD AUTO: 0.05 THOUSANDS/ΜL (ref 0–0.1)
BASOPHILS NFR BLD AUTO: 1 % (ref 0–1)
EOSINOPHIL # BLD AUTO: 0.08 THOUSAND/ΜL (ref 0–0.61)
EOSINOPHIL NFR BLD AUTO: 1 % (ref 0–6)
ERYTHROCYTE [DISTWIDTH] IN BLOOD BY AUTOMATED COUNT: 14.1 % (ref 11.6–15.1)
HCT VFR BLD AUTO: 52.7 % (ref 36.5–49.3)
HGB BLD-MCNC: 17.8 G/DL (ref 12–17)
IMM GRANULOCYTES # BLD AUTO: 0.05 THOUSAND/UL (ref 0–0.2)
IMM GRANULOCYTES NFR BLD AUTO: 1 % (ref 0–2)
KCT BLD-ACNC: 322 SEC (ref 89–137)
KCT BLD-ACNC: 605 SEC (ref 89–137)
LACTATE SERPL-SCNC: 2.6 MMOL/L (ref 0.5–2)
LYMPHOCYTES # BLD AUTO: 1.51 THOUSANDS/ΜL (ref 0.6–4.47)
LYMPHOCYTES NFR BLD AUTO: 20 % (ref 14–44)
MCH RBC QN AUTO: 33.3 PG (ref 26.8–34.3)
MCHC RBC AUTO-ENTMCNC: 33.8 G/DL (ref 31.4–37.4)
MCV RBC AUTO: 99 FL (ref 82–98)
MONOCYTES # BLD AUTO: 0.71 THOUSAND/ΜL (ref 0.17–1.22)
MONOCYTES NFR BLD AUTO: 9 % (ref 4–12)
NEUTROPHILS # BLD AUTO: 5.36 THOUSANDS/ΜL (ref 1.85–7.62)
NEUTS SEG NFR BLD AUTO: 68 % (ref 43–75)
NRBC BLD AUTO-RTO: 0 /100 WBCS
PLATELET # BLD AUTO: 107 THOUSANDS/UL (ref 149–390)
PMV BLD AUTO: 10.2 FL (ref 8.9–12.7)
PROCALCITONIN SERPL-MCNC: <0.05 NG/ML
RBC # BLD AUTO: 5.34 MILLION/UL (ref 3.88–5.62)
SPECIMEN SOURCE: ABNORMAL
SPECIMEN SOURCE: ABNORMAL
TROPONIN I SERPL-MCNC: 11.7 NG/ML
WBC # BLD AUTO: 7.76 THOUSAND/UL (ref 4.31–10.16)

## 2020-02-19 PROCEDURE — C1894 INTRO/SHEATH, NON-LASER: HCPCS | Performed by: INTERNAL MEDICINE

## 2020-02-19 PROCEDURE — 92928 PRQ TCAT PLMT NTRAC ST 1 LES: CPT | Performed by: INTERNAL MEDICINE

## 2020-02-19 PROCEDURE — 93455 CORONARY ART/GRFT ANGIO S&I: CPT | Performed by: INTERNAL MEDICINE

## 2020-02-19 PROCEDURE — 99232 SBSQ HOSP IP/OBS MODERATE 35: CPT | Performed by: INTERNAL MEDICINE

## 2020-02-19 PROCEDURE — C1769 GUIDE WIRE: HCPCS | Performed by: INTERNAL MEDICINE

## 2020-02-19 PROCEDURE — 83605 ASSAY OF LACTIC ACID: CPT | Performed by: INTERNAL MEDICINE

## 2020-02-19 PROCEDURE — C1887 CATHETER, GUIDING: HCPCS | Performed by: INTERNAL MEDICINE

## 2020-02-19 PROCEDURE — C1760 CLOSURE DEV, VASC: HCPCS | Performed by: INTERNAL MEDICINE

## 2020-02-19 PROCEDURE — 85347 COAGULATION TIME ACTIVATED: CPT

## 2020-02-19 PROCEDURE — 027034Z DILATION OF CORONARY ARTERY, ONE ARTERY WITH DRUG-ELUTING INTRALUMINAL DEVICE, PERCUTANEOUS APPROACH: ICD-10-PCS | Performed by: INTERNAL MEDICINE

## 2020-02-19 PROCEDURE — 99152 MOD SED SAME PHYS/QHP 5/>YRS: CPT | Performed by: INTERNAL MEDICINE

## 2020-02-19 PROCEDURE — 85025 COMPLETE CBC W/AUTO DIFF WBC: CPT | Performed by: INTERNAL MEDICINE

## 2020-02-19 PROCEDURE — 84145 PROCALCITONIN (PCT): CPT | Performed by: INTERNAL MEDICINE

## 2020-02-19 PROCEDURE — 99153 MOD SED SAME PHYS/QHP EA: CPT | Performed by: INTERNAL MEDICINE

## 2020-02-19 PROCEDURE — 93970 EXTREMITY STUDY: CPT | Performed by: SURGERY

## 2020-02-19 PROCEDURE — B2111ZZ FLUOROSCOPY OF MULTIPLE CORONARY ARTERIES USING LOW OSMOLAR CONTRAST: ICD-10-PCS | Performed by: INTERNAL MEDICINE

## 2020-02-19 PROCEDURE — C1874 STENT, COATED/COV W/DEL SYS: HCPCS

## 2020-02-19 PROCEDURE — 85730 THROMBOPLASTIN TIME PARTIAL: CPT | Performed by: INTERNAL MEDICINE

## 2020-02-19 PROCEDURE — C9600 PERC DRUG-EL COR STENT SING: HCPCS | Performed by: INTERNAL MEDICINE

## 2020-02-19 PROCEDURE — 84484 ASSAY OF TROPONIN QUANT: CPT | Performed by: INTERNAL MEDICINE

## 2020-02-19 PROCEDURE — B2121ZZ FLUOROSCOPY OF SINGLE CORONARY ARTERY BYPASS GRAFT USING LOW OSMOLAR CONTRAST: ICD-10-PCS | Performed by: INTERNAL MEDICINE

## 2020-02-19 PROCEDURE — C1725 CATH, TRANSLUMIN NON-LASER: HCPCS | Performed by: INTERNAL MEDICINE

## 2020-02-19 PROCEDURE — NC001 PR NO CHARGE: Performed by: INTERNAL MEDICINE

## 2020-02-19 PROCEDURE — 93005 ELECTROCARDIOGRAM TRACING: CPT

## 2020-02-19 PROCEDURE — 87086 URINE CULTURE/COLONY COUNT: CPT | Performed by: INTERNAL MEDICINE

## 2020-02-19 RX ORDER — FENTANYL CITRATE 50 UG/ML
INJECTION, SOLUTION INTRAMUSCULAR; INTRAVENOUS CODE/TRAUMA/SEDATION MEDICATION
Status: COMPLETED | OUTPATIENT
Start: 2020-02-19 | End: 2020-02-19

## 2020-02-19 RX ORDER — LIDOCAINE HYDROCHLORIDE AND EPINEPHRINE 10; 10 MG/ML; UG/ML
20 INJECTION, SOLUTION INFILTRATION; PERINEURAL ONCE
Status: COMPLETED | OUTPATIENT
Start: 2020-02-19 | End: 2020-02-19

## 2020-02-19 RX ORDER — HEPARIN SODIUM 1000 [USP'U]/ML
INJECTION, SOLUTION INTRAVENOUS; SUBCUTANEOUS CODE/TRAUMA/SEDATION MEDICATION
Status: COMPLETED | OUTPATIENT
Start: 2020-02-19 | End: 2020-02-19

## 2020-02-19 RX ORDER — LIDOCAINE HYDROCHLORIDE AND EPINEPHRINE 10; 10 MG/ML; UG/ML
INJECTION, SOLUTION INFILTRATION; PERINEURAL
Status: DISPENSED
Start: 2020-02-19 | End: 2020-02-20

## 2020-02-19 RX ORDER — SODIUM CHLORIDE 9 MG/ML
100 INJECTION, SOLUTION INTRAVENOUS CONTINUOUS
Status: DISCONTINUED | OUTPATIENT
Start: 2020-02-19 | End: 2020-02-20

## 2020-02-19 RX ORDER — MIDAZOLAM HYDROCHLORIDE 2 MG/2ML
INJECTION, SOLUTION INTRAMUSCULAR; INTRAVENOUS CODE/TRAUMA/SEDATION MEDICATION
Status: COMPLETED | OUTPATIENT
Start: 2020-02-19 | End: 2020-02-19

## 2020-02-19 RX ORDER — LIDOCAINE HYDROCHLORIDE 10 MG/ML
INJECTION, SOLUTION EPIDURAL; INFILTRATION; INTRACAUDAL; PERINEURAL CODE/TRAUMA/SEDATION MEDICATION
Status: COMPLETED | OUTPATIENT
Start: 2020-02-19 | End: 2020-02-19

## 2020-02-19 RX ADMIN — FENTANYL CITRATE 50 MCG: 50 INJECTION, SOLUTION INTRAMUSCULAR; INTRAVENOUS at 13:24

## 2020-02-19 RX ADMIN — LIDOCAINE HYDROCHLORIDE AND EPINEPHRINE 20 ML: 10; 10 INJECTION, SOLUTION INFILTRATION; PERINEURAL at 18:09

## 2020-02-19 RX ADMIN — PANTOPRAZOLE SODIUM 40 MG: 40 TABLET, DELAYED RELEASE ORAL at 05:14

## 2020-02-19 RX ADMIN — HEPARIN SODIUM 4000 UNITS: 1000 INJECTION INTRAVENOUS; SUBCUTANEOUS at 11:39

## 2020-02-19 RX ADMIN — ASPIRIN 81 MG 81 MG: 81 TABLET ORAL at 08:59

## 2020-02-19 RX ADMIN — MIDAZOLAM 1 MG: 1 INJECTION INTRAMUSCULAR; INTRAVENOUS at 14:07

## 2020-02-19 RX ADMIN — CLOPIDOGREL BISULFATE 600 MG: 75 TABLET ORAL at 08:59

## 2020-02-19 RX ADMIN — MONTELUKAST 10 MG: 10 TABLET, FILM COATED ORAL at 10:39

## 2020-02-19 RX ADMIN — LIDOCAINE HYDROCHLORIDE 10 ML: 10 INJECTION, SOLUTION EPIDURAL; INFILTRATION; INTRACAUDAL; PERINEURAL at 13:29

## 2020-02-19 RX ADMIN — HEPARIN SODIUM 6000 UNITS: 1000 INJECTION INTRAVENOUS; SUBCUTANEOUS at 13:48

## 2020-02-19 RX ADMIN — TIOTROPIUM BROMIDE 18 MCG: 18 CAPSULE ORAL; RESPIRATORY (INHALATION) at 09:02

## 2020-02-19 RX ADMIN — FENTANYL CITRATE 25 MCG: 50 INJECTION, SOLUTION INTRAMUSCULAR; INTRAVENOUS at 14:07

## 2020-02-19 RX ADMIN — IOHEXOL 180 ML: 350 INJECTION, SOLUTION INTRAVENOUS at 14:33

## 2020-02-19 RX ADMIN — TAMSULOSIN HYDROCHLORIDE 0.4 MG: 0.4 CAPSULE ORAL at 10:39

## 2020-02-19 RX ADMIN — DONEPEZIL HYDROCHLORIDE 10 MG: 10 TABLET ORAL at 00:24

## 2020-02-19 RX ADMIN — ATORVASTATIN CALCIUM 40 MG: 40 TABLET, FILM COATED ORAL at 15:47

## 2020-02-19 RX ADMIN — FLUTICASONE FUROATE AND VILANTEROL TRIFENATATE 1 PUFF: 100; 25 POWDER RESPIRATORY (INHALATION) at 09:02

## 2020-02-19 RX ADMIN — MIDAZOLAM 2 MG: 1 INJECTION INTRAMUSCULAR; INTRAVENOUS at 13:24

## 2020-02-19 RX ADMIN — ATENOLOL 25 MG: 25 TABLET ORAL at 21:27

## 2020-02-19 RX ADMIN — DONEPEZIL HYDROCHLORIDE 10 MG: 10 TABLET ORAL at 21:27

## 2020-02-19 RX ADMIN — MEMANTINE 5 MG: 5 TABLET ORAL at 10:39

## 2020-02-19 RX ADMIN — SODIUM CHLORIDE 75 ML/HR: 0.9 INJECTION, SOLUTION INTRAVENOUS at 10:44

## 2020-02-19 RX ADMIN — MEMANTINE 5 MG: 5 TABLET ORAL at 17:50

## 2020-02-19 RX ADMIN — ESCITALOPRAM OXALATE 10 MG: 10 TABLET ORAL at 09:03

## 2020-02-19 NOTE — QUICK NOTE
Called by nursing staff for continual right groin ooze  Manual pressure had been held for about 30 minutes minutes prior to my arrival   I held pressure for an additional 5 minutes  Right groin injected with 10 ml of Lidocaine 1% with 100,000 units epinephrine  Manual pressure held again for 10 minutes  There was continued right groin ooze, so the right groin was injected with another 10 ml of Lidocaine 1% with 100,000 units epinephrine  Manual pressure was held for an additional 15 minutes with improvement in oozing  Pressure dressing applied  No hematoma, or ecchyomsis  Bedrest extended until 9:00 p m

## 2020-02-19 NOTE — H&P
H&P- Addy Cabral 1940, 70 y o  male MRN: [id number]    Unit/Bed#: [facility] 524-01 Encounter: [id number]    Primary Care Provider: Leandro Morton DO   Date and time admitted to hospital: 2/18/2020  8:35 PM        * Type 1 myocardial infarction [facility])  Assessment & Plan  Chest pain elevated troponin  Continue heparin GTT  Continue aspirin Plavix atorvastatin beta-blocker  Cardiology following    Chronic respiratory failure with hypoxia (HCC)  Assessment & Plan  Continue supplemental oxygen to keep O2 sats more than 88-90%      Ventricular ectopy  Assessment & Plan  Continue beta-blocker  Monitor potassium, magnesium and replete  Monitor closely    Hx of CABG  Assessment & Plan  Continue aspirin Plavix statin beta-blocker    Dementia ([facility] [address] )  Assessment & Plan  Monitor for delirium  Reorientation sleep hygiene      Thrombocytopenia ([facility] [address] )  Assessment & Plan  Monitor counts    COPD (chronic obstructive pulmonary disease) ([facility] [address] )  Assessment & Plan  Continue respiratory treatments  Supportive care          VTE Prophylaxis: Heparin Drip  / sequential compression device   Code Status:  Full code  POLST: There is no POLST form on file for this patient (pre-hospital)  Discussion with family:  Discussed with the patient, he reports his family is aware    Anticipated Length of Stay:  Patient will be admitted on an Inpatient basis with an anticipated length of stay of  More than 2 midnights  Justification for Hospital Stay:  Chest pain elevated troponin present on IV heparin requiring further evaluation by Cardiology      Chief Complaint:       Transferred from [facility] cardiology evaluation    History of Present Illness:    Addy Cabral is a 70 y o  male who presents with transfer from [facility] for cardiology evaluation    Patient presented with chest pain to [facility], he was noted to have elevated troponins he was diagnosed with type 1 MI, placed on IV heparin GTT beta-blocker statin aspirin Plavix  He was evaluated by Cardiology at [facility] and was recommended to be transferred to [facility] for cardiac intervention  Presently reports mild chest discomfort, denies shortness of breath or diaphoresis  He is otherwise comfortably in bed  History chart labs medications reviewed  Review of Systems:    Review of Systems   All other systems reviewed and are negative        Past Medical and Surgical History:     Past Medical History:   Diagnosis Date    AAA (abdominal aortic aneurysm) ([facility])     Acid reflux     Acute serous otitis media of left ear     recurrence not specified     Anesthesia     "always has mental changes /lingers and lingers after anesthesia"    Anxiety     Aortic aneurysm without rupture (HCC)     Arm bruise     right inner forearm "recent IV"    At risk for falls     BPH without urinary obstruction     Cancer ([facility])     skin CA on nose    CAP (community acquired pneumonia)     Cataracts, bilateral     Change in bowel function     Clubbing of fingers     Colon polyps     Common cold     Contusion of elbow, left     initial encounter     COPD (chronic obstructive pulmonary disease) (HCC)     Coronary artery disease     Cough     Dementia (HCC)     Depression     Dizziness     upon "standing quickly on occas"    Exercise counseling     Fatigue     Full dentures     "doesn't wear them"    Glaucoma screening     High cholesterol     History of abdominal aortic aneurysm (AAA) repair 08/2017    History of bacteremia     History of chronic obstructive lung disease     History of epistaxis     History of influenza vaccination     History of kidney stones     History of pneumonia     "many times" "at least 5 times" almost always goes to sepsis"    History of sepsis 10/2017    History of sinusitis     History of skin cancer     History of sleep apnea     History of transfusion     History of urinary tract infection     [place] (hard of hearing)     Hydronephrosis with obstructing calculus     Influenza vaccine needed     Jock itch     left/saw doctor 11/8 and started on antifungal cream    Kidney stones     Left hip pain     Need for pneumococcal vaccination     Need for prophylactic vaccination and inoculation against influenza     Other emphysema ([facility] [address] )     Pancreatitis, chronic ([facility] [address] )     pt and wife can't confirm 11/10/17    Pneumonia 10/26/2017    admitted [facility]    Pulmonary emphysema ([facility] [address] )     Screening for genitourinary condition     Screening for neurological condition     Sepsis ([facility] [address] )     due to unspecified organism     Short-term memory loss     Sleep apnea     does not use CPAP   Special screening examination for neoplasm of prostate     TIA involving carotid artery     "before carotid surgery"    Ulcer     stomach, "years ago"    Unsteady gait     Use of cane as ambulatory aid     sometimes    Vitamin D deficiency     Wears glasses        Past Surgical History:   Procedure Laterality Date    ABDOMINAL AORTIC ANEURYSM REPAIR  08/23/2017    ABDOMINAL AORTIC ANEURYSM REPAIR, ENDOVASCULAR      BACK SURGERY      spinal stenosis    CARDIAC SURGERY      CABG x3    CAROTID ENDARTARECTOMY Left 11/1996    CATARACT EXTRACTION Bilateral     CORONARY ARTERY BYPASS GRAFT  01/2003    x3    CYSTOSCOPY      with insertion of ureteral stent     CYSTOSCOPY      with ureteroscopy with lithotripsy     EXCISIONAL HEMORRHOIDECTOMY      EYE SURGERY Bilateral     "for a wrinkle" after cataract surgery    HERNIA REPAIR      umbilical    LITHOTRIPSY      renal    MOUTH SURGERY      full mouth extraction     AZ COLONOSCOPY FLX DX W/COLLJ SPEC WHEN PFRMD N/A 5/16/2017    Procedure: COLONOSCOPY with polypectomies/ hot snare and tattoo;  Surgeon: Evi Mckinney MD;  Location: [facility];   Service: Gastroenterology    AZ CYSTOURETHROSCOPY N/A 11/30/2017    Procedure: Leoncio Bower;  Surgeon: Claudette Goldstein MD;  [facility];  Service: Urology    KS ESOPHAGOGASTRODUODENOSCOPY TRANSORAL DIAGNOSTIC N/A 8/18/2016    Procedure: ESOPHAGOGASTRODUODENOSCOPY (EGD); Surgeon: Kaela Lowery MD;  Location: [facility]; Service: Gastroenterology    KS REMOVE BLADDER STONE,<2 5 CM N/A 11/30/2017    Procedure: Vinay Miller;  Surgeon: Claudette Goldstein MD;  [facility];  Service: Urology    SKIN BIOPSY      TONSILLECTOMY      URETERAL STENT PLACEMENT      and removal       Meds/Allergies:    Prior to Admission medications    Medication Sig Start Date End Date Taking? Authorizing Provider   acetaminophen (TYLENOL) 500 mg tablet Take 650 mg by mouth every 6 (six) hours as needed for headaches  7/10/18  Yes Historical Provider, MD   aspirin 81 MG tablet Take 81 mg by mouth daily Took within 24 hours    Yes Historical Provider, MD   atenolol (TENORMIN) 25 mg tablet TAKE 1 TABLET AT BEDTIME 10/4/19  Yes Wendi Shelby MD   atorvastatin (LIPITOR) 20 mg tablet Take 1 tablet (20 mg total) by mouth daily at bedtime 7/15/19  Yes Mark Storm,    Bacillus Coagulans-Inulin (PROBIOTIC FORMULA) 1-250 BILLION-MG CAPS Take 1 capsule by mouth daily Record states dose is currently 4 billion   Yes Historical Provider, MD   Cholecalciferol (VITAMIN D-3 PO) Take 2,000 Units by mouth daily  Yes Historical Provider, MD   cyanocobalamin (VITAMIN B-12) 1,000 mcg tablet Take 1,000 mcg by mouth 3 (three) times a week MWF   Yes Historical Provider, MD   donepezil (ARICEPT) 10 mg tablet TAKE 1 TABLET DAILY AT     BEDTIME 2/11/20  Yes Lobo Pod, DO   escitalopram (LEXAPRO) 20 mg tablet TAKE 1 TABLET DAILY 2/10/20  Yes Lobo Pod, DO   Fluticasone-Salmeterol (AIRDUO RESPICLICK 79/33) 95-63 MCG/ACT AEPB Inhale 1 puff 2 (two) times a day AM & PM   Yes Historical Provider, MD   KRILL OIL PO Take 500 mg by mouth daily     Yes Historical Provider, MD   Melatonin 10 MG TABS Take 2 tablets by mouth daily at bedtime Taking 15mg   Yes Historical Provider, MD   memantine (NAMENDA) 5 mg tablet Take 5 mg by mouth 2 (two) times a day In the evening    Yes Historical Provider, MD   montelukast (SINGULAIR) 10 mg tablet Take 1 tablet (10 mg total) by mouth daily 7/12/19  Yes Gilberto Quispe, DO   Multiple Vitamins-Minerals (CENTRUM SILVER ADULT 50+) TABS Take 1 tablet by mouth daily   Yes Historical Provider, MD   pantoprazole (PROTONIX) 40 mg tablet Take 1 tablet (40 mg total) by mouth daily 1/16/20  Yes Gilberto Quispe, DO   tamsulosin [facility]) 0 4 mg Take 1 capsule (0 4 mg total) by mouth daily for 90 days 6/6/19 2/18/20 Yes Chula Guevara PA-C   tiotropium (SPIRIVA HANDIHALER) 18 mcg inhalation capsule Place 18 mcg into inhaler and inhale daily  Yes Historical Provider, MD   Potassium Citrate ER 15 MEQ (1620 MG) TBCR Take 1 tablet by mouth 2 (two) times a day for 90 days 6/6/19 2/10/20  Laura Guevara PA-C   QUEtiapine (SEROquel) 50 mg tablet TAKE 1 TABLET DAILY AT     BEDTIME  Patient not taking: Reported on 2/18/2020 2/10/20   Gilberto Quispe DO     Epic    Allergies:    Allergies   Allergen Reactions    Augmentin [Amoxicillin-Pot Clavulanate] Diarrhea    Ciprofloxacin Hives    Morphine And Related Other (See Comments)     Change in mental status    Levofloxacin      Headache    Wellbutrin [Bupropion]        Social History:     Marital Status: /Civil Union   Occupation:   Patient Pre-hospital Living Situation: home  Patient Pre-hospital Level of Mobility:  Independent  Patient Pre-hospital Diet Restrictions: no  Substance Use History:   Social History     Substance and Sexual Activity   Alcohol Use Not Currently    Alcohol/week: 0 0 standard drinks    Frequency: Never    Drinks per session: 1 or 2    Binge frequency: Never    Comment: quit 25 yrs ago     Social History     Tobacco Use   Smoking Status Former Smoker    Packs/day: 1 50    Years: 60 00    Pack years: 90 00    Last attempt to quit: 2014    Years since quittin 1   Smokeless Tobacco Never Used   Tobacco Comment    quit 3 yrs ago, used to be a 1-1 5 ppd smoker     Social History     Substance and Sexual Activity   Drug Use No    Comment: No illicit drug use        Family History:    Family History   Problem Relation Age of Onset    Diabetes type II Mother         mellitus    Kidney failure Father     Diabetes type II Maternal Grandmother         mellitus     Hypertension Paternal Grandmother         benign essential        Physical Exam:     Vitals:   Blood Pressure: 139/80 (20)  Pulse: 76 (20)  Temperature: 97 9 °F (36 6 °C) (20)  Temp Source: Oral (20)  Respirations: 16 (20)  Height: 5' 9" (175 3 cm) (20)  Weight - Scale: 78 6 kg (173 lb 4 5 oz) (20)  SpO2: 91 % (20)    Physical Exam    Comfortably lying in bed  Neck supple  Lungs diminished breath sounds bilaterally no additional sounds  Heart sounds S1-S2 noted  Abdomen soft nontender  Awake alert obeys simple commands  Pulses noted  No pedal edema  No rash    Additional Data:     Lab Results: I have personally reviewed pertinent reports  Results from last 7 days   Lab Units 20  0546 20  0041   WBC Thousand/uL 5 89 5 59   HEMOGLOBIN g/dL 16 4 16 9   HEMATOCRIT % 48 8 50 5*   PLATELETS Thousands/uL 100* 103*   NEUTROS PCT %  --  56   LYMPHS PCT %  --  31   MONOS PCT %  --  10   EOS PCT %  --  2     Results from last 7 days   Lab Units 20  0546 20  0041   SODIUM mmol/L 141 144   POTASSIUM mmol/L 4 4 3 6   CHLORIDE mmol/L 106 105   CO2 mmol/L 25 27   BUN mg/dL 9 9   CREATININE mg/dL 1 05 1 27   ANION GAP mmol/L 10 12   CALCIUM mg/dL 8 8 8 6   ALBUMIN g/dL  --  3 6   TOTAL BILIRUBIN mg/dL  --  1 10*   ALK PHOS U/L  --  117*   ALT U/L  --  44   AST U/L  --  41   GLUCOSE RANDOM mg/dL 110 126     Results from last 7 days   Lab Units 20  0041   INR  0 92       Imaging:  I have personally reviewed pertinent reports  EKG, Pathology, and Other Studies Reviewed on Admission:   · EKG: Sinus rhythm no ST-T wave changes    Allscripts / Epic Records Reviewed: Yes     ** Please Note: This note has been constructed using a voice recognition system   **

## 2020-02-19 NOTE — PLAN OF CARE
Problem: Potential for Falls  Goal: Patient will remain free of falls  Description  INTERVENTIONS:  - Assess patient frequently for physical needs  -  Identify cognitive and physical deficits and behaviors that affect risk of falls    -  [place] fall precautions as indicated by assessment   - Educate patient/family on patient safety including physical limitations  - Instruct patient to call for assistance with activity based on assessment  - Modify environment to reduce risk of injury  - Consider OT/PT consult to assist with strengthening/mobility  Outcome: Progressing     Problem: PAIN - ADULT  Goal: Verbalizes/displays adequate comfort level or baseline comfort level  Description  Interventions:  - Encourage patient to monitor pain and request assistance  - Assess pain using appropriate pain scale  - Administer analgesics based on type and severity of pain and evaluate response  - Implement non-pharmacological measures as appropriate and evaluate response  - Consider cultural and social influences on pain and pain management  - Notify physician/advanced practitioner if interventions unsuccessful or patient reports new pain  Outcome: Progressing     Problem: INFECTION - ADULT  Goal: Absence or prevention of progression during hospitalization  Description  INTERVENTIONS:  - Assess and monitor for signs and symptoms of infection  - Monitor lab/diagnostic results  - Monitor all insertion sites, i e  indwelling lines, tubes, and drains  - Monitor endotracheal if appropriate and nasal secretions for changes in amount and color  - Ashley appropriate cooling/warming therapies per order  - Administer medications as ordered  - Instruct and encourage patient and family to use good hand hygiene technique  - Identify and instruct in appropriate isolation precautions for identified infection/condition  Outcome: Progressing  Goal: Absence of fever/infection during neutropenic period  Description  INTERVENTIONS:  - Monitor WBC    Outcome: Progressing     Problem: SAFETY ADULT  Goal: Patient will remain free of falls  Description  INTERVENTIONS:  - Assess patient frequently for physical needs  -  Identify cognitive and physical deficits and behaviors that affect risk of falls    -  [place] fall precautions as indicated by assessment   - Educate patient/family on patient safety including physical limitations  - Instruct patient to call for assistance with activity based on assessment  - Modify environment to reduce risk of injury  - Consider OT/PT consult to assist with strengthening/mobility  Outcome: Progressing  Goal: Maintain or return to baseline ADL function  Description  INTERVENTIONS:  -  Assess patient's ability to carry out ADLs; assess patient's baseline for ADL function and identify physical deficits which impact ability to perform ADLs (bathing, care of mouth/teeth, toileting, grooming, dressing, etc )  - Assess/evaluate cause of self-care deficits   - Assess range of motion  - Assess patient's mobility; develop plan if impaired  - Assess patient's need for assistive devices and provide as appropriate  - Encourage maximum independence but intervene and supervise when necessary  - Involve family in performance of ADLs  - Assess for home care needs following discharge   - Consider OT consult to assist with ADL evaluation and planning for discharge  - Provide patient education as appropriate  Outcome: Progressing  Goal: Maintain or return mobility status to optimal level  Description  INTERVENTIONS:  - Assess patient's baseline mobility status (ambulation, transfers, stairs, etc )    - Identify cognitive and physical deficits and behaviors that affect mobility  - Identify mobility aids required to assist with transfers and/or ambulation (gait belt, sit-to-stand, lift, walker, cane, etc )  - [place] fall precautions as indicated by assessment  - Record patient progress and toleration of activity level on Mobility SBAR; progress patient to next Phase/Stage  - Instruct patient to call for assistance with activity based on assessment  - Consider rehabilitation consult to assist with strengthening/weightbearing, etc   Outcome: Progressing     Problem: DISCHARGE PLANNING  Goal: Discharge to home or other facility with appropriate resources  Description  INTERVENTIONS:  - Identify barriers to discharge w/patient and caregiver  - Arrange for needed discharge resources and transportation as appropriate  - Identify discharge learning needs (meds, wound care, etc )  - Arrange for interpretive services to assist at discharge as needed  - Refer to Case Management Department for coordinating discharge planning if the patient needs post-hospital services based on physician/advanced practitioner order or complex needs related to functional status, cognitive ability, or social support system  Outcome: Progressing     Problem: Knowledge Deficit  Goal: Patient/family/caregiver demonstrates understanding of disease process, treatment plan, medications, and discharge instructions  Description  Complete learning assessment and assess knowledge base    Interventions:  - Provide teaching at level of understanding  - Provide teaching via preferred learning methods  Outcome: Progressing     Problem: CARDIOVASCULAR - ADULT  Goal: Maintains optimal cardiac output and hemodynamic stability  Description  INTERVENTIONS:  - Monitor I/O, vital signs and rhythm  - Monitor for S/S and trends of decreased cardiac output  - Administer and titrate ordered vasoactive medications to optimize hemodynamic stability  - Assess quality of pulses, skin color and temperature  - Assess for signs of decreased coronary artery perfusion  - Instruct patient to report change in severity of symptoms  Outcome: Progressing  Goal: Absence of cardiac dysrhythmias or at baseline rhythm  Description  INTERVENTIONS:  - Continuous cardiac monitoring, vital signs, obtain 12 lead EKG if ordered  - Administer antiarrhythmic and heart rate control medications as ordered  - Monitor electrolytes and administer replacement therapy as ordered  Outcome: Progressing     Problem: HEMATOLOGIC - ADULT  Goal: Maintains hematologic stability  Description  INTERVENTIONS  - Assess for signs and symptoms of bleeding or hemorrhage  - Monitor labs  - Administer supportive blood products/factors as ordered and appropriate  Outcome: Progressing

## 2020-02-19 NOTE — PROGRESS NOTES
Progress Note - Denise Potter 1940, 70 y o  male MRN: [id number]    Unit/Bed#: [facility] 524-01 Encounter: [id number]    Primary Care Provider: Elena Nobles DO   Date and time admitted to hospital: 2020  8:35 PM        * Type 1 myocardial infarction [facility])  Assessment & Plan  Status post cardiac catheterization with drug-eluting stent to circumflex artery  Continue aspirin Plavix atorvastatin beta-blocker  Check lipid panel  Monitor on telemetry overnight  Check PT OT evaluate    Hx of CABG  Assessment & Plan  With thrombosis of graft  Continue therapy as above    Dementia ([facility] [address] )  Assessment & Plan  Monitor for delirium  Reorientation sleep hygiene  Continue Aricept, Lexapro, Namenda, Seroquel    Thrombocytopenia (HCC)  Assessment & Plan  Repeat CBC in a m  COPD (chronic obstructive pulmonary disease) (HCC)  Assessment & Plan  Continue respiratory treatments  Supportive care        VTE Pharmacologic Prophylaxis:   Pharmacologic: Heparin  Mechanical VTE Prophylaxis in Place: Yes    Patient Centered Rounds: I have performed bedside rounds with nursing staff today  Education and Discussions with Family / Patient:  Patient and family, plan of care    Time Spent for Care: 20 minutes  More than 50% of total time spent on counseling and coordination of care as described above  Current Length of Stay: 1 day(s)    Current Patient Status: Inpatient   Certification Statement: The patient will continue to require additional inpatient hospital stay due to Monitor overnight post cardiac catheterization    Discharge Plan: To be determined    Code Status: Level 1 - Full Code      Subjective:   Reports that his chest discomfort has significantly improved  Denies any shortness of breath, lightheadedness, dizziness, palpitations      Objective:     Vitals:   Temp (24hrs), Av °F (36 7 °C), Min:97 5 °F (36 4 °C), Max:98 6 °F (37 °C)    Temp:  [97 5 °F (36 4 °C)-98 6 °F (37 °C)] 97 8 °F (36 6 °C)  HR: [73-88] 75  Resp:  [16-18] 18  BP: (139-168)/(66-81) 140/72  SpO2:  [91 %-95 %] 93 %  Body mass index is 25 59 kg/m²  Input and Output Summary (last 24 hours): Intake/Output Summary (Last 24 hours) at 2/19/2020 1828  Last data filed at 2/19/2020 1044  Gross per 24 hour   Intake 1000 ml   Output 300 ml   Net 700 ml       Physical Exam:     Physical Exam   Constitutional: He appears well-developed and well-nourished  HENT:   Head: Normocephalic and atraumatic  Eyes: Pupils are equal, round, and reactive to light  EOM are normal    Neck: Neck supple  Cardiovascular: Normal rate and regular rhythm  Pulmonary/Chest: Effort normal    Abdominal: Soft  Musculoskeletal:   Right groin puncture site with saturated dressing, no palpable hematoma   Skin: Skin is warm and dry  Additional Data:     Labs:    Results from last 7 days   Lab Units 02/19/20  0412   WBC Thousand/uL 7 76   HEMOGLOBIN g/dL 17 8*   HEMATOCRIT % 52 7*   PLATELETS Thousands/uL 107*   NEUTROS PCT % 68   LYMPHS PCT % 20   MONOS PCT % 9   EOS PCT % 1     Results from last 7 days   Lab Units 02/18/20  0546 02/18/20  0041   SODIUM mmol/L 141 144   POTASSIUM mmol/L 4 4 3 6   CHLORIDE mmol/L 106 105   CO2 mmol/L 25 27   BUN mg/dL 9 9   CREATININE mg/dL 1 05 1 27   ANION GAP mmol/L 10 12   CALCIUM mg/dL 8 8 8 6   ALBUMIN g/dL  --  3 6   TOTAL BILIRUBIN mg/dL  --  1 10*   ALK PHOS U/L  --  117*   ALT U/L  --  44   AST U/L  --  41   GLUCOSE RANDOM mg/dL 110 126     Results from last 7 days   Lab Units 02/18/20  0041   INR  0 92             Results from last 7 days   Lab Units 02/19/20  0401 02/19/20  0337   LACTIC ACID mmol/L 2 6*  --    PROCALCITONIN ng/ml  --  <0 05           * I Have Reviewed All Lab Data Listed Above  * Additional Pertinent Lab Tests Reviewed:  All Labs Within Last 24 Hours Reviewed      Recent Cultures (last 7 days):     Results from last 7 days   Lab Units 02/18/20  1051   URINE CULTURE  No Growth <1000 cfu/mL       Last 24 Hours Medication List:     Current Facility-Administered Medications:  aspirin 81 mg Oral Daily Claudetta Daunt, MD    atenolol 25 mg Oral HS Claudetta Daunt, MD    atorvastatin 40 mg Oral Daily With Stanley Arrington MD    clopidogrel 75 mg Oral Daily Claudetta Daunt, MD    donepezil 10 mg Oral HS Claudetta Daunt, MD    escitalopram 10 mg Oral Daily Claudetta Daunt, MD    fluticasone-vilanterol 1 puff Inhalation Daily Claudetta Daunt, MD    lidocaine-epinephrine        memantine 5 mg Oral BID Claudetta Daunt, MD    montelukast 10 mg Oral Daily Claudetta Daunt, MD    nitroglycerin 0 4 mg Sublingual Q5 Min PRN Claudetta Daunt, MD    pantoprazole 40 mg Oral Early Morning Claudetta Daunt, MD    sodium chloride 75 mL/hr Intravenous Continuous Claudetta Daunt, MD Last Rate: 75 mL/hr (02/19/20 1044)   tamsulosin 0 4 mg Oral Daily Claudetta Daunt, MD    tiotropium 18 mcg Inhalation Daily Claudetta Daunt, MD         Today, Patient Was Seen By: Lamin Lewis MD    ** Please Note: Dictation voice to text software may have been used in the creation of this document   **

## 2020-02-19 NOTE — OCCUPATIONAL THERAPY NOTE
Occupational Therapy Cancellation Note    Orders received and chart reviewed  Pt currently off of the unit at cardiac cath lab  Will continue to follow to be seen for OT evaluation as appropriate/when medically cleared           Loy Calvert MS, OTR/L

## 2020-02-19 NOTE — ASSESSMENT & PLAN NOTE
Status post cardiac catheterization with drug-eluting stent to circumflex artery  Continue aspirin Plavix atorvastatin beta-blocker  Check lipid panel  Monitor on telemetry overnight    Check PT OT evaluate

## 2020-02-19 NOTE — SOCIAL WORK
CM met with pt to discuss DCP and cm role  Pt lives with spouse in a 2sh with 1ste  Prior to admission pt was independent with ambulation and with ADLS  No prior hx of VNA  Pt's preference for pharmacy is [facility] in [facility]  Pt denies any hx of drugs/ETOH or inpt psych stays  Patient/caregiver received discharge checklist  Content reviewed  Patient/caregiver encouraged to participate in discharge plan of care prior to discharge home  CM reviewed d/c planning process including the following: identifying help at home, patient preference for d/c planning needs, Discharge Lounge, [facility] Meds to Bed program, availability of treatment team to discuss questions or concerns patient and/or family may have regarding understanding medications and recognizing signs and symptoms once discharged  CM also encouraged patient to follow up with all recommended appointments after discharge  Patient advised of importance for patient and family to participate in managing patients medical well being

## 2020-02-19 NOTE — PROGRESS NOTES
Cardiology Progress Note - María Li 70 y o  male MRN: [id number]    [facility] Encounter: [id number]      Assessment & Plan:  Principal Problem:    Type 1 NSTEMI  -history of distant CABG  -episodes sudden onset chest heaviness with jaw pain  -troponins were initially negative and then trended up to greater than 40, now trended down to 11 7 this morning  -no significant ischemic changes on EKG  -started on heparin drip and loaded with Plavix at [address] 's before transfer  -continue with aspirin, Plavix, heparin drip, Lipitor 40 mg daily  -plan for left heart catheterization today  Active Problems:    Hx of CABG  -continue with aspirin 81 mg daily and Lipitor 40 mg daily    Ventricular ectopy  -currently on atenolol 25 mg daily    COPD (chronic obstructive pulmonary disease) (HCC)    Thrombocytopenia (HCC)    Dementia (HCC)    Chronic respiratory failure with hypoxia (HCC)      Subjective:   Patient seen and examined  No significant events overnight  Patient reports feeling well today, but is a little anxious about the heart catheterization today  Denies chest pain, shortness of breath, abdominal pain, nausea, vomiting, fever, chills or headache  Objective:     Vitals: Blood pressure 168/79, pulse 79, temperature 97 5 °F (36 4 °C), temperature source Oral, resp  rate 16, height 5' 9" (1 753 m), weight 78 6 kg (173 lb 4 5 oz), SpO2 95 %  , Body mass index is 25 59 kg/m² ,   Orthostatic Blood Pressures      Most Recent Value   Blood Pressure  168/79 filed at 02/19/2020 0700   Patient Position - Orthostatic VS  Lying filed at 02/19/2020 0258            Intake/Output Summary (Last 24 hours) at 2/19/2020 1247  Last data filed at 2/19/2020 1044  Gross per 24 hour   Intake 1000 ml   Output 300 ml   Net 700 ml           Physical Exam:    GEN: María Li appears well, alert and oriented x 3, pleasant and cooperative   HEENT: anicteric, mucous membranes moist  NECK: no jvd, carotid bruits   HEART: regular rhythm, normal S1 and S2, positive systolic murmur  LUNGS: clear to auscultation bilaterally; no wheezes, rales, or rhonchi   ABDOMEN: normal bowel sounds, soft, no tenderness, no distention  EXTREMITIES: peripheral pulses normal; no clubbing, cyanosis, or edema  NEURO: no focal findings   SKIN: normal without suspicious lesions on exposed skin      Current Facility-Administered Medications:     aspirin chewable tablet 81 mg, 81 mg, Oral, Daily, Arlene Mancera MD, 81 mg at 02/19/20 0859    atenolol (TENORMIN) tablet 25 mg, 25 mg, Oral, HS, Arlene Mancera MD, 25 mg at 02/18/20 2124    atorvastatin (LIPITOR) tablet 40 mg, 40 mg, Oral, Daily With Dinner, Arlene Mancera MD    clopidogrel (PLAVIX) tablet 75 mg, 75 mg, Oral, Daily, Arlene Mancera MD    donepezil (ARICEPT) tablet 10 mg, 10 mg, Oral, HS, Arlene Mancera MD, 10 mg at 02/19/20 0024    escitalopram (LEXAPRO) tablet 10 mg, 10 mg, Oral, Daily, Arlene Mancera MD, 10 mg at 02/19/20 0903    fluticasone-vilanterol (BREO ELLIPTA) 100-25 mcg/inh inhaler 1 puff, 1 puff, Inhalation, Daily, Arlene Mancera MD, 1 puff at 02/19/20 0902    heparin (porcine) 25,000 units in 250 mL infusion (premix), 3-20 Units/kg/hr (Order-Specific), Intravenous, Titrated, Arlene Mancera MD, Last Rate: 12 8 mL/hr at 02/19/20 1140, 16 Units/kg/hr at 02/19/20 1140    heparin (porcine) injection 2,000 Units, 2,000 Units, Intravenous, PRN, Arlene Mancera MD    heparin (porcine) injection 4,000 Units, 4,000 Units, Intravenous, PRN, Arlene Mancera MD, 4,000 Units at 02/19/20 1139    memantine (NAMENDA) tablet 5 mg, 5 mg, Oral, BID, Arlene Mancera MD, 5 mg at 02/19/20 1039    montelukast (SINGULAIR) tablet 10 mg, 10 mg, Oral, Daily, Arlene Mancera MD, 10 mg at 02/19/20 1039    nitroglycerin (NITROSTAT) SL tablet 0 4 mg, 0 4 mg, Sublingual, Q5 Min PRN, Arlene Mancera MD  [facility] pantoprazole (PROTONIX) EC tablet 40 mg, 40 mg, Oral, Early Morning, Chandler Villanueva MD, 40 mg at 02/19/20 0514    sodium chloride 0 9 % infusion, 75 mL/hr, Intravenous, Continuous, Chandler Villanueva MD, Last Rate: 75 mL/hr at 02/19/20 1044, 75 mL/hr at 02/19/20 1044    tamsulosin (FLOMAX) capsule 0 4 mg, 0 4 mg, Oral, Daily, Chandler Villanueva MD, 0 4 mg at 02/19/20 1039    tiotropium (SPIRIVA) capsule for inhaler 18 mcg, 18 mcg, Inhalation, Daily, Chandler Villanueva MD, 18 mcg at 02/19/20 0902    Labs & Results:    Lab Results   Component Value Date    TROPONINI 11 70 (H) 02/19/2020    TROPONINI 14 70 (H) 02/18/2020    TROPONINI 26 07 (HH) 02/18/2020       Lab Results   Component Value Date    GLUCOSE 77 11/29/2014    CALCIUM 8 8 02/18/2020     11/29/2014    K 4 4 02/18/2020    CO2 25 02/18/2020     02/18/2020    BUN 9 02/18/2020    CREATININE 1 05 02/18/2020       Lab Results   Component Value Date    WBC 7 76 02/19/2020    HGB 17 8 (H) 02/19/2020    HCT 52 7 (H) 02/19/2020    MCV 99 (H) 02/19/2020     (L) 02/19/2020     Results from last 7 days   Lab Units 02/18/20  0041   INR  0 92       No results found for: CHOL  Lab Results   Component Value Date    HDL 30 (L) 10/01/2018    HDL 32 (L) 04/08/2016     Lab Results   Component Value Date    LDLCALC 82 10/01/2018    LDLCALC 104 (H) 04/08/2016     Lab Results   Component Value Date    TRIG 136 10/01/2018    TRIG 81 04/08/2016       Lab Results   Component Value Date    ALT 44 02/18/2020    AST 41 02/18/2020         EKG personally reviewed by )Yvan Davey MD  No acute changes   TELE: no events overnight

## 2020-02-19 NOTE — ASSESSMENT & PLAN NOTE
Chest pain elevated troponin  Continue heparin GTT  Continue aspirin Plavix atorvastatin beta-blocker  Cardiology following

## 2020-02-19 NOTE — PHYSICAL THERAPY NOTE
Physical Therapy Cancellation Note      PT orders received, chart reviewed  Pt currently off floor at cardiac cath  PT to continue to follow and see pt as able      Sallie Swanson, PT, DPT

## 2020-02-19 NOTE — RESTORATIVE TECHNICIAN NOTE
Restorative Specialist Mobility Note       Activity: Ambulate in [place]     Assistive Device: None        Repositioned: Sitting, Up in chair

## 2020-02-19 NOTE — NURSING NOTE
Patient transferred off floor via stretcher by transport  Discharge instructions reviewed and report given to receiving nurse  Patient in no acute distress

## 2020-02-20 VITALS
WEIGHT: 167.33 LBS | RESPIRATION RATE: 16 BRPM | TEMPERATURE: 97.8 F | SYSTOLIC BLOOD PRESSURE: 128 MMHG | BODY MASS INDEX: 24.78 KG/M2 | DIASTOLIC BLOOD PRESSURE: 79 MMHG | OXYGEN SATURATION: 94 % | HEIGHT: 69 IN | HEART RATE: 69 BPM

## 2020-02-20 LAB
ANION GAP SERPL CALCULATED.3IONS-SCNC: 7 MMOL/L (ref 4–13)
ATRIAL RATE: 80 BPM
BACTERIA UR CULT: NORMAL
BASOPHILS # BLD AUTO: 0.03 THOUSANDS/ΜL (ref 0–0.1)
BASOPHILS NFR BLD AUTO: 1 % (ref 0–1)
BUN SERPL-MCNC: 13 MG/DL (ref 5–25)
CALCIUM SERPL-MCNC: 8.5 MG/DL (ref 8.3–10.1)
CHLORIDE SERPL-SCNC: 113 MMOL/L (ref 100–108)
CHOLEST SERPL-MCNC: 102 MG/DL (ref 50–200)
CO2 SERPL-SCNC: 20 MMOL/L (ref 21–32)
CREAT SERPL-MCNC: 0.86 MG/DL (ref 0.6–1.3)
EOSINOPHIL # BLD AUTO: 0.06 THOUSAND/ΜL (ref 0–0.61)
EOSINOPHIL NFR BLD AUTO: 1 % (ref 0–6)
ERYTHROCYTE [DISTWIDTH] IN BLOOD BY AUTOMATED COUNT: 14 % (ref 11.6–15.1)
GFR SERPL CREATININE-BSD FRML MDRD: 84 ML/MIN/1.73SQ M
GLUCOSE SERPL-MCNC: 95 MG/DL (ref 65–140)
HCT VFR BLD AUTO: 46.2 % (ref 36.5–49.3)
HDLC SERPL-MCNC: 26 MG/DL
HGB BLD-MCNC: 15.5 G/DL (ref 12–17)
IMM GRANULOCYTES # BLD AUTO: 0.02 THOUSAND/UL (ref 0–0.2)
IMM GRANULOCYTES NFR BLD AUTO: 0 % (ref 0–2)
LDLC SERPL CALC-MCNC: 53 MG/DL (ref 0–100)
LYMPHOCYTES # BLD AUTO: 1.08 THOUSANDS/ΜL (ref 0.6–4.47)
LYMPHOCYTES NFR BLD AUTO: 18 % (ref 14–44)
MCH RBC QN AUTO: 33 PG (ref 26.8–34.3)
MCHC RBC AUTO-ENTMCNC: 33.5 G/DL (ref 31.4–37.4)
MCV RBC AUTO: 98 FL (ref 82–98)
MONOCYTES # BLD AUTO: 0.67 THOUSAND/ΜL (ref 0.17–1.22)
MONOCYTES NFR BLD AUTO: 11 % (ref 4–12)
NEUTROPHILS # BLD AUTO: 4.3 THOUSANDS/ΜL (ref 1.85–7.62)
NEUTS SEG NFR BLD AUTO: 69 % (ref 43–75)
NONHDLC SERPL-MCNC: 76 MG/DL
NRBC BLD AUTO-RTO: 0 /100 WBCS
P AXIS: 67 DEGREES
PLATELET # BLD AUTO: 95 THOUSANDS/UL (ref 149–390)
PMV BLD AUTO: 10.2 FL (ref 8.9–12.7)
POTASSIUM SERPL-SCNC: 3.8 MMOL/L (ref 3.5–5.3)
PR INTERVAL: 218 MS
QRS AXIS: 71 DEGREES
QRSD INTERVAL: 90 MS
QT INTERVAL: 418 MS
QTC INTERVAL: 482 MS
RBC # BLD AUTO: 4.7 MILLION/UL (ref 3.88–5.62)
SODIUM SERPL-SCNC: 140 MMOL/L (ref 136–145)
T WAVE AXIS: -85 DEGREES
TRIGL SERPL-MCNC: 114 MG/DL
VENTRICULAR RATE: 80 BPM
WBC # BLD AUTO: 6.16 THOUSAND/UL (ref 4.31–10.16)

## 2020-02-20 PROCEDURE — 97166 OT EVAL MOD COMPLEX 45 MIN: CPT

## 2020-02-20 PROCEDURE — 93010 ELECTROCARDIOGRAM REPORT: CPT | Performed by: INTERNAL MEDICINE

## 2020-02-20 PROCEDURE — 85025 COMPLETE CBC W/AUTO DIFF WBC: CPT | Performed by: INTERNAL MEDICINE

## 2020-02-20 PROCEDURE — 80061 LIPID PANEL: CPT | Performed by: INTERNAL MEDICINE

## 2020-02-20 PROCEDURE — 97162 PT EVAL MOD COMPLEX 30 MIN: CPT

## 2020-02-20 PROCEDURE — 80048 BASIC METABOLIC PNL TOTAL CA: CPT | Performed by: INTERNAL MEDICINE

## 2020-02-20 PROCEDURE — 99232 SBSQ HOSP IP/OBS MODERATE 35: CPT | Performed by: INTERNAL MEDICINE

## 2020-02-20 RX ORDER — ATORVASTATIN CALCIUM 40 MG/1
20 TABLET, FILM COATED ORAL
Qty: 30 TABLET | Refills: 0 | Status: CANCELLED | OUTPATIENT
Start: 2020-02-20

## 2020-02-20 RX ORDER — CLOPIDOGREL BISULFATE 75 MG/1
75 TABLET ORAL DAILY
Qty: 30 TABLET | Refills: 0 | Status: SHIPPED | OUTPATIENT
Start: 2020-02-21 | End: 2020-03-02 | Stop reason: SDUPTHER

## 2020-02-20 RX ADMIN — FLUTICASONE FUROATE AND VILANTEROL TRIFENATATE 1 PUFF: 100; 25 POWDER RESPIRATORY (INHALATION) at 08:33

## 2020-02-20 RX ADMIN — MONTELUKAST 10 MG: 10 TABLET, FILM COATED ORAL at 08:33

## 2020-02-20 RX ADMIN — MEMANTINE 5 MG: 5 TABLET ORAL at 08:33

## 2020-02-20 RX ADMIN — CLOPIDOGREL BISULFATE 75 MG: 75 TABLET ORAL at 08:33

## 2020-02-20 RX ADMIN — ASPIRIN 81 MG 81 MG: 81 TABLET ORAL at 08:37

## 2020-02-20 RX ADMIN — PANTOPRAZOLE SODIUM 40 MG: 40 TABLET, DELAYED RELEASE ORAL at 05:08

## 2020-02-20 RX ADMIN — ESCITALOPRAM OXALATE 10 MG: 10 TABLET ORAL at 08:33

## 2020-02-20 RX ADMIN — SODIUM CHLORIDE 75 ML/HR: 0.9 INJECTION, SOLUTION INTRAVENOUS at 00:05

## 2020-02-20 RX ADMIN — TIOTROPIUM BROMIDE 18 MCG: 18 CAPSULE ORAL; RESPIRATORY (INHALATION) at 08:34

## 2020-02-20 RX ADMIN — TAMSULOSIN HYDROCHLORIDE 0.4 MG: 0.4 CAPSULE ORAL at 08:33

## 2020-02-20 NOTE — NURSING NOTE
Brisk oozing from right femoral puncture site  No palpable hematoma  Pressure held for 25 minutes  Cardiology fellow Dr Susu Guzman notified and here at bedside 
penile discharge

## 2020-02-20 NOTE — PROGRESS NOTES
Cardiology Progress Note - Tanisha Pizarro 70 y o  male MRN: [id number]    Unit/Bed#: [facility] 524-01 Encounter: [id number]      Assessment:  Principal Problem:    Type 1 myocardial infarction [facility])  Active Problems:    COPD (chronic obstructive pulmonary disease) (HCC)    Thrombocytopenia (HCC)    Dementia (HCC)    Hx of CABG    Ventricular ectopy    Chronic respiratory failure with hypoxia ([address] )      Plan:  Patient is comfortable today  He has no chest pain or significant dyspnea  He is on supplemental oxygen which he tells me he uses at home at 2 L nasal cannula  Patient had cardiac catheterization yesterday with PCI of a 90% proximal circumflex lesion  Saphenous vein graft to second obtuse marginal branch with 99% stenosis which will be managed medically given PCI to native left circumflex  LAD and right coronary artery with noncritical disease  BMP today with potassium of 3 8 and creatinine of 0 86  Lipid profile is acceptable  Will require dual anti-platelet therapy for one year minimum and then aspirin indefinitely  Patient has been ambulating without difficulty  Patient okay for discharge planning from cardiac point of view and will follow up with his primary cardiologist Dr Gonzales Granda as an outpatient  Subjective:   Patient seen and examined  No significant events overnight   negative  Objective:     Vitals: Blood pressure 125/77, pulse 79, temperature 97 7 °F (36 5 °C), temperature source Oral, resp  rate 16, height 5' 9" (1 753 m), weight 75 9 kg (167 lb 5 3 oz), SpO2 94 %  , Body mass index is 24 71 kg/m² ,   Orthostatic Blood Pressures      Most Recent Value   Blood Pressure  125/77 filed at 02/20/2020 0742   Patient Position - Orthostatic VS  Lying filed at 02/20/2020 0742      ,      Intake/Output Summary (Last 24 hours) at 2/20/2020 0858  Last data filed at 2/20/2020 0839  Gross per 24 hour   Intake 2883 75 ml   Output 1675 ml   Net 1208 75 ml       No significant arrhythmias seen on telemetry review         Physical Exam:    GEN: Devin Mancuso appears well, alert and oriented x 3, pleasant and cooperative   NECK: supple, no carotid bruits, no JVD or HJR  HEART: normal rate, regular rhythm, normal S1 and S2, no murmurs, clicks, gallops or rubs   LUNGS: clear to auscultation bilaterally; no wheezes, rales, or rhonchi   ABDOMEN: normal bowel sounds, soft, no tenderness, no distention  EXTREMITIES: peripheral pulses normal; no clubbing, cyanosis, or edema  SKIN: warm and well perfused, no suspicious lesions on exposed skin    Labs & Results:    Admission on 02/18/2020   Component Date Value    PTT 02/18/2020 65*    Troponin I 02/18/2020 14 70*    Troponin I 02/19/2020 11 70*    Urine Culture 02/19/2020 0802-5064 cfu/ml      PTT 02/19/2020 62*    WBC 02/19/2020 7 76     RBC 02/19/2020 5 34     Hemoglobin 02/19/2020 17 8*    Hematocrit 02/19/2020 52 7*    MCV 02/19/2020 99*    MCH 02/19/2020 33 3     MCHC 02/19/2020 33 8     RDW 02/19/2020 14 1     MPV 02/19/2020 10 2     Platelets 41/24/8544 107*    nRBC 02/19/2020 0     Neutrophils Relative 02/19/2020 68     Immat GRANS % 02/19/2020 1     Lymphocytes Relative 02/19/2020 20     Monocytes Relative 02/19/2020 9     Eosinophils Relative 02/19/2020 1     Basophils Relative 02/19/2020 1     Neutrophils Absolute 02/19/2020 5 36     Immature Grans Absolute 02/19/2020 0 05     Lymphocytes Absolute 02/19/2020 1 51     Monocytes Absolute 02/19/2020 0 71     Eosinophils Absolute 02/19/2020 0 08     Basophils Absolute 02/19/2020 0 05     LACTIC ACID 02/19/2020 2 6*    Procalcitonin 02/19/2020 <0 05     PTT 02/19/2020 38*    Activated Clotting Time,* 02/19/2020 605*    Specimen Type 02/19/2020 VENOUS     Activated Clotting Time,* 02/19/2020 322*    Specimen Type 02/19/2020 VENOUS     Cholesterol 02/20/2020 102     Triglycerides 02/20/2020 114     HDL, Direct 02/20/2020 26*    LDL Calculated 02/20/2020 53     Non-HDL-Chol (CHOL-HDL) 02/20/2020 76     WBC 02/20/2020 6 16     RBC 02/20/2020 4 70     Hemoglobin 02/20/2020 15 5     Hematocrit 02/20/2020 46 2     MCV 02/20/2020 98     MCH 02/20/2020 33 0     MCHC 02/20/2020 33 5     RDW 02/20/2020 14 0     MPV 02/20/2020 10 2     Platelets 35/35/3825 95*    nRBC 02/20/2020 0     Neutrophils Relative 02/20/2020 69     Immat GRANS % 02/20/2020 0     Lymphocytes Relative 02/20/2020 18     Monocytes Relative 02/20/2020 11     Eosinophils Relative 02/20/2020 1     Basophils Relative 02/20/2020 1     Neutrophils Absolute 02/20/2020 4 30     Immature Grans Absolute 02/20/2020 0 02     Lymphocytes Absolute 02/20/2020 1 08     Monocytes Absolute 02/20/2020 0 67     Eosinophils Absolute 02/20/2020 0 06     Basophils Absolute 02/20/2020 0 03     Sodium 02/20/2020 140     Potassium 02/20/2020 3 8     Chloride 02/20/2020 113*    CO2 02/20/2020 20*    ANION GAP 02/20/2020 7     BUN 02/20/2020 13     Creatinine 02/20/2020 0 86     Glucose 02/20/2020 95     Calcium 02/20/2020 8 5     eGFR 02/20/2020 84     Ventricular Rate 02/19/2020 80     Atrial Rate 02/19/2020 80     ND Interval 02/19/2020 218     QRSD Interval 02/19/2020 90     QT Interval 02/19/2020 418     QTC Interval 02/19/2020 482     P Axis 02/19/2020 67     QRS Axis 02/19/2020 71     T Wave Axis 02/19/2020 -85        Pe Study With Ct Abdomen And Pelvis With Contrast    Result Date: 2/18/2020  Narrative: CT PULMONARY ANGIOGRAM OF THE CHEST AND CT ABDOMEN AND PELVIS WITH INTRAVENOUS CONTRAST INDICATION:   Abdominal pain, unspecified PE suspected, high pretest prob Elevated D-dimer  COMPARISON:  CT abdomen and pelvis dated 1/3/2020, CTA chest dated 7/2/2019 TECHNIQUE:  CT examination of the chest, abdomen and pelvis was performed    Thin section CT angiographic technique was used in the chest in order to evaluate for pulmonary embolus and coronal 3D MIP postprocessing was performed on the acquisition scanner  Axial, sagittal, and coronal 2D reformatted images were created from the source data and submitted for interpretation  Radiation dose length product (DLP) for this visit:  883 17 mGy-cm   This examination, like all CT scans performed in the [facility], was performed utilizing techniques to minimize radiation dose exposure, including the use of iterative  reconstruction and automated exposure control  IV Contrast:  100 mL of iohexol (OMNIPAQUE) Enteric Contrast:  Enteric contrast was not administered  FINDINGS: CHEST PULMONARY ARTERIAL TREE:  No pulmonary embolus is seen  LUNGS:  Extensive bilateral pulmonary emphysematous changes  Some suspected focal scarring is redemonstrated in the right upper lobe, similar to the prior  There is redemonstration of a 11 to 12 mm pulmonary nodule in the posterior right lower lobe (axial image 143, series 2), similar to the prior  Mild atelectasis/scarring suspected dependently and in the bilateral lower lung fields, lungs otherwise appear grossly clear  PLEURA:  Paraseptal emphysematous changes, most prominent in the upper lobes HEART/AORTA:  Moderate aortic and moderate to severe coronary atherosclerosis  Status post CABG  No thoracic aortic aneurysm  MEDIASTINUM AND DAVID:  Increased AP diameter of the trachea, probably related to COPD  Otherwise grossly unremarkable CHEST WALL AND LOWER NECK:   Median sternotomy changes, otherwise grossly unremarkable ABDOMEN LIVER/BILIARY TREE:  Unremarkable  GALLBLADDER:  Normal caliber, no discrete stones or gallbladder inflammation is seen  SPLEEN:  Unremarkable  PANCREAS:  Unremarkable  ADRENAL GLANDS:  Unremarkable  KIDNEYS/URETERS:  Bilateral nonobstructing stones, largest measures approximately 4 mm in size in the lower pole of the left kidney  Bilateral renal cortical scarring is noted  No discrete suspicious appearing renal lesion is seen  No hydronephrosis    Circumaortic left renal vein incidentally noted  STOMACH AND BOWEL:  Scattered colonic diverticulosis  No discrete evidence of acute diverticulitis  No discrete evidence of bowel obstruction  Otherwise grossly unremarkable  APPENDIX:  No findings to suggest appendicitis  ABDOMINOPELVIC CAVITY:  No ascites or free intraperitoneal air  No lymphadenopathy  VESSELS:  Redemonstration of a fusiform abdominal aortic aneurysm which measures approximately 5 9 x 5 7 cm in diameter, similar to the prior, status post endovascular repair  No discrete evidence of endoleak  PELVIS REPRODUCTIVE ORGANS:  Enlarged and nodular prostate with prostate volume estimated at 62 mL  URINARY BLADDER:  Bladder is partially distended  Mild circumferential bladder wall thickening noted, possibly exaggerated by underdistention  Otherwise grossly unremarkable  ABDOMINAL WALL/INGUINAL REGIONS:  Grossly unremarkable OSSEOUS STRUCTURES: Mild degenerative changes of the bilateral hips  No acute fracture or destructive osseous lesion  Impression: No pulmonary embolus is seen  No acute process seen in the chest  Extensive bilateral pulmonary emphysematous changes  Some suspected focal scarring is redemonstrated in the right upper lobe, similar to the prior  There is redemonstration of a 11 to 12 mm pulmonary nodule in the posterior right lower lobe (axial image 143, series 2), similar to the prior  Moderate aortic and moderate to severe coronary atherosclerosis  Status post CABG  No thoracic aortic aneurysm  Partially distended bladder  Mild circumferential bladder wall thickening noted, possibly exaggerated by underdistention  Consider a superimposed cystitis in the appropriate clinical setting  No acute process seen in the abdomen/pelvis, otherwise Redemonstration of a fusiform abdominal aortic aneurysm which measures approximately 5 9 x 5 7 cm in diameter, similar to the prior, status post endovascular repair  No discrete evidence of endoleak    Renal cortical scarring with bilateral nephrolithiasis, no hydronephrosis  Colonic diverticulosis without evidence of acute diverticulitis, enlarged prostate, and other findings as above  Workstation performed: [id number]     Vas Lower Limb Venous Duplex Study, Complete Bilateral    Result Date: 2/19/2020  Narrative:  [facility] REPORT CLINICAL: Indications: Elevated d-dimer and chest pain today  No history of DVT, on 81mg aspirin daily  Operative History: Left CEA Risk Factors The patient has history of HLD  He has no history of HTN or Diabetes  FINDINGS:  Segment  Right            Left              Impression       Impression       CFV      Normal (Patent)  Normal (Patent)     CONCLUSION: Impression: RIGHT LOWER LIMB: No evidence of acute or chronic deep vein thrombosis  No evidence of superficial thrombophlebitis noted  Doppler evaluation shows a normal response to augmentation maneuvers  Popliteal, posterior tibial and anterior tibial arterial Doppler waveforms are triphasic  LEFT LOWER LIMB: No evidence of acute or chronic deep vein thrombosis  No evidence of superficial thrombophlebitis noted  Doppler evaluation shows a normal response to augmentation maneuvers  Popliteal, posterior tibial and anterior tibial arterial Doppler waveforms are triphasic  SIGNATURE: Electronically Signed by: Mayank Fischer MD on 2020-02-19 05:47:01 AM      EKG personally reviewed by Penelope Briseno MD      Counseling / Coordination of Care  Total floor / unit time spent today 30 minutes  Greater than 50% of total time was spent with the patient and / or family counseling and / or coordination of care

## 2020-02-20 NOTE — OCCUPATIONAL THERAPY NOTE
Occupational Therapy Evaluation      María Li    2/20/2020    Principal Problem:    Type 1 myocardial infarction [facility])  Active Problems:    COPD (chronic obstructive pulmonary disease) ([facility])    Thrombocytopenia (HCC)    Dementia (HCC)    Hx of CABG    Ventricular ectopy    Chronic respiratory failure with hypoxia ([facility])      Past Medical History:   Diagnosis Date    AAA (abdominal aortic aneurysm) ([facility])     Acid reflux     Acute serous otitis media of left ear     recurrence not specified     Anesthesia     "always has mental changes /lingers and lingers after anesthesia"    Anxiety     Aortic aneurysm without rupture ([address] )     Arm bruise     right inner forearm "recent IV"    At risk for falls     BPH without urinary obstruction     Cancer ([address] )     skin CA on nose    CAP (community acquired pneumonia)     Cataracts, bilateral     Change in bowel function     Clubbing of fingers     Colon polyps     Common cold     Contusion of elbow, left     initial encounter     COPD (chronic obstructive [facility] disease) ([facility]     Coronary artery disease     Cough     Dementia (HCC)     Depression     Dizziness     upon "standing quickly on occas"    Exercise counseling     Fatigue     Full dentures     "doesn't wear them"    Glaucoma screening     High cholesterol     History of abdominal aortic aneurysm (AAA) repair 08/2017    History of bacteremia     History of chronic obstructive lung disease     History of epistaxis     History of influenza vaccination     History of kidney stones     History of pneumonia     "many times" "at least 5 times" almost always goes to sepsis"    History of sepsis 10/2017    History of sinusitis     History of skin cancer     History of sleep apnea     History of transfusion     History of urinary tract infection     [place] (hard of hearing)     Hydronephrosis with obstructing calculus     Influenza vaccine needed     Jock itch     left/saw doctor 11/8 and started on antifungal cream    Kidney stones     Left hip pain     Need for pneumococcal vaccination     Need for prophylactic vaccination and inoculation against influenza     Other emphysema [facility]     Pancreatitis, chronic ([facility] )     pt and wife can't confirm 11/10/17    Pneumonia 10/26/2017    admitted [facility]    Pulmonary emphysema ([facility] )     Screening for genitourinary condition     Screening for neurological condition     Sepsis ([facility] )     due to unspecified organism     Short-term memory loss     Sleep apnea     does not use CPAP   Special screening examination for neoplasm of prostate     TIA involving carotid artery     "before carotid surgery"    Ulcer     stomach, "years ago"    Unsteady gait     Use of cane as ambulatory aid     sometimes    Vitamin D deficiency     Wears glasses        Past Surgical History:   Procedure Laterality Date    ABDOMINAL AORTIC ANEURYSM REPAIR  08/23/2017    ABDOMINAL AORTIC ANEURYSM REPAIR, ENDOVASCULAR      BACK SURGERY      spinal stenosis    CARDIAC SURGERY      CABG x3    CAROTID ENDARTARECTOMY Left 11/1996    CATARACT EXTRACTION Bilateral     CORONARY ARTERY BYPASS GRAFT  01/2003    x3    CYSTOSCOPY      with insertion of ureteral stent     CYSTOSCOPY      with ureteroscopy with lithotripsy     EXCISIONAL HEMORRHOIDECTOMY      EYE SURGERY Bilateral     "for a wrinkle" after cataract surgery    HERNIA REPAIR      umbilical    LITHOTRIPSY      renal    MOUTH SURGERY      full mouth extraction     IN COLONOSCOPY FLX DX W/COLLJ SPEC WHEN PFRMD N/A 5/16/2017    Procedure: COLONOSCOPY with polypectomies/ hot snare and tattoo;  Surgeon: Mimi Poe MD;  [facility];   Service: Gastroenterology    IN CYSTOURETHROSCOPY N/A 11/30/2017    Procedure: Malgorzata Samano;  Surgeon: Elizabeth Mcleod MD;  [facility];  Service: Urology    IN ESOPHAGOGASTRODUODENOSCOPY TRANSORAL DIAGNOSTIC N/A 8/18/2016    Procedure: ESOPHAGOGASTRODUODENOSCOPY (EGD); Surgeon: Bryson Murphy MD;  [facility]; Service: Gastroenterology    [place] REMOVE BLADDER STONE,<2 5 CM N/A 11/30/2017    Procedure: Naa Calle;  Surgeon: Finesse Munoz MD;  [facility];  Service: Urology    SKIN BIOPSY      TONSILLECTOMY      URETERAL STENT PLACEMENT      and removal        02/20/20 0948   Note Type   Note type Eval only   Restrictions/Precautions   Weight Bearing Precautions Per Order No   Other Precautions Multiple lines   Pain Assessment   Pain Assessment No/denies pain   Pain Score No Pain   Home Living   Type of Home House   Home Layout Multi-level; Able to live on main level with bedroom/bathroom; Bed/bath upstairs  (1STE)   Bathroom Shower/Tub Walk-in shower   Bathroom Toilet Standard   Bathroom Equipment Grab bars in shower;Grab bars around toilet   [address] Walker;Cane   Additional Comments Pt denies use of DME PTA   Prior Function   Level of [place] Independent with ADLs and functional mobility   Lives With Spouse   Receives Help From Family   ADL Assistance Independent   IADLs Independent   Falls in the last 6 months 0   Vocational Retired   Lifestyle   Autonomy Pt reports being IND w/ all ADLS and IADLS; (+) drives PTA   Reciprocal Relationships Pt lives w/ supportive spouse who is home and in good health to provide A as needed      Service to Others Pt is retired   Intrinsic Gratification Pt reports enjoying crafting and painting   Psychosocial   Psychosocial (WDL) WDL   ADL   Where Assessed Chair   Eating Assistance 5  Supervision/Setup   Grooming Assistance 5  Supervision/Setup   UB [address] 5  Supervision/Setup   LB [address] 5  Supervision/Setup   UB Dressing Assistance 5  Supervision/Setup    [phone]  5  Supervision/Setup   Bed Mobility   Rolling L Unable to assess   Supine to Sit Unable to assess   Additional Comments Pt seated OOB in chair upon OT arrival  Pt seated OOB in chair w/ all needs in reach s/p OT session  Transfers   Sit to Stand 5  Supervision   Additional items Increased time required   Stand to Sit 5  Supervision   Additional items Increased time required   Additional Comments Transfers w/o AD  Functional Mobility   Functional Mobility 5  Supervision   Additional Comments Pt demonstrated long distance household mobility in hallway w/o AD at S level  Additional items   (none)   Balance   Static Sitting Good   Dynamic Sitting Fair +   Static Standing Fair   Dynamic Standing Fair -   Ambulatory Fair -   Activity Tolerance   Activity Tolerance Patient tolerated treatment well   Medical Staff Made Aware PT Veleta Rina; CM for safe dispo planning   Nurse Made Aware RN cleared Pt for OT eval   RUE Assessment   RUE Assessment WFL   LUE Assessment   LUE Assessment WFL   Hand Function   Gross Motor Coordination Functional   Fine Motor Coordination Functional   Sensation   Light Touch No apparent deficits   Cognition   Overall Cognitive Status WFL   Arousal/Participation Alert; Cooperative   Attention Within functional limits   Orientation Level Oriented X4   Memory Within functional limits   Following Commands Follows one step commands without difficulty   Comments Pt is pleasant and cooperative to participate in therapy this day  Pt required few VC for safety awareness t/o session  Assessment   Limitation Decreased endurance;Decreased high-level ADLs   Prognosis Good   Assessment Pt is a 69 yo male seen for OT eval s/p adm to [facility] w/ chest pain dx'd w/ type I MI  Comorbidities include a h/o COPD, thrombocytopenia, dementia, CABG, chronic respiratory failure, hypercholesterolemia, bradycardia, SOB, CAD  Pt with active OT orders  Pt is retired and resides w/ spouse 3SH w/ 1STE  Pt's spouse is home and is able to provide A as needed   Pt was I w/  ADLS and IADLS, does not drive, & required no use of DME PTA  Pt is currently demonstrating the following occupational deficits: S all self care, transfers, and mobility w/o AD  These deficits that are impacting pt's baseline areas of occupation are a result of the following impairments: endurance and decreased I w/ ADLS/IADLS  Based on the aforementioned OT evaluation, functional performance deficits, and assessments, pt has been identified as a moderate complexity evaluation  Recommend home with family support upon D/C  Pt  Appears to be functioning close to baseline levels  No further acute care OT needs at this time  Will D/C OT  Please re-consult if appropriate      Goals   Patient Goals To return home   Recommendation   OT Discharge Recommendation Home with family support   OT - OK to Discharge Yes  (when medically cleared)   Modified Mica Scale   Modified [place] Scale 2           Milvia Jewell MS, OTR/L

## 2020-02-20 NOTE — PHYSICAL THERAPY NOTE
Physical Therapy Evaluation     Patient's Name: Sulma Peterson    Admitting Diagnosis  Chest pain, cardiac [R07 9]    Problem List  Patient Active Problem List   Diagnosis    COPD (chronic obstructive pulmonary disease) ([address] )    Hypercholesterolemia    GERD (gastroesophageal reflux disease)    Microscopic hematuria    Abnormal CT scan, chest    Abdominal aortic aneurysm (HCC)    Thrombocytopenia (HCC)    History of aortic aneurysm repair    Benign prostatic hyperplasia    Dementia ([address] )    Bradycardia    SOB (shortness of breath)    Pulmonary nodule    Ventricular bigeminy    Coronary artery disease involving native coronary artery of native heart without angina pectoris    Hx of CABG    Bilateral carotid artery stenosis    Elevated bilirubin    Tracheobronchitis    CAD (coronary artery disease)    Elevated serum creatinine    Leukocytosis    D-dimer, elevated    Episode of unresponsiveness    Dementia of the Alzheimer's type, with late onset, with delirium ([address] )    Chest pain    Elevated MCV    Type 1 myocardial infarction ([address] )    Grade I diastolic dysfunction    Ventricular ectopy    Premature atrial contractions    1st degree AV block    Bladder distension    Bladder wall thickening    Chronic respiratory failure with hypoxia ([address] )       Past Medical History  Past Medical History:   Diagnosis Date    [facility])     Acid reflux     Acute serous otitis media of left ear     recurrence not specified     Anesthesia     "always has mental changes /lingers and lingers after anesthesia"    Anxiety     Aortic aneurysm without rupture ([address] )     Arm bruise     right inner forearm "recent IV"    At risk for falls     BPH without urinary obstruction     Cancer ([address] )     skin CA on nose    CAP (community acquired pneumonia)     Cataracts, bilateral     Change in bowel function     Clubbing of fingers     Colon polyps     Common cold     Contusion of elbow, left     initial encounter     COPD (chronic obstructive pulmonary disease) (HCC)     Coronary artery disease     Cough     Dementia (HCC)     Depression     Dizziness     upon "standing quickly on occas"    Exercise counseling     Fatigue     Full dentures     "doesn't wear them"    Glaucoma screening     High cholesterol     History of abdominal aortic aneurysm (AAA) repair 08/2017    History of bacteremia     History of chronic obstructive lung disease     History of epistaxis     History of influenza vaccination     History of kidney stones     History of pneumonia     "many times" "at least 5 times" almost always goes to sepsis"    History of sepsis 10/2017    History of sinusitis     History of skin cancer     History of sleep apnea     History of transfusion     History of urinary tract infection     [place] (hard of hearing)     Hydronephrosis with obstructing calculus     Influenza vaccine needed     Jock itch     left/saw doctor 11/8 and started on antifungal cream    Kidney stones     Left hip pain     Need for pneumococcal vaccination     Need for prophylactic vaccination and inoculation against influenza     Other emphysema ([address] )     Pancreatitis, chronic ([address] )     pt and wife can't confirm 11/10/17    Pneumonia 10/26/2017    admitted [facility]    Pulmonary emphysema ([address] )     Screening for genitourinary condition     Screening for neurological condition     Sepsis ([address] )     due to unspecified organism     Short-term memory loss     Sleep apnea     does not use CPAP      Special screening examination for neoplasm of prostate     TIA involving carotid artery     "before carotid surgery"    Ulcer     stomach, "years ago"    Unsteady gait     Use of cane as ambulatory aid     sometimes    Vitamin D deficiency     Wears glasses        Past Surgical History  Past Surgical History:   Procedure Laterality Date    ABDOMINAL AORTIC ANEURYSM REPAIR 08/23/2017    ABDOMINAL AORTIC ANEURYSM REPAIR, ENDOVASCULAR      BACK SURGERY      spinal stenosis    CARDIAC SURGERY      CABG x3    CAROTID ENDARTARECTOMY Left 11/1996    CATARACT EXTRACTION Bilateral     CORONARY ARTERY BYPASS GRAFT  01/2003    x3    CYSTOSCOPY      with insertion of ureteral stent     CYSTOSCOPY      with ureteroscopy with lithotripsy     EXCISIONAL HEMORRHOIDECTOMY      EYE SURGERY Bilateral     "for a wrinkle" after cataract surgery    HERNIA REPAIR      umbilical    LITHOTRIPSY      renal    MOUTH SURGERY      full mouth extraction     [place] COLONOSCOPY FLX DX W/COLLJ SPEC WHEN PFRMD N/A 5/16/2017    Procedure: COLONOSCOPY with polypectomies/ hot snare and tattoo;  Surgeon: Trevor Correa MD;  [facility]; Service: Gastroenterology    [place] CYSTOURETHROSCOPY N/A 11/30/2017    Procedure: Welford Gallery;  Surgeon: Amalia Baker MD;  [facility];  Service: Urology    [place] ESOPHAGOGASTRODUODENOSCOPY TRANSORAL DIAGNOSTIC N/A 8/18/2016    Procedure: ESOPHAGOGASTRODUODENOSCOPY (EGD); Surgeon: Trevor Correa MD;  [facility];   Service: Gastroenterology    [place] REMOVE BLADDER STONE,<2 5 CM N/A 11/30/2017    Procedure: Durga Glance;  Surgeon: Amalia Baker MD;  [facility];  Service: Urology    SKIN BIOPSY      TONSILLECTOMY      URETERAL STENT PLACEMENT      and removal        02/20/20 0947   Note Type   Note type Eval only   Pain Assessment   Pain Assessment No/denies pain   Pain Score No Pain   Home Living   Type of Home House   Home Layout Multi-level  (1STE)   Bathroom Shower/Tub Walk-in shower   Bathroom Toilet Standard   Home Equipment Walker;Cane  (does not use PTA, home O2 2L)   Prior Function   Level of [place] Independent with ADLs and functional mobility   Lives With Spouse   ADL Assistance Independent   IADLs Independent   Falls in the last 6 months 0   Restrictions/Precautions   Weight Bearing Precautions Per Order No   Braces or Orthoses   (none)   Other Precautions Multiple lines   General   Family/Caregiver Present No   Cognition   Overall Cognitive Status WFL   Arousal/Participation Alert   Orientation Level Oriented X4   Memory Within functional limits   Following Commands Follows multistep commands with increased time or repetition   Comments Pt requires some cues for safety throughout session  RLE Assessment   RLE Assessment WFL   LLE Assessment   LLE Assessment WFL   Bed Mobility   Additional Comments OOB in chair upon PT arrival   Pt left upright in bedside chair with chair alarm intact and all needs in reach  Transfers   Sit to Stand 5  Supervision   Stand to Sit 5  Supervision   Additional Comments Transfers without AD  VC for hand placement and safety  Ambulation/Elevation   Gait pattern WNL   Gait Assistance 5  Supervision   Assistive Device None   Distance 40 ft x 2   Stair Management Assistance 5  Supervision   Stair Management Technique Foreward; One rail L;Reciprocal   Number of Stairs 4   Balance   Static Sitting Good   Dynamic Sitting Fair +   Static Standing Fair   Dynamic Standing Fair -   Ambulatory Fair -   Endurance Deficit   Endurance Deficit No   Activity Tolerance   Activity Tolerance Patient tolerated treatment well   Medical Staff Made Aware OT Tamie   Nurse Made Aware RN cleared pt to be seen by PT   Assessment   Prognosis Good   Assessment Pt seen for moderate complexity PT evaluation due to decrease in functional mobility status compared to baseline  Pt with active PT eval/treat orders at this time  Pt is a 70 y o  yo M who presented to [facility] with type 1 MI on 2/18/20    Pt  has a past medical history of AAA (abdominal aortic aneurysm) (HCC), Acid reflux, Acute serous otitis media of left ear, Anesthesia, Anxiety, Aortic aneurysm without rupture ([address] ), Arm bruise, At risk for falls, BPH without urinary obstruction, Cancer ([address] ), CAP (community acquired pneumonia), Cataracts, bilateral, Change in bowel function, Clubbing of fingers, Colon polyps, Common cold, Contusion of elbow, left, COPD (chronic obstructive pulmonary disease) ([facility] ), Coronary artery disease, Cough, Dementia (HCC), Depression, Dizziness, Exercise counseling, Fatigue, Full dentures, Glaucoma screening, High cholesterol, History of abdominal aortic aneurysm (AAA) repair, History of bacteremia, History of chronic obstructive lung disease, History of epistaxis, History of influenza vaccination, History of kidney stones, History of pneumonia, History of sepsis, History of sinusitis, History of skin cancer, History of sleep apnea, History of transfusion, History of urinary tract infection, [place] (hard of hearing), Hydronephrosis with obstructing calculus, Influenza vaccine needed, Jock itch, Kidney stones, Left hip pain, Need for pneumococcal vaccination, Need for prophylactic vaccination and inoculation against influenza, Other emphysema ([facility] ), Pancreatitis, chronic ([facility] ), Pneumonia, Pulmonary emphysema ([facility] ), Screening for genitourinary condition, Screening for neurological condition, Sepsis ([facility] ), Short-term memory loss, Sleep apnea, Special screening examination for neoplasm of prostate, TIA involving carotid artery, Ulcer, Unsteady gait, Use of cane as ambulatory aid, Vitamin D deficiency, and Wears glasses  He also has no past medical history of Arthritis, Asthma, CHF (congestive heart failure) ([facility] ), Diabetes mellitus ([facility] ), Disease of thyroid gland, Hypertension, Psychiatric disorder, or Stroke ([facility] )  Pt resides with spouse in multilevel home with 1STE and reports I PTA  Pt requires supervision for all functional transfers, functional mobility, and stair negotiation at this time  Pt left upright in bedside chair with chair alarm intact and with all needs in reach  Pt denies any concerns regarding mobility upon DC    PT to DC pt as pt has no further acute skilled PT needs and recommending continued partici Barriers to Discharge None   Goals   Patient Goals to go home   Recommendation   Recommendation Home with family support;D/C PT   Equipment Recommended   (none)   PT - OK to Discharge Yes  (once medically cleared)   Modified Mica Scale   Modified [place] Scale 2           Tasha Bauer, PT, DPT

## 2020-02-20 NOTE — PLAN OF CARE
Problem: Potential for Falls  Goal: Patient will remain free of falls  Description  INTERVENTIONS:  - Assess patient frequently for physical needs  -  Identify cognitive and physical deficits and behaviors that affect risk of falls    -  [place] fall precautions as indicated by assessment   - Educate patient/family on patient safety including physical limitations  - Instruct patient to call for assistance with activity based on assessment  - Modify environment to reduce risk of injury  - Consider OT/PT consult to assist with strengthening/mobility  Outcome: Progressing     Problem: PAIN - ADULT  Goal: Verbalizes/displays adequate comfort level or baseline comfort level  Description  Interventions:  - Encourage patient to monitor pain and request assistance  - Assess pain using appropriate pain scale  - Administer analgesics based on type and severity of pain and evaluate response  - Implement non-pharmacological measures as appropriate and evaluate response  - Consider cultural and social influences on pain and pain management  - Notify physician/advanced practitioner if interventions unsuccessful or patient reports new pain  Outcome: Progressing     Problem: INFECTION - ADULT  Goal: Absence or prevention of progression during hospitalization  Description  INTERVENTIONS:  - Assess and monitor for signs and symptoms of infection  - Monitor lab/diagnostic results  - Monitor all insertion sites, i e  indwelling lines, tubes, and drains  - Monitor endotracheal if appropriate and nasal secretions for changes in amount and color  - [place] appropriate cooling/warming therapies per order  - Administer medications as ordered  - Instruct and encourage patient and family to use good hand hygiene technique  - Identify and instruct in appropriate isolation precautions for identified infection/condition  Outcome: Progressing  Goal: Absence of fever/infection during neutropenic period  Description  INTERVENTIONS:  - Monitor WBC    Outcome: Progressing     Problem: SAFETY ADULT  Goal: Patient will remain free of falls  Description  INTERVENTIONS:  - Assess patient frequently for physical needs  -  Identify cognitive and physical deficits and behaviors that affect risk of falls    -  [place] fall precautions as indicated by assessment   - Educate patient/family on patient safety including physical limitations  - Instruct patient to call for assistance with activity based on assessment  - Modify environment to reduce risk of injury  - Consider OT/PT consult to assist with strengthening/mobility  Outcome: Progressing  Goal: Maintain or return to baseline ADL function  Description  INTERVENTIONS:  -  Assess patient's ability to carry out ADLs; assess patient's baseline for ADL function and identify physical deficits which impact ability to perform ADLs (bathing, care of mouth/teeth, toileting, grooming, dressing, etc )  - Assess/evaluate cause of self-care deficits   - Assess range of motion  - Assess patient's mobility; develop plan if impaired  - Assess patient's need for assistive devices and provide as appropriate  - Encourage maximum independence but intervene and supervise when necessary  - Involve family in performance of ADLs  - Assess for home care needs following discharge   - Consider OT consult to assist with ADL evaluation and planning for discharge  - Provide patient education as appropriate  Outcome: Progressing  Goal: Maintain or return mobility status to optimal level  Description  INTERVENTIONS:  - Assess patient's baseline mobility status (ambulation, transfers, stairs, etc )    - Identify cognitive and physical deficits and behaviors that affect mobility  - Identify mobility aids required to assist with transfers and/or ambulation (gait belt, sit-to-stand, lift, walker, cane, etc )  - [place] fall precautions as indicated by assessment  - Record patient progress and toleration of activity level on Mobility SBAR; progress patient to next Phase/Stage  - Instruct patient to call for assistance with activity based on assessment  - Consider rehabilitation consult to assist with strengthening/weightbearing, etc   Outcome: Progressing     Problem: DISCHARGE PLANNING  Goal: Discharge to home or other facility with appropriate resources  Description  INTERVENTIONS:  - Identify barriers to discharge w/patient and caregiver  - Arrange for needed discharge resources and transportation as appropriate  - Identify discharge learning needs (meds, wound care, etc )  - Arrange for interpretive services to assist at discharge as needed  - Refer to Case Management Department for coordinating discharge planning if the patient needs post-hospital services based on physician/advanced practitioner order or complex needs related to functional status, cognitive ability, or social support system  Outcome: Progressing     Problem: Knowledge Deficit  Goal: Patient/family/caregiver demonstrates understanding of disease process, treatment plan, medications, and discharge instructions  Description  Complete learning assessment and assess knowledge base    Interventions:  - Provide teaching at level of understanding  - Provide teaching via preferred learning methods  Outcome: Progressing     Problem: CARDIOVASCULAR - ADULT  Goal: Maintains optimal cardiac output and hemodynamic stability  Description  INTERVENTIONS:  - Monitor I/O, vital signs and rhythm  - Monitor for S/S and trends of decreased cardiac output  - Administer and titrate ordered vasoactive medications to optimize hemodynamic stability  - Assess quality of pulses, skin color and temperature  - Assess for signs of decreased coronary artery perfusion  - Instruct patient to report change in severity of symptoms  Outcome: Progressing  Goal: Absence of cardiac dysrhythmias or at baseline rhythm  Description  INTERVENTIONS:  - Continuous cardiac monitoring, vital signs, obtain 12 lead EKG if ordered  - Administer antiarrhythmic and heart rate control medications as ordered  - Monitor electrolytes and administer replacement therapy as ordered  Outcome: Progressing     Problem: HEMATOLOGIC - ADULT  Goal: Maintains hematologic stability  Description  INTERVENTIONS  - Assess for signs and symptoms of bleeding or hemorrhage  - Monitor labs  - Administer supportive blood products/factors as ordered and appropriate  Outcome: Progressing     Problem: Prexisting or High Potential for Compromised Skin Integrity  Goal: Skin integrity is maintained or improved  Description  INTERVENTIONS:  - Identify patients at risk for skin breakdown  - Assess and monitor skin integrity  - Assess and monitor nutrition and hydration status  - Monitor labs   - Assess for incontinence   - Turn and reposition patient  - Assist with mobility/ambulation  - Relieve pressure over bony prominences  - Avoid friction and shearing  - Provide appropriate hygiene as needed including keeping skin clean and dry  - Evaluate need for skin moisturizer/barrier cream  - Collaborate with interdisciplinary team   - Patient/family teaching  - Consider wound care consult   Outcome: Progressing

## 2020-02-20 NOTE — RESTORATIVE TECHNICIAN NOTE
Restorative Specialist Mobility Note       Activity: Ambulate in [place], Bathroom privileges     Assistive Device: None        Repositioned: Sitting, Up in chair, Other (Comment)(Chair Alarm)

## 2020-02-21 ENCOUNTER — TRANSITIONAL CARE MANAGEMENT (OUTPATIENT)
Dept: FAMILY MEDICINE CLINIC | Facility: CLINIC | Age: 80
End: 2020-02-21

## 2020-02-21 NOTE — PROGRESS NOTES
So the only thing I can see after going through is both of his charts here and at [facility] was that it was stopped because he was receiving other sedation however quite a PN and all of the medicines in that class very an increased risk for stroke and heart attack so maybe since it is in the acute setting that may be starting him out on half a dose would be wiser as far as the potassium citrate goes I have no clue why that was stopped on probably okay to restart that for his kidney stones

## 2020-02-22 ENCOUNTER — TELEPHONE (OUTPATIENT)
Dept: OTHER | Facility: OTHER | Age: 80
End: 2020-02-22

## 2020-02-22 NOTE — TELEPHONE ENCOUNTER
Pt had a procedure on Wednesday & is having very watery diarrhea and wants to know what she can give Pt?

## 2020-02-23 ENCOUNTER — APPOINTMENT (EMERGENCY)
Dept: RADIOLOGY | Facility: HOSPITAL | Age: 80
DRG: 281 | End: 2020-02-23
Payer: MEDICARE

## 2020-02-23 ENCOUNTER — HOSPITAL ENCOUNTER (INPATIENT)
Facility: HOSPITAL | Age: 80
LOS: 2 days | Discharge: HOME HEALTH | DRG: 281 | End: 2020-02-25
Attending: EMERGENCY MEDICINE | Admitting: INTERNAL MEDICINE
Payer: MEDICARE

## 2020-02-23 DIAGNOSIS — Z95.1 HX OF CABG: ICD-10-CM

## 2020-02-23 DIAGNOSIS — R77.8 ELEVATED TROPONIN: ICD-10-CM

## 2020-02-23 DIAGNOSIS — R07.9 CHEST PAIN: Primary | ICD-10-CM

## 2020-02-23 DIAGNOSIS — J44.9 CHRONIC OBSTRUCTIVE PULMONARY DISEASE, UNSPECIFIED COPD TYPE (HCC): ICD-10-CM

## 2020-02-23 DIAGNOSIS — I25.10 CORONARY ARTERY DISEASE INVOLVING NATIVE CORONARY ARTERY OF NATIVE HEART WITHOUT ANGINA PECTORIS: ICD-10-CM

## 2020-02-23 DIAGNOSIS — I50.33 ACUTE ON CHRONIC DIASTOLIC CONGESTIVE HEART FAILURE (HCC): ICD-10-CM

## 2020-02-23 LAB
ALBUMIN SERPL BCP-MCNC: 3.7 G/DL (ref 3.5–5)
ALP SERPL-CCNC: 119 U/L (ref 46–116)
ALT SERPL W P-5'-P-CCNC: 44 U/L (ref 12–78)
ANION GAP SERPL CALCULATED.3IONS-SCNC: 13 MMOL/L (ref 4–13)
APTT PPP: 110 SECONDS (ref 23–37)
APTT PPP: 31 SECONDS (ref 23–37)
AST SERPL W P-5'-P-CCNC: 33 U/L (ref 5–45)
BASOPHILS # BLD AUTO: 0.04 THOUSANDS/ΜL (ref 0–0.1)
BASOPHILS NFR BLD AUTO: 1 % (ref 0–1)
BILIRUB DIRECT SERPL-MCNC: 0.34 MG/DL (ref 0–0.2)
BILIRUB SERPL-MCNC: 2.3 MG/DL (ref 0.2–1)
BUN SERPL-MCNC: 9 MG/DL (ref 5–25)
CALCIUM SERPL-MCNC: 9.2 MG/DL (ref 8.3–10.1)
CHLORIDE SERPL-SCNC: 104 MMOL/L (ref 100–108)
CO2 SERPL-SCNC: 26 MMOL/L (ref 21–32)
CREAT SERPL-MCNC: 1.04 MG/DL (ref 0.6–1.3)
EOSINOPHIL # BLD AUTO: 0.08 THOUSAND/ΜL (ref 0–0.61)
EOSINOPHIL NFR BLD AUTO: 1 % (ref 0–6)
ERYTHROCYTE [DISTWIDTH] IN BLOOD BY AUTOMATED COUNT: 14 % (ref 11.6–15.1)
GFR SERPL CREATININE-BSD FRML MDRD: 69 ML/MIN/1.73SQ M
GLUCOSE SERPL-MCNC: 81 MG/DL (ref 65–140)
HCT VFR BLD AUTO: 45.7 % (ref 36.5–49.3)
HGB BLD-MCNC: 15.3 G/DL (ref 12–17)
HOLD SPECIMEN: NORMAL
IMM GRANULOCYTES # BLD AUTO: 0.02 THOUSAND/UL (ref 0–0.2)
IMM GRANULOCYTES NFR BLD AUTO: 0 % (ref 0–2)
INR PPP: 1.01 (ref 0.84–1.19)
LIPASE SERPL-CCNC: 84 U/L (ref 73–393)
LYMPHOCYTES # BLD AUTO: 1.12 THOUSANDS/ΜL (ref 0.6–4.47)
LYMPHOCYTES NFR BLD AUTO: 19 % (ref 14–44)
MCH RBC QN AUTO: 33.5 PG (ref 26.8–34.3)
MCHC RBC AUTO-ENTMCNC: 33.5 G/DL (ref 31.4–37.4)
MCV RBC AUTO: 100 FL (ref 82–98)
MONOCYTES # BLD AUTO: 0.58 THOUSAND/ΜL (ref 0.17–1.22)
MONOCYTES NFR BLD AUTO: 10 % (ref 4–12)
NEUTROPHILS # BLD AUTO: 4.08 THOUSANDS/ΜL (ref 1.85–7.62)
NEUTS SEG NFR BLD AUTO: 69 % (ref 43–75)
NRBC BLD AUTO-RTO: 0 /100 WBCS
NT-PROBNP SERPL-MCNC: 462 PG/ML
PLATELET # BLD AUTO: 129 THOUSANDS/UL (ref 149–390)
PMV BLD AUTO: 9.8 FL (ref 8.9–12.7)
POTASSIUM SERPL-SCNC: 3.7 MMOL/L (ref 3.5–5.3)
PROT SERPL-MCNC: 7.3 G/DL (ref 6.4–8.2)
PROTHROMBIN TIME: 13.3 SECONDS (ref 11.6–14.5)
RBC # BLD AUTO: 4.57 MILLION/UL (ref 3.88–5.62)
SODIUM SERPL-SCNC: 143 MMOL/L (ref 136–145)
TROPONIN I SERPL-MCNC: 0.69 NG/ML
TROPONIN I SERPL-MCNC: 0.7 NG/ML
TROPONIN I SERPL-MCNC: 0.72 NG/ML
TROPONIN I SERPL-MCNC: 0.79 NG/ML
WBC # BLD AUTO: 5.92 THOUSAND/UL (ref 4.31–10.16)

## 2020-02-23 PROCEDURE — 83690 ASSAY OF LIPASE: CPT | Performed by: EMERGENCY MEDICINE

## 2020-02-23 PROCEDURE — 80048 BASIC METABOLIC PNL TOTAL CA: CPT | Performed by: EMERGENCY MEDICINE

## 2020-02-23 PROCEDURE — 96375 TX/PRO/DX INJ NEW DRUG ADDON: CPT

## 2020-02-23 PROCEDURE — 94664 DEMO&/EVAL PT USE INHALER: CPT

## 2020-02-23 PROCEDURE — 99223 1ST HOSP IP/OBS HIGH 75: CPT | Performed by: INTERNAL MEDICINE

## 2020-02-23 PROCEDURE — 99285 EMERGENCY DEPT VISIT HI MDM: CPT

## 2020-02-23 PROCEDURE — 84484 ASSAY OF TROPONIN QUANT: CPT | Performed by: INTERNAL MEDICINE

## 2020-02-23 PROCEDURE — 36415 COLL VENOUS BLD VENIPUNCTURE: CPT | Performed by: EMERGENCY MEDICINE

## 2020-02-23 PROCEDURE — 80076 HEPATIC FUNCTION PANEL: CPT | Performed by: EMERGENCY MEDICINE

## 2020-02-23 PROCEDURE — 84484 ASSAY OF TROPONIN QUANT: CPT | Performed by: EMERGENCY MEDICINE

## 2020-02-23 PROCEDURE — 85610 PROTHROMBIN TIME: CPT | Performed by: EMERGENCY MEDICINE

## 2020-02-23 PROCEDURE — 85025 COMPLETE CBC W/AUTO DIFF WBC: CPT | Performed by: EMERGENCY MEDICINE

## 2020-02-23 PROCEDURE — 85730 THROMBOPLASTIN TIME PARTIAL: CPT | Performed by: EMERGENCY MEDICINE

## 2020-02-23 PROCEDURE — 93005 ELECTROCARDIOGRAM TRACING: CPT

## 2020-02-23 PROCEDURE — 99285 EMERGENCY DEPT VISIT HI MDM: CPT | Performed by: EMERGENCY MEDICINE

## 2020-02-23 PROCEDURE — 85730 THROMBOPLASTIN TIME PARTIAL: CPT | Performed by: INTERNAL MEDICINE

## 2020-02-23 PROCEDURE — 83880 ASSAY OF NATRIURETIC PEPTIDE: CPT | Performed by: EMERGENCY MEDICINE

## 2020-02-23 PROCEDURE — 71046 X-RAY EXAM CHEST 2 VIEWS: CPT

## 2020-02-23 PROCEDURE — 94760 N-INVAS EAR/PLS OXIMETRY 1: CPT

## 2020-02-23 PROCEDURE — 96374 THER/PROPH/DIAG INJ IV PUSH: CPT

## 2020-02-23 RX ORDER — MEMANTINE HYDROCHLORIDE 5 MG/1
5 TABLET ORAL 2 TIMES DAILY
Status: DISCONTINUED | OUTPATIENT
Start: 2020-02-23 | End: 2020-02-25 | Stop reason: HOSPADM

## 2020-02-23 RX ORDER — FLUTICASONE FUROATE AND VILANTEROL 200; 25 UG/1; UG/1
1 POWDER RESPIRATORY (INHALATION)
Status: DISCONTINUED | OUTPATIENT
Start: 2020-02-24 | End: 2020-02-25 | Stop reason: HOSPADM

## 2020-02-23 RX ORDER — TAMSULOSIN HYDROCHLORIDE 0.4 MG/1
0.4 CAPSULE ORAL DAILY
Status: DISCONTINUED | OUTPATIENT
Start: 2020-02-24 | End: 2020-02-25 | Stop reason: HOSPADM

## 2020-02-23 RX ORDER — ATORVASTATIN CALCIUM 10 MG/1
20 TABLET, FILM COATED ORAL
Status: DISCONTINUED | OUTPATIENT
Start: 2020-02-23 | End: 2020-02-25 | Stop reason: HOSPADM

## 2020-02-23 RX ORDER — POTASSIUM CITRATE 15 MEQ/1
TABLET, EXTENDED RELEASE ORAL 2 TIMES DAILY
Status: DISCONTINUED | OUTPATIENT
Start: 2020-02-23 | End: 2020-02-23

## 2020-02-23 RX ORDER — HEPARIN SODIUM 10000 [USP'U]/100ML
3-20 INJECTION, SOLUTION INTRAVENOUS
Status: DISCONTINUED | OUTPATIENT
Start: 2020-02-23 | End: 2020-02-24

## 2020-02-23 RX ORDER — LANOLIN ALCOHOL/MO/W.PET/CERES
9 CREAM (GRAM) TOPICAL
Status: DISCONTINUED | OUTPATIENT
Start: 2020-02-23 | End: 2020-02-25 | Stop reason: HOSPADM

## 2020-02-23 RX ORDER — ESCITALOPRAM OXALATE 10 MG/1
20 TABLET ORAL DAILY
Status: DISCONTINUED | OUTPATIENT
Start: 2020-02-24 | End: 2020-02-25 | Stop reason: HOSPADM

## 2020-02-23 RX ORDER — ONDANSETRON 2 MG/ML
4 INJECTION INTRAMUSCULAR; INTRAVENOUS EVERY 6 HOURS PRN
Status: DISCONTINUED | OUTPATIENT
Start: 2020-02-23 | End: 2020-02-25 | Stop reason: HOSPADM

## 2020-02-23 RX ORDER — KRILL/OM-3/DHA/EPA/PHOSPHO/AST 500MG-86MG
500 CAPSULE ORAL DAILY
Status: DISCONTINUED | OUTPATIENT
Start: 2020-02-24 | End: 2020-02-23 | Stop reason: RX

## 2020-02-23 RX ORDER — ACETAMINOPHEN 325 MG/1
650 TABLET ORAL EVERY 4 HOURS PRN
Status: DISCONTINUED | OUTPATIENT
Start: 2020-02-23 | End: 2020-02-25 | Stop reason: HOSPADM

## 2020-02-23 RX ORDER — ATENOLOL 25 MG/1
25 TABLET ORAL
Status: DISCONTINUED | OUTPATIENT
Start: 2020-02-23 | End: 2020-02-25 | Stop reason: HOSPADM

## 2020-02-23 RX ORDER — SACCHAROMYCES BOULARDII 250 MG
250 CAPSULE ORAL 2 TIMES DAILY
Status: DISCONTINUED | OUTPATIENT
Start: 2020-02-23 | End: 2020-02-25 | Stop reason: HOSPADM

## 2020-02-23 RX ORDER — TRISODIUM CITRATE DIHYDRATE AND CITRIC ACID MONOHYDRATE 500; 334 MG/5ML; MG/5ML
15 SOLUTION ORAL
Status: DISCONTINUED | OUTPATIENT
Start: 2020-02-23 | End: 2020-02-23 | Stop reason: RX

## 2020-02-23 RX ORDER — POTASSIUM CITRATE 15 MEQ/1
TABLET, EXTENDED RELEASE ORAL 2 TIMES DAILY
COMMUNITY
End: 2020-05-15

## 2020-02-23 RX ORDER — DONEPEZIL HYDROCHLORIDE 5 MG/1
10 TABLET, FILM COATED ORAL
Status: DISCONTINUED | OUTPATIENT
Start: 2020-02-23 | End: 2020-02-25 | Stop reason: HOSPADM

## 2020-02-23 RX ORDER — MELATONIN
2000 DAILY
Status: DISCONTINUED | OUTPATIENT
Start: 2020-02-24 | End: 2020-02-25 | Stop reason: HOSPADM

## 2020-02-23 RX ORDER — NITROGLYCERIN 0.4 MG/1
0.4 TABLET SUBLINGUAL
Status: DISCONTINUED | OUTPATIENT
Start: 2020-02-23 | End: 2020-02-25 | Stop reason: HOSPADM

## 2020-02-23 RX ORDER — NITROGLYCERIN 0.4 MG/1
0.4 TABLET SUBLINGUAL ONCE
Status: DISCONTINUED | OUTPATIENT
Start: 2020-02-23 | End: 2020-02-23

## 2020-02-23 RX ORDER — HEPARIN SODIUM 1000 [USP'U]/ML
2000 INJECTION, SOLUTION INTRAVENOUS; SUBCUTANEOUS AS NEEDED
Status: DISCONTINUED | OUTPATIENT
Start: 2020-02-23 | End: 2020-02-24

## 2020-02-23 RX ORDER — HEPARIN SODIUM 1000 [USP'U]/ML
4000 INJECTION, SOLUTION INTRAVENOUS; SUBCUTANEOUS AS NEEDED
Status: DISCONTINUED | OUTPATIENT
Start: 2020-02-23 | End: 2020-02-24

## 2020-02-23 RX ORDER — MONTELUKAST SODIUM 10 MG/1
10 TABLET ORAL DAILY
Status: DISCONTINUED | OUTPATIENT
Start: 2020-02-24 | End: 2020-02-25 | Stop reason: HOSPADM

## 2020-02-23 RX ORDER — ALBUTEROL SULFATE 2.5 MG/3ML
2.5 SOLUTION RESPIRATORY (INHALATION) EVERY 4 HOURS PRN
Status: DISCONTINUED | OUTPATIENT
Start: 2020-02-23 | End: 2020-02-25 | Stop reason: HOSPADM

## 2020-02-23 RX ORDER — HEPARIN SODIUM 1000 [USP'U]/ML
4000 INJECTION, SOLUTION INTRAVENOUS; SUBCUTANEOUS ONCE
Status: COMPLETED | OUTPATIENT
Start: 2020-02-23 | End: 2020-02-23

## 2020-02-23 RX ORDER — PANTOPRAZOLE SODIUM 40 MG/1
40 TABLET, DELAYED RELEASE ORAL DAILY
Status: DISCONTINUED | OUTPATIENT
Start: 2020-02-24 | End: 2020-02-25 | Stop reason: HOSPADM

## 2020-02-23 RX ORDER — CLOPIDOGREL BISULFATE 75 MG/1
75 TABLET ORAL DAILY
Status: DISCONTINUED | OUTPATIENT
Start: 2020-02-24 | End: 2020-02-25 | Stop reason: HOSPADM

## 2020-02-23 RX ORDER — ASPIRIN 81 MG/1
81 TABLET, CHEWABLE ORAL DAILY
Status: DISCONTINUED | OUTPATIENT
Start: 2020-02-24 | End: 2020-02-25 | Stop reason: HOSPADM

## 2020-02-23 RX ADMIN — Medication 250 MG: at 18:11

## 2020-02-23 RX ADMIN — DONEPEZIL HYDROCHLORIDE 10 MG: 5 TABLET, FILM COATED ORAL at 21:28

## 2020-02-23 RX ADMIN — ATORVASTATIN CALCIUM 20 MG: 10 TABLET, FILM COATED ORAL at 21:28

## 2020-02-23 RX ADMIN — MEMANTINE 5 MG: 5 TABLET ORAL at 18:11

## 2020-02-23 RX ADMIN — HEPARIN SODIUM AND DEXTROSE 12 UNITS/KG/HR: 10000; 5 INJECTION INTRAVENOUS at 15:32

## 2020-02-23 RX ADMIN — HEPARIN SODIUM AND DEXTROSE 10 UNITS/KG/HR: 10000; 5 INJECTION INTRAVENOUS at 22:39

## 2020-02-23 RX ADMIN — ATENOLOL 25 MG: 25 TABLET ORAL at 21:28

## 2020-02-23 RX ADMIN — HEPARIN SODIUM 4000 UNITS: 1000 INJECTION INTRAVENOUS; SUBCUTANEOUS at 15:32

## 2020-02-23 RX ADMIN — MELATONIN 9 MG: at 21:28

## 2020-02-23 NOTE — ASSESSMENT & PLAN NOTE
With recent hospitalization and drug-eluting stent placement February 19, 2020-at that time peak troponin was 40 and leveled off at 11  Troponin today is 0 79, will continue to trend  EKG shows ST depressions in the lateral leads  ED physician consulted Cardiology and recommended to start IV heparin drip, will continue  Continue atenolol, aspirin, Plavix, Lipitor, nitro p r n , EKG p r n   Chest pain  Cardiology consult place, appreciate input

## 2020-02-23 NOTE — PLAN OF CARE
Problem: Potential for Falls  Goal: Patient will remain free of falls  Description  INTERVENTIONS:  - Assess patient frequently for physical needs  -  Identify cognitive and physical deficits and behaviors that affect risk of falls  (pain,fatigue)  -  [place] fall precautions as indicated by assessment  (medium fall risk)  - Educate patient/family on patient safety including physical limitations  - Instruct patient to call for assistance with activity based on assessment  - Modify environment to reduce risk of injury  - Consider OT/PT consult to assist with strengthening/mobility   Outcome: Progressing     Problem: PAIN - ADULT  Goal: Verbalizes/displays adequate comfort level or baseline comfort level  Description  Interventions:  - Encourage patient to monitor pain and request assistance  - Assess pain using appropriate pain scale (0-10 pain scale)  - Administer analgesics based on type and severity of pain and evaluate response  - Implement non-pharmacological measures as appropriate and evaluate response  - Consider cultural and social influences on pain and pain management  - Notify physician/advanced practitioner if interventions unsuccessful or patient reports new pain   Outcome: Progressing     Problem: INFECTION - ADULT  Goal: Absence or prevention of progression during hospitalization  Description  INTERVENTIONS:  - Assess and monitor for signs and symptoms of infection  - Monitor lab/diagnostic results  - Monitor all insertion sites, i e  indwelling lines  - Administer medications as ordered  - Instruct and encourage patient and family to use good hand hygiene technique   Outcome: Progressing     Problem: SAFETY ADULT  Goal: Maintain or return to baseline ADL function  Description  INTERVENTIONS:  -  Assess patient's ability to carry out ADLs; (minimal assist for cares)  - Assess/evaluate cause of self-care deficits   - Assess range of motion  - Assess patient's mobility; (stand by assist)  - Assess patient's need for assistive devices and provide as appropriate (none)  - Encourage maximum independence but intervene and supervise when necessary  - Involve family in performance of ADLs  - Assess for home care needs following discharge   - Consider OT consult to assist with ADL evaluation and planning for discharge  - Provide patient education as appropriate   Outcome: Progressing  Goal: Maintain or return mobility status to optimal level  Description  INTERVENTIONS:  - Assess patient's baseline mobility status (indepndent)    - Identify cognitive and physical deficits and behaviors that affect mobility (pain, fatigue)  - Identify mobility aids required to assist with transfers and/or ambulation (none)  - [place] fall precautions as indicated by assessment (medium fall risk)  - Record patient progress and toleration of activity level on Mobility SBAR; progress patient to next Phase/Stage  - Instruct patient to call for assistance with activity based on assessment  - Consider rehabilitation consult to assist with strengthening/weightbearing, etc    Outcome: Progressing     Problem: DISCHARGE PLANNING  Goal: Discharge to home or other facility with appropriate resources  Description  INTERVENTIONS:  - Identify barriers to discharge w/patient and caregiver  - Arrange for needed discharge resources and transportation as appropriate  - Identify discharge learning needs (meds, wound care, etc )  - Refer to Case Management Department for coordinating discharge planning if the patient needs post-hospital services based on physician/advanced practitioner order or complex needs related to functional status, cognitive ability, or social support system   Outcome: Progressing     Problem: Knowledge Deficit  Goal: Patient/family/caregiver demonstrates understanding of disease process, treatment plan, medications, and discharge instructions  Description  Complete learning assessment and assess knowledge base   Interventions:  - Provide teaching at level of understanding  - Provide teaching via preferred learning methods  Outcome: Progressing     Problem: CARDIOVASCULAR - ADULT  Goal: Maintains optimal cardiac output and hemodynamic stability  Description  INTERVENTIONS:  - Monitor I/O, vital signs and rhythm  - Monitor for S/S and trends of decreased cardiac output  - Assess quality of pulses, skin color and temperature  - Assess for signs of decreased coronary artery perfusion  - Instruct patient to report change in severity of symptoms   Outcome: Progressing  Goal: Absence of cardiac dysrhythmias or at baseline rhythm  Description  INTERVENTIONS:  - Continuous cardiac monitoring, vital signs, obtain 12 lead EKG if ordered  - Administer antiarrhythmic and heart rate control medications as ordered  - Monitor electrolytes and administer replacement therapy as ordered  Outcome: Progressing     Problem: RESPIRATORY - ADULT  Goal: Achieves optimal ventilation and oxygenation  Description  INTERVENTIONS:  - Assess for changes in respiratory status  - Assess for changes in mentation and behavior  - Position to facilitate oxygenation and minimize respiratory effort  - Oxygen administered by appropriate delivery (chronically on 2 liters/min oxygen)  - Encourage broncho-pulmonary hygiene including cough, deep breathe, Incentive Spirometry  - Assess the need for suctioning and aspirate as needed  - Assess and instruct to report SOB or any respiratory difficulty  - Respiratory Therapy support as indicated   Outcome: Progressing     Problem: HEMATOLOGIC - ADULT  Goal: Maintains hematologic stability  Description  INTERVENTIONS  - Assess for signs and symptoms of bleeding or hemorrhage  - Monitor labs  - Administer supportive blood products/factors as ordered and appropriate  Outcome: Progressing

## 2020-02-23 NOTE — ED NOTES
Heparin drip checked with [facility]; 12 units/kg/hr; 9 6mL/hr      Ashley Barrios RN  02/23/20 0943

## 2020-02-23 NOTE — ED PROVIDER NOTES
History  Chief Complaint   Patient presents with    Chest Pain     patient c/o of chest pain and sob starting last night that has worsen this afternoon     This is a 68-year-old male with a history of COPD, CAD status post CABG and recent PCI who presents with chest pain  The patient states that his chest pain began at approximately 1:30 p m  This afternoon  He describes it as a substernal pressure, constant, nonradiating, associated with lightheadedness and nausea without vomiting  He also describes slightly worsening shortness of breath  He does wear 3 L nasal cannula at baseline  States that this feels similar to his previous heart attack  Denies any exacerbating factors  EMS was called  He was given 3 sublingual nitro with improvement of his chest pain  He was also given full-dose aspirin  Denies fever/chills, vomiting, dizziness, numbness/weakness, headache, change in vision, URI symptoms, neck pain, palpitations, cough, back pain, flank pain, abdominal pain, diarrhea, hematochezia, melena, dysuria, hematuria  The patient had recent cardiac catheterization on 2/19/19  He had PCI of the proximal circumflex which had a 90% stenosis  He also had a 99% stenosis of the saphenous vein graft to 2nd obtuse marginal branch which is being managed medically given the PCI of the native left circumflex  The patient has been doing well since discharge  2:49 PM  Wife at bedside explains that he woke up at around 6:30 a m  This morning disoriented  She states that he kept saying that he was trapped in the garage  The wife was able to calmed him down to get him back to sleep  He woke up later in the morning stating that he was really scared this morning because he did not know where he was  The patient was in his normal mental state from that point on in the morning  She states that around 1:30 p m , he started rubbing his chest   She asked the patient if he was having chest pain    He stated a little bit   This is when EMS was called  Prior to Admission Medications   Prescriptions Last Dose Informant Patient Reported? Taking? Bacillus Coagulans-Inulin (PROBIOTIC FORMULA) 1-250 BILLION-MG CAPS 2/23/2020 at Unknown time Self Yes Yes   Sig: Take 1 capsule by mouth daily Record states dose is currently 4 billion   Cholecalciferol (VITAMIN D-3 PO) 2/23/2020 at Unknown time Self Yes Yes   Sig: Take 2,000 Units by mouth daily  Fluticasone-Salmeterol (AIRDUO RESPICLICK 83/91) 35-85 MCG/ACT AEPB 2/23/2020 at Unknown time Spouse/Significant Other Yes Yes   Sig: Inhale 1 puff 2 (two) times a day AM & PM   KRILL OIL PO 2/23/2020 at Unknown time Self Yes Yes   Sig: Take 500 mg by mouth daily     Melatonin 10 MG TABS 2/22/2020 at Unknown time Self Yes Yes   Sig: Take 2 tablets by mouth daily at bedtime Taking 15mg   Multiple Vitamins-Minerals (CENTRUM SILVER ADULT 50+) TABS 2/23/2020 at Unknown time Self Yes Yes   Sig: Take 1 tablet by mouth daily   Potassium Citrate ER 15 MEQ (1620 MG) TBCR 2/23/2020 at Unknown time Self Yes Yes   Sig: Take by mouth 2 (two) times a day   aspirin 81 MG tablet 2/23/2020 at Unknown time Self Yes Yes   Sig: Take 81 mg by mouth daily Took within 24 hours    atenolol (TENORMIN) 25 mg tablet 2/22/2020 at Unknown time  No Yes   Sig: TAKE 1 TABLET AT BEDTIME   atorvastatin (LIPITOR) 20 mg tablet 2/22/2020 at Unknown time  No Yes   Sig: Take 1 tablet (20 mg total) by mouth daily at bedtime   citric acid-potassium citrate (POLYCITRA K) 1,100-334 mg/5 mL solution   Yes Yes   Sig: Take by mouth 3 (three) times a day with meals   clopidogrel (PLAVIX) 75 mg tablet 2/23/2020 at Unknown time  No Yes   Sig: Take 1 tablet (75 mg total) by mouth daily   cyanocobalamin (VITAMIN B-12) 1,000 mcg tablet Past Week at Unknown time Self Yes Yes   Sig: Take 1,000 mcg by mouth 3 (three) times a week MWF   donepezil (ARICEPT) 10 mg tablet 2/22/2020 at Unknown time  No Yes   Sig: TAKE 1 TABLET DAILY AT BEDTIME   escitalopram (LEXAPRO) 20 mg tablet 2/23/2020 at Unknown time  No Yes   Sig: TAKE 1 TABLET DAILY   memantine (NAMENDA) 5 mg tablet 2/23/2020 at Unknown time Spouse/Significant Other Yes Yes   Sig: Take 5 mg by mouth 2 (two) times a day In the evening    montelukast (SINGULAIR) 10 mg tablet 2/23/2020 at Unknown time  No Yes   Sig: Take 1 tablet (10 mg total) by mouth daily   pantoprazole (PROTONIX) 40 mg tablet 2/23/2020 at Unknown time  No Yes   Sig: Take 1 tablet (40 mg total) by mouth daily   tamsulosin (FLOMAX) 0 4 mg 2/23/2020 at Unknown time  No Yes   Sig: Take 1 capsule (0 4 mg total) by mouth daily for 90 days   tiotropium (SPIRIVA HANDIHALER) 18 mcg inhalation capsule 2/23/2020 at Unknown time Self Yes Yes   Sig: Place 18 mcg into inhaler and inhale daily        Facility-Administered Medications: None       Past Medical History:   Diagnosis Date    AAA (abdominal aortic aneurysm) ([facility])     Acid reflux     Acute serous otitis media of left ear     recurrence not specified     Anesthesia     "always has mental changes /lingers and lingers after anesthesia"    Anxiety     Aortic aneurysm without rupture (HCC)     Arm bruise     right inner forearm "recent IV"    At risk for falls     BPH without urinary obstruction     Cancer (HCC)     skin CA on nose    CAP (community acquired pneumonia)     Cataracts, bilateral     Change in bowel function     Clubbing of fingers     Colon polyps     Common cold     Contusion of elbow, left     initial encounter     COPD (chronic obstructive pulmonary disease) (HCC)     Coronary artery disease     Cough     Dementia (HCC)     Depression     Dizziness     upon "standing quickly on occas"    Exercise counseling     Fatigue     Full dentures     "doesn't wear them"    Glaucoma screening     High cholesterol     History of abdominal aortic aneurysm (AAA) repair 08/2017    History of bacteremia     History of chronic obstructive lung disease  History of epistaxis     History of influenza vaccination     History of kidney stones     History of pneumonia     "many times" "at least 5 times" almost always goes to sepsis"    History of sepsis 10/2017    History of sinusitis     History of skin cancer     History of sleep apnea     History of transfusion     History of urinary tract infection     [place] (hard of hearing)     Hydronephrosis with obstructing calculus     Influenza vaccine needed     Jock itch     left/saw doctor 11/8 and started on antifungal cream    Kidney stones     Left hip pain     Need for pneumococcal vaccination     Need for prophylactic vaccination and inoculation against influenza     Other emphysema ([address]     Pancreatitis, chronic ([address] )     pt and wife can't confirm 11/10/17    Pneumonia 10/26/2017    admitted [facility]    Pulmonary emphysema ([address] )     Screening for genitourinary condition     Screening for neurological condition     Sepsis ([address] )     due to unspecified organism     Short-term memory loss     Sleep apnea     does not use CPAP      Special screening examination for neoplasm of prostate     TIA involving carotid artery     "before carotid surgery"    Ulcer     stomach, "years ago"    Unsteady gait     Use of cane as ambulatory aid     sometimes    Vitamin D deficiency     Wears glasses        Past Surgical History:   Procedure Laterality Date    ABDOMINAL AORTIC ANEURYSM REPAIR  08/23/2017    ABDOMINAL AORTIC ANEURYSM REPAIR, ENDOVASCULAR      BACK SURGERY      spinal stenosis    CARDIAC SURGERY      CABG x3    CAROTID ENDARTARECTOMY Left 11/1996    CATARACT EXTRACTION Bilateral     CORONARY ARTERY BYPASS GRAFT  01/2003    x3    CYSTOSCOPY      with insertion of ureteral stent     CYSTOSCOPY      with ureteroscopy with lithotripsy     EXCISIONAL HEMORRHOIDECTOMY      EYE SURGERY Bilateral     "for a wrinkle" after cataract surgery    HERNIA REPAIR      umbilical    LITHOTRIPSY renal    MOUTH SURGERY      full mouth extraction     LA COLONOSCOPY FLX DX W/COLLJ SPEC WHEN PFRMD N/A 2017    Procedure: COLONOSCOPY with polypectomies/ hot snare and tattoo;  Surgeon: Madi Hutchinson MD;  [facility]; Service: Gastroenterology    LA CYSTOURETHROSCOPY N/A 2017    Procedure: Viraj Vasquez;  Surgeon: Cj Sellers MD;  [facility];  Service: Urology    LA ESOPHAGOGASTRODUODENOSCOPY TRANSORAL DIAGNOSTIC N/A 2016    Procedure: ESOPHAGOGASTRODUODENOSCOPY (EGD); Surgeon: Madi Hutchinson MD;  [facility]; Service: Gastroenterology    LA REMOVE BLADDER STONE,<2 5 CM N/A 2017    Procedure: Daly Ivy;  Surgeon: Cj Sellers MD;  [facility];  Service: Urology    SKIN BIOPSY      TONSILLECTOMY      URETERAL STENT PLACEMENT      and removal       Family History   Problem Relation Age of Onset    Diabetes type II Mother         mellitus    Kidney failure Father     Diabetes type II Maternal Grandmother         mellitus     Hypertension Paternal Grandmother         benign essential      I have reviewed and agree with the history as documented  Social History     Tobacco Use    Smoking status: Former Smoker     Packs/day: 1 50     Years: 60 00     Pack years: 90 00     Last attempt to quit: 2014     Years since quittin 1    Smokeless tobacco: Never Used    Tobacco comment: quit 3 yrs ago, used to be a 1-1 5 ppd smoker   Substance Use Topics    Alcohol use: Not Currently     Alcohol/week: 0 0 standard drinks     Frequency: Never     Drinks per session: 1 or 2     Binge frequency: Never     Comment: quit 25 yrs ago    Drug use: No     Comment: No illicit drug use        Review of Systems   Constitutional: Negative for chills, fatigue and fever  HENT: Negative for rhinorrhea, sore throat and trouble swallowing  Eyes: Negative for photophobia and visual disturbance     Respiratory: Positive for shortness of breath  Negative for cough and chest tightness  Cardiovascular: Positive for chest pain  Negative for palpitations and leg swelling  Gastrointestinal: Positive for nausea  Negative for abdominal pain, blood in stool, diarrhea and vomiting  Endocrine: Negative for polyuria  Genitourinary: Negative for dysuria, flank pain and hematuria  Musculoskeletal: Negative for back pain and neck pain  Skin: Negative for color change and rash  Allergic/Immunologic: Negative for immunocompromised state  Neurological: Positive for light-headedness  Negative for dizziness, weakness, numbness and headaches  All other systems reviewed and are negative  Physical Exam  Physical Exam   Constitutional: Vital signs are normal  He appears well-developed and well-nourished  He is cooperative  No distress  HENT:   Mouth/Throat: Uvula is midline and oropharynx is clear and moist    Eyes: Pupils are equal, round, and reactive to light  Conjunctivae, EOM and lids are normal    Neck: Trachea normal  No thyroid mass and no thyromegaly present  Cardiovascular: Normal rate, regular rhythm, normal heart sounds, intact distal pulses and normal pulses  No murmur heard  Pulmonary/Chest: Effort normal and breath sounds normal    Healed sternotomy scar  Abdominal: Soft  Normal appearance and bowel sounds are normal  There is no tenderness  There is no rebound, no guarding, no CVA tenderness and negative Meléndez's sign  Neurological: He is alert  Skin: Skin is warm, dry and intact  Psychiatric: He has a normal mood and affect   His speech is normal and behavior is normal  Thought content normal        Vital Signs  ED Triage Vitals [02/23/20 1400]   Temperature Pulse Respirations Blood Pressure SpO2   97 8 °F (36 6 °C) 70 20 101/64 90 %      Temp Source Heart Rate Source Patient Position - Orthostatic VS BP Location FiO2 (%)   Temporal Monitor Lying Left arm --      Pain Score       8           Vitals:    02/23/20 1400 02/23/20 1445 02/23/20 1500   BP: 101/64 125/76 115/63   Pulse: 70 73 75   Patient Position - Orthostatic VS: Lying Lying Lying         Visual Acuity      ED Medications  Medications   heparin (porcine) 25,000 units in 250 mL infusion (premix) (12 Units/kg/hr × 80 kg (Order-Specific) Intravenous New Bag 2/23/20 1532)   heparin (porcine) injection 4,000 Units (has no administration in time range)   heparin (porcine) injection 2,000 Units (has no administration in time range)   heparin (porcine) injection 4,000 Units (4,000 Units Intravenous Given 2/23/20 1532)       Diagnostic Studies  Results Reviewed     Procedure Component Value Units Date/Time    APTT six (6) hours after Heparin bolus/drip initiation or dosing change [[id number]]     Lab Status:  No result Specimen:  Blood     Troponin I [[id number]]  (Abnormal) Collected:  02/23/20 1420    Lab Status:  Final result Specimen:  Blood from Arm, Right Updated:  02/23/20 1501     Troponin I 0 79 ng/mL     Hepatic function panel [[id number]]  (Abnormal) Collected:  02/23/20 1420    Lab Status:  Final result Specimen:  Blood from Arm, Right Updated:  02/23/20 1456     Total Bilirubin 2 30 mg/dL      Bilirubin, Direct 0 34 mg/dL      Alkaline Phosphatase 119 U/L      AST 33 U/L      ALT 44 U/L      Total Protein 7 3 g/dL      Albumin 3 7 g/dL     Lipase [[id number]]  (Normal) Collected:  02/23/20 1420    Lab Status:  Final result Specimen:  Blood from Arm, Right Updated:  02/23/20 1456     Lipase 84 u/L     NT-BNP PRO [[id number]]  (Abnormal) Collected:  02/23/20 1420    Lab Status:  Final result Specimen:  Blood from Arm, Right Updated:  02/23/20 1456     NT-proBNP 462 pg/mL     Basic metabolic panel [[id number]] Collected:  02/23/20 1420    Lab Status:  Final result Specimen:  Blood from Arm, Right Updated:  02/23/20 1443     Sodium 143 mmol/L      Potassium 3 7 mmol/L      Chloride 104 mmol/L      CO2 26 mmol/L      ANION GAP 13 mmol/L      BUN 9 mg/dL      Creatinine 1 04 mg/dL      Glucose 81 mg/dL      Calcium 9 2 mg/dL      eGFR 69 ml/min/1 73sq m     Narrative:       [place] guidelines for Chronic Kidney Disease (CKD):     Stage 1 with normal or high GFR (GFR > 90 mL/min/1 73 square meters)    Stage 2 Mild CKD (GFR = 60-89 mL/min/1 73 square meters)    Stage 3A Moderate CKD (GFR = 45-59 mL/min/1 73 square meters)    Stage 3B Moderate CKD (GFR = 30-44 mL/min/1 73 square meters)    Stage 4 Severe CKD (GFR = 15-29 mL/min/1 73 square meters)    Stage 5 End Stage CKD (GFR <15 mL/min/1 73 square meters)  Note: GFR calculation is accurate only with a steady state creatinine    Protime-INR [[id number]]  (Normal) Collected:  02/23/20 1420    Lab Status:  Final result Specimen:  Blood from Arm, Right Updated:  02/23/20 1439     Protime 13 3 seconds      INR 1 01    APTT [[id number]]  (Normal) Collected:  02/23/20 1420    Lab Status:  Final result Specimen:  Blood from Arm, Right Updated:  02/23/20 1439     PTT 31 seconds     CBC and differential [[id number]]  (Abnormal) Collected:  02/23/20 1420    Lab Status:  Final result Specimen:  Blood from Arm, Right Updated:  02/23/20 1437     WBC 5 92 Thousand/uL      RBC 4 57 Million/uL      Hemoglobin 15 3 g/dL      Hematocrit 45 7 %       fL      MCH 33 5 pg      MCHC 33 5 g/dL      RDW 14 0 %      MPV 9 8 fL      Platelets 994 Thousands/uL      nRBC 0 /100 WBCs      Neutrophils Relative 69 %      Immat GRANS % 0 %      Lymphocytes Relative 19 %      Monocytes Relative 10 %      Eosinophils Relative 1 %      Basophils Relative 1 %      Neutrophils Absolute 4 08 Thousands/µL      Immature Grans Absolute 0 02 Thousand/uL      Lymphocytes Absolute 1 12 Thousands/µL      Monocytes Absolute 0 58 Thousand/µL      Eosinophils Absolute 0 08 Thousand/µL      Basophils Absolute 0 04 Thousands/µL     [place] draw [[id number]] Collected:  02/23/20 1420    Lab Status:   In process Specimen:  Blood from Arm, Right Updated:  02/23/20 1426    Narrative: The following orders were created for panel order [place] draw  Procedure                               Abnormality         Status                     ---------                               -----------         ------                     Zhane Mcdonald top on SWCE[[id number]]                                 In process                 Green / Yellow tube on BBOS[[id number]]                      In process                   Please view results for these tests on the individual orders  XR chest 2 views   Final Result by Vilma Rodríguez DO (02/23 3608)      Hyperinflated lung fields consistent with underlying COPD  Workstation performed: [id number]                    Procedures  ECG 12 Lead Documentation Only  Date/Time: 2/23/2020 2:18 PM  Performed by: Tomas Alas MD  Authorized by: Tomas Alas MD     ECG reviewed by me, the ED Provider: yes    Patient location:  ED  Previous ECG:     Previous ECG:  Compared to current    Comparison ECG info:  2/21/20    Similarity:  No change    Comparison to cardiac monitor: Yes    Interpretation:     Interpretation: non-specific    Rate:     ECG rate:  66    ECG rate assessment: normal    Rhythm:     Rhythm: sinus rhythm    Ectopy:     Ectopy: none    QRS:     QRS axis:  Normal    QRS intervals:  Normal  Conduction:     Conduction: normal    ST segments:     ST segments:  Non-specific    Depression:  V4, V5 and V6  T waves:     T waves: normal    Comments:      Depressions in the lateral leads are similar to previous  ED Course  ED Course as of Feb 23 1545   Sun Feb 23, 2020   1451 Stable thrombocytopenia   Platelet Count(!): 734   1458 Stable T bili elevation   TOTAL BILIRUBIN(!): 2 30   1458 Nursing received a call from the lab stating that his troponin is 0 8  There a running the test   The patient's previous troponin was 11 7 on December 19th  Patient could be still removing the troponin is blood  Will speak with Cardiology  6 [facility] sent to Dr Drew Singh from cardiology            HEART Risk Score      Most Recent Value   Heart Score Risk Calculator   History  2 Filed at: 02/23/2020 1544   ECG  0 Filed at: 02/23/2020 1544   Age  2 Filed at: 02/23/2020 1544   Risk Factors  2 Filed at: 02/23/2020 1544   Troponin  2 Filed at: 02/23/2020 1544   HEART Score  8 Filed at: 02/23/2020 1544                            MDM  Number of Diagnoses or Management Options  Diagnosis management comments: Plan to check labs, EKG, chest x-ray  Plan to speak with Cardiology  Patient will need to be admitted  Disposition  Final diagnoses:   Chest pain   Elevated troponin     Time reflects when diagnosis was documented in both MDM as applicable and the Disposition within this note     Time User Action Codes Description Comment    2/23/2020  3:44 PM Daryn Fowler Add [R07 9] Chest pain     2/23/2020  3:44 PM Daryn Fowler Add [R79 89] Elevated troponin       ED Disposition     ED Disposition Condition Date/Time Comment    Admit Stable Sun Feb 23, 2020  3:44 PM         Follow-up Information    None         Patient's Medications   Discharge Prescriptions    No medications on file     No discharge procedures on file      PDMP Review     None          ED Provider  Electronically Signed by           Marsha Mason MD  02/23/20 5762

## 2020-02-23 NOTE — ASSESSMENT & PLAN NOTE
With recent drug-eluting stent placed to left circumflex-February 19, 2020  Cardiac catheterization at that time shows 99% stenosis of the graft to the 2nd obtuse marginal artery-decision was made to continue medical management  Continue atenolol, aspirin, Plavix, Lipitor

## 2020-02-23 NOTE — H&P
H&P- Chris Bruce 1940, 70 y o  male MRN: [id number]    Unit/Bed#: 426-01 Encounter: [id number]    Primary Care Provider: Kathya Puentes DO   Date and time admitted to hospital: 2/23/2020  1:56 PM        Chronic respiratory failure with hypoxia [facility])  Assessment & Plan  Patient currently at his baseline of 3 L nasal cannula  Continue oxygen supplementation    Hx of CABG  Assessment & Plan  Aware  As above    Coronary artery disease involving native coronary artery of native heart without angina pectoris  Assessment & Plan  With recent drug-eluting stent placed to left circumflex-February 19, 2020  Cardiac catheterization at that time shows 99% stenosis of the graft to the 2nd obtuse marginal artery-decision was made to continue medical management  Continue atenolol, aspirin, Plavix, Lipitor    Dementia (HCC)  Assessment & Plan  With short-term memory loss  Continue Aricept, Lexapro, Namenda    Benign prostatic hyperplasia  Assessment & Plan  Continue Flomax    Hypercholesterolemia  Assessment & Plan  Recent lipid panel with recent hospitalization  Continue Lipitor    COPD (chronic obstructive pulmonary disease) ([facility] [address] )  Assessment & Plan  Not in acute exacerbation  Continue Singulair, air duo, and Spiriva  Respiratory protocol      * Chest pain  Assessment & Plan  With recent hospitalization and drug-eluting stent placement February 19, 2020-at that time peak troponin was 40 and leveled off at 11  Troponin today is 0 79, will continue to trend  EKG shows ST depressions in the lateral leads  ED physician consulted Cardiology and recommended to start IV heparin drip, will continue  Continue atenolol, aspirin, Plavix, Lipitor, nitro p r n , EKG p r n   Chest pain  Cardiology consult place, appreciate input      VTE Prophylaxis: Heparin Drip  / sequential compression device   Code Status:  Full code, wife is POA  POLST: There is no POLST form on file for this patient (pre-hospital)  Discussion with family: Discussed treatment plan with patient and wife    Anticipated Length of Stay:  Patient will be admitted on an Inpatient basis with an anticipated length of stay of  more than 2 midnights  Justification for Hospital Stay:  Cardiology workup for elevated troponin    Total Time for Visit, including Counseling / Coordination of Care: 45 minutes  Greater than 50% of this total time spent on direct patient counseling and coordination of care  Chief Complaint:   Chest pain    History of Present Illness:    Brenda Chavez is a 70 y o  male who presents with chest pain since Saturday night  Patient was recently discharged from [facility] after having a drug-eluting stent placed to the left circumflex artery  Patient has a history of CAD status post CABG about 17 years ago  Patient states yesterday he was having substernal chest pain that feels like bricks sitting on his chest with associated nausea and shortness of breath  This resolved on its own  Then this morning he had a similar pain and his wife decided to call EMS  In the ER ED physician called Cardiology after it was noted that patient's troponin was elevated at 0 79, Cardiology recommended admitting the patient and starting IV heparin drip  Patient states that after he received nitro in the EMS his chest pain improved but is not totally resolved  He describes as nonradiating, substernal, pressure  Review of Systems:    Review of Systems   Constitutional: Negative for activity change, chills, diaphoresis, fatigue and fever  HENT: Negative for congestion, rhinorrhea, sinus pressure, sore throat and trouble swallowing  Eyes: Negative  Respiratory: Positive for chest tightness and shortness of breath  Cardiovascular: Positive for chest pain  Negative for palpitations and leg swelling  Gastrointestinal: Positive for nausea  Negative for abdominal distention, constipation, diarrhea and vomiting  Endocrine: Negative  Genitourinary: Negative  Musculoskeletal: Negative  Skin: Negative  Allergic/Immunologic: Negative  Neurological: Negative for dizziness, syncope, light-headedness and numbness  Hematological: Negative  Psychiatric/Behavioral: Negative  All other systems reviewed and are negative        Past Medical and Surgical History:     Past Medical History:   Diagnosis Date    AAA (abdominal aortic aneurysm) ([facility])     Acid reflux     Acute serous otitis media of left ear     recurrence not specified     Anesthesia     "always has mental changes /lingers and lingers after anesthesia"    Anxiety     Aortic aneurysm without rupture (HCC)     Arm bruise     right inner forearm "recent IV"    At risk for falls     BPH without urinary obstruction     Cancer (HCC)     skin CA on nose    CAP (community acquired pneumonia)     Cataracts, bilateral     Change in bowel function     Clubbing of fingers     Colon polyps     Common cold     Contusion of elbow, left     initial encounter     COPD (chronic obstructive pulmonary disease) ([facility])     Coronary artery disease     Cough     Dementia (HCC)     Depression     Dizziness     upon "standing quickly on occas"    Exercise counseling     Fatigue     Full dentures     "doesn't wear them"    Glaucoma screening     High cholesterol     History of abdominal aortic aneurysm (AAA) repair 08/2017    History of bacteremia     History of chronic obstructive lung disease     History of epistaxis     History of influenza vaccination     History of kidney stones     History of pneumonia     "many times" "at least 5 times" almost always goes to sepsis"    History of sepsis 10/2017    History of sinusitis     History of skin cancer     History of sleep apnea     History of transfusion     History of urinary tract infection     [place] (hard of hearing)     Hydronephrosis with obstructing calculus     Influenza vaccine needed     Jock itch left/saw doctor 11/8 and started on antifungal cream    Kidney stones     Left hip pain     Need for pneumococcal vaccination     Need for prophylactic vaccination and inoculation against influenza     Other emphysema ([facility] [address] )     Pancreatitis, chronic ([facility] [address] )     pt and wife can't confirm 11/10/17    Pneumonia 10/26/2017    admitted [facility]    Pulmonary emphysema ([facility] [address] )     Screening for genitourinary condition     Screening for neurological condition     Sepsis ([facility] [address] )     due to unspecified organism     Short-term memory loss     Sleep apnea     does not use CPAP   Special screening examination for neoplasm of prostate     TIA involving carotid artery     "before carotid surgery"    Ulcer     stomach, "years ago"    Unsteady gait     Use of cane as ambulatory aid     sometimes    Vitamin D deficiency     Wears glasses        Past Surgical History:   Procedure Laterality Date    ABDOMINAL AORTIC ANEURYSM REPAIR  08/23/2017    ABDOMINAL AORTIC ANEURYSM REPAIR, ENDOVASCULAR      BACK SURGERY      spinal stenosis    CARDIAC SURGERY      CABG x3    CAROTID ENDARTARECTOMY Left 11/1996    CATARACT EXTRACTION Bilateral     CORONARY ARTERY BYPASS GRAFT  01/2003    x3    CYSTOSCOPY      with insertion of ureteral stent     CYSTOSCOPY      with ureteroscopy with lithotripsy     EXCISIONAL HEMORRHOIDECTOMY      EYE SURGERY Bilateral     "for a wrinkle" after cataract surgery    HERNIA REPAIR      umbilical    LITHOTRIPSY      renal    MOUTH SURGERY      full mouth extraction     NV COLONOSCOPY FLX DX W/COLLJ SPEC WHEN PFRMD N/A 5/16/2017    Procedure: COLONOSCOPY with polypectomies/ hot snare and tattoo;  Surgeon: Ren Posada MD;  Location: [facility];   Service: Gastroenterology    NV CYSTOURETHROSCOPY N/A 11/30/2017    Procedure: Elva Barber;  Surgeon: Eh Montanez MD;  Location: [facility];  Service: Urology    NV ESOPHAGOGASTRODUODENOSCOPY TRANSORAL DIAGNOSTIC N/A 8/18/2016    Procedure: ESOPHAGOGASTRODUODENOSCOPY (EGD); Surgeon: Hal Cushing, MD;  Location: [facility]; Service: Gastroenterology    VT REMOVE BLADDER STONE,<2 5 CM N/A 11/30/2017    Procedure: Gui Arceo;  Surgeon: Silvina Reyez MD;  Location[facility];  Service: Urology    SKIN BIOPSY      TONSILLECTOMY      URETERAL STENT PLACEMENT      and removal       Meds/Allergies:    Prior to Admission medications    Medication Sig Start Date End Date Taking? Authorizing Provider   aspirin 81 MG tablet Take 81 mg by mouth daily Took within 24 hours    Yes Historical Provider, MD   atenolol (TENORMIN) 25 mg tablet TAKE 1 TABLET AT BEDTIME 10/4/19  Yes Hillary Rodriguez MD   atorvastatin (LIPITOR) 20 mg tablet Take 1 tablet (20 mg total) by mouth daily at bedtime 7/15/19  Yes Mark Storm DO   Bacillus Coagulans-Inulin (PROBIOTIC FORMULA) 1-250 BILLION-MG CAPS Take 1 capsule by mouth daily Record states dose is currently 4 billion   Yes Historical Provider, MD   Cholecalciferol (VITAMIN D-3 PO) Take 2,000 Units by mouth daily  Yes Historical Provider, MD   citric acid-potassium citrate (POLYCITRA K) 1,100-334 mg/5 mL solution Take by mouth 3 (three) times a day with meals   Yes Historical Provider, MD   clopidogrel (PLAVIX) 75 mg tablet Take 1 tablet (75 mg total) by mouth daily 2/21/20  Yes Basilia Alexandra MD   cyanocobalamin (VITAMIN B-12) 1,000 mcg tablet Take 1,000 mcg by mouth 3 (three) times a week MWF   Yes Historical Provider, MD   donepezil (ARICEPT) 10 mg tablet TAKE 1 TABLET DAILY AT     BEDTIME 2/11/20  Yes Rica Randolph DO   escitalopram (LEXAPRO) 20 mg tablet TAKE 1 TABLET DAILY 2/10/20  Yes Rica Randolph DO   Fluticasone-Salmeterol (AIRDUO RESPICLICK 69/02) 17-55 MCG/ACT AEPB Inhale 1 puff 2 (two) times a day AM & PM   Yes Historical Provider, MD   KRILL OIL PO Take 500 mg by mouth daily     Yes Historical Provider, MD   Melatonin 10 MG TABS Take 2 tablets by mouth daily at bedtime Taking 15mg Yes Historical Provider, MD   memantine (NAMENDA) 5 mg tablet Take 5 mg by mouth 2 (two) times a day In the evening    Yes Historical Provider, MD   montelukast (SINGULAIR) 10 mg tablet Take 1 tablet (10 mg total) by mouth daily 7/12/19  Yes Олег Staggers, DO   Multiple Vitamins-Minerals (CENTRUM SILVER ADULT 50+) TABS Take 1 tablet by mouth daily   Yes Historical Provider, MD   pantoprazole (PROTONIX) 40 mg tablet Take 1 tablet (40 mg total) by mouth daily 1/16/20  Yes Олег Staggers, DO   Potassium Citrate ER 15 MEQ (1620 MG) TBCR Take by mouth 2 (two) times a day   Yes Historical Provider, MD   tamsulosin (FLOMAX) 0 4 mg Take 1 capsule (0 4 mg total) by mouth daily for 90 days 6/6/19 2/23/20 Yes Chula Guevara PA-C   tiotropium (SPIRIVA HANDIHALER) 18 mcg inhalation capsule Place 18 mcg into inhaler and inhale daily  Yes Historical Provider, MD   acetaminophen (TYLENOL) 500 mg tablet Take 650 mg by mouth every 6 (six) hours as needed for headaches  7/10/18 2/23/20  Historical Provider, MD     I have reviewed home medications with patient family member  Allergies:    Allergies   Allergen Reactions    Augmentin [Amoxicillin-Pot Clavulanate] Diarrhea    Ciprofloxacin Hives    Morphine And Related Other (See Comments)     Change in mental status    Levofloxacin      Headache    Wellbutrin [Bupropion]        Social History:     Marital Status: /Civil Union   Occupation:  Retired   Patient Pre-hospital Living Situation:  With wife  Patient Pre-hospital Level of Mobility:  Independent  Patient Pre-hospital Diet Restrictions:  Cardiac  Substance Use History:   Social History     Substance and Sexual Activity   Alcohol Use Not Currently    Alcohol/week: 0 0 standard drinks    Frequency: Never    Drinks per session: 1 or 2    Binge frequency: Never    Comment: quit 25 yrs ago     Social History     Tobacco Use   Smoking Status Former Smoker    Packs/day: 1 50    Years: 60 00    Pack years: 90 00    Last attempt to quit:     Years since quittin 1   Smokeless Tobacco Never Used   Tobacco Comment    quit 3 yrs ago, used to be a 1-1 5 ppd smoker     Social History     Substance and Sexual Activity   Drug Use No    Comment: No illicit drug use        Family History:    Family History   Problem Relation Age of Onset    Diabetes type II Mother         mellitus    Kidney failure Father     Diabetes type II Maternal Grandmother         mellitus     Hypertension Paternal Grandmother         benign essential        Physical Exam:     Vitals:   Blood Pressure: 130/70 (20 1630)  Pulse: 73 (20 1630)  Temperature: 97 8 °F (36 6 °C) (20 1400)  Temp Source: Temporal (20 1400)  Respirations: 18 (20 1630)  Height: 5' 9" (175 3 cm) (20 1400)  Weight - Scale: 81 4 kg (179 lb 7 3 oz) (20 1400)  SpO2: 93 % (20 1630)    Physical Exam   Constitutional: He is oriented to person, place, and time  He appears well-developed and well-nourished  HENT:   Head: Normocephalic and atraumatic  Mouth/Throat: Oropharynx is clear and moist    Eyes: Pupils are equal, round, and reactive to light  Conjunctivae are normal    Neck: Neck supple  No JVD present  Cardiovascular: Normal rate, regular rhythm, normal heart sounds and intact distal pulses  Pulmonary/Chest: Effort normal and breath sounds normal    Abdominal: Soft  Bowel sounds are normal    Right groin with ecchymosis   Musculoskeletal: He exhibits no edema or tenderness  Neurological: He is alert and oriented to person, place, and time  Skin: Skin is warm and dry  Capillary refill takes less than 2 seconds  Psychiatric: He has a normal mood and affect  His behavior is normal  Judgment and thought content normal    Nursing note and vitals reviewed  Additional Data:     Lab Results: I have personally reviewed pertinent reports        Results from last 7 days   Lab Units 20  1420   WBC Thousand/uL 5 92   HEMOGLOBIN g/dL 15 3   HEMATOCRIT % 45 7   PLATELETS Thousands/uL 129*   NEUTROS PCT % 69   LYMPHS PCT % 19   MONOS PCT % 10   EOS PCT % 1     Results from last 7 days   Lab Units 02/23/20  1420   SODIUM mmol/L 143   POTASSIUM mmol/L 3 7   CHLORIDE mmol/L 104   CO2 mmol/L 26   BUN mg/dL 9   CREATININE mg/dL 1 04   ANION GAP mmol/L 13   CALCIUM mg/dL 9 2   ALBUMIN g/dL 3 7   TOTAL BILIRUBIN mg/dL 2 30*   ALK PHOS U/L 119*   ALT U/L 44   AST U/L 33   GLUCOSE RANDOM mg/dL 81     Results from last 7 days   Lab Units 02/23/20  1420   INR  1 01             Results from last 7 days   Lab Units 02/19/20  0401 02/19/20  0337   LACTIC ACID mmol/L 2 6*  --    PROCALCITONIN ng/ml  --  <0 05       Imaging: I have personally reviewed pertinent reports  XR chest 2 views   Final Result by Deepak Garay DO (02/23 1448)      Hyperinflated lung fields consistent with underlying COPD  Workstation performed: [id number]             EKG, Pathology, and Other Studies Reviewed on Admission:   · EKG:  ST depressions in lateral leads    Allscripts / Epic Records Reviewed: Yes     ** Please Note: This note has been constructed using a voice recognition system   **

## 2020-02-24 ENCOUNTER — APPOINTMENT (INPATIENT)
Dept: NON INVASIVE DIAGNOSTICS | Facility: HOSPITAL | Age: 80
DRG: 281 | End: 2020-02-24
Payer: MEDICARE

## 2020-02-24 LAB
ANION GAP SERPL CALCULATED.3IONS-SCNC: 11 MMOL/L (ref 4–13)
APTT PPP: 60 SECONDS (ref 23–37)
ATRIAL RATE: 64 BPM
ATRIAL RATE: 66 BPM
ATRIAL RATE: 70 BPM
ATRIAL RATE: 73 BPM
BUN SERPL-MCNC: 11 MG/DL (ref 5–25)
CALCIUM SERPL-MCNC: 8.8 MG/DL (ref 8.3–10.1)
CHLORIDE SERPL-SCNC: 107 MMOL/L (ref 100–108)
CO2 SERPL-SCNC: 24 MMOL/L (ref 21–32)
CREAT SERPL-MCNC: 0.93 MG/DL (ref 0.6–1.3)
GFR SERPL CREATININE-BSD FRML MDRD: 79 ML/MIN/1.73SQ M
GLUCOSE SERPL-MCNC: 95 MG/DL (ref 65–140)
P AXIS: 107 DEGREES
P AXIS: 113 DEGREES
P AXIS: 57 DEGREES
P AXIS: 59 DEGREES
POTASSIUM SERPL-SCNC: 3.6 MMOL/L (ref 3.5–5.3)
PR INTERVAL: 206 MS
PR INTERVAL: 206 MS
PR INTERVAL: 224 MS
PR INTERVAL: 228 MS
QRS AXIS: 118 DEGREES
QRS AXIS: 118 DEGREES
QRS AXIS: 61 DEGREES
QRS AXIS: 71 DEGREES
QRSD INTERVAL: 72 MS
QRSD INTERVAL: 78 MS
QRSD INTERVAL: 86 MS
QRSD INTERVAL: 90 MS
QT INTERVAL: 418 MS
QT INTERVAL: 418 MS
QT INTERVAL: 424 MS
QT INTERVAL: 446 MS
QTC INTERVAL: 437 MS
QTC INTERVAL: 451 MS
QTC INTERVAL: 460 MS
QTC INTERVAL: 467 MS
SODIUM SERPL-SCNC: 142 MMOL/L (ref 136–145)
T WAVE AXIS: 101 DEGREES
T WAVE AXIS: 114 DEGREES
T WAVE AXIS: 118 DEGREES
T WAVE AXIS: 84 DEGREES
VENTRICULAR RATE: 64 BPM
VENTRICULAR RATE: 66 BPM
VENTRICULAR RATE: 70 BPM
VENTRICULAR RATE: 73 BPM

## 2020-02-24 PROCEDURE — 93926 LOWER EXTREMITY STUDY: CPT | Performed by: SURGERY

## 2020-02-24 PROCEDURE — 97166 OT EVAL MOD COMPLEX 45 MIN: CPT

## 2020-02-24 PROCEDURE — 80048 BASIC METABOLIC PNL TOTAL CA: CPT | Performed by: INTERNAL MEDICINE

## 2020-02-24 PROCEDURE — 99222 1ST HOSP IP/OBS MODERATE 55: CPT | Performed by: INTERNAL MEDICINE

## 2020-02-24 PROCEDURE — 99232 SBSQ HOSP IP/OBS MODERATE 35: CPT | Performed by: PHYSICIAN ASSISTANT

## 2020-02-24 PROCEDURE — 97163 PT EVAL HIGH COMPLEX 45 MIN: CPT

## 2020-02-24 PROCEDURE — 85730 THROMBOPLASTIN TIME PARTIAL: CPT | Performed by: FAMILY MEDICINE

## 2020-02-24 PROCEDURE — 93926 LOWER EXTREMITY STUDY: CPT

## 2020-02-24 PROCEDURE — 93010 ELECTROCARDIOGRAM REPORT: CPT | Performed by: INTERNAL MEDICINE

## 2020-02-24 RX ORDER — HEPARIN SODIUM 5000 [USP'U]/ML
5000 INJECTION, SOLUTION INTRAVENOUS; SUBCUTANEOUS EVERY 8 HOURS SCHEDULED
Status: DISCONTINUED | OUTPATIENT
Start: 2020-02-24 | End: 2020-02-25 | Stop reason: HOSPADM

## 2020-02-24 RX ORDER — POTASSIUM CHLORIDE 20 MEQ/1
20 TABLET, EXTENDED RELEASE ORAL 2 TIMES DAILY
Status: DISCONTINUED | OUTPATIENT
Start: 2020-02-24 | End: 2020-02-25 | Stop reason: HOSPADM

## 2020-02-24 RX ORDER — FUROSEMIDE 10 MG/ML
40 INJECTION INTRAMUSCULAR; INTRAVENOUS
Status: DISCONTINUED | OUTPATIENT
Start: 2020-02-24 | End: 2020-02-25

## 2020-02-24 RX ADMIN — CYANOCOBALAMIN TAB 1000 MCG 1000 MCG: 1000 TAB at 08:59

## 2020-02-24 RX ADMIN — POTASSIUM CHLORIDE 20 MEQ: 20 TABLET, EXTENDED RELEASE ORAL at 10:08

## 2020-02-24 RX ADMIN — ASPIRIN 81 MG 81 MG: 81 TABLET ORAL at 08:59

## 2020-02-24 RX ADMIN — Medication 250 MG: at 18:24

## 2020-02-24 RX ADMIN — TAMSULOSIN HYDROCHLORIDE 0.4 MG: 0.4 CAPSULE ORAL at 08:58

## 2020-02-24 RX ADMIN — VITAMIN D, TAB 1000IU (100/BT) 2000 UNITS: 25 TAB at 08:59

## 2020-02-24 RX ADMIN — ATORVASTATIN CALCIUM 20 MG: 10 TABLET, FILM COATED ORAL at 22:49

## 2020-02-24 RX ADMIN — Medication 250 MG: at 08:58

## 2020-02-24 RX ADMIN — HEPARIN SODIUM 5000 UNITS: 5000 INJECTION INTRAVENOUS; SUBCUTANEOUS at 22:48

## 2020-02-24 RX ADMIN — DONEPEZIL HYDROCHLORIDE 10 MG: 5 TABLET, FILM COATED ORAL at 22:48

## 2020-02-24 RX ADMIN — POTASSIUM CHLORIDE 20 MEQ: 20 TABLET, EXTENDED RELEASE ORAL at 18:24

## 2020-02-24 RX ADMIN — FUROSEMIDE 40 MG: 10 INJECTION, SOLUTION INTRAMUSCULAR; INTRAVENOUS at 16:33

## 2020-02-24 RX ADMIN — MEMANTINE 5 MG: 5 TABLET ORAL at 08:59

## 2020-02-24 RX ADMIN — MEMANTINE 5 MG: 5 TABLET ORAL at 18:25

## 2020-02-24 RX ADMIN — CLOPIDOGREL BISULFATE 75 MG: 75 TABLET ORAL at 08:59

## 2020-02-24 RX ADMIN — ACETAMINOPHEN 650 MG: 325 TABLET, FILM COATED ORAL at 00:03

## 2020-02-24 RX ADMIN — FLUTICASONE FUROATE AND VILANTEROL TRIFENATATE 1 PUFF: 200; 25 POWDER RESPIRATORY (INHALATION) at 08:56

## 2020-02-24 RX ADMIN — TIOTROPIUM BROMIDE 18 MCG: 18 CAPSULE ORAL; RESPIRATORY (INHALATION) at 08:56

## 2020-02-24 RX ADMIN — MULTIPLE VITAMINS W/ MINERALS TAB 1 TABLET: TAB at 08:58

## 2020-02-24 RX ADMIN — FUROSEMIDE 40 MG: 10 INJECTION, SOLUTION INTRAMUSCULAR; INTRAVENOUS at 10:08

## 2020-02-24 RX ADMIN — PANTOPRAZOLE SODIUM 40 MG: 40 TABLET, DELAYED RELEASE ORAL at 08:59

## 2020-02-24 RX ADMIN — MELATONIN 9 MG: at 22:49

## 2020-02-24 RX ADMIN — ATENOLOL 25 MG: 25 TABLET ORAL at 22:49

## 2020-02-24 RX ADMIN — MONTELUKAST 10 MG: 10 TABLET, FILM COATED ORAL at 08:58

## 2020-02-24 RX ADMIN — ESCITALOPRAM OXALATE 20 MG: 10 TABLET ORAL at 08:59

## 2020-02-24 NOTE — RESPIRATORY THERAPY NOTE
RT Protocol Note  Cecilia Manual 70 y o  male MRN: [id number]  Unit/Bed#: 486-28 Encounter: [id number]    Assessment    Principal Problem:    Chest pain  Active Problems:    COPD (chronic obstructive pulmonary disease) ([facility])    Hypercholesterolemia    Benign prostatic hyperplasia    Dementia ([facility] )    Coronary artery disease involving native coronary artery of native heart without angina pectoris    Hx of CABG    Chronic respiratory failure with hypoxia ([facility])      Home Pulmonary Medications:  Spiriva Airduo  Home Devices/Therapy: Home O2    Past Medical History:   Diagnosis Date    AAA (abdominal aortic aneurysm) ([facility])     Acid reflux     Acute serous otitis media of left ear     recurrence not specified     Anesthesia     "always has mental changes /lingers and lingers after anesthesia"    Anxiety     Aortic aneurysm without rupture ([facility])     Arm bruise     right inner forearm "recent IV"    At risk for falls     BPH without urinary obstruction     Cancer ([facility] )     skin CA on nose    CAP (community acquired pneumonia)     Cataracts, bilateral     Change in bowel function     Clubbing of fingers     Colon polyps     Common cold     Contusion of elbow, left     initial encounter     COPD (chronic obstructive pulmonary disease) ([facility] )     Coronary artery disease     Cough     Dementia ([facility] )     Depression     Dizziness     upon "standing quickly on occas"    Exercise counseling     Fatigue     Full dentures     "doesn't wear them"    Glaucoma screening     High cholesterol     History of abdominal aortic aneurysm (AAA) repair 08/2017    History of bacteremia     History of chronic obstructive lung disease     History of epistaxis     History of influenza vaccination     History of kidney stones     History of pneumonia     "many times" "at least 5 times" almost always goes to sepsis"    History of sepsis 10/2017    History of sinusitis     History of skin cancer     History of sleep apnea     History of transfusion     History of urinary tract infection     [place] (hard of hearing)     Hydronephrosis with obstructing calculus     Influenza vaccine needed     Jock itch     left/saw doctor  and started on antifungal cream    Kidney stones     Left hip pain     Need for pneumococcal vaccination     Need for prophylactic vaccination and inoculation against influenza     Other emphysema ([facility] )     Pancreatitis, chronic ([facility] )     pt and wife can't confirm 11/10/17    Pneumonia 10/26/2017    admitted [facility]    Pulmonary emphysema ([facility] )     Screening for genitourinary condition     Screening for neurological condition     Sepsis ([facility] )     due to unspecified organism     Short-term memory loss     Sleep apnea     does not use CPAP      Special screening examination for neoplasm of prostate     TIA involving carotid artery     "before carotid surgery"    Ulcer     stomach, "years ago"    Unsteady gait     Use of cane as ambulatory aid     sometimes    Vitamin D deficiency     Wears glasses      Social History     Socioeconomic History    Marital status: /Civil Union     Spouse name: None    Number of children: None    Years of education: None    Highest education level: None   Occupational History    Occupation:      Comment: Retired   Social Needs    Financial resource strain: None    Food insecurity:     Worry: None     Inability: None    Transportation needs:     Medical: None     Non-medical: None   Tobacco Use    Smoking status: Former Smoker     Packs/day: 1 50     Years: 60 00     Pack years: 90 00     Last attempt to quit:      Years since quittin 1    Smokeless tobacco: Never Used    Tobacco comment:  used to be a 1-1 5 ppd smoker   Substance and Sexual Activity    Alcohol use: Not Currently     Alcohol/week: 0 0 standard drinks     Frequency: Never     Drinks per session: 1 or 2     Binge frequency: Never     Comment: quit 25 yrs ago    Drug use: No     Comment: No illicit drug use     Sexual activity: Not Currently     Partners: Female   Lifestyle    Physical activity:     Days per week: None     Minutes per session: None    Stress: None   Relationships    Social connections:     Talks on phone: None     Gets together: None     Attends Restorationist service: None     Active member of club or organization: None     Attends meetings of clubs or organizations: None     Relationship status: None    Intimate partner violence:     Fear of current or ex partner: None     Emotionally abused: None     Physically abused: None     Forced sexual activity: None   Other Topics Concern    None   Social History Narrative    Retired     No living Will     No advance directives     Daily caffeine consumption - 4-5 servings a day        Subjective         Objective    Physical Exam:   Assessment Type: Assess only  General Appearance: Alert, Awake  Respiratory Pattern: Normal  Chest Assessment: Chest expansion symmetrical  Bilateral Breath Sounds: Diminished  Cough: None  O2 Device: [email or web address]    Vitals:  Blood pressure 108/52, pulse 87, temperature 98 1 °F (36 7 °C), resp  rate 18, height 5' 9" (1 753 m), weight 81 4 kg (179 lb 7 3 oz), SpO2 92 %  Imaging and other studies: I have personally reviewed pertinent reports  O2 Device: [email or web address]     Plan    Respiratory Plan: Home Bronchodilator Patient pathway      Pt  BS diminished but clear  Will make treatments Q4PRN with Albuterol

## 2020-02-24 NOTE — OCCUPATIONAL THERAPY NOTE
Occupational Therapy Evaluation     Patient Name: Yared CABEZAS Date: 2/24/2020  Problem List  Principal Problem:    Chest pain  Active Problems:    COPD (chronic obstructive pulmonary disease) ([facility])    Hypercholesterolemia    Benign prostatic hyperplasia    Dementia ([address] )    Coronary artery disease involving native coronary artery of native heart without angina pectoris    Hx of CABG    Chronic respiratory failure with hypoxia [facility])    Past Medical History  Past Medical History:   Diagnosis Date    AAA (abdominal aortic aneurysm) ([facility])     Acid reflux     Acute serous otitis media of left ear     recurrence not specified     Anesthesia     "always has mental changes /lingers and lingers after anesthesia"    Anxiety     Aortic aneurysm without rupture ([address] )     Arm bruise     right inner forearm "recent IV"    At risk for falls     BPH without urinary obstruction     Cancer ([address] )     skin CA on nose    CAP (community acquired pneumonia)     Cataracts, bilateral     Change in bowel function     Clubbing of fingers     Colon polyps     Common cold     Contusion of elbow, left     initial encounter     COPD (chronic obstructive pulmonary disease) ([address] )     Coronary artery disease     Cough     Dementia ([address] )     Depression     Dizziness     upon "standing quickly on occas"    Exercise counseling     Fatigue     Full dentures     "doesn't wear them"    Glaucoma screening     High cholesterol     History of abdominal aortic aneurysm (AAA) repair 08/2017    History of bacteremia     History of chronic obstructive lung disease     History of epistaxis     History of influenza vaccination     History of kidney stones     History of pneumonia     "many times" "at least 5 times" almost always goes to sepsis"    History of sepsis 10/2017    History of sinusitis     History of skin cancer     History of sleep apnea     History of transfusion     History of urinary tract infection     [place] (hard of hearing)     Hydronephrosis with obstructing calculus     Influenza vaccine needed     Jock itch     left/saw doctor 11/8 and started on antifungal cream    Kidney stones     Left hip pain     Need for pneumococcal vaccination     Need for prophylactic vaccination and inoculation against influenza     Other emphysema [facility]     Pancreatitis, chronic ([facility] )     pt and wife can't confirm 11/10/17    Pneumonia 10/26/2017    admitted [facility]    Pulmonary emphysema ([facility] )     Screening for genitourinary condition     Screening for neurological condition     Sepsis ([facility] )     due to unspecified organism     Short-term memory loss     Sleep apnea     does not use CPAP   Special screening examination for neoplasm of prostate     TIA involving carotid artery     "before carotid surgery"    Ulcer     stomach, "years ago"    Unsteady gait     Use of cane as ambulatory aid     sometimes    Vitamin D deficiency     Wears glasses      Past Surgical History  Past Surgical History:   Procedure Laterality Date    ABDOMINAL AORTIC ANEURYSM REPAIR  08/23/2017    ABDOMINAL AORTIC ANEURYSM REPAIR, ENDOVASCULAR      BACK SURGERY      spinal stenosis    CARDIAC SURGERY      CABG x3    CAROTID ENDARTARECTOMY Left 11/1996    CATARACT EXTRACTION Bilateral     CORONARY ARTERY BYPASS GRAFT  01/2003    x3    CYSTOSCOPY      with insertion of ureteral stent     CYSTOSCOPY      with ureteroscopy with lithotripsy     EXCISIONAL HEMORRHOIDECTOMY      EYE SURGERY Bilateral     "for a wrinkle" after cataract surgery    HERNIA REPAIR      umbilical    LITHOTRIPSY      renal    MOUTH SURGERY      full mouth extraction     KS COLONOSCOPY FLX DX W/COLLJ SPEC WHEN PFRMD N/A 5/16/2017    Procedure: COLONOSCOPY with polypectomies/ hot snare and tattoo;  Surgeon: Mica Le MD;  [facility]: [facility];   Service: Gastroenterology    KS CYSTOURETHROSCOPY N/A 11/30/2017    Procedure: Brea Giordano; Surgeon: Cj Sellers MD;  [facility];  Service: Urology    NV ESOPHAGOGASTRODUODENOSCOPY TRANSORAL DIAGNOSTIC N/A 8/18/2016    Procedure: ESOPHAGOGASTRODUODENOSCOPY (EGD); Surgeon: Madi Hutchinson MD;  [facility]: [facility]; Service: Gastroenterology    NV REMOVE BLADDER STONE,<2 5 CM N/A 11/30/2017    Procedure: Daly Oh;  Surgeon: Cj Sellers MD;  [facility];  Service: Urology    SKIN BIOPSY      TONSILLECTOMY      URETERAL STENT PLACEMENT      and removal             02/24/20 1057   Note Type   Note type Eval/Treat   Restrictions/Precautions   Weight Bearing Precautions Per Order No   Other Precautions Chair Alarm;Telemetry; Fall Risk;Multiple lines   Pain Assessment   Pain Assessment No/denies pain   Home Living   Type of [facility] Multi-level;Performs ADLs on one level; Able to live on main level with bedroom/bathroom;Stairs to enter without rails; Other (Comment)  (1 BERNICE)   Bathroom Shower/Tub Walk-in shower   Bathroom Toilet Standard   Bathroom Equipment Grab bars in shower;Grab bars around toilet   [address] Walker;Cane   Additional Comments pt reports no device at baseline during functional mobility   Prior Function   Level of [place] Independent with ADLs and functional mobility   Lives With Spouse   Receives Help From Family   ADL Assistance Independent   IADLs Needs assistance   Falls in the last 6 months 0   Vocational Retired   Comments pt is (I) with driving   Psychosocial   Psychosocial (WDL) WDL   Subjective   Subjective "I have been doing well at home"   ADL   Where Assessed Other (Comment)  (bathroom)   Grooming Assistance 5  Supervision/Setup   LB Dressing Assistance 5  Supervision/Setup   Toileting Assistance  5  Supervision/Setup   Additional Comments pt performed functional ADLs with (S) level and increased time   Bed Mobility   Supine to Sit 5  Supervision   Additional items Bedrails   Sit to Supine   (seated in chair at end of session)   Additional Comments pt on 2L O2 during session; SpO2 ranged 86-94% during session with minimal SOB   Transfers   Sit to Stand 5  Supervision   Additional items Bedrails   Stand to Sit 5  Supervision   Additional items Bedrails   Additional Comments pt performed functional transfers with (S) level with no device and close (S)    Functional Mobility   Functional Mobility 5  Supervision   Additional Comments pt performed functional mobility with no device ~125ft    Additional items   (no device)   Balance   Static Sitting Good   Dynamic Sitting Good   Static Standing Good   Dynamic Standing Fair +   Ambulatory Fair +   Activity Tolerance   Activity Tolerance Patient limited by fatigue   RUE Assessment   RUE Assessment WFL   LUE Assessment   LUE Assessment WFL   Hand Function   Gross Motor Coordination Functional   Fine Motor Coordination Functional   Sensation   Light Touch No apparent deficits   Sharp/Dull No apparent deficits   Cognition   Overall Cognitive Status WFL   Arousal/Participation Alert   Attention Within functional limits   Orientation Level Oriented X4   Memory Within functional limits   Following Commands Follows all commands and directions without difficulty   Assessment   Limitation Decreased ADL status; Decreased UE strength;Decreased Safe judgement during ADL;Decreased endurance;Decreased self-care trans;Decreased high-level ADLs   Assessment Pt is a 70 y o  male seen for OT evaluation s/p admit to [facility] on 2/23/2020 w/ Chest pain  Comorbidities affecting pt's functional performance at time of assessment include: HTN, limited cognition, COPD, cancer history and high schoesterol, kidney stones, CAD, pancreatitis, sleep apnea, aortic aneurysm, BPH, TIA, clubbing of fingers, sepsis, dizziness   Personal factors affecting pt at time of IE include:steps to enter environment, difficulty performing ADLS, difficulty performing IADLS , limited insight into deficits, decreased initiation and engagement  and health management   Prior to admission, pt was (I) with ADLs and (A) IADLs with no device  Upon evaluation: Pt requires (S) level with no device during functional mobility 2* the following deficits impacting occupational performance: weakness, decreased strength, decreased balance, decreased tolerance, impaired initiation, impaired problem solving, decreased safety awareness and impaired interpersonal skills  Pt to benefit from continued skilled OT tx while in the hospital to address deficits as defined above and maximize level of functional independence w ADL's and functional mobility  Occupational Performance areas to address include: grooming, bathing/shower, toilet hygiene, dressing, functional mobility, community mobility and clothing management  From OT standpoint, recommendation at time of d/c would be home with family support  Goals   Patient Goals to go home    Short Term Goal  pt will perform UE strengthening exercises seated in chair    Long Term Goal #1 pt will demonstrate UB/LB bathing and grooming tasks seated in chair at (I) level    Long Term Goal #2 pt will demonstrate toilet transfers and hygiene at (I) level    Long Term Goal pt will increase independence with functional mobility with no device to (I) level with increased endurance   Plan   Treatment Interventions ADL retraining;UE strengthening/ROM; Functional transfer training; Endurance training;Patient/family training; Activityengagement   Goal Expiration Date 03/09/20   OT Frequency 3-5x/wk   Recommendation   OT Discharge Recommendation Home with family support   Barthel Index   Feeding 10   Bathing 0   Grooming Score 0   Dressing Score 5   Bladder Score 10   Bowels Score 10   Toilet Use Score 5   Transfers (Bed/Chair) Score 10   Mobility (Level Surface) Score 10   Stairs Score 0   Barthel Index Score 60     Pt will benefit from continued OT services in order to maximize (I) c ADL performance, FM c no device, and improve overall endurance/strength required to complete functional tasks in preparation for d/c  Pt left seated in chair at end of session; all needs within reach; all lines intact; scds connected and turned on

## 2020-02-24 NOTE — PROGRESS NOTES
Progress Note - María Li 1940, 70 y o  male MRN: [id number]    Unit/Bed#: 426-01 Encounter: [id number]    Primary Care Provider: Jamal Chacon DO   Date and time admitted to hospital: 2/23/2020  1:56 PM        * Chest pain  Assessment & Plan  With recent hospitalization and drug-eluting stent placement February 19, 2020-at that time peak troponin was 40 and leveled off at 11  Troponin is 0 79, 0 72, 0 69, 0 70  EKG shows ST depressions in the lateral leads  ED physician consulted Cardiology and recommended to start IV heparin drip  Heparin drip discontinued  Continue atenolol, aspirin, Plavix, Lipitor, nitro p r n , EKG p r n  Chest pain  Cardiology consulted, appreciate input  Per cardiology, patient not having ACS and troponin is still elevated from recent MI  No additional cardiac ischemia workup warranted  Symptoms likely due to diastolic CHF from retention of IV fluids     Lasix 40 mg IV BID started by cardiology  Repeat labs in am     Coronary artery disease involving native coronary artery of native heart without angina pectoris  Assessment & Plan  With recent drug-eluting stent placed to left circumflex-February 19, 2020  Cardiac catheterization at that time shows 99% stenosis of the graft to the 2nd obtuse marginal artery-decision was made to continue medical management  Continue atenolol, aspirin, Plavix, Lipitor    Hx of CABG  Assessment & Plan  Aware  As above    COPD (chronic obstructive pulmonary disease) ([facility] )  Assessment & Plan  Not in acute exacerbation  Continue Singulair, air duo, and Spiriva  Respiratory protocol      Chronic respiratory failure with hypoxia ([facility] )  Assessment & Plan  Patient currently at his baseline of 3 L nasal cannula  Continue oxygen supplementation    Dementia ([facility] )  Assessment & Plan  With short-term memory loss  Continue Aricept, Lexapro, Namenda    Hypercholesterolemia  Assessment & Plan  Recent lipid panel with recent hospitalization  Continue Lipitor    Benign prostatic hyperplasia  Assessment & Plan  Continue Flomax      VTE Pharmacologic Prophylaxis:   Pharmacologic: Heparin  Mechanical VTE Prophylaxis in Place: Yes    Patient Centered Rounds: I have performed bedside rounds with nursing staff today  Discussions with Specialists or Other Care Team Provider:  nursing, CM, and attending      Education and Discussions with Family / Patient: family updated at bedside    Time Spent for Care: 20 minutes  More than 50% of total time spent on counseling and coordination of care as described above  Current Length of Stay: 1 day(s)    Current Patient Status: Inpatient   Certification Statement: The patient will continue to require additional inpatient hospital stay due to need for IV lasix and monitoring of labs    Discharge Plan: TBD    Code Status: Level 1 - Full Code      Subjective: The patient was seen and examined  The patient states his chest pain has resolved and shortness of breath has improved  Objective:     Vitals:   Temp (24hrs), Av °F (36 7 °C), Min:97 6 °F (36 4 °C), Max:98 3 °F (36 8 °C)    Temp:  [97 6 °F (36 4 °C)-98 3 °F (36 8 °C)] 98 °F (36 7 °C)  HR:  [60-87] 60  Resp:  [16-19] 19  BP: ()/(52-77) 95/65  SpO2:  [90 %-94 %] 94 %  Body mass index is 26 5 kg/m²  Input and Output Summary (last 24 hours): Intake/Output Summary (Last 24 hours) at 2020 1825  Last data filed at 2020 0900  Gross per 24 hour   Intake 280 ml   Output 700 ml   Net -420 ml       Physical Exam:     Physical Exam   Constitutional: He is oriented to person, place, and time  Vital signs are normal  He appears well-developed and well-nourished  He is active and cooperative  Nasal cannula in place  Cardiovascular: Normal rate and regular rhythm  Pulmonary/Chest: Effort normal  He has decreased breath sounds  He has no wheezes  He has no rhonchi  He has no rales  Abdominal: Soft   Normal appearance and bowel sounds are normal  There is no tenderness  Neurological: He is alert and oriented to person, place, and time  No cranial nerve deficit  Skin: Skin is warm, dry and intact  Nursing note and vitals reviewed  Additional Data:     Labs:    Results from last 7 days   Lab Units 02/23/20  1420   WBC Thousand/uL 5 92   HEMOGLOBIN g/dL 15 3   HEMATOCRIT % 45 7   PLATELETS Thousands/uL 129*   NEUTROS PCT % 69   LYMPHS PCT % 19   MONOS PCT % 10   EOS PCT % 1     Results from last 7 days   Lab Units 02/24/20  0434 02/23/20  1420   SODIUM mmol/L 142 143   POTASSIUM mmol/L 3 6 3 7   CHLORIDE mmol/L 107 104   CO2 mmol/L 24 26   BUN mg/dL 11 9   CREATININE mg/dL 0 93 1 04   ANION GAP mmol/L 11 13   CALCIUM mg/dL 8 8 9 2   ALBUMIN g/dL  --  3 7   TOTAL BILIRUBIN mg/dL  --  2 30*   ALK PHOS U/L  --  119*   ALT U/L  --  44   AST U/L  --  33   GLUCOSE RANDOM mg/dL 95 81     Results from last 7 days   Lab Units 02/23/20  1420   INR  1 01             Results from last 7 days   Lab Units 02/19/20  0401 02/19/20  0337   LACTIC ACID mmol/L 2 6*  --    PROCALCITONIN ng/ml  --  <0 05           * I Have Reviewed All Lab Data Listed Above  * Additional Pertinent Lab Tests Reviewed:  All Labs Within Last 24 Hours Reviewed    Imaging:    Imaging Reports Reviewed Today Include: none  Imaging Personally Reviewed by Myself Includes:  none    Recent Cultures (last 7 days):     Results from last 7 days   Lab Units 02/19/20  0337 02/18/20  1051   URINE CULTURE  9331-2632 cfu/ml  No Growth <1000 cfu/mL       Last 24 Hours Medication List:     Current Facility-Administered Medications:  acetaminophen 650 mg Oral Q4H PRN Silva Tadeo MD   albuterol 2 5 mg Nebulization Q4H PRN Jennifer Negron MD   aspirin 81 mg Oral Daily Silva Tadeo MD   atenolol 25 mg Oral HS Silva Tadeo MD   atorvastatin 20 mg Oral HS Silva Tadeo MD   cholecalciferol 2,000 Units Oral Daily Silva Tadeo MD   clopidogrel 75 mg Oral Daily Silva Tadeo MD   vitamin B-12 1,000 mcg Oral Once per day on Mon Wed Fri Shari Martinez MD   donepezil 10 mg Oral HS Shari Martinez MD   escitalopram 20 mg Oral Daily Shari Martinez MD   fluticasone-vilanterol 1 puff Inhalation Daily Shari Martinez MD   furosemide 40 mg Intravenous BID (diuretic) Sky Courtney MD   melatonin 9 mg Oral HS Shari Martinez MD   memantine 5 mg Oral BID Shari Martinez MD   montelukast 10 mg Oral Daily Shari Martinez MD   multivitamin-minerals 1 tablet Oral Daily Shari Martinez MD   nitroglycerin 0 4 mg Sublingual Q5 Min PRN Shari Martinez MD   ondansetron 4 mg Intravenous Q6H PRN Shari Martinez MD   pantoprazole 40 mg Oral Daily Shari Martinez MD   potassium chloride 20 mEq Oral BID Sky Courtney MD   saccharomyces boulardii 250 mg Oral BID Shari Martinez MD   tamsulosin 0 4 mg Oral Daily Shari Martinez MD   tiotropium 18 mcg Inhalation Daily Shari Martinez MD        Today, Patient Was Seen By: Zulay Turpin PA-C    ** Please Note: Dictation voice to text software may have been used in the creation of this document   **

## 2020-02-24 NOTE — PLAN OF CARE
Problem: PHYSICAL THERAPY ADULT  Goal: Performs mobility at highest level of function for planned discharge setting  See evaluation for individualized goals  Description  Treatment/Interventions: Functional transfer training, Elevations, Endurance training, Bed mobility, Gait training          See flowsheet documentation for full assessment, interventions and recommendations  Note:   Prognosis: Good  Problem List: Impaired balance, Decreased mobility, Decreased endurance  Assessment: Patient is a 70 y o  male evaluated by Physical Therapy s/p admit to [address] [place] [address],[address] on 2/23/2020 with admitting diagnosis of: Chest pain, Elevated troponin, and principal problem of: Chest pain  PT was consulted to assess patient's functional mobility and discharge needs  Ordered are PT Evaluation and treatment with activity level of: up with assistance  Comorbidities affecting patient's physical performance at time of assessment include: COPD, hypercholesterolemia, dementia, CAD, hx of CABG, respiratory failure, GERD, Alzheimer's, hx of MI  Personal factors affecting the patient at time of IE include: lives in 2 story home, step(s) to enter home, inability/difficulty performing IADLs and inability/difficulty performing ADLs  Please locate objective findings from PT assessment regarding body systems outlined above  Upon evaluation, pt able to perform all functional mobility with SUP and verbal cuing; no RW required  Upon initial stance, pt demonstrating moderate postural sway which improved with increased ambulation  No LOB experienced  HR remaining WFL, however SpO2 decreasing to 86% with exertion on 2L O2  Pt will benefit from continued PT intervention during LOS to address current deficits, increase LOF, and facilitate safe d/c home when medically appropriate  D/c recommendation at this time is home with family support          Recommendation: Home with family support          See flowsheet documentation for full assessment

## 2020-02-24 NOTE — PLAN OF CARE
Problem: OCCUPATIONAL THERAPY ADULT  Goal: Performs self-care activities at highest level of function for planned discharge setting  See evaluation for individualized goals  Description  Treatment Interventions: ADL retraining, UE strengthening/ROM, Functional transfer training, Endurance training, Patient/family training, Activityengagement          See flowsheet documentation for full assessment, interventions and recommendations  Note:   Limitation: Decreased ADL status, Decreased UE strength, Decreased Safe judgement during ADL, Decreased endurance, Decreased self-care trans, Decreased high-level ADLs     Assessment: Pt is a 70 y o  male seen for OT evaluation s/p admit to [facility] on 2/23/2020 w/ Chest pain  Comorbidities affecting pt's functional performance at time of assessment include: HTN, limited cognition, COPD, cancer history and high schoesterol, kidney stones, CAD, pancreatitis, sleep apnea, aortic aneurysm, BPH, TIA, clubbing of fingers, sepsis, dizziness  Personal factors affecting pt at time of IE include:steps to enter environment, difficulty performing ADLS, difficulty performing IADLS , limited insight into deficits, decreased initiation and engagement  and health management   Prior to admission, pt was (I) with ADLs and (A) IADLs with no device  Upon evaluation: Pt requires (S) level with no device during functional mobility 2* the following deficits impacting occupational performance: weakness, decreased strength, decreased balance, decreased tolerance, impaired initiation, impaired problem solving, decreased safety awareness and impaired interpersonal skills  Pt to benefit from continued skilled OT tx while in the hospital to address deficits as defined above and maximize level of functional independence w ADL's and functional mobility   Occupational Performance areas to address include: grooming, bathing/shower, toilet hygiene, dressing, functional mobility, community mobility and clothing management  From OT standpoint, recommendation at time of d/c would be home with family support       OT Discharge Recommendation: Home with family support

## 2020-02-24 NOTE — PLAN OF CARE
Problem: Potential for Falls  Goal: Patient will remain free of falls  Description  INTERVENTIONS:  - Assess patient frequently for physical needs  -  Identify cognitive and physical deficits and behaviors that affect risk of falls  (pain,fatigue)  -  [place] fall precautions as indicated by assessment  (medium fall risk)  - Educate patient/family on patient safety including physical limitations  - Instruct patient to call for assistance with activity based on assessment  - Modify environment to reduce risk of injury  - Consider OT/PT consult to assist with strengthening/mobility   Outcome: Progressing     Problem: PAIN - ADULT  Goal: Verbalizes/displays adequate comfort level or baseline comfort level  Description  Interventions:  - Encourage patient to monitor pain and request assistance  - Assess pain using appropriate pain scale (0-10 pain scale)  - Administer analgesics based on type and severity of pain and evaluate response  - Implement non-pharmacological measures as appropriate and evaluate response  - Consider cultural and social influences on pain and pain management  - Notify physician/advanced practitioner if interventions unsuccessful or patient reports new pain   Outcome: Progressing     Problem: INFECTION - ADULT  Goal: Absence or prevention of progression during hospitalization  Description  INTERVENTIONS:  - Assess and monitor for signs and symptoms of infection  - Monitor lab/diagnostic results  - Monitor all insertion sites, i e  indwelling lines  - Administer medications as ordered  - Instruct and encourage patient and family to use good hand hygiene technique   Outcome: Progressing     Problem: SAFETY ADULT  Goal: Maintain or return to baseline ADL function  Description  INTERVENTIONS:  -  Assess patient's ability to carry out ADLs; (minimal assist for cares)  - Assess/evaluate cause of self-care deficits   - Assess range of motion  - Assess patient's mobility; (stand by assist)  - Assess patient's need for assistive devices and provide as appropriate (none)  - Encourage maximum independence but intervene and supervise when necessary  - Involve family in performance of ADLs  - Assess for home care needs following discharge   - Consider OT consult to assist with ADL evaluation and planning for discharge  - Provide patient education as appropriate   Outcome: Progressing  Goal: Maintain or return mobility status to optimal level  Description  INTERVENTIONS:  - Assess patient's baseline mobility status (indepndent)    - Identify cognitive and physical deficits and behaviors that affect mobility (pain, fatigue)  - Identify mobility aids required to assist with transfers and/or ambulation (none)  - [place] fall precautions as indicated by assessment (medium fall risk)  - Record patient progress and toleration of activity level on Mobility SBAR; progress patient to next Phase/Stage  - Instruct patient to call for assistance with activity based on assessment  - Consider rehabilitation consult to assist with strengthening/weightbearing, etc    Outcome: Progressing     Problem: DISCHARGE PLANNING  Goal: Discharge to home or other facility with appropriate resources  Description  INTERVENTIONS:  - Identify barriers to discharge w/patient and caregiver  - Arrange for needed discharge resources and transportation as appropriate  - Identify discharge learning needs (meds, wound care, etc )  - Refer to Case Management Department for coordinating discharge planning if the patient needs post-hospital services based on physician/advanced practitioner order or complex needs related to functional status, cognitive ability, or social support system   Outcome: Progressing     Problem: Knowledge Deficit  Goal: Patient/family/caregiver demonstrates understanding of disease process, treatment plan, medications, and discharge instructions  Description  Complete learning assessment and assess knowledge base   Interventions:  - Provide teaching at level of understanding  - Provide teaching via preferred learning methods  Outcome: Progressing     Problem: CARDIOVASCULAR - ADULT  Goal: Maintains optimal cardiac output and hemodynamic stability  Description  INTERVENTIONS:  - Monitor I/O, vital signs and rhythm  - Monitor for S/S and trends of decreased cardiac output  - Assess quality of pulses, skin color and temperature  - Assess for signs of decreased coronary artery perfusion  - Instruct patient to report change in severity of symptoms   Outcome: Progressing  Goal: Absence of cardiac dysrhythmias or at baseline rhythm  Description  INTERVENTIONS:  - Continuous cardiac monitoring, vital signs, obtain 12 lead EKG if ordered  - Administer antiarrhythmic and heart rate control medications as ordered  - Monitor electrolytes and administer replacement therapy as ordered  Outcome: Progressing     Problem: RESPIRATORY - ADULT  Goal: Achieves optimal ventilation and oxygenation  Description  INTERVENTIONS:  - Assess for changes in respiratory status  - Assess for changes in mentation and behavior  - Position to facilitate oxygenation and minimize respiratory effort  - Oxygen administered by appropriate delivery (chronically on 2 liters/min oxygen)  - Encourage broncho-pulmonary hygiene including cough, deep breathe, Incentive Spirometry  - Assess the need for suctioning and aspirate as needed  - Assess and instruct to report SOB or any respiratory difficulty  - Respiratory Therapy support as indicated   Outcome: Progressing     Problem: HEMATOLOGIC - ADULT  Goal: Maintains hematologic stability  Description  INTERVENTIONS  - Assess for signs and symptoms of bleeding or hemorrhage  - Monitor labs  - Administer supportive blood products/factors as ordered and appropriate  Outcome: Progressing

## 2020-02-24 NOTE — ASSESSMENT & PLAN NOTE
With recent hospitalization and drug-eluting stent placement February 19, 2020-at that time peak troponin was 40 and leveled off at 11  Troponin is 0 79, 0 72, 0 69, 0 70  EKG shows ST depressions in the lateral leads  ED physician consulted Cardiology and recommended to start IV heparin drip  Heparin drip discontinued  Continue atenolol, aspirin, Plavix, Lipitor, nitro p r n , EKG p r n  Chest pain  Cardiology consulted, appreciate input  Per cardiology, patient not having ACS and troponin is still elevated from recent MI  No additional cardiac ischemia workup warranted  Symptoms likely due to diastolic CHF from retention of IV fluids     Lasix 40 mg IV BID started by cardiology  Repeat labs in am

## 2020-02-24 NOTE — CONSULTS
Consultation - Cardiology   Paradise Healy 70 y o  male MRN: [id number]  Unit/Bed#: 426-01 Encounter: [id number]  Physician Requesting Consult: Jarrod Poe MD  Reason for Consult / Principal Problem: CP, SOB  Assessment:  Principal Problem:    Chest pain  Active Problems:    COPD (chronic obstructive pulmonary disease) (HCC)    Hypercholesterolemia    Benign prostatic hyperplasia    Dementia (HCC)    Coronary artery disease involving native coronary artery of native heart without angina pectoris    Hx of CABG    Chronic respiratory failure with hypoxia ([address] )        Plan:  I do not feel that he is having acute coronary syndrome and his troponin is still elevated from his recent MI  The stenting artery was very small  The rest of his coronary arteries which supply most of his coronary blood flow are patent  He does not need repeat cardiac cath or stress testing  His symptoms are likely due to diastolic CHF from retention of IVFs from his recent cardiac cath  1  Lasix 40 mg IV BID  2  Check ultrasound of R groin for pseudoaneurysm    History of Present Illness     HPI: Paradise Healy is a 70y o  year old male  He has a history of remote CABG  He was admitted to [address] [place] [address] last week with chest heaviness and jaw pain and lateral ischemic ECG changes  Troponin was 24  He underwent cardiac cath on 2/19/2020 and had coronary stenting performed to a very small second OM branch  The rest of his coronary anatomy did not show any significant disease  The cath was done via the RFA  He has chronic SOB from his COPD  He uses O2 at home  He was readmitted with increased SOB and orthopnea / chest heaviness when lying in bed  ECG does not show the lateral ischemic changes that he had prior to his stenting  Troponin levels 0 79, 0 72, 0 69, 0 70      EF is 60 % by recent echo                Review of Systems:    Alert awake oriented, comfortable, denies any complaints  No fevers chills nausea vomiting  No weakness, dizziness, seizures  positive for - shortness of breath  Denies any palpitations,  Diaphoresis, positive for chest pressure  Denies leg edema, pain or paresthesias  Denies any skin rashes  Denies abdominal pain, bloody stools, masses  Denies any depression or suicidal ideations      Historical Information   Past Medical History:   Diagnosis Date    AAA (abdominal aortic aneurysm) ([facility])     Acid reflux     Acute serous otitis media of left ear     recurrence not specified     Anesthesia     "always has mental changes /lingers and lingers after anesthesia"    Anxiety     Aortic aneurysm without rupture ([facility])     Arm bruise     right inner forearm "recent IV"    At risk for falls     BPH without urinary obstruction     Cancer ([facility] )     skin CA on nose    CAP (community acquired pneumonia)     Cataracts, bilateral     Change in bowel function     Clubbing of fingers     Colon polyps     Common cold     Contusion of elbow, left     initial encounter     COPD (chronic obstructive pulmonary disease) ([facility])     Coronary artery disease     Cough     Dementia ([facility])     Depression     Dizziness     upon "standing quickly on occas"    Exercise counseling     Fatigue     Full dentures     "doesn't wear them"    Glaucoma screening     High cholesterol     History of abdominal aortic aneurysm (AAA) repair 08/2017    History of bacteremia     History of chronic obstructive lung disease     History of epistaxis     History of influenza vaccination     History of kidney stones     History of pneumonia     "many times" "at least 5 times" almost always goes to sepsis"    History of sepsis 10/2017    History of sinusitis     History of skin cancer     History of sleep apnea     History of transfusion     History of urinary tract infection     [place] (hard of hearing)     Hydronephrosis with obstructing calculus     Influenza vaccine needed     Jock itch     left/saw doctor 11/8 and started on antifungal cream    Kidney stones     Left hip pain     Need for pneumococcal vaccination     Need for prophylactic vaccination and inoculation against influenza     Other emphysema ([facility] )     Pancreatitis, chronic ([facility] )     pt and wife can't confirm 11/10/17    Pneumonia 10/26/2017    admitted [facility]    Pulmonary emphysema ([facility] )     Screening for genitourinary condition     Screening for neurological condition     Sepsis ([facility] )     due to unspecified organism     Short-term memory loss     Sleep apnea     does not use CPAP   Special screening examination for neoplasm of prostate     TIA involving carotid artery     "before carotid surgery"    Ulcer     stomach, "years ago"    Unsteady gait     Use of cane as ambulatory aid     sometimes    Vitamin D deficiency     Wears glasses      Past Surgical History:   Procedure Laterality Date    ABDOMINAL AORTIC ANEURYSM REPAIR  08/23/2017    ABDOMINAL AORTIC ANEURYSM REPAIR, ENDOVASCULAR      BACK SURGERY      spinal stenosis    CARDIAC SURGERY      CABG x3    CAROTID ENDARTARECTOMY Left 11/1996    CATARACT EXTRACTION Bilateral     CORONARY ARTERY BYPASS GRAFT  01/2003    x3    CYSTOSCOPY      with insertion of ureteral stent     CYSTOSCOPY      with ureteroscopy with lithotripsy     EXCISIONAL HEMORRHOIDECTOMY      EYE SURGERY Bilateral     "for a wrinkle" after cataract surgery    HERNIA REPAIR      umbilical    LITHOTRIPSY      renal    MOUTH SURGERY      full mouth extraction     IA COLONOSCOPY FLX DX W/COLLJ SPEC WHEN PFRMD N/A 5/16/2017    Procedure: COLONOSCOPY with polypectomies/ hot snare and tattoo;  Surgeon: Adarsh Victoria MD;  [facility];   Service: Gastroenterology    IA CYSTOURETHROSCOPY N/A 11/30/2017    Procedure: Meseret Lynn;  Surgeon: Margarito Santizo MD;  [facility];  Service: Urology    IA ESOPHAGOGASTRODUODENOSCOPY TRANSORAL DIAGNOSTIC N/A 8/18/2016    Procedure: ESOPHAGOGASTRODUODENOSCOPY (EGD); Surgeon: Grant Silva MD;  [facility]: [facility]; Service: Gastroenterology    MD REMOVE BLADDER STONE,<2 5 CM N/A 2017    Procedure: Marte Sharriew;  Surgeon: Geovani Serrano MD;  [facility];  Service: Urology    SKIN BIOPSY      TONSILLECTOMY      URETERAL STENT PLACEMENT      and removal     Social History     Substance and Sexual Activity   Alcohol Use Not Currently    Alcohol/week: 0 0 standard drinks    Frequency: Never    Drinks per session: 1 or 2    Binge frequency: Never    Comment: quit 25 yrs ago     Social History     Substance and Sexual Activity   Drug Use No    Comment: No illicit drug use      Social History     Tobacco Use   Smoking Status Former Smoker    Packs/day: 1 50    Years: 60 00    Pack years: 90 00    Last attempt to quit: 2014    Years since quittin 1   Smokeless Tobacco Never Used   Tobacco Comment     used to be a 1-1 5 ppd smoker     Family History: non-contributory    Meds/Allergies   all current active meds have been reviewed  Allergies   Allergen Reactions    Augmentin [Amoxicillin-Pot Clavulanate] Diarrhea    Ciprofloxacin Hives    Morphine And Related Other (See Comments)     Change in mental status    Levofloxacin      Headache    Wellbutrin [Bupropion]        Objective   Vitals: Blood pressure 97/62, pulse 65, temperature 97 6 °F (36 4 °C), resp  rate 16, height 5' 9" (1 753 m), weight 81 4 kg (179 lb 7 3 oz), SpO2 93 %  , Body mass index is 26 5 kg/m² ,   Orthostatic Blood Pressures      Most Recent Value   Blood Pressure  97/62 filed at 2020 0707   Patient Position - Orthostatic VS  Lying filed at 2020 2122            Intake/Output Summary (Last 24 hours) at 2020 0804  Last data filed at 2020 0001  Gross per 24 hour   Intake 360 ml   Output 300 ml   Net 60 ml               Physical Exam:  GEN: Brenda Chavez appears well, alert and oriented x 3, pleasant and cooperative   HEENT: pupils equal, round, and reactive to light; extraocular muscles intact  NECK: supple, no carotid bruits   HEART: regular rhythm, normal S1 and S2, no murmurs, clicks, gallops or rubs   LUNGS: rales at the bases  ABDOMEN: normal bowel sounds, soft, no tenderness, no distention  EXTREMITIES: peripheral pulses normal; no clubbing, cyanosis, or edema  NEURO: no focal findings   SKIN: normal without suspicious lesions on exposed skin    Lab Results:   Admission on 02/23/2020   Component Date Value Ref Range Status    WBC 02/23/2020 5 92  4 31 - 10 16 Thousand/uL Final    RBC 02/23/2020 4 57  3 88 - 5 62 Million/uL Final    Hemoglobin 02/23/2020 15 3  12 0 - 17 0 g/dL Final    Hematocrit 02/23/2020 45 7  36 5 - 49 3 % Final    MCV 02/23/2020 100* 82 - 98 fL Final    MCH 02/23/2020 33 5  26 8 - 34 3 pg Final    MCHC 02/23/2020 33 5  31 4 - 37 4 g/dL Final    RDW 02/23/2020 14 0  11 6 - 15 1 % Final    MPV 02/23/2020 9 8  8 9 - 12 7 fL Final    Platelets 78/01/4330 129* 149 - 390 Thousands/uL Final    nRBC 02/23/2020 0  /100 WBCs Final    Neutrophils Relative 02/23/2020 69  43 - 75 % Final    Immat GRANS % 02/23/2020 0  0 - 2 % Final    Lymphocytes Relative 02/23/2020 19  14 - 44 % Final    Monocytes Relative 02/23/2020 10  4 - 12 % Final    Eosinophils Relative 02/23/2020 1  0 - 6 % Final    Basophils Relative 02/23/2020 1  0 - 1 % Final    Neutrophils Absolute 02/23/2020 4 08  1 85 - 7 62 Thousands/µL Final    Immature Grans Absolute 02/23/2020 0 02  0 00 - 0 20 Thousand/uL Final    Lymphocytes Absolute 02/23/2020 1 12  0 60 - 4 47 Thousands/µL Final    Monocytes Absolute 02/23/2020 0 58  0 17 - 1 22 Thousand/µL Final    Eosinophils Absolute 02/23/2020 0 08  0 00 - 0 61 Thousand/µL Final    Basophils Absolute 02/23/2020 0 04  0 00 - 0 10 Thousands/µL Final    Sodium 02/23/2020 143  136 - 145 mmol/L Final    Potassium 02/23/2020 3 7  3 5 - 5 3 mmol/L Final    Chloride 02/23/2020 104  100 - 108 mmol/L Final    CO2 02/23/2020 26  21 - 32 mmol/L Final    ANION GAP 02/23/2020 13  4 - 13 mmol/L Final    BUN 02/23/2020 9  5 - 25 mg/dL Final    Creatinine 02/23/2020 1 04  0 60 - 1 30 mg/dL Final    Standardized to IDMS reference method    Glucose 02/23/2020 81  65 - 140 mg/dL Final      If the patient is fasting, the ADA then defines impaired fasting glucose as > 100 mg/dL and diabetes as > or equal to 123 mg/dL  Specimen collection should occur prior to Sulfasalazine administration due to the potential for falsely depressed results  Specimen collection should occur prior to Sulfapyridine administration due to the potential for falsely elevated results   Calcium 02/23/2020 9 2  8 3 - 10 1 mg/dL Final    eGFR 02/23/2020 69  ml/min/1 73sq m Final    Total Bilirubin 02/23/2020 2 30* 0 20 - 1 00 mg/dL Final    Bilirubin, Direct 02/23/2020 0 34* 0 00 - 0 20 mg/dL Final    Alkaline Phosphatase 02/23/2020 119* 46 - 116 U/L Final    AST 02/23/2020 33  5 - 45 U/L Final      Specimen collection should occur prior to Sulfasalazine administration due to the potential for falsely depressed results   ALT 02/23/2020 44  12 - 78 U/L Final      Specimen collection should occur prior to Sulfasalazine administration due to the potential for falsely depressed results   Total Protein 02/23/2020 7 3  6 4 - 8 2 g/dL Final    Albumin 02/23/2020 3 7  3 5 - 5 0 g/dL Final    Lipase 02/23/2020 84  73 - 393 u/L Final    Troponin I 02/23/2020 0 79* <=0 04 ng/mL Final      [employer] Chemistry analyzer 99% cutoff is > 0 04 ng/mL in network labs     o cTnI 99% cutoff is useful only when applied to patients in the clinical setting of myocardial ischemia   o cTnI 99% cutoff should be interpreted in the context of clinical history, ECG findings and possibly cardiac imaging to establish correct diagnosis     o cTnI 99% cutoff may be suggestive but clearly not indicative of a coronary event without the clinical setting of myocardial ischemia   NT-proBNP 02/23/2020 462* <450 pg/mL Final    Protime 02/23/2020 13 3  11 6 - 14 5 seconds Final    INR 02/23/2020 1 01  0 84 - 1 19 Final    PTT 02/23/2020 31  23 - 37 seconds Final    Therapeutic Heparin Range =  60-90 seconds    Extra Tube 02/23/2020 Hold for add-ons  Final    Auto resulted   Troponin I 02/23/2020 0 72* <=0 04 ng/mL Final      [employer] Chemistry analyzer 99% cutoff is > 0 04 ng/mL in network labs     o cTnI 99% cutoff is useful only when applied to patients in the clinical setting of myocardial ischemia   o cTnI 99% cutoff should be interpreted in the context of clinical history, ECG findings and possibly cardiac imaging to establish correct diagnosis  o cTnI 99% cutoff may be suggestive but clearly not indicative of a coronary event without the clinical setting of myocardial ischemia   Troponin I 02/23/2020 0 69* <=0 04 ng/mL Final      [employer] Chemistry analyzer 99% cutoff is > 0 04 ng/mL in network labs     o cTnI 99% cutoff is useful only when applied to patients in the clinical setting of myocardial ischemia   o cTnI 99% cutoff should be interpreted in the context of clinical history, ECG findings and possibly cardiac imaging to establish correct diagnosis  o cTnI 99% cutoff may be suggestive but clearly not indicative of a coronary event without the clinical setting of myocardial ischemia   PTT 02/23/2020 110* 23 - 37 seconds Final    Therapeutic Heparin Range =  60-90 seconds    Troponin I 02/23/2020 0 70* <=0 04 ng/mL Final      [employer] Chemistry analyzer 99% cutoff is > 0 04 ng/mL in network labs     o cTnI 99% cutoff is useful only when applied to patients in the clinical setting of myocardial ischemia   o cTnI 99% cutoff should be interpreted in the context of clinical history, ECG findings and possibly cardiac imaging to establish correct diagnosis     o cTnI 99% cutoff may be suggestive but clearly not indicative of a coronary event without the clinical setting of myocardial ischemia   PTT 02/24/2020 60* 23 - 37 seconds Final    Therapeutic Heparin Range =  60-90 seconds    Sodium 02/24/2020 142  136 - 145 mmol/L Final    Potassium 02/24/2020 3 6  3 5 - 5 3 mmol/L Final    Chloride 02/24/2020 107  100 - 108 mmol/L Final    CO2 02/24/2020 24  21 - 32 mmol/L Final    ANION GAP 02/24/2020 11  4 - 13 mmol/L Final    BUN 02/24/2020 11  5 - 25 mg/dL Final    Creatinine 02/24/2020 0 93  0 60 - 1 30 mg/dL Final    Standardized to IDMS reference method    Glucose 02/24/2020 95  65 - 140 mg/dL Final      If the patient is fasting, the ADA then defines impaired fasting glucose as > 100 mg/dL and diabetes as > or equal to 123 mg/dL  Specimen collection should occur prior to Sulfasalazine administration due to the potential for falsely depressed results  Specimen collection should occur prior to Sulfapyridine administration due to the potential for falsely elevated results      Calcium 02/24/2020 8 8  8 3 - 10 1 mg/dL Final    eGFR 02/24/2020 79  ml/min/1 73sq m Final    Ventricular Rate 02/23/2020 64  BPM Final    Atrial Rate 02/23/2020 64  BPM Final    IA Interval 02/23/2020 228  ms Final    QRSD Interval 02/23/2020 72  ms Final    QT Interval 02/23/2020 424  ms Final    QTC Interval 02/23/2020 437  ms Final    P Axis 02/23/2020 107  degrees Final    QRS Axis 02/23/2020 118  degrees Final    T Wave [place] 02/23/2020 114  degrees Final    Ventricular Rate 02/23/2020 70  BPM Final    Atrial Rate 02/23/2020 70  BPM Final    IA Interval 02/23/2020 224  ms Final    QRSD Interval 02/23/2020 78  ms Final    QT Interval 02/23/2020 418  ms Final    QTC Interval 02/23/2020 451  ms Final    P Axis 02/23/2020 113  degrees Final    QRS Axis 02/23/2020 118  degrees Final    T Wave [place] 02/23/2020 101  degrees Final    Ventricular Rate 02/23/2020 66  BPM Final    Atrial Rate 02/23/2020 66  BPM Final    IA Interval 02/23/2020 206  ms Final    QRSD Interval 02/23/2020 90  ms Final    QT Interval 02/23/2020 446  ms Final    QTC Interval 02/23/2020 467  ms Final    P Axis 02/23/2020 57  degrees Final    QRS Axis 02/23/2020 61  degrees Final    T Wave [place] 02/23/2020 84  degrees Final    Ventricular Rate 02/23/2020 73  BPM Final    Atrial Rate 02/23/2020 73  BPM Final    AR Interval 02/23/2020 206  ms Final    QRSD Interval 02/23/2020 86  ms Final    QT Interval 02/23/2020 418  ms Final    QTC Interval 02/23/2020 460  ms Final    P Axis 02/23/2020 59  degrees Final    QRS Axis 02/23/2020 71  degrees Final    T Wave [place] 02/23/2020 118  degrees Final       Results from last 7 days   Lab Units 02/23/20 2203 02/23/20 2008 02/23/20  1750   TROPONIN I ng/mL 0 70* 0 69* 0 72*     Results from last 7 days   Lab Units 02/23/20  1420 02/20/20  0452 02/19/20  0412   WBC Thousand/uL 5 92 6 16 7 76   HEMOGLOBIN g/dL 15 3 15 5 17 8*   HEMATOCRIT % 45 7 46 2 52 7*   PLATELETS Thousands/uL 129* 95* 107*         Results from last 7 days   Lab Units 02/24/20  0434 02/23/20 1420 02/20/20  0452  02/18/20  0041   POTASSIUM mmol/L 3 6 3 7 3 8   < > 3 6   CHLORIDE mmol/L 107 104 113*   < > 105   CO2 mmol/L 24 26 20*   < > 27   BUN mg/dL 11 9 13   < > 9   CREATININE mg/dL 0 93 1 04 0 86   < > 1 27   CALCIUM mg/dL 8 8 9 2 8 5   < > 8 6   ALK PHOS U/L  --  119*  --   --  117*   ALT U/L  --  44  --   --  44   AST U/L  --  33  --   --  41    < > = values in this interval not displayed  Results from last 7 days   Lab Units 02/23/20  1420 02/18/20  0041   INR  1 01 0 92           Imaging: I have personally reviewed pertinent reports  Echo: Recent ECHO - EF 60 %, [facility]  Counseling / Coordination of Care  Total floor / unit time spent today 60 minutes  Greater than 50% of total time was spent with the patient and / or family counseling and / or coordination of care

## 2020-02-24 NOTE — SOCIAL WORK
Cm met with the patient and spoke with wife to evaluate the patients prior function and living situation and any barriers to d/c and form a safe d/c plan  Cm also evaluated the patient for any services in the home or needs for services  Pt resides at home with his wife in a 2 story house  Has 1 BERNICE  Then 10 steps inside to his bedroom/bathroom  Pt had been independent with his adls and ambulation  Occasionally uses a SPC to ambulate  No services  Pt has home 02 he wears 2l continuously from "[employer] [facility]  Pt's wife does the driving  PCP is Lorna Alcantara and pharmacy is [facility] in [facility]  Plans at this time are home on dc with outpatient follow up  CM will follow and assist in dc planning  Pt is a 30 day readmission was at [facility] and transferred on 2/18/20 to [facility]  Was at [facility] from 2/18/20-2/20/20 dx type 1 MI; And had drug-eluting stent placed to left circumflex-February 19, 2020  Pt now readmitted due to CP since Saturday night (2/22)  Patient's troponin was elevated at 0 79, Cardiology recommended admitting the patient and starting IV heparin drip

## 2020-02-24 NOTE — PHYSICAL THERAPY NOTE
Physical Therapy Evaluation    Patient Name: Brenda Alcocer    RBJFW'S Date: 2/24/2020     Problem List  Principal Problem:    Chest pain  Active Problems:    COPD (chronic obstructive pulmonary disease) ([facility])    Hypercholesterolemia    Benign prostatic hyperplasia    Dementia ([facility] [address] )    Coronary artery disease involving native coronary artery of native heart without angina pectoris    Hx of CABG    Chronic respiratory failure with hypoxia [facility])       Past Medical History  Past Medical History:   Diagnosis Date    AAA (abdominal aortic aneurysm) ([facility])     Acid reflux     Acute serous otitis media of left ear     recurrence not specified     Anesthesia     "always has mental changes /lingers and lingers after anesthesia"    Anxiety     Aortic aneurysm without rupture ([facility] )     Arm bruise     right inner forearm "recent IV"    At risk for falls     BPH without urinary obstruction     Cancer ([facility] )     skin CA on nose    CAP (community acquired pneumonia)     Cataracts, bilateral     Change in bowel function     Clubbing of fingers     Colon polyps     Common cold     Contusion of elbow, left     initial encounter     COPD (chronic obstructive pulmonary disease) ([facility] )     Coronary artery disease     Cough     Dementia ([facility] )     Depression     Dizziness     upon "standing quickly on occas"    Exercise counseling     Fatigue     Full dentures     "doesn't wear them"    Glaucoma screening     High cholesterol     History of abdominal aortic aneurysm (AAA) repair 08/2017    History of bacteremia     History of chronic obstructive lung disease     History of epistaxis     History of influenza vaccination     History of kidney stones     History of pneumonia     "many times" "at least 5 times" almost always goes to sepsis"    History of sepsis 10/2017    History of sinusitis     History of skin cancer     History of sleep apnea     History of transfusion     History of urinary tract infection     [place] (hard of hearing)     Hydronephrosis with obstructing calculus     Influenza vaccine needed     Jock itch     left/saw doctor 11/8 and started on antifungal cream    Kidney stones     Left hip pain     Need for pneumococcal vaccination     Need for prophylactic vaccination and inoculation against influenza     Other emphysema ([facility]     Pancreatitis, chronic ([facility] )     pt and wife can't confirm 11/10/17    Pneumonia 10/26/2017    admitted [facility]    Pulmonary emphysema ([facility] )     Screening for genitourinary condition     Screening for neurological condition     Sepsis ([facility] )     due to unspecified organism     Short-term memory loss     Sleep apnea     does not use CPAP   Special screening examination for neoplasm of prostate     TIA involving carotid artery     "before carotid surgery"    Ulcer     stomach, "years ago"    Unsteady gait     Use of cane as ambulatory aid     sometimes    Vitamin D deficiency     Wears glasses         Past Surgical History  Past Surgical History:   Procedure Laterality Date    ABDOMINAL AORTIC ANEURYSM REPAIR  08/23/2017    ABDOMINAL AORTIC ANEURYSM REPAIR, ENDOVASCULAR      BACK SURGERY      spinal stenosis    CARDIAC SURGERY      CABG x3    CAROTID ENDARTARECTOMY Left 11/1996    CATARACT EXTRACTION Bilateral     CORONARY ARTERY BYPASS GRAFT  01/2003    x3    CYSTOSCOPY      with insertion of ureteral stent     CYSTOSCOPY      with ureteroscopy with lithotripsy     EXCISIONAL HEMORRHOIDECTOMY      EYE SURGERY Bilateral     "for a wrinkle" after cataract surgery    HERNIA REPAIR      umbilical    LITHOTRIPSY      renal    MOUTH SURGERY      full mouth extraction     MT COLONOSCOPY FLX DX W/COLLJ SPEC WHEN PFRMD N/A 5/16/2017    Procedure: COLONOSCOPY with polypectomies/ hot snare and tattoo;  Surgeon: Montse Franks MD;  [facility]: [facility];   Service: Gastroenterology    MT CYSTOURETHROSCOPY N/A 11/30/2017    Procedure: Fred Crew;  Surgeon: Silvina Reyez MD;  [facility];  Service: Urology    LA ESOPHAGOGASTRODUODENOSCOPY TRANSORAL DIAGNOSTIC N/A 8/18/2016    Procedure: ESOPHAGOGASTRODUODENOSCOPY (EGD); Surgeon: Hal Cushing, MD;  Location: [facility]; Service: Gastroenterology    LA REMOVE BLADDER STONE,<2 5 CM N/A 11/30/2017    Procedure: Gui Bronsonse;  Surgeon: Silvina Reyez MD;  [facility];  Service: Urology    SKIN BIOPSY      TONSILLECTOMY      URETERAL STENT PLACEMENT      and removal           02/24/20 1058   Note Type   Note type Eval/Treat   Pain Assessment   Pain Assessment No/denies pain   Pain Score No Pain   Home Living   Type of [facility] Multi-level;Performs ADLs on one level; Able to live on main level with bedroom/bathroom;Stairs to enter with rails  (1 BERNICE)   Bathroom Shower/Tub Walk-in shower   Bathroom Toilet Standard   Bathroom Equipment Grab bars in shower;Grab bars around toilet   [address] Walker;Cane   Additional Comments pt denies use of DME   Prior Function   Level of [place] Independent with ADLs and functional mobility   Lives With Spouse   Receives Help From Family   ADL Assistance Independent   IADLs Needs assistance   Falls in the last 6 months 0   Vocational Retired   Comments + driving   Restrictions/Precautions   [place] Precautions Per Order No   Other Precautions Chair Alarm; Fall Risk;O2;Telemetry;Multiple lines   General   Family/Caregiver Present No   Cognition   Overall Cognitive Status WFL   Arousal/Participation Alert   Orientation Level Oriented X4   Following Commands Follows all commands and directions without difficulty   RLE Assessment   RLE Assessment WFL   LLE Assessment   LLE Assessment WFL   Bed Mobility   Supine to Sit 5  Supervision   Additional items Bedrails   Sit to Supine   (pt seated in chair at end of session) Transfers   Sit to Stand 5  Supervision   Additional items Bedrails   Stand to Sit 5  Supervision   Additional items Armrests   Ambulation/Elevation   Gait pattern Excessively slow; Short stride;Decreased foot clearance; Improper Weight shift   Gait Assistance 5  Supervision   Additional items Verbal cues   Assistive Device None   Distance 125'   Balance   Static Sitting Good   Dynamic Sitting Good   Static Standing Good   Dynamic Standing Fair +   Ambulatory Fair +   Endurance Deficit   Endurance Deficit Yes   Endurance Deficit Description SpO2 decreasing with exertion   Activity Tolerance   Activity Tolerance Patient limited by fatigue   Assessment   Prognosis Good   Problem List Impaired balance;Decreased mobility; Decreased endurance   Assessment Patient is a 70 y o  male evaluated by Physical Therapy s/p admit to [address] [place] [address] on 2/23/2020 with admitting diagnosis of: Chest pain, Elevated troponin, and principal problem of: Chest pain  PT was consulted to assess patient's functional mobility and discharge needs  Ordered are PT Evaluation and treatment with activity level of: up with assistance  Comorbidities affecting patient's physical performance at time of assessment include: COPD, hypercholesterolemia, dementia, CAD, hx of CABG, respiratory failure, GERD, Alzheimer's, hx of MI  Personal factors affecting the patient at time of IE include: lives in 2 story home, step(s) to enter home, inability/difficulty performing IADLs and inability/difficulty performing ADLs  Please locate objective findings from PT assessment regarding body systems outlined above  Upon evaluation, pt able to perform all functional mobility with SUP and verbal cuing; no RW required  Upon initial stance, pt demonstrating moderate postural sway which improved with increased ambulation  No LOB experienced  HR remaining WFL, however SpO2 decreasing to 86% with exertion on 2L O2   Pt will benefit from continued PT intervention during LOS to address current deficits, increase LOF, and facilitate safe d/c home when medically appropriate  D/c recommendation at this time is home with family support  Goals   Patient Goals to go home  Short Term Goal #1 Pt will perform all functional transfers and bed mobility mod(I) with good safety awareness  LTG Expiration Date 03/09/20   Long Term Goal #1 Pt will ambulate 250' with SUP while maintaining WFL SpO2 and HR  Long Term Goal #2 Pt will ascend/descend 10 stairs with SUP and HR to reflect the ability to safely access the second floor of his home   Plan   Treatment/Interventions Functional transfer training;Elevations; Endurance training;Bed mobility;Gait training   PT Frequency Other (Comment)  (3-5x/week)   Recommendation   Recommendation Home with family support     Pt seated in recliner at end of session with all needs in reach

## 2020-02-25 ENCOUNTER — EPISODE CHANGES (OUTPATIENT)
Dept: CASE MANAGEMENT | Facility: HOSPITAL | Age: 80
End: 2020-02-25

## 2020-02-25 ENCOUNTER — TRANSITIONAL CARE MANAGEMENT (OUTPATIENT)
Dept: FAMILY MEDICINE CLINIC | Facility: CLINIC | Age: 80
End: 2020-02-25

## 2020-02-25 VITALS
SYSTOLIC BLOOD PRESSURE: 113 MMHG | HEART RATE: 79 BPM | TEMPERATURE: 97.4 F | OXYGEN SATURATION: 87 % | RESPIRATION RATE: 20 BRPM | BODY MASS INDEX: 26.58 KG/M2 | WEIGHT: 179.45 LBS | DIASTOLIC BLOOD PRESSURE: 74 MMHG | HEIGHT: 69 IN

## 2020-02-25 PROBLEM — I50.33 ACUTE ON CHRONIC DIASTOLIC CONGESTIVE HEART FAILURE (HCC): Status: ACTIVE | Noted: 2020-02-25

## 2020-02-25 LAB
ANION GAP SERPL CALCULATED.3IONS-SCNC: 13 MMOL/L (ref 4–13)
BASOPHILS # BLD AUTO: 0.04 THOUSANDS/ΜL (ref 0–0.1)
BASOPHILS NFR BLD AUTO: 1 % (ref 0–1)
BUN SERPL-MCNC: 18 MG/DL (ref 5–25)
CALCIUM SERPL-MCNC: 9.3 MG/DL (ref 8.3–10.1)
CHLORIDE SERPL-SCNC: 106 MMOL/L (ref 100–108)
CO2 SERPL-SCNC: 23 MMOL/L (ref 21–32)
CREAT SERPL-MCNC: 1.22 MG/DL (ref 0.6–1.3)
EOSINOPHIL # BLD AUTO: 0.06 THOUSAND/ΜL (ref 0–0.61)
EOSINOPHIL NFR BLD AUTO: 1 % (ref 0–6)
ERYTHROCYTE [DISTWIDTH] IN BLOOD BY AUTOMATED COUNT: 14.1 % (ref 11.6–15.1)
GFR SERPL CREATININE-BSD FRML MDRD: 57 ML/MIN/1.73SQ M
GLUCOSE SERPL-MCNC: 111 MG/DL (ref 65–140)
HCT VFR BLD AUTO: 46.1 % (ref 36.5–49.3)
HGB BLD-MCNC: 15.8 G/DL (ref 12–17)
IMM GRANULOCYTES # BLD AUTO: 0.02 THOUSAND/UL (ref 0–0.2)
IMM GRANULOCYTES NFR BLD AUTO: 0 % (ref 0–2)
LYMPHOCYTES # BLD AUTO: 1.16 THOUSANDS/ΜL (ref 0.6–4.47)
LYMPHOCYTES NFR BLD AUTO: 22 % (ref 14–44)
MAGNESIUM SERPL-MCNC: 2.1 MG/DL (ref 1.6–2.6)
MCH RBC QN AUTO: 34 PG (ref 26.8–34.3)
MCHC RBC AUTO-ENTMCNC: 34.3 G/DL (ref 31.4–37.4)
MCV RBC AUTO: 99 FL (ref 82–98)
MONOCYTES # BLD AUTO: 0.65 THOUSAND/ΜL (ref 0.17–1.22)
MONOCYTES NFR BLD AUTO: 12 % (ref 4–12)
NEUTROPHILS # BLD AUTO: 3.43 THOUSANDS/ΜL (ref 1.85–7.62)
NEUTS SEG NFR BLD AUTO: 64 % (ref 43–75)
NRBC BLD AUTO-RTO: 0 /100 WBCS
PHOSPHATE SERPL-MCNC: 3.8 MG/DL (ref 2.3–4.1)
PLATELET # BLD AUTO: 132 THOUSANDS/UL (ref 149–390)
PMV BLD AUTO: 9.8 FL (ref 8.9–12.7)
POTASSIUM SERPL-SCNC: 3.7 MMOL/L (ref 3.5–5.3)
RBC # BLD AUTO: 4.65 MILLION/UL (ref 3.88–5.62)
SODIUM SERPL-SCNC: 142 MMOL/L (ref 136–145)
WBC # BLD AUTO: 5.36 THOUSAND/UL (ref 4.31–10.16)

## 2020-02-25 PROCEDURE — 84100 ASSAY OF PHOSPHORUS: CPT | Performed by: PHYSICIAN ASSISTANT

## 2020-02-25 PROCEDURE — 97116 GAIT TRAINING THERAPY: CPT

## 2020-02-25 PROCEDURE — 80048 BASIC METABOLIC PNL TOTAL CA: CPT | Performed by: INTERNAL MEDICINE

## 2020-02-25 PROCEDURE — 99238 HOSP IP/OBS DSCHRG MGMT 30/<: CPT | Performed by: PHYSICIAN ASSISTANT

## 2020-02-25 PROCEDURE — 97535 SELF CARE MNGMENT TRAINING: CPT

## 2020-02-25 PROCEDURE — 83735 ASSAY OF MAGNESIUM: CPT | Performed by: PHYSICIAN ASSISTANT

## 2020-02-25 PROCEDURE — 99232 SBSQ HOSP IP/OBS MODERATE 35: CPT | Performed by: INTERNAL MEDICINE

## 2020-02-25 PROCEDURE — 85025 COMPLETE CBC W/AUTO DIFF WBC: CPT | Performed by: PHYSICIAN ASSISTANT

## 2020-02-25 RX ORDER — FUROSEMIDE 20 MG/1
20 TABLET ORAL DAILY
Qty: 30 TABLET | Refills: 0 | Status: SHIPPED | OUTPATIENT
Start: 2020-02-25 | End: 2020-03-02 | Stop reason: SDUPTHER

## 2020-02-25 RX ADMIN — PANTOPRAZOLE SODIUM 40 MG: 40 TABLET, DELAYED RELEASE ORAL at 09:21

## 2020-02-25 RX ADMIN — Medication 250 MG: at 09:21

## 2020-02-25 RX ADMIN — VITAMIN D, TAB 1000IU (100/BT) 2000 UNITS: 25 TAB at 09:20

## 2020-02-25 RX ADMIN — ESCITALOPRAM OXALATE 20 MG: 10 TABLET ORAL at 09:20

## 2020-02-25 RX ADMIN — TAMSULOSIN HYDROCHLORIDE 0.4 MG: 0.4 CAPSULE ORAL at 09:20

## 2020-02-25 RX ADMIN — MEMANTINE 5 MG: 5 TABLET ORAL at 09:20

## 2020-02-25 RX ADMIN — CLOPIDOGREL BISULFATE 75 MG: 75 TABLET ORAL at 09:20

## 2020-02-25 RX ADMIN — MULTIPLE VITAMINS W/ MINERALS TAB 1 TABLET: TAB at 09:21

## 2020-02-25 RX ADMIN — TIOTROPIUM BROMIDE 18 MCG: 18 CAPSULE ORAL; RESPIRATORY (INHALATION) at 09:22

## 2020-02-25 RX ADMIN — MONTELUKAST 10 MG: 10 TABLET, FILM COATED ORAL at 09:20

## 2020-02-25 RX ADMIN — ASPIRIN 81 MG 81 MG: 81 TABLET ORAL at 09:20

## 2020-02-25 RX ADMIN — HEPARIN SODIUM 5000 UNITS: 5000 INJECTION INTRAVENOUS; SUBCUTANEOUS at 05:51

## 2020-02-25 RX ADMIN — POTASSIUM CHLORIDE 20 MEQ: 20 TABLET, EXTENDED RELEASE ORAL at 09:20

## 2020-02-25 RX ADMIN — FLUTICASONE FUROATE AND VILANTEROL TRIFENATATE 1 PUFF: 200; 25 POWDER RESPIRATORY (INHALATION) at 09:23

## 2020-02-25 NOTE — OCCUPATIONAL THERAPY NOTE
OT Note     02/25/20 0951   Restrictions/Precautions   Other Precautions Fall Risk; Chair Alarm   ADL   Where Assessed Chair   Grooming Assistance 5  Supervision/Setup   Grooming Comments Completes oral care   UB Bathing Assistance 5  Supervision/Setup   UB Bathing Comments A with back   LB Bathing Assistance 5  Supervision/Setup   LB Bathing Comments Les in seated, [place]/buttocks in stance   [address] around back   UB Dressing Comments A with fasteners   LB Dressing Assistance 5  Supervision/Setup   LB Dressing Comments Doffs/dons pajamas/non skids without difficulty   Transfers   Sit to Stand 5  Supervision   Additional items Armrests   Stand to Sit 5  Supervision   Additional items Armrests   Activity Tolerance   Activity Tolerance Patient tolerated treatment well   Assessment   Assessment Pt completing a m  self care as per above  Verbalizes pending d/c  Appears to be functioning at level prior to admission  Plan   Goal Expiration Date 03/09/20   OT Treatment Day 1   Recommendation   OT Discharge Recommendation Home with family support   Remains seated recliner on completion  Chair alarm activated  SCDs applied and turned on

## 2020-02-25 NOTE — NURSING NOTE
AVS printed and reviewed with patient and wife  Both verbalized understanding    HF nurse visit ordered on d/c

## 2020-02-25 NOTE — ASSESSMENT & PLAN NOTE
With recent hospitalization and drug-eluting stent placement February 19, 2020-at that time peak troponin was 40 and leveled off at 11  Troponin is 0 79, 0 72, 0 69, 0 70  EKG shows ST depressions in the lateral leads  ED physician consulted Cardiology and recommended to start IV heparin drip  Heparin drip discontinued on 2/24/2020  Continue atenolol, aspirin, Plavix, Lipitor  Cardiology consulted, appreciate input  Per cardiology, patient not having ACS and troponin is still elevated from recent MI  No additional cardiac ischemia workup warranted  Symptoms likely due to diastolic CHF from retention of IV fluids  Lasix 40 mg IV BID started by cardiology and transitioned to PO lasix 20 mg daily at discharge  Outpatient follow-up with Juvenal Arredondo PA-C (cardiology) next week

## 2020-02-25 NOTE — ASSESSMENT & PLAN NOTE
Wt Readings from Last 3 Encounters:   02/23/20 81 4 kg (179 lb 7 3 oz)   02/20/20 75 9 kg (167 lb 5 3 oz)   02/18/20 79 9 kg (176 lb 2 4 oz)     Present on admission in the setting of fluid retention from recent cardiac catheterization  Cardiology started the patient on IV Lasix 40 mg BID on 2/24/2020  Patient diuresed well and cardiology felt patient was medically cleared from a cardiac standpoint to be discharged home  The patient was discharged home non PO lasix 20 mg daily  Continue PO potassium supplementation  Outpatient follow-up with Yoan Carranza PA-C (cardiology) next week

## 2020-02-25 NOTE — PLAN OF CARE
Problem: Potential for Falls  Goal: Patient will remain free of falls  Description  INTERVENTIONS:  - Assess patient frequently for physical needs  -  Identify cognitive and physical deficits and behaviors that affect risk of falls  (pain,fatigue)  -  [place] fall precautions as indicated by assessment  (medium fall risk)  - Educate patient/family on patient safety including physical limitations  - Instruct patient to call for assistance with activity based on assessment  - Modify environment to reduce risk of injury  - Consider OT/PT consult to assist with strengthening/mobility   2/25/2020 1220 by Rhea Nelson RN  Outcome: Completed  2/25/2020 0741 by Rhea Nelson RN  Outcome: Progressing     Problem: PAIN - ADULT  Goal: Verbalizes/displays adequate comfort level or baseline comfort level  Description  Interventions:  - Encourage patient to monitor pain and request assistance  - Assess pain using appropriate pain scale (0-10 pain scale)  - Administer analgesics based on type and severity of pain and evaluate response  - Implement non-pharmacological measures as appropriate and evaluate response  - Consider cultural and social influences on pain and pain management  - Notify physician/advanced practitioner if interventions unsuccessful or patient reports new pain   2/25/2020 1220 by Rhea Nelson RN  Outcome: Completed  2/25/2020 0741 by Rhea Nelson RN  Outcome: Progressing     Problem: INFECTION - ADULT  Goal: Absence or prevention of progression during hospitalization  Description  INTERVENTIONS:  - Assess and monitor for signs and symptoms of infection  - Monitor lab/diagnostic results  - Monitor all insertion sites, i e  indwelling lines  - Administer medications as ordered  - Instruct and encourage patient and family to use good hand hygiene technique   2/25/2020 1220 by Rhea Nelson RN  Outcome: Completed  2/25/2020 0741 by Rhea Nelson RN  Outcome: Progressing     Problem: SAFETY ADULT  Goal: Maintain or return to baseline ADL function  Description  INTERVENTIONS:  -  Assess patient's ability to carry out ADLs; (minimal assist for cares)  - Assess/evaluate cause of self-care deficits   - Assess range of motion  - Assess patient's mobility; (stand by assist)  - Assess patient's need for assistive devices and provide as appropriate (none)  - Encourage maximum independence but intervene and supervise when necessary  - Involve family in performance of ADLs  - Assess for home care needs following discharge   - Consider OT consult to assist with ADL evaluation and planning for discharge  - Provide patient education as appropriate   2/25/2020 1220 by Kassandra Chacko RN  Outcome: Completed  2/25/2020 0741 by Kassandra Chacko RN  Outcome: Progressing  Goal: Maintain or return mobility status to optimal level  Description  INTERVENTIONS:  - Assess patient's baseline mobility status (indepndent)    - Identify cognitive and physical deficits and behaviors that affect mobility (pain, fatigue)  - Identify mobility aids required to assist with transfers and/or ambulation (none)  - [place] fall precautions as indicated by assessment (medium fall risk)  - Record patient progress and toleration of activity level on Mobility SBAR; progress patient to next Phase/Stage  - Instruct patient to call for assistance with activity based on assessment  - Consider rehabilitation consult to assist with strengthening/weightbearing, etc    2/25/2020 1220 by Kassandra Chacko RN  Outcome: Completed  2/25/2020 0741 by Kassandra Chacko RN  Outcome: Progressing     Problem: DISCHARGE PLANNING  Goal: Discharge to home or other facility with appropriate resources  Description  INTERVENTIONS:  - Identify barriers to discharge w/patient and caregiver  - Arrange for needed discharge resources and transportation as appropriate  - Identify discharge learning needs (meds, wound care, etc )  - Refer to Case Management Department for coordinating discharge planning if the patient needs post-hospital services based on physician/advanced practitioner order or complex needs related to functional status, cognitive ability, or social support system   2/25/2020 1220 by Rony Thapa RN  Outcome: Completed  2/25/2020 0741 by Rony Thapa RN  Outcome: Progressing     Problem: Knowledge Deficit  Goal: Patient/family/caregiver demonstrates understanding of disease process, treatment plan, medications, and discharge instructions  Description  Complete learning assessment and assess knowledge base    Interventions:  - Provide teaching at level of understanding  - Provide teaching via preferred learning methods  2/25/2020 1220 by Rony Thapa RN  Outcome: Completed  2/25/2020 0741 by Rony Thapa RN  Outcome: Progressing     Problem: CARDIOVASCULAR - ADULT  Goal: Maintains optimal cardiac output and hemodynamic stability  Description  INTERVENTIONS:  - Monitor I/O, vital signs and rhythm  - Monitor for S/S and trends of decreased cardiac output  - Assess quality of pulses, skin color and temperature  - Assess for signs of decreased coronary artery perfusion  - Instruct patient to report change in severity of symptoms   2/25/2020 1220 by Rony Thapa RN  Outcome: Completed  2/25/2020 0741 by Rony Thapa RN  Outcome: Progressing  Goal: Absence of cardiac dysrhythmias or at baseline rhythm  Description  INTERVENTIONS:  - Continuous cardiac monitoring, vital signs, obtain 12 lead EKG if ordered  - Administer antiarrhythmic and heart rate control medications as ordered  - Monitor electrolytes and administer replacement therapy as ordered  2/25/2020 1220 by Rony Thapa RN  Outcome: Completed  2/25/2020 0741 by Rony Thapa RN  Outcome: Progressing     Problem: RESPIRATORY - ADULT  Goal: Achieves optimal ventilation and oxygenation  Description  INTERVENTIONS:  - Assess for changes in respiratory status  - Assess for changes in mentation and behavior  - Position to facilitate oxygenation and minimize respiratory effort  - Oxygen administered by appropriate delivery (chronically on 2 liters/min oxygen)  - Encourage broncho-pulmonary hygiene including cough, deep breathe, Incentive Spirometry  - Assess the need for suctioning and aspirate as needed  - Assess and instruct to report SOB or any respiratory difficulty  - Respiratory Therapy support as indicated   2/25/2020 1220 by Bobbi Baer RN  Outcome: Completed  2/25/2020 0741 by Bobbi Baer RN  Outcome: Progressing     Problem: HEMATOLOGIC - ADULT  Goal: Maintains hematologic stability  Description  INTERVENTIONS  - Assess for signs and symptoms of bleeding or hemorrhage  - Monitor labs  - Administer supportive blood products/factors as ordered and appropriate  2/25/2020 1220 by Bobbi Baer RN  Outcome: Completed  2/25/2020 0741 by Bobbi Baer RN  Outcome: Progressing     Problem: DISCHARGE PLANNING - CARE MANAGEMENT  Goal: Discharge to post-acute care or home with appropriate resources  Description  INTERVENTIONS:  - Conduct assessment to determine patient/family and health care team treatment goals, and need for post-acute services based on payer coverage, community resources, and patient preferences, and barriers to discharge  - Address psychosocial, clinical, and financial barriers to discharge as identified in assessment in conjunction with the patient/family and health care team  - Arrange appropriate level of post-acute services according to patient's   needs and preference and payer coverage in collaboration with the physician and health care team  - Communicate with and update the patient/family, physician, and health care team regarding progress on the discharge plan  - Arrange appropriate transportation to post-acute venues  -home on dc with outpatient follow up   2/25/2020 1220 by Bobbi Baer RN  Outcome: Completed  2/25/2020 0741 by Bobbi Baer RN  Outcome: Progressing     Problem: Prexisting or High Potential for Compromised Skin Integrity  Goal: Skin integrity is maintained or improved  Description  INTERVENTIONS:  - Identify patients at risk for skin breakdown  - Assess and monitor skin integrity  - Assess and monitor nutrition and hydration status  - Monitor labs   - Assess for incontinence   - Turn and reposition patient  - Assist with mobility/ambulation  - Relieve pressure over bony prominences  - Avoid friction and shearing  - Provide appropriate hygiene as needed including keeping skin clean and dry  - Evaluate need for skin moisturizer/barrier cream  - Collaborate with interdisciplinary team   - Patient/family teaching  - Consider wound care consult   2/25/2020 1220 by Marielos Skinner RN  Outcome: Completed  2/25/2020 0741 by Marielos Skinner RN  Outcome: Progressing

## 2020-02-25 NOTE — PLAN OF CARE
Problem: Potential for Falls  Goal: Patient will remain free of falls  Description  INTERVENTIONS:  - Assess patient frequently for physical needs  -  Identify cognitive and physical deficits and behaviors that affect risk of falls  (pain,fatigue)  -  [place] fall precautions as indicated by assessment  (medium fall risk)  - Educate patient/family on patient safety including physical limitations  - Instruct patient to call for assistance with activity based on assessment  - Modify environment to reduce risk of injury  - Consider OT/PT consult to assist with strengthening/mobility   Outcome: Progressing     Problem: PAIN - ADULT  Goal: Verbalizes/displays adequate comfort level or baseline comfort level  Description  Interventions:  - Encourage patient to monitor pain and request assistance  - Assess pain using appropriate pain scale (0-10 pain scale)  - Administer analgesics based on type and severity of pain and evaluate response  - Implement non-pharmacological measures as appropriate and evaluate response  - Consider cultural and social influences on pain and pain management  - Notify physician/advanced practitioner if interventions unsuccessful or patient reports new pain   Outcome: Progressing     Problem: INFECTION - ADULT  Goal: Absence or prevention of progression during hospitalization  Description  INTERVENTIONS:  - Assess and monitor for signs and symptoms of infection  - Monitor lab/diagnostic results  - Monitor all insertion sites, i e  indwelling lines  - Administer medications as ordered  - Instruct and encourage patient and family to use good hand hygiene technique   Outcome: Progressing     Problem: SAFETY ADULT  Goal: Maintain or return to baseline ADL function  Description  INTERVENTIONS:  -  Assess patient's ability to carry out ADLs; (minimal assist for cares)  - Assess/evaluate cause of self-care deficits   - Assess range of motion  - Assess patient's mobility; (stand by assist)  - Assess patient's need for assistive devices and provide as appropriate (none)  - Encourage maximum independence but intervene and supervise when necessary  - Involve family in performance of ADLs  - Assess for home care needs following discharge   - Consider OT consult to assist with ADL evaluation and planning for discharge  - Provide patient education as appropriate   Outcome: Progressing  Goal: Maintain or return mobility status to optimal level  Description  INTERVENTIONS:  - Assess patient's baseline mobility status (indepndent)    - Identify cognitive and physical deficits and behaviors that affect mobility (pain, fatigue)  - Identify mobility aids required to assist with transfers and/or ambulation (none)  - [place] fall precautions as indicated by assessment (medium fall risk)  - Record patient progress and toleration of activity level on Mobility SBAR; progress patient to next Phase/Stage  - Instruct patient to call for assistance with activity based on assessment  - Consider rehabilitation consult to assist with strengthening/weightbearing, etc    Outcome: Progressing     Problem: DISCHARGE PLANNING  Goal: Discharge to home or other facility with appropriate resources  Description  INTERVENTIONS:  - Identify barriers to discharge w/patient and caregiver  - Arrange for needed discharge resources and transportation as appropriate  - Identify discharge learning needs (meds, wound care, etc )  - Refer to Case Management Department for coordinating discharge planning if the patient needs post-hospital services based on physician/advanced practitioner order or complex needs related to functional status, cognitive ability, or social support system   Outcome: Progressing     Problem: Knowledge Deficit  Goal: Patient/family/caregiver demonstrates understanding of disease process, treatment plan, medications, and discharge instructions  Description  Complete learning assessment and assess knowledge base   Interventions:  - Provide teaching at level of understanding  - Provide teaching via preferred learning methods  Outcome: Progressing     Problem: CARDIOVASCULAR - ADULT  Goal: Maintains optimal cardiac output and hemodynamic stability  Description  INTERVENTIONS:  - Monitor I/O, vital signs and rhythm  - Monitor for S/S and trends of decreased cardiac output  - Assess quality of pulses, skin color and temperature  - Assess for signs of decreased coronary artery perfusion  - Instruct patient to report change in severity of symptoms   Outcome: Progressing  Goal: Absence of cardiac dysrhythmias or at baseline rhythm  Description  INTERVENTIONS:  - Continuous cardiac monitoring, vital signs, obtain 12 lead EKG if ordered  - Administer antiarrhythmic and heart rate control medications as ordered  - Monitor electrolytes and administer replacement therapy as ordered  Outcome: Progressing     Problem: RESPIRATORY - ADULT  Goal: Achieves optimal ventilation and oxygenation  Description  INTERVENTIONS:  - Assess for changes in respiratory status  - Assess for changes in mentation and behavior  - Position to facilitate oxygenation and minimize respiratory effort  - Oxygen administered by appropriate delivery (chronically on 2 liters/min oxygen)  - Encourage broncho-pulmonary hygiene including cough, deep breathe, Incentive Spirometry  - Assess the need for suctioning and aspirate as needed  - Assess and instruct to report SOB or any respiratory difficulty  - Respiratory Therapy support as indicated   Outcome: Progressing     Problem: HEMATOLOGIC - ADULT  Goal: Maintains hematologic stability  Description  INTERVENTIONS  - Assess for signs and symptoms of bleeding or hemorrhage  - Monitor labs  - Administer supportive blood products/factors as ordered and appropriate  Outcome: Progressing     Problem: DISCHARGE PLANNING - CARE MANAGEMENT  Goal: Discharge to post-acute care or home with appropriate resources  Description  INTERVENTIONS:  - Conduct assessment to determine patient/family and health care team treatment goals, and need for post-acute services based on payer coverage, community resources, and patient preferences, and barriers to discharge  - Address psychosocial, clinical, and financial barriers to discharge as identified in assessment in conjunction with the patient/family and health care team  - Arrange appropriate level of post-acute services according to patient's   needs and preference and payer coverage in collaboration with the physician and health care team  - Communicate with and update the patient/family, physician, and health care team regarding progress on the discharge plan  - Arrange appropriate transportation to post-acute venues  -home on dc with outpatient follow up   Outcome: Progressing     Problem: Prexisting or High Potential for Compromised Skin Integrity  Goal: Skin integrity is maintained or improved  Description  INTERVENTIONS:  - Identify patients at risk for skin breakdown  - Assess and monitor skin integrity  - Assess and monitor nutrition and hydration status  - Monitor labs   - Assess for incontinence   - Turn and reposition patient  - Assist with mobility/ambulation  - Relieve pressure over bony prominences  - Avoid friction and shearing  - Provide appropriate hygiene as needed including keeping skin clean and dry  - Evaluate need for skin moisturizer/barrier cream  - Collaborate with interdisciplinary team   - Patient/family teaching  - Consider wound care consult   Outcome: Progressing

## 2020-02-25 NOTE — PHYSICAL THERAPY NOTE
PHYSICAL THERAPY NOTE          Patient Name: Masoud Rebolledo  VPUJE'D Date: 2/25/2020 02/25/20 0551   Restrictions/Precautions   Other Precautions Fall Risk; Chair Alarm   Subjective   Subjective Pt  agreeable to therapy  Transfers   Sit to Stand 5  Supervision   Additional items Armrests   Stand to Sit 5  Supervision   Additional items Armrests   Ambulation/Elevation   Gait pattern   (Excessively slow; Short stride;Decreased foot clearance; Impro)   Gait Assistance 5  Supervision   Additional items Verbal cues   Assistive Device None   Distance 250'   Stair Management Assistance 5  Supervision   Additional items Assist x 1;Verbal cues   Stair Management Technique One rail R;Step to pattern; Alternating pattern   Number of Stairs 10   Balance   Ambulatory Fair +   Endurance Deficit   Endurance Deficit Yes   Activity Tolerance   Activity Tolerance Patient tolerated treatment well   Assessment   Prognosis Good   Problem List Decreased endurance; Impaired balance   Assessment Pt  seen for PT treatment session this date with interventions consisting of gait training w/ emphasis on improving pt's ability to ambulate  Pt  Currently performing  tx and ambulation at (SUP) x 1 level of function  No device  with fair to good balance and stability  Able to safely ambulate on 10 steps with supervision  In comparison to previous session, Pt  With improvements in activity tolerance  Pt is in need of continued activity in PT to improve strength balance endurance mobility transfers and ambulation with return to maximize LOF  From PT/mobility standpoint, recommendation at time of d/c would be home with family support in order to promote return to PLOF and independence  Goals   LTG Expiration Date 03/09/20   PT Treatment Day 1   Plan   Treatment/Interventions Functional transfer training;LE strengthening/ROM; Therapeutic exercise; Endurance training;Elevations; Bed mobility;Gait training   Progress Progressing toward goals   Recommendation   Recommendation Home with family support   Pt  OOB in chair  with call bell within reach, scd's connected and turned on and alarm on at end of PT session  Discussed with Cheng Alexis, MARYAN today's treatment and patient's current level of function for care coordination

## 2020-02-25 NOTE — PROGRESS NOTES
Progress Note - Cardiology   Sandee Pages 70 y o  male MRN: [id number]  Unit/Bed#: 426-01 Encounter: [id number]    Assessment:  1  Acute on chronic diastolic CHF  2  CAD s/p recent NSTEMI with L Cx stenting  3  Hx of CABG    Plan:  He is stable for discharge from a cardiac standpoint  Continue same cardiac meds as previous  Add Lasix 20 mg daily, Klorcon 10 meq daily  He needs f/up with [facility] ( Susan Reina ) next week  Subjective/Objective   He feels much better  Denies CP or SOB  Negative 640 cc yesterday      VAS - No evidence of pseudoaneurysm in the R femoral artery    BUN 18  Creatinine 1 22                  Vitals: /74   Pulse 79   Temp 98 3 °F (36 8 °C)   Resp 20   Ht 5' 9" (1 753 m)   Wt 81 4 kg (179 lb 7 3 oz)   SpO2 (!) 87%   BMI 26 50 kg/m²   Vitals:    02/23/20 1400   Weight: 81 4 kg (179 lb 7 3 oz)     Orthostatic Blood Pressures      Most Recent Value   Blood Pressure  113/74 filed at 02/25/2020 0708   Patient Position - Orthostatic VS  Lying filed at 02/23/2020 2122            Intake/Output Summary (Last 24 hours) at 2/25/2020 9156  Last data filed at 2/24/2020 1847  Gross per 24 hour   Intake 160 ml   Output 800 ml   Net -640 ml       Invasive Devices     Peripheral Intravenous Line            Peripheral IV 02/23/20 Right Antecubital 1 day                Review of Systems: Negative     Physical Exam: /74   Pulse 79   Temp 98 3 °F (36 8 °C)   Resp 20   Ht 5' 9" (1 753 m)   Wt 81 4 kg (179 lb 7 3 oz)   SpO2 (!) 87%   BMI 26 50 kg/m²     General Appearance:    Alert, cooperative, no distress, appears stated age   Head:    Normocephalic, without obvious abnormality, atraumatic   Eyes:    PERRL, conjunctiva/corneas clear, EOM's intact, fundi     benign, both eyes        Ears:    Normal TM's and external ear canals, both ears   Nose:   Nares normal, septum midline, mucosa normal, no drainage    or sinus tenderness   Throat:   Lips, mucosa, and tongue normal; teeth and gums normal   Neck:   Supple, symmetrical, trachea midline, no adenopathy;        thyroid:  No enlargement/tenderness/nodules; no carotid    bruit or JVD   Back:     Symmetric, no curvature, ROM normal, no CVA tenderness   Lungs:     Clear to auscultation bilaterally, respirations unlabored   Chest wall:    No tenderness or deformity   Heart:    Regular rate and rhythm, S1 and S2 normal, no murmur, rub   or gallop   Abdomen:     Soft, non-tender, bowel sounds active all four quadrants,     no masses, no organomegaly           Extremities:   Extremities normal, atraumatic, no cyanosis or edema   Pulses:   2+ and symmetric all extremities   Skin:   Skin color, texture, turgor normal, no rashes or lesions   Lymph nodes:   Cervical, supraclavicular, and axillary nodes normal   Neurologic:   CNII-XII intact  Normal strength, sensation and reflexes       throughout       Lab Results: I have personally reviewed pertinent lab results  Imaging: I have personally reviewed pertinent reports

## 2020-02-25 NOTE — DISCHARGE SUMMARY
Discharge- Sandee Last 1940, 70 y o  male MRN: [id number]    Unit/Bed#: 426-01 Encounter: [id number]    Primary Care Provider: Abi Cowart DO   Date and time admitted to hospital: 2/23/2020  1:56 PM        * Acute on chronic diastolic congestive heart failure ([address] )  Assessment & Plan  Wt Readings from Last 3 Encounters:   02/23/20 81 4 kg (179 lb 7 3 oz)   02/20/20 75 9 kg (167 lb 5 3 oz)   02/18/20 79 9 kg (176 lb 2 4 oz)     Present on admission in the setting of fluid retention from recent cardiac catheterization  Cardiology started the patient on IV Lasix 40 mg BID on 2/24/2020  Patient diuresed well and cardiology felt patient was medically cleared from a cardiac standpoint to be discharged home  The patient was discharged home non PO lasix 20 mg daily  Continue PO potassium supplementation  Outpatient follow-up with Cuauhtemoc Zhao PA-C (cardiology) next week  Chest pain  Assessment & Plan  With recent hospitalization and drug-eluting stent placement February 19, 2020-at that time peak troponin was 40 and leveled off at 11  Troponin is 0 79, 0 72, 0 69, 0 70  EKG shows ST depressions in the lateral leads  ED physician consulted Cardiology and recommended to start IV heparin drip  Heparin drip discontinued on 2/24/2020  Continue atenolol, aspirin, Plavix, Lipitor  Cardiology consulted, appreciate input  Per cardiology, patient not having ACS and troponin is still elevated from recent MI  No additional cardiac ischemia workup warranted  Symptoms likely due to diastolic CHF from retention of IV fluids  Lasix 40 mg IV BID started by cardiology and transitioned to PO lasix 20 mg daily at discharge  Outpatient follow-up with Cuauhtemoc Zhao PA-C (cardiology) next week       Coronary artery disease involving native coronary artery of native heart without angina pectoris  Assessment & Plan  With recent drug-eluting stent placed to left circumflex-February 19, 2020  Cardiac catheterization at that time shows 99% stenosis of the graft to the 2nd obtuse marginal artery-decision was made to continue medical management  Continue atenolol, aspirin, Plavix, Lipitor    Hx of CABG  Assessment & Plan  Aware  As above    COPD (chronic obstructive pulmonary disease) (HCC)  Assessment & Plan  Not in acute exacerbation  Continue Singulair, air duo, and Spiriva      Chronic respiratory failure with hypoxia ([facility] )  Assessment & Plan  Patient currently at his baseline of 2 L nasal cannula  Continue oxygen supplementation    Dementia ([facility] [facility] )  Assessment & Plan  With short-term memory loss  Continue Aricept, Lexapro, Namenda    Hypercholesterolemia  Assessment & Plan  Recent lipid panel with recent hospitalization  Continue Lipitor    Benign prostatic hyperplasia  Assessment & Plan  Continue Flomax      Discharging Physician / Practitioner: Hali Garcia PA-C  PCP: Nena Washington DO  Admission Date:   Admission Orders (From admission, onward)     Ordered        02/23/20 1544  Inpatient Admission  Once                   Discharge Date: 02/25/20    Resolved Problems  Date Reviewed: 2/25/2020    None          Consultations During Hospital Stay:  · Cardiology    Procedures Performed:   · none    Significant Findings / Test Results:   Xr Chest 2 Views    Result Date: 2/23/2020  · Impression: Hyperinflated lung fields consistent with underlying COPD  Workstation performed: [id number]   ·     Incidental Findings:   · none     Test Results Pending at Discharge (will require follow up):   · none     Outpatient Tests Requested:  · BMP- outpatient follow-up with cardiology regarding results    Complications:  none    Reason for Admission: chest pain    Hospital Course:     Mo Gill is a 70 y o  male patient who originally presented to the hospital on 2/23/2020 due to chest pain since Saturday night  Patient was recently discharged from [facility] after having a drug-eluting stent placed to the left circumflex artery  Patient has a history of CAD status post CABG about 17 years ago  Patient states yesterday he was having substernal chest pain that feels like bricks sitting on his chest with associated nausea and shortness of breath  This resolved on its own  Then this morning he had a similar pain and his wife decided to call EMS  In the ER ED physician called Cardiology after it was noted that patient's troponin was elevated at 0 79, Cardiology recommended admitting the patient and starting IV heparin drip  Patient states that after he received nitro in the EMS his chest pain improved but is not totally resolved  He describes as nonradiating, substernal, pressure  Please see above list of diagnoses and related plan for additional information  Condition at Discharge: good     Discharge Day Visit / Exam:     Subjective:    Vitals: Blood Pressure: 113/74 (02/25/20 0708)  Pulse: 79 (02/25/20 0708)  Temperature: (!) 97 4 °F (36 3 °C) (02/25/20 0708)  Temp Source: Temporal (02/25/20 0708)  Respirations: 20 (02/25/20 0708)  Height: 5' 9" (175 3 cm) (02/23/20 1400)  Weight - Scale: 81 4 kg (179 lb 7 3 oz) (02/23/20 1400)  SpO2: (!) 87 % (02/25/20 0708)  Exam:   Physical Exam   Constitutional: He is oriented to person, place, and time  Vital signs are normal  He appears well-developed and well-nourished  He is active and cooperative  Cardiovascular: Normal rate and regular rhythm  Pulmonary/Chest: Effort normal and breath sounds normal  He has no wheezes  He has no rhonchi  He has no rales  Abdominal: Soft  Normal appearance and bowel sounds are normal  There is no tenderness  Neurological: He is alert and oriented to person, place, and time  No cranial nerve deficit  Skin: Skin is warm, dry and intact  Nursing note and vitals reviewed      Discussion with Family: wife updated at bedside prior to discharge    Discharge instructions/Information to patient and family:   See after visit summary for information provided to patient and family  Provisions for Follow-Up Care:  See after visit summary for information related to follow-up care and any pertinent home health orders  Disposition:     Home    For Discharges to [facility] SNF:   · Not Applicable to this Patient - Not Applicable to this Patient    Planned Readmission: no     Discharge Statement:  I spent 25 minutes discharging the patient  This time was spent on the day of discharge  I had direct contact with the patient on the day of discharge  Greater than 50% of the total time was spent examining patient, answering all patient questions, arranging and discussing plan of care with patient as well as directly providing post-discharge instructions  Additional time then spent on discharge activities  Discharge Medications:  See after visit summary for reconciled discharge medications provided to patient and family        ** Please Note: This note has been constructed using a voice recognition system **

## 2020-02-25 NOTE — SOCIAL WORK
Pt is being discharged today  Pt's wife will give the patient a ride home  Pt will benefit from Home care on discharge  Pt is agreeable to having home care  A post acute care recommendation was made by your care team for [address]  Discussed Freedom of Choice with patient  List of agencies given to patient via in person  patient aware the list is custom filtered for them by preference  and that [facility] post acute providers are designated  Pt would like me to make a referral to [facility] care  Referral made as requested  Follow up appts reviewed with patient with good understanding  Case Management reviewed discharge planning process including the following: identifying help that is needed at home, pt's preference for discharge needs and Meds at [facility]  Reviewed with Pt that any member of the healthcare team can answer questions regarding : medications, jmportance of recognizing  Signs and symptoms of any  medical problems  Case Management also encouraged pt to follow up with all recommended appointments after discharge

## 2020-02-26 ENCOUNTER — PATIENT OUTREACH (OUTPATIENT)
Dept: FAMILY MEDICINE CLINIC | Facility: CLINIC | Age: 80
End: 2020-02-26

## 2020-02-27 ENCOUNTER — TELEPHONE (OUTPATIENT)
Dept: FAMILY MEDICINE CLINIC | Facility: CLINIC | Age: 80
End: 2020-02-27

## 2020-02-27 ENCOUNTER — PATIENT OUTREACH (OUTPATIENT)
Dept: FAMILY MEDICINE CLINIC | Facility: CLINIC | Age: 80
End: 2020-02-27

## 2020-02-27 NOTE — TELEPHONE ENCOUNTER
Pt admitted into [facility] services - While entering Rx's there were 2 pop-up's required to report    1  Drug interaction between Lexapro & Aricept    2    Duplicate drug therapy between Clopidaogrel     Call any new orders or changes

## 2020-02-27 NOTE — PROGRESS NOTES
Outpatient Care Management Note:  Outreach call attempted  Message left for patient to please return call  Contact information left on message

## 2020-02-29 ENCOUNTER — APPOINTMENT (OUTPATIENT)
Dept: LAB | Facility: HOSPITAL | Age: 80
End: 2020-02-29
Attending: FAMILY MEDICINE
Payer: MEDICARE

## 2020-02-29 DIAGNOSIS — I50.33 ACUTE ON CHRONIC DIASTOLIC CONGESTIVE HEART FAILURE (HCC): ICD-10-CM

## 2020-02-29 LAB
ANION GAP SERPL CALCULATED.3IONS-SCNC: 10 MMOL/L (ref 4–13)
BUN SERPL-MCNC: 13 MG/DL (ref 5–25)
CALCIUM SERPL-MCNC: 8.9 MG/DL (ref 8.3–10.1)
CHLORIDE SERPL-SCNC: 104 MMOL/L (ref 100–108)
CO2 SERPL-SCNC: 29 MMOL/L (ref 21–32)
CREAT SERPL-MCNC: 1.15 MG/DL (ref 0.6–1.3)
GFR SERPL CREATININE-BSD FRML MDRD: 61 ML/MIN/1.73SQ M
GLUCOSE P FAST SERPL-MCNC: 111 MG/DL (ref 65–99)
POTASSIUM SERPL-SCNC: 3.9 MMOL/L (ref 3.5–5.3)
SODIUM SERPL-SCNC: 143 MMOL/L (ref 136–145)

## 2020-02-29 PROCEDURE — 80048 BASIC METABOLIC PNL TOTAL CA: CPT

## 2020-03-02 ENCOUNTER — OFFICE VISIT (OUTPATIENT)
Dept: CARDIOLOGY CLINIC | Facility: HOSPITAL | Age: 80
End: 2020-03-02
Payer: MEDICARE

## 2020-03-02 ENCOUNTER — LAB (OUTPATIENT)
Dept: LAB | Facility: MEDICAL CENTER | Age: 80
End: 2020-03-02
Payer: MEDICARE

## 2020-03-02 ENCOUNTER — OFFICE VISIT (OUTPATIENT)
Dept: FAMILY MEDICINE CLINIC | Facility: CLINIC | Age: 80
End: 2020-03-02
Payer: MEDICARE

## 2020-03-02 VITALS
WEIGHT: 168.43 LBS | DIASTOLIC BLOOD PRESSURE: 62 MMHG | BODY MASS INDEX: 24.95 KG/M2 | SYSTOLIC BLOOD PRESSURE: 116 MMHG | HEART RATE: 78 BPM | HEIGHT: 69 IN

## 2020-03-02 VITALS
OXYGEN SATURATION: 97 % | HEIGHT: 69 IN | WEIGHT: 169 LBS | DIASTOLIC BLOOD PRESSURE: 84 MMHG | BODY MASS INDEX: 25.03 KG/M2 | SYSTOLIC BLOOD PRESSURE: 124 MMHG

## 2020-03-02 DIAGNOSIS — N18.30 STAGE 3 CHRONIC KIDNEY DISEASE (HCC): ICD-10-CM

## 2020-03-02 DIAGNOSIS — I10 ESSENTIAL HYPERTENSION: ICD-10-CM

## 2020-03-02 DIAGNOSIS — IMO0001 TRANSITION OF CARE PERFORMED WITH SHARING OF CLINICAL SUMMARY: Primary | ICD-10-CM

## 2020-03-02 DIAGNOSIS — Z95.5 PRESENCE OF DRUG-ELUTING STENT IN LEFT CIRCUMFLEX CORONARY ARTERY: ICD-10-CM

## 2020-03-02 DIAGNOSIS — D69.6 THROMBOCYTOPENIA (HCC): ICD-10-CM

## 2020-03-02 DIAGNOSIS — Z95.5 STATUS POST CORONARY ARTERY STENT PLACEMENT: ICD-10-CM

## 2020-03-02 DIAGNOSIS — I21.9 TYPE 1 MYOCARDIAL INFARCTION (HCC): ICD-10-CM

## 2020-03-02 DIAGNOSIS — I71.4 ABDOMINAL AORTIC ANEURYSM (AAA) WITHOUT RUPTURE (HCC): ICD-10-CM

## 2020-03-02 DIAGNOSIS — I65.23 BILATERAL CAROTID ARTERY STENOSIS: ICD-10-CM

## 2020-03-02 DIAGNOSIS — E78.5 DYSLIPIDEMIA: ICD-10-CM

## 2020-03-02 DIAGNOSIS — Z95.828 HISTORY OF ENDOVASCULAR STENT GRAFT FOR ABDOMINAL AORTIC ANEURYSM: ICD-10-CM

## 2020-03-02 DIAGNOSIS — I25.10 CORONARY ARTERY DISEASE INVOLVING NATIVE CORONARY ARTERY OF NATIVE HEART WITHOUT ANGINA PECTORIS: Primary | ICD-10-CM

## 2020-03-02 DIAGNOSIS — Z95.1 S/P CABG (CORONARY ARTERY BYPASS GRAFT): ICD-10-CM

## 2020-03-02 DIAGNOSIS — I50.32 CHRONIC DIASTOLIC (CONGESTIVE) HEART FAILURE (HCC): ICD-10-CM

## 2020-03-02 LAB
ANION GAP SERPL CALCULATED.3IONS-SCNC: 6 MMOL/L (ref 4–13)
BUN SERPL-MCNC: 12 MG/DL (ref 5–25)
CALCIUM SERPL-MCNC: 9.3 MG/DL (ref 8.3–10.1)
CHLORIDE SERPL-SCNC: 105 MMOL/L (ref 100–108)
CO2 SERPL-SCNC: 29 MMOL/L (ref 21–32)
CREAT SERPL-MCNC: 1.16 MG/DL (ref 0.6–1.3)
ERYTHROCYTE [DISTWIDTH] IN BLOOD BY AUTOMATED COUNT: 13.8 % (ref 11.6–15.1)
GFR SERPL CREATININE-BSD FRML MDRD: 60 ML/MIN/1.73SQ M
GLUCOSE P FAST SERPL-MCNC: 100 MG/DL (ref 65–99)
HCT VFR BLD AUTO: 50.8 % (ref 36.5–49.3)
HGB BLD-MCNC: 17.1 G/DL (ref 12–17)
MCH RBC QN AUTO: 33.7 PG (ref 26.8–34.3)
MCHC RBC AUTO-ENTMCNC: 33.7 G/DL (ref 31.4–37.4)
MCV RBC AUTO: 100 FL (ref 82–98)
PLATELET # BLD AUTO: 153 THOUSANDS/UL (ref 149–390)
PMV BLD AUTO: 10.2 FL (ref 8.9–12.7)
POTASSIUM SERPL-SCNC: 4.4 MMOL/L (ref 3.5–5.3)
RBC # BLD AUTO: 5.07 MILLION/UL (ref 3.88–5.62)
SODIUM SERPL-SCNC: 140 MMOL/L (ref 136–145)
WBC # BLD AUTO: 6.14 THOUSAND/UL (ref 4.31–10.16)

## 2020-03-02 PROCEDURE — 3078F DIAST BP <80 MM HG: CPT | Performed by: PHYSICIAN ASSISTANT

## 2020-03-02 PROCEDURE — 36415 COLL VENOUS BLD VENIPUNCTURE: CPT

## 2020-03-02 PROCEDURE — 1036F TOBACCO NON-USER: CPT | Performed by: PHYSICIAN ASSISTANT

## 2020-03-02 PROCEDURE — 3074F SYST BP LT 130 MM HG: CPT | Performed by: PHYSICIAN ASSISTANT

## 2020-03-02 PROCEDURE — 80048 BASIC METABOLIC PNL TOTAL CA: CPT

## 2020-03-02 PROCEDURE — 99495 TRANSJ CARE MGMT MOD F2F 14D: CPT | Performed by: FAMILY MEDICINE

## 2020-03-02 PROCEDURE — 4040F PNEUMOC VAC/ADMIN/RCVD: CPT | Performed by: PHYSICIAN ASSISTANT

## 2020-03-02 PROCEDURE — 99214 OFFICE O/P EST MOD 30 MIN: CPT | Performed by: PHYSICIAN ASSISTANT

## 2020-03-02 PROCEDURE — 3008F BODY MASS INDEX DOCD: CPT | Performed by: PHYSICIAN ASSISTANT

## 2020-03-02 PROCEDURE — 1111F DSCHRG MED/CURRENT MED MERGE: CPT | Performed by: PHYSICIAN ASSISTANT

## 2020-03-02 PROCEDURE — 1160F RVW MEDS BY RX/DR IN RCRD: CPT | Performed by: PHYSICIAN ASSISTANT

## 2020-03-02 PROCEDURE — 85027 COMPLETE CBC AUTOMATED: CPT

## 2020-03-02 RX ORDER — NITROGLYCERIN 0.4 MG/1
0.4 TABLET SUBLINGUAL
Qty: 30 TABLET | Refills: 0 | Status: SHIPPED | OUTPATIENT
Start: 2020-03-02 | End: 2020-07-27

## 2020-03-02 RX ORDER — FUROSEMIDE 20 MG/1
20 TABLET ORAL DAILY
Qty: 90 TABLET | Refills: 3 | Status: SHIPPED | OUTPATIENT
Start: 2020-03-02 | End: 2020-08-29 | Stop reason: HOSPADM

## 2020-03-02 RX ORDER — CLOPIDOGREL BISULFATE 75 MG/1
75 TABLET ORAL DAILY
Qty: 90 TABLET | Refills: 3 | Status: SHIPPED | OUTPATIENT
Start: 2020-03-02 | End: 2020-10-29 | Stop reason: HOSPADM

## 2020-03-02 NOTE — PROGRESS NOTES
Assessment/Plan: We will follow-up platelet count and a BMP on the patient will be seen again in approximately 6 weeks     Diagnoses and all orders for this visit:    Transition of care performed with sharing of clinical summary    Status post coronary artery stent placement         Subjective:     Patient ID: Paradise Healy is a 70 y o  male  Transition of care Mr Libia Ledezma she had a stent placed in a a closed graft doing well post procedure he did have a relapse and had to go back to be re-evaluated but no further coronary arteriography was done on his sites in his groin look good they are black and blue and other than that seems to be doing pretty well      Review of Systems   Constitutional: Negative for activity change, appetite change, diaphoresis, fatigue and fever  HENT: Negative  Eyes: Negative  Respiratory: Negative for apnea, cough, chest tightness, shortness of breath and wheezing  Cardiovascular: Positive for chest pain  Negative for palpitations and leg swelling  Gastrointestinal: Negative for abdominal distention, abdominal pain, anal bleeding, constipation, diarrhea, nausea and vomiting  Endocrine: Negative for cold intolerance, heat intolerance, polydipsia, polyphagia and polyuria  Genitourinary: Negative for difficulty urinating, dysuria, flank pain, hematuria and urgency  Musculoskeletal: Negative for arthralgias, back pain, gait problem, joint swelling and myalgias  Skin: Negative for color change, rash and wound  Allergic/Immunologic: Negative for environmental allergies, food allergies and immunocompromised state  Neurological: Negative for dizziness, seizures, syncope, speech difficulty, numbness and headaches  Hematological: Negative for adenopathy  Does not bruise/bleed easily  Psychiatric/Behavioral: Negative for agitation, behavioral problems, hallucinations, sleep disturbance and suicidal ideas           Objective:     Physical Exam   Constitutional: He is oriented to person, place, and time  He appears well-developed and well-nourished  No distress  HENT:   Head: Normocephalic  Right Ear: External ear normal    Left Ear: External ear normal    Nose: Nose normal    Mouth/Throat: Oropharynx is clear and moist    Eyes: Pupils are equal, round, and reactive to light  Conjunctivae and EOM are normal  Right eye exhibits no discharge  Left eye exhibits no discharge  No scleral icterus  Neck: Normal range of motion  No tracheal deviation present  No thyromegaly present  Cardiovascular: Normal rate, regular rhythm and normal heart sounds  Exam reveals no gallop and no friction rub  No murmur heard  Pulmonary/Chest: Effort normal and breath sounds normal  No respiratory distress  He has no wheezes  Abdominal: Soft  Bowel sounds are normal  He exhibits no mass  There is no tenderness  There is no guarding  Musculoskeletal: He exhibits no edema or deformity  Lymphadenopathy:     He has no cervical adenopathy  Neurological: He is alert and oriented to person, place, and time  No cranial nerve deficit  Skin: Skin is warm and dry  No rash noted  He is not diaphoretic  No erythema  Psychiatric: He has a normal mood and affect   Thought content normal          Vitals:    03/02/20 1127   BP: 124/84   BP Location: Right arm   Patient Position: Sitting   Cuff Size: Standard   SpO2: 97%   Weight: 76 7 kg (169 lb)   Height: 5' 9" (1 753 m)       The following portions of the patient's history were reviewed and updated as appropriate:   He  has a past medical history of AAA (abdominal aortic aneurysm) ([address] ), Acid reflux, Acute on chronic diastolic congestive heart failure ([address] ) (2/25/2020), Acute serous otitis media of left ear, Anesthesia, Anxiety, Aortic aneurysm without rupture ([address] ), Arm bruise, At risk for falls, BPH without urinary obstruction, Cancer ([address] ), CAP (community acquired pneumonia), Cataracts, bilateral, Change in bowel function, Clubbing of fingers, Colon polyps, Common cold, Contusion of elbow, left, COPD (chronic obstructive pulmonary disease) ([facility] ), Coronary artery disease, Cough, Dementia ([facility] ), Depression, Dizziness, Exercise counseling, Fatigue, Full dentures, Glaucoma screening, High cholesterol, History of abdominal aortic aneurysm (AAA) repair (08/2017), History of bacteremia, History of chronic obstructive lung disease, History of epistaxis, History of influenza vaccination, History of kidney stones, History of pneumonia, History of sepsis (10/2017), History of sinusitis, History of skin cancer, History of sleep apnea, History of transfusion, History of urinary tract infection, [place] (hard of hearing), Hydronephrosis with obstructing calculus, Influenza vaccine needed, Jock itch, Kidney stones, Left hip pain, Need for pneumococcal vaccination, Need for prophylactic vaccination and inoculation against influenza, Other emphysema ([facility] ), Pancreatitis, chronic ([facility] ), Pneumonia (10/26/2017), Pulmonary emphysema ([facility] ), Screening for genitourinary condition, Screening for neurological condition, Sepsis ([facility] ), Short-term memory loss, Sleep apnea, Special screening examination for neoplasm of prostate, TIA involving carotid artery, Ulcer, Unsteady gait, Use of cane as ambulatory aid, Vitamin D deficiency, and Wears glasses    He   Patient Active Problem List    Diagnosis Date Noted    Acute on chronic diastolic congestive heart failure ([facility] ) 02/25/2020    Chest pain 02/18/2020    Elevated MCV 02/18/2020    Type 1 myocardial infarction ([facility] ) 02/18/2020    Grade I diastolic dysfunction 24/81/8246    Ventricular ectopy 02/18/2020    Premature atrial contractions 02/18/2020    1st degree AV block 02/18/2020    Bladder distension 02/18/2020    Bladder wall thickening 02/18/2020    Chronic respiratory failure with hypoxia ([address] ) 02/18/2020    Dementia of the Alzheimer's type, with late onset, with delirium ([facility] )     Leukocytosis 07/01/2019    D-dimer, elevated 07/01/2019    Episode of unresponsiveness 07/01/2019    Elevated serum creatinine 06/14/2019    CAD (coronary artery disease) 05/15/2018    Elevated bilirubin 05/13/2018    Tracheobronchitis 05/13/2018    Coronary artery disease involving native coronary artery of native heart without angina pectoris 05/11/2018    Hx of CABG 05/11/2018    Bilateral carotid artery stenosis 05/11/2018    Pulmonary nodule 03/07/2018    Ventricular bigeminy 03/07/2018    Bradycardia 03/06/2018    SOB (shortness of breath) 03/06/2018    Dementia ([facility] ) 01/29/2018    History of aortic aneurysm repair 09/01/2017    Thrombocytopenia ([place] [address] ) 06/02/2017    Abnormal CT scan, chest 06/01/2017    Abdominal aortic aneurysm ([place] [address] ) 06/01/2017    Microscopic hematuria 05/31/2017    COPD (chronic obstructive pulmonary disease) ([place] [address] ) 12/28/2016    Hypercholesterolemia 12/28/2016    GERD (gastroesophageal reflux disease) 12/28/2016    Benign prostatic hyperplasia 09/18/2015     He  has a past surgical history that includes Cardiac surgery; Ureteral stent placement; Excisional hemorrhoidectomy; Mouth surgery; pr esophagogastroduodenoscopy transoral diagnostic (N/A, 8/18/2016); Cataract extraction (Bilateral); Lithotripsy; Hernia repair; pr colonoscopy flx dx w/collj spec when pfrmd (N/A, 5/16/2017); Abdominal aortic aneurysm repair (08/23/2017); CAROTID ENDARTARECTOMY (Left, 11/1996); Coronary artery bypass graft (01/2003); Eye surgery (Bilateral); Cystoscopy; pr cystourethroscopy (N/A, 11/30/2017); pr remove bladder stone,<2 5 cm (N/A, 11/30/2017); Cystoscopy; AAA repair, endovascular; Tonsillectomy; Back surgery; and Skin biopsy  His family history includes Diabetes type II in his maternal grandmother and mother; Hypertension in his paternal grandmother; Kidney failure in his father  He  reports that he quit smoking about 6 years ago  He has a 90 00 pack-year smoking history   He has never used smokeless tobacco  He reports that he drank alcohol  He reports that he does not use drugs  Current Outpatient Medications   Medication Sig Dispense Refill    aspirin 81 MG tablet Take 81 mg by mouth daily Took within 24 hours       atenolol (TENORMIN) 25 mg tablet TAKE 1 TABLET AT BEDTIME 90 tablet 3    atorvastatin (LIPITOR) 20 mg tablet Take 1 tablet (20 mg total) by mouth daily at bedtime 90 tablet 3    Bacillus Coagulans-Inulin (PROBIOTIC FORMULA) 1-250 BILLION-MG CAPS Take 1 capsule by mouth daily Record states dose is currently 4 billion      Cholecalciferol (VITAMIN D-3 PO) Take 2,000 Units by mouth daily   clopidogrel (PLAVIX) 75 mg tablet Take 1 tablet (75 mg total) by mouth daily 30 tablet 0    cyanocobalamin (VITAMIN B-12) 1,000 mcg tablet Take 1,000 mcg by mouth 3 (three) times a week MWF      donepezil (ARICEPT) 10 mg tablet TAKE 1 TABLET DAILY AT     BEDTIME 90 tablet 1    escitalopram (LEXAPRO) 20 mg tablet TAKE 1 TABLET DAILY 90 tablet 3    Fluticasone-Salmeterol (AIRDUO RESPICLICK 56/53) 17-20 MCG/ACT AEPB Inhale 1 puff 2 (two) times a day AM & PM      furosemide (LASIX) 20 mg tablet Take 1 tablet (20 mg total) by mouth daily 30 tablet 0    KRILL OIL PO Take 500 mg by mouth daily   Melatonin 10 MG TABS Take 2 tablets by mouth daily at bedtime Taking 15mg      memantine (NAMENDA) 5 mg tablet Take 5 mg by mouth 2 (two) times a day In the evening       montelukast (SINGULAIR) 10 mg tablet Take 1 tablet (10 mg total) by mouth daily 90 tablet 2    Multiple Vitamins-Minerals (CENTRUM SILVER ADULT 50+) TABS Take 1 tablet by mouth daily      pantoprazole (PROTONIX) 40 mg tablet Take 1 tablet (40 mg total) by mouth daily 90 tablet 1    Potassium Citrate ER 15 MEQ (1620 MG) TBCR Take by mouth 2 (two) times a day      tiotropium (SPIRIVA HANDIHALER) 18 mcg inhalation capsule Place 18 mcg into inhaler and inhale daily        tamsulosin (FLOMAX) 0 4 mg Take 1 capsule (0 4 mg total) by mouth daily for 90 days 90 capsule 3     No current facility-administered medications for this visit  Current Outpatient Medications on File Prior to Visit   Medication Sig    aspirin 81 MG tablet Take 81 mg by mouth daily Took within 24 hours     atenolol (TENORMIN) 25 mg tablet TAKE 1 TABLET AT BEDTIME    atorvastatin (LIPITOR) 20 mg tablet Take 1 tablet (20 mg total) by mouth daily at bedtime    Bacillus Coagulans-Inulin (PROBIOTIC FORMULA) 1-250 BILLION-MG CAPS Take 1 capsule by mouth daily Record states dose is currently 4 billion    Cholecalciferol (VITAMIN D-3 PO) Take 2,000 Units by mouth daily   clopidogrel (PLAVIX) 75 mg tablet Take 1 tablet (75 mg total) by mouth daily    cyanocobalamin (VITAMIN B-12) 1,000 mcg tablet Take 1,000 mcg by mouth 3 (three) times a week MWF    donepezil (ARICEPT) 10 mg tablet TAKE 1 TABLET DAILY AT     BEDTIME    escitalopram (LEXAPRO) 20 mg tablet TAKE 1 TABLET DAILY    Fluticasone-Salmeterol (AIRDUO RESPICLICK 01/37) 72-30 MCG/ACT AEPB Inhale 1 puff 2 (two) times a day AM & PM    furosemide (LASIX) 20 mg tablet Take 1 tablet (20 mg total) by mouth daily    KRILL OIL PO Take 500 mg by mouth daily   Melatonin 10 MG TABS Take 2 tablets by mouth daily at bedtime Taking 15mg    memantine (NAMENDA) 5 mg tablet Take 5 mg by mouth 2 (two) times a day In the evening     montelukast (SINGULAIR) 10 mg tablet Take 1 tablet (10 mg total) by mouth daily    Multiple Vitamins-Minerals (CENTRUM SILVER ADULT 50+) TABS Take 1 tablet by mouth daily    pantoprazole (PROTONIX) 40 mg tablet Take 1 tablet (40 mg total) by mouth daily    Potassium Citrate ER 15 MEQ (1620 MG) TBCR Take by mouth 2 (two) times a day    tiotropium (SPIRIVA HANDIHALER) 18 mcg inhalation capsule Place 18 mcg into inhaler and inhale daily   tamsulosin (FLOMAX) 0 4 mg Take 1 capsule (0 4 mg total) by mouth daily for 90 days     No current facility-administered medications on file prior to visit        He is allergic to augmentin [amoxicillin-pot clavulanate]; ciprofloxacin; morphine and related; levofloxacin; and wellbutrin [bupropion]       Transitional Care Management Review:  Yared Yost is a 70 y o  male here for TCM follow up  During the TCM phone call patient stated:    TCM Call (since 1/31/2020)     Date and time call was made  2/25/2020  2:37 PM    Hospital care reviewed  Records reviewed    Patient was hospitialized at  [facility]    Date of Admission  02/23/20    Date of discharge  02/25/20    Diagnosis  Acute on chronic diastolic congestive heart failure    Disposition  Home    Current Symptoms  None      TCM Call (since 1/31/2020)     Post hospital issues  Reduced activity    Should patient be enrolled in anticoag monitoring? Yes <img src='C:FILES (X86)    Scheduled for follow up? Yes <img src='C:FILES (X86)    Not clinically warranted  WIFE CALLED PATIENT RE HOSPITALIZED    Patients specialists  Cardiologist    Cardiologist name  Mike Puri    Did you obtain your prescribed medications  Yes <img src='C:FILES (X86)    Do you need help managing your prescriptions or medications  No    Why type of assitance do you need  took off Quintapine 50mg, concerned he may get agitated   Potassium citrate 2x per day, taken off, given by Urologist    Is transportation to your appointment needed  No    I have advised the patient to call PCP with any new or worsening symptoms  Missy Rangel, [facility] or [facility] other    Support System  Spouse    The type of support provided  Emotional; Financial; Physical    Do you have social support  Yes, quite a bit    Are you recieving any outpatient services  No    Are you recieving home care services  Yes    Types of home care services  Nurse visit <img src='C:FILES (X86)    Are you using any community resources  No    Current waiver services  No    Have you fallen in the last 12 months  No    Interperter language line needed  No    Counseling  Family <img src='C:FILES (U42)              Jakob Dalal DO

## 2020-03-02 NOTE — PROGRESS NOTES
Cardiology Follow Up    Apoorva [place]  1940  [id number]  [address] [facility] [place]  [address] [place] [address] [employer]  [place]  [place] [address]  [phone]    1  Coronary artery disease involving native coronary artery of native heart without angina pectoris  nitroglycerin (NITROSTAT) 0 4 mg SL tablet   2  S/P CABG (coronary artery bypass graft)  nitroglycerin (NITROSTAT) 0 4 mg SL tablet   3  Presence of drug-eluting stent in left circumflex coronary artery  nitroglycerin (NITROSTAT) 0 4 mg SL tablet   4  Chronic diastolic (congestive) heart failure (HCC)  furosemide (LASIX) 20 mg tablet   5  Bilateral carotid artery stenosis     6  Abdominal aortic aneurysm (AAA) without rupture ([facility] [address] )     7  History of endovascular stent graft for abdominal aortic aneurysm     8  Essential hypertension     9  Dyslipidemia     10  Type 1 myocardial infarction (HCC)  clopidogrel (PLAVIX) 75 mg tablet       Discussion/Summary:  Overall he is stable from a cardiac standpoint  He has had no recurrent anginal symptoms  I did provide a prescription for prn nitroglycerin and discussed use with patient and wife  Continue dual antiplatelet therapy, statin, and beta-blocker  He will begin cardiac rehab in the next few weeks  He appears euvolemic on exam  His weight has been stable the past few days at home  Continue lasix 20 mg daily with potassium supplementation  Repeat BMP showed stable renal function and electrolytes  Discussed the importance of calling the office for a weight gain >3lbs overnight or >5lbs in 1 week and/or worsening edema/shortness of breath  Blood pressure is well controlled in the office  I discussed slow position changes and staying adequately hydrated for symptoms of presumed orthostatic hypotension  He follows with vascular surgery at [facility] regarding his vascular disease       He will RTO in 2 months with Dr Hermann Mayberry or sooner if necessary  He will call with any concerns  Interval History:   Sandee Last is a 70 y o  male with oronary artery disease status post CABG in 2003 with recent NSTEMI s/p AMANDA to the circumflex artery, peripheral vascular disease-abdominal aortic aneurysm status post EVAR, hypertension, dyslipidemia, COPD, and dementia who presents to the office today for hospital follow up  He recently was hospitalized after he woke up with severe chest pain  He was noted to have an elevated troponin and was transferred to [facility] for a cardiac catheterization and received a drug-eluding stent to the circumflex artery on 2/19/20  He was placed on dual antiplatelet therapy (aspirin and plavix) in addition to his beta-blocker and statin  He then presented back to the ER on 2/23/20 with additional complaints of chest pain  His troponin was still elevated although this was thought to be from his recent NSTEMI  He was also deemed to be volume overloaded and was given a few doses of IV diuretic therapy  He was placed on lasix 20 mg daily with potassium supplementation at discharge  Since hospital discharge he has been feeling well  He presents with his wife who helps provide some of the history  He denies any recurrent chest pain/pressure/discomfort  He wears supplemental oxygen chronically but denies any worsening dyspnea on exertion, lower extremity edema, orthopnea, and PND  He does admit to lightheadedness upon standing  He denies syncope or falls  He denies palpitations       Problem List     COPD (chronic obstructive pulmonary disease) ([facility] )    Overview Signed 1/16/2020 12:00 PM by Abi Cowart DO     Continues oxygen dependant         Hypercholesterolemia    GERD (gastroesophageal reflux disease)    Microscopic hematuria    Abnormal CT scan, chest    Abdominal aortic aneurysm [facility])    Overview Signed 1/16/2020 12:01 PM by Abi Cowart DO     Patient asymptomatic from his aortic aneurysm         Thrombocytopenia ([facility] [address] ) History of aortic aneurysm repair    Benign prostatic hyperplasia    Dementia ([facility] [address] )    Overview Signed 1/16/2020 12:02 PM by Alethea Ott DO     Dementia seems stable         Bradycardia    SOB (shortness of breath)    Pulmonary nodule    Ventricular bigeminy    Coronary artery disease involving native coronary artery of native heart without angina pectoris    Hx of CABG    Bilateral carotid artery stenosis    Elevated bilirubin    Tracheobronchitis    CAD (coronary artery disease)    Elevated serum creatinine    Leukocytosis    D-dimer, elevated    Episode of unresponsiveness    Dementia of the Alzheimer's type, with late onset, with delirium ([facility])    Chest pain    Elevated MCV    Type 1 myocardial infarction ([facility])    Grade I diastolic dysfunction    Ventricular ectopy    Premature atrial contractions    1st degree AV block    Bladder distension    Bladder wall thickening    Chronic respiratory failure with hypoxia ([facility])    Acute on chronic diastolic congestive heart failure ([facility] [address] )    Wt Readings from Last 3 Encounters:   03/02/20 76 4 kg (168 lb 6 9 oz)   03/02/20 76 7 kg (169 lb)   02/23/20 81 4 kg (179 lb 7 3 oz)                     Past Medical History:   Diagnosis Date    AAA (abdominal aortic aneurysm) ([facility])     Acid reflux     Acute on chronic diastolic congestive heart failure ([facility]) 2/25/2020    Acute serous otitis media of left ear     recurrence not specified     Anesthesia     "always has mental changes /lingers and lingers after anesthesia"    Anxiety     Aortic aneurysm without rupture ([facility] [address] )     Arm bruise     right inner forearm "recent IV"    At risk for falls     BPH without urinary obstruction     Cancer ([address] )     skin CA on nose    CAP (community acquired pneumonia)     Cataracts, bilateral     Change in bowel function     Clubbing of fingers     Colon polyps     Common cold     Contusion of elbow, left     initial encounter     COPD (chronic obstructive pulmonary disease) ([facility] [address] )  Coronary artery disease     Cough     Dementia (HCC)     Depression     Dizziness     upon "standing quickly on occas"    Exercise counseling     Fatigue     Full dentures     "doesn't wear them"    Glaucoma screening     High cholesterol     History of abdominal aortic aneurysm (AAA) repair 08/2017    History of bacteremia     History of chronic obstructive lung disease     History of epistaxis     History of influenza vaccination     History of kidney stones     History of pneumonia     "many times" "at least 5 times" almost always goes to sepsis"    History of sepsis 10/2017    History of sinusitis     History of skin cancer     History of sleep apnea     History of transfusion     History of urinary tract infection     [place] (hard of hearing)     Hydronephrosis with obstructing calculus     Influenza vaccine needed     Jock itch     left/saw doctor 11/8 and started on antifungal cream    Kidney stones     Left hip pain     Need for pneumococcal vaccination     Need for prophylactic vaccination and inoculation against influenza     Other emphysema ([address] )     Pancreatitis, chronic ([address] )     pt and wife can't confirm 11/10/17    Pneumonia 10/26/2017    admitted [facility]    Pulmonary emphysema ([address] )     Screening for genitourinary condition     Screening for neurological condition     Sepsis ([facility] [address] )     due to unspecified organism     Short-term memory loss     Sleep apnea     does not use CPAP      Special screening examination for neoplasm of prostate     TIA involving carotid artery     "before carotid surgery"    Ulcer     stomach, "years ago"    Unsteady gait     Use of cane as ambulatory aid     sometimes    Vitamin D deficiency     Wears glasses      Social History     Socioeconomic History    Marital status: /Civil Union     Spouse name: Not on file    Number of children: Not on file    Years of education: Not on file    Highest education level: Not on file Occupational History    Occupation:      Comment: Retired   Social Needs    Financial resource strain: Not on file    Food insecurity:     Worry: Not on file     Inability: Not on file    Transportation needs:     Medical: Not on file     Non-medical: Not on file   Tobacco Use    Smoking status: Former Smoker     Packs/day: 1 50     Years: 60 00     Pack years: 90 00     Last attempt to quit:      Years since quittin 1    Smokeless tobacco: Never Used    Tobacco comment:  used to be a 1-1 5 ppd smoker   Substance and Sexual Activity    Alcohol use: Not Currently     Alcohol/week: 0 0 standard drinks     Frequency: Never     Drinks per session: 1 or 2     Binge frequency: Never     Comment: quit 25 yrs ago    Drug use: No     Comment: No illicit drug use     Sexual activity: Not Currently     Partners: Female   Lifestyle    Physical activity:     Days per week: Not on file     Minutes per session: Not on file    Stress: Not on file   Relationships    Social connections:     Talks on phone: Not on file     Gets together: Not on file     Attends Religion service: Not on file     Active member of club or organization: Not on file     Attends meetings of clubs or organizations: Not on file     Relationship status: Not on file    Intimate partner violence:     Fear of current or ex partner: Not on file     Emotionally abused: Not on file     Physically abused: Not on file     Forced sexual activity: Not on file   Other Topics Concern    Not on file   Social History Narrative    Retired     No living Will     No advance directives     Daily caffeine consumption - 4-5 servings a day       Family History   Problem Relation Age of Onset    Diabetes type II Mother         mellitus    Kidney failure Father     Diabetes type II Maternal Grandmother         mellitus     Hypertension Paternal Grandmother         benign essential      Past Surgical History:   Procedure Laterality Date    ABDOMINAL AORTIC ANEURYSM REPAIR  08/23/2017    ABDOMINAL AORTIC ANEURYSM REPAIR, ENDOVASCULAR      BACK SURGERY      spinal stenosis    CARDIAC SURGERY      CABG x3    CAROTID ENDARTARECTOMY Left 11/1996    CATARACT EXTRACTION Bilateral     CORONARY ARTERY BYPASS GRAFT  01/2003    x3    CYSTOSCOPY      with insertion of ureteral stent     CYSTOSCOPY      with ureteroscopy with lithotripsy     EXCISIONAL HEMORRHOIDECTOMY      EYE SURGERY Bilateral     "for a wrinkle" after cataract surgery    HERNIA REPAIR      umbilical    LITHOTRIPSY      renal    MOUTH SURGERY      full mouth extraction     SC COLONOSCOPY FLX DX W/COLLJ SPEC WHEN PFRMD N/A 5/16/2017    Procedure: COLONOSCOPY with polypectomies/ hot snare and tattoo;  Surgeon: Perri Lyn MD;  [facility]: [facility]; Service: Gastroenterology    SC CYSTOURETHROSCOPY N/A 11/30/2017    Procedure: Lalo Ventura;  Surgeon: Mary Ken MD;  [facility];  Service: Urology    SC ESOPHAGOGASTRODUODENOSCOPY TRANSORAL DIAGNOSTIC N/A 8/18/2016    Procedure: ESOPHAGOGASTRODUODENOSCOPY (EGD); Surgeon: Perri Lyn MD;  [facility];   Service: Gastroenterology    SC REMOVE BLADDER STONE,<2 5 CM N/A 11/30/2017    Procedure: Svitlana Quinn;  Surgeon: Mary Ken MD;  [facility];  Service: Urology    SKIN BIOPSY      TONSILLECTOMY      URETERAL STENT PLACEMENT      and removal       Current Outpatient Medications:     aspirin 81 MG tablet, Take 81 mg by mouth daily Took within 24 hours , Disp: , Rfl:     atenolol (TENORMIN) 25 mg tablet, TAKE 1 TABLET AT BEDTIME, Disp: 90 tablet, Rfl: 3    atorvastatin (LIPITOR) 20 mg tablet, Take 1 tablet (20 mg total) by mouth daily at bedtime, Disp: 90 tablet, Rfl: 3    Bacillus Coagulans-Inulin (PROBIOTIC FORMULA) 1-250 BILLION-MG CAPS, Take 1 capsule by mouth daily Record states dose is currently 4 billion, Disp: , Rfl:     Cholecalciferol (VITAMIN D-3 PO), Take 2,000 Units by mouth daily  , Disp: , Rfl:     clopidogrel (PLAVIX) 75 mg tablet, Take 1 tablet (75 mg total) by mouth daily, Disp: 90 tablet, Rfl: 3    cyanocobalamin (VITAMIN B-12) 1,000 mcg tablet, Take 1,000 mcg by mouth 3 (three) times a week MWF, Disp: , Rfl:     donepezil (ARICEPT) 10 mg tablet, TAKE 1 TABLET DAILY AT     BEDTIME, Disp: 90 tablet, Rfl: 1    escitalopram (LEXAPRO) 20 mg tablet, TAKE 1 TABLET DAILY, Disp: 90 tablet, Rfl: 3    Fluticasone-Salmeterol (AIRDUO RESPICLICK 03/12) 96-96 MCG/ACT AEPB, Inhale 1 puff 2 (two) times a day AM & PM, Disp: , Rfl:     furosemide (LASIX) 20 mg tablet, Take 1 tablet (20 mg total) by mouth daily, Disp: 90 tablet, Rfl: 3    Melatonin 10 MG TABS, Take 2 tablets by mouth daily at bedtime Taking 15mg, Disp: , Rfl:     memantine (NAMENDA) 5 mg tablet, Take 5 mg by mouth 2 (two) times a day In the evening , Disp: , Rfl:     montelukast (SINGULAIR) 10 mg tablet, Take 1 tablet (10 mg total) by mouth daily, Disp: 90 tablet, Rfl: 2    Multiple Vitamins-Minerals (CENTRUM SILVER ADULT 50+) TABS, Take 1 tablet by mouth daily, Disp: , Rfl:     pantoprazole (PROTONIX) 40 mg tablet, Take 1 tablet (40 mg total) by mouth daily, Disp: 90 tablet, Rfl: 1    Potassium Citrate ER 15 MEQ (1620 MG) TBCR, Take by mouth 2 (two) times a day, Disp: , Rfl:     tamsulosin (FLOMAX) 0 4 mg, Take 1 capsule (0 4 mg total) by mouth daily for 90 days, Disp: 90 capsule, Rfl: 3    tiotropium (SPIRIVA HANDIHALER) 18 mcg inhalation capsule, Place 18 mcg into inhaler and inhale daily  , Disp: , Rfl:     KRILL OIL PO, Take 500 mg by mouth daily  , Disp: , Rfl:     nitroglycerin (NITROSTAT) 0 4 mg SL tablet, Place 1 tablet (0 4 mg total) under the tongue every 5 (five) minutes as needed for chest pain, Disp: 30 tablet, Rfl: 0  Allergies   Allergen Reactions    Augmentin [Amoxicillin-Pot Clavulanate] Diarrhea    Ciprofloxacin Hives    Morphine And Related Other (See Comments)     Change in mental status    Levofloxacin      Headache    Wellbutrin [Bupropion]        Labs:     Chemistry        Component Value Date/Time     11/29/2014 1416    K 3 9 02/29/2020 0920    K 3 6 11/29/2014 1416     02/29/2020 0920     11/29/2014 1416    CO2 29 02/29/2020 0920    CO2 30 6 11/29/2014 1416    BUN 13 02/29/2020 0920    BUN 13 11/29/2014 1416    CREATININE 1 15 02/29/2020 0920    CREATININE 0 94 08/31/2015 1715        Component Value Date/Time    CALCIUM 8 9 02/29/2020 0920    CALCIUM 8 9 11/29/2014 1416    ALKPHOS 119 (H) 02/23/2020 1420    ALKPHOS 122 11/29/2014 1416    AST 33 02/23/2020 1420    AST 28 11/29/2014 1416    ALT 44 02/23/2020 1420    ALT 38 11/29/2014 1416    BILITOT 0 7 11/29/2014 1416            No results found for: CHOL  Lab Results   Component Value Date    HDL 26 (L) 02/20/2020    HDL 30 (L) 10/01/2018    HDL 32 (L) 04/08/2016     Lab Results   Component Value Date    LDLCALC 53 02/20/2020    LDLCALC 82 10/01/2018    LDLCALC 104 (H) 04/08/2016     Lab Results   Component Value Date    TRIG 114 02/20/2020    TRIG 136 10/01/2018    TRIG 81 04/08/2016     No results found for: CHOLHDL    Imaging: Xr Chest 2 Views    Result Date: 2/23/2020  Narrative: CHEST INDICATION:   chest pain  COMPARISON:  11/18/2019 EXAM PERFORMED/VIEWS:  XR CHEST PA & LATERAL  The frontal view was performed utilizing dual energy radiographic technique  Images: 4 FINDINGS: Stable cardiomediastinal structures  Prior CABG  Sternotomy wires and surgical clips are unchanged  Hyperinflated lung fields consistent with underlying COPD  No congestive failure  No pneumothorax  Osseous structures appear within normal limits for patient age  Impression: Hyperinflated lung fields consistent with underlying COPD   Workstation performed: [id number]     Pe Study With Ct Abdomen And Pelvis With Contrast    Result Date: 2/18/2020  Narrative: CT PULMONARY ANGIOGRAM OF THE CHEST AND CT ABDOMEN AND PELVIS WITH INTRAVENOUS CONTRAST INDICATION:   Abdominal pain, unspecified PE suspected, high pretest prob Elevated D-dimer  COMPARISON:  CT abdomen and pelvis dated 1/3/2020, CTA chest dated 7/2/2019 TECHNIQUE:  CT examination of the chest, abdomen and pelvis was performed  Thin section CT angiographic technique was used in the chest in order to evaluate for pulmonary embolus and coronal 3D MIP postprocessing was performed on the acquisition scanner  Axial, sagittal, and coronal 2D reformatted images were created from the source data and submitted for interpretation  Radiation dose length product (DLP) for this visit:  883 17 mGy-cm   This examination, like all CT scans performed in the [facility], was performed utilizing techniques to minimize radiation dose exposure, including the use of iterative  reconstruction and automated exposure control  IV Contrast:  100 mL of iohexol (OMNIPAQUE) Enteric Contrast:  Enteric contrast was not administered  FINDINGS: CHEST PULMONARY ARTERIAL TREE:  No pulmonary embolus is seen  LUNGS:  Extensive bilateral pulmonary emphysematous changes  Some suspected focal scarring is redemonstrated in the right upper lobe, similar to the prior  There is redemonstration of a 11 to 12 mm pulmonary nodule in the posterior right lower lobe (axial image 143, series 2), similar to the prior  Mild atelectasis/scarring suspected dependently and in the bilateral lower lung fields, lungs otherwise appear grossly clear  PLEURA:  Paraseptal emphysematous changes, most prominent in the upper lobes HEART/AORTA:  Moderate aortic and moderate to severe coronary atherosclerosis  Status post CABG  No thoracic aortic aneurysm  MEDIASTINUM AND DAVID:  Increased AP diameter of the trachea, probably related to COPD  Otherwise grossly unremarkable CHEST WALL AND LOWER NECK:   Median sternotomy changes, otherwise grossly unremarkable ABDOMEN LIVER/BILIARY TREE:  Unremarkable   GALLBLADDER:  Normal caliber, no discrete stones or gallbladder inflammation is seen  SPLEEN:  Unremarkable  PANCREAS:  Unremarkable  ADRENAL GLANDS:  Unremarkable  KIDNEYS/URETERS:  Bilateral nonobstructing stones, largest measures approximately 4 mm in size in the lower pole of the left kidney  Bilateral renal cortical scarring is noted  No discrete suspicious appearing renal lesion is seen  No hydronephrosis  Circumaortic left renal vein incidentally noted  STOMACH AND BOWEL:  Scattered colonic diverticulosis  No discrete evidence of acute diverticulitis  No discrete evidence of bowel obstruction  Otherwise grossly unremarkable  APPENDIX:  No findings to suggest appendicitis  ABDOMINOPELVIC CAVITY:  No ascites or free intraperitoneal air  No lymphadenopathy  VESSELS:  Redemonstration of a fusiform abdominal aortic aneurysm which measures approximately 5 9 x 5 7 cm in diameter, similar to the prior, status post endovascular repair  No discrete evidence of endoleak  PELVIS REPRODUCTIVE ORGANS:  Enlarged and nodular prostate with prostate volume estimated at 62 mL  URINARY BLADDER:  Bladder is partially distended  Mild circumferential bladder wall thickening noted, possibly exaggerated by underdistention  Otherwise grossly unremarkable  ABDOMINAL WALL/INGUINAL REGIONS:  Grossly unremarkable OSSEOUS STRUCTURES: Mild degenerative changes of the bilateral hips  No acute fracture or destructive osseous lesion  Impression: No pulmonary embolus is seen  No acute process seen in the chest  Extensive bilateral pulmonary emphysematous changes  Some suspected focal scarring is redemonstrated in the right upper lobe, similar to the prior  There is redemonstration of a 11 to 12 mm pulmonary nodule in the posterior right lower lobe (axial image 143, series 2), similar to the prior  Moderate aortic and moderate to severe coronary atherosclerosis  Status post CABG  No thoracic aortic aneurysm  Partially distended bladder    Mild circumferential bladder wall thickening noted, possibly exaggerated by underdistention  Consider a superimposed cystitis in the appropriate clinical setting  No acute process seen in the abdomen/pelvis, otherwise Redemonstration of a fusiform abdominal aortic aneurysm which measures approximately 5 9 x 5 7 cm in diameter, similar to the prior, status post endovascular repair  No discrete evidence of endoleak  Renal cortical scarring with bilateral nephrolithiasis, no hydronephrosis  Colonic diverticulosis without evidence of acute diverticulitis, enlarged prostate, and other findings as above  Workstation performed: [id number]     Vas Pseudoaneurysm Study    Result Date: 2/24/2020  Narrative:  [facility] REPORT CLINICAL: Indications: Patient presents with extensive bruising in the right groin s/p cardiac catheterization  Physician would like to evaluate for pseudoaneurysm  Operative History: 2017-08-23 AAA Repair 2003-01-01 CABG [id number] Left CEA Risk Factors The patient has history of HTN, HLD, COPD, AAA and CAD  CONCLUSION: Impression: No evidence of a pseudoaneurysm in the femoral artery  The common femoral vein is patent with no evidence of an arteriovenous fistula noted  SIGNATURE: Electronically Signed by: Davis Daniels MD, RPVI on 2020-02-24 12:34:10 PM    Vas Lower Limb Venous Duplex Study, Complete Bilateral    Result Date: 2/19/2020  Narrative:  [facility] REPORT CLINICAL: Indications: Elevated d-dimer and chest pain today  No history of DVT, on 81mg aspirin daily  Operative History: Left CEA Risk Factors The patient has history of HLD  He has no history of HTN or Diabetes  FINDINGS:  Segment  Right            Left              Impression       Impression       CFV      Normal (Patent)  Normal (Patent)     CONCLUSION: Impression: RIGHT LOWER LIMB: No evidence of acute or chronic deep vein thrombosis  No evidence of superficial thrombophlebitis noted   Doppler evaluation shows a normal response to augmentation maneuvers  Popliteal, posterior tibial and anterior tibial arterial Doppler waveforms are triphasic  LEFT LOWER LIMB: No evidence of acute or chronic deep vein thrombosis  No evidence of superficial thrombophlebitis noted  Doppler evaluation shows a normal response to augmentation maneuvers  Popliteal, posterior tibial and anterior tibial arterial Doppler waveforms are triphasic  SIGNATURE: Electronically Signed by: Stephanie Birmingham MD on 2020-02-19 05:47:01 AM          Review of Systems   Constitution: Negative for chills and fever  HENT: Negative  Cardiovascular: Negative for chest pain, dyspnea on exertion, leg swelling, near-syncope, orthopnea, palpitations, paroxysmal nocturnal dyspnea and syncope  Respiratory: Negative for cough and shortness of breath  Gastrointestinal: Negative for diarrhea, nausea and vomiting  Genitourinary: Negative  Neurological: Positive for light-headedness  Negative for dizziness and focal weakness  All other systems reviewed and are negative  Vitals:    03/02/20 1414   BP: 116/62   Pulse: 78     Vitals:    03/02/20 1414   Weight: 76 4 kg (168 lb 6 9 oz)     Height: 5' 9" (175 3 cm)   Body mass index is 24 87 kg/m²  Physical Exam:  Physical Exam   Constitutional: He is oriented to person, place, and time  No distress  Nasal cannula in place  HENT:   Head: Normocephalic and atraumatic  Eyes: Pupils are equal, round, and reactive to light  Neck: Normal range of motion  Carotid bruit is not present  Cardiovascular: Normal rate, regular rhythm, S1 normal and S2 normal    No murmur heard  Pulses:       Radial pulses are 2+ on the right side, and 2+ on the left side  Dorsalis pedis pulses are 2+ on the right side, and 2+ on the left side  Pulmonary/Chest: Effort normal and breath sounds normal  No respiratory distress  He has no wheezes  He has no rales  Abdominal: Soft  There is no tenderness     Musculoskeletal: Normal range of motion  He exhibits no edema or deformity  Neurological: He is alert and oriented to person, place, and time  Skin: Skin is warm and dry  He is not diaphoretic  No erythema  Psychiatric: He has a normal mood and affect  His behavior is normal    Vitals reviewed

## 2020-03-04 ENCOUNTER — PATIENT OUTREACH (OUTPATIENT)
Dept: FAMILY MEDICINE CLINIC | Facility: CLINIC | Age: 80
End: 2020-03-04

## 2020-03-04 NOTE — PROGRESS NOTES
Outpatient Care Management Note:  Outreach call attempted  Message left for patient to please return call  Contact information left on message  Per recent home health note dated 2/27/2020 at 10:25:    [facility]  CHF   sp cardiac cath with stent insertion on 02 19  COPD  hypercholesterolemia  BPH  mild dementia  CAD  resp failure with hypoxia     PRECERT VISIT STATUS__na medicare     FOLLOW UP ON MEDICATION__wife manages same for the patient  reported duplicate drug therapy between plavix and aspirin and level 2 drug interaction between lexapro and aricept   per Elissa Whitman at Dr Gloria Hoover no current change in the medication regime        PERFORM LABS AS ORDERED  BMP to be done  prior to nect cardiology appointment  wife will have done at the hospital as cardio appt on 03 02    ASSESS skin integrity  right groin  ecchymosis and right abdomen and hip area also from prior cardiac cath slowly resolving  dr had test done prior to being  discharged due to extensive ecchymosis but nothing was found wrong  no pseudoaneurysm       EDUCATION NEEDED  regarding medications  o2 use and precautions  safety  signs to report to the physician    IF CHF PATIENT WHAT WAS THE LAST WEIGHT__166 on soc   pts scale     Barb Del Rosario    DECLINED_x_  REOFFERED__    UPCOMING APPOINTMENTS  Dr Carli Salazar 03 02 2020  cardiology 03 02 2020    DISCHARGE PLAN dc when goals met  per pt he wants to stay out of the hospital

## 2020-03-06 NOTE — ASSESSMENT & PLAN NOTE
· Outpatient follow-up with Vascular Surgery Cyclophosphamide Counseling:  I discussed with the patient the risks of cyclophosphamide including but not limited to hair loss, hormonal abnormalities, decreased fertility, abdominal pain, diarrhea, nausea and vomiting, bone marrow suppression and infection. The patient understands that monitoring is required while taking this medication.

## 2020-03-09 ENCOUNTER — PATIENT OUTREACH (OUTPATIENT)
Dept: FAMILY MEDICINE CLINIC | Facility: CLINIC | Age: 80
End: 2020-03-09

## 2020-03-09 NOTE — PROGRESS NOTES
Outpatient Care Management Note:  Outreach call attempted:  Message left for patient to please return call  Contact information left on message  Per Home care notes:    From Visit:  03/03/2020 10:35 AM    [facility] HOME HEALTH  CHF   sp cardiac cath with stent insertion on 02 19  COPD  hypercholesterolemia  BPH  mild dementia  CAD  resp failure with hypoxia     PRECERT VISIT STATUS__na medicare     FOLLOW UP ON MEDICATION__wife manages same for the patient  denied any recent medication changes  reports compliance with same     PERFORM LABS AS ORDERED  no labs currently ordered    ASSESS skin integrity  right groin  ecchymosis and right abdomen and hip area also from prior cardiac cath slowly resolving  dr had test done prior to being  discharged due to extensive ecchymosis but nothing was found wrong  no pseudoaneurysm    as of snv 03 03  area slowly resolving       EDUCATION NEEDED  regarding medications   signs to report to the physician  emergency measures     IF CHF PATIENT WHAT WAS THE LAST WEIGHT__166 on soc  weight at snv 03 03 was 165 5     Jamas Jayla    DECLINED_x_  REOFFERED_x    UPCOMING APPOINTMENTS  Dr Benjamin Sapp [phone]  cardiology [phone]    DISCHARGE PLAN dc when goals met  per pt he wants to stay out of the hospital

## 2020-03-11 ENCOUNTER — PATIENT OUTREACH (OUTPATIENT)
Dept: FAMILY MEDICINE CLINIC | Facility: CLINIC | Age: 80
End: 2020-03-11

## 2020-03-11 NOTE — PROGRESS NOTES
Outpatient Care Management Note:  Received return call from Tomasz's wife, Ruchi Monge  Explained my role  At this time Ruchi Monge does not feel that outreach is necessary  Mami Tesfaye is doing well and she feels he is back at this baseline  He remains active with [address] care and will be starting cardiac rehabilitation at home and then transition [facility] with any questions or concerns  Verbalized understanding of same

## 2020-03-25 ENCOUNTER — PATIENT OUTREACH (OUTPATIENT)
Dept: FAMILY MEDICINE CLINIC | Facility: CLINIC | Age: 80
End: 2020-03-25

## 2020-03-25 NOTE — PROGRESS NOTES
Outpatient Care Management Note:      Ivan Chaudhry 80 note:    From Visit:  03/09/2020 10:25 AM    [address]  [facility]  CHF   sp cardiac cath with stent insertion on 02 19  COPD  hypercholesterolemia  BPH  mild dementia  CAD  resp failure with hypoxia     [address] 30 given this snv  if stable  dc pt from home care services next snv    PRECERT VISIT STATUS__na medicare     FOLLOW UP ON MEDICATION__wife manages same for the patient  reports compliance  denied any recent medication changes     PERFORM LABS AS ORDERED  no labs currently ordered    ASSESS skin integrity  right groin  ecchymosis and right abdomen and hip  and left groin area also from prior cardiac cath slowly resolving     EDUCATION NEEDED  regarding CHF  safety     IF CHF PATIENT WHAT WAS THE LAST WEIGHT__166 2 on soc  weight at snv 03 03 was 165 0     OTHER DISCIPLINES ORDERED    DECLINED_x_  REOFFERED_x    UPCOMING APPOINTMENTS  Dr Alpa Burgess [phone]  cardiology [phone]    DISCHARGE PLAN dc when goals met  per pt he wants to get better and go to outpatient physical therapy     Louisa Joseph was discharged from home care on 3/12/2020 with a plan of starting outpatient PT  First visit is scheduled 4/7/2020

## 2020-03-26 ENCOUNTER — TELEPHONE (OUTPATIENT)
Dept: CARDIAC REHAB | Facility: HOSPITAL | Age: 80
End: 2020-03-26

## 2020-04-04 ENCOUNTER — HOSPITAL ENCOUNTER (INPATIENT)
Facility: HOSPITAL | Age: 80
LOS: 3 days | Discharge: HOME | DRG: 189 | End: 2020-04-07
Attending: EMERGENCY MEDICINE | Admitting: INTERNAL MEDICINE
Payer: MEDICARE

## 2020-04-04 ENCOUNTER — APPOINTMENT (EMERGENCY)
Dept: RADIOLOGY | Facility: HOSPITAL | Age: 80
DRG: 189 | End: 2020-04-04
Payer: MEDICARE

## 2020-04-04 DIAGNOSIS — J44.9 CHRONIC OBSTRUCTIVE PULMONARY DISEASE, UNSPECIFIED COPD TYPE (HCC): ICD-10-CM

## 2020-04-04 DIAGNOSIS — R50.9 FEVER: ICD-10-CM

## 2020-04-04 DIAGNOSIS — Z20.822 SUSPECTED COVID-19 VIRUS INFECTION: Primary | ICD-10-CM

## 2020-04-04 DIAGNOSIS — R09.02 HYPOXIA: ICD-10-CM

## 2020-04-04 LAB
ALBUMIN SERPL BCP-MCNC: 3.9 G/DL (ref 3.5–5)
ALP SERPL-CCNC: 110 U/L (ref 46–116)
ALT SERPL W P-5'-P-CCNC: 23 U/L (ref 12–78)
ANION GAP SERPL CALCULATED.3IONS-SCNC: 12 MMOL/L (ref 4–13)
APTT PPP: 31 SECONDS (ref 23–37)
AST SERPL W P-5'-P-CCNC: 21 U/L (ref 5–45)
BASOPHILS # BLD AUTO: 0.06 THOUSANDS/ΜL (ref 0–0.1)
BASOPHILS NFR BLD AUTO: 1 % (ref 0–1)
BILIRUB SERPL-MCNC: 2.1 MG/DL (ref 0.2–1)
BILIRUB UR QL STRIP: NEGATIVE
BUN SERPL-MCNC: 12 MG/DL (ref 5–25)
CALCIUM SERPL-MCNC: 8.9 MG/DL (ref 8.3–10.1)
CHLORIDE SERPL-SCNC: 101 MMOL/L (ref 100–108)
CK SERPL-CCNC: 52 U/L (ref 39–308)
CLARITY UR: CLEAR
CO2 SERPL-SCNC: 25 MMOL/L (ref 21–32)
COLOR UR: YELLOW
CREAT SERPL-MCNC: 1.14 MG/DL (ref 0.6–1.3)
CRP SERPL QL: 9.2 MG/L
D DIMER PPP FEU-MCNC: >10 UG/ML FEU
EOSINOPHIL # BLD AUTO: 0.02 THOUSAND/ΜL (ref 0–0.61)
EOSINOPHIL NFR BLD AUTO: 0 % (ref 0–6)
ERYTHROCYTE [DISTWIDTH] IN BLOOD BY AUTOMATED COUNT: 13.2 % (ref 11.6–15.1)
ERYTHROCYTE [SEDIMENTATION RATE] IN BLOOD: 2 MM/HOUR (ref 0–10)
FLUAV RNA NPH QL NAA+PROBE: NORMAL
FLUBV RNA NPH QL NAA+PROBE: NORMAL
GFR SERPL CREATININE-BSD FRML MDRD: 62 ML/MIN/1.73SQ M
GLUCOSE SERPL-MCNC: 105 MG/DL (ref 65–140)
GLUCOSE UR STRIP-MCNC: NEGATIVE MG/DL
HCT VFR BLD AUTO: 49.3 % (ref 36.5–49.3)
HGB BLD-MCNC: 16.4 G/DL (ref 12–17)
HGB UR QL STRIP.AUTO: NEGATIVE
IMM GRANULOCYTES # BLD AUTO: 0.09 THOUSAND/UL (ref 0–0.2)
IMM GRANULOCYTES NFR BLD AUTO: 1 % (ref 0–2)
INR PPP: 1 (ref 0.84–1.19)
KETONES UR STRIP-MCNC: NEGATIVE MG/DL
LACTATE SERPL-SCNC: 1.9 MMOL/L (ref 0.5–2)
LACTATE SERPL-SCNC: 2.8 MMOL/L (ref 0.5–2)
LDH SERPL-CCNC: 151 U/L (ref 81–234)
LEUKOCYTE ESTERASE UR QL STRIP: NEGATIVE
LYMPHOCYTES # BLD AUTO: 0.89 THOUSANDS/ΜL (ref 0.6–4.47)
LYMPHOCYTES NFR BLD AUTO: 7 % (ref 14–44)
MCH RBC QN AUTO: 33.2 PG (ref 26.8–34.3)
MCHC RBC AUTO-ENTMCNC: 33.3 G/DL (ref 31.4–37.4)
MCV RBC AUTO: 100 FL (ref 82–98)
MONOCYTES # BLD AUTO: 1.04 THOUSAND/ΜL (ref 0.17–1.22)
MONOCYTES NFR BLD AUTO: 9 % (ref 4–12)
NEUTROPHILS # BLD AUTO: 9.85 THOUSANDS/ΜL (ref 1.85–7.62)
NEUTS SEG NFR BLD AUTO: 82 % (ref 43–75)
NITRITE UR QL STRIP: NEGATIVE
NRBC BLD AUTO-RTO: 0 /100 WBCS
NT-PROBNP SERPL-MCNC: 244 PG/ML
PH UR STRIP.AUTO: 7.5 [PH]
PLATELET # BLD AUTO: 122 THOUSANDS/UL (ref 149–390)
PLATELET # BLD AUTO: 122 THOUSANDS/UL (ref 149–390)
PMV BLD AUTO: 9.3 FL (ref 8.9–12.7)
PMV BLD AUTO: 9.6 FL (ref 8.9–12.7)
POTASSIUM SERPL-SCNC: 4.1 MMOL/L (ref 3.5–5.3)
PROCALCITONIN SERPL-MCNC: <0.05 NG/ML
PROT SERPL-MCNC: 7.3 G/DL (ref 6.4–8.2)
PROT UR STRIP-MCNC: NEGATIVE MG/DL
PROTHROMBIN TIME: 13.2 SECONDS (ref 11.6–14.5)
RBC # BLD AUTO: 4.94 MILLION/UL (ref 3.88–5.62)
RSV RNA NPH QL NAA+PROBE: NORMAL
SODIUM SERPL-SCNC: 138 MMOL/L (ref 136–145)
SP GR UR STRIP.AUTO: 1.01 (ref 1–1.03)
TROPONIN I SERPL-MCNC: <0.02 NG/ML
UROBILINOGEN UR QL STRIP.AUTO: 0.2 E.U./DL
WBC # BLD AUTO: 11.95 THOUSAND/UL (ref 4.31–10.16)

## 2020-04-04 PROCEDURE — 80053 COMPREHEN METABOLIC PANEL: CPT | Performed by: EMERGENCY MEDICINE

## 2020-04-04 PROCEDURE — 85049 AUTOMATED PLATELET COUNT: CPT | Performed by: INTERNAL MEDICINE

## 2020-04-04 PROCEDURE — 84145 PROCALCITONIN (PCT): CPT | Performed by: INTERNAL MEDICINE

## 2020-04-04 PROCEDURE — 81003 URINALYSIS AUTO W/O SCOPE: CPT | Performed by: EMERGENCY MEDICINE

## 2020-04-04 PROCEDURE — 87631 RESP VIRUS 3-5 TARGETS: CPT | Performed by: EMERGENCY MEDICINE

## 2020-04-04 PROCEDURE — 85730 THROMBOPLASTIN TIME PARTIAL: CPT | Performed by: EMERGENCY MEDICINE

## 2020-04-04 PROCEDURE — 85025 COMPLETE CBC W/AUTO DIFF WBC: CPT | Performed by: EMERGENCY MEDICINE

## 2020-04-04 PROCEDURE — 85379 FIBRIN DEGRADATION QUANT: CPT | Performed by: INTERNAL MEDICINE

## 2020-04-04 PROCEDURE — 83605 ASSAY OF LACTIC ACID: CPT | Performed by: EMERGENCY MEDICINE

## 2020-04-04 PROCEDURE — 82728 ASSAY OF FERRITIN: CPT | Performed by: INTERNAL MEDICINE

## 2020-04-04 PROCEDURE — 99285 EMERGENCY DEPT VISIT HI MDM: CPT

## 2020-04-04 PROCEDURE — 85384 FIBRINOGEN ACTIVITY: CPT | Performed by: INTERNAL MEDICINE

## 2020-04-04 PROCEDURE — 84145 PROCALCITONIN (PCT): CPT | Performed by: EMERGENCY MEDICINE

## 2020-04-04 PROCEDURE — 83880 ASSAY OF NATRIURETIC PEPTIDE: CPT | Performed by: INTERNAL MEDICINE

## 2020-04-04 PROCEDURE — 36415 COLL VENOUS BLD VENIPUNCTURE: CPT | Performed by: EMERGENCY MEDICINE

## 2020-04-04 PROCEDURE — 83615 LACTATE (LD) (LDH) ENZYME: CPT | Performed by: INTERNAL MEDICINE

## 2020-04-04 PROCEDURE — 86140 C-REACTIVE PROTEIN: CPT | Performed by: INTERNAL MEDICINE

## 2020-04-04 PROCEDURE — 85652 RBC SED RATE AUTOMATED: CPT | Performed by: INTERNAL MEDICINE

## 2020-04-04 PROCEDURE — 93005 ELECTROCARDIOGRAM TRACING: CPT

## 2020-04-04 PROCEDURE — 85610 PROTHROMBIN TIME: CPT | Performed by: EMERGENCY MEDICINE

## 2020-04-04 PROCEDURE — 87635 SARS-COV-2 COVID-19 AMP PRB: CPT | Performed by: EMERGENCY MEDICINE

## 2020-04-04 PROCEDURE — 99223 1ST HOSP IP/OBS HIGH 75: CPT | Performed by: INTERNAL MEDICINE

## 2020-04-04 PROCEDURE — 99285 EMERGENCY DEPT VISIT HI MDM: CPT | Performed by: EMERGENCY MEDICINE

## 2020-04-04 PROCEDURE — 84484 ASSAY OF TROPONIN QUANT: CPT | Performed by: EMERGENCY MEDICINE

## 2020-04-04 PROCEDURE — 82550 ASSAY OF CK (CPK): CPT | Performed by: INTERNAL MEDICINE

## 2020-04-04 PROCEDURE — 71045 X-RAY EXAM CHEST 1 VIEW: CPT

## 2020-04-04 PROCEDURE — 87040 BLOOD CULTURE FOR BACTERIA: CPT | Performed by: EMERGENCY MEDICINE

## 2020-04-04 RX ORDER — PHENOL 1.4 %
2 AEROSOL, SPRAY (ML) MUCOUS MEMBRANE
Status: DISCONTINUED | OUTPATIENT
Start: 2020-04-04 | End: 2020-04-04

## 2020-04-04 RX ORDER — ACETAMINOPHEN 325 MG/1
650 TABLET ORAL EVERY 6 HOURS PRN
Status: DISCONTINUED | OUTPATIENT
Start: 2020-04-04 | End: 2020-04-07 | Stop reason: HOSPADM

## 2020-04-04 RX ORDER — AZITHROMYCIN 250 MG/1
250 TABLET, FILM COATED ORAL EVERY 24 HOURS
Status: DISCONTINUED | OUTPATIENT
Start: 2020-04-05 | End: 2020-04-07 | Stop reason: HOSPADM

## 2020-04-04 RX ORDER — ATORVASTATIN CALCIUM 10 MG/1
20 TABLET, FILM COATED ORAL
Status: DISCONTINUED | OUTPATIENT
Start: 2020-04-04 | End: 2020-04-07 | Stop reason: HOSPADM

## 2020-04-04 RX ORDER — CLOPIDOGREL BISULFATE 75 MG/1
75 TABLET ORAL DAILY
Status: DISCONTINUED | OUTPATIENT
Start: 2020-04-05 | End: 2020-04-07 | Stop reason: HOSPADM

## 2020-04-04 RX ORDER — PANTOPRAZOLE SODIUM 40 MG/1
40 TABLET, DELAYED RELEASE ORAL DAILY
Status: DISCONTINUED | OUTPATIENT
Start: 2020-04-05 | End: 2020-04-07 | Stop reason: HOSPADM

## 2020-04-04 RX ORDER — ESCITALOPRAM OXALATE 10 MG/1
20 TABLET ORAL DAILY
Status: DISCONTINUED | OUTPATIENT
Start: 2020-04-05 | End: 2020-04-07 | Stop reason: HOSPADM

## 2020-04-04 RX ORDER — FLUTICASONE FUROATE AND VILANTEROL 200; 25 UG/1; UG/1
1 POWDER RESPIRATORY (INHALATION)
Status: DISCONTINUED | OUTPATIENT
Start: 2020-04-05 | End: 2020-04-07 | Stop reason: HOSPADM

## 2020-04-04 RX ORDER — LANOLIN ALCOHOL/MO/W.PET/CERES
9 CREAM (GRAM) TOPICAL ONCE
Status: COMPLETED | OUTPATIENT
Start: 2020-04-04 | End: 2020-04-04

## 2020-04-04 RX ORDER — FUROSEMIDE 20 MG/1
20 TABLET ORAL DAILY
Status: DISCONTINUED | OUTPATIENT
Start: 2020-04-05 | End: 2020-04-07 | Stop reason: HOSPADM

## 2020-04-04 RX ORDER — MAGNESIUM HYDROXIDE/ALUMINUM HYDROXICE/SIMETHICONE 120; 1200; 1200 MG/30ML; MG/30ML; MG/30ML
30 SUSPENSION ORAL EVERY 6 HOURS PRN
Status: DISCONTINUED | OUTPATIENT
Start: 2020-04-04 | End: 2020-04-07 | Stop reason: HOSPADM

## 2020-04-04 RX ORDER — ONDANSETRON 2 MG/ML
4 INJECTION INTRAMUSCULAR; INTRAVENOUS EVERY 6 HOURS PRN
Status: DISCONTINUED | OUTPATIENT
Start: 2020-04-04 | End: 2020-04-07 | Stop reason: HOSPADM

## 2020-04-04 RX ORDER — ASCORBIC ACID 500 MG
1000 TABLET ORAL 2 TIMES DAILY
Status: DISCONTINUED | OUTPATIENT
Start: 2020-04-04 | End: 2020-04-05

## 2020-04-04 RX ORDER — NITROGLYCERIN 0.4 MG/1
0.4 TABLET SUBLINGUAL
Status: DISCONTINUED | OUTPATIENT
Start: 2020-04-04 | End: 2020-04-07 | Stop reason: HOSPADM

## 2020-04-04 RX ORDER — POLYETHYLENE GLYCOL 3350 17 G/17G
17 POWDER, FOR SOLUTION ORAL DAILY PRN
Status: DISCONTINUED | OUTPATIENT
Start: 2020-04-04 | End: 2020-04-07 | Stop reason: HOSPADM

## 2020-04-04 RX ORDER — TAMSULOSIN HYDROCHLORIDE 0.4 MG/1
0.4 CAPSULE ORAL DAILY
Status: DISCONTINUED | OUTPATIENT
Start: 2020-04-05 | End: 2020-04-07 | Stop reason: HOSPADM

## 2020-04-04 RX ORDER — HYDROXYCHLOROQUINE SULFATE 200 MG/1
200 TABLET, FILM COATED ORAL 2 TIMES DAILY
Status: DISCONTINUED | OUTPATIENT
Start: 2020-04-05 | End: 2020-04-05

## 2020-04-04 RX ORDER — MEMANTINE HYDROCHLORIDE 5 MG/1
5 TABLET ORAL 2 TIMES DAILY
Status: DISCONTINUED | OUTPATIENT
Start: 2020-04-04 | End: 2020-04-07 | Stop reason: HOSPADM

## 2020-04-04 RX ORDER — ALBUTEROL SULFATE 90 UG/1
2 AEROSOL, METERED RESPIRATORY (INHALATION) EVERY 6 HOURS PRN
Status: DISCONTINUED | OUTPATIENT
Start: 2020-04-04 | End: 2020-04-07 | Stop reason: HOSPADM

## 2020-04-04 RX ORDER — DONEPEZIL HYDROCHLORIDE 5 MG/1
10 TABLET, FILM COATED ORAL
Status: DISCONTINUED | OUTPATIENT
Start: 2020-04-04 | End: 2020-04-07 | Stop reason: HOSPADM

## 2020-04-04 RX ORDER — ZINC SULFATE 50(220)MG
220 CAPSULE ORAL DAILY
Status: DISCONTINUED | OUTPATIENT
Start: 2020-04-05 | End: 2020-04-05

## 2020-04-04 RX ORDER — MONTELUKAST SODIUM 10 MG/1
10 TABLET ORAL DAILY
Status: DISCONTINUED | OUTPATIENT
Start: 2020-04-04 | End: 2020-04-07 | Stop reason: HOSPADM

## 2020-04-04 RX ORDER — ATENOLOL 25 MG/1
25 TABLET ORAL
Status: DISCONTINUED | OUTPATIENT
Start: 2020-04-04 | End: 2020-04-07 | Stop reason: HOSPADM

## 2020-04-04 RX ORDER — HYDROXYCHLOROQUINE SULFATE 200 MG/1
400 TABLET, FILM COATED ORAL 2 TIMES DAILY
Status: DISCONTINUED | OUTPATIENT
Start: 2020-04-04 | End: 2020-04-04

## 2020-04-04 RX ORDER — CEFTRIAXONE 1 G/50ML
1000 INJECTION, SOLUTION INTRAVENOUS ONCE
Status: COMPLETED | OUTPATIENT
Start: 2020-04-04 | End: 2020-04-04

## 2020-04-04 RX ORDER — ALBUTEROL SULFATE 90 UG/1
2 AEROSOL, METERED RESPIRATORY (INHALATION) EVERY 6 HOURS PRN
COMMUNITY
End: 2020-10-29 | Stop reason: HOSPADM

## 2020-04-04 RX ORDER — METHYLPREDNISOLONE SODIUM SUCCINATE 40 MG/ML
40 INJECTION, POWDER, LYOPHILIZED, FOR SOLUTION INTRAMUSCULAR; INTRAVENOUS DAILY
Status: DISCONTINUED | OUTPATIENT
Start: 2020-04-04 | End: 2020-04-04

## 2020-04-04 RX ORDER — HYDROXYCHLOROQUINE SULFATE 200 MG/1
400 TABLET, FILM COATED ORAL 2 TIMES DAILY
Status: COMPLETED | OUTPATIENT
Start: 2020-04-04 | End: 2020-04-05

## 2020-04-04 RX ORDER — ASPIRIN 81 MG/1
81 TABLET ORAL DAILY
Status: DISCONTINUED | OUTPATIENT
Start: 2020-04-05 | End: 2020-04-07 | Stop reason: HOSPADM

## 2020-04-04 RX ADMIN — OXYCODONE HYDROCHLORIDE AND ACETAMINOPHEN 1000 MG: 500 TABLET ORAL at 19:43

## 2020-04-04 RX ADMIN — HYDROXYCHLOROQUINE SULFATE 400 MG: 200 TABLET, FILM COATED ORAL at 19:43

## 2020-04-04 RX ADMIN — ATENOLOL 25 MG: 25 TABLET ORAL at 21:05

## 2020-04-04 RX ADMIN — MELATONIN 9 MG: at 21:05

## 2020-04-04 RX ADMIN — ATORVASTATIN CALCIUM 20 MG: 10 TABLET, FILM COATED ORAL at 21:05

## 2020-04-04 RX ADMIN — MEMANTINE 5 MG: 5 TABLET ORAL at 19:44

## 2020-04-04 RX ADMIN — MONTELUKAST 10 MG: 10 TABLET, FILM COATED ORAL at 21:05

## 2020-04-04 RX ADMIN — AZITHROMYCIN MONOHYDRATE 500 MG: 500 INJECTION, POWDER, LYOPHILIZED, FOR SOLUTION INTRAVENOUS at 17:06

## 2020-04-04 RX ADMIN — DONEPEZIL HYDROCHLORIDE 10 MG: 5 TABLET, FILM COATED ORAL at 21:05

## 2020-04-04 RX ADMIN — CEFTRIAXONE 1000 MG: 1 INJECTION, SOLUTION INTRAVENOUS at 16:05

## 2020-04-05 ENCOUNTER — PATIENT OUTREACH (OUTPATIENT)
Dept: FAMILY MEDICINE CLINIC | Facility: CLINIC | Age: 80
End: 2020-04-05

## 2020-04-05 LAB
ALBUMIN SERPL BCP-MCNC: 3.7 G/DL (ref 3.5–5)
ALP SERPL-CCNC: 110 U/L (ref 46–116)
ALT SERPL W P-5'-P-CCNC: 19 U/L (ref 12–78)
ANION GAP SERPL CALCULATED.3IONS-SCNC: 12 MMOL/L (ref 4–13)
AST SERPL W P-5'-P-CCNC: 20 U/L (ref 5–45)
BASOPHILS # BLD AUTO: 0.03 THOUSANDS/ΜL (ref 0–0.1)
BASOPHILS NFR BLD AUTO: 0 % (ref 0–1)
BILIRUB SERPL-MCNC: 3 MG/DL (ref 0.2–1)
BUN SERPL-MCNC: 14 MG/DL (ref 5–25)
CALCIUM SERPL-MCNC: 9.1 MG/DL (ref 8.3–10.1)
CHLORIDE SERPL-SCNC: 103 MMOL/L (ref 100–108)
CO2 SERPL-SCNC: 25 MMOL/L (ref 21–32)
CREAT SERPL-MCNC: 1.2 MG/DL (ref 0.6–1.3)
EOSINOPHIL # BLD AUTO: 0.04 THOUSAND/ΜL (ref 0–0.61)
EOSINOPHIL NFR BLD AUTO: 1 % (ref 0–6)
ERYTHROCYTE [DISTWIDTH] IN BLOOD BY AUTOMATED COUNT: 13.2 % (ref 11.6–15.1)
FERRITIN SERPL-MCNC: 43 NG/ML (ref 8–388)
FIBRINOGEN PPP-MCNC: 288 MG/DL (ref 227–495)
GFR SERPL CREATININE-BSD FRML MDRD: 58 ML/MIN/1.73SQ M
GLUCOSE SERPL-MCNC: 110 MG/DL (ref 65–140)
HCT VFR BLD AUTO: 47.5 % (ref 36.5–49.3)
HGB BLD-MCNC: 15.8 G/DL (ref 12–17)
IMM GRANULOCYTES # BLD AUTO: 0.03 THOUSAND/UL (ref 0–0.2)
IMM GRANULOCYTES NFR BLD AUTO: 0 % (ref 0–2)
LDH SERPL-CCNC: 134 U/L (ref 81–234)
LYMPHOCYTES # BLD AUTO: 1.33 THOUSANDS/ΜL (ref 0.6–4.47)
LYMPHOCYTES NFR BLD AUTO: 17 % (ref 14–44)
MAGNESIUM SERPL-MCNC: 2 MG/DL (ref 1.6–2.6)
MCH RBC QN AUTO: 33.1 PG (ref 26.8–34.3)
MCHC RBC AUTO-ENTMCNC: 33.3 G/DL (ref 31.4–37.4)
MCV RBC AUTO: 99 FL (ref 82–98)
MONOCYTES # BLD AUTO: 0.78 THOUSAND/ΜL (ref 0.17–1.22)
MONOCYTES NFR BLD AUTO: 10 % (ref 4–12)
NEUTROPHILS # BLD AUTO: 5.63 THOUSANDS/ΜL (ref 1.85–7.62)
NEUTS SEG NFR BLD AUTO: 72 % (ref 43–75)
NRBC BLD AUTO-RTO: 0 /100 WBCS
PLATELET # BLD AUTO: 117 THOUSANDS/UL (ref 149–390)
PMV BLD AUTO: 9.5 FL (ref 8.9–12.7)
POTASSIUM SERPL-SCNC: 3.8 MMOL/L (ref 3.5–5.3)
PROCALCITONIN SERPL-MCNC: <0.05 NG/ML
PROCALCITONIN SERPL-MCNC: <0.05 NG/ML
PROT SERPL-MCNC: 7.1 G/DL (ref 6.4–8.2)
RBC # BLD AUTO: 4.78 MILLION/UL (ref 3.88–5.62)
SARS-COV-2 RNA RESP QL NAA+PROBE: NEGATIVE
SODIUM SERPL-SCNC: 140 MMOL/L (ref 136–145)
WBC # BLD AUTO: 7.84 THOUSAND/UL (ref 4.31–10.16)

## 2020-04-05 PROCEDURE — 83615 LACTATE (LD) (LDH) ENZYME: CPT | Performed by: INTERNAL MEDICINE

## 2020-04-05 PROCEDURE — 84145 PROCALCITONIN (PCT): CPT | Performed by: INTERNAL MEDICINE

## 2020-04-05 PROCEDURE — 80053 COMPREHEN METABOLIC PANEL: CPT | Performed by: INTERNAL MEDICINE

## 2020-04-05 PROCEDURE — 85025 COMPLETE CBC W/AUTO DIFF WBC: CPT | Performed by: INTERNAL MEDICINE

## 2020-04-05 PROCEDURE — 83735 ASSAY OF MAGNESIUM: CPT | Performed by: INTERNAL MEDICINE

## 2020-04-05 PROCEDURE — 99232 SBSQ HOSP IP/OBS MODERATE 35: CPT | Performed by: INTERNAL MEDICINE

## 2020-04-05 RX ORDER — METHYLPREDNISOLONE SODIUM SUCCINATE 40 MG/ML
40 INJECTION, POWDER, LYOPHILIZED, FOR SOLUTION INTRAMUSCULAR; INTRAVENOUS EVERY 8 HOURS SCHEDULED
Status: DISCONTINUED | OUTPATIENT
Start: 2020-04-05 | End: 2020-04-06

## 2020-04-05 RX ADMIN — ATENOLOL 25 MG: 25 TABLET ORAL at 22:02

## 2020-04-05 RX ADMIN — MONTELUKAST 10 MG: 10 TABLET, FILM COATED ORAL at 22:02

## 2020-04-05 RX ADMIN — ESCITALOPRAM OXALATE 20 MG: 10 TABLET ORAL at 09:40

## 2020-04-05 RX ADMIN — ATORVASTATIN CALCIUM 20 MG: 10 TABLET, FILM COATED ORAL at 22:02

## 2020-04-05 RX ADMIN — TAMSULOSIN HYDROCHLORIDE 0.4 MG: 0.4 CAPSULE ORAL at 09:40

## 2020-04-05 RX ADMIN — OXYCODONE HYDROCHLORIDE AND ACETAMINOPHEN 1000 MG: 500 TABLET ORAL at 09:40

## 2020-04-05 RX ADMIN — HYDROXYCHLOROQUINE SULFATE 400 MG: 200 TABLET, FILM COATED ORAL at 09:40

## 2020-04-05 RX ADMIN — ASPIRIN 81 MG: 81 TABLET, COATED ORAL at 09:43

## 2020-04-05 RX ADMIN — PANTOPRAZOLE SODIUM 40 MG: 40 TABLET, DELAYED RELEASE ORAL at 09:40

## 2020-04-05 RX ADMIN — DONEPEZIL HYDROCHLORIDE 10 MG: 5 TABLET, FILM COATED ORAL at 22:02

## 2020-04-05 RX ADMIN — MEMANTINE 5 MG: 5 TABLET ORAL at 09:41

## 2020-04-05 RX ADMIN — FUROSEMIDE 20 MG: 20 TABLET ORAL at 09:40

## 2020-04-05 RX ADMIN — CLOPIDOGREL BISULFATE 75 MG: 75 TABLET ORAL at 09:41

## 2020-04-05 RX ADMIN — METHYLPREDNISOLONE SODIUM SUCCINATE 40 MG: 40 INJECTION, POWDER, FOR SOLUTION INTRAMUSCULAR; INTRAVENOUS at 22:02

## 2020-04-05 RX ADMIN — ENOXAPARIN SODIUM 40 MG: 40 INJECTION SUBCUTANEOUS at 09:42

## 2020-04-05 RX ADMIN — FLUTICASONE FUROATE AND VILANTEROL TRIFENATATE 1 PUFF: 200; 25 POWDER RESPIRATORY (INHALATION) at 09:41

## 2020-04-05 RX ADMIN — ZINC SULFATE 220 MG (50 MG) CAPSULE 220 MG: CAPSULE at 09:40

## 2020-04-05 RX ADMIN — AZITHROMYCIN 250 MG: 250 TABLET, FILM COATED ORAL at 17:09

## 2020-04-05 RX ADMIN — MEMANTINE 5 MG: 5 TABLET ORAL at 17:10

## 2020-04-05 RX ADMIN — TIOTROPIUM BROMIDE 18 MCG: 18 CAPSULE ORAL; RESPIRATORY (INHALATION) at 09:43

## 2020-04-05 RX ADMIN — METHYLPREDNISOLONE SODIUM SUCCINATE 40 MG: 40 INJECTION, POWDER, FOR SOLUTION INTRAMUSCULAR; INTRAVENOUS at 17:10

## 2020-04-06 LAB
ALBUMIN SERPL BCP-MCNC: 3.3 G/DL (ref 3.5–5)
ALP SERPL-CCNC: 102 U/L (ref 46–116)
ALT SERPL W P-5'-P-CCNC: 17 U/L (ref 12–78)
ANION GAP SERPL CALCULATED.3IONS-SCNC: 11 MMOL/L (ref 4–13)
AST SERPL W P-5'-P-CCNC: 15 U/L (ref 5–45)
ATRIAL RATE: 81 BPM
BASOPHILS # BLD AUTO: 0.01 THOUSANDS/ΜL (ref 0–0.1)
BASOPHILS NFR BLD AUTO: 0 % (ref 0–1)
BILIRUB SERPL-MCNC: 2 MG/DL (ref 0.2–1)
BUN SERPL-MCNC: 15 MG/DL (ref 5–25)
CALCIUM SERPL-MCNC: 9.1 MG/DL (ref 8.3–10.1)
CHLORIDE SERPL-SCNC: 104 MMOL/L (ref 100–108)
CO2 SERPL-SCNC: 22 MMOL/L (ref 21–32)
CREAT SERPL-MCNC: 1.02 MG/DL (ref 0.6–1.3)
EOSINOPHIL # BLD AUTO: 0 THOUSAND/ΜL (ref 0–0.61)
EOSINOPHIL NFR BLD AUTO: 0 % (ref 0–6)
ERYTHROCYTE [DISTWIDTH] IN BLOOD BY AUTOMATED COUNT: 13.1 % (ref 11.6–15.1)
GFR SERPL CREATININE-BSD FRML MDRD: 71 ML/MIN/1.73SQ M
GLUCOSE SERPL-MCNC: 148 MG/DL (ref 65–140)
HCT VFR BLD AUTO: 47.8 % (ref 36.5–49.3)
HGB BLD-MCNC: 16.1 G/DL (ref 12–17)
IMM GRANULOCYTES # BLD AUTO: 0.02 THOUSAND/UL (ref 0–0.2)
IMM GRANULOCYTES NFR BLD AUTO: 1 % (ref 0–2)
LYMPHOCYTES # BLD AUTO: 0.6 THOUSANDS/ΜL (ref 0.6–4.47)
LYMPHOCYTES NFR BLD AUTO: 16 % (ref 14–44)
MAGNESIUM SERPL-MCNC: 2.2 MG/DL (ref 1.6–2.6)
MCH RBC QN AUTO: 33.5 PG (ref 26.8–34.3)
MCHC RBC AUTO-ENTMCNC: 33.7 G/DL (ref 31.4–37.4)
MCV RBC AUTO: 99 FL (ref 82–98)
MONOCYTES # BLD AUTO: 0.09 THOUSAND/ΜL (ref 0.17–1.22)
MONOCYTES NFR BLD AUTO: 2 % (ref 4–12)
NEUTROPHILS # BLD AUTO: 3.16 THOUSANDS/ΜL (ref 1.85–7.62)
NEUTS SEG NFR BLD AUTO: 81 % (ref 43–75)
NRBC BLD AUTO-RTO: 0 /100 WBCS
P AXIS: 55 DEGREES
PLATELET # BLD AUTO: 122 THOUSANDS/UL (ref 149–390)
PMV BLD AUTO: 9.6 FL (ref 8.9–12.7)
POTASSIUM SERPL-SCNC: 4.2 MMOL/L (ref 3.5–5.3)
PR INTERVAL: 206 MS
PROT SERPL-MCNC: 7.2 G/DL (ref 6.4–8.2)
QRS AXIS: 60 DEGREES
QRSD INTERVAL: 82 MS
QT INTERVAL: 398 MS
QTC INTERVAL: 462 MS
RBC # BLD AUTO: 4.81 MILLION/UL (ref 3.88–5.62)
SODIUM SERPL-SCNC: 137 MMOL/L (ref 136–145)
T WAVE AXIS: 75 DEGREES
VENTRICULAR RATE: 81 BPM
WBC # BLD AUTO: 3.88 THOUSAND/UL (ref 4.31–10.16)

## 2020-04-06 PROCEDURE — 99232 SBSQ HOSP IP/OBS MODERATE 35: CPT | Performed by: INTERNAL MEDICINE

## 2020-04-06 PROCEDURE — 94761 N-INVAS EAR/PLS OXIMETRY MLT: CPT

## 2020-04-06 PROCEDURE — 80053 COMPREHEN METABOLIC PANEL: CPT | Performed by: INTERNAL MEDICINE

## 2020-04-06 PROCEDURE — 94760 N-INVAS EAR/PLS OXIMETRY 1: CPT

## 2020-04-06 PROCEDURE — 83735 ASSAY OF MAGNESIUM: CPT | Performed by: INTERNAL MEDICINE

## 2020-04-06 PROCEDURE — 85025 COMPLETE CBC W/AUTO DIFF WBC: CPT | Performed by: INTERNAL MEDICINE

## 2020-04-06 PROCEDURE — 93010 ELECTROCARDIOGRAM REPORT: CPT

## 2020-04-06 RX ORDER — PREDNISONE 20 MG/1
40 TABLET ORAL 2 TIMES DAILY WITH MEALS
Status: DISCONTINUED | OUTPATIENT
Start: 2020-04-07 | End: 2020-04-07 | Stop reason: HOSPADM

## 2020-04-06 RX ADMIN — MEMANTINE 5 MG: 5 TABLET ORAL at 10:12

## 2020-04-06 RX ADMIN — PANTOPRAZOLE SODIUM 40 MG: 40 TABLET, DELAYED RELEASE ORAL at 10:13

## 2020-04-06 RX ADMIN — FLUTICASONE FUROATE AND VILANTEROL TRIFENATATE 1 PUFF: 200; 25 POWDER RESPIRATORY (INHALATION) at 10:14

## 2020-04-06 RX ADMIN — FUROSEMIDE 20 MG: 20 TABLET ORAL at 10:12

## 2020-04-06 RX ADMIN — ATORVASTATIN CALCIUM 20 MG: 10 TABLET, FILM COATED ORAL at 21:58

## 2020-04-06 RX ADMIN — ENOXAPARIN SODIUM 40 MG: 40 INJECTION SUBCUTANEOUS at 10:12

## 2020-04-06 RX ADMIN — METHYLPREDNISOLONE SODIUM SUCCINATE 40 MG: 40 INJECTION, POWDER, FOR SOLUTION INTRAMUSCULAR; INTRAVENOUS at 15:57

## 2020-04-06 RX ADMIN — DONEPEZIL HYDROCHLORIDE 10 MG: 5 TABLET, FILM COATED ORAL at 21:57

## 2020-04-06 RX ADMIN — ESCITALOPRAM OXALATE 20 MG: 10 TABLET ORAL at 10:12

## 2020-04-06 RX ADMIN — ASPIRIN 81 MG: 81 TABLET, COATED ORAL at 10:13

## 2020-04-06 RX ADMIN — ATENOLOL 25 MG: 25 TABLET ORAL at 21:57

## 2020-04-06 RX ADMIN — MONTELUKAST 10 MG: 10 TABLET, FILM COATED ORAL at 21:57

## 2020-04-06 RX ADMIN — TAMSULOSIN HYDROCHLORIDE 0.4 MG: 0.4 CAPSULE ORAL at 10:12

## 2020-04-06 RX ADMIN — MEMANTINE 5 MG: 5 TABLET ORAL at 18:37

## 2020-04-06 RX ADMIN — AZITHROMYCIN 250 MG: 250 TABLET, FILM COATED ORAL at 18:37

## 2020-04-06 RX ADMIN — ALBUTEROL SULFATE 2 PUFF: 90 AEROSOL, METERED RESPIRATORY (INHALATION) at 10:14

## 2020-04-06 RX ADMIN — METHYLPREDNISOLONE SODIUM SUCCINATE 40 MG: 40 INJECTION, POWDER, FOR SOLUTION INTRAMUSCULAR; INTRAVENOUS at 05:21

## 2020-04-06 RX ADMIN — CLOPIDOGREL BISULFATE 75 MG: 75 TABLET ORAL at 10:13

## 2020-04-06 RX ADMIN — TIOTROPIUM BROMIDE 18 MCG: 18 CAPSULE ORAL; RESPIRATORY (INHALATION) at 10:14

## 2020-04-07 ENCOUNTER — PATIENT OUTREACH (OUTPATIENT)
Dept: FAMILY MEDICINE CLINIC | Facility: CLINIC | Age: 80
End: 2020-04-07

## 2020-04-07 VITALS
SYSTOLIC BLOOD PRESSURE: 117 MMHG | DIASTOLIC BLOOD PRESSURE: 71 MMHG | HEART RATE: 77 BPM | OXYGEN SATURATION: 91 % | WEIGHT: 169.53 LBS | BODY MASS INDEX: 25.11 KG/M2 | TEMPERATURE: 97.6 F | RESPIRATION RATE: 18 BRPM | HEIGHT: 69 IN

## 2020-04-07 LAB
ALBUMIN SERPL BCP-MCNC: 3.4 G/DL (ref 3.5–5)
ALP SERPL-CCNC: 93 U/L (ref 46–116)
ALT SERPL W P-5'-P-CCNC: 18 U/L (ref 12–78)
ANION GAP SERPL CALCULATED.3IONS-SCNC: 12 MMOL/L (ref 4–13)
AST SERPL W P-5'-P-CCNC: 15 U/L (ref 5–45)
BASOPHILS # BLD AUTO: 0.02 THOUSANDS/ΜL (ref 0–0.1)
BASOPHILS NFR BLD AUTO: 0 % (ref 0–1)
BILIRUB SERPL-MCNC: 1.4 MG/DL (ref 0.2–1)
BUN SERPL-MCNC: 25 MG/DL (ref 5–25)
CALCIUM SERPL-MCNC: 9.1 MG/DL (ref 8.3–10.1)
CHLORIDE SERPL-SCNC: 105 MMOL/L (ref 100–108)
CO2 SERPL-SCNC: 22 MMOL/L (ref 21–32)
CREAT SERPL-MCNC: 1.1 MG/DL (ref 0.6–1.3)
EOSINOPHIL # BLD AUTO: 0 THOUSAND/ΜL (ref 0–0.61)
EOSINOPHIL NFR BLD AUTO: 0 % (ref 0–6)
ERYTHROCYTE [DISTWIDTH] IN BLOOD BY AUTOMATED COUNT: 13 % (ref 11.6–15.1)
GFR SERPL CREATININE-BSD FRML MDRD: 64 ML/MIN/1.73SQ M
GLUCOSE SERPL-MCNC: 129 MG/DL (ref 65–140)
HCT VFR BLD AUTO: 45.6 % (ref 36.5–49.3)
HGB BLD-MCNC: 15.2 G/DL (ref 12–17)
IMM GRANULOCYTES # BLD AUTO: 0.05 THOUSAND/UL (ref 0–0.2)
IMM GRANULOCYTES NFR BLD AUTO: 1 % (ref 0–2)
LYMPHOCYTES # BLD AUTO: 0.89 THOUSANDS/ΜL (ref 0.6–4.47)
LYMPHOCYTES NFR BLD AUTO: 11 % (ref 14–44)
MAGNESIUM SERPL-MCNC: 2.2 MG/DL (ref 1.6–2.6)
MCH RBC QN AUTO: 33 PG (ref 26.8–34.3)
MCHC RBC AUTO-ENTMCNC: 33.3 G/DL (ref 31.4–37.4)
MCV RBC AUTO: 99 FL (ref 82–98)
MONOCYTES # BLD AUTO: 0.61 THOUSAND/ΜL (ref 0.17–1.22)
MONOCYTES NFR BLD AUTO: 8 % (ref 4–12)
NEUTROPHILS # BLD AUTO: 6.51 THOUSANDS/ΜL (ref 1.85–7.62)
NEUTS SEG NFR BLD AUTO: 80 % (ref 43–75)
NRBC BLD AUTO-RTO: 0 /100 WBCS
PLATELET # BLD AUTO: 144 THOUSANDS/UL (ref 149–390)
PMV BLD AUTO: 9.8 FL (ref 8.9–12.7)
POTASSIUM SERPL-SCNC: 4.1 MMOL/L (ref 3.5–5.3)
PROT SERPL-MCNC: 6.8 G/DL (ref 6.4–8.2)
RBC # BLD AUTO: 4.61 MILLION/UL (ref 3.88–5.62)
SODIUM SERPL-SCNC: 139 MMOL/L (ref 136–145)
WBC # BLD AUTO: 8.08 THOUSAND/UL (ref 4.31–10.16)

## 2020-04-07 PROCEDURE — 80053 COMPREHEN METABOLIC PANEL: CPT | Performed by: INTERNAL MEDICINE

## 2020-04-07 PROCEDURE — 83735 ASSAY OF MAGNESIUM: CPT | Performed by: INTERNAL MEDICINE

## 2020-04-07 PROCEDURE — 85025 COMPLETE CBC W/AUTO DIFF WBC: CPT | Performed by: INTERNAL MEDICINE

## 2020-04-07 PROCEDURE — 99239 HOSP IP/OBS DSCHRG MGMT >30: CPT | Performed by: FAMILY MEDICINE

## 2020-04-07 PROCEDURE — 97162 PT EVAL MOD COMPLEX 30 MIN: CPT

## 2020-04-07 PROCEDURE — 97166 OT EVAL MOD COMPLEX 45 MIN: CPT

## 2020-04-07 RX ORDER — PREDNISONE 20 MG/1
TABLET ORAL
Qty: 25 TABLET | Refills: 0 | Status: SHIPPED | OUTPATIENT
Start: 2020-04-07 | End: 2020-07-22 | Stop reason: ALTCHOICE

## 2020-04-07 RX ORDER — AZITHROMYCIN 250 MG/1
250 TABLET, FILM COATED ORAL EVERY 24 HOURS
Qty: 2 TABLET | Refills: 0 | Status: SHIPPED | OUTPATIENT
Start: 2020-04-07 | End: 2020-04-09

## 2020-04-07 RX ADMIN — PANTOPRAZOLE SODIUM 40 MG: 40 TABLET, DELAYED RELEASE ORAL at 08:00

## 2020-04-07 RX ADMIN — ESCITALOPRAM OXALATE 20 MG: 10 TABLET ORAL at 08:00

## 2020-04-07 RX ADMIN — MEMANTINE 5 MG: 5 TABLET ORAL at 08:00

## 2020-04-07 RX ADMIN — CLOPIDOGREL BISULFATE 75 MG: 75 TABLET ORAL at 08:00

## 2020-04-07 RX ADMIN — TAMSULOSIN HYDROCHLORIDE 0.4 MG: 0.4 CAPSULE ORAL at 08:00

## 2020-04-07 RX ADMIN — ENOXAPARIN SODIUM 40 MG: 40 INJECTION SUBCUTANEOUS at 08:00

## 2020-04-07 RX ADMIN — FLUTICASONE FUROATE AND VILANTEROL TRIFENATATE 1 PUFF: 200; 25 POWDER RESPIRATORY (INHALATION) at 08:01

## 2020-04-07 RX ADMIN — PREDNISONE 40 MG: 20 TABLET ORAL at 08:00

## 2020-04-07 RX ADMIN — TIOTROPIUM BROMIDE 18 MCG: 18 CAPSULE ORAL; RESPIRATORY (INHALATION) at 08:01

## 2020-04-07 RX ADMIN — FUROSEMIDE 20 MG: 20 TABLET ORAL at 08:00

## 2020-04-07 RX ADMIN — ASPIRIN 81 MG: 81 TABLET, COATED ORAL at 08:00

## 2020-04-08 ENCOUNTER — TRANSITIONAL CARE MANAGEMENT (OUTPATIENT)
Dept: FAMILY MEDICINE CLINIC | Facility: CLINIC | Age: 80
End: 2020-04-08

## 2020-04-09 ENCOUNTER — PATIENT OUTREACH (OUTPATIENT)
Dept: FAMILY MEDICINE CLINIC | Facility: CLINIC | Age: 80
End: 2020-04-09

## 2020-04-09 LAB
BACTERIA BLD CULT: NORMAL
BACTERIA BLD CULT: NORMAL

## 2020-04-10 ENCOUNTER — OFFICE VISIT (OUTPATIENT)
Dept: FAMILY MEDICINE CLINIC | Facility: CLINIC | Age: 80
End: 2020-04-10
Payer: MEDICARE

## 2020-04-10 VITALS
TEMPERATURE: 97.4 F | HEIGHT: 69 IN | BODY MASS INDEX: 25.62 KG/M2 | DIASTOLIC BLOOD PRESSURE: 64 MMHG | WEIGHT: 173 LBS | SYSTOLIC BLOOD PRESSURE: 98 MMHG

## 2020-04-10 DIAGNOSIS — J44.1 COPD WITH ACUTE EXACERBATION (HCC): ICD-10-CM

## 2020-04-10 DIAGNOSIS — IMO0001 TRANSITION OF CARE PERFORMED WITH SHARING OF CLINICAL SUMMARY: Primary | ICD-10-CM

## 2020-04-10 PROCEDURE — 99496 TRANSJ CARE MGMT HIGH F2F 7D: CPT | Performed by: FAMILY MEDICINE

## 2020-04-10 RX ORDER — AZITHROMYCIN 250 MG/1
TABLET, FILM COATED ORAL
Qty: 6 TABLET | Refills: 0 | Status: SHIPPED | OUTPATIENT
Start: 2020-04-10 | End: 2020-04-14

## 2020-04-14 DIAGNOSIS — K21.9 GASTROESOPHAGEAL REFLUX DISEASE WITHOUT ESOPHAGITIS: ICD-10-CM

## 2020-04-14 RX ORDER — PANTOPRAZOLE SODIUM 40 MG/1
TABLET, DELAYED RELEASE ORAL
Qty: 90 TABLET | Refills: 1 | Status: SHIPPED | OUTPATIENT
Start: 2020-04-14 | End: 2020-07-06

## 2020-04-20 ENCOUNTER — TELEPHONE (OUTPATIENT)
Dept: FAMILY MEDICINE CLINIC | Facility: CLINIC | Age: 80
End: 2020-04-20

## 2020-04-30 ENCOUNTER — OFFICE VISIT (OUTPATIENT)
Dept: CARDIOLOGY CLINIC | Facility: HOSPITAL | Age: 80
End: 2020-04-30
Payer: MEDICARE

## 2020-04-30 ENCOUNTER — PATIENT OUTREACH (OUTPATIENT)
Dept: FAMILY MEDICINE CLINIC | Facility: CLINIC | Age: 80
End: 2020-04-30

## 2020-04-30 VITALS
HEIGHT: 69 IN | BODY MASS INDEX: 25.53 KG/M2 | WEIGHT: 172.4 LBS | HEART RATE: 76 BPM | SYSTOLIC BLOOD PRESSURE: 106 MMHG | DIASTOLIC BLOOD PRESSURE: 70 MMHG

## 2020-04-30 DIAGNOSIS — I25.110 CORONARY ARTERY DISEASE WITH UNSTABLE ANGINA PECTORIS, UNSPECIFIED VESSEL OR LESION TYPE, UNSPECIFIED WHETHER NATIVE OR TRANSPLANTED HEART (HCC): Primary | ICD-10-CM

## 2020-04-30 DIAGNOSIS — F03.90 DEMENTIA WITHOUT BEHAVIORAL DISTURBANCE, UNSPECIFIED DEMENTIA TYPE (HCC): ICD-10-CM

## 2020-04-30 DIAGNOSIS — I50.33 ACUTE ON CHRONIC DIASTOLIC CONGESTIVE HEART FAILURE (HCC): ICD-10-CM

## 2020-04-30 DIAGNOSIS — I49.1 PREMATURE ATRIAL CONTRACTIONS: ICD-10-CM

## 2020-04-30 DIAGNOSIS — I65.23 BILATERAL CAROTID ARTERY STENOSIS: ICD-10-CM

## 2020-04-30 DIAGNOSIS — R06.02 SOB (SHORTNESS OF BREATH): ICD-10-CM

## 2020-04-30 DIAGNOSIS — I25.10 CORONARY ARTERY DISEASE INVOLVING NATIVE CORONARY ARTERY OF NATIVE HEART WITHOUT ANGINA PECTORIS: ICD-10-CM

## 2020-04-30 DIAGNOSIS — D69.6 THROMBOCYTOPENIA (HCC): ICD-10-CM

## 2020-04-30 DIAGNOSIS — R00.1 BRADYCARDIA: ICD-10-CM

## 2020-04-30 PROCEDURE — 4040F PNEUMOC VAC/ADMIN/RCVD: CPT | Performed by: INTERNAL MEDICINE

## 2020-04-30 PROCEDURE — 3074F SYST BP LT 130 MM HG: CPT | Performed by: INTERNAL MEDICINE

## 2020-04-30 PROCEDURE — 3078F DIAST BP <80 MM HG: CPT | Performed by: INTERNAL MEDICINE

## 2020-04-30 PROCEDURE — 1036F TOBACCO NON-USER: CPT | Performed by: INTERNAL MEDICINE

## 2020-04-30 PROCEDURE — 1111F DSCHRG MED/CURRENT MED MERGE: CPT | Performed by: INTERNAL MEDICINE

## 2020-04-30 PROCEDURE — 1160F RVW MEDS BY RX/DR IN RCRD: CPT | Performed by: INTERNAL MEDICINE

## 2020-04-30 PROCEDURE — 3008F BODY MASS INDEX DOCD: CPT | Performed by: INTERNAL MEDICINE

## 2020-04-30 PROCEDURE — 99214 OFFICE O/P EST MOD 30 MIN: CPT | Performed by: INTERNAL MEDICINE

## 2020-05-14 DIAGNOSIS — N20.0 RENAL CALCULI: Primary | ICD-10-CM

## 2020-05-14 DIAGNOSIS — E78.2 MIXED HYPERLIPIDEMIA: ICD-10-CM

## 2020-05-15 RX ORDER — ATORVASTATIN CALCIUM 20 MG/1
TABLET, FILM COATED ORAL
Qty: 90 TABLET | Refills: 3 | Status: SHIPPED | OUTPATIENT
Start: 2020-05-15 | End: 2020-08-29 | Stop reason: HOSPADM

## 2020-05-15 RX ORDER — POTASSIUM CITRATE 15 MEQ/1
TABLET, EXTENDED RELEASE ORAL
Qty: 180 TABLET | Refills: 3 | Status: SHIPPED | OUTPATIENT
Start: 2020-05-15 | End: 2020-08-29 | Stop reason: HOSPADM

## 2020-05-26 ENCOUNTER — PATIENT OUTREACH (OUTPATIENT)
Dept: FAMILY MEDICINE CLINIC | Facility: CLINIC | Age: 80
End: 2020-05-26

## 2020-06-05 ENCOUNTER — HOSPITAL ENCOUNTER (OUTPATIENT)
Dept: ULTRASOUND IMAGING | Facility: HOSPITAL | Age: 80
Discharge: HOME | End: 2020-06-05
Payer: MEDICARE

## 2020-06-05 ENCOUNTER — HOSPITAL ENCOUNTER (OUTPATIENT)
Dept: RADIOLOGY | Facility: HOSPITAL | Age: 80
Discharge: HOME | End: 2020-06-05
Payer: MEDICARE

## 2020-06-05 DIAGNOSIS — N20.0 RENAL CALCULI: ICD-10-CM

## 2020-06-05 PROCEDURE — 74018 RADEX ABDOMEN 1 VIEW: CPT

## 2020-06-05 PROCEDURE — 76770 US EXAM ABDO BACK WALL COMP: CPT

## 2020-06-10 ENCOUNTER — HOSPITAL ENCOUNTER (OUTPATIENT)
Dept: NON INVASIVE DIAGNOSTICS | Facility: HOSPITAL | Age: 80
Discharge: HOME | End: 2020-06-10
Attending: INTERNAL MEDICINE
Payer: MEDICARE

## 2020-06-10 DIAGNOSIS — I49.8 VENTRICULAR BIGEMINY: ICD-10-CM

## 2020-06-10 DIAGNOSIS — R00.1 BRADYCARDIA: ICD-10-CM

## 2020-06-10 DIAGNOSIS — I25.10 CORONARY ARTERY DISEASE, ANGINA PRESENCE UNSPECIFIED, UNSPECIFIED VESSEL OR LESION TYPE, UNSPECIFIED WHETHER NATIVE OR TRANSPLANTED HEART: ICD-10-CM

## 2020-06-10 DIAGNOSIS — E78.5 HYPERLIPIDEMIA, UNSPECIFIED HYPERLIPIDEMIA TYPE: ICD-10-CM

## 2020-06-10 DIAGNOSIS — J44.9 CHRONIC OBSTRUCTIVE PULMONARY DISEASE, UNSPECIFIED COPD TYPE (HCC): ICD-10-CM

## 2020-06-10 PROCEDURE — 93306 TTE W/DOPPLER COMPLETE: CPT | Performed by: INTERNAL MEDICINE

## 2020-06-10 PROCEDURE — 93306 TTE W/DOPPLER COMPLETE: CPT

## 2020-06-11 LAB
ATRIAL RATE: 66 BPM
P AXIS: 60 DEGREES
PR INTERVAL: 238 MS
QRS AXIS: 67 DEGREES
QRSD INTERVAL: 86 MS
QT INTERVAL: 442 MS
QTC INTERVAL: 463 MS
T WAVE AXIS: 83 DEGREES
VENTRICULAR RATE: 66 BPM

## 2020-06-11 PROCEDURE — 93010 ELECTROCARDIOGRAM REPORT: CPT | Performed by: INTERNAL MEDICINE

## 2020-06-26 ENCOUNTER — TELEPHONE (OUTPATIENT)
Dept: UROLOGY | Facility: AMBULATORY SURGERY CENTER | Age: 80
End: 2020-06-26

## 2020-07-02 ENCOUNTER — OFFICE VISIT (OUTPATIENT)
Dept: FAMILY MEDICINE CLINIC | Facility: CLINIC | Age: 80
End: 2020-07-02
Payer: MEDICARE

## 2020-07-02 ENCOUNTER — LAB (OUTPATIENT)
Dept: LAB | Facility: MEDICAL CENTER | Age: 80
End: 2020-07-02
Payer: MEDICARE

## 2020-07-02 VITALS
HEIGHT: 69 IN | TEMPERATURE: 96.7 F | DIASTOLIC BLOOD PRESSURE: 72 MMHG | WEIGHT: 174.8 LBS | SYSTOLIC BLOOD PRESSURE: 132 MMHG | BODY MASS INDEX: 25.89 KG/M2 | OXYGEN SATURATION: 88 %

## 2020-07-02 DIAGNOSIS — F03.90 DEMENTIA WITHOUT BEHAVIORAL DISTURBANCE, UNSPECIFIED DEMENTIA TYPE (HCC): ICD-10-CM

## 2020-07-02 DIAGNOSIS — B34.9 ACUTE VIRAL SYNDROME: Primary | ICD-10-CM

## 2020-07-02 DIAGNOSIS — D49.1 PULMONARY NEOPLASM: ICD-10-CM

## 2020-07-02 DIAGNOSIS — B34.9 ACUTE VIRAL SYNDROME: ICD-10-CM

## 2020-07-02 DIAGNOSIS — R53.82 CHRONIC FATIGUE: ICD-10-CM

## 2020-07-02 LAB
ALBUMIN SERPL BCP-MCNC: 3.9 G/DL (ref 3.5–5)
ALP SERPL-CCNC: 120 U/L (ref 46–116)
ALT SERPL W P-5'-P-CCNC: 18 U/L (ref 12–78)
ANION GAP SERPL CALCULATED.3IONS-SCNC: 6 MMOL/L (ref 4–13)
AST SERPL W P-5'-P-CCNC: 18 U/L (ref 5–45)
BASOPHILS # BLD AUTO: 0.04 THOUSANDS/ΜL (ref 0–0.1)
BASOPHILS NFR BLD AUTO: 1 % (ref 0–1)
BILIRUB SERPL-MCNC: 1.9 MG/DL (ref 0.2–1)
BUN SERPL-MCNC: 14 MG/DL (ref 5–25)
CALCIUM SERPL-MCNC: 9.2 MG/DL (ref 8.3–10.1)
CHLORIDE SERPL-SCNC: 108 MMOL/L (ref 100–108)
CO2 SERPL-SCNC: 23 MMOL/L (ref 21–32)
CREAT SERPL-MCNC: 1.2 MG/DL (ref 0.6–1.3)
EOSINOPHIL # BLD AUTO: 0.08 THOUSAND/ΜL (ref 0–0.61)
EOSINOPHIL NFR BLD AUTO: 2 % (ref 0–6)
ERYTHROCYTE [DISTWIDTH] IN BLOOD BY AUTOMATED COUNT: 13.7 % (ref 11.6–15.1)
GFR SERPL CREATININE-BSD FRML MDRD: 58 ML/MIN/1.73SQ M
GLUCOSE SERPL-MCNC: 99 MG/DL (ref 65–140)
HCT VFR BLD AUTO: 50.1 % (ref 36.5–49.3)
HGB BLD-MCNC: 16.4 G/DL (ref 12–17)
IMM GRANULOCYTES # BLD AUTO: 0.02 THOUSAND/UL (ref 0–0.2)
IMM GRANULOCYTES NFR BLD AUTO: 0 % (ref 0–2)
LYMPHOCYTES # BLD AUTO: 1.2 THOUSANDS/ΜL (ref 0.6–4.47)
LYMPHOCYTES NFR BLD AUTO: 22 % (ref 14–44)
MCH RBC QN AUTO: 31.4 PG (ref 26.8–34.3)
MCHC RBC AUTO-ENTMCNC: 32.7 G/DL (ref 31.4–37.4)
MCV RBC AUTO: 96 FL (ref 82–98)
MONOCYTES # BLD AUTO: 0.66 THOUSAND/ΜL (ref 0.17–1.22)
MONOCYTES NFR BLD AUTO: 12 % (ref 4–12)
NEUTROPHILS # BLD AUTO: 3.44 THOUSANDS/ΜL (ref 1.85–7.62)
NEUTS SEG NFR BLD AUTO: 63 % (ref 43–75)
NRBC BLD AUTO-RTO: 0 /100 WBCS
PLATELET # BLD AUTO: 118 THOUSANDS/UL (ref 149–390)
PMV BLD AUTO: 10.7 FL (ref 8.9–12.7)
POTASSIUM SERPL-SCNC: 4.3 MMOL/L (ref 3.5–5.3)
PROT SERPL-MCNC: 7.7 G/DL (ref 6.4–8.2)
RBC # BLD AUTO: 5.22 MILLION/UL (ref 3.88–5.62)
SODIUM SERPL-SCNC: 137 MMOL/L (ref 136–145)
TSH SERPL DL<=0.05 MIU/L-ACNC: 1.02 UIU/ML (ref 0.36–3.74)
WBC # BLD AUTO: 5.44 THOUSAND/UL (ref 4.31–10.16)

## 2020-07-02 PROCEDURE — 1036F TOBACCO NON-USER: CPT | Performed by: FAMILY MEDICINE

## 2020-07-02 PROCEDURE — 3008F BODY MASS INDEX DOCD: CPT | Performed by: FAMILY MEDICINE

## 2020-07-02 PROCEDURE — 80053 COMPREHEN METABOLIC PANEL: CPT | Performed by: FAMILY MEDICINE

## 2020-07-02 PROCEDURE — 84443 ASSAY THYROID STIM HORMONE: CPT

## 2020-07-02 PROCEDURE — 4040F PNEUMOC VAC/ADMIN/RCVD: CPT | Performed by: FAMILY MEDICINE

## 2020-07-02 PROCEDURE — 3075F SYST BP GE 130 - 139MM HG: CPT | Performed by: FAMILY MEDICINE

## 2020-07-02 PROCEDURE — 99213 OFFICE O/P EST LOW 20 MIN: CPT | Performed by: FAMILY MEDICINE

## 2020-07-02 PROCEDURE — 85025 COMPLETE CBC W/AUTO DIFF WBC: CPT

## 2020-07-02 PROCEDURE — 36415 COLL VENOUS BLD VENIPUNCTURE: CPT | Performed by: FAMILY MEDICINE

## 2020-07-02 PROCEDURE — 3078F DIAST BP <80 MM HG: CPT | Performed by: FAMILY MEDICINE

## 2020-07-02 PROCEDURE — 1160F RVW MEDS BY RX/DR IN RCRD: CPT | Performed by: FAMILY MEDICINE

## 2020-07-02 NOTE — PROGRESS NOTES
Assessment/Plan:    Problem List Items Addressed This Visit     None      Visit Diagnoses     Acute viral syndrome    -  Primary           Diagnoses and all orders for this visit:    Acute viral syndrome        No problem-specific Assessment & Plan notes found for this encounter  Subjective:      Patient ID: Rishabh Hoffmann is a 70 y o  male      Feeling of fatigue   lasitude        The following portions of the patient's history were reviewed and updated as appropriate:   He has a past medical history of AAA (abdominal aortic aneurysm) ([facility] [address] ), Acid reflux, Acute on chronic diastolic congestive heart failure ([facility] [address] ) (2/25/2020), Acute serous otitis media of left ear, Anesthesia, Anxiety, Aortic aneurysm without rupture ([address] ), Arm bruise, At risk for falls, BPH without urinary obstruction, Cancer ([address] ), CAP (community acquired pneumonia), Cataracts, bilateral, Change in bowel function, Clubbing of fingers, Colon polyps, Common cold, Contusion of elbow, left, COPD (chronic obstructive pulmonary disease) ([address] ), Coronary artery disease, Cough, Dementia ([address] ), Depression, Dizziness, Exercise counseling, Fatigue, Full dentures, Glaucoma screening, High cholesterol, History of abdominal aortic aneurysm (AAA) repair (08/2017), History of bacteremia, History of chronic obstructive lung disease, History of epistaxis, History of influenza vaccination, History of kidney stones, History of pneumonia, History of sepsis (10/2017), History of sinusitis, History of skin cancer, History of sleep apnea, History of transfusion, History of urinary tract infection, [place] (hard of hearing), Hydronephrosis with obstructing calculus, Influenza vaccine needed, Jock itch, Kidney stones, Left hip pain, Need for pneumococcal vaccination, Need for prophylactic vaccination and inoculation against influenza, Other emphysema ([address] ), Pancreatitis, chronic ([address] ), Pneumonia (10/26/2017), Pulmonary emphysema ([address] ), Screening for genitourinary condition, Screening for neurological condition, Sepsis ([facility] [address] ), Short-term memory loss, Sleep apnea, Special screening examination for neoplasm of prostate, TIA involving carotid artery, Ulcer, Unsteady gait, Use of cane as ambulatory aid, Vitamin D deficiency, and Wears glasses  ,  does not have any pertinent problems on file  ,   has a past surgical history that includes Cardiac surgery; Ureteral stent placement; Excisional hemorrhoidectomy; Mouth surgery; pr esophagogastroduodenoscopy transoral diagnostic (N/A, 8/18/2016); Cataract extraction (Bilateral); Lithotripsy; Hernia repair; pr colonoscopy flx dx w/collj spec when pfrmd (N/A, 5/16/2017); Abdominal aortic aneurysm repair (08/23/2017); CAROTID ENDARTARECTOMY (Left, 11/1996); Coronary artery bypass graft (01/2003); Eye surgery (Bilateral); Cystoscopy; pr cystourethroscopy (N/A, 11/30/2017); pr remove bladder stone,<2 5 cm (N/A, 11/30/2017); Cystoscopy; AAA repair, endovascular; Tonsillectomy; Back surgery; and Skin biopsy  ,  family history includes Diabetes type II in his maternal grandmother and mother; Hypertension in his paternal grandmother; Kidney failure in his father  ,   reports that he quit smoking about 6 years ago  He has a 90 00 pack-year smoking history  He has never used smokeless tobacco  He reports that he drank alcohol  He reports that he does not use drugs  ,  is allergic to augmentin [amoxicillin-pot clavulanate]; ciprofloxacin; morphine and related; levofloxacin; and wellbutrin [bupropion]     Current Outpatient Medications   Medication Sig Dispense Refill    albuterol (PROVENTIL HFA,VENTOLIN HFA) 90 mcg/act inhaler Inhale 2 puffs every 6 (six) hours as needed for wheezing      aspirin 81 MG tablet Take 81 mg by mouth daily Took within 24 hours       atenolol (TENORMIN) 25 mg tablet TAKE 1 TABLET AT BEDTIME 90 tablet 3    atorvastatin (LIPITOR) 20 mg tablet TAKE 1 TABLET AT BEDTIME 90 tablet 3    Bacillus Coagulans-Inulin (PROBIOTIC FORMULA) 1-250 BILLION-MG CAPS Take 1 capsule by mouth daily Record states dose is currently 4 billion      Cholecalciferol (VITAMIN D-3 PO) Take 2,000 Units by mouth daily   clopidogrel (PLAVIX) 75 mg tablet Take 1 tablet (75 mg total) by mouth daily 90 tablet 3    cyanocobalamin (VITAMIN B-12) 1,000 mcg tablet Take 1,000 mcg by mouth 3 (three) times a week MWF      donepezil (ARICEPT) 10 mg tablet TAKE 1 TABLET DAILY AT     BEDTIME 90 tablet 1    escitalopram (LEXAPRO) 20 mg tablet TAKE 1 TABLET DAILY 90 tablet 3    Fluticasone-Salmeterol (AIRDUO RESPICLICK 50/64) 54-62 MCG/ACT AEPB Inhale 1 puff 2 (two) times a day AM & PM      furosemide (LASIX) 20 mg tablet Take 1 tablet (20 mg total) by mouth daily 90 tablet 3    KRILL OIL PO Take 500 mg by mouth daily   Melatonin 10 MG TABS Take 2 tablets by mouth daily at bedtime Taking 15mg      memantine (NAMENDA) 5 mg tablet Take 5 mg by mouth 2 (two) times a day In the evening       Multiple Vitamins-Minerals (CENTRUM SILVER ADULT 50+) TABS Take 1 tablet by mouth daily      nitroglycerin (NITROSTAT) 0 4 mg SL tablet Place 1 tablet (0 4 mg total) under the tongue every 5 (five) minutes as needed for chest pain 30 tablet 0    pantoprazole (PROTONIX) 40 mg tablet take 1 tablet by mouth once daily 90 tablet 1    Potassium Citrate ER 15 MEQ (1620 MG) TBCR TAKE 1 TABLET TWICE A  tablet 3    predniSONE 20 mg tablet Take 2 tabs daily and decrease by 1/2 tab every 2 days 25 tablet 0    tamsulosin (FLOMAX) 0 4 mg Take 1 capsule (0 4 mg total) by mouth daily for 90 days 90 capsule 3    tiotropium (SPIRIVA HANDIHALER) 18 mcg inhalation capsule Place 18 mcg into inhaler and inhale daily  No current facility-administered medications for this visit  Review of Systems   Constitutional: Negative for activity change, appetite change, diaphoresis, fatigue and fever  HENT: Negative  Eyes: Negative  Respiratory: Positive for cough  Negative for apnea, chest tightness, shortness of breath and wheezing  Cardiovascular: Negative for chest pain, palpitations and leg swelling  Gastrointestinal: Negative for abdominal distention, abdominal pain, anal bleeding, constipation, diarrhea, nausea and vomiting  Endocrine: Negative for cold intolerance, heat intolerance, polydipsia, polyphagia and polyuria  Genitourinary: Negative for difficulty urinating, dysuria, flank pain, hematuria and urgency  Musculoskeletal: Negative for arthralgias, back pain, gait problem, joint swelling and myalgias  Skin: Negative for color change, rash and wound  Allergic/Immunologic: Negative for environmental allergies, food allergies and immunocompromised state  Neurological: Negative for dizziness, seizures, syncope, speech difficulty, numbness and headaches  Hematological: Negative for adenopathy  Does not bruise/bleed easily  Psychiatric/Behavioral: Negative for agitation, behavioral problems, hallucinations, sleep disturbance and suicidal ideas  Objective:  Vitals:    07/02/20 1227   BP: 132/72   BP Location: Left arm   Patient Position: Sitting   Cuff Size: Standard   Temp: (!) 96 7 °F (35 9 °C)   TempSrc: Temporal   SpO2: (!) 88%   Weight: 79 3 kg (174 lb 12 8 oz)   Height: 5' 9" (1 753 m)     Body mass index is 25 81 kg/m²  Physical Exam   Constitutional: He is oriented to person, place, and time  He appears well-developed and well-nourished  No distress  HENT:   Head: Normocephalic  Right Ear: External ear normal    Left Ear: External ear normal    Nose: Nose normal    Mouth/Throat: Oropharynx is clear and moist    Eyes: Pupils are equal, round, and reactive to light  Conjunctivae and EOM are normal  Right eye exhibits no discharge  Left eye exhibits no discharge  No scleral icterus  Neck: Normal range of motion  No tracheal deviation present  No thyromegaly present     Cardiovascular: Normal rate, regular rhythm and normal heart sounds  Exam reveals no gallop and no friction rub  No murmur heard  Pulmonary/Chest: Effort normal and breath sounds normal  No respiratory distress  He has no wheezes  Abdominal: Soft  Bowel sounds are normal  He exhibits no mass  There is no tenderness  There is no guarding  Musculoskeletal: He exhibits no edema or deformity  Lymphadenopathy:     He has no cervical adenopathy  Neurological: He is alert and oriented to person, place, and time  No cranial nerve deficit  Skin: Skin is warm and dry  No rash noted  He is not diaphoretic  No erythema  Psychiatric: He has a normal mood and affect   Thought content normal

## 2020-07-06 DIAGNOSIS — K21.9 GASTROESOPHAGEAL REFLUX DISEASE WITHOUT ESOPHAGITIS: ICD-10-CM

## 2020-07-06 RX ORDER — PANTOPRAZOLE SODIUM 40 MG/1
TABLET, DELAYED RELEASE ORAL
Qty: 90 TABLET | Refills: 1 | Status: SHIPPED | OUTPATIENT
Start: 2020-07-06 | End: 2020-08-29 | Stop reason: HOSPADM

## 2020-07-22 ENCOUNTER — HOSPITAL ENCOUNTER (OUTPATIENT)
Dept: RADIOLOGY | Facility: HOSPITAL | Age: 80
Discharge: HOME | End: 2020-07-22
Attending: FAMILY MEDICINE
Payer: MEDICARE

## 2020-07-22 ENCOUNTER — LAB (OUTPATIENT)
Dept: LAB | Facility: HOSPITAL | Age: 80
End: 2020-07-22
Attending: FAMILY MEDICINE
Payer: MEDICARE

## 2020-07-22 ENCOUNTER — OFFICE VISIT (OUTPATIENT)
Dept: FAMILY MEDICINE CLINIC | Facility: CLINIC | Age: 80
End: 2020-07-22
Payer: MEDICARE

## 2020-07-22 VITALS
SYSTOLIC BLOOD PRESSURE: 102 MMHG | BODY MASS INDEX: 25.77 KG/M2 | WEIGHT: 174 LBS | DIASTOLIC BLOOD PRESSURE: 64 MMHG | HEIGHT: 69 IN | TEMPERATURE: 97.6 F

## 2020-07-22 DIAGNOSIS — G30.1 DEMENTIA OF THE ALZHEIMER'S TYPE, WITH LATE ONSET, WITH DELIRIUM (HCC): ICD-10-CM

## 2020-07-22 DIAGNOSIS — R14.0 ABDOMINAL DISTENSION: ICD-10-CM

## 2020-07-22 DIAGNOSIS — R06.00 DYSPNEA ON EFFORT: Primary | ICD-10-CM

## 2020-07-22 DIAGNOSIS — I50.32 CHRONIC DIASTOLIC (CONGESTIVE) HEART FAILURE (HCC): ICD-10-CM

## 2020-07-22 DIAGNOSIS — R06.00 DYSPNEA ON EFFORT: ICD-10-CM

## 2020-07-22 DIAGNOSIS — F05 DEMENTIA OF THE ALZHEIMER'S TYPE, WITH LATE ONSET, WITH DELIRIUM (HCC): ICD-10-CM

## 2020-07-22 DIAGNOSIS — F02.80 DEMENTIA OF THE ALZHEIMER'S TYPE, WITH LATE ONSET, WITH DELIRIUM (HCC): ICD-10-CM

## 2020-07-22 PROBLEM — R25.1 TREMOR: Status: ACTIVE | Noted: 2020-07-22

## 2020-07-22 LAB — D DIMER PPP FEU-MCNC: 7.26 UG/ML FEU

## 2020-07-22 PROCEDURE — 1160F RVW MEDS BY RX/DR IN RCRD: CPT | Performed by: FAMILY MEDICINE

## 2020-07-22 PROCEDURE — 85379 FIBRIN DEGRADATION QUANT: CPT

## 2020-07-22 PROCEDURE — 74019 RADEX ABDOMEN 2 VIEWS: CPT

## 2020-07-22 PROCEDURE — 1036F TOBACCO NON-USER: CPT | Performed by: FAMILY MEDICINE

## 2020-07-22 PROCEDURE — 71046 X-RAY EXAM CHEST 2 VIEWS: CPT

## 2020-07-22 PROCEDURE — 4040F PNEUMOC VAC/ADMIN/RCVD: CPT | Performed by: FAMILY MEDICINE

## 2020-07-22 PROCEDURE — 3008F BODY MASS INDEX DOCD: CPT | Performed by: FAMILY MEDICINE

## 2020-07-22 PROCEDURE — 3074F SYST BP LT 130 MM HG: CPT | Performed by: FAMILY MEDICINE

## 2020-07-22 PROCEDURE — 99214 OFFICE O/P EST MOD 30 MIN: CPT | Performed by: FAMILY MEDICINE

## 2020-07-22 PROCEDURE — 3078F DIAST BP <80 MM HG: CPT | Performed by: FAMILY MEDICINE

## 2020-07-22 PROCEDURE — 36415 COLL VENOUS BLD VENIPUNCTURE: CPT

## 2020-07-22 NOTE — RESULT ENCOUNTER NOTE
Please call the patient regarding his abnormal result    D-dimer is elevated but has been elevated for the past 6 months so I do not think he has any blood clots I think the D-dimer is elevated because of his lung tumor

## 2020-07-22 NOTE — PROGRESS NOTES
Assessment/Plan:    Problem List Items Addressed This Visit        Nervous and Auditory    Dementia of the Alzheimer's type, with late onset, with delirium ([address] )      Other Visit Diagnoses     Dyspnea on effort    -  Primary           Diagnoses and all orders for this visit:    Dyspnea on effort    Dementia of the Alzheimer's type, with late onset, with delirium ([address] )        No problem-specific Assessment & Plan notes found for this encounter  Subjective:      Patient ID: Eileen Patton is a 70 y o  male      Mr Miladys Saini here chief complaint is that he is experiencing tremors also is experiencing dyspnea with exertion and dyspnea at rest on increasing abdominal girth and on ambulation is tentative and somewhat slow also patient complains of being fatigued all the time and he is experiencing some dementia      The following portions of the patient's history were reviewed and updated as appropriate:   He has a past medical history of AAA (abdominal aortic aneurysm) ([address] ), Acid reflux, Acute on chronic diastolic congestive heart failure ([address] ) (2/25/2020), Acute serous otitis media of left ear, Anesthesia, Anxiety, Aortic aneurysm without rupture ([address] ), Arm bruise, At risk for falls, BPH without urinary obstruction, Cancer ([address] ), CAP (community acquired pneumonia), Cataracts, bilateral, Change in bowel function, Clubbing of fingers, Colon polyps, Common cold, Contusion of elbow, left, COPD (chronic obstructive pulmonary disease) ([address] ), Coronary artery disease, Cough, Dementia ([address] ), Depression, Dizziness, Exercise counseling, Fatigue, Full dentures, Glaucoma screening, High cholesterol, History of abdominal aortic aneurysm (AAA) repair (08/2017), History of bacteremia, History of chronic obstructive lung disease, History of epistaxis, History of influenza vaccination, History of kidney stones, History of pneumonia, History of sepsis (10/2017), History of sinusitis, History of skin cancer, History of sleep apnea, History of transfusion, History of urinary tract infection, [place] (hard of hearing), Hydronephrosis with obstructing calculus, Influenza vaccine needed, Jock itch, Kidney stones, Left hip pain, Need for pneumococcal vaccination, Need for prophylactic vaccination and inoculation against influenza, Other emphysema ([facility] ), Pancreatitis, chronic ([facility] ), Pneumonia (10/26/2017), Pulmonary emphysema ([facility] ), Screening for genitourinary condition, Screening for neurological condition, Sepsis ([facility] ), Short-term memory loss, Sleep apnea, Special screening examination for neoplasm of prostate, TIA involving carotid artery, Ulcer, Unsteady gait, Use of cane as ambulatory aid, Vitamin D deficiency, and Wears glasses  ,  does not have any pertinent problems on file  ,   has a past surgical history that includes Cardiac surgery; Ureteral stent placement; Excisional hemorrhoidectomy; Mouth surgery; pr esophagogastroduodenoscopy transoral diagnostic (N/A, 8/18/2016); Cataract extraction (Bilateral); Lithotripsy; Hernia repair; pr colonoscopy flx dx w/collj spec when pfrmd (N/A, 5/16/2017); Abdominal aortic aneurysm repair (08/23/2017); CAROTID ENDARTARECTOMY (Left, 11/1996); Coronary artery bypass graft (01/2003); Eye surgery (Bilateral); Cystoscopy; pr cystourethroscopy (N/A, 11/30/2017); pr remove bladder stone,<2 5 cm (N/A, 11/30/2017); Cystoscopy; AAA repair, endovascular; Tonsillectomy; Back surgery; and Skin biopsy  ,  family history includes Diabetes type II in his maternal grandmother and mother; Hypertension in his paternal grandmother; Kidney failure in his father  ,   reports that he quit smoking about 6 years ago  He has a 90 00 pack-year smoking history  He has never used smokeless tobacco  He reports that he drank alcohol  He reports that he does not use drugs  ,  is allergic to augmentin [amoxicillin-pot clavulanate]; ciprofloxacin; morphine and related; levofloxacin; and wellbutrin [bupropion]     Current Outpatient Medications   Medication Sig Dispense Refill    albuterol (PROVENTIL HFA,VENTOLIN HFA) 90 mcg/act inhaler Inhale 2 puffs every 6 (six) hours as needed for wheezing      aspirin 81 MG tablet Take 81 mg by mouth daily Took within 24 hours       atenolol (TENORMIN) 25 mg tablet TAKE 1 TABLET AT BEDTIME 90 tablet 3    atorvastatin (LIPITOR) 20 mg tablet TAKE 1 TABLET AT BEDTIME 90 tablet 3    Bacillus Coagulans-Inulin (PROBIOTIC FORMULA) 1-250 BILLION-MG CAPS Take 1 capsule by mouth daily Record states dose is currently 4 billion      Cholecalciferol (VITAMIN D-3 PO) Take 2,000 Units by mouth daily   clopidogrel (PLAVIX) 75 mg tablet Take 1 tablet (75 mg total) by mouth daily 90 tablet 3    cyanocobalamin (VITAMIN B-12) 1,000 mcg tablet Take 1,000 mcg by mouth 3 (three) times a week MWF      donepezil (ARICEPT) 10 mg tablet TAKE 1 TABLET DAILY AT     BEDTIME 90 tablet 1    escitalopram (LEXAPRO) 20 mg tablet TAKE 1 TABLET DAILY 90 tablet 3    Fluticasone-Salmeterol (AIRDUO RESPICLICK 37/25) 45-25 MCG/ACT AEPB Inhale 1 puff 2 (two) times a day AM & PM      furosemide (LASIX) 20 mg tablet Take 1 tablet (20 mg total) by mouth daily 90 tablet 3    KRILL OIL PO Take 500 mg by mouth daily        memantine (NAMENDA) 5 mg tablet Take 5 mg by mouth 2 (two) times a day In the evening       Multiple Vitamins-Minerals (CENTRUM SILVER ADULT 50+) TABS Take 1 tablet by mouth daily      nitroglycerin (NITROSTAT) 0 4 mg SL tablet Place 1 tablet (0 4 mg total) under the tongue every 5 (five) minutes as needed for chest pain 30 tablet 0    pantoprazole (PROTONIX) 40 mg tablet take 1 tablet by mouth once daily 90 tablet 1    Potassium Citrate ER 15 MEQ (1620 MG) TBCR TAKE 1 TABLET TWICE A  tablet 3    tamsulosin (FLOMAX) 0 4 mg Take 1 capsule (0 4 mg total) by mouth daily for 90 days 90 capsule 3    Melatonin 10 MG TABS Take 2 tablets by mouth daily at bedtime Taking 15mg      tiotropium (Dennise Hudorotaer) 18 mcg inhalation capsule Place 18 mcg into inhaler and inhale daily  No current facility-administered medications for this visit  Review of Systems   Constitutional: Positive for activity change and fatigue  Negative for appetite change, diaphoresis and fever  HENT: Negative  Negative for dental problem  Eyes: Negative  Respiratory: Positive for shortness of breath  Negative for apnea, cough, chest tightness and wheezing  Cardiovascular: Negative for chest pain, palpitations and leg swelling  Gastrointestinal: Positive for abdominal distention  Negative for abdominal pain, anal bleeding, constipation, diarrhea, nausea and vomiting  Endocrine: Negative for cold intolerance, heat intolerance, polydipsia, polyphagia and polyuria  Genitourinary: Negative for difficulty urinating, dysuria, flank pain, hematuria and urgency  Musculoskeletal: Positive for back pain and gait problem  Negative for arthralgias, joint swelling and myalgias  Skin: Negative for color change, rash and wound  Allergic/Immunologic: Negative for environmental allergies, food allergies and immunocompromised state  Neurological: Positive for headaches  Negative for dizziness, seizures, syncope, speech difficulty and numbness  Hematological: Negative for adenopathy  Does not bruise/bleed easily  Psychiatric/Behavioral: Negative for agitation, behavioral problems, hallucinations, sleep disturbance and suicidal ideas  Objective:  Vitals:    07/22/20 1340   BP: 102/64   BP Location: Left arm   Patient Position: Sitting   Cuff Size: Standard   Temp: 97 6 °F (36 4 °C)   TempSrc: Temporal   Weight: 78 9 kg (174 lb)   Height: 5' 9" (1 753 m)     Body mass index is 25 7 kg/m²  Physical Exam   Constitutional: He is oriented to person, place, and time  He appears well-developed and well-nourished  No distress  HENT:   Head: Normocephalic     Right Ear: External ear normal    Left Ear: External ear normal  Nose: Nose normal    Mouth/Throat: Oropharynx is clear and moist    Eyes: Pupils are equal, round, and reactive to light  Conjunctivae and EOM are normal  Right eye exhibits no discharge  Left eye exhibits no discharge  No scleral icterus  Neck: Normal range of motion  No tracheal deviation present  No thyromegaly present  Cardiovascular: Normal rate, regular rhythm and normal heart sounds  Exam reveals no gallop and no friction rub  No murmur heard  Pulmonary/Chest: Effort normal and breath sounds normal  No respiratory distress  He has no wheezes  Abdominal: Soft  Bowel sounds are normal  He exhibits no mass  There is no tenderness  There is no guarding  Musculoskeletal: He exhibits no edema or deformity  Lymphadenopathy:     He has no cervical adenopathy  Neurological: He is alert and oriented to person, place, and time  No cranial nerve deficit  Skin: Skin is warm and dry  No rash noted  He is not diaphoretic  No erythema  Psychiatric: He has a normal mood and affect   Thought content normal

## 2020-07-24 ENCOUNTER — HOSPITAL ENCOUNTER (OUTPATIENT)
Dept: ULTRASOUND IMAGING | Facility: HOSPITAL | Age: 80
Discharge: HOME | End: 2020-07-24
Attending: FAMILY MEDICINE
Payer: MEDICARE

## 2020-07-24 DIAGNOSIS — R14.0 ABDOMINAL DISTENSION: ICD-10-CM

## 2020-07-24 PROCEDURE — 76700 US EXAM ABDOM COMPLETE: CPT

## 2020-07-26 DIAGNOSIS — Z95.5 PRESENCE OF DRUG-ELUTING STENT IN LEFT CIRCUMFLEX CORONARY ARTERY: ICD-10-CM

## 2020-07-26 DIAGNOSIS — I25.10 CORONARY ARTERY DISEASE INVOLVING NATIVE CORONARY ARTERY OF NATIVE HEART WITHOUT ANGINA PECTORIS: ICD-10-CM

## 2020-07-26 DIAGNOSIS — R33.9 URINARY RETENTION: ICD-10-CM

## 2020-07-26 DIAGNOSIS — Z95.1 S/P CABG (CORONARY ARTERY BYPASS GRAFT): ICD-10-CM

## 2020-07-27 RX ORDER — TAMSULOSIN HYDROCHLORIDE 0.4 MG/1
CAPSULE ORAL
Qty: 90 CAPSULE | Refills: 3 | Status: SHIPPED | OUTPATIENT
Start: 2020-07-27 | End: 2020-10-29 | Stop reason: HOSPADM

## 2020-07-27 RX ORDER — NITROGLYCERIN 0.4 MG/1
TABLET SUBLINGUAL
Qty: 25 TABLET | Refills: 0 | Status: SHIPPED | OUTPATIENT
Start: 2020-07-27 | End: 2020-08-29 | Stop reason: HOSPADM

## 2020-07-28 NOTE — RESULT ENCOUNTER NOTE
Call patient to notify normal results patient is got a small kidney stone which is not obstructing anything is just sitting in the kidney so that is not a cause for his abdominal pain

## 2020-07-29 ENCOUNTER — TRANSCRIBE ORDERS (OUTPATIENT)
Dept: ADMINISTRATIVE | Facility: HOSPITAL | Age: 80
End: 2020-07-29

## 2020-07-29 DIAGNOSIS — C34.31 MALIGNANT NEOPLASM OF LOWER LOBE OF RIGHT LUNG (HCC): Primary | ICD-10-CM

## 2020-08-03 ENCOUNTER — HOSPITAL ENCOUNTER (OUTPATIENT)
Dept: RADIOLOGY | Facility: HOSPITAL | Age: 80
Discharge: HOME | End: 2020-08-03
Attending: FAMILY MEDICINE
Payer: MEDICARE

## 2020-08-03 ENCOUNTER — OFFICE VISIT (OUTPATIENT)
Dept: FAMILY MEDICINE CLINIC | Facility: CLINIC | Age: 80
End: 2020-08-03
Payer: MEDICARE

## 2020-08-03 ENCOUNTER — LAB (OUTPATIENT)
Dept: LAB | Facility: HOSPITAL | Age: 80
End: 2020-08-03
Attending: FAMILY MEDICINE
Payer: MEDICARE

## 2020-08-03 ENCOUNTER — HOSPITAL ENCOUNTER (OUTPATIENT)
Dept: CT IMAGING | Facility: HOSPITAL | Age: 80
Discharge: HOME | End: 2020-08-03
Attending: FAMILY MEDICINE
Payer: MEDICARE

## 2020-08-03 VITALS
HEIGHT: 69 IN | SYSTOLIC BLOOD PRESSURE: 90 MMHG | WEIGHT: 174 LBS | DIASTOLIC BLOOD PRESSURE: 56 MMHG | BODY MASS INDEX: 25.77 KG/M2 | TEMPERATURE: 96.5 F

## 2020-08-03 DIAGNOSIS — R07.81 PLEURITIC CHEST PAIN: ICD-10-CM

## 2020-08-03 DIAGNOSIS — R07.81 PLEURITIC CHEST PAIN: Primary | ICD-10-CM

## 2020-08-03 LAB
ANION GAP SERPL CALCULATED.3IONS-SCNC: 12 MMOL/L (ref 4–13)
BACTERIA UR QL AUTO: ABNORMAL /HPF
BASOPHILS # BLD AUTO: 0.05 THOUSANDS/ΜL (ref 0–0.1)
BASOPHILS NFR BLD AUTO: 1 % (ref 0–1)
BILIRUB UR QL STRIP: NEGATIVE
BUN SERPL-MCNC: 14 MG/DL (ref 5–25)
CALCIUM SERPL-MCNC: 9.3 MG/DL (ref 8.3–10.1)
CHLORIDE SERPL-SCNC: 103 MMOL/L (ref 100–108)
CLARITY UR: CLEAR
CO2 SERPL-SCNC: 24 MMOL/L (ref 21–32)
COLOR UR: YELLOW
CREAT SERPL-MCNC: 1.34 MG/DL (ref 0.6–1.3)
EOSINOPHIL # BLD AUTO: 0.18 THOUSAND/ΜL (ref 0–0.61)
EOSINOPHIL NFR BLD AUTO: 3 % (ref 0–6)
ERYTHROCYTE [DISTWIDTH] IN BLOOD BY AUTOMATED COUNT: 13.7 % (ref 11.6–15.1)
GFR SERPL CREATININE-BSD FRML MDRD: 51 ML/MIN/1.73SQ M
GLUCOSE SERPL-MCNC: 103 MG/DL (ref 65–140)
GLUCOSE UR STRIP-MCNC: NEGATIVE MG/DL
HCT VFR BLD AUTO: 51.9 % (ref 36.5–49.3)
HGB BLD-MCNC: 16.9 G/DL (ref 12–17)
HGB UR QL STRIP.AUTO: NEGATIVE
IMM GRANULOCYTES # BLD AUTO: 0.02 THOUSAND/UL (ref 0–0.2)
IMM GRANULOCYTES NFR BLD AUTO: 0 % (ref 0–2)
KETONES UR STRIP-MCNC: NEGATIVE MG/DL
LEUKOCYTE ESTERASE UR QL STRIP: NEGATIVE
LYMPHOCYTES # BLD AUTO: 1.49 THOUSANDS/ΜL (ref 0.6–4.47)
LYMPHOCYTES NFR BLD AUTO: 23 % (ref 14–44)
MCH RBC QN AUTO: 31.1 PG (ref 26.8–34.3)
MCHC RBC AUTO-ENTMCNC: 32.6 G/DL (ref 31.4–37.4)
MCV RBC AUTO: 96 FL (ref 82–98)
MONOCYTES # BLD AUTO: 0.66 THOUSAND/ΜL (ref 0.17–1.22)
MONOCYTES NFR BLD AUTO: 10 % (ref 4–12)
NEUTROPHILS # BLD AUTO: 3.97 THOUSANDS/ΜL (ref 1.85–7.62)
NEUTS SEG NFR BLD AUTO: 63 % (ref 43–75)
NITRITE UR QL STRIP: NEGATIVE
NON-SQ EPI CELLS URNS QL MICRO: ABNORMAL /HPF
NRBC BLD AUTO-RTO: 0 /100 WBCS
PH UR STRIP.AUTO: 6.5 [PH]
PLATELET # BLD AUTO: 125 THOUSANDS/UL (ref 149–390)
PMV BLD AUTO: 9.9 FL (ref 8.9–12.7)
POTASSIUM SERPL-SCNC: 4.4 MMOL/L (ref 3.5–5.3)
PROT UR STRIP-MCNC: NEGATIVE MG/DL
RBC # BLD AUTO: 5.43 MILLION/UL (ref 3.88–5.62)
RBC #/AREA URNS AUTO: ABNORMAL /HPF
SODIUM SERPL-SCNC: 139 MMOL/L (ref 136–145)
SP GR UR STRIP.AUTO: 1.01 (ref 1–1.03)
UROBILINOGEN UR QL STRIP.AUTO: 0.2 E.U./DL
WBC # BLD AUTO: 6.37 THOUSAND/UL (ref 4.31–10.16)
WBC #/AREA URNS AUTO: ABNORMAL /HPF

## 2020-08-03 PROCEDURE — G1004 CDSM NDSC: HCPCS

## 2020-08-03 PROCEDURE — 3008F BODY MASS INDEX DOCD: CPT | Performed by: FAMILY MEDICINE

## 2020-08-03 PROCEDURE — 3074F SYST BP LT 130 MM HG: CPT | Performed by: FAMILY MEDICINE

## 2020-08-03 PROCEDURE — 80048 BASIC METABOLIC PNL TOTAL CA: CPT

## 2020-08-03 PROCEDURE — 71275 CT ANGIOGRAPHY CHEST: CPT

## 2020-08-03 PROCEDURE — 4040F PNEUMOC VAC/ADMIN/RCVD: CPT | Performed by: FAMILY MEDICINE

## 2020-08-03 PROCEDURE — 3078F DIAST BP <80 MM HG: CPT | Performed by: FAMILY MEDICINE

## 2020-08-03 PROCEDURE — 36415 COLL VENOUS BLD VENIPUNCTURE: CPT

## 2020-08-03 PROCEDURE — 72072 X-RAY EXAM THORAC SPINE 3VWS: CPT

## 2020-08-03 PROCEDURE — 81001 URINALYSIS AUTO W/SCOPE: CPT | Performed by: FAMILY MEDICINE

## 2020-08-03 PROCEDURE — 1036F TOBACCO NON-USER: CPT | Performed by: FAMILY MEDICINE

## 2020-08-03 PROCEDURE — 99213 OFFICE O/P EST LOW 20 MIN: CPT | Performed by: FAMILY MEDICINE

## 2020-08-03 PROCEDURE — 1160F RVW MEDS BY RX/DR IN RCRD: CPT | Performed by: FAMILY MEDICINE

## 2020-08-03 PROCEDURE — 71100 X-RAY EXAM RIBS UNI 2 VIEWS: CPT

## 2020-08-03 PROCEDURE — 85025 COMPLETE CBC W/AUTO DIFF WBC: CPT

## 2020-08-03 RX ADMIN — IOHEXOL 85 ML: 350 INJECTION, SOLUTION INTRAVENOUS at 16:13

## 2020-08-03 NOTE — PROGRESS NOTES
Assessment/Plan:    Problem List Items Addressed This Visit     None      Visit Diagnoses     Pleuritic chest pain    -  Primary    Relevant Orders    XR spine thoracic 3 vw    XR ribs 2 vw right    CTA chest pe study    CBC and differential    Basic metabolic panel    Urinalysis with microscopic           Diagnoses and all orders for this visit:    Pleuritic chest pain  -     XR spine thoracic 3 vw; Future  -     XR ribs 2 vw right; Future  -     CTA chest pe study; Future  -     CBC and differential; Future  -     Basic metabolic panel; Future  -     Urinalysis with microscopic        No problem-specific Assessment & Plan notes found for this encounter  John Cortez was seen today for back pain  Diagnoses and all orders for this visit:    Pleuritic chest pain  -     XR spine thoracic 3 vw; Future  -     XR ribs 2 vw right; Future  -     CTA chest pe study; Future  -     CBC and differential; Future  -     Basic metabolic panel; Future  -     Urinalysis with microscopic        Subjective:      Patient ID: Artemio Her is a 70 y o  male      Mr Lilli Hernandez experiencing right-sided pleuritic chest pain just lateral to the T8 in T9 vertebral segments on it has a pleuritic component to it he has a hard time taking a deep breath he denies any hemoptysis said no fever chills he denies any hematuria but he does have a diagnosis of bilateral renal calculi he denies constipation or hematochezia or melena      The following portions of the patient's history were reviewed and updated as appropriate:   He has a past medical history of AAA (abdominal aortic aneurysm) ([address] ), Acid reflux, Acute on chronic diastolic congestive heart failure ([address] ) (2/25/2020), Acute serous otitis media of left ear, Anesthesia, Anxiety, Aortic aneurysm without rupture ([address] ), Arm bruise, At risk for falls, BPH without urinary obstruction, Cancer ([address] ), CAP (community acquired pneumonia), Cataracts, bilateral, Change in bowel function, Clubbing of fingers, Colon polyps, Common cold, Contusion of elbow, left, COPD (chronic obstructive pulmonary disease) ([facility] ), Coronary artery disease, Cough, Dementia ([facility] ), Depression, Dizziness, Exercise counseling, Fatigue, Full dentures, Glaucoma screening, High cholesterol, History of abdominal aortic aneurysm (AAA) repair (08/2017), History of bacteremia, History of chronic obstructive lung disease, History of epistaxis, History of influenza vaccination, History of kidney stones, History of pneumonia, History of sepsis (10/2017), History of sinusitis, History of skin cancer, History of sleep apnea, History of transfusion, History of urinary tract infection, [place] (hard of hearing), Hydronephrosis with obstructing calculus, Influenza vaccine needed, Jock itch, Kidney stones, Left hip pain, Need for pneumococcal vaccination, Need for prophylactic vaccination and inoculation against influenza, Other emphysema ([email or web address] ), Pancreatitis, chronic ([email or web address] ), Pneumonia (10/26/2017), Pulmonary emphysema ([facility] ), Screening for genitourinary condition, Screening for neurological condition, Sepsis ([email or web address] ), Short-term memory loss, Sleep apnea, Special screening examination for neoplasm of prostate, TIA involving carotid artery, Ulcer, Unsteady gait, Use of cane as ambulatory aid, Vitamin D deficiency, and Wears glasses  ,  does not have any pertinent problems on file  ,   has a past surgical history that includes Cardiac surgery; Ureteral stent placement; Excisional hemorrhoidectomy; Mouth surgery; pr esophagogastroduodenoscopy transoral diagnostic (N/A, 8/18/2016); Cataract extraction (Bilateral); Lithotripsy; Hernia repair; pr colonoscopy flx dx w/collj spec when pfrmd (N/A, 5/16/2017); Abdominal aortic aneurysm repair (08/23/2017); CAROTID ENDARTARECTOMY (Left, 11/1996); Coronary artery bypass graft (01/2003); Eye surgery (Bilateral);  Cystoscopy; pr cystourethroscopy (N/A, 11/30/2017); pr remove bladder stone,<2 5 cm (N/A, 11/30/2017); Cystoscopy; AAA repair, endovascular; Tonsillectomy; Back surgery; and Skin biopsy  ,  family history includes Diabetes type II in his maternal grandmother and mother; Hypertension in his paternal grandmother; Kidney failure in his father  ,   reports that he quit smoking about 6 years ago  He has a 90 00 pack-year smoking history  He has never used smokeless tobacco  He reports previous alcohol use  He reports that he does not use drugs  ,  is allergic to augmentin [amoxicillin-pot clavulanate]; ciprofloxacin; morphine and related; levofloxacin; and wellbutrin [bupropion]     Current Outpatient Medications   Medication Sig Dispense Refill    albuterol (PROVENTIL HFA,VENTOLIN HFA) 90 mcg/act inhaler Inhale 2 puffs every 6 (six) hours as needed for wheezing      aspirin 81 MG tablet Take 81 mg by mouth daily Took within 24 hours       atenolol (TENORMIN) 25 mg tablet TAKE 1 TABLET AT BEDTIME 90 tablet 3    atorvastatin (LIPITOR) 20 mg tablet TAKE 1 TABLET AT BEDTIME 90 tablet 3    Bacillus Coagulans-Inulin (PROBIOTIC FORMULA) 1-250 BILLION-MG CAPS Take 1 capsule by mouth daily Record states dose is currently 4 billion      Cholecalciferol (VITAMIN D-3 PO) Take 2,000 Units by mouth daily   clopidogrel (PLAVIX) 75 mg tablet Take 1 tablet (75 mg total) by mouth daily 90 tablet 3    cyanocobalamin (VITAMIN B-12) 1,000 mcg tablet Take 1,000 mcg by mouth 3 (three) times a week MWF      donepezil (ARICEPT) 10 mg tablet TAKE 1 TABLET DAILY AT     BEDTIME 90 tablet 1    escitalopram (LEXAPRO) 20 mg tablet TAKE 1 TABLET DAILY 90 tablet 3    Fluticasone-Salmeterol (AIRDUO RESPICLICK 64/35) 34-20 MCG/ACT AEPB Inhale 1 puff 2 (two) times a day AM & PM      furosemide (LASIX) 20 mg tablet Take 1 tablet (20 mg total) by mouth daily 90 tablet 3    KRILL OIL PO Take 500 mg by mouth daily        Melatonin 10 MG TABS Take 2 tablets by mouth daily at bedtime Taking 15mg      memantine (NAMENDA) 5 mg tablet Take 5 mg by mouth 2 (two) times a day In the evening       Multiple Vitamins-Minerals (CENTRUM SILVER ADULT 50+) TABS Take 1 tablet by mouth daily      nitroglycerin (NITROSTAT) 0 4 mg SL tablet PLACE 1 TABLET UNDER THE   TONGUE AND ALLOW TO        DISSOLVE EVERY 5 MINUTES ASNEEDED FOR CHEST PAIN 25 tablet 0    pantoprazole (PROTONIX) 40 mg tablet take 1 tablet by mouth once daily 90 tablet 1    Potassium Citrate ER 15 MEQ (1620 MG) TBCR TAKE 1 TABLET TWICE A  tablet 3    tamsulosin (FLOMAX) 0 4 mg TAKE 1 CAPSULE DAILY 90 capsule 3    tiotropium (SPIRIVA HANDIHALER) 18 mcg inhalation capsule Place 18 mcg into inhaler and inhale daily  No current facility-administered medications for this visit  Review of Systems   Constitutional: Negative for activity change, appetite change, diaphoresis, fatigue and fever  HENT: Negative  Eyes: Negative  Respiratory: Positive for shortness of breath  Negative for apnea, cough, chest tightness and wheezing  Cardiovascular: Positive for chest pain  Negative for palpitations and leg swelling  Gastrointestinal: Negative for abdominal distention, abdominal pain, anal bleeding, constipation, diarrhea, nausea and vomiting  Endocrine: Negative for cold intolerance, heat intolerance, polydipsia, polyphagia and polyuria  Genitourinary: Negative for difficulty urinating, dysuria, flank pain, hematuria and urgency  Musculoskeletal: Positive for back pain  Negative for arthralgias, gait problem, joint swelling and myalgias  Skin: Negative for color change, rash and wound  Allergic/Immunologic: Negative for environmental allergies, food allergies and immunocompromised state  Neurological: Negative for dizziness, seizures, syncope, speech difficulty, numbness and headaches  Hematological: Negative for adenopathy  Does not bruise/bleed easily     Psychiatric/Behavioral: Negative for agitation, behavioral problems, hallucinations, sleep disturbance and suicidal ideas  Objective:  Vitals:    08/03/20 1433   BP: 90/56   BP Location: Left arm   Patient Position: Sitting   Cuff Size: Standard   Temp: (!) 96 5 °F (35 8 °C)   TempSrc: Temporal   Weight: 78 9 kg (174 lb)   Height: 5' 9"     Body mass index is 25 7 kg/m²  Physical Exam   Constitutional: He is oriented to person, place, and time  He appears well-developed  No distress  HENT:   Head: Normocephalic  Right Ear: External ear normal    Left Ear: External ear normal    Nose: Nose normal    Eyes: Pupils are equal, round, and reactive to light  Conjunctivae are normal  Right eye exhibits no discharge  Left eye exhibits no discharge  No scleral icterus  Neck: Normal range of motion  No tracheal deviation present  No thyromegaly present  Cardiovascular: Normal rate, regular rhythm and normal heart sounds  Exam reveals no gallop and no friction rub  No murmur heard  Pulmonary/Chest: No respiratory distress  He has wheezes  He exhibits tenderness  Abdominal: Soft  Bowel sounds are normal  He exhibits no mass  There is no abdominal tenderness  There is no guarding  Musculoskeletal:         General: No deformity  Lymphadenopathy:     He has no cervical adenopathy  Neurological: He is alert and oriented to person, place, and time  No cranial nerve deficit  Skin: Skin is warm and dry  No rash noted  He is not diaphoretic  No erythema     Psychiatric: Thought content normal

## 2020-08-03 NOTE — RESULT ENCOUNTER NOTE
Please call the patient regarding his abnormal result    Blood count is normal with the exception of his platelet count it is about the same as it always has no lower no higher urinalysis does not show any infection and he had 1 red blood cell and 1 white blood cell which to me is a normal exam the pulmonary angiogram CT scan was normal there was no sign of any blood clot in the lungs and the previously mentioned nodule in the right lower lobe of the lung is gone so that is a very good sign x-rays of the ribs did not reveal any fracture the x-ray of the spine did not reveal any fracture but his bone density is very poor so my thought is that he either has a early compression fracture of the vertebrae which just is in showing on the x-rays or he pulled and intercostal muscle which is the muscle that is in between the ribs I would recommend ice Tylenol also a salon pause lidocaine patch put directly over the area that is painful and changes every 12 hours and other than that I have no  other recommendations for treatment

## 2020-08-04 ENCOUNTER — TELEPHONE (OUTPATIENT)
Dept: FAMILY MEDICINE CLINIC | Facility: CLINIC | Age: 80
End: 2020-08-04

## 2020-08-04 ENCOUNTER — HOSPITAL ENCOUNTER (EMERGENCY)
Facility: HOSPITAL | Age: 80
Discharge: HOME | End: 2020-08-05
Attending: EMERGENCY MEDICINE | Admitting: EMERGENCY MEDICINE
Payer: MEDICARE

## 2020-08-04 ENCOUNTER — APPOINTMENT (EMERGENCY)
Dept: CT IMAGING | Facility: HOSPITAL | Age: 80
End: 2020-08-04
Payer: MEDICARE

## 2020-08-04 DIAGNOSIS — M54.9 BACK PAIN: Primary | ICD-10-CM

## 2020-08-04 LAB
ALBUMIN SERPL BCP-MCNC: 3.6 G/DL (ref 3.5–5)
ALP SERPL-CCNC: 114 U/L (ref 46–116)
ALT SERPL W P-5'-P-CCNC: 24 U/L (ref 12–78)
ANION GAP SERPL CALCULATED.3IONS-SCNC: 10 MMOL/L (ref 4–13)
AST SERPL W P-5'-P-CCNC: 21 U/L (ref 5–45)
BASOPHILS # BLD AUTO: 0.04 THOUSANDS/ΜL (ref 0–0.1)
BASOPHILS NFR BLD AUTO: 1 % (ref 0–1)
BILIRUB SERPL-MCNC: 1.1 MG/DL (ref 0.2–1)
BUN SERPL-MCNC: 15 MG/DL (ref 5–25)
CALCIUM SERPL-MCNC: 8.6 MG/DL (ref 8.3–10.1)
CHLORIDE SERPL-SCNC: 103 MMOL/L (ref 100–108)
CO2 SERPL-SCNC: 28 MMOL/L (ref 21–32)
CREAT SERPL-MCNC: 1.41 MG/DL (ref 0.6–1.3)
EOSINOPHIL # BLD AUTO: 0.22 THOUSAND/ΜL (ref 0–0.61)
EOSINOPHIL NFR BLD AUTO: 4 % (ref 0–6)
ERYTHROCYTE [DISTWIDTH] IN BLOOD BY AUTOMATED COUNT: 13.8 % (ref 11.6–15.1)
GFR SERPL CREATININE-BSD FRML MDRD: 48 ML/MIN/1.73SQ M
GLUCOSE SERPL-MCNC: 162 MG/DL (ref 65–140)
HCT VFR BLD AUTO: 50.5 % (ref 36.5–49.3)
HGB BLD-MCNC: 16 G/DL (ref 12–17)
IMM GRANULOCYTES # BLD AUTO: 0.01 THOUSAND/UL (ref 0–0.2)
IMM GRANULOCYTES NFR BLD AUTO: 0 % (ref 0–2)
LIPASE SERPL-CCNC: 112 U/L (ref 73–393)
LYMPHOCYTES # BLD AUTO: 1.55 THOUSANDS/ΜL (ref 0.6–4.47)
LYMPHOCYTES NFR BLD AUTO: 30 % (ref 14–44)
MAGNESIUM SERPL-MCNC: 2.1 MG/DL (ref 1.6–2.6)
MCH RBC QN AUTO: 30.5 PG (ref 26.8–34.3)
MCHC RBC AUTO-ENTMCNC: 31.7 G/DL (ref 31.4–37.4)
MCV RBC AUTO: 96 FL (ref 82–98)
MONOCYTES # BLD AUTO: 0.55 THOUSAND/ΜL (ref 0.17–1.22)
MONOCYTES NFR BLD AUTO: 11 % (ref 4–12)
NEUTROPHILS # BLD AUTO: 2.8 THOUSANDS/ΜL (ref 1.85–7.62)
NEUTS SEG NFR BLD AUTO: 54 % (ref 43–75)
NRBC BLD AUTO-RTO: 0 /100 WBCS
PLATELET # BLD AUTO: 128 THOUSANDS/UL (ref 149–390)
PMV BLD AUTO: 10.1 FL (ref 8.9–12.7)
POTASSIUM SERPL-SCNC: 3.9 MMOL/L (ref 3.5–5.3)
PROT SERPL-MCNC: 7.2 G/DL (ref 6.4–8.2)
RBC # BLD AUTO: 5.25 MILLION/UL (ref 3.88–5.62)
SODIUM SERPL-SCNC: 141 MMOL/L (ref 136–145)
TROPONIN I SERPL-MCNC: <0.02 NG/ML
WBC # BLD AUTO: 5.17 THOUSAND/UL (ref 4.31–10.16)

## 2020-08-04 PROCEDURE — 83690 ASSAY OF LIPASE: CPT | Performed by: EMERGENCY MEDICINE

## 2020-08-04 PROCEDURE — G1004 CDSM NDSC: HCPCS

## 2020-08-04 PROCEDURE — 96374 THER/PROPH/DIAG INJ IV PUSH: CPT

## 2020-08-04 PROCEDURE — 84484 ASSAY OF TROPONIN QUANT: CPT | Performed by: EMERGENCY MEDICINE

## 2020-08-04 PROCEDURE — 74174 CTA ABD&PLVS W/CONTRAST: CPT

## 2020-08-04 PROCEDURE — 80053 COMPREHEN METABOLIC PANEL: CPT | Performed by: EMERGENCY MEDICINE

## 2020-08-04 PROCEDURE — 83735 ASSAY OF MAGNESIUM: CPT | Performed by: EMERGENCY MEDICINE

## 2020-08-04 PROCEDURE — 36415 COLL VENOUS BLD VENIPUNCTURE: CPT | Performed by: EMERGENCY MEDICINE

## 2020-08-04 PROCEDURE — 85025 COMPLETE CBC W/AUTO DIFF WBC: CPT | Performed by: EMERGENCY MEDICINE

## 2020-08-04 PROCEDURE — 99284 EMERGENCY DEPT VISIT MOD MDM: CPT

## 2020-08-04 PROCEDURE — 96375 TX/PRO/DX INJ NEW DRUG ADDON: CPT

## 2020-08-04 PROCEDURE — 99285 EMERGENCY DEPT VISIT HI MDM: CPT | Performed by: EMERGENCY MEDICINE

## 2020-08-04 PROCEDURE — 71275 CT ANGIOGRAPHY CHEST: CPT

## 2020-08-04 PROCEDURE — 93005 ELECTROCARDIOGRAM TRACING: CPT

## 2020-08-04 RX ORDER — ONDANSETRON 2 MG/ML
4 INJECTION INTRAMUSCULAR; INTRAVENOUS ONCE
Status: COMPLETED | OUTPATIENT
Start: 2020-08-04 | End: 2020-08-04

## 2020-08-04 RX ORDER — FENTANYL CITRATE 50 UG/ML
25 INJECTION, SOLUTION INTRAMUSCULAR; INTRAVENOUS ONCE
Status: COMPLETED | OUTPATIENT
Start: 2020-08-04 | End: 2020-08-04

## 2020-08-04 RX ADMIN — IOHEXOL 100 ML: 350 INJECTION, SOLUTION INTRAVENOUS at 23:59

## 2020-08-04 RX ADMIN — FENTANYL CITRATE 25 MCG: 50 INJECTION, SOLUTION INTRAMUSCULAR; INTRAVENOUS at 22:50

## 2020-08-04 RX ADMIN — ONDANSETRON 4 MG: 2 INJECTION INTRAMUSCULAR; INTRAVENOUS at 22:50

## 2020-08-04 NOTE — TELEPHONE ENCOUNTER
Pt is scheduled for ECHO Friday (8/7/20)    Had - ECHO 06/10/20 - ordered by Dr Edgar Hsu you want the Pt to have the F/U

## 2020-08-05 VITALS
HEIGHT: 69 IN | SYSTOLIC BLOOD PRESSURE: 133 MMHG | BODY MASS INDEX: 25.73 KG/M2 | HEART RATE: 75 BPM | TEMPERATURE: 98 F | RESPIRATION RATE: 18 BRPM | DIASTOLIC BLOOD PRESSURE: 71 MMHG | WEIGHT: 173.72 LBS | OXYGEN SATURATION: 92 %

## 2020-08-05 RX ORDER — TRAMADOL HYDROCHLORIDE 50 MG/1
50 TABLET ORAL EVERY 12 HOURS PRN
Qty: 6 TABLET | Refills: 0 | Status: SHIPPED | OUTPATIENT
Start: 2020-08-05 | End: 2020-08-10 | Stop reason: ALTCHOICE

## 2020-08-05 RX ADMIN — SODIUM CHLORIDE 500 ML: 0.9 INJECTION, SOLUTION INTRAVENOUS at 01:20

## 2020-08-05 NOTE — DISCHARGE INSTRUCTIONS
Plenty of fliud  Tylenol 650mg every 6 hours as needed for pain, fever (max 3000mg in 24 hours)   Tramadol for break thru pain

## 2020-08-05 NOTE — ED PROVIDER NOTES
History  Chief Complaint   Patient presents with    Back Pain     patient reports two days of generalized back pain that is radiating to his abdomen  no pain medications taken prior to arrival       51-year-old male presents with back pain over the last several days it unless she started out as a pulled muscle  It had a was gradual and onset is been ongoing for 2-3 days he did follow up with his family doctor Dr Jessica Jacobo at that time he was complaining of some chest pain outpatient labs were obtained including an elevated D-dimer he underwent CTA PE study of his chest which demonstrated no evidence of pulmonary embolism or acute findings no infiltrate or consolidation  He has been using Tylenol and ice for the pain but this evening his pain suddenly worsened he is complaining of severe pain between the shoulder bili aids which radiates down his entire back  He denies any trauma or falls he has had no numbness or tingling no bowel or bladder incontinence his shortness of breath is at baseline he does have history of COPD and is maintained on 2L nc:  He denies any weakness he has had no fever or chills  He is currently denying any abdominal pain his abdomen does feel bloated his appetite been diminished slightly pain does not radiate to his legs  Prior to Admission Medications   Prescriptions Last Dose Informant Patient Reported? Taking? Bacillus Coagulans-Inulin (PROBIOTIC FORMULA) 1-250 BILLION-MG CAPS  Self Yes No   Sig: Take 1 capsule by mouth daily Record states dose is currently 4 billion   Cholecalciferol (VITAMIN D-3 PO)  Self Yes No   Sig: Take 2,000 Units by mouth daily  Fluticasone-Salmeterol (AIRDUO RESPICLICK 08/41) 91-87 MCG/ACT AEPB  Spouse/Significant Other Yes No   Sig: Inhale 1 puff 2 (two) times a day AM & PM   KRILL OIL PO  Self Yes No   Sig: Take 500 mg by mouth daily     Melatonin 10 MG TABS  Self Yes No   Sig: Take 2 tablets by mouth daily at bedtime Taking 15mg   Multiple Vitamins-Minerals (CENTRUM SILVER ADULT 50+) TABS 8/4/2020 at Unknown time Self Yes Yes   Sig: Take 1 tablet by mouth daily   Potassium Citrate ER 15 MEQ (1620 MG) TBCR 8/4/2020 at Unknown time  No Yes   Sig: TAKE 1 TABLET TWICE A DAY   albuterol (PROVENTIL HFA,VENTOLIN HFA) 90 mcg/act inhaler 8/4/2020 at Unknown time  Yes Yes   Sig: Inhale 2 puffs every 6 (six) hours as needed for wheezing   aspirin 81 MG tablet 8/4/2020 at Unknown time Self Yes Yes   Sig: Take 81 mg by mouth daily Took within 24 hours    atenolol (TENORMIN) 25 mg tablet 8/4/2020 at Unknown time  No Yes   Sig: TAKE 1 TABLET AT BEDTIME   atorvastatin (LIPITOR) 20 mg tablet 8/4/2020 at Unknown time  No Yes   Sig: TAKE 1 TABLET AT BEDTIME   clopidogrel (PLAVIX) 75 mg tablet 8/4/2020 at Unknown time  No Yes   Sig: Take 1 tablet (75 mg total) by mouth daily   cyanocobalamin (VITAMIN B-12) 1,000 mcg tablet 8/4/2020 at Unknown time Self Yes Yes   Sig: Take 1,000 mcg by mouth 3 (three) times a week MWF   donepezil (ARICEPT) 10 mg tablet 8/4/2020 at Unknown time  No Yes   Sig: TAKE 1 TABLET DAILY AT     BEDTIME   escitalopram (LEXAPRO) 20 mg tablet 8/4/2020 at Unknown time  No Yes   Sig: TAKE 1 TABLET DAILY   furosemide (LASIX) 20 mg tablet 8/4/2020 at Unknown time  No Yes   Sig: Take 1 tablet (20 mg total) by mouth daily   memantine (NAMENDA) 5 mg tablet  Spouse/Significant Other Yes No   Sig: Take 5 mg by mouth 2 (two) times a day In the evening    nitroglycerin (NITROSTAT) 0 4 mg SL tablet   No No   Sig: PLACE 1 TABLET UNDER THE   TONGUE AND ALLOW TO        DISSOLVE EVERY 5 MINUTES ASNEEDED FOR CHEST PAIN   pantoprazole (PROTONIX) 40 mg tablet 8/4/2020 at Unknown time  No Yes   Sig: take 1 tablet by mouth once daily   tamsulosin (FLOMAX) 0 4 mg 8/4/2020 at Unknown time  No Yes   Sig: TAKE 1 CAPSULE DAILY   tiotropium (SPIRIVA HANDIHALER) 18 mcg inhalation capsule  Self Yes No   Sig: Place 18 mcg into inhaler and inhale daily        Facility-Administered Medications: None       Past Medical History:   Diagnosis Date    AAA (abdominal aortic aneurysm) ([facility])     Acid reflux     Acute on chronic diastolic congestive heart failure ([facility]) 2/25/2020    Acute serous otitis media of left ear     recurrence not specified     Anesthesia     "always has mental changes /lingers and lingers after anesthesia"    Anxiety     Aortic aneurysm without rupture ([address] )     Arm bruise     right inner forearm "recent IV"    At risk for falls     BPH without urinary obstruction     Cancer ([address] )     skin CA on nose    CAP (community acquired pneumonia)     Cataracts, bilateral     Change in bowel function     Clubbing of fingers     Colon polyps     Common cold     Contusion of elbow, left     initial encounter     COPD (chronic obstructive pulmonary disease) ([facility])     Coronary artery disease     Cough     Dementia ([facility])     Depression     Dizziness     upon "standing quickly on occas"    Exercise counseling     Fatigue     Full dentures     "doesn't wear them"    Glaucoma screening     High cholesterol     History of abdominal aortic aneurysm (AAA) repair 08/2017    History of bacteremia     History of chronic obstructive lung disease     History of epistaxis     History of influenza vaccination     History of kidney stones     History of pneumonia     "many times" "at least 5 times" almost always goes to sepsis"    History of sepsis 10/2017    History of sinusitis     History of skin cancer     History of sleep apnea     History of transfusion     History of urinary tract infection     [place] (hard of hearing)     Hydronephrosis with obstructing calculus     Influenza vaccine needed     Jock itch     left/saw doctor 11/8 and started on antifungal cream    Kidney stones     Left hip pain     Need for pneumococcal vaccination     Need for prophylactic vaccination and inoculation against influenza     Other emphysema ([facility] )     Pancreatitis, [facility])     pt and wife can't confirm 11/10/17    Pneumonia 10/26/2017    admitted [facility]    Pulmonary emphysema ([facility] )     Screening for genitourinary condition     Screening for neurological condition     Sepsis ([facility] )     due to unspecified organism     Short-term memory loss     Sleep apnea     does not use CPAP   Special screening examination for neoplasm of prostate     TIA involving carotid artery     "before carotid surgery"    Ulcer     stomach, "years ago"    Unsteady gait     Use of cane as ambulatory aid     sometimes    Vitamin D deficiency     Wears glasses        Past Surgical History:   Procedure Laterality Date    ABDOMINAL AORTIC ANEURYSM REPAIR  08/23/2017    ABDOMINAL AORTIC ANEURYSM REPAIR, ENDOVASCULAR      BACK SURGERY      spinal stenosis    CARDIAC SURGERY      CABG x3    CAROTID ENDARTARECTOMY Left 11/1996    CATARACT EXTRACTION Bilateral     CORONARY ARTERY BYPASS GRAFT  01/2003    x3    CYSTOSCOPY      with insertion of ureteral stent     CYSTOSCOPY      with ureteroscopy with lithotripsy     EXCISIONAL HEMORRHOIDECTOMY      EYE SURGERY Bilateral     "for a wrinkle" after cataract surgery    HERNIA REPAIR      umbilical    LITHOTRIPSY      renal    MOUTH SURGERY      full mouth extraction     HI COLONOSCOPY FLX DX W/COLLJ SPEC WHEN PFRMD N/A 5/16/2017    Procedure: COLONOSCOPY with polypectomies/ hot snare and tattoo;  Surgeon: Rosemarie Luciano MD;  Location: [facility]; Service: Gastroenterology    HI CYSTOURETHROSCOPY N/A 11/30/2017    Procedure: Esa Crelacey;  Surgeon: Angélica Ramierz MD;  [facility];  Service: Urology    HI ESOPHAGOGASTRODUODENOSCOPY TRANSORAL DIAGNOSTIC N/A 8/18/2016    Procedure: ESOPHAGOGASTRODUODENOSCOPY (EGD); Surgeon: Rosemarie Luciano MD;  Location: [facility];   Service: Gastroenterology    HI REMOVE BLADDER STONE,<2 5 CM N/A 11/30/2017    Procedure: Versie Grade;  Surgeon: Angélica Ramirez MD; Location: [facility];  Service: Urology    SKIN BIOPSY      TONSILLECTOMY      URETERAL STENT PLACEMENT      and removal       Family History   Problem Relation Age of Onset    Diabetes type II Mother         mellitus    Kidney failure Father     Diabetes type II Maternal Grandmother         mellitus     Hypertension Paternal Grandmother         benign essential      I have reviewed and agree with the history as documented  E-Cigarette/Vaping    E-Cigarette Use Never User      E-Cigarette/Vaping Substances    Nicotine No     THC No     CBD No     Flavoring No     Other No     Unknown No      Social History     Tobacco Use    Smoking status: Former Smoker     Packs/day: 1 50     Years: 60 00     Pack years: 90 00     Last attempt to quit:      Years since quittin 5    Smokeless tobacco: Never Used    Tobacco comment:  used to be a 1-1 5 ppd smoker   Substance Use Topics    Alcohol use: Not Currently     Alcohol/week: 0 0 standard drinks     Frequency: Never     Drinks per session: 1 or 2     Binge frequency: Never     Comment: quit 25 yrs ago    Drug use: No     Comment: No illicit drug use        Review of Systems   Constitutional: Positive for activity change  Negative for appetite change, chills, diaphoresis and fever  HENT: Negative for congestion, ear pain, rhinorrhea, sneezing and sore throat  Eyes: Negative for discharge  Respiratory: Negative for cough and shortness of breath  Cardiovascular: Negative for chest pain and leg swelling  Gastrointestinal: Negative for abdominal pain, blood in stool, diarrhea, nausea and vomiting  Endocrine: Negative for polyuria  Genitourinary: Negative for dysuria, frequency and urgency  Musculoskeletal: Positive for back pain (upper and lower)  Negative for myalgias  Skin: Negative for rash  Neurological: Negative for dizziness, weakness, light-headedness, numbness and headaches  Hematological: Negative for adenopathy  Psychiatric/Behavioral: Negative for confusion  All other systems reviewed and are negative  Physical Exam  Physical Exam  Vitals signs and nursing note reviewed  Constitutional:       General: He is not in acute distress  Appearance: Normal appearance  He is well-developed  He is not ill-appearing, toxic-appearing or diaphoretic  HENT:      Head: Normocephalic and atraumatic  Right Ear: Tympanic membrane and external ear normal       Left Ear: Tympanic membrane and external ear normal       Nose: Nose normal       Mouth/Throat:      Mouth: Mucous membranes are moist    Eyes:      General: No scleral icterus  Right eye: No discharge  Left eye: No discharge  Extraocular Movements: Extraocular movements intact  Conjunctiva/sclera: Conjunctivae normal       Pupils: Pupils are equal, round, and reactive to light  Neck:      Musculoskeletal: Normal range of motion and neck supple  No neck rigidity or muscular tenderness  Cardiovascular:      Rate and Rhythm: Normal rate and regular rhythm  Pulses: Normal pulses  Heart sounds: Normal heart sounds  Pulmonary:      Effort: Pulmonary effort is normal  No respiratory distress  Comments: disant BS  Abdominal:      General: Bowel sounds are normal  There is no distension  Palpations: Abdomen is soft  There is no mass  Tenderness: There is no abdominal tenderness  There is no right CVA tenderness, left CVA tenderness, guarding or rebound  Comments: Back: mildline T or L spine tenderness;     Musculoskeletal: Normal range of motion  General: No tenderness or deformity  Right lower leg: No edema  Left lower leg: No edema  Comments: 2+ anterior tibial pulses symmetric without mottling feet are equally warm   Lymphadenopathy:      Cervical: No cervical adenopathy  Skin:     General: Skin is warm and dry  Capillary Refill: Capillary refill takes less than 2 seconds  Neurological:      General: No focal deficit present  Mental Status: He is alert and oriented to person, place, and time  Cranial Nerves: No cranial nerve deficit  Sensory: No sensory deficit  Motor: No weakness or abnormal muscle tone        Coordination: Coordination normal       Deep Tendon Reflexes: Reflexes normal       Comments: Gait steady   Psychiatric:         Mood and Affect: Mood normal          Vital Signs  ED Triage Vitals   Temperature Pulse Respirations Blood Pressure SpO2   08/04/20 2205 08/04/20 2205 08/04/20 2205 08/04/20 2205 08/04/20 2205   98 °F (36 7 °C) 67 16 122/81 95 %      Temp Source Heart Rate Source Patient Position - Orthostatic VS BP Location FiO2 (%)   08/04/20 2205 08/04/20 2205 08/04/20 2205 08/04/20 2205 --   Temporal Monitor Sitting Left arm       Pain Score       08/04/20 2250       6           Vitals:    08/04/20 2205 08/04/20 2230 08/05/20 0130 08/05/20 0200   BP: 122/81 135/78 133/71    Pulse: 67 76 (!) 51 75   Patient Position - Orthostatic VS: Sitting Sitting           Visual Acuity      ED Medications  Medications   ondansetron (ZOFRAN) injection 4 mg (4 mg Intravenous Given 8/4/20 2250)   fentanyl citrate (PF) 100 MCG/2ML 25 mcg (25 mcg Intravenous Given 8/4/20 2250)   iohexol (OMNIPAQUE) 350 MG/ML injection (SINGLE-DOSE) 100 mL (100 mL Intravenous Given 8/4/20 2359)   sodium chloride 0 9 % bolus 500 mL (0 mL Intravenous Stopped 8/5/20 0150)       Diagnostic Studies  Results Reviewed     Procedure Component Value Units Date/Time    Troponin I [[id number]]  (Normal) Collected:  08/04/20 2250    Lab Status:  Final result Specimen:  Blood from Arm, Right Updated:  08/04/20 2319     Troponin I <0 02 ng/mL     Comprehensive metabolic panel [[id number]]  (Abnormal) Collected:  08/04/20 2250    Lab Status:  Final result Specimen:  Blood from Arm, Right Updated:  08/04/20 2316     Sodium 141 mmol/L      Potassium 3 9 mmol/L      Chloride 103 mmol/L      CO2 28 mmol/L      ANION GAP 10 mmol/L      BUN 15 mg/dL      Creatinine 1 41 mg/dL      Glucose 162 mg/dL      Calcium 8 6 mg/dL      AST 21 U/L      ALT 24 U/L      Alkaline Phosphatase 114 U/L      Total Protein 7 2 g/dL      Albumin 3 6 g/dL      Total Bilirubin 1 10 mg/dL      eGFR 48 ml/min/1 73sq m     Narrative:       National Kidney Disease Foundation guidelines for Chronic Kidney Disease (CKD):     Stage 1 with normal or high GFR (GFR > 90 mL/min/1 73 square meters)    Stage 2 Mild CKD (GFR = 60-89 mL/min/1 73 square meters)    Stage 3A Moderate CKD (GFR = 45-59 mL/min/1 73 square meters)    Stage 3B Moderate CKD (GFR = 30-44 mL/min/1 73 square meters)    Stage 4 Severe CKD (GFR = 15-29 mL/min/1 73 square meters)    Stage 5 End Stage CKD (GFR <15 mL/min/1 73 square meters)  Note: GFR calculation is accurate only with a steady state creatinine    CBC and differential [[id number]]  (Abnormal) Collected:  08/04/20 2250    Lab Status:  Final result Specimen:  Blood from Arm, Right Updated:  08/04/20 2314     WBC 5 17 Thousand/uL      RBC 5 25 Million/uL      Hemoglobin 16 0 g/dL      Hematocrit 50 5 %      MCV 96 fL      MCH 30 5 pg      MCHC 31 7 g/dL      RDW 13 8 %      MPV 10 1 fL      Platelets 156 Thousands/uL      nRBC 0 /100 WBCs      Neutrophils Relative 54 %      Immat GRANS % 0 %      Lymphocytes Relative 30 %      Monocytes Relative 11 %      Eosinophils Relative 4 %      Basophils Relative 1 %      Neutrophils Absolute 2 80 Thousands/µL      Immature Grans Absolute 0 01 Thousand/uL      Lymphocytes Absolute 1 55 Thousands/µL      Monocytes Absolute 0 55 Thousand/µL      Eosinophils Absolute 0 22 Thousand/µL      Basophils Absolute 0 04 Thousands/µL     Lipase [[id number]]  (Normal) Collected:  08/04/20 2250    Lab Status:  Final result Specimen:  Blood from Arm, Right Updated:  08/04/20 2310     Lipase 112 u/L     Magnesium [[id number]]  (Normal) Collected:  08/04/20 2250    Lab Status:  Final result Specimen:  Blood from Arm, Right Updated:  08/04/20 2310     Magnesium 2 1 mg/dL                  CTA dissection protocol chest abdomen pelvis w wo contrast   Final Result by David Miller MD (08/05 7731)      1  Stable infrarenal abdominal aortic aneurysm measuring up to 5 9 cm status post aortobiiliac graft repair which is patent  No evidence of endoleak  2   No evidence of aortic dissection  3   Stable moderate narrowing of the left external iliac artery  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  4   Stable focal area of moderate narrowing at the origin of the superior mesenteric artery  5   Coronary artery calcifications status post CABG  6   Cholelithiasis without evidence of cholecystitis  7   Bilateral nonobstructing nephrolithiasis  No hydronephrosis  8   Small hiatal hernia        Workstation performed: [id number]                    Procedures  ECG 12 Lead Documentation Only    Date/Time: 8/4/2020 11:11 PM  Performed by: Sabina Mccord MD  Authorized by: Sabina Mccord MD     Indications / Diagnosis:  Back pain  ECG reviewed by me, the ED Provider: yes    Patient location:  ED  Previous ECG:     Previous ECG:  Compared to current    Comparison ECG info:  4/4/2020 1440    Similarity:  Changes noted  Rate:     ECG rate:  71    ECG rate assessment: normal    Rhythm:     Rhythm: sinus rhythm    QRS:     QRS axis:  Normal  Comments:      1st degree AVB; nonspecific st-t charge             ED Course  ED Course as of Aug 05 0540   Wed Aug 05, 2020   0102 Extensively reviewed CT findings with patient and wife is there is no evidence of any acute coronary syndrome exact etiology of the patient's discomfort is not determined there is no evidence of dissection infection bony lesion he has various chronic findings atherosclerotic disease evidence of cholelithiasis without cholecystitis his LFTs are unremarkable id no abdominal tenderness reviewed the possibility of biliary colic recommended;      0104 Will prescribe a short trial of tramadol any take it twice daily on an as-needed basis if he has return of pain recommend he follow-up with Dr Arminda Brewer family was comfortable with plan; patient is currently pain free after fentanyl  Review tramadol may be addictive further refills would have to come from Dr Mckee [facility] office      [address] [facility] review: alprazolam 0 25mg #2 11/2018      0119 Reivewed with patient and wife that he did not receive  1L bolus by the time of discharge reviewed with wife I preferred that he get some IV fluids to flush the contrast dye will administer 500 bolus           US AUDIT      Most Recent Value   Initial Alcohol Screen: US AUDIT-C    1  How often do you have a drink containing alcohol?  0 Filed at: 08/04/2020 2207   2  How many drinks containing alcohol do you have on a typical day you are drinking? 0 Filed at: 08/04/2020 2207   3a  Male UNDER 65: How often do you have five or more drinks on one occasion? 0 Filed at: 08/04/2020 2207   3b  FEMALE Any Age, or MALE 65+: How often do you have 4 or more drinks on one occassion? 0 Filed at: 08/04/2020 2207   Audit-C Score  0 Filed at: 08/04/2020 2207                  NEAL/DAST-10      Most Recent Value   How many times in the past year have you    Used an illegal drug or used a prescription medication for non-medical reasons? Never Filed at: 08/04/2020 2208                                MDM  Number of Diagnoses or Management Options  Diagnosis management comments: Mdm:  Patient with gradual onset of back pain which is now worsened to severe back pain between the shoulder rate radiating down the back  CTA results were reviewed from yesterday given patient's symptoms will proceed with CTA of the chest abdomen pelvis to evaluate his aorta to rule out dissection  Will check for acute coronary syndrome; patient has no reproducible tenderness to the abdomen          Disposition  Final diagnoses:   Back pain Time reflects when diagnosis was documented in both MDM as applicable and the Disposition within this note     Time User Action Codes Description Comment    8/5/2020  1:09 AM Shayla Winter Jony [M54 9] Back pain       ED Disposition     ED Disposition Condition Date/Time Comment    Discharge Stable Wed Aug 5, 2020  1:14 AM Cristofer Gregg discharge to home/self care              Follow-up Information     Follow up With Specialties Details Why Contact Zaina Ruff DO Family Medicine Call in 1 day follow up later in week [address]  [facility] [address]  [phone]            Discharge Medication List as of 8/5/2020  1:15 AM      START taking these medications    Details   traMADol (ULTRAM) 50 mg tablet Take 1 tablet (50 mg total) by mouth every 12 (twelve) hours as needed for moderate pain for up to 6 doses, Starting Wed 8/5/2020, Normal         CONTINUE these medications which have NOT CHANGED    Details   albuterol (PROVENTIL HFA,VENTOLIN HFA) 90 mcg/act inhaler Inhale 2 puffs every 6 (six) hours as needed for wheezing, Historical Med      aspirin 81 MG tablet Take 81 mg by mouth daily Took within 24 hours , Historical Med      atenolol (TENORMIN) 25 mg tablet TAKE 1 TABLET AT BEDTIME, Normal      atorvastatin (LIPITOR) 20 mg tablet TAKE 1 TABLET AT BEDTIME, Normal      clopidogrel (PLAVIX) 75 mg tablet Take 1 tablet (75 mg total) by mouth daily, Starting Mon 3/2/2020, Normal      cyanocobalamin (VITAMIN B-12) 1,000 mcg tablet Take 1,000 mcg by mouth 3 (three) times a week MWF, Historical Med      donepezil (ARICEPT) 10 mg tablet TAKE 1 TABLET DAILY AT     BEDTIME, Normal      escitalopram (LEXAPRO) 20 mg tablet TAKE 1 TABLET DAILY, Normal      furosemide (LASIX) 20 mg tablet Take 1 tablet (20 mg total) by mouth daily, Starting Mon 3/2/2020, Normal      Multiple Vitamins-Minerals (CENTRUM SILVER ADULT 50+) TABS Take 1 tablet by mouth daily, Historical Med      pantoprazole (PROTONIX) 40 mg tablet take 1 tablet by mouth once daily, Normal      Potassium Citrate ER 15 MEQ (1620 MG) TBCR TAKE 1 TABLET TWICE A DAY, Normal      tamsulosin (FLOMAX) 0 4 mg TAKE 1 CAPSULE DAILY, Normal      Bacillus Coagulans-Inulin (PROBIOTIC FORMULA) 1-250 BILLION-MG CAPS Take 1 capsule by mouth daily Record states dose is currently 4 billion, Historical Med      Cholecalciferol (VITAMIN D-3 PO) Take 2,000 Units by mouth daily  , Until Discontinued, Historical Med      Fluticasone-Salmeterol (AIRDUO RESPICLICK 65/59) 15-60 MCG/ACT AEPB Inhale 1 puff 2 (two) times a day AM & PM, Historical Med      KRILL OIL PO Take 500 mg by mouth daily  , Historical Med      Melatonin 10 MG TABS Take 2 tablets by mouth daily at bedtime Taking 15mg, Historical Med      memantine (NAMENDA) 5 mg tablet Take 5 mg by mouth 2 (two) times a day In the evening , Historical Med      nitroglycerin (NITROSTAT) 0 4 mg SL tablet PLACE 1 TABLET UNDER THE   TONGUE AND ALLOW TO        DISSOLVE EVERY 5 MINUTES ASNEEDED FOR CHEST PAIN, Normal      tiotropium (SPIRIVA HANDIHALER) 18 mcg inhalation capsule Place 18 mcg into inhaler and inhale daily  , Historical Med           No discharge procedures on file      PDMP Review       Value Time User    PDMP Reviewed  Yes 8/5/2020  1:12 AM Vernell Willett MD          ED Provider  Electronically Signed by           Vernell Willett MD  08/05/20 6691

## 2020-08-06 LAB
ATRIAL RATE: 71 BPM
P AXIS: 57 DEGREES
PR INTERVAL: 214 MS
QRS AXIS: 60 DEGREES
QRSD INTERVAL: 92 MS
QT INTERVAL: 422 MS
QTC INTERVAL: 458 MS
T WAVE AXIS: 66 DEGREES
VENTRICULAR RATE: 71 BPM

## 2020-08-06 PROCEDURE — 93010 ELECTROCARDIOGRAM REPORT: CPT | Performed by: INTERNAL MEDICINE

## 2020-08-07 ENCOUNTER — HOSPITAL ENCOUNTER (OUTPATIENT)
Dept: NON INVASIVE DIAGNOSTICS | Facility: HOSPITAL | Age: 80
Discharge: HOME | End: 2020-08-07
Attending: FAMILY MEDICINE
Payer: MEDICARE

## 2020-08-07 DIAGNOSIS — I50.32 CHRONIC DIASTOLIC (CONGESTIVE) HEART FAILURE (HCC): ICD-10-CM

## 2020-08-07 DIAGNOSIS — R06.00 DYSPNEA ON EFFORT: ICD-10-CM

## 2020-08-07 PROCEDURE — 93306 TTE W/DOPPLER COMPLETE: CPT | Performed by: INTERNAL MEDICINE

## 2020-08-07 PROCEDURE — 93306 TTE W/DOPPLER COMPLETE: CPT

## 2020-08-10 DIAGNOSIS — G30.1 LATE ONSET ALZHEIMER'S DISEASE WITH BEHAVIORAL DISTURBANCE (HCC): Primary | ICD-10-CM

## 2020-08-10 DIAGNOSIS — F02.81 LATE ONSET ALZHEIMER'S DISEASE WITH BEHAVIORAL DISTURBANCE (HCC): Primary | ICD-10-CM

## 2020-08-10 RX ORDER — QUETIAPINE FUMARATE 25 MG/1
12.5 TABLET, FILM COATED ORAL
Qty: 5 TABLET | Refills: 1 | Status: SHIPPED | OUTPATIENT
Start: 2020-08-10 | End: 2020-08-14 | Stop reason: SDUPTHER

## 2020-08-11 ENCOUNTER — TELEPHONE (OUTPATIENT)
Dept: FAMILY MEDICINE CLINIC | Facility: CLINIC | Age: 80
End: 2020-08-11

## 2020-08-14 DIAGNOSIS — G30.1 LATE ONSET ALZHEIMER'S DISEASE WITH BEHAVIORAL DISTURBANCE (HCC): ICD-10-CM

## 2020-08-14 DIAGNOSIS — F02.81 LATE ONSET ALZHEIMER'S DISEASE WITH BEHAVIORAL DISTURBANCE (HCC): ICD-10-CM

## 2020-08-14 RX ORDER — QUETIAPINE FUMARATE 25 MG/1
12.5 TABLET, FILM COATED ORAL
Qty: 5 TABLET | Refills: 1 | Status: SHIPPED | OUTPATIENT
Start: 2020-08-14 | End: 2020-08-29 | Stop reason: HOSPADM

## 2020-08-14 NOTE — TELEPHONE ENCOUNTER
Patient's insomnia not improving with 1/2 tablet of Seroquel  Went to bed last night after 11 and was awake at 130 this morning  Asking if another script can be sent to his pharmacy for him to take a whole tablet? States in the past patient was taking Seroquel 50 mg

## 2020-08-17 ENCOUNTER — TELEPHONE (OUTPATIENT)
Dept: FAMILY MEDICINE CLINIC | Facility: CLINIC | Age: 80
End: 2020-08-17

## 2020-08-17 ENCOUNTER — CONSULT (OUTPATIENT)
Dept: PULMONOLOGY | Facility: CLINIC | Age: 80
End: 2020-08-17
Payer: MEDICARE

## 2020-08-17 VITALS
HEIGHT: 69 IN | SYSTOLIC BLOOD PRESSURE: 114 MMHG | WEIGHT: 174 LBS | DIASTOLIC BLOOD PRESSURE: 64 MMHG | BODY MASS INDEX: 25.77 KG/M2 | OXYGEN SATURATION: 91 % | HEART RATE: 73 BPM

## 2020-08-17 DIAGNOSIS — Z85.118 HISTORY OF LUNG CANCER: ICD-10-CM

## 2020-08-17 DIAGNOSIS — M54.6 CHRONIC MIDLINE THORACIC BACK PAIN: Primary | ICD-10-CM

## 2020-08-17 DIAGNOSIS — J44.9 CHRONIC OBSTRUCTIVE PULMONARY DISEASE, UNSPECIFIED COPD TYPE (HCC): ICD-10-CM

## 2020-08-17 DIAGNOSIS — D49.1 PULMONARY NEOPLASM: ICD-10-CM

## 2020-08-17 DIAGNOSIS — R93.89 ABNORMAL CT SCAN, CHEST: ICD-10-CM

## 2020-08-17 DIAGNOSIS — J96.11 CHRONIC RESPIRATORY FAILURE WITH HYPOXIA (HCC): ICD-10-CM

## 2020-08-17 DIAGNOSIS — R06.02 SOB (SHORTNESS OF BREATH): Primary | ICD-10-CM

## 2020-08-17 DIAGNOSIS — G89.29 CHRONIC MIDLINE THORACIC BACK PAIN: Primary | ICD-10-CM

## 2020-08-17 PROCEDURE — 99214 OFFICE O/P EST MOD 30 MIN: CPT | Performed by: INTERNAL MEDICINE

## 2020-08-17 PROCEDURE — 94618 PULMONARY STRESS TESTING: CPT

## 2020-08-17 RX ORDER — FLUTICASONE PROPIONATE AND SALMETEROL 55; 14 UG/1; UG/1
1 POWDER, METERED RESPIRATORY (INHALATION) 2 TIMES DAILY
Qty: 1 INHALER | Refills: 3 | Status: SHIPPED | OUTPATIENT
Start: 2020-08-17 | End: 2020-10-29 | Stop reason: HOSPADM

## 2020-08-17 NOTE — ASSESSMENT & PLAN NOTE
History of right upper lobe lung cancer  Initially staged as T1 B N0  [place] to be medically inoperable therefore he underwent SBRT the right lower lung with treatments between January 22, 2020 and January 31, 2020  Most recent CT of the chest in August 4th shows stable right lower lobe lung nodule

## 2020-08-17 NOTE — TELEPHONE ENCOUNTER
A ordered an x-ray of his thoracic spine hopefully that is the area where he has the pain make sure he does not have a pacemaker any other metal in his body

## 2020-08-17 NOTE — TELEPHONE ENCOUNTER
He saw a radiation specialist and noted all the pain he had in his back and he suggested she call you to order an mri of his back

## 2020-08-17 NOTE — PROGRESS NOTES
Pulmonary Consultation   Debbie Ortiz 70 y o  male MRN: [id number]  8/17/2020      Assessment:    COPD (chronic obstructive pulmonary disease) ([facility] [address] )  Patient is a 72-year-old male history of COPD/emphysema who presents to the Pulmonary office for routine evaluation today  No recent exacerbations but does have shortness of breath with exertion  Plan  Continue current inhalers  6 minutes walk test/ desaturation study today  Will need influenza vaccination when available  He is up-to-date on pneumococcal vaccination    SOB (shortness of breath)  Chronic and likely multifactorial related to underlying COPD, deconditioning, chronic hypoxia, cardiovascular disease  History of lung cancer  History of right upper lobe lung cancer  Initially staged as T1 B N0  [place] to be medically inoperable therefore he underwent SBRT the right lower lung with treatments between January 22, 2020 and January 31, 2020  Most recent CT of the chest in August 4th shows stable right lower lobe lung nodule  Abnormal CT scan, chest  CT of the chest shows severe emphysematous changes  Nodule in the right lower lobe  A scar in the right upper lobe  Chronic respiratory failure with hypoxia (HCC)  6 minutes walk/oxygen titration study performed today  Resting room air saturation:  92%  Exercise saturation without supplemental oxygen:  88%  Exercise saturation with supplemental oxygen:  3 liters/minute, 91-93%  Impression:  Patient requires 3 L supplemental oxygen with exertion  Plan:    Diagnoses and all orders for this visit:    SOB (shortness of breath)  -     fluticasone-salmeterol (AirDuo RespiClick 67/51) 73-62 mcg/act dry powder inhaler;  Inhale 1 puff 2 (two) times a day AM & PM  -     Home Oxygen with Portability    Pulmonary neoplasm  -     Ambulatory referral to Pulmonology    History of lung cancer    Abnormal CT scan, chest    Chronic respiratory failure with hypoxia (HCC)  -     fluticasone-salmeterol (AirDuo RespiClick 66/74) 01-09 mcg/act dry powder inhaler; Inhale 1 puff 2 (two) times a day AM & PM  -     Home Oxygen with Portability    Chronic obstructive pulmonary disease, unspecified COPD type (HCC)  -     fluticasone-salmeterol (AirDuo RespiClick 97/47) 14-91 mcg/act dry powder inhaler; Inhale 1 puff 2 (two) times a day AM & PM  -     Home Oxygen with Portability        Return in about 3 months (around 11/17/2020)  History of Present Illness    HPI:  Claudia Santos is a 70 y o  male with history severe COPD, chronic hypoxic respiratory failure,  stage IA right lower lobe lung cancer  He underwent SBRT in January (5 treatments)  Other significant history includes coronary artery disease status post CABG 2003, abdominal aortic aneurysm in August 2017  Patient reports history of several episodes of pneumonia  None recently  Previously followed by pulmonologist near [facility]      He is currently on 2L supplemental 0xygen  States he was told by [address] that he needs a renewal     He is currently taking airoduo respiclkick, albuterol, and spiriva  He is compliant with medications and not have any issues with them  Overall feels that his pulmonary symptoms her baseline  He is unsure when he last had a breathing tests  Does have pending workup with neurology for new onset tremors  Additionally he is being evaluated for low back pain  He is accompanied to this visit today by his wife  Does not have any acute complaints today  Review of Systems   Constitutional: Negative for chills, fatigue and fever  HENT: Negative for congestion, nosebleeds, postnasal drip, rhinorrhea, sinus pressure and sore throat  Eyes: Negative for discharge, redness and itching  Respiratory: Positive for cough and shortness of breath  Negative for choking, chest tightness, wheezing and stridor  Cardiovascular: Negative for chest pain, palpitations and leg swelling     Gastrointestinal: Negative for blood in stool  Genitourinary: Negative for difficulty urinating and dysuria  Musculoskeletal: Negative for arthralgias, joint swelling and myalgias  Back pain   Skin: Negative for color change and rash  Neurological: Positive for tremors  Negative for light-headedness and headaches  Hematological: Negative for adenopathy         Historical Information   Past Medical History:   Diagnosis Date    AAA (abdominal aortic aneurysm) ([facility])     Acid reflux     Acute on chronic diastolic congestive heart failure ([facility]) 2/25/2020    Acute serous otitis media of left ear     recurrence not specified     Anesthesia     "always has mental changes /lingers and lingers after anesthesia"    Anxiety     Aortic aneurysm without rupture ([facility])     Arm bruise     right inner forearm "recent IV"    At risk for falls     BPH without urinary obstruction     Cancer ([facility] [address] )     skin CA on nose    CAP (community acquired pneumonia)     Cataracts, bilateral     Change in bowel function     Clubbing of fingers     Colon polyps     Common cold     Contusion of elbow, left     initial encounter     COPD (chronic obstructive pulmonary disease) ([facility])     Coronary artery disease     Cough     Dementia ([facility])     Depression     Dizziness     upon "standing quickly on occas"    Exercise counseling     Fatigue     Full dentures     "doesn't wear them"    Glaucoma screening     High cholesterol     History of abdominal aortic aneurysm (AAA) repair 08/2017    History of bacteremia     History of chronic obstructive lung disease     History of epistaxis     History of influenza vaccination     History of kidney stones     History of pneumonia     "many times" "at least 5 times" almost always goes to sepsis"    History of sepsis 10/2017    History of sinusitis     History of skin cancer     History of sleep apnea     History of transfusion     History of urinary tract infection     [place] (hard of hearing)     Hydronephrosis with obstructing calculus     Influenza vaccine needed     Jock itch     left/saw doctor 11/8 and started on antifungal cream    Kidney stones     Left hip pain     Need for pneumococcal vaccination     Need for prophylactic vaccination and inoculation against influenza     Other emphysema ([facility] [address] )     Pancreatitis, chronic ([facility] [address] )     pt and wife can't confirm 11/10/17    Pneumonia 10/26/2017    admitted [facility]    Pulmonary emphysema ([facility] [address] )     Screening for genitourinary condition     Screening for neurological condition     Sepsis ([facility] [address] )     due to unspecified organism     Short-term memory loss     Sleep apnea     does not use CPAP   Special screening examination for neoplasm of prostate     TIA involving carotid artery     "before carotid surgery"    Ulcer     stomach, "years ago"    Unsteady gait     Use of cane as ambulatory aid     sometimes    Vitamin D deficiency     Wears glasses      Past Surgical History:   Procedure Laterality Date    ABDOMINAL AORTIC ANEURYSM REPAIR  08/23/2017    ABDOMINAL AORTIC ANEURYSM REPAIR, ENDOVASCULAR      BACK SURGERY      spinal stenosis    CARDIAC SURGERY      CABG x3    CAROTID ENDARTARECTOMY Left 11/1996    CATARACT EXTRACTION Bilateral     CORONARY ARTERY BYPASS GRAFT  01/2003    x3    CYSTOSCOPY      with insertion of ureteral stent     CYSTOSCOPY      with ureteroscopy with lithotripsy     EXCISIONAL HEMORRHOIDECTOMY      EYE SURGERY Bilateral     "for a wrinkle" after cataract surgery    HERNIA REPAIR      umbilical    LITHOTRIPSY      renal    MOUTH SURGERY      full mouth extraction     ND COLONOSCOPY FLX DX W/COLLJ SPEC WHEN PFRMD N/A 5/16/2017    Procedure: COLONOSCOPY with polypectomies/ hot snare and tattoo;  Surgeon: Dayana Deal MD;  Location: [facility];   Service: Gastroenterology    ND CYSTOURETHROSCOPY N/A 11/30/2017    Procedure: Reinaldo Tillman;  Surgeon: Carlos A Morales MD;  [facility];  Service: Urology    WV ESOPHAGOGASTRODUODENOSCOPY TRANSORAL DIAGNOSTIC N/A 8/18/2016    Procedure: ESOPHAGOGASTRODUODENOSCOPY (EGD); Surgeon: Emilia Aguirre MD;  Location: [facility]; Service: Gastroenterology    [place] REMOVE BLADDER STONE,<2 5 CM N/A 11/30/2017    Procedure: Makayla Frisian;  Surgeon: Luz Maria Mcekon MD;  [facility];  Service: Urology    SKIN BIOPSY      TONSILLECTOMY      URETERAL STENT PLACEMENT      and removal     Family History   Problem Relation Age of Onset    Diabetes type II Mother         mellitus    Kidney failure Father     Diabetes type II Maternal Grandmother         mellitus     Hypertension Paternal Grandmother         benign essential        Social History   Places lived: from [place]  4 sons   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 , 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   Tobacco: quit 5-6 years ago  > 100 packs   Alcohol:  Illicits:  None  Hobbies:     Meds/Allergies     Current Outpatient Medications:     albuterol (PROVENTIL HFA,VENTOLIN HFA) 90 mcg/act inhaler, Inhale 2 puffs every 6 (six) hours as needed for wheezing, Disp: , Rfl:     aspirin 81 MG tablet, Take 81 mg by mouth daily Took within 24 hours , Disp: , Rfl:     atenolol (TENORMIN) 25 mg tablet, TAKE 1 TABLET AT BEDTIME, Disp: 90 tablet, Rfl: 3    atorvastatin (LIPITOR) 20 mg tablet, TAKE 1 TABLET AT BEDTIME, Disp: 90 tablet, Rfl: 3    Bacillus Coagulans-Inulin (PROBIOTIC FORMULA) 1-250 BILLION-MG CAPS, Take 1 capsule by mouth daily Record states dose is currently 4 billion, Disp: , Rfl:     Cholecalciferol (VITAMIN D-3 PO), Take 2,000 Units by mouth daily  , Disp: , Rfl:     clopidogrel (PLAVIX) 75 mg tablet, Take 1 tablet (75 mg total) by mouth daily, Disp: 90 tablet, Rfl: 3    cyanocobalamin (VITAMIN B-12) 1,000 mcg tablet, Take 1,000 mcg by mouth 3 (three) times a week MWF, Disp: , Rfl:     donepezil (ARICEPT) 10 mg tablet, TAKE 1 TABLET DAILY AT     BEDTIME, Disp: 90 tablet, Rfl: 1    fluticasone-salmeterol (AirDuo RespiClick 78/35) 88-64 mcg/act dry powder inhaler, Inhale 1 puff 2 (two) times a day AM & PM, Disp: 1 Inhaler, Rfl: 3    furosemide (LASIX) 20 mg tablet, Take 1 tablet (20 mg total) by mouth daily, Disp: 90 tablet, Rfl: 3    KRILL OIL PO, Take 500 mg by mouth daily  , Disp: , Rfl:     Melatonin 10 MG TABS, Take 2 tablets by mouth daily at bedtime Taking 15mg, Disp: , Rfl:     memantine (NAMENDA) 5 mg tablet, Take 5 mg by mouth 2 (two) times a day In the evening , Disp: , Rfl:     Multiple Vitamins-Minerals (CENTRUM SILVER ADULT 50+) TABS, Take 1 tablet by mouth daily, Disp: , Rfl:     nitroglycerin (NITROSTAT) 0 4 mg SL tablet, PLACE 1 TABLET UNDER THE   TONGUE AND ALLOW TO        DISSOLVE EVERY 5 MINUTES ASNEEDED FOR CHEST PAIN, Disp: 25 tablet, Rfl: 0    pantoprazole (PROTONIX) 40 mg tablet, take 1 tablet by mouth once daily, Disp: 90 tablet, Rfl: 1    Potassium Citrate ER 15 MEQ (1620 MG) TBCR, TAKE 1 TABLET TWICE A DAY, Disp: 180 tablet, Rfl: 3    QUEtiapine (SEROquel) 25 mg tablet, Take 0 5 tablets (12 5 mg total) by mouth daily at bedtime, Disp: 5 tablet, Rfl: 1    tamsulosin (FLOMAX) 0 4 mg, TAKE 1 CAPSULE DAILY, Disp: 90 capsule, Rfl: 3    tiotropium (SPIRIVA HANDIHALER) 18 mcg inhalation capsule, Place 18 mcg into inhaler and inhale daily  , Disp: , Rfl:   Allergies   Allergen Reactions    Augmentin [Amoxicillin-Pot Clavulanate] Diarrhea    Ciprofloxacin Hives    Morphine And Related Other (See Comments)     Change in mental status    Levofloxacin      Headache    Wellbutrin [Bupropion]        Vitals: Blood pressure 114/64, pulse 73, height 5' 9" (1 753 m), weight 78 9 kg (174 lb), SpO2 91 %  Body mass index is 25 7 kg/m²  Oxygen Therapy  SpO2: 91 %      Physical Exam  Physical Exam  Vitals signs reviewed  Constitutional:       General: He is not in acute distress  Appearance: He is well-developed  HENT:      Head: Normocephalic and atraumatic        Right Ear: External ear normal       Left Ear: External ear normal       Nose: Nose normal       Mouth/Throat:      Pharynx: No oropharyngeal exudate  Eyes:      Conjunctiva/sclera: Conjunctivae normal       Pupils: Pupils are equal, round, and reactive to light  Neck:      Musculoskeletal: Normal range of motion  Trachea: No tracheal deviation  Cardiovascular:      Rate and Rhythm: Normal rate and regular rhythm  Heart sounds: Normal heart sounds  No murmur  No friction rub  No gallop  Pulmonary:      Effort: Pulmonary effort is normal       Breath sounds: Normal breath sounds  No stridor  No wheezing or rales  Lymphadenopathy:      Head:      Right side of head: No submental, submandibular, preauricular or posterior auricular adenopathy  Left side of head: No submental, submandibular, preauricular or posterior auricular adenopathy  Cervical: No cervical adenopathy  Skin:     General: Skin is warm and dry  Neurological:      Mental Status: He is alert and oriented to person, place, and time  Labs: I have personally reviewed pertinent lab results  Lab Results   Component Value Date    WBC 5 17 08/04/2020    HGB 16 0 08/04/2020    HCT 50 5 (H) 08/04/2020    MCV 96 08/04/2020     (L) 08/04/2020     Lab Results   Component Value Date    GLUCOSE 77 11/29/2014    CALCIUM 8 6 08/04/2020     11/29/2014    K 3 9 08/04/2020    CO2 28 08/04/2020     08/04/2020    BUN 15 08/04/2020    CREATININE 1 41 (H) 08/04/2020     No results found for: IGE  Lab Results   Component Value Date    ALT 24 08/04/2020    AST 21 08/04/2020    ALKPHOS 114 08/04/2020    BILITOT 0 7 11/29/2014       Imaging and other studies: I have personally reviewed pertinent reports  and I have personally reviewed pertinent films in PACS      IMPRESSION:     1  Stable infrarenal abdominal aortic aneurysm measuring up to 5 9 cm status post aortobiiliac graft repair which is patent  No evidence of endoleak    2   No evidence of aortic dissection  3   Stable moderate narrowing of the left external iliac artery  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  4   Stable focal area of moderate narrowing at the origin of the superior mesenteric artery  5   Coronary artery calcifications status post CABG  6   Cholelithiasis without evidence of cholecystitis  7   Bilateral nonobstructing nephrolithiasis  No hydronephrosis  8   Small hiatal hernia  Emphysematous changes  Right upper lobe scarring  Pulmonary function testing:   Not on file    EKG, Pathology, and Other Studies: I have personally reviewed pertinent reports  Echocardiogram 08/07/2012  Ejection fraction 60% no valvular stenosis, tricuspid velocity was within normal range  URBAN Sue    [facility]

## 2020-08-17 NOTE — ASSESSMENT & PLAN NOTE
Patient is a 56-year-old male history of COPD/emphysema who presents to the Pulmonary office for routine evaluation today  No recent exacerbations but does have shortness of breath with exertion      Plan  Continue current inhalers  6 minutes walk test/ desaturation study today  Will need influenza vaccination when available  He is up-to-date on pneumococcal vaccination

## 2020-08-17 NOTE — ASSESSMENT & PLAN NOTE
Chronic and likely multifactorial related to underlying COPD, deconditioning, chronic hypoxia, cardiovascular disease

## 2020-08-17 NOTE — ASSESSMENT & PLAN NOTE
CT of the chest shows severe emphysematous changes  Nodule in the right lower lobe  A scar in the right upper lobe

## 2020-08-18 DIAGNOSIS — F03.90 DEMENTIA ARISING IN THE SENIUM AND PRESENIUM (HCC): ICD-10-CM

## 2020-08-19 RX ORDER — DONEPEZIL HYDROCHLORIDE 10 MG/1
TABLET, FILM COATED ORAL
Qty: 90 TABLET | Refills: 1 | Status: SHIPPED | OUTPATIENT
Start: 2020-08-19 | End: 2020-10-29 | Stop reason: HOSPADM

## 2020-08-20 ENCOUNTER — TELEPHONE (OUTPATIENT)
Dept: FAMILY MEDICINE CLINIC | Facility: CLINIC | Age: 80
End: 2020-08-20

## 2020-08-20 NOTE — TELEPHONE ENCOUNTER
He has an mri ordered for tomorrow at 1 and he will require a xanax    He had to due it in the past   Can she give him one of her xanax 0 25mg and will need directions

## 2020-08-21 ENCOUNTER — HOSPITAL ENCOUNTER (OUTPATIENT)
Dept: MRI IMAGING | Facility: HOSPITAL | Age: 80
Discharge: HOME | End: 2020-08-21
Attending: FAMILY MEDICINE
Payer: MEDICARE

## 2020-08-21 ENCOUNTER — HOSPITAL ENCOUNTER (EMERGENCY)
Facility: HOSPITAL | Age: 80
Discharge: HOME | End: 2020-08-21
Attending: EMERGENCY MEDICINE | Admitting: EMERGENCY MEDICINE
Payer: MEDICARE

## 2020-08-21 VITALS
DIASTOLIC BLOOD PRESSURE: 59 MMHG | SYSTOLIC BLOOD PRESSURE: 109 MMHG | RESPIRATION RATE: 18 BRPM | TEMPERATURE: 97.5 F | OXYGEN SATURATION: 95 % | BODY MASS INDEX: 25.52 KG/M2 | HEART RATE: 62 BPM | WEIGHT: 172.84 LBS

## 2020-08-21 DIAGNOSIS — R25.1 TREMOR: Primary | ICD-10-CM

## 2020-08-21 DIAGNOSIS — M54.6 CHRONIC MIDLINE THORACIC BACK PAIN: ICD-10-CM

## 2020-08-21 DIAGNOSIS — G89.29 CHRONIC MIDLINE THORACIC BACK PAIN: ICD-10-CM

## 2020-08-21 LAB
ANION GAP SERPL CALCULATED.3IONS-SCNC: 12 MMOL/L (ref 4–13)
BASOPHILS # BLD AUTO: 0.05 THOUSANDS/ΜL (ref 0–0.1)
BASOPHILS NFR BLD AUTO: 1 % (ref 0–1)
BUN SERPL-MCNC: 16 MG/DL (ref 5–25)
CALCIUM SERPL-MCNC: 9.4 MG/DL (ref 8.3–10.1)
CHLORIDE SERPL-SCNC: 103 MMOL/L (ref 100–108)
CO2 SERPL-SCNC: 26 MMOL/L (ref 21–32)
CREAT SERPL-MCNC: 1.4 MG/DL (ref 0.6–1.3)
EOSINOPHIL # BLD AUTO: 0.17 THOUSAND/ΜL (ref 0–0.61)
EOSINOPHIL NFR BLD AUTO: 3 % (ref 0–6)
ERYTHROCYTE [DISTWIDTH] IN BLOOD BY AUTOMATED COUNT: 14.1 % (ref 11.6–15.1)
GFR SERPL CREATININE-BSD FRML MDRD: 48 ML/MIN/1.73SQ M
GLUCOSE SERPL-MCNC: 99 MG/DL (ref 65–140)
HCT VFR BLD AUTO: 51.7 % (ref 36.5–49.3)
HGB BLD-MCNC: 17.1 G/DL (ref 12–17)
IMM GRANULOCYTES # BLD AUTO: 0.02 THOUSAND/UL (ref 0–0.2)
IMM GRANULOCYTES NFR BLD AUTO: 0 % (ref 0–2)
LYMPHOCYTES # BLD AUTO: 1.59 THOUSANDS/ΜL (ref 0.6–4.47)
LYMPHOCYTES NFR BLD AUTO: 27 % (ref 14–44)
MCH RBC QN AUTO: 31.2 PG (ref 26.8–34.3)
MCHC RBC AUTO-ENTMCNC: 33.1 G/DL (ref 31.4–37.4)
MCV RBC AUTO: 94 FL (ref 82–98)
MONOCYTES # BLD AUTO: 0.7 THOUSAND/ΜL (ref 0.17–1.22)
MONOCYTES NFR BLD AUTO: 12 % (ref 4–12)
NEUTROPHILS # BLD AUTO: 3.41 THOUSANDS/ΜL (ref 1.85–7.62)
NEUTS SEG NFR BLD AUTO: 57 % (ref 43–75)
NRBC BLD AUTO-RTO: 0 /100 WBCS
PLATELET # BLD AUTO: 128 THOUSANDS/UL (ref 149–390)
PMV BLD AUTO: 9.9 FL (ref 8.9–12.7)
POTASSIUM SERPL-SCNC: 4.6 MMOL/L (ref 3.5–5.3)
RBC # BLD AUTO: 5.48 MILLION/UL (ref 3.88–5.62)
SODIUM SERPL-SCNC: 141 MMOL/L (ref 136–145)
WBC # BLD AUTO: 5.94 THOUSAND/UL (ref 4.31–10.16)

## 2020-08-21 PROCEDURE — G1004 CDSM NDSC: HCPCS

## 2020-08-21 PROCEDURE — 93005 ELECTROCARDIOGRAM TRACING: CPT

## 2020-08-21 PROCEDURE — 80048 BASIC METABOLIC PNL TOTAL CA: CPT | Performed by: EMERGENCY MEDICINE

## 2020-08-21 PROCEDURE — 99284 EMERGENCY DEPT VISIT MOD MDM: CPT | Performed by: EMERGENCY MEDICINE

## 2020-08-21 PROCEDURE — 36415 COLL VENOUS BLD VENIPUNCTURE: CPT | Performed by: EMERGENCY MEDICINE

## 2020-08-21 PROCEDURE — 85025 COMPLETE CBC W/AUTO DIFF WBC: CPT | Performed by: EMERGENCY MEDICINE

## 2020-08-21 PROCEDURE — 99285 EMERGENCY DEPT VISIT HI MDM: CPT

## 2020-08-21 PROCEDURE — 72146 MRI CHEST SPINE W/O DYE: CPT

## 2020-08-21 RX ORDER — METHOCARBAMOL 500 MG/1
500 TABLET, FILM COATED ORAL 2 TIMES DAILY
Qty: 20 TABLET | Refills: 0 | Status: SHIPPED | OUTPATIENT
Start: 2020-08-21 | End: 2020-08-29 | Stop reason: HOSPADM

## 2020-08-21 RX ORDER — DIAZEPAM 2 MG/1
2 TABLET ORAL ONCE
Status: COMPLETED | OUTPATIENT
Start: 2020-08-21 | End: 2020-08-21

## 2020-08-21 RX ORDER — ACETAMINOPHEN 325 MG/1
650 TABLET ORAL ONCE
Status: COMPLETED | OUTPATIENT
Start: 2020-08-21 | End: 2020-08-21

## 2020-08-21 RX ADMIN — DIAZEPAM 2 MG: 2 TABLET ORAL at 15:46

## 2020-08-21 RX ADMIN — ACETAMINOPHEN 650 MG: 325 TABLET, FILM COATED ORAL at 15:47

## 2020-08-21 NOTE — RESULT ENCOUNTER NOTE
Call patient to notify normal results other than some very minor degenerative arthritis of the thoracic spine there is nothing else there that is a problem there are no fractures no bulging discs and no pinching of the nerve roots so the pain in his spine is most likely due to his posture and pressure on his thoracic vertebrae but there is no fracture or anything else needs to be treated

## 2020-08-24 LAB
ATRIAL RATE: 75 BPM
P AXIS: 84 DEGREES
PR INTERVAL: 224 MS
QRS AXIS: 78 DEGREES
QRSD INTERVAL: 80 MS
QT INTERVAL: 422 MS
QTC INTERVAL: 471 MS
T WAVE AXIS: 80 DEGREES
VENTRICULAR RATE: 75 BPM

## 2020-08-24 PROCEDURE — 93010 ELECTROCARDIOGRAM REPORT: CPT | Performed by: INTERNAL MEDICINE

## 2020-08-26 ENCOUNTER — APPOINTMENT (EMERGENCY)
Dept: CT IMAGING | Facility: HOSPITAL | Age: 80
DRG: 078 | End: 2020-08-26
Payer: MEDICARE

## 2020-08-26 ENCOUNTER — HOSPITAL ENCOUNTER (INPATIENT)
Facility: HOSPITAL | Age: 80
LOS: 2 days | Discharge: NURSING, REHAB OR LONG-TERM CARE | DRG: 078 | End: 2020-08-29
Attending: EMERGENCY MEDICINE | Admitting: INTERNAL MEDICINE
Payer: MEDICARE

## 2020-08-26 ENCOUNTER — APPOINTMENT (EMERGENCY)
Dept: RADIOLOGY | Facility: HOSPITAL | Age: 80
DRG: 078 | End: 2020-08-26
Payer: MEDICARE

## 2020-08-26 DIAGNOSIS — I25.10 CAD (CORONARY ARTERY DISEASE): ICD-10-CM

## 2020-08-26 DIAGNOSIS — R26.2 AMBULATORY DYSFUNCTION: ICD-10-CM

## 2020-08-26 DIAGNOSIS — I49.3 VENTRICULAR ECTOPY: ICD-10-CM

## 2020-08-26 DIAGNOSIS — R55 SYNCOPE, UNSPECIFIED SYNCOPE TYPE: ICD-10-CM

## 2020-08-26 DIAGNOSIS — I25.10 CORONARY ARTERY DISEASE INVOLVING NATIVE CORONARY ARTERY OF NATIVE HEART WITHOUT ANGINA PECTORIS: ICD-10-CM

## 2020-08-26 DIAGNOSIS — I71.4 ABDOMINAL AORTIC ANEURYSM (AAA) WITHOUT RUPTURE (HCC): ICD-10-CM

## 2020-08-26 DIAGNOSIS — R40.4 ALTERED LEVEL OF CONSCIOUSNESS: ICD-10-CM

## 2020-08-26 DIAGNOSIS — R91.1 PULMONARY NODULE: ICD-10-CM

## 2020-08-26 DIAGNOSIS — N18.30 CKD (CHRONIC KIDNEY DISEASE) STAGE 3, GFR 30-59 ML/MIN (HCC): ICD-10-CM

## 2020-08-26 DIAGNOSIS — I44.0 1ST DEGREE AV BLOCK: ICD-10-CM

## 2020-08-26 DIAGNOSIS — D69.6 THROMBOCYTOPENIA (HCC): ICD-10-CM

## 2020-08-26 DIAGNOSIS — R07.9 CHEST PAIN: Primary | ICD-10-CM

## 2020-08-26 DIAGNOSIS — I10 HTN (HYPERTENSION): ICD-10-CM

## 2020-08-26 DIAGNOSIS — J44.9 CHRONIC OBSTRUCTIVE PULMONARY DISEASE, UNSPECIFIED COPD TYPE (HCC): ICD-10-CM

## 2020-08-26 DIAGNOSIS — R31.29 MICROSCOPIC HEMATURIA: ICD-10-CM

## 2020-08-26 DIAGNOSIS — I67.9 CEREBROVASCULAR SMALL VESSEL DISEASE: ICD-10-CM

## 2020-08-26 LAB
ALBUMIN SERPL BCP-MCNC: 3.8 G/DL (ref 3.5–5)
ALP SERPL-CCNC: 117 U/L (ref 46–116)
ALT SERPL W P-5'-P-CCNC: 20 U/L (ref 12–78)
ANION GAP SERPL CALCULATED.3IONS-SCNC: 13 MMOL/L (ref 4–13)
APTT PPP: 32 SECONDS (ref 23–37)
AST SERPL W P-5'-P-CCNC: 23 U/L (ref 5–45)
BASOPHILS # BLD AUTO: 0.05 THOUSANDS/ΜL (ref 0–0.1)
BASOPHILS NFR BLD AUTO: 1 % (ref 0–1)
BILIRUB SERPL-MCNC: 1.4 MG/DL (ref 0.2–1)
BILIRUB UR QL STRIP: NEGATIVE
BUN SERPL-MCNC: 16 MG/DL (ref 5–25)
CALCIUM SERPL-MCNC: 8.5 MG/DL (ref 8.3–10.1)
CHLORIDE SERPL-SCNC: 105 MMOL/L (ref 100–108)
CLARITY UR: CLEAR
CO2 SERPL-SCNC: 24 MMOL/L (ref 21–32)
COLOR UR: YELLOW
CREAT SERPL-MCNC: 1.33 MG/DL (ref 0.6–1.3)
D DIMER PPP FEU-MCNC: >10 UG/ML FEU
EOSINOPHIL # BLD AUTO: 0.21 THOUSAND/ΜL (ref 0–0.61)
EOSINOPHIL NFR BLD AUTO: 3 % (ref 0–6)
ERYTHROCYTE [DISTWIDTH] IN BLOOD BY AUTOMATED COUNT: 14.3 % (ref 11.6–15.1)
GFR SERPL CREATININE-BSD FRML MDRD: 51 ML/MIN/1.73SQ M
GLUCOSE SERPL-MCNC: 125 MG/DL (ref 65–140)
GLUCOSE UR STRIP-MCNC: NEGATIVE MG/DL
HCT VFR BLD AUTO: 48.2 % (ref 36.5–49.3)
HGB BLD-MCNC: 16.1 G/DL (ref 12–17)
HGB UR QL STRIP.AUTO: NEGATIVE
IMM GRANULOCYTES # BLD AUTO: 0.02 THOUSAND/UL (ref 0–0.2)
IMM GRANULOCYTES NFR BLD AUTO: 0 % (ref 0–2)
INR PPP: 1.06 (ref 0.84–1.19)
KETONES UR STRIP-MCNC: NEGATIVE MG/DL
LEUKOCYTE ESTERASE UR QL STRIP: NEGATIVE
LYMPHOCYTES # BLD AUTO: 1.5 THOUSANDS/ΜL (ref 0.6–4.47)
LYMPHOCYTES NFR BLD AUTO: 23 % (ref 14–44)
MAGNESIUM SERPL-MCNC: 2 MG/DL (ref 1.6–2.6)
MCH RBC QN AUTO: 31.6 PG (ref 26.8–34.3)
MCHC RBC AUTO-ENTMCNC: 33.4 G/DL (ref 31.4–37.4)
MCV RBC AUTO: 95 FL (ref 82–98)
MONOCYTES # BLD AUTO: 0.81 THOUSAND/ΜL (ref 0.17–1.22)
MONOCYTES NFR BLD AUTO: 12 % (ref 4–12)
NEUTROPHILS # BLD AUTO: 3.95 THOUSANDS/ΜL (ref 1.85–7.62)
NEUTS SEG NFR BLD AUTO: 61 % (ref 43–75)
NITRITE UR QL STRIP: NEGATIVE
NRBC BLD AUTO-RTO: 0 /100 WBCS
NT-PROBNP SERPL-MCNC: 167 PG/ML
PH UR STRIP.AUTO: 6.5 [PH]
PLATELET # BLD AUTO: 106 THOUSANDS/UL (ref 149–390)
PLATELET # BLD AUTO: 118 THOUSANDS/UL (ref 149–390)
PMV BLD AUTO: 10.5 FL (ref 8.9–12.7)
PMV BLD AUTO: 10.5 FL (ref 8.9–12.7)
POTASSIUM SERPL-SCNC: 3.9 MMOL/L (ref 3.5–5.3)
PROT SERPL-MCNC: 7.2 G/DL (ref 6.4–8.2)
PROT UR STRIP-MCNC: NEGATIVE MG/DL
PROTHROMBIN TIME: 13.6 SECONDS (ref 11.6–14.5)
RBC # BLD AUTO: 5.1 MILLION/UL (ref 3.88–5.62)
SODIUM SERPL-SCNC: 142 MMOL/L (ref 136–145)
SP GR UR STRIP.AUTO: <=1.005 (ref 1–1.03)
TROPONIN I SERPL-MCNC: <0.02 NG/ML
UROBILINOGEN UR QL STRIP.AUTO: 0.2 E.U./DL
WBC # BLD AUTO: 6.54 THOUSAND/UL (ref 4.31–10.16)

## 2020-08-26 PROCEDURE — 70450 CT HEAD/BRAIN W/O DYE: CPT

## 2020-08-26 PROCEDURE — 93005 ELECTROCARDIOGRAM TRACING: CPT

## 2020-08-26 PROCEDURE — 85379 FIBRIN DEGRADATION QUANT: CPT | Performed by: PHYSICIAN ASSISTANT

## 2020-08-26 PROCEDURE — 81003 URINALYSIS AUTO W/O SCOPE: CPT | Performed by: PHYSICIAN ASSISTANT

## 2020-08-26 PROCEDURE — 71275 CT ANGIOGRAPHY CHEST: CPT

## 2020-08-26 PROCEDURE — 94664 DEMO&/EVAL PT USE INHALER: CPT

## 2020-08-26 PROCEDURE — 84484 ASSAY OF TROPONIN QUANT: CPT | Performed by: PHYSICIAN ASSISTANT

## 2020-08-26 PROCEDURE — 85610 PROTHROMBIN TIME: CPT | Performed by: PHYSICIAN ASSISTANT

## 2020-08-26 PROCEDURE — 99285 EMERGENCY DEPT VISIT HI MDM: CPT | Performed by: PHYSICIAN ASSISTANT

## 2020-08-26 PROCEDURE — 83880 ASSAY OF NATRIURETIC PEPTIDE: CPT | Performed by: PHYSICIAN ASSISTANT

## 2020-08-26 PROCEDURE — 83735 ASSAY OF MAGNESIUM: CPT | Performed by: PHYSICIAN ASSISTANT

## 2020-08-26 PROCEDURE — G1004 CDSM NDSC: HCPCS

## 2020-08-26 PROCEDURE — 99220 PR INITIAL OBSERVATION CARE/DAY 70 MINUTES: CPT | Performed by: PHYSICIAN ASSISTANT

## 2020-08-26 PROCEDURE — 80053 COMPREHEN METABOLIC PANEL: CPT | Performed by: PHYSICIAN ASSISTANT

## 2020-08-26 PROCEDURE — 99285 EMERGENCY DEPT VISIT HI MDM: CPT

## 2020-08-26 PROCEDURE — 85730 THROMBOPLASTIN TIME PARTIAL: CPT | Performed by: PHYSICIAN ASSISTANT

## 2020-08-26 PROCEDURE — 85025 COMPLETE CBC W/AUTO DIFF WBC: CPT | Performed by: PHYSICIAN ASSISTANT

## 2020-08-26 PROCEDURE — 94760 N-INVAS EAR/PLS OXIMETRY 1: CPT

## 2020-08-26 PROCEDURE — 71045 X-RAY EXAM CHEST 1 VIEW: CPT

## 2020-08-26 PROCEDURE — 85049 AUTOMATED PLATELET COUNT: CPT | Performed by: PHYSICIAN ASSISTANT

## 2020-08-26 RX ORDER — ALBUTEROL SULFATE 90 UG/1
2 AEROSOL, METERED RESPIRATORY (INHALATION) EVERY 6 HOURS PRN
Status: DISCONTINUED | OUTPATIENT
Start: 2020-08-26 | End: 2020-08-29 | Stop reason: HOSPADM

## 2020-08-26 RX ORDER — PANTOPRAZOLE SODIUM 40 MG/1
40 TABLET, DELAYED RELEASE ORAL DAILY
Status: DISCONTINUED | OUTPATIENT
Start: 2020-08-27 | End: 2020-08-29 | Stop reason: HOSPADM

## 2020-08-26 RX ORDER — HEPARIN SODIUM 5000 [USP'U]/ML
5000 INJECTION, SOLUTION INTRAVENOUS; SUBCUTANEOUS EVERY 8 HOURS SCHEDULED
Status: DISCONTINUED | OUTPATIENT
Start: 2020-08-26 | End: 2020-08-29 | Stop reason: HOSPADM

## 2020-08-26 RX ORDER — ACETAMINOPHEN 325 MG/1
650 TABLET ORAL EVERY 6 HOURS PRN
Status: DISCONTINUED | OUTPATIENT
Start: 2020-08-26 | End: 2020-08-29 | Stop reason: HOSPADM

## 2020-08-26 RX ORDER — POTASSIUM CITRATE 15 MEQ/1
1 TABLET, EXTENDED RELEASE ORAL 2 TIMES DAILY
Status: DISCONTINUED | OUTPATIENT
Start: 2020-08-26 | End: 2020-08-27

## 2020-08-26 RX ORDER — QUETIAPINE FUMARATE 25 MG/1
12.5 TABLET, FILM COATED ORAL
Status: DISCONTINUED | OUTPATIENT
Start: 2020-08-26 | End: 2020-08-29 | Stop reason: HOSPADM

## 2020-08-26 RX ORDER — ATENOLOL 25 MG/1
25 TABLET ORAL
Status: DISCONTINUED | OUTPATIENT
Start: 2020-08-26 | End: 2020-08-29 | Stop reason: HOSPADM

## 2020-08-26 RX ORDER — DONEPEZIL HYDROCHLORIDE 5 MG/1
10 TABLET, FILM COATED ORAL
Status: DISCONTINUED | OUTPATIENT
Start: 2020-08-26 | End: 2020-08-29 | Stop reason: HOSPADM

## 2020-08-26 RX ORDER — TAMSULOSIN HYDROCHLORIDE 0.4 MG/1
0.4 CAPSULE ORAL DAILY
Status: DISCONTINUED | OUTPATIENT
Start: 2020-08-27 | End: 2020-08-29 | Stop reason: HOSPADM

## 2020-08-26 RX ORDER — MEMANTINE HYDROCHLORIDE 5 MG/1
5 TABLET ORAL 2 TIMES DAILY
Status: DISCONTINUED | OUTPATIENT
Start: 2020-08-26 | End: 2020-08-29 | Stop reason: HOSPADM

## 2020-08-26 RX ORDER — ONDANSETRON 2 MG/ML
4 INJECTION INTRAMUSCULAR; INTRAVENOUS EVERY 6 HOURS PRN
Status: DISCONTINUED | OUTPATIENT
Start: 2020-08-26 | End: 2020-08-29 | Stop reason: HOSPADM

## 2020-08-26 RX ORDER — FLUTICASONE FUROATE AND VILANTEROL 100; 25 UG/1; UG/1
1 POWDER RESPIRATORY (INHALATION)
Status: DISCONTINUED | OUTPATIENT
Start: 2020-08-27 | End: 2020-08-29 | Stop reason: HOSPADM

## 2020-08-26 RX ORDER — ATORVASTATIN CALCIUM 10 MG/1
20 TABLET, FILM COATED ORAL
Status: DISCONTINUED | OUTPATIENT
Start: 2020-08-26 | End: 2020-08-29

## 2020-08-26 RX ORDER — CLOPIDOGREL BISULFATE 75 MG/1
75 TABLET ORAL DAILY
Status: DISCONTINUED | OUTPATIENT
Start: 2020-08-27 | End: 2020-08-29 | Stop reason: HOSPADM

## 2020-08-26 RX ORDER — FUROSEMIDE 20 MG/1
20 TABLET ORAL DAILY
Status: DISCONTINUED | OUTPATIENT
Start: 2020-08-27 | End: 2020-08-29 | Stop reason: HOSPADM

## 2020-08-26 RX ORDER — LACTOBACILLUS ACIDOPHILUS / LACTOBACILLUS BULGARICUS 100 MILLION CFU STRENGTH
1 GRANULES ORAL DAILY
Status: DISCONTINUED | OUTPATIENT
Start: 2020-08-27 | End: 2020-08-29 | Stop reason: HOSPADM

## 2020-08-26 RX ORDER — ASPIRIN 81 MG/1
81 TABLET ORAL DAILY
Status: DISCONTINUED | OUTPATIENT
Start: 2020-08-27 | End: 2020-08-29 | Stop reason: HOSPADM

## 2020-08-26 RX ORDER — KRILL/OM-3/DHA/EPA/PHOSPHO/AST 500MG-86MG
500 CAPSULE ORAL DAILY
Status: DISCONTINUED | OUTPATIENT
Start: 2020-08-27 | End: 2020-08-27

## 2020-08-26 RX ORDER — METHOCARBAMOL 500 MG/1
500 TABLET, FILM COATED ORAL 2 TIMES DAILY
Status: DISCONTINUED | OUTPATIENT
Start: 2020-08-26 | End: 2020-08-29 | Stop reason: HOSPADM

## 2020-08-26 RX ADMIN — DONEPEZIL HYDROCHLORIDE 10 MG: 5 TABLET, FILM COATED ORAL at 21:01

## 2020-08-26 RX ADMIN — HEPARIN SODIUM 5000 UNITS: 5000 INJECTION INTRAVENOUS; SUBCUTANEOUS at 21:01

## 2020-08-26 RX ADMIN — QUETIAPINE FUMARATE 12.5 MG: 25 TABLET ORAL at 21:02

## 2020-08-26 RX ADMIN — ATORVASTATIN CALCIUM 20 MG: 10 TABLET, FILM COATED ORAL at 21:01

## 2020-08-26 RX ADMIN — METHOCARBAMOL TABLETS 500 MG: 500 TABLET, COATED ORAL at 20:37

## 2020-08-26 RX ADMIN — MEMANTINE 5 MG: 5 TABLET ORAL at 20:37

## 2020-08-26 RX ADMIN — ATENOLOL 25 MG: 25 TABLET ORAL at 21:02

## 2020-08-26 RX ADMIN — IOHEXOL 85 ML: 350 INJECTION, SOLUTION INTRAVENOUS at 17:58

## 2020-08-26 RX ADMIN — NITROGLYCERIN 1 INCH: 20 OINTMENT TOPICAL at 16:21

## 2020-08-26 RX ADMIN — CYANOCOBALAMIN TAB 1000 MCG 1000 MCG: 1000 TAB at 20:37

## 2020-08-26 NOTE — ASSESSMENT & PLAN NOTE
D-dimer level more than 10  Review of his record shows that he has been having elevated D-dimer dating back to February of 2020  No prior hx of DVT/PE  CTA negative for acute pulmonary embolism  --Check VAS lower limb venous duplex study  --VTE prophylaxis heparin 5000 units SQ q 8 hours

## 2020-08-26 NOTE — ED NOTES
Patient's wife at the bedside  She states that he was laying in a recliner at work and she couldn't arouse him  She asked him his name and he wasn't responding   He was "just sitting there shaking "      Annelise Rousseau RN  08/26/20 [id number]

## 2020-08-26 NOTE — ASSESSMENT & PLAN NOTE
Presented to emergency room for evaluation of chest pain  Wife reports that he initially was quite lethargic at home upon EMS arrival he complained of chest pain  Did received nitroglycerin and aspirin with some relief after arrival in the ER  EKG shows-a sinus rhythm with first-degree AV block with occasional premature ventricular complexes and premature atrial complexes at a rate of 83 beats per minute  Initial troponin less than 0 02  --Admit on observation  --Telemetry monitoring  --Trend troponin x2   --Continue PTA medications  --Monitor blood pressure closely  --Consider Echo    --Cardiology consult  --AM labs  --Supportive care

## 2020-08-26 NOTE — ED NOTES
Patient transported to [address] [place] [address] Rd, Critical access hospital0 [facility]  08/26/20 [id number]

## 2020-08-26 NOTE — ED NOTES
Patient's wife requesting update if patient will be admitted       Faustino Frame, [address]  08/26/20 8410

## 2020-08-26 NOTE — ASSESSMENT & PLAN NOTE
Respiratory distress evidence of acute exacerbation  --Respiratory protocol  --Continue oxygen at 3 LPM  --Continue PTA respiratory medication  --Continue outpatient pulmonary follow-up

## 2020-08-26 NOTE — H&P
[facility] Internal Medicine  H&P- Teresita Lr 1940, 70 y o  male MRN: [id number]    Unit/Bed#: 410-01 Encounter: [id number]    Primary Care Provider: Nadeem Flores DO   Date and time admitted to hospital: 8/26/2020  3:56 PM        * Chest pain with moderate risk for cardiac etiology  Assessment & Plan  Presented to emergency room for evaluation of chest pain  Wife reports that he initially was quite lethargic at home upon EMS arrival he complained of chest pain  Did received nitroglycerin and aspirin with some relief after arrival in the ER  EKG shows-a sinus rhythm with first-degree AV block with occasional premature ventricular complexes and premature atrial complexes at a rate of 83 beats per minute  Initial troponin less than 0 02  --Admit on observation  --Telemetry monitoring  --Trend troponin x2   --Continue PTA medications  --Monitor blood pressure closely  --Consider Echo    --Cardiology consult  --AM labs  --Supportive care    Elevated d-dimer  Assessment & Plan  D-dimer level more than 10  Review of his record shows that he has been having elevated D-dimer dating back to February of 2020  No prior hx of DVT/PE  CTA negative for acute pulmonary embolism  --Check VAS lower limb venous duplex study  --VTE prophylaxis heparin 5000 units SQ q 8 hours        Coronary artery disease involving native coronary artery of native heart without angina pectoris  Assessment & Plan  Presented to the emergency room complaints of chest pain  Does have history of drug-eluting stent placement in February of 2020  Did receive aspirin and nitroglycerin in the ER with some relief  --Continue PTA medication (atenolol, aspirin, Plavix, Lipitor)  --Consider inpatient cardiology consult     Abdominal aortic aneurysm [facility])  Assessment & Plan  Currently stable  He did have repair on 08/23/2017  Monitor blood pressure closely    Syncope  Assessment & Plan  Possible syncope episode at home  His wife reports that he was sitting on recliner and went unresponsive  Wife reports that he was not responding for several miniutes  EMS reports upon their arrival he was awake and complaint of chest pain  Head CT shows-No acute intracranial abnormality  EKG shows- sinus rhythm with first-degree AV block with occasional premature ventricular complexes and premature atrial complexes at a rate of 83 beats per minute  His blood pressure was elevated soon after arrival to the ER  --Telemetry monitoring  --Check Orthostatic  --Monitor blood pressure closely  -0Monitor I/O  --OT/PT eval  --Am labs  --Supportive care    COPD (chronic obstructive pulmonary disease) ([facility])  Assessment & Plan  Respiratory distress evidence of acute exacerbation  --Respiratory protocol  --Continue oxygen at 3 LPM  --Continue PTA respiratory medication  --Continue outpatient pulmonary follow-up    Dementia ([facility])  Assessment & Plan  Currently at baseline  Continue Aricept and Namenda    GERD (gastroesophageal reflux disease)  Assessment & Plan  Continue Protonix 40 mg p o  Daily    Hypercholesterolemia  Assessment & Plan  Continue PTA dose statins    VTE Prophylaxis: Heparin  / sequential compression device   Code Status: Level 1- Full code  POLST: POLST form is not discussed and not completed at this time  Discussion with family: Spoke with wife via phone    Anticipated Length of Stay:  Patient will be admitted on an Observation basis with an anticipated length of stay of  < 2 midnights  Justification for Hospital Stay: Chest pain, syncope    Total Time for Visit, including Counseling / Coordination of Care: 30 minutes  Greater than 50% of this total time spent on direct patient counseling and coordination of care  Chief Complaint:   Chest pain    History of Present Illness:    Rodney Hawkins is a 70 y o  male who presents to the emergency room for evaluation of chest pain    EMS reported that his wife called EMS because he had a possible episode of unresponsiveness  Patient reports chest pain 7/10 started about 30 minutes prior to arrival of EMS  His past medical history in significant for CABG, severe COPD chronically on 3 liters/minute oxygen, CAD, lung CA, abdominal aortic aneurysm, dementia  He denies headaches, dizziness, weakness, fever, chills, vomiting, abdominal pain  He also denies any recent falls or head trauma  He does admit to intermittent shortness of breath which he attributes to his current COPD diagnosis  He also complains of ongoing tremors to his right hand which she has schedule appointments with neurology  Labs completed in emergency room with results as shown below  CTA chest PE study and head CT  completed results as shown below  He did received sublingual nitro 0 4 mg and aspirin 324 mg from EMS  Upon arrival to the emergency room he still complained of chest pain and nitroglycerin paste 1 in was applied  Patient has been admitted on observation status [facility] level care for further workup and management of chest pain with moderate risk weak cardiac etiology    Review of Systems:    Review of Systems   Constitutional: Negative for chills, fatigue and fever  HENT: Negative for congestion, sore throat and trouble swallowing  Eyes: Negative for photophobia and visual disturbance  Respiratory: Positive for chest tightness  Negative for cough, shortness of breath and wheezing  Cardiovascular: Positive for chest pain  Negative for palpitations and leg swelling  Gastrointestinal: Negative for abdominal pain, nausea and vomiting  Endocrine: Negative for polydipsia, polyphagia and polyuria  Genitourinary: Negative for difficulty urinating, dysuria, flank pain, frequency and hematuria  Musculoskeletal: Negative for arthralgias, back pain, gait problem and joint swelling  Skin: Negative for color change, pallor, rash and wound  Neurological: Positive for tremors and syncope   Negative for dizziness, weakness and headaches  Psychiatric/Behavioral: Positive for confusion and sleep disturbance  Negative for agitation  The patient is not nervous/anxious          Past Medical and Surgical History:     Past Medical History:   Diagnosis Date    AAA [facility]     Acid reflux     Acute on chronic diastolic congestive heart failure ([facility]) 2/25/2020    Acute serous otitis media of left ear     recurrence not specified     Anesthesia     "always has mental changes /lingers and lingers after anesthesia"    Anxiety     Aortic aneurysm without rupture ([facility])     Arm bruise     right inner forearm "recent IV"    At risk for falls     BPH without urinary obstruction     Cancer ([facility] [address] )     skin CA on nose    CAP (community acquired pneumonia)     Cataracts, bilateral     Change in bowel function     Clubbing of fingers     Colon polyps     Common cold     Contusion of elbow, left     initial encounter     COPD (chronic obstructive pulmonary disease) ([facility]     Coronary artery disease     Cough     Dementia ([facility])     Depression     Dizziness     upon "standing quickly on occas"    Exercise counseling     Fatigue     Full dentures     "doesn't wear them"    Glaucoma screening     High cholesterol     History of abdominal aortic aneurysm (AAA) repair 08/2017    History of bacteremia     History of chronic obstructive lung disease     History of epistaxis     History of influenza vaccination     History of kidney stones     History of pneumonia     "many times" "at least 5 times" almost always goes to sepsis"    History of sepsis 10/2017    History of sinusitis     History of skin cancer     History of sleep apnea     History of transfusion     History of urinary tract infection     [place] (hard of hearing)     Hydronephrosis with obstructing calculus     Influenza vaccine needed     Jock itch     left/saw doctor 11/8 and started on antifungal cream    Kidney stones     Left hip pain     Need for pneumococcal vaccination     Need for prophylactic vaccination and inoculation against influenza     Other emphysema [facility] [address] [facility]     Pancreatitis, chronic ([facility] [address] )     pt and wife can't confirm 11/10/17    Pneumonia 10/26/2017    admitted [facility]    Pulmonary emphysema ([facility] [address] [facility]     Screening for genitourinary condition     Screening for neurological condition     Sepsis ([facility] [address] )     due to unspecified organism     Short-term memory loss     Sleep apnea     does not use CPAP   Special screening examination for neoplasm of prostate     TIA involving carotid artery     "before carotid surgery"    Ulcer     stomach, "years ago"    Unsteady gait     Use of cane as ambulatory aid     sometimes    Vitamin D deficiency     Wears glasses        Past Surgical History:   Procedure Laterality Date    ABDOMINAL AORTIC ANEURYSM REPAIR  08/23/2017    ABDOMINAL AORTIC ANEURYSM REPAIR, ENDOVASCULAR      BACK SURGERY      spinal stenosis    CARDIAC SURGERY      CABG x3    CAROTID ENDARTARECTOMY Left 11/1996    CATARACT EXTRACTION Bilateral     CORONARY ARTERY BYPASS GRAFT  01/2003    x3    CYSTOSCOPY      with insertion of ureteral stent     CYSTOSCOPY      with ureteroscopy with lithotripsy     EXCISIONAL HEMORRHOIDECTOMY      EYE SURGERY Bilateral     "for a wrinkle" after cataract surgery    HERNIA REPAIR      umbilical    LITHOTRIPSY      renal    MOUTH SURGERY      full mouth extraction     NM COLONOSCOPY FLX DX W/COLLJ SPEC WHEN PFRMD N/A 5/16/2017    Procedure: COLONOSCOPY with polypectomies/ hot snare and tattoo;  Surgeon: Julio Nixon MD;  Location: [facility]; Service: Gastroenterology    NM CYSTOURETHROSCOPY N/A 11/30/2017    Procedure: Prisca Jiménez;  Surgeon: Vincenzo Mora MD;  [facility];  Service: Urology    NM ESOPHAGOGASTRODUODENOSCOPY TRANSORAL DIAGNOSTIC N/A 8/18/2016    Procedure: ESOPHAGOGASTRODUODENOSCOPY (EGD);   Surgeon: Julio Nixon MD;  Location: [facility];  Service: Gastroenterology    IL REMOVE BLADDER STONE,<2 5 CM N/A 11/30/2017    Procedure: Hampton Boeck;  Surgeon: Caleb Negro MD;  [facility];  Service: Urology    SKIN BIOPSY      TONSILLECTOMY      URETERAL STENT PLACEMENT      and removal       Meds/Allergies:    Prior to Admission medications    Medication Sig Start Date End Date Taking? Authorizing Provider   albuterol (PROVENTIL HFA,VENTOLIN HFA) 90 mcg/act inhaler Inhale 2 puffs every 6 (six) hours as needed for wheezing   Yes Historical Provider, MD   aspirin 81 MG tablet Take 81 mg by mouth daily Took within 24 hours    Yes Historical Provider, MD   atenolol (TENORMIN) 25 mg tablet TAKE 1 TABLET AT BEDTIME 10/4/19  Yes Cari Lesches, MD   atorvastatin (LIPITOR) 20 mg tablet TAKE 1 TABLET AT BEDTIME 5/15/20  Yes Mark Storm DO   Bacillus Coagulans-Inulin (PROBIOTIC FORMULA) 1-250 BILLION-MG CAPS Take 1 capsule by mouth daily Record states dose is currently 4 billion   Yes Historical Provider, MD   Cholecalciferol (VITAMIN D-3 PO) Take 2,000 Units by mouth daily  Yes Historical Provider, MD   clopidogrel (PLAVIX) 75 mg tablet Take 1 tablet (75 mg total) by mouth daily 3/2/20  Yes Mili Dickey PA-C   cyanocobalamin (VITAMIN B-12) 1,000 mcg tablet Take 1,000 mcg by mouth 3 (three) times a week MWF   Yes Historical Provider, MD   donepezil (ARICEPT) 10 mg tablet TAKE 1 TABLET DAILY AT     BEDTIME 8/19/20  Yes Myles Brambila DO   fluticasone-salmeterol (AirDuo RespiClick 34/22) 93-18 mcg/act dry powder inhaler Inhale 1 puff 2 (two) times a day AM & PM 8/17/20  Yes Carlos A Jameson MD   furosemide (LASIX) 20 mg tablet Take 1 tablet (20 mg total) by mouth daily 3/2/20  Yes Mili Dickey PA-C   KRILL OIL PO Take 500 mg by mouth daily     Yes Historical Provider, MD   Melatonin 10 MG TABS Take 2 tablets by mouth daily at bedtime Taking 15mg   Yes Historical Provider, MD   memantine (NAMENDA) 5 mg tablet Take 5 mg by mouth 2 (two) times a day In the evening    Yes Historical Provider, MD   methocarbamol (ROBAXIN) 500 mg tablet Take 1 tablet (500 mg total) by mouth 2 (two) times a day 8/21/20  Yes Fern Galarza MD   Multiple Vitamins-Minerals (CENTRUM SILVER ADULT 50+) TABS Take 1 tablet by mouth daily   Yes Historical Provider, MD   nitroglycerin (NITROSTAT) 0 4 mg SL tablet PLACE 1 TABLET UNDER THE   TONGUE AND ALLOW TO        DISSOLVE EVERY 5 MINUTES ASNEEDED FOR CHEST PAIN 7/27/20  Yes Mili Dickey PA-C   pantoprazole (PROTONIX) 40 mg tablet take 1 tablet by mouth once daily 7/6/20  Yes Kevon Khan DO   Potassium Citrate ER 15 MEQ (1620 MG) TBCR TAKE 1 TABLET TWICE A DAY 5/15/20  Yes Lizbeth Villafana PA-C   QUEtiapine (SEROquel) 25 mg tablet Take 0 5 tablets (12 5 mg total) by mouth daily at bedtime 8/14/20  Yes Kevon Khan DO   tamsulosin (FLOMAX) 0 4 mg TAKE 1 CAPSULE DAILY 7/27/20  Yes GILBERT Nieto   tiotropium (SPIRIVA HANDIHALER) 18 mcg inhalation capsule Place 18 mcg into inhaler and inhale daily  Yes Historical Provider, MD     I have reviewed home medications with patient family member  Allergies:    Allergies   Allergen Reactions    Augmentin [Amoxicillin-Pot Clavulanate] Diarrhea    Ciprofloxacin Hives    Morphine And Related Other (See Comments)     Change in mental status    Levofloxacin      Headache    Wellbutrin [Bupropion]        Social History:     Marital Status: /Civil Union   Occupation: retired  Patient Pre-hospital Living Situation: Lives with wife  Patient Pre-hospital Level of Mobility: Active  Patient Pre-hospital Diet Restrictions: None reported  Substance Use History:   Social History     Substance and Sexual Activity   Alcohol Use Not Currently    Alcohol/week: 0 0 standard drinks    Frequency: Never    Drinks per session: Patient refused    Binge frequency: Never    Comment: quit 25 yrs ago     Social History     Tobacco Use   Smoking Status Former Smoker    Packs/day: 1 50    Years: 60 00    Pack years: 90 00    Last attempt to quit: 2014    Years since quittin 6   Smokeless Tobacco Never Used   Tobacco Comment     used to be a 1-1 5 ppd smoker     Social History     Substance and Sexual Activity   Drug Use No    Comment: No illicit drug use        Family History:    Family History   Problem Relation Age of Onset    Diabetes type II Mother         mellitus    Kidney failure Father     Diabetes type II Maternal Grandmother         mellitus     Hypertension Paternal Grandmother         benign essential        Physical Exam:     Vitals:   Blood Pressure: 131/63 (20)  Pulse: 72 (20)  Temperature: 98 °F (36 7 °C) (20)  Temp Source: Oral (20)  Respirations: 16 (20)  Height: 5' 8" (172 7 cm) (20)  Weight - Scale: 77 1 kg (170 lb) (20)  SpO2: 93 % (20)    Physical Exam  Vitals signs reviewed  Constitutional:       General: He is not in acute distress  Appearance: He is not ill-appearing  HENT:      Head: Normocephalic and atraumatic  Nose: Nose normal       Mouth/Throat:      Mouth: Mucous membranes are moist       Pharynx: Oropharynx is clear  Eyes:      Pupils: Pupils are equal, round, and reactive to light  Neck:      Musculoskeletal: Normal range of motion and neck supple  Cardiovascular:      Rate and Rhythm: Normal rate and regular rhythm  Pulses: Normal pulses  Pulmonary:      Effort: Pulmonary effort is normal  No respiratory distress  Breath sounds: No stridor  No wheezing or rales  Abdominal:      General: There is no distension  Palpations: Abdomen is soft  Tenderness: There is no abdominal tenderness  Musculoskeletal:         General: No swelling, tenderness or deformity  Right lower leg: No edema  Left lower leg: No edema  Skin:     General: Skin is warm and dry        Capillary Refill: Capillary refill takes less than 2 seconds  Coloration: Skin is not jaundiced or pale  Findings: No bruising or erythema  Neurological:      Mental Status: He is alert  Mental status is at baseline  Psychiatric:         Mood and Affect: Mood normal            Additional Data:     Lab Results: I have personally reviewed pertinent reports  Results from last 7 days   Lab Units 08/26/20  1614   WBC Thousand/uL 6 54   HEMOGLOBIN g/dL 16 1   HEMATOCRIT % 48 2   PLATELETS Thousands/uL 118*   NEUTROS PCT % 61   LYMPHS PCT % 23   MONOS PCT % 12   EOS PCT % 3     Results from last 7 days   Lab Units 08/26/20  1614   SODIUM mmol/L 142   POTASSIUM mmol/L 3 9   CHLORIDE mmol/L 105   CO2 mmol/L 24   BUN mg/dL 16   CREATININE mg/dL 1 33*   ANION GAP mmol/L 13   CALCIUM mg/dL 8 5   ALBUMIN g/dL 3 8   TOTAL BILIRUBIN mg/dL 1 40*   ALK PHOS U/L 117*   ALT U/L 20   AST U/L 23   GLUCOSE RANDOM mg/dL 125     Results from last 7 days   Lab Units 08/26/20  1614   INR  1 06                   Imaging: I have personally reviewed pertinent reports  CTA ED chest PE Study   Final Result by Willem Branch MD (08/26 1902)      No evidence for acute pulmonary embolus  Extensive bilateral diffuse panlobular emphysema and scarring as above  No suspicious interval change compared to priors  Workstation performed: [id number]         CT head without contrast   Final Result by Jr Jean DO (08/26 1653)   No acute intracranial abnormality  Workstation performed: [id number]         XR chest 1 view portable   Final Result by Jr Jean DO (08/26 1647)   Mild atelectatic changes bilaterally (more so on the right than left)  No lobar consolidation or large effusion  Cardiac size normal   No pulmonary edema           Workstation performed: [id number]             EKG, Pathology, and Other Studies Reviewed on Admission:   · EKG:  Sinus rhythm with first-degree AV block with occasional premature ventricular complexes and premature atrial complexes at a rate of 83 beats per minute  Allscripts / Epic Records Reviewed: Yes     ** Please Note: This note has been constructed using a voice recognition system   **

## 2020-08-26 NOTE — ASSESSMENT & PLAN NOTE
Presented to the emergency room complaints of chest pain  Does have history of drug-eluting stent placement in February of 2020  Did receive aspirin and nitroglycerin in the ER with some relief  --Continue PTA medication (atenolol, aspirin, Plavix, Lipitor)  --Consider inpatient cardiology consult

## 2020-08-26 NOTE — ED PROVIDER NOTES
History  Chief Complaint   Patient presents with    Chest Pain     chest pain and change of responsiveness well sitting in the recliner at home  given 1 nitro enroute pain down to a 10    70year old male presents via EMS from home for evaluation of chest pain  This started about 30 minutes prior to arrival   Pt notes pain in center of chest, does not radiate  At worse was 7/10, has improved to 3/10  He received aspirin 324 mg and nitro 0 4 SL prior to arrival   He notes pain improved at this time  He admits to shortness of breath which has been chronic - follows with pulmonology, wears O2 via NC chronically  Admits to feeling lightheaded and has had headaches  Denies visual disturbance  C/o tremors which has been ongoing - is scheduled to see neurology in a few weeks  Pt states, "I just shake all the time"  Reports nausea  Denies vomiting, diarrhea, constipation or abdominal pain  No recent falls reported  EMS also notes that wife had called EMS as he had an episode of reported unresponsiveness  PMH includes severe COPD, chronic hypoxic respiratory failure on 3L O2 at home,  stage IA right lower lobe lung cancer  He underwent SBRT in January (5 treatments)  Other significant history includes coronary artery disease status post CABG 2003, abdominal aortic aneurysm in August 2017, CAD with placement of drug eluting stent 2/19/20, CHF, HTN, dementia         History provided by:  Patient and medical records  History limited by:  Dementia   used: No    Chest Pain   Pain location:  Substernal area  Pain radiates to:  Does not radiate  Pain radiates to the back: no    Progression:  Improving  Relieved by:  Nitroglycerin and aspirin  Associated symptoms: altered mental status, fatigue, headache, nausea and shortness of breath    Associated symptoms: no abdominal pain, no back pain, no cough, no diaphoresis, no dizziness, no fever, no numbness, no palpitations and not vomiting    Risk factors: coronary artery disease, hypertension and male sex    Risk factors: no prior DVT/PE and no smoking        Prior to Admission Medications   Prescriptions Last Dose Informant Patient Reported? Taking? Bacillus Coagulans-Inulin (PROBIOTIC FORMULA) 1-250 BILLION-MG CAPS 8/26/2020 at Unknown time Self Yes Yes   Sig: Take 1 capsule by mouth daily Record states dose is currently 4 billion   Cholecalciferol (VITAMIN D-3 PO) 8/26/2020 at Unknown time Self Yes Yes   Sig: Take 2,000 Units by mouth daily  KRILL OIL PO 8/25/2020 at Unknown time Self Yes Yes   Sig: Take 500 mg by mouth daily     Melatonin 10 MG TABS 8/25/2020 at Unknown time Self Yes Yes   Sig: Take 2 tablets by mouth daily at bedtime Taking 15mg   Multiple Vitamins-Minerals (CENTRUM SILVER ADULT 50+) TABS 8/26/2020 at Unknown time Self Yes Yes   Sig: Take 1 tablet by mouth daily   Potassium Citrate ER 15 MEQ (1620 MG) TBCR 8/26/2020 at Unknown time  No Yes   Sig: TAKE 1 TABLET TWICE A DAY   QUEtiapine (SEROquel) 25 mg tablet 8/25/2020 at Unknown time  No Yes   Sig: Take 0 5 tablets (12 5 mg total) by mouth daily at bedtime   albuterol (PROVENTIL HFA,VENTOLIN HFA) 90 mcg/act inhaler   Yes Yes   Sig: Inhale 2 puffs every 6 (six) hours as needed for wheezing   aspirin 81 MG tablet 8/26/2020 at Unknown time Self Yes Yes   Sig: Take 81 mg by mouth daily Took within 24 hours    atenolol (TENORMIN) 25 mg tablet 8/25/2020 at Unknown time  No Yes   Sig: TAKE 1 TABLET AT BEDTIME   atorvastatin (LIPITOR) 20 mg tablet 8/25/2020 at Unknown time  No Yes   Sig: TAKE 1 TABLET AT BEDTIME   clopidogrel (PLAVIX) 75 mg tablet 8/26/2020 at Unknown time  No Yes   Sig: Take 1 tablet (75 mg total) by mouth daily   cyanocobalamin (VITAMIN B-12) 1,000 mcg tablet 8/26/2020 at Unknown time Self Yes Yes   Sig: Take 1,000 mcg by mouth 3 (three) times a week MWF   donepezil (ARICEPT) 10 mg tablet 8/25/2020 at Unknown time  No Yes   Sig: TAKE 1 TABLET DAILY AT     BEDTIME fluticasone-salmeterol (AirDuo RespiClick 20/32) 02-18 mcg/act dry powder inhaler 8/26/2020 at Unknown time  No Yes   Sig: Inhale 1 puff 2 (two) times a day AM & PM   furosemide (LASIX) 20 mg tablet 8/26/2020 at Unknown time  No Yes   Sig: Take 1 tablet (20 mg total) by mouth daily   memantine (NAMENDA) 5 mg tablet 8/26/2020 at Unknown time Spouse/Significant Other Yes Yes   Sig: Take 5 mg by mouth 2 (two) times a day In the evening    methocarbamol (ROBAXIN) 500 mg tablet   No Yes   Sig: Take 1 tablet (500 mg total) by mouth 2 (two) times a day   nitroglycerin (NITROSTAT) 0 4 mg SL tablet 8/26/2020 at Unknown time  No Yes   Sig: PLACE 1 TABLET UNDER THE   TONGUE AND ALLOW TO        DISSOLVE EVERY 5 MINUTES ASNEEDED FOR CHEST PAIN   pantoprazole (PROTONIX) 40 mg tablet 8/26/2020 at Unknown time  No Yes   Sig: take 1 tablet by mouth once daily   tamsulosin (FLOMAX) 0 4 mg 8/26/2020 at Unknown time  No Yes   Sig: TAKE 1 CAPSULE DAILY   tiotropium (SPIRIVA HANDIHALER) 18 mcg inhalation capsule 8/26/2020 at Unknown time Self Yes Yes   Sig: Place 18 mcg into inhaler and inhale daily        Facility-Administered Medications: None       Past Medical History:   Diagnosis Date    AAA (abdominal aortic aneurysm) (HCC)     Acid reflux     Acute on chronic diastolic congestive heart failure (HCC) 2/25/2020    Acute serous otitis media of left ear     recurrence not specified     Anesthesia     "always has mental changes /lingers and lingers after anesthesia"    Anxiety     Aortic aneurysm without rupture (HCC)     Arm bruise     right inner forearm "recent IV"    At risk for falls     BPH without urinary obstruction     Cancer ([facility] [address] )     skin CA on nose    CAP (community acquired pneumonia)     Cataracts, bilateral     Change in bowel function     Clubbing of fingers     Colon polyps     Common cold     Contusion of elbow, left     initial encounter     COPD (chronic obstructive pulmonary disease) ([address] )     Coronary artery disease     Cough     Dementia (HCC)     Depression     Dizziness     upon "standing quickly on occas"    Exercise counseling     Fatigue     Full dentures     "doesn't wear them"    Glaucoma screening     High cholesterol     History of abdominal aortic aneurysm (AAA) repair 08/2017    History of bacteremia     History of chronic obstructive lung disease     History of epistaxis     History of influenza vaccination     History of kidney stones     History of pneumonia     "many times" "at least 5 times" almost always goes to sepsis"    History of sepsis 10/2017    History of sinusitis     History of skin cancer     History of sleep apnea     History of transfusion     History of urinary tract infection     [place] (hard of hearing)     Hydronephrosis with obstructing calculus     Influenza vaccine needed     Jock itch     left/saw doctor 11/8 and started on antifungal cream    Kidney stones     Left hip pain     Need for pneumococcal vaccination     Need for prophylactic vaccination and inoculation against influenza     Other emphysema ([address] )     Pancreatitis, chronic ([address] )     pt and wife can't confirm 11/10/17    Pneumonia 10/26/2017    admitted [facility]    Pulmonary emphysema ([address] )     Screening for genitourinary condition     Screening for neurological condition     Sepsis ([address] )     due to unspecified organism     Short-term memory loss     Sleep apnea     does not use CPAP      Special screening examination for neoplasm of prostate     TIA involving carotid artery     "before carotid surgery"    Ulcer     stomach, "years ago"    Unsteady gait     Use of cane as ambulatory aid     sometimes    Vitamin D deficiency     Wears glasses        Past Surgical History:   Procedure Laterality Date    ABDOMINAL AORTIC ANEURYSM REPAIR  08/23/2017    ABDOMINAL AORTIC ANEURYSM REPAIR, ENDOVASCULAR      BACK SURGERY      spinal stenosis    CARDIAC SURGERY      CABG x3  CAROTID ENDARTARECTOMY Left 1996    CATARACT EXTRACTION Bilateral     CORONARY ARTERY BYPASS GRAFT  01/2003    x3    CYSTOSCOPY      with insertion of ureteral stent     CYSTOSCOPY      with ureteroscopy with lithotripsy     EXCISIONAL HEMORRHOIDECTOMY      EYE SURGERY Bilateral     "for a wrinkle" after cataract surgery    HERNIA REPAIR      umbilical    LITHOTRIPSY      renal    MOUTH SURGERY      full mouth extraction     AR COLONOSCOPY FLX DX W/COLLJ SPEC WHEN PFRMD N/A 2017    Procedure: COLONOSCOPY with polypectomies/ hot snare and tattoo;  Surgeon: Kat Flynn MD;  [facility]: [facility]; Service: Gastroenterology    AR CYSTOURETHROSCOPY N/A 2017    Procedure: Guero Cortes;  Surgeon: Monik Jones MD;  [facility];  Service: Urology    AR ESOPHAGOGASTRODUODENOSCOPY TRANSORAL DIAGNOSTIC N/A 2016    Procedure: ESOPHAGOGASTRODUODENOSCOPY (EGD); Surgeon: Kat Flynn MD;  [facility]; Service: Gastroenterology    AR REMOVE BLADDER STONE,<2 5 CM N/A 2017    Procedure: Mariana Sanon;  Surgeon: Monik Jones MD;  [facility];  Service: Urology    SKIN BIOPSY      TONSILLECTOMY      URETERAL STENT PLACEMENT      and removal       Family History   Problem Relation Age of Onset    Diabetes type II Mother         mellitus    Kidney failure Father     Diabetes type II Maternal Grandmother         mellitus     Hypertension Paternal Grandmother         benign essential      I have reviewed and agree with the history as documented      E-Cigarette/Vaping    E-Cigarette Use Never User      E-Cigarette/Vaping Substances    Nicotine No     THC No     CBD No     Flavoring No     Other No     Unknown No      Social History     Tobacco Use    Smoking status: Former Smoker     Packs/day: 1 50     Years: 60 00     Pack years: 90 00     Last attempt to quit:      Years since quittin 6    Smokeless tobacco: Never Used  Tobacco comment:  used to be a 1-1 5 ppd smoker   Substance Use Topics    Alcohol use: Not Currently     Alcohol/week: 0 0 standard drinks     Frequency: Never     Drinks per session: 1 or 2     Binge frequency: Never     Comment: quit 25 yrs ago    Drug use: No     Comment: No illicit drug use        Review of Systems   Constitutional: Positive for fatigue  Negative for chills, diaphoresis and fever  HENT: Negative  Negative for congestion, rhinorrhea and sore throat  Eyes: Negative  Negative for visual disturbance  Respiratory: Positive for shortness of breath  Negative for cough and wheezing  Cardiovascular: Positive for chest pain  Negative for palpitations and leg swelling  Gastrointestinal: Positive for nausea  Negative for abdominal pain, constipation, diarrhea and vomiting  Genitourinary: Negative  Negative for dysuria, flank pain, frequency and hematuria  Musculoskeletal: Negative  Negative for back pain, myalgias and neck pain  Skin: Negative  Negative for rash  Neurological: Positive for tremors, light-headedness and headaches  Negative for dizziness and numbness  Psychiatric/Behavioral: Positive for confusion  All other systems reviewed and are negative  Physical Exam  Physical Exam  Vitals signs and nursing note reviewed  Constitutional:       General: He is not in acute distress  Appearance: Normal appearance  He is well-developed  He is not toxic-appearing or diaphoretic  Comments: Elderly male   HENT:      Head: Normocephalic and atraumatic  Right Ear: Tympanic membrane, ear canal and external ear normal  Decreased hearing noted  Left Ear: Tympanic membrane, ear canal and external ear normal  Decreased hearing noted  Nose: Nose normal       Mouth/Throat:      Mouth: Mucous membranes are moist       Tongue: Tongue does not deviate from midline  Pharynx: Oropharynx is clear  Uvula midline  No oropharyngeal exudate     Eyes: General: Lids are normal  No scleral icterus  Extraocular Movements: Extraocular movements intact  Conjunctiva/sclera: Conjunctivae normal       Pupils: Pupils are equal, round, and reactive to light  Neck:      Musculoskeletal: Normal range of motion and neck supple  Vascular: No JVD  Trachea: Trachea and phonation normal  No tracheal deviation  Cardiovascular:      Rate and Rhythm: Normal rate and regular rhythm  Extrasystoles are present  Pulses: Normal pulses  Heart sounds: Normal heart sounds  No murmur  Comments: Old midline surgical scar  Pulmonary:      Effort: Pulmonary effort is normal  No tachypnea or respiratory distress  Breath sounds: Normal breath sounds  No wheezing, rhonchi or rales  Comments: On 4L O2 via NC  Abdominal:      General: Bowel sounds are normal  There is no distension  Palpations: Abdomen is soft  Tenderness: There is no abdominal tenderness  There is no guarding or rebound  Hernia: No hernia is present  Musculoskeletal: Normal range of motion  General: No tenderness  Right lower le+ Edema present  Left lower le+ Edema present  Skin:     General: Skin is warm and dry  Capillary Refill: Capillary refill takes less than 2 seconds  Coloration: Skin is not cyanotic  Findings: No rash  Nails: There is no clubbing  Neurological:      General: No focal deficit present  Mental Status: He is alert and oriented to person, place, and time  GCS: GCS eye subscore is 4  GCS verbal subscore is 5  GCS motor subscore is 6  Cranial Nerves: Cranial nerves are intact  No cranial nerve deficit  Sensory: No sensory deficit  Motor: Tremor (pt noted to have shaking tremor of b/l upper extremities) present  No abnormal muscle tone  Coordination: Coordination is intact  Comments: Pt is able to tell me his name, age, where we are, year and current president  Psychiatric:         Mood and Affect: Mood normal          Speech: Speech normal          Behavior: Behavior normal          Cognition and Memory: He exhibits impaired recent memory           Vital Signs  ED Triage Vitals [08/26/20 1559]   Temperature Pulse Respirations Blood Pressure SpO2   98 °F (36 7 °C) 98 18 126/77 91 %      Temp Source Heart Rate Source Patient Position - Orthostatic VS BP Location FiO2 (%)   Temporal Monitor Sitting Right arm --      Pain Score       6           Vitals:    08/26/20 1725 08/26/20 1730 08/26/20 1800 08/26/20 1830   BP: (!) 225/143 96/52 110/69 105/68   Pulse: 86 83 74 78   Patient Position - Orthostatic VS: Lying Sitting Lying Lying         Visual Acuity      ED Medications  Medications   ondansetron (ZOFRAN) injection 4 mg (has no administration in time range)   heparin (porcine) subcutaneous injection 5,000 Units (has no administration in time range)   acetaminophen (TYLENOL) tablet 650 mg (has no administration in time range)   nitroglycerin (NITRO-BID) 2 % TD ointment 1 inch (1 inch Topical Given 8/26/20 1621)   iohexol (OMNIPAQUE) 350 MG/ML injection (SINGLE-DOSE) 100 mL (85 mL Intravenous Given 8/26/20 1758)       Diagnostic Studies  Results Reviewed     Procedure Component Value Units Date/Time    UA (URINE) with reflex to Scope [[id number]] Collected:  08/26/20 1907    Lab Status:  Final result Specimen:  Urine, Clean Catch Updated:  08/26/20 1913     Color, UA Yellow     Clarity, UA Clear     Specific Gravity, UA <=1 005     pH, UA 6 5     Leukocytes, UA Negative     Nitrite, UA Negative     Protein, UA Negative mg/dl      Glucose, UA Negative mg/dl      Ketones, UA Negative mg/dl      Urobilinogen, UA 0 2 E U /dl      Bilirubin, UA Negative     Blood, UA Negative    D-Dimer [[id number]]  (Abnormal) Collected:  08/26/20 1614    Lab Status:  Final result Specimen:  Blood from Arm, Left Updated:  08/26/20 1700     D-Dimer, Quant >10 00 ug/ml FEU     Magnesium [[id number]] (Normal) Collected:  08/26/20 1614    Lab Status:  Final result Specimen:  Blood from Arm, Left Updated:  08/26/20 1642     Magnesium 2 0 mg/dL     NT-BNP PRO [[id number]]  (Normal) Collected:  08/26/20 1614    Lab Status:  Final result Specimen:  Blood from Arm, Left Updated:  08/26/20 1642     NT-proBNP 167 pg/mL     CBC and differential [[id number]]  (Abnormal) Collected:  08/26/20 1614    Lab Status:  Final result Specimen:  Blood from Arm, Left Updated:  08/26/20 1642     WBC 6 54 Thousand/uL      RBC 5 10 Million/uL      Hemoglobin 16 1 g/dL      Hematocrit 48 2 %      MCV 95 fL      MCH 31 6 pg      MCHC 33 4 g/dL      RDW 14 3 %      MPV 10 5 fL      Platelets 021 Thousands/uL      nRBC 0 /100 WBCs      Neutrophils Relative 61 %      Immat GRANS % 0 %      Lymphocytes Relative 23 %      Monocytes Relative 12 %      Eosinophils Relative 3 %      Basophils Relative 1 %      Neutrophils Absolute 3 95 Thousands/µL      Immature Grans Absolute 0 02 Thousand/uL      Lymphocytes Absolute 1 50 Thousands/µL      Monocytes Absolute 0 81 Thousand/µL      Eosinophils Absolute 0 21 Thousand/µL      Basophils Absolute 0 05 Thousands/µL     Troponin I [[id number]]  (Normal) Collected:  08/26/20 1614    Lab Status:  Final result Specimen:  Blood from Arm, Left Updated:  08/26/20 1637     Troponin I <0 02 ng/mL     Comprehensive metabolic panel [[id number]]  (Abnormal) Collected:  08/26/20 1614    Lab Status:  Final result Specimen:  Blood from Arm, Left Updated:  08/26/20 1635     Sodium 142 mmol/L      Potassium 3 9 mmol/L      Chloride 105 mmol/L      CO2 24 mmol/L      ANION GAP 13 mmol/L      BUN 16 mg/dL      Creatinine 1 33 mg/dL      Glucose 125 mg/dL      Calcium 8 5 mg/dL      AST 23 U/L      ALT 20 U/L      Alkaline Phosphatase 117 U/L      Total Protein 7 2 g/dL      Albumin 3 8 g/dL      Total Bilirubin 1 40 mg/dL      eGFR 51 ml/min/1 73sq m     Narrative:       [place] guidelines for Chronic Kidney Disease (CKD):     Stage 1 with normal or high GFR (GFR > 90 mL/min/1 73 square meters)    Stage 2 Mild CKD (GFR = 60-89 mL/min/1 73 square meters)    Stage 3A Moderate CKD (GFR = 45-59 mL/min/1 73 square meters)    Stage 3B Moderate CKD (GFR = 30-44 mL/min/1 73 square meters)    Stage 4 Severe CKD (GFR = 15-29 mL/min/1 73 square meters)    Stage 5 End Stage CKD (GFR <15 mL/min/1 73 square meters)  Note: GFR calculation is accurate only with a steady state creatinine    Protime-INR [[id number]]  (Normal) Collected:  08/26/20 1614    Lab Status:  Final result Specimen:  Blood from Arm, Left Updated:  08/26/20 1630     Protime 13 6 seconds      INR 1 06    APTT [[id number]]  (Normal) Collected:  08/26/20 1614    Lab Status:  Final result Specimen:  Blood from Arm, Left Updated:  08/26/20 1630     PTT 32 seconds                  CTA ED chest PE Study   Final Result by Sulema Sparks MD (08/26 1902)      No evidence for acute pulmonary embolus  Extensive bilateral diffuse panlobular emphysema and scarring as above  No suspicious interval change compared to priors  Workstation performed: [id number]         CT head without contrast   Final Result by Medhat Flores DO (08/26 1653)   No acute intracranial abnormality  Workstation performed: [id number]         XR chest 1 view portable   Final Result by Medhat Flores DO (08/26 1647)   Mild atelectatic changes bilaterally (more so on the right than left)  No lobar consolidation or large effusion  Cardiac size normal   No pulmonary edema           Workstation performed: [id number]                    Procedures  ECG 12 Lead Documentation Only    Date/Time: 8/26/2020 3:59 PM  Performed by: Cyndi Hull PA-C  Authorized by: Cyndi Hull PA-C     Indications / Diagnosis:  Chest pain  ECG reviewed by me, the ED Provider: yes    Patient location:  ED  Previous ECG:     Previous ECG:  Compared to current    Comparison ECG info:  8/21/20    Similarity:  No change    Comparison to cardiac monitor: Yes    Interpretation:     Interpretation: abnormal    Rate:     ECG rate:  92    ECG rate assessment: normal    Rhythm:     Rhythm: sinus rhythm and A-V block    Ectopy:     Ectopy: PVCs    QRS:     QRS axis:  Normal    QRS intervals:  Normal  Conduction:     Conduction: abnormal      Abnormal conduction: 1st degree    ST segments:     ST segments:  Non-specific  T waves:     T waves: non-specific    Comments:      QRS 84, QT/QTc 368/455; quality of tracing limited secondary to pt's tremor  Prehospital 12 lead was reviewed  Sinus rhythm w/ frequent PVCs and 1st deg AV block  , , QT/QTc 356/406, nonspecific inferior T wave abnormality  ECG 12 Lead Documentation Only    Date/Time: 8/26/2020 5:21 PM  Performed by: Lin Vargas PA-C  Authorized by: Lin Vargas PA-C     Indications / Diagnosis:  Repeat EKG  ECG reviewed by me, the ED Provider: yes    Patient location:  ED  Previous ECG:     Previous ECG:  Compared to current    Comparison to cardiac monitor: Yes    Interpretation:     Interpretation: abnormal    Rate:     ECG rate:  83    ECG rate assessment: normal    Rhythm:     Rhythm: sinus rhythm    Ectopy:     Ectopy: PVCs    QRS:     QRS axis:  Normal    QRS intervals:  Normal  Conduction:     Conduction: normal    ST segments:     ST segments:  Normal  T waves:     T waves: non-specific    Comments:      , QRS 88, QT/QTc 392/460; no acute ischemic changes, similar to prior EKGs             ED Course  ED Course as of Aug 26 2006   Wed Aug 26, 2020   1600 EKG poor quality tracing due to pt's tremors  1630 INR: 1 06   1631 Protime: 13 6   1631 PTT: 32   1635 I spoke to wife at bedside  She notes he was sitting his chair with head resting on his chest   She notes she wasn't able to wake him despite shouting his name and touching him    He eventually came to and she tried to get him to drink some water as she is concerned he hasn't been hydrating well  EMS was called  She notes it wasn't until EMS arrived that he told them he was having chest pain  1638 Glucose, Random: 125   1638 Stable, improved from 1 40 five days ago   Creatinine(!): 1 33   1638 BUN: 16   1638 Sodium: 142   1638 Potassium: 3 9   1638 Chloride: 105   1638 CO2: 24   1638 Anion Gap: 13   1638 Calcium: 8 5   1638 AST: 23   1638 ALT: 20   1638 Alkaline Phosphatase(!): 117   1638 Total Protein: 7 2   1638 Albumin: 3 8   1638 TOTAL BILIRUBIN(!): 1 40   1638 eGFR: 51   1638 Troponin I: <0 02   1645 WBC: 6 54   1645 Hemoglobin: 16 1   1645 Decreased from 128 five days ago   Platelet Count(!): 755   1645 NT-proBNP: 167   1645 Magnesium: 2 0   1650 IMPRESSION:  Mild atelectatic changes bilaterally (more so on the right than left)  No lobar consolidation or large effusion  Cardiac size normal   No pulmonary edema  XR chest 1 view portable   1703 Will proceed with CTA chest to r/o PE  Has had similar elevations in the past   Case was discussed with attending Dr Denny Kaur, agrees with PE study  D-Dimer, Quant(!): >10 00   1708 IMPRESSION:  No acute intracranial abnormality  CT head without contrast   1804 CTA performed and pending interpretation  Pt resting comfortably in no distress  He notes his pain is improved  His tremor is better  7469 Dr Demetrio Daniels of [facility] to the ED, was updated and advised to speak to overnight AP for admission once CTA results  1911 IMPRESSION:     No evidence for acute pulmonary embolus      Extensive bilateral diffuse panlobular emphysema and scarring as above  No suspicious interval change compared to priors  CTA ED chest PE Study   1914 Tiger Text sent to on call SHIREEN Schneider to discuss admission  1914 Completely unremarkable     UA (URINE) with reflex to Scope   1920 Spoke to [facility] via telephone, appropriate pt information relayed, accepts for tele obs         1925 Spoke with pt as well as his wife via telephone  They are in agreeance with admission/treatment plan  Pt admitted in stable condition for further observation due to chest pain and co-morbidities as well as episode of decreased responsiveness - it is unclear if this was syncope  His pain did improve with aspirin and nitro  Has h/o elevated d dimer in the past - has had prior venous duplex of lower extremities about 6 months ago when D dimer was also similarly elevated at that time  Labs otherwise appear chronic and stable  EKG is similar to prior with no ischemic changes and negative initial troponin  US AUDIT      Most Recent Value   Initial Alcohol Screen: US AUDIT-C    1  How often do you have a drink containing alcohol?  0 Filed at: 08/26/2020 1605   2  How many drinks containing alcohol do you have on a typical day you are drinking? 0 Filed at: 08/26/2020 1605   3b  FEMALE Any Age, or MALE 65+: How often do you have 4 or more drinks on one occassion? 0 Filed at: 08/26/2020 1605   Audit-C Score  0 Filed at: 08/26/2020 1605            HEART Risk Score      Most Recent Value   Heart Score Risk Calculator   History  1 Filed at: 08/26/2020 1650   ECG  1 Filed at: 08/26/2020 1650   Age  2 Filed at: 08/26/2020 1650   Risk Factors  2 Filed at: 08/26/2020 1650   Troponin  0 Filed at: 08/26/2020 1650   HEART Score  6 Filed at: 08/26/2020 1650            NEAL/DAST-10      Most Recent Value   How many times in the past year have you    Used an illegal drug or used a prescription medication for non-medical reasons?   Never Filed at: 08/26/2020 1605                  JAMI Risk Score      Most Recent Value   Age >= 72  1 Filed at: 08/26/2020 1939   Known CAD (stenosis >= 50%)  0 Filed at: 08/26/2020 1939   Recent (<=24 hrs) Service Angina  0 Filed at: 08/26/2020 1939   ST Deviation >= 0 5 mm  0 Filed at: 08/26/2020 1939   3+ CAD Risk Factors (FHx, HTN, HLP, DM, Smoker)  1 Filed at: 08/26/2020 1939   Aspirin Use Past 7 Days  1 Filed at: 08/26/2020 1939   Elevated Cardiac Markers  0 Filed at: 08/26/2020 1939   JAMI Risk Score (Calculated)  3 Filed at: 08/26/2020 1939        Wells' Criteria for PE      Most Recent Value   Wells' Criteria for PE   Clinical signs and symptoms of DVT  0 Filed at: 08/26/2020 1650   PE is primary diagnosis or equally likely  0 Filed at: 08/26/2020 1650   HR >100  0 Filed at: 08/26/2020 1650   Immobilization at least 3 days or Surgery in the previous 4 weeks  0 Filed at: 08/26/2020 1650   Previous, objectively diagnosed PE or DVT  0 Filed at: 08/26/2020 1650   Hemoptysis  0 Filed at: 08/26/2020 1650   Malignancy with treatment within 6 months or palliative  0 Filed at: 08/26/2020 1650   Wells' Criteria Total  0 Filed at: 08/26/2020 1650                  MDM  Number of Diagnoses or Management Options  Altered level of consciousness: new and requires workup  CAD (coronary artery disease):   Chest pain: new and requires workup  CKD (chronic kidney disease) stage 3, GFR 30-59 ml/min ([facility] [address] ): established and improving  HTN (hypertension): established and worsening  Thrombocytopenia ([facility] [address] ): established and worsening     Amount and/or Complexity of Data Reviewed  Clinical lab tests: ordered and reviewed  Tests in the radiology section of CPT®: ordered and reviewed  Decide to obtain previous medical records or to obtain history from someone other than the patient: yes  Obtain history from someone other than the patient: yes  Review and summarize past medical records: yes  Discuss the patient with other providers: yes (Attending, SLIM)  Independent visualization of images, tracings, or specimens: yes    Patient Progress  Patient progress: improved        Disposition  Final diagnoses:   Chest pain   Altered level of consciousness - resolved   CKD (chronic kidney disease) stage 3, GFR 30-59 ml/min (HCC)   Thrombocytopenia (HCC)   CAD (coronary artery disease)   HTN (hypertension)     Time reflects when diagnosis was documented in both MDM as applicable and the Disposition within this note     Time User Action Codes Description Comment    8/26/2020  7:39 PM Kathrin Second Add [R07 9] Chest pain     8/26/2020  7:40 PM Kathrin Second Add [R40 4] Altered level of consciousness     8/26/2020  7:40 PM Kathrin Second Modify [R40 4] Altered level of consciousness resolved    8/26/2020  7:41 PM Kathrin Second Add [N18 3] CKD (chronic kidney disease) stage 3, GFR 30-59 ml/min ([facility] [address] )     8/26/2020  7:42 PM Kathrin Second Add [D69 6] Thrombocytopenia ([facility] [address] )     8/26/2020  7:42 PM Kathrin Second Add [I25 10] CAD (coronary artery disease)     8/26/2020  7:42 PM Kathrin Second Add [I10] HTN (hypertension)       ED Disposition     ED Disposition Condition Date/Time Comment    Admit Stable Wed Aug 26, 2020  7:20 PM Case was discussed with Pat Kwon PA-C and the patient's admission status was agreed to be Admission Status: observation status to the service of Dr Aliza Bain  Follow-up Information    None         Current Discharge Medication List      CONTINUE these medications which have NOT CHANGED    Details   albuterol (PROVENTIL HFA,VENTOLIN HFA) 90 mcg/act inhaler Inhale 2 puffs every 6 (six) hours as needed for wheezing    Comments: Substitution to a formulary equivalent within the same pharmaceutical class is authorized  aspirin 81 MG tablet Take 81 mg by mouth daily Took within 24 hours       atenolol (TENORMIN) 25 mg tablet TAKE 1 TABLET AT BEDTIME  Qty: 90 tablet, Refills: 3    Associated Diagnoses: Ventricular bigeminy      atorvastatin (LIPITOR) 20 mg tablet TAKE 1 TABLET AT BEDTIME  Qty: 90 tablet, Refills: 3    Associated Diagnoses: Mixed hyperlipidemia      Bacillus Coagulans-Inulin (PROBIOTIC FORMULA) 1-250 BILLION-MG CAPS Take 1 capsule by mouth daily Record states dose is currently 4 billion      Cholecalciferol (VITAMIN D-3 PO) Take 2,000 Units by mouth daily        clopidogrel (PLAVIX) 75 mg tablet Take 1 tablet (75 mg total) by mouth daily  Qty: 90 tablet, Refills: 3    Associated Diagnoses: Type 1 myocardial infarction ([facility])      cyanocobalamin (VITAMIN B-12) 1,000 mcg tablet Take 1,000 mcg by mouth 3 (three) times a week MWF      donepezil (ARICEPT) 10 mg tablet TAKE 1 TABLET DAILY AT     BEDTIME  Qty: 90 tablet, Refills: 1    Associated Diagnoses: 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 ([facility])      fluticasone-salmeterol (AirDuo RespiClick 18/02) 33-28 mcg/act dry powder inhaler Inhale 1 puff 2 (two) times a day AM & PM  Qty: 1 Inhaler, Refills: 3    Associated Diagnoses: SOB (shortness of breath); Chronic respiratory failure with hypoxia ([address] ); Chronic obstructive pulmonary disease, unspecified COPD type ([facility])      furosemide (LASIX) 20 mg tablet Take 1 tablet (20 mg total) by mouth daily  Qty: 90 tablet, Refills: 3    Associated Diagnoses: Chronic diastolic (congestive) heart failure ([facility])      KRILL OIL PO Take 500 mg by mouth daily        Melatonin 10 MG TABS Take 2 tablets by mouth daily at bedtime Taking 15mg      memantine (NAMENDA) 5 mg tablet Take 5 mg by mouth 2 (two) times a day In the evening       methocarbamol (ROBAXIN) 500 mg tablet Take 1 tablet (500 mg total) by mouth 2 (two) times a day  Qty: 20 tablet, Refills: 0    Associated Diagnoses: Tremor      Multiple Vitamins-Minerals (CENTRUM SILVER ADULT 50+) TABS Take 1 tablet by mouth daily      nitroglycerin (NITROSTAT) 0 4 mg SL tablet PLACE 1 TABLET UNDER THE   TONGUE AND ALLOW TO        DISSOLVE EVERY 5 MINUTES ASNEEDED FOR CHEST PAIN  Qty: 25 tablet, Refills: 0    Associated Diagnoses: Coronary artery disease involving native coronary artery of native heart without angina pectoris; S/P CABG (coronary artery bypass graft); Presence of drug-eluting stent in left circumflex coronary artery      pantoprazole (PROTONIX) 40 mg tablet take 1 tablet by mouth once daily  Qty: 90 tablet, Refills: 1    Associated Diagnoses: Gastroesophageal reflux disease without esophagitis      Potassium Citrate ER 15 MEQ (1620 MG) TBCR TAKE 1 TABLET TWICE A DAY  Qty: 180 tablet, Refills: 3    Associated Diagnoses: Renal calculi      QUEtiapine (SEROquel) 25 mg tablet Take 0 5 tablets (12 5 mg total) by mouth daily at bedtime  Qty: 5 tablet, Refills: 1    Associated Diagnoses: Late onset Alzheimer's disease with behavioral disturbance (HCC)      tamsulosin (FLOMAX) 0 4 mg TAKE 1 CAPSULE DAILY  Qty: 90 capsule, Refills: 3    Associated Diagnoses: Urinary retention      tiotropium (SPIRIVA HANDIHALER) 18 mcg inhalation capsule Place 18 mcg into inhaler and inhale daily  No discharge procedures on file      PDMP Review       Value Time User    PDMP Reviewed  Yes 8/5/2020  1:12 AM Ana Lilia Partida MD          ED Provider  Electronically Signed by           Dannial Dandy, PA-C  08/26/20 2006

## 2020-08-27 ENCOUNTER — APPOINTMENT (OUTPATIENT)
Dept: NON INVASIVE DIAGNOSTICS | Facility: HOSPITAL | Age: 80
DRG: 078 | End: 2020-08-27
Payer: MEDICARE

## 2020-08-27 PROBLEM — R26.2 AMBULATORY DYSFUNCTION: Status: ACTIVE | Noted: 2020-08-27

## 2020-08-27 LAB
ATRIAL RATE: 83 BPM
ATRIAL RATE: 90 BPM
P AXIS: 52 DEGREES
PR INTERVAL: 212 MS
QRS AXIS: 60 DEGREES
QRS AXIS: 63 DEGREES
QRSD INTERVAL: 84 MS
QRSD INTERVAL: 88 MS
QT INTERVAL: 368 MS
QT INTERVAL: 392 MS
QTC INTERVAL: 455 MS
QTC INTERVAL: 460 MS
SARS-COV-2 RNA RESP QL NAA+PROBE: NEGATIVE
T WAVE AXIS: 57 DEGREES
T WAVE AXIS: 71 DEGREES
VENTRICULAR RATE: 83 BPM
VENTRICULAR RATE: 92 BPM

## 2020-08-27 PROCEDURE — 99222 1ST HOSP IP/OBS MODERATE 55: CPT | Performed by: INTERNAL MEDICINE

## 2020-08-27 PROCEDURE — 93880 EXTRACRANIAL BILAT STUDY: CPT | Performed by: SURGERY

## 2020-08-27 PROCEDURE — 93970 EXTREMITY STUDY: CPT | Performed by: SURGERY

## 2020-08-27 PROCEDURE — 97167 OT EVAL HIGH COMPLEX 60 MIN: CPT

## 2020-08-27 PROCEDURE — 93010 ELECTROCARDIOGRAM REPORT: CPT | Performed by: INTERNAL MEDICINE

## 2020-08-27 PROCEDURE — 87635 SARS-COV-2 COVID-19 AMP PRB: CPT | Performed by: INTERNAL MEDICINE

## 2020-08-27 PROCEDURE — 93880 EXTRACRANIAL BILAT STUDY: CPT

## 2020-08-27 PROCEDURE — 97535 SELF CARE MNGMENT TRAINING: CPT

## 2020-08-27 PROCEDURE — 97162 PT EVAL MOD COMPLEX 30 MIN: CPT

## 2020-08-27 PROCEDURE — 93970 EXTREMITY STUDY: CPT

## 2020-08-27 RX ADMIN — HEPARIN SODIUM 5000 UNITS: 5000 INJECTION INTRAVENOUS; SUBCUTANEOUS at 14:06

## 2020-08-27 RX ADMIN — LACTOBACILLUS ACIDOPHILUS / LACTOBACILLUS BULGARICUS 1 PACKET: 100 MILLION CFU STRENGTH GRANULES at 09:12

## 2020-08-27 RX ADMIN — MEMANTINE 5 MG: 5 TABLET ORAL at 17:28

## 2020-08-27 RX ADMIN — METHOCARBAMOL TABLETS 500 MG: 500 TABLET, COATED ORAL at 17:28

## 2020-08-27 RX ADMIN — HEPARIN SODIUM 5000 UNITS: 5000 INJECTION INTRAVENOUS; SUBCUTANEOUS at 21:02

## 2020-08-27 RX ADMIN — MULTIPLE VITAMINS W/ MINERALS TAB 1 TABLET: TAB at 09:11

## 2020-08-27 RX ADMIN — CLOPIDOGREL BISULFATE 75 MG: 75 TABLET ORAL at 09:11

## 2020-08-27 RX ADMIN — TAMSULOSIN HYDROCHLORIDE 0.4 MG: 0.4 CAPSULE ORAL at 09:11

## 2020-08-27 RX ADMIN — FLUTICASONE FUROATE AND VILANTEROL TRIFENATATE 1 PUFF: 100; 25 POWDER RESPIRATORY (INHALATION) at 09:12

## 2020-08-27 RX ADMIN — PANTOPRAZOLE SODIUM 40 MG: 40 TABLET, DELAYED RELEASE ORAL at 06:00

## 2020-08-27 RX ADMIN — HEPARIN SODIUM 5000 UNITS: 5000 INJECTION INTRAVENOUS; SUBCUTANEOUS at 05:32

## 2020-08-27 RX ADMIN — DONEPEZIL HYDROCHLORIDE 10 MG: 5 TABLET, FILM COATED ORAL at 21:02

## 2020-08-27 RX ADMIN — QUETIAPINE FUMARATE 12.5 MG: 25 TABLET ORAL at 21:02

## 2020-08-27 RX ADMIN — MEMANTINE 5 MG: 5 TABLET ORAL at 09:11

## 2020-08-27 RX ADMIN — FUROSEMIDE 20 MG: 20 TABLET ORAL at 09:11

## 2020-08-27 RX ADMIN — TIOTROPIUM BROMIDE 18 MCG: 18 CAPSULE ORAL; RESPIRATORY (INHALATION) at 09:12

## 2020-08-27 RX ADMIN — ATENOLOL 25 MG: 25 TABLET ORAL at 21:01

## 2020-08-27 RX ADMIN — METHOCARBAMOL TABLETS 500 MG: 500 TABLET, COATED ORAL at 09:11

## 2020-08-27 RX ADMIN — ATORVASTATIN CALCIUM 20 MG: 10 TABLET, FILM COATED ORAL at 21:01

## 2020-08-27 RX ADMIN — ASPIRIN 81 MG: 81 TABLET, COATED ORAL at 09:11

## 2020-08-27 NOTE — PLAN OF CARE
Problem: PHYSICAL THERAPY ADULT  Goal: Performs mobility at highest level of function for planned discharge setting  See evaluation for individualized goals  Description: Treatment/Interventions: Elevations, Therapeutic exercise, LE strengthening/ROM, Functional transfer training, Endurance training, Patient/family training, Bed mobility, Gait training          See flowsheet documentation for full assessment, interventions and recommendations  Note: Prognosis: Good  Problem List: Decreased strength, Decreased endurance, Impaired balance, Decreased mobility, Decreased safety awareness, Impaired judgement, Decreased cognition  Assessment: Pt is a 71 yo male admitted on 8/26/20 due to chest pain, questionable syncopal episode  Pt with hx of CABG, AAA, CEA, dementia, TIA  Pt lives with his wife in a 2 story home  Walks with no device  Pt is O2 dependent  Pt on 4L of O2 during eval  Moderate complexity PT eval completed on 8/27/20  O2 sats 94-97% on 4L throughout gait  Pt gait trained 450' without device with initial CGA  With increased gait distance, pt required supervision  Pt will benefit from continued PT to progress gait, transfers, balance, strengthening, steps, and safety in order to maximize function and decrease risk for falls  PT Discharge Recommendation: Return to previous environment with social support     PT - OK to Discharge: Yes(When medically stable)    See flowsheet documentation for full assessment

## 2020-08-27 NOTE — ASSESSMENT & PLAN NOTE
Possible syncope episode at home  His wife reports that he was sitting on recliner and went unresponsive  Wife reports that he was not responding for several miniutes  EMS reports upon their arrival he was awake and complaint of chest pain  Head CT shows-No acute intracranial abnormality  EKG shows- sinus rhythm with first-degree AV block with occasional premature ventricular complexes and premature atrial complexes at a rate of 83 beats per minute  His blood pressure was elevated soon after arrival to the ER    --Telemetry monitoring  --Check Orthostatic  --Monitor blood pressure closely  -0Monitor I/O  --OT/PT eval  --Am labs  --Supportive care

## 2020-08-27 NOTE — OCCUPATIONAL THERAPY NOTE
Occupational Therapy Evaluation     Patient Name: Maryana Bailon  BSUUL'Z Date: 8/27/2020  Problem List  Principal Problem:    Chest pain with moderate risk for cardiac etiology  Active Problems:    COPD (chronic obstructive pulmonary disease) (HCC)    Hypercholesterolemia    GERD (gastroesophageal reflux disease)    Syncope    Abdominal aortic aneurysm (HCC)    Dementia (HCC)    Coronary artery disease involving native coronary artery of native heart without angina pectoris    Elevated d-dimer    Ambulatory dysfunction    Past Medical History  Past Medical History:   Diagnosis Date    AAA [facility]abdominal [facility]) [facility])     Acid reflux     Acute on chronic diastolic congestive heart failure ([facility] [address] ) 2/25/2020    Acute serous otitis media of left ear     recurrence not specified     Anesthesia     "always has mental changes /lingers and lingers after anesthesia"    Anxiety     Aortic aneurysm without rupture ([address] )     Arm bruise     right inner forearm "recent IV"    At risk for falls     BPH without urinary obstruction     Cancer ([address] )     skin CA on nose    CAP (community acquired pneumonia)     Cataracts, bilateral     Change in bowel function     Clubbing of fingers     Colon polyps     Common cold     Contusion of elbow, left     initial encounter     COPD (chronic obstructive pulmonary disease) ([address] )     Coronary artery disease     Cough     Dementia ([address] )     Depression     Dizziness     upon "standing quickly on occas"    Exercise counseling     Fatigue     Full dentures     "doesn't wear them"    Glaucoma screening     High cholesterol     History of abdominal aortic aneurysm (AAA) repair 08/2017    History of bacteremia     History of chronic obstructive lung disease     History of epistaxis     History of influenza vaccination     History of kidney stones     History of pneumonia     "many times" "at least 5 times" almost always goes to sepsis"    History of sepsis 10/2017    History of sinusitis     History of skin cancer     History of sleep apnea     History of transfusion     History of urinary tract infection     [place] (hard of hearing)     Hydronephrosis with obstructing calculus     Influenza vaccine needed     Jock itch     left/saw doctor 11/8 and started on antifungal cream    Kidney stones     Left hip pain     Need for pneumococcal vaccination     Need for prophylactic vaccination and inoculation against influenza     Other emphysema ([facility] [address] )     Pancreatitis, chronic ([facility] [address] )     pt and wife can't confirm 11/10/17    Pneumonia 10/26/2017    admitted [facility]    Pulmonary emphysema ([facility] [address] )     Screening for genitourinary condition     Screening for neurological condition     Sepsis ([facility] [address] )     due to unspecified organism     Short-term memory loss     Sleep apnea     does not use CPAP      Special screening examination for neoplasm of prostate     TIA involving carotid artery     "before carotid surgery"    Ulcer     stomach, "years ago"    Unsteady gait     Use of cane as ambulatory aid     sometimes    Vitamin D deficiency     Wears glasses      Past Surgical History  Past Surgical History:   Procedure Laterality Date    ABDOMINAL AORTIC ANEURYSM REPAIR  08/23/2017    ABDOMINAL AORTIC ANEURYSM REPAIR, ENDOVASCULAR      BACK SURGERY      spinal stenosis    CARDIAC SURGERY      CABG x3    CAROTID ENDARTARECTOMY Left 11/1996    CATARACT EXTRACTION Bilateral     CORONARY ARTERY BYPASS GRAFT  01/2003    x3    CYSTOSCOPY      with insertion of ureteral stent     CYSTOSCOPY      with ureteroscopy with lithotripsy     EXCISIONAL HEMORRHOIDECTOMY      EYE SURGERY Bilateral     "for a wrinkle" after cataract surgery    HERNIA REPAIR      umbilical    LITHOTRIPSY      renal    MOUTH SURGERY      full mouth extraction     CA COLONOSCOPY FLX DX W/COLLJ SPEC WHEN PFRMD N/A 5/16/2017    Procedure: COLONOSCOPY with polypectomies/ hot snare and tattoo;  Surgeon: Julio Nixon MD;  [facility]: [facility]; Service: Gastroenterology    AZ CYSTOURETHROSCOPY N/A 11/30/2017    Procedure: [place];  Surgeon: Vincenzo Mora MD;  [facility];  Service: Urology    AZ ESOPHAGOGASTRODUODENOSCOPY TRANSORAL DIAGNOSTIC N/A 8/18/2016    Procedure: ESOPHAGOGASTRODUODENOSCOPY (EGD); Surgeon: Julio Nixon MD;  [facility]: [facility]; Service: Gastroenterology    [place] REMOVE BLADDER STONE,<2 5 CM N/A 11/30/2017    Procedure: Lauren Yeyo;  Surgeon: Vincenzo Mora MD;  [facility];  Service: Urology    SKIN BIOPSY      TONSILLECTOMY      URETERAL STENT PLACEMENT      and removal             08/27/20 0907   Note Type   Note type Eval/Treat   Restrictions/Precautions   Weight Bearing Precautions Per Order No   Other Precautions Fall Risk; Chair Alarm;Telemetry;O2   Pain Assessment   Pain Assessment Tool Pain Assessment not indicated - pt denies pain   Home Living   Type of [facility] Multi-level;Performs ADLs on one level; Able to live on main level with bedroom/bathroom;Stairs to enter without rails; Other (Comment)  (1 [address]; 1st floor setup)   Bathroom Shower/Tub Walk-in shower   Bathroom Toilet Standard   Bathroom Equipment Grab bars in shower;Grab bars around toilet   [address] Walker;Cane;Grab bars   Additional Comments pt reports no device at baseline during mobility   Prior Function   Level of [place] Independent with ADLs and functional mobility; Needs assistance with IADLs   Lives With Spouse   Receives Help From Family   ADL Assistance Independent   IADLs Needs assistance   Falls in the last 6 months 1 to 4   Psychosocial   Psychosocial (WDL) WDL   Subjective   Subjective "I am doing good"   ADL   Where Assessed Edge of bed   LB Dressing Assistance 4  Minimal Assistance   LB Dressing Deficit Don/doff R sock; Don/doff L sock; Don/doff R shoe;Don/doff L shoe   Additional Comments pt seated EOB and performs tasks; pt with apparent problem solving and sequencing deficits requiring cues throughout to complete   Bed Mobility   Supine to Sit 5  Supervision   Additional items Increased time required   Sit to Supine   (pt seated in chair at end of session)   Additional Comments pt on 4L O2 during session; SpO2 ranged 89-96% during session with minimal SOB   Transfers   Sit to Stand 5  Supervision   Additional items Increased time required;Verbal cues  (no device)   Stand to Sit 5  Supervision   Additional items Increased time required;Verbal cues  (no device)   Additional Comments pt with no LOB; mild instability during session   Functional Mobility   Functional Mobility 5  Supervision   Additional Comments pt performed functional mobility with no device; performed ~450ft with no LOB   Additional items   (no device)   Balance   Static Sitting Good   Dynamic Sitting Good   Static Standing Fair +   Dynamic Standing Fair   Ambulatory Fair   Activity Tolerance   Activity Tolerance Patient limited by fatigue   RUE Assessment   RUE Assessment WFL   LUE Assessment   LUE Assessment WFL   Hand Function   Gross Motor Coordination Functional   Fine Motor Coordination Functional   Sensation   Light Touch No apparent deficits   Sharp/Dull No apparent deficits   Cognition   Overall Cognitive Status Impaired   Arousal/Participation Alert   Attention Within functional limits   Orientation Level Oriented to person;Oriented to place; Disoriented to time;Disoriented to situation   Memory Decreased long term memory;Decreased short term memory;Decreased recall of recent events   Following Commands Follows one step commands with increased time or repetition   Assessment   Limitation Decreased ADL status; Decreased UE strength;Decreased Safe judgement during ADL;Decreased endurance;Decreased cognition;Decreased high-level ADLs; Decreased self-care trans   Assessment Pt is a 70 y o  male seen for OT evaluation s/p admit to [facility] on 8/26/2020 w/ Chest pain with moderate risk for cardiac etiology  Comorbidities affecting pt's functional performance at time of assessment include: HTN, limited hearing, COPD and cancer history  Personal factors affecting pt at time of IE include:steps to enter environment, limited home support, difficulty performing ADLS, difficulty performing IADLS , limited insight into deficits, decreased initiation and engagement  and health management   Prior to admission, pt was (I) with ADLs and IADLs with no device during functional mobility  Upon evaluation: Pt requires (S) level with no device during functional mobility 2* the following deficits impacting occupational performance: weakness, decreased strength, decreased balance, decreased tolerance, impaired initiation, impaired sequencing, impaired problem solving, decreased safety awareness and impaired interpersonal skills  Pt to benefit from continued skilled OT tx while in the hospital to address deficits as defined above and maximize level of functional independence w ADL's and functional mobility  Occupational Performance areas to address include: grooming, bathing/shower, toilet hygiene, dressing, functional mobility, community mobility and clothing management  From OT standpoint, recommendation at time of d/c would be home with home health services  Goals   Patient Goals to go home    Short Term Goal  pt will perform UE strengthening exercises    Long Term Goal #1 pt will demonstrate toilet transfers and hygiene at (I) level   Long Term Goal #2 pt will demonstrate functional mobility with no device at (I) level   Long Term Goal pt will demonstreate UB/LB bathing and grooming tasks seated in chair at (I) level   Plan   Treatment Interventions ADL retraining;Functional transfer training;UE strengthening/ROM; Endurance training;Patient/family training; Activityengagement   Goal Expiration Date 09/10/20   OT Frequency 3-5x/wk   Recommendation   OT Discharge Recommendation Home with skilled therapy   Barthel Index   Feeding 5   Bathing 0   Grooming Score 0   Dressing Score 5   Bladder Score 10   Bowels Score 10   Toilet Use Score 5   Transfers (Bed/Chair) Score 10   Mobility (Level Surface) Score 10   Stairs Score 0   Barthel Index Score 55        Pt will benefit from continued OT services in order to maximize (I) c ADL performance, FM c no device, and improve overall endurance/strength required to complete functional tasks in preparation for d/c  Pt left seated in chair at end of session; all needs within reach; all lines intact; scds connected and turned on

## 2020-08-27 NOTE — PLAN OF CARE
Problem: Potential for Falls  Goal: Patient will remain free of falls  Description: INTERVENTIONS:  - Assess patient frequently for physical needs  -  Identify cognitive and physical deficits and behaviors that affect risk of falls    -  [place] fall precautions as indicated by assessment   - Educate patient/family on patient safety including physical limitations  - Instruct patient to call for assistance with activity based on assessment  - Modify environment to reduce risk of injury  - Consider OT/PT consult to assist with strengthening/mobility  Outcome: Progressing     Problem: PAIN - ADULT  Goal: Verbalizes/displays adequate comfort level or baseline comfort level  Description: Interventions:  - Encourage patient to monitor pain and request assistance  - Assess pain using appropriate pain scale  - Administer analgesics based on type and severity of pain and evaluate response  - Implement non-pharmacological measures as appropriate and evaluate response  - Consider cultural and social influences on pain and pain management  - Notify physician/advanced practitioner if interventions unsuccessful or patient reports new pain  Outcome: Progressing     Problem: INFECTION - ADULT  Goal: Absence or prevention of progression during hospitalization  Description: INTERVENTIONS:  - Assess and monitor for signs and symptoms of infection  - Monitor lab/diagnostic results  - Monitor all insertion sites, i e  indwelling lines, tubes, and drains  - Monitor endotracheal if appropriate and nasal secretions for changes in amount and color  - [place] appropriate cooling/warming therapies per order  - Administer medications as ordered  - Instruct and encourage patient and family to use good hand hygiene technique  - Identify and instruct in appropriate isolation precautions for identified infection/condition  Outcome: Progressing  Goal: Absence of fever/infection during neutropenic period  Description: INTERVENTIONS:  - Monitor WBC    Outcome: Progressing     Problem: SAFETY ADULT  Goal: Patient will remain free of falls  Description: INTERVENTIONS:  - Assess patient frequently for physical needs  -  Identify cognitive and physical deficits and behaviors that affect risk of falls    -  [place] fall precautions as indicated by assessment   - Educate patient/family on patient safety including physical limitations  - Instruct patient to call for assistance with activity based on assessment  - Modify environment to reduce risk of injury  - Consider OT/PT consult to assist with strengthening/mobility  Outcome: Progressing  Goal: Maintain or return to baseline ADL function  Description: INTERVENTIONS:  -  Assess patient's ability to carry out ADLs; assess patient's baseline for ADL function and identify physical deficits which impact ability to perform ADLs (bathing, care of mouth/teeth, toileting, grooming, dressing, etc )  - Assess/evaluate cause of self-care deficits   - Assess range of motion  - Assess patient's mobility; develop plan if impaired  - Assess patient's need for assistive devices and provide as appropriate  - Encourage maximum independence but intervene and supervise when necessary  - Involve family in performance of ADLs  - Assess for home care needs following discharge   - Consider OT consult to assist with ADL evaluation and planning for discharge  - Provide patient education as appropriate  Outcome: Progressing  Goal: Maintain or return mobility status to optimal level  Description: INTERVENTIONS:  - Assess patient's baseline mobility status (ambulation, transfers, stairs, etc )    - Identify cognitive and physical deficits and behaviors that affect mobility  - Identify mobility aids required to assist with transfers and/or ambulation (gait belt, sit-to-stand, lift, walker, cane, etc )  - [place] fall precautions as indicated by assessment  - Record patient progress and toleration of activity level on Mobility SBAR; progress patient to next Phase/Stage  - Instruct patient to call for assistance with activity based on assessment  - Consider rehabilitation consult to assist with strengthening/weightbearing, etc   Outcome: Progressing     Problem: DISCHARGE PLANNING  Goal: Discharge to home or other facility with appropriate resources  Description: INTERVENTIONS:  - Identify barriers to discharge w/patient and caregiver  - Arrange for needed discharge resources and transportation as appropriate  - Identify discharge learning needs (meds, wound care, etc )  - Arrange for interpretive services to assist at discharge as needed  - Refer to Case Management Department for coordinating discharge planning if the patient needs post-hospital services based on physician/advanced practitioner order or complex needs related to functional status, cognitive ability, or social support system  Outcome: Progressing     Problem: Knowledge Deficit  Goal: Patient/family/caregiver demonstrates understanding of disease process, treatment plan, medications, and discharge instructions  Description: Complete learning assessment and assess knowledge base    Interventions:  - Provide teaching at level of understanding  - Provide teaching via preferred learning methods  Outcome: Progressing     Problem: CARDIOVASCULAR - ADULT  Goal: Maintains optimal cardiac output and hemodynamic stability  Description: INTERVENTIONS:  - Monitor I/O, vital signs and rhythm  - Monitor for S/S and trends of decreased cardiac output  - Administer and titrate ordered vasoactive medications to optimize hemodynamic stability  - Assess quality of pulses, skin color and temperature  - Assess for signs of decreased coronary artery perfusion  - Instruct patient to report change in severity of symptoms  Outcome: Progressing  Goal: Absence of cardiac dysrhythmias or at baseline rhythm  Description: INTERVENTIONS:  - Continuous cardiac monitoring, vital signs, obtain 12 lead EKG if ordered  - Administer antiarrhythmic and heart rate control medications as ordered  - Monitor electrolytes and administer replacement therapy as ordered  Outcome: Progressing     Problem: METABOLIC, FLUID AND ELECTROLYTES - ADULT  Goal: Electrolytes maintained within normal limits  Description: INTERVENTIONS:  - Monitor labs and assess patient for signs and symptoms of electrolyte imbalances  - Administer electrolyte replacement as ordered  - Monitor response to electrolyte replacements, including repeat lab results as appropriate  - Instruct patient on fluid and nutrition as appropriate  Outcome: Progressing  Goal: Fluid balance maintained  Description: INTERVENTIONS:  - Monitor labs   - Monitor I/O and WT  - Instruct patient on fluid and nutrition as appropriate  - Assess for signs & symptoms of volume excess or deficit  Outcome: Progressing  Goal: Glucose maintained within target range  Description: INTERVENTIONS:  - Monitor Blood Glucose as ordered  - Assess for signs and symptoms of hyperglycemia and hypoglycemia  - Administer ordered medications to maintain glucose within target range  - Assess nutritional intake and initiate nutrition service referral as needed  Outcome: Progressing     Problem: HEMATOLOGIC - ADULT  Goal: Maintains hematologic stability  Description: INTERVENTIONS  - Assess for signs and symptoms of bleeding or hemorrhage  - Monitor labs  - Administer supportive blood products/factors as ordered and appropriate  Outcome: Progressing     Problem: MUSCULOSKELETAL - ADULT  Goal: Maintain or return mobility to safest level of function  Description: INTERVENTIONS:  - Assess patient's ability to carry out ADLs; assess patient's baseline for ADL function and identify physical deficits which impact ability to perform ADLs (bathing, care of mouth/teeth, toileting, grooming, dressing, etc )  - Assess/evaluate cause of self-care deficits   - Assess range of motion  - Assess patient's mobility  - Assess patient's need for assistive devices and provide as appropriate  - Encourage maximum independence but intervene and supervise when necessary  - Involve family in performance of ADLs  - Assess for home care needs following discharge   - Consider OT consult to assist with ADL evaluation and planning for discharge  - Provide patient education as appropriate  Outcome: Progressing  Goal: Maintain proper alignment of affected body part  Description: INTERVENTIONS:  - Support, maintain and protect limb and body alignment  - Provide patient/ family with appropriate education  Outcome: Progressing

## 2020-08-27 NOTE — ASSESSMENT & PLAN NOTE
Suspected syncope episode at home, patient declines any  I tried to call and speak to patient wife multiple time, she did not recall, left my personal number  Son also did not be call  As per admitting physician note, His wife reports that he was sitting on recliner and went unresponsive  Wife reports that he was not responding for several miniutes    EMS reports upon their arrival he was awake and complaint of chest pain responded to nitroglycerin  Head CT shows-No acute intracranial abnormality  EKG shows- sinus rhythm with first-degree AV block with occasional premature ventricular complexes and premature atrial complexes at a rate of 83 beats per minute, patient seen by cardiology recommending no new intervention  Patient has a recent echo no read to repeat echo    His blood pressure was elevated soon after arrival to the ER  --Telemetry monitoring no arrhythmias  --orthostatics within normal limits  --Monitor blood pressure, well controlled   --OT/PT eval advising home with home care    Safe to discharge home

## 2020-08-27 NOTE — CASE MANAGEMENT
Met with pt to discuss role as  in helping pt to develop discharge plan and to help pt carry out their plan  Pt lives in 2 story house with his wife  Pt has 1 1/2 BERNICE with a railing  Pt has 12 steps on the inside  Pt has bedroom on the 2nd floor  Pt has bathroom Pt  Has 02 from [employer] which he uses at 4 Liters continuously  Pt has a cane which he uses to ambulate outside with  Pt is independent with ADL's  Pt's wife does the cooking and cleaning  Pt's wife drives him to appointments  Pt does have a scale which he uses to weigh himself every day  Pt has [facility] the past  Pt's PCP is Dr Khadra Patel  [facility]  Pt uses the [address] [place] [address] In [facility]  Will continue to follow for any Case Management needs

## 2020-08-27 NOTE — RESPIRATORY THERAPY NOTE
RT Protocol Note  Rut Gaviria 70 y o  male MRN: [id number]  Unit/Bed#: 410-01 Encounter: [id number]    Assessment    Principal Problem:    Chest pain with moderate risk for cardiac etiology  Active Problems:    COPD (chronic obstructive pulmonary disease) ([facility])    Hypercholesterolemia    GERD (gastroesophageal reflux disease)    Syncope    Abdominal aortic aneurysm (HCC)    Dementia ([facility])    Coronary artery disease involving native coronary artery of native heart without angina pectoris    Elevated d-dimer      Home Pulmonary Medications:  Spiriva daily Albuterol Mdi Q6 PRN   Breo  Proventil MDI Q6 PRN       Past Medical History:   Diagnosis Date    AAA (abdominal aortic aneurysm) ([facility])     Acid reflux     Acute on chronic diastolic congestive heart failure ([facility]) 2/25/2020    Acute serous otitis media of left ear     recurrence not specified     Anesthesia     "always has mental changes /lingers and lingers after anesthesia"    Anxiety     Aortic aneurysm without rupture ([address] )     Arm bruise     right inner forearm "recent IV"    At risk for falls     BPH without urinary obstruction     Cancer ([address] )     skin CA on nose    CAP (community acquired pneumonia)     Cataracts, bilateral     Change in bowel function     Clubbing of fingers     Colon polyps     Common cold     Contusion of elbow, left     initial encounter     COPD (chronic obstructive pulmonary disease) ([address] )     Coronary artery disease     Cough     Dementia [facility]     Depression     Dizziness     upon "standing quickly on occas"    Exercise counseling     Fatigue     Full dentures     "doesn't wear them"    Glaucoma screening     High cholesterol     History of abdominal aortic aneurysm (AAA) repair 08/2017    History of bacteremia     History of chronic obstructive lung disease     History of epistaxis     History of influenza vaccination     History of kidney stones     History of pneumonia     "many times" "at least 5 times" almost always goes to sepsis"    History of sepsis 10/2017    History of sinusitis     History of skin cancer     History of sleep apnea     History of transfusion     History of urinary tract infection     [place] (hard of hearing)     Hydronephrosis with obstructing calculus     Influenza vaccine needed     Jock itch     left/saw doctor  and started on antifungal cream    Kidney stones     Left hip pain     Need for pneumococcal vaccination     Need for prophylactic vaccination and inoculation against influenza     Other emphysema [facility]     Pancreatitis, chronic ([facility] )     pt and wife can't confirm 11/10/17    Pneumonia 10/26/2017    admitted [facility]    Pulmonary emphysema ([facility]     Screening for genitourinary condition     Screening for neurological condition     Sepsis ([facility] )     due to unspecified organism     Short-term memory loss     Sleep apnea     does not use CPAP      Special screening examination for neoplasm of prostate     TIA involving carotid artery     "before carotid surgery"    Ulcer     stomach, "years ago"    Unsteady gait     Use of cane as ambulatory aid     sometimes    Vitamin D deficiency     Wears glasses      Social History     Socioeconomic History    Marital status: /Civil Union     Spouse name: None    Number of children: None    Years of education: None    Highest education level: None   Occupational History    Occupation:      Comment: Retired   Social Needs    Financial resource strain: None    Food insecurity     Worry: None     Inability: None    Transportation needs     Medical: None     Non-medical: None   Tobacco Use    Smoking status: Former Smoker     Packs/day: 1 50     Years: 60 00     Pack years: 90 00     Last attempt to quit:      Years since quittin 6    Smokeless tobacco: Never Used    Tobacco comment:  used to be a 1-1 5 ppd smoker   Substance and Sexual Activity    Alcohol use: Not Currently Alcohol/week: 0 0 standard drinks     Frequency: Never     Drinks per session: Patient refused     Binge frequency: Never     Comment: quit 25 yrs ago    Drug use: No     Comment: No illicit drug use     Sexual activity: Not Currently     Partners: Female   Lifestyle    Physical activity     Days per week: None     Minutes per session: None    Stress: None   Relationships    Social connections     Talks on phone: None     Gets together: None     Attends [employer] service: None     Active member of club or organization: None     Attends meetings of clubs or organizations: None     Relationship status: None    Intimate partner violence     Fear of current or ex partner: None     Emotionally abused: None     Physically abused: None     Forced sexual activity: None   Other Topics Concern    None   Social History Narrative    Retired     No living Will     No advance directives     Daily caffeine consumption - 4-5 servings a day        Subjective         Objective    Physical Exam:   Assessment Type: Assess only  General Appearance: Alert, Awake  Respiratory Pattern: Dyspnea with exertion  Chest Assessment: Chest expansion symmetrical  Bilateral Breath Sounds: Clear, Diminished  Cough: None    Vitals:  Blood pressure 127/77, pulse 70, temperature 98 3 °F (36 8 °C), resp  rate 18, height 5' 8" (1 727 m), weight 77 1 kg (170 lb), SpO2 97 %  Imaging and other studies: I have personally reviewed pertinent reports  Plan    Respiratory Plan: Home Bronchodilator Patient pathway      Will keep treatments as ordered

## 2020-08-27 NOTE — PLAN OF CARE
Problem: Potential for Falls  Goal: Patient will remain free of falls  Description: INTERVENTIONS:  - Assess patient frequently for physical needs  -  Identify cognitive and physical deficits and behaviors that affect risk of falls    -  [place] fall precautions as indicated by assessment   - Educate patient/family on patient safety including physical limitations  - Instruct patient to call for assistance with activity based on assessment  - Modify environment to reduce risk of injury  - Consider OT/PT consult to assist with strengthening/mobility  Outcome: Progressing     Problem: PAIN - ADULT  Goal: Verbalizes/displays adequate comfort level or baseline comfort level  Description: Interventions:  - Encourage patient to monitor pain and request assistance  - Assess pain using appropriate pain scale  - Administer analgesics based on type and severity of pain and evaluate response  - Implement non-pharmacological measures as appropriate and evaluate response  - Consider cultural and social influences on pain and pain management  - Notify physician/advanced practitioner if interventions unsuccessful or patient reports new pain  Outcome: Progressing     Problem: INFECTION - ADULT  Goal: Absence or prevention of progression during hospitalization  Description: INTERVENTIONS:  - Assess and monitor for signs and symptoms of infection  - Monitor lab/diagnostic results  - Monitor all insertion sites, i e  indwelling lines, tubes, and drains  - Monitor endotracheal if appropriate and nasal secretions for changes in amount and color  - [place] appropriate cooling/warming therapies per order  - Administer medications as ordered  - Instruct and encourage patient and family to use good hand hygiene technique  - Identify and instruct in appropriate isolation precautions for identified infection/condition  Outcome: Progressing  Goal: Absence of fever/infection during neutropenic period  Description: INTERVENTIONS:  - Monitor WBC    Outcome: Progressing     Problem: SAFETY ADULT  Goal: Patient will remain free of falls  Description: INTERVENTIONS:  - Assess patient frequently for physical needs  -  Identify cognitive and physical deficits and behaviors that affect risk of falls    -  [place] fall precautions as indicated by assessment   - Educate patient/family on patient safety including physical limitations  - Instruct patient to call for assistance with activity based on assessment  - Modify environment to reduce risk of injury  - Consider OT/PT consult to assist with strengthening/mobility  Outcome: Progressing  Goal: Maintain or return to baseline ADL function  Description: INTERVENTIONS:  -  Assess patient's ability to carry out ADLs; assess patient's baseline for ADL function and identify physical deficits which impact ability to perform ADLs (bathing, care of mouth/teeth, toileting, grooming, dressing, etc )  - Assess/evaluate cause of self-care deficits   - Assess range of motion  - Assess patient's mobility; develop plan if impaired  - Assess patient's need for assistive devices and provide as appropriate  - Encourage maximum independence but intervene and supervise when necessary  - Involve family in performance of ADLs  - Assess for home care needs following discharge   - Consider OT consult to assist with ADL evaluation and planning for discharge  - Provide patient education as appropriate  Outcome: Progressing  Goal: Maintain or return mobility status to optimal level  Description: INTERVENTIONS:  - Assess patient's baseline mobility status (ambulation, transfers, stairs, etc )    - Identify cognitive and physical deficits and behaviors that affect mobility  - Identify mobility aids required to assist with transfers and/or ambulation (gait belt, sit-to-stand, lift, walker, cane, etc )  - [place] fall precautions as indicated by assessment  - Record patient progress and toleration of activity level on Mobility SBAR; progress patient to next Phase/Stage  - Instruct patient to call for assistance with activity based on assessment  - Consider rehabilitation consult to assist with strengthening/weightbearing, etc   Outcome: Progressing     Problem: DISCHARGE PLANNING  Goal: Discharge to home or other facility with appropriate resources  Description: INTERVENTIONS:  - Identify barriers to discharge w/patient and caregiver  - Arrange for needed discharge resources and transportation as appropriate  - Identify discharge learning needs (meds, wound care, etc )  - Arrange for interpretive services to assist at discharge as needed  - Refer to Case Management Department for coordinating discharge planning if the patient needs post-hospital services based on physician/advanced practitioner order or complex needs related to functional status, cognitive ability, or social support system  Outcome: Progressing     Problem: Knowledge Deficit  Goal: Patient/family/caregiver demonstrates understanding of disease process, treatment plan, medications, and discharge instructions  Description: Complete learning assessment and assess knowledge base    Interventions:  - Provide teaching at level of understanding  - Provide teaching via preferred learning methods  Outcome: Progressing     Problem: CARDIOVASCULAR - ADULT  Goal: Maintains optimal cardiac output and hemodynamic stability  Description: INTERVENTIONS:  - Monitor I/O, vital signs and rhythm  - Monitor for S/S and trends of decreased cardiac output  - Administer and titrate ordered vasoactive medications to optimize hemodynamic stability  - Assess quality of pulses, skin color and temperature  - Assess for signs of decreased coronary artery perfusion  - Instruct patient to report change in severity of symptoms  Outcome: Progressing  Goal: Absence of cardiac dysrhythmias or at baseline rhythm  Description: INTERVENTIONS:  - Continuous cardiac monitoring, vital signs, obtain 12 lead EKG if ordered  - Administer antiarrhythmic and heart rate control medications as ordered  - Monitor electrolytes and administer replacement therapy as ordered  Outcome: Progressing     Problem: METABOLIC, FLUID AND ELECTROLYTES - ADULT  Goal: Electrolytes maintained within normal limits  Description: INTERVENTIONS:  - Monitor labs and assess patient for signs and symptoms of electrolyte imbalances  - Administer electrolyte replacement as ordered  - Monitor response to electrolyte replacements, including repeat lab results as appropriate  - Instruct patient on fluid and nutrition as appropriate  Outcome: Progressing  Goal: Fluid balance maintained  Description: INTERVENTIONS:  - Monitor labs   - Monitor I/O and WT  - Instruct patient on fluid and nutrition as appropriate  - Assess for signs & symptoms of volume excess or deficit  Outcome: Progressing  Goal: Glucose maintained within target range  Description: INTERVENTIONS:  - Monitor Blood Glucose as ordered  - Assess for signs and symptoms of hyperglycemia and hypoglycemia  - Administer ordered medications to maintain glucose within target range  - Assess nutritional intake and initiate nutrition service referral as needed  Outcome: Progressing     Problem: HEMATOLOGIC - ADULT  Goal: Maintains hematologic stability  Description: INTERVENTIONS  - Assess for signs and symptoms of bleeding or hemorrhage  - Monitor labs  - Administer supportive blood products/factors as ordered and appropriate  Outcome: Progressing     Problem: MUSCULOSKELETAL - ADULT  Goal: Maintain or return mobility to safest level of function  Description: INTERVENTIONS:  - Assess patient's ability to carry out ADLs; assess patient's baseline for ADL function and identify physical deficits which impact ability to perform ADLs (bathing, care of mouth/teeth, toileting, grooming, dressing, etc )  - Assess/evaluate cause of self-care deficits   - Assess range of motion  - Assess patient's mobility  - Assess patient's need for assistive devices and provide as appropriate  - Encourage maximum independence but intervene and supervise when necessary  - Involve family in performance of ADLs  - Assess for home care needs following discharge   - Consider OT consult to assist with ADL evaluation and planning for discharge  - Provide patient education as appropriate  Outcome: Progressing  Goal: Maintain proper alignment of affected body part  Description: INTERVENTIONS:  - Support, maintain and protect limb and body alignment  - Provide patient/ family with appropriate education  Outcome: Progressing

## 2020-08-27 NOTE — PHYSICIAN ADVISOR
The patient presented to the hospital yesterday for evaluation of chest pain  He was placed under observation class  There was reported possible episode of syncope  Today he was evaluated by Cardiology  The chest pain is considered atypical for angina  The provider was determining the patient's safety for discharge home  Later today the hospitalist spoke to the patient's wife  She was able to further describe his unresponsive episode  As unresponsive and not responding to commands for 10 minutes  He had a staring look  The provider added on EEG and carotid Doppler to the workup  The patient has dementia appears to be getting worse and the patient's wife has concerns about providing the care he needs  Case management is involved  The patient will require hospitalization for at least two midnights  Based on the concerns noted, change to inpatient class would be appropriate

## 2020-08-27 NOTE — PROGRESS NOTES
Progress Note - Michele Erickson 1940, 70 y o  male MRN: [id number]    Unit/Bed#: 410-01 Encounter: [id number]    Primary Care Provider: Juan Hernandez DO   Date and time admitted to hospital: 8/26/2020  3:56 PM        Ambulatory dysfunction  Assessment & Plan  Patient overall very weak, frail, for last 2-3 months, he started intention tremors, his PCP doing further workup, neurology outpatient appointment already scheduled  Normal CT head noted  Patient has some memory issues, which seems chronic  Although patient evaluated by PT OT and recommended home assistance, I am not sure about patient wife age, comorbidities, health status, I am trying to reach patient wife and son, to make sure patient is safe to go home  Elevated d-dimer  Assessment & Plan  D-dimer level more than 10  CTA negative for acute pulmonary embolism  --VTE prophylaxis heparin 5000 units SQ q 8 hours        Coronary artery disease involving native coronary artery of native heart without angina pectoris  Assessment & Plan  Presented to the emergency room complaints of chest pain  Does have history of drug-eluting stent placement in February of 2020  Did receive aspirin and nitroglycerin in the ER, with some relief  Continue PTA medication (atenolol, aspirin, Plavix, Lipitor)    Patient seen and evaluated by cardiology team, given negative troponin x 3 and EKG negative for ischemia, recommending no further workup  Patient also reports self-limiting 2 episodes of sharp lower chest upper abdominal pain  Other differential diagnosis include musculoskeletal pain versus GERD related pain, will add p r n  Tramadol  Continue Protonix for GERD        Dementia [facility])  Assessment & Plan  Currently at baseline  Continue Aricept and Namenda    Abdominal aortic aneurysm [facility])  Assessment & Plan  Currently stable  He did have repair on 08/23/2017  Monitor blood pressure closely    Syncope  Assessment & Plan  Suspected syncope episode at home, patient declines any  I tried to call and speak to patient wife multiple time, she did not recall, left my personal number  Son also did not be call  As per admitting physician note, His wife reports that he was sitting on recliner and went unresponsive  Wife reports that he was not responding for several miniutes    EMS reports upon their arrival he was awake and complaint of chest pain responded to nitroglycerin  Head CT shows-No acute intracranial abnormality  EKG shows- sinus rhythm with first-degree AV block with occasional premature ventricular complexes and premature atrial complexes at a rate of 83 beats per minute, patient seen by cardiology recommending no new intervention  Patient has a recent echo no read to repeat echo    His blood pressure was elevated soon after arrival to the ER  --Telemetry monitoring no arrhythmias  --orthostatics within normal limits  --Monitor blood pressure, well controlled   --OT/PT eval advising home with home care  Safe to discharge home      GERD (gastroesophageal reflux disease)  Assessment & Plan  Continue Protonix 40 mg p o  Daily    Hypercholesterolemia  Assessment & Plan  Continue PTA dose statins    COPD (chronic obstructive pulmonary disease) ([facility])  Assessment & Plan  Respiratory distress evidence of acute exacerbation  --Respiratory protocol  --Continue oxygen at 3 LPM  --Continue PTA respiratory medication  --Continue outpatient pulmonary follow-up    * Chest pain with moderate risk for cardiac etiology  Assessment & Plan  Troponin negative x3, EKG negative for ischemia  Cardiology recommending no further workup       -Food electrolyte and nutrition: full    -VTE prophylaxis:  Heparin    - Level 1 - Full Code    -Pt Centered Rounds: Performed    -Education/Discussion with family and pt: Assessment plan discussed with patient    I tried to call son as well as wife multiple time, nobody attend my call     -Discharge planning/length of stay: Today 0 day(s)    Although patient evaluated by PT OT and recommended home assistance and discharge, I am not sure about patient wife age, comorbidities, health status to be healthy to take care of her ,, I am trying to reach patient wife and son, to make sure patient is safe to go home  I communicated my concerns to case management too  The total time spent more than 35 minutes, more than 50% of total time spent on counseling and coordination of care as described above  ? Please Note: Voice recognition used throughout compilation of provider note  Typographical errors may be present  Extended Emergency Contact Information  Primary Emergency Contact: [facility]  Address: [address], [address] [place] [address] Phone: [phone]  Mobile Phone: [phone]  Relation: Spouse  Secondary Emergency Contact: [facility]  Mobile Phone: [phone]  Relation: Son_   ? Subjectives   Interval History:  Patient denies any complaints, no chest pain, no nausea, no vomiting  Denies any syncopal episode at home  Chest pain episodes x2, severe but very short duration, no associated nausea, vomiting  Rest 12 point review of systems was reviewed and otherwise negative except as noted in interval Hx     Physical Exam:    Current    Min/Max  Temperature: 98 5 °F (36 9 °C)  Temp  Min: 98 °F (36 7 °C)  Max: 98 5 °F (36 9 °C)  Pulse: 80  Pulse  Min: 70  Max: 98  Respirations: 18  Resp  Min: 16  Max: 22  Blood Pressure: 130/76  BP  Min: 96/52  Max: 225/143  SpO2: 95 %  SpO2  Min: 90 %  Max: 97 %  ? Weight:   Wt Readings from Last 4 Encounters:   08/27/20 74 8 kg (164 lb 14 5 oz)   08/21/20 78 4 kg (172 lb 13 5 oz)   08/17/20 78 9 kg (174 lb)   08/04/20 78 8 kg (173 lb 11 6 oz)     BMI: Body mass index is 25 07 kg/m²  Ins/Os last 24 luis manuel    General: No distress, well-developed   ?   ENT: EOMI, Conjunctiva non-icteric, MMM, no facial ASYMMETRY  Respiratory: Trachea midline  Lungs clear to auscultation bilaterally  No use of accessory muscle  ?  CVS: No JVD, regular rate and rhythm, pulses equal all extremities  ? Abd: Soft and non-tender  Bowel sounds normal ?No surgery scar  ??  Skin: No bruising, rash    MSK: No joint swelling, tenderness, fine tremors at rest and right upper extremity improved with movement  ? CNS: AAOx3, hard of hearing, power symmetrical upper and lower extremity  ? PSYCH: Normal affect,     Genitourinary: No Martel       MEDS:  Current Facility-Administered Medications   Medication Dose Route Frequency Provider Last Rate    acetaminophen  650 mg Oral Q6H PRN Jimmie Vasquez, PA-C      albuterol  2 puff Inhalation Q6H PRN Jimmie Vasquez, PA-C      aspirin  81 mg Oral Daily Jimmie Vasquez, PA-C      atenolol  25 mg Oral HS Jimmie Vasquez, PA-C      atorvastatin  20 mg Oral HS Jimmie Vasquez, PA-C      clopidogrel  75 mg Oral Daily Jimmie Vasquez, PA-C      vitamin B-12  1,000 mcg Oral Once per day on Mon Wed Fri Jimmie Vasquez, PA-C      donepezil  10 mg Oral HS Jimmie Vasquez, PA-C      fluticasone-vilanterol  1 puff Inhalation Daily Jimmie Vasquez, PA-C      furosemide  20 mg Oral Daily Jimmie Vasquez, PA-C      heparin (porcine)  5,000 Units Subcutaneous Q8H [address] Jimmie Vasquez, PA-C      lactobacillus acidophilus-bulgaricus  1 packet Oral Daily Jimmie Vasquez, PA-C      memantine  5 mg Oral BID Jimmie Vasquez, PA-C      methocarbamol  500 mg Oral BID Jimmie Vasquez, PA-C      multivitamin-minerals  1 tablet Oral Daily Jimmie Vasquez, PA-C      ondansetron  4 mg Intravenous Q6H PRN Jimmie Vasquez, PA-C      pantoprazole  40 mg Oral Daily Jimmie Vasquez, PA-C      QUEtiapine  12 5 mg Oral HS Jimmie Vasquez, PA-C      tamsulosin  0 4 mg Oral Daily Jimmie Vasquez, PA-C      tiotropium  18 mcg Inhalation Daily Jimmieefra Vasquez, PA-C           LABORATORY RESULTS:  CBC:  Results from last 7 days   Lab Units 08/26/20  2257 08/26/20  1614 08/21/20  1542   WBC Thousand/uL  --  6 54 5 94 HEMOGLOBIN g/dL  --  16 1 17 1*   HEMATOCRIT %  --  48 2 51 7*   PLATELETS Thousands/uL 106* 118* 128*   NEUTROS PCT %  --  61 57   LYMPHS PCT %  --  23 27   MONOS PCT %  --  12 12   EOS PCT %  --  3 3       CHEMISTRY:  Results from last 7 days   Lab Units 08/26/20  1614 08/21/20  1542   POTASSIUM mmol/L 3 9 4 6   CHLORIDE mmol/L 105 103   CO2 mmol/L 24 26   BUN mg/dL 16 16   CREATININE mg/dL 1 33* 1 40*   CALCIUM mg/dL 8 5 9 4   ALK PHOS U/L 117*  --    ALT U/L 20  --    AST U/L 23  --      Results from last 7 days   Lab Units 08/26/20  1614   MAGNESIUM mg/dL 2 0       COAGULATION:  Results from last 7 days   Lab Units 08/26/20  1614   INR  1 06   PTT seconds 32       OTHER TESTS:  0   Lab Value Date/Time    TROPONINI <0 02 08/26/2020 2257    TROPONINI <0 02 08/26/2020 2024    TROPONINI <0 02 08/26/2020 1614    TROPONINI <0 02 08/04/2020 2250    TROPONINI <0 02 04/04/2020 1456    TROPONINI 0 70 () 02/23/2020 2203    TROPONINI 0 69 () 02/23/2020 2008    TROPONINI 0 72 () 02/23/2020 1750    TROPONINI 0 79 () 02/23/2020 1420    TROPONINI 11 70 (H) 02/19/2020 0128    TROPONINI 14 70 (H) 02/18/2020 2210    TROPONINI 26 07 () 02/18/2020 1714    TROPONINI >40 00 () 02/18/2020 1333    TROPONINI >40 00 () 02/18/2020 0849    TROPONINI 24 75 ([facility]) 02/18/2020 0546    TROPONINI <0 02 02/18/2020 0041    TROPONINI <0 02 11/18/2019 1540    TROPONINI <0 02 11/18/2019 1214    TROPONINI <0 02 07/01/2019 0932    TROPONINI <0 02 07/01/2019 0554    TROPONINI <0 02 06/14/2019 1204    TROPONINI <0 02 12/30/2018 1738    TROPONINI <0 02 12/30/2018 1505    TROPONINI <0 02 12/30/2018 0925    TROPONINI <0 02 05/13/2018 0706    TROPONINI <0 02 03/07/2018 1156    TROPONINI <0 02 03/07/2018 0750    TROPONINI <0 02 03/06/2018 1822    TROPONINI <0 02 02/25/2018 0728    TROPONINI <0 02 10/26/2017 2323    TROPONINI 0 03 12/28/2016 0947    TROPONINI 0 03 12/28/2016 0744    TROPONINI <0 02 12/28/2016 0242     ? ?      Radiological Studies:  I have reviewed images personally as well as reports  Xr Chest 1 View Portable    Result Date: 8/26/2020  Narrative: CHEST INDICATION:   chest pain  COMPARISON:  July 22, 2020 EXAM PERFORMED/VIEWS:  XR CHEST PORTABLE Single image FINDINGS:  Lungs adequately aerated  No lobar consolidation or large effusion  Mild atelectatic changes bilaterally (more so on the right than left)  Cardiac size within normal limits  Numerous surgical clips projecting over the left heart border  Prior sternotomy  No pneumothorax or free air  Osseous structures appear within normal limits for patient age  Impression: Mild atelectatic changes bilaterally (more so on the right than left)  No lobar consolidation or large effusion  Cardiac size normal   No pulmonary edema  Workstation performed: [id number]     Xr Ribs 2 Vw Right    Result Date: 8/3/2020  Narrative: RIGHT RIBS INDICATION:   R07 81: Pleurodynia  COMPARISON:  Chest x-ray from July 22, 2020  VIEWS:  XR RIBS 2 VW RIGHT FINDINGS: There is no fracture or pathologic bone lesion  Soft tissues are unremarkable  Centrilobular emphysema and mild stable coarse interstitial markings with biapical pleural scarring  Median sternotomy wires and surgical clips from CABG  Impression: No fracture  Emphysema and biapical pleural scarring  Workstation performed: [id number]     Xr Spine Thoracic 3 Vw    Result Date: 8/3/2020  Narrative: THORACIC SPINE INDICATION:   R07 81: Pleurodynia  COMPARISON:  None VIEWS:  XR SPINE THORACIC 3 VW FINDINGS: Median sternotomy wires and surgical clips from CABG  Diffuse osteopenia/osteoporosis  Mild gradual mid thoracic kyphosis  There is no fracture or pathologic bone lesion  Limited evaluation of the upper thoracic spine due to superimposition of shadows   Thoracic vertebral alignment is within normal limits  Anterior marginal osteophytes in the mid and lower thoracic spine  There is no displacement of the paraspinal line  The pedicles appear intact  Impression: No acute osseous abnormality  Diffuse osteopenia/osteoporosis  Mild thoracic degenerative disease  Workstation performed: [id number]     Ct Head Without Contrast    Result Date: 8/26/2020  Narrative: CT BRAIN - WITHOUT CONTRAST INDICATION:   Altered mental status  COMPARISON:  November 18, 2019 TECHNIQUE:  CT examination of the brain was performed  In addition to axial images, sagittal and coronal 2D reformatted images were created and submitted for interpretation  Radiation dose length product (DLP) for this visit:  911 41 mGy-cm   This examination, like all CT scans performed in the [facility], was performed utilizing techniques to minimize radiation dose exposure, including the use of iterative  reconstruction and automated exposure control  IMAGE QUALITY:  Diagnostic  FINDINGS: PARENCHYMA: Decreased attenuation is noted in periventricular and subcortical white matter demonstrating an appearance that is statistically most likely to represent mild microangiopathic change  Arterial atherosclerosis  No CT signs of acute infarction  No intracranial mass, mass effect or midline shift  No acute parenchymal hemorrhage  VENTRICLES AND EXTRA-AXIAL SPACES:  Normal for the patient's age  VISUALIZED ORBITS AND PARANASAL SINUSES:  Unremarkable  CALVARIUM AND EXTRACRANIAL SOFT TISSUES:  Normal      Impression: No acute intracranial abnormality  Workstation performed: [id number]     Mri Thoracic Spine Wo Contrast    Result Date: 8/21/2020  Narrative: MRI THORACIC SPINE WITHOUT CONTRAST INDICATION: M54 6: Pain in thoracic spine G89 29: Other chronic pain  COMPARISON:  X-ray 8/3/2020 TECHNIQUE:  Sagittal T1, sagittal T2, sagittal inversion recovery, axial T2,  axial 2D MERGE   IMAGE QUALITY: Intermittent motion related artifact  Owing to pain, axial T2*images could not be performed  FINDINGS: ALIGNMENT: Normal alignment of the thoracic spine  No compression fracture  No subluxation  No scoliosis  MARROW SIGNAL:  Normal marrow signal is identified within the visualized bony structures  No discrete marrow lesion  High STIR marrow signal from the T5 through the T12 level thought to be artifactual   This is not apparent on the T1 or T2 sequences  The paraspinal soft tissues are diffusely upright  THORACIC CORD: Normal signal within the thoracic cord  Conus terminates at the T12-L1 level  PARAVERTEBRAL SOFT TISSUES:  Normal  THORACIC DEGENERATIVE CHANGE: Mild, multilevel degenerative changes not compressive  Impression: Minor, noncompressive degenerative changes of the thoracic spine  Thoracic cord is normal  Workstation performed: [id number]     Cta Dissection Protocol Chest Abdomen Pelvis W Wo Contrast    Result Date: 8/5/2020  Narrative: CT ANGIOGRAM OF THE CHEST, ABDOMEN AND PELVIS WITH AND WITHOUT IV CONTRAST INDICATION:  Severe back pain b/w shoulder blades radiate down back status post endovascular repair of aortic aneurysm  COMPARISON: CT of the chest on August 3, 2020  TECHNIQUE:  CT angiogram examination of the chest, abdomen and pelvis was performed according to standard protocol  This examination, like all CT scans performed in the [facility], was performed utilizing techniques to minimize radiation dose exposure, including the use of iterative reconstruction and automated exposure control  Contrast as well as noncontrast images were obtained  3D reconstructions were performed an independent workstation, and are supplied for review  Rad dose 1856 75 mGy-cm IV Contrast:  100 mL of iohexol (OMNIPAQUE) was administered intravenously without immediate adverse reaction  Enteric Contrast:  Not given FINDINGS: VASCULAR STRUCTURES:  No evidence of aortic dissection    There is a aortobiiliac graft which is patent  No evidence of endoleak  There is a infrarenal abdominal aortic aneurysm not significantly changed in size measuring 5 9 x 5 7 cm  Moderate atherosclerotic calcifications  Moderate narrowing of the left external iliac artery  Mild narrowing of the right external iliac artery  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  Focal area of moderate narrowing at the origin of the superior mesenteric artery (series 602, image 80), stable  OTHER FINDINGS: CHEST: HEART:  Heart is normal in size  Coronary artery calcifications  Status post CABG  No pericardial effusion    LUNGS:  Emphysematous changes of lungs  Right upper lobe scarring is stable  Central airways are patent  PLEURA: No pleural effusion  MEDIASTINUM AND DAVID:  No mass or significant lymphadenopathy  Small mediastinal and hilar lymph nodes measure up to 5 mm in short axis  CHEST WALL AND LOWER NECK: Normal  ABDOMEN LIVER/BILIARY TREE:  Subcentimeter hypodensities in liver incompletely characterized however likely due to cysts  No biliary duct dilatation  GALLBLADDER:  Cholelithiasis without evidence of cholecystitis  SPLEEN:  Unremarkable  Normal size  PANCREAS:  Unremarkable  ADRENAL GLANDS:  Unremarkable  KIDNEYS/URETERS:  Bilateral nonobstructive nephrolithiasis  Calculi measure up to 5 mm on the left and 2 mm on the right  No hydronephrosis  Scattered areas of cortical scarring within bilateral kidneys  PELVIS REPRODUCTIVE ORGANS:  Unremarkable for patient's age  URINARY BLADDER:  Unremarkable  ADDITIONAL ABDOMINAL AND PELVIC STRUCTURES: STOMACH AND BOWEL:  Small hiatal hernia  No bowel obstruction  ABDOMINOPELVIC CAVITY:  No pathologically enlarged mesenteric or retroperitoneal lymph nodes  No ascites or free intraperitoneal air  ABDOMINAL WALL/INGUINAL REGIONS:  Unremarkable  OSSEOUS STRUCTURES:  No acute fracture or destructive osseous lesion  Impression: 1    Stable infrarenal abdominal aortic aneurysm measuring up to 5 9 cm status post aortobiiliac graft repair which is patent  No evidence of endoleak  2   No evidence of aortic dissection  3   Stable moderate narrowing of the left external iliac artery  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  4   Stable focal area of moderate narrowing at the origin of the superior mesenteric artery  5   Coronary artery calcifications status post CABG  6   Cholelithiasis without evidence of cholecystitis  7   Bilateral nonobstructing nephrolithiasis  No hydronephrosis  8   Small hiatal hernia  Workstation performed: [id number]     Cta Chest Pe Study    Result Date: 8/3/2020  Narrative: CTA - CHEST WITH IV CONTRAST - PULMONARY ANGIOGRAM INDICATION:   R07 81: Pleurodynia  COMPARISON: CTA chest 2/18/2020 TECHNIQUE: CTA examination of the chest was performed using angiographic technique according to a protocol specifically tailored to evaluate for pulmonary embolism  Axial, sagittal, and coronal 2D reformatted images were created from the source data and  submitted for interpretation  In addition, coronal 3D MIP postprocessing was performed on the acquisition scanner  Radiation dose length product (DLP) for this visit:  468 49 mGy-cm   This examination, like all CT scans performed in the [facility], was performed utilizing techniques to minimize radiation dose exposure, including the use of iterative  reconstruction and automated exposure control  IV Contrast:  85 mL of iohexol (OMNIPAQUE)  FINDINGS: PULMONARY ARTERIAL TREE:  No pulmonary arterial embolism  LUNGS:  Moderate upper lobe predominant centrilobular pulmonary emphysema  Discoid right upper lobe scarring  The previously seen 14 mm subpleural right lower lobe nodular density has resolved and likely represented atelectasis  PLEURA:  Unremarkable  HEART/GREAT VESSELS:  Atherosclerotic MEDIASTINUM AND DAVID:  Small hiatal hernia CHEST WALL AND LOWER NECK:   Median sternotomy wires  VISUALIZED STRUCTURES IN THE UPPER ABDOMEN:  Nonobstructive renal calculi OSSEOUS STRUCTURES:  No acute fracture or destructive osseous lesion  Impression: No acute findings in the chest; specifically, no pulmonary arterial embolism or pulmonary infiltrate/consolidation  Workstation performed: [id number]     Cta Ed Chest Pe Study    Result Date: 8/26/2020  Narrative: CTA - CHEST WITH IV CONTRAST - PULMONARY ANGIOGRAM INDICATION:   PE suspected, intermediate prob, positive D-dimer  COMPARISON: August 4, 2020, July 1, 2019 TECHNIQUE: CTA examination of the chest was performed using angiographic technique according to a protocol specifically tailored to evaluate for pulmonary embolism  Axial, sagittal, and coronal 2D reformatted images were created from the source data and  submitted for interpretation  In addition, coronal 3D MIP postprocessing was performed on the acquisition scanner  Radiation dose length product (DLP) for this visit:  334 63 mGy-cm   This examination, like all CT scans performed in the [facility], was performed utilizing techniques to minimize radiation dose exposure, including the use of iterative  reconstruction and automated exposure control  IV Contrast:  85 mL of iohexol (OMNIPAQUE)  FINDINGS: PULMONARY ARTERIAL TREE:  No pulmonary embolus is seen  LUNGS:  Diffuse moderate panlobular emphysema  Focal oval and linear scar in the posterior right upper lobe without suspicious change compared to 2019  Mild subpleural reticular interstitial thickening in both lung bases  No suspicious areas of consolidation or mass  Minimal residual linear and ill-defined opacity in the right lower lobe image 3/81, improved compared to most recent study  Corresponds to area of nodule seen in 2019  PLEURA:  Unremarkable  HEART/GREAT VESSELS:  Unremarkable for patient's age  MEDIASTINUM AND DAVID:  Unremarkable  CHEST WALL AND LOWER NECK:   Unremarkable   VISUALIZED STRUCTURES IN THE UPPER ABDOMEN:  Unremarkable  OSSEOUS STRUCTURES:  No acute fracture or destructive osseous lesion  Impression: No evidence for acute pulmonary embolus  Extensive bilateral diffuse panlobular emphysema and scarring as above  No suspicious interval change compared to priors  Workstation performed: [id number]     Vas Lower Limb Venous Duplex Study, Complete Bilateral    Result Date: 8/27/2020  Narrative:  [facility] REPORT CLINICAL: Indications: Physician wants to determine patency of the venous system secondary to elevated D-dimer and chest pain  Operative History: 2017-08-23 AAA Repair 2003-01-01 CABG [id number] Left CEA Risk Factors The patient has history of HLD, carotid artery disease, COPD, Previous remote smoking, AAA, CHF, and CAD  He has no history of Diabetes  FINDINGS:  Segment  Right            Left              Impression       Impression       CFV      Normal (Patent)  Normal (Patent)     CONCLUSION:  Impression: RIGHT LOWER LIMB: No evidence of acute or chronic deep vein thrombosis  No evidence of superficial thrombophlebitis noted  Doppler evaluation shows a normal response to augmentation maneuvers  Popliteal, posterior tibial and anterior tibial arterial Doppler waveforms are triphasic  LEFT LOWER LIMB: No evidence of acute or chronic deep vein thrombosis  No evidence of superficial thrombophlebitis noted  Doppler evaluation shows a normal response to augmentation maneuvers  Popliteal, posterior tibial and anterior tibial arterial Doppler waveforms are triphasic    SIGNATURE: Electronically Signed by: Elena Roque MD on 2020-08-27 12:11:16 PM        Signed,    Alcira Perry MD  [facility]  8/27/2020 1:10 PM       ?

## 2020-08-27 NOTE — ASSESSMENT & PLAN NOTE
Patient overall very weak, frail, for last 2-3 months, he started intention tremors, his PCP doing further workup, neurology outpatient appointment already scheduled  Normal CT head noted  Patient has some memory issues, which seems chronic  Although patient evaluated by PT OT and recommended home assistance, I am not sure about patient wife age, comorbidities, health status, I am trying to reach patient wife and son, to make sure patient is safe to go home

## 2020-08-27 NOTE — ASSESSMENT & PLAN NOTE
D-dimer level more than 10  CTA negative for acute pulmonary embolism  --VTE prophylaxis heparin 5000 units SQ q 8 hours

## 2020-08-27 NOTE — PLAN OF CARE
Problem: OCCUPATIONAL THERAPY ADULT  Goal: Performs self-care activities at highest level of function for planned discharge setting  See evaluation for individualized goals  Description: Treatment Interventions: ADL retraining, Functional transfer training, UE strengthening/ROM, Endurance training, Patient/family training, Activityengagement          See flowsheet documentation for full assessment, interventions and recommendations  Note: Limitation: Decreased ADL status, Decreased UE strength, Decreased Safe judgement during ADL, Decreased endurance, Decreased cognition, Decreased high-level ADLs, Decreased self-care trans     Assessment: Pt is a 70 y o  male seen for OT evaluation s/p admit to [facility] on 8/26/2020 w/ Chest pain with moderate risk for cardiac etiology  Comorbidities affecting pt's functional performance at time of assessment include: HTN, limited hearing, COPD and cancer history  Personal factors affecting pt at time of IE include:steps to enter environment, limited home support, difficulty performing ADLS, difficulty performing IADLS , limited insight into deficits, decreased initiation and engagement  and health management   Prior to admission, pt was (I) with ADLs and IADLs with no device during functional mobility  Upon evaluation: Pt requires (S) level with no device during functional mobility 2* the following deficits impacting occupational performance: weakness, decreased strength, decreased balance, decreased tolerance, impaired initiation, impaired sequencing, impaired problem solving, decreased safety awareness and impaired interpersonal skills  Pt to benefit from continued skilled OT tx while in the hospital to address deficits as defined above and maximize level of functional independence w ADL's and functional mobility   Occupational Performance areas to address include: grooming, bathing/shower, toilet hygiene, dressing, functional mobility, community mobility and clothing management  From OT standpoint, recommendation at time of d/c would be home with home health services       OT Discharge Recommendation: Home with skilled therapy

## 2020-08-27 NOTE — CONSULTS
Consultation - Cardiology   Trevor Pineda 70 y o  male MRN: [id number]  Unit/Bed#: 410-01 Encounter: [id number]    Consults      Physician Requesting Consult: Vanna Ridley MD  Reason for Consult / Principal Problem: chest pain    Assessment/Plan:  1  Atypical chest pain   - atypical for angina   - sounds either GI related or musculoskeletal, patient has some tenderness to palpation of the epigastric region   - troponin negative x 3, EKG without ischemic changes  - he had an echocardiogram a few weeks ago showing preserved EF  This does not need to be repeated  - CTA negative for PE, pulmonary edema   - no further ischemic evaluation is needed at this time  2  Coronary artery disease   - with prior CABG in 2003, AMANDA to circumflex in February 2020   - continue aspirin, plavix, statin, beta-blocker therapy    3  Chronic diastolic congestive heart failure   - appears euvolemic on exam, weight is stable   - BNP normal    - continue lasix 20 mg daily    4  Hypertension    - had a spike yesterday evening - /143; BP is now well controlled  5  S/p EVAR for AAA   - continue aspirin, plavix, and statin    History of Present Illness   HPI: Trevor Pineda is a 70y o  year old male with oronary artery disease status post CABG in 2003, NSTEMI s/p AMANDA to the circumflex artery in February 2020, peripheral vascular disease-abdominal aortic aneurysm status post EVAR, hypertension, dyslipidemia, COPD, and dementia who follows with Dr Melanie Kim  The patient presented to the ER yesterday for the evaluation of chest pain  Patient states he was ambulating a short distance at home when he felt like "a bullet went through me " he points to his epigastric region when he states this  He did have some pain up into the lower chest as well along the sternum  He states this lasted only seconds and went away  This happened one more time a few minutes later with the same characteristics  He never felt this pain before   His wife called the ambulance  He wears oxygen chronically for his COPD and states his breathing actually feels improved  He denies lower extremity edema, orthopnea, and PND  He denies lightheadedness, dizziness, palpitations, and syncope  Patient apparently also had an episode of unresponsiveness but patient did not report this and his wife is not in the room for input  Upon admission he was noted to have an elevated d-dimer and underwent a CTA chest which was negative for pulmonary embolism, pleural effusion, and pulmonary edema  BNP was normal  Troponin was negative x 3  EKG showed sinus rhythm with first degree AV block and PVC's/PAC's  CT head was negative for acute abnormality  Cardiology was consulted for further evaluation and management  Review of Systems   Constitution: Negative for chills and fever  HENT: Negative  Cardiovascular: Positive for chest pain  Negative for dyspnea on exertion, leg swelling, orthopnea, palpitations, paroxysmal nocturnal dyspnea and syncope  Respiratory: Negative for cough and shortness of breath  Gastrointestinal: Positive for abdominal pain  Negative for diarrhea, nausea and vomiting  Genitourinary: Negative  Neurological: Negative for dizziness and light-headedness  All other systems reviewed and are negative      Historical Information   Past Medical History:   Diagnosis Date    AAA (abdominal aortic aneurysm) (HCC)     Acid reflux     Acute on chronic diastolic congestive heart failure (HCC) 2/25/2020    Acute serous otitis media of left ear     recurrence not specified     Anesthesia     "always has mental changes /lingers and lingers after anesthesia"    Anxiety     Aortic aneurysm without rupture (HCC)     Arm bruise     right inner forearm "recent IV"    At risk for falls     BPH without urinary obstruction     Cancer ([facility] [address] )     skin CA on nose    CAP (community acquired pneumonia)     Cataracts, bilateral     Change in bowel function  Clubbing of fingers     Colon polyps     Common cold     Contusion of elbow, left     initial encounter     COPD (chronic obstructive pulmonary disease) (HCC)     Coronary artery disease     Cough     Dementia (HCC)     Depression     Dizziness     upon "standing quickly on occas"    Exercise counseling     Fatigue     Full dentures     "doesn't wear them"    Glaucoma screening     High cholesterol     History of abdominal aortic aneurysm (AAA) repair 08/2017    History of bacteremia     History of chronic obstructive lung disease     History of epistaxis     History of influenza vaccination     History of kidney stones     History of pneumonia     "many times" "at least 5 times" almost always goes to sepsis"    History of sepsis 10/2017    History of sinusitis     History of skin cancer     History of sleep apnea     History of transfusion     History of urinary tract infection     [place] (hard of hearing)     Hydronephrosis with obstructing calculus     Influenza vaccine needed     Jock itch     left/saw doctor 11/8 and started on antifungal cream    Kidney stones     Left hip pain     Need for pneumococcal vaccination     Need for prophylactic vaccination and inoculation against influenza     Other emphysema ([address] )     Pancreatitis, chronic ([address] )     pt and wife can't confirm 11/10/17    Pneumonia 10/26/2017    admitted [facility]    Pulmonary emphysema ([address] )     Screening for genitourinary condition     Screening for neurological condition     Sepsis ([address] )     due to unspecified organism     Short-term memory loss     Sleep apnea     does not use CPAP      Special screening examination for neoplasm of prostate     TIA involving carotid artery     "before carotid surgery"    Ulcer     stomach, "years ago"    Unsteady gait     Use of cane as ambulatory aid     sometimes    Vitamin D deficiency     Wears glasses      Past Surgical History:   Procedure Laterality Date    ABDOMINAL AORTIC ANEURYSM REPAIR  2017    ABDOMINAL AORTIC ANEURYSM REPAIR, ENDOVASCULAR      BACK SURGERY      spinal stenosis    CARDIAC SURGERY      CABG x3    CAROTID ENDARTARECTOMY Left 1996    CATARACT EXTRACTION Bilateral     CORONARY ARTERY BYPASS GRAFT  01/2003    x3    CYSTOSCOPY      with insertion of ureteral stent     CYSTOSCOPY      with ureteroscopy with lithotripsy     EXCISIONAL HEMORRHOIDECTOMY      EYE SURGERY Bilateral     "for a wrinkle" after cataract surgery    HERNIA REPAIR      umbilical    LITHOTRIPSY      renal    MOUTH SURGERY      full mouth extraction     MA COLONOSCOPY FLX DX W/COLLJ SPEC WHEN PFRMD N/A 2017    Procedure: COLONOSCOPY with polypectomies/ hot snare and tattoo;  Surgeon: Julio Nixon MD;  Location: [facility]; Service: Gastroenterology    MA CYSTOURETHROSCOPY N/A 2017    Procedure: Prisca Jiménez;  Surgeon: Vincenzo Mora MD;  [facility];  Service: Urology    MA ESOPHAGOGASTRODUODENOSCOPY TRANSORAL DIAGNOSTIC N/A 2016    Procedure: ESOPHAGOGASTRODUODENOSCOPY (EGD); Surgeon: Julio Nixon MD;  [facility]: [facility];   Service: Gastroenterology    MA REMOVE BLADDER STONE,<2 5 CM N/A 2017    Procedure: Lauren Orona;  Surgeon: Vincenzo Mora MD;  [facility];  Service: Urology    SKIN BIOPSY      TONSILLECTOMY      URETERAL STENT PLACEMENT      and removal     Social History     Substance and Sexual Activity   Alcohol Use Not Currently    Alcohol/week: 0 0 standard drinks    Frequency: Never    Drinks per session: Patient refused    Binge frequency: Never    Comment: quit 25 yrs ago     Social History     Substance and Sexual Activity   Drug Use No    Comment: No illicit drug use      Social History     Tobacco Use   Smoking Status Former Smoker    Packs/day: 1 50    Years: 60 00    Pack years: 90 00    Last attempt to quit:     Years since quittin 6   Smokeless Tobacco Never Used   Tobacco Comment     used to be a 1-1 5 ppd smoker     Family History: non-contributory    Meds/Allergies   all current active meds have been reviewed       Allergies   Allergen Reactions    Augmentin [Amoxicillin-Pot Clavulanate] Diarrhea    Ciprofloxacin Hives    Morphine And Related Other (See Comments)     Change in mental status    Levofloxacin      Headache    Wellbutrin [Bupropion]        Objective   Vitals: Blood pressure 130/76, pulse 80, temperature 98 5 °F (36 9 °C), resp  rate 18, height 5' 8" (1 727 m), weight 74 8 kg (164 lb 14 5 oz), SpO2 95 %  , Body mass index is 25 07 kg/m² ,   Orthostatic Blood Pressures      Most Recent Value   Blood Pressure  130/76 filed at 08/27/2020 0709   Patient Position - Orthostatic VS  Lying filed at 08/26/2020 3203        Systolic (16APW), TJO:341 , Min:96 , ZNE:064     Diastolic (50WMZ), PYI:44, Min:52, Max:143      Intake/Output Summary (Last 24 hours) at 8/27/2020 0946  Last data filed at 8/27/2020 0538  Gross per 24 hour   Intake    Output 950 ml   Net -950 ml       Weight (last 2 days)     Date/Time   Weight    08/27/20 0536   74 8 (164 9)    08/26/20 20:27:40   77 1 (170)    08/26/20 1559   85 4 (188 27)              Invasive Devices     Peripheral Intravenous Line            Peripheral IV Left Antecubital -- days    Peripheral IV 08/26/20 Left Forearm less than 1 day                  Physical Exam  Vitals signs reviewed  Constitutional:       General: He is not in acute distress  Appearance: He is well-developed  He is not diaphoretic  Interventions: Nasal cannula in place  HENT:      Head: Normocephalic and atraumatic  Eyes:      Pupils: Pupils are equal, round, and reactive to light  Neck:      Musculoskeletal: Normal range of motion  Vascular: No carotid bruit  Cardiovascular:      Rate and Rhythm: Normal rate and regular rhythm  Pulses:           Radial pulses are 2+ on the right side and 2+ on the left side  Dorsalis pedis pulses are 2+ on the right side and 2+ on the left side  Heart sounds: S1 normal and S2 normal  No murmur  Pulmonary:      Effort: Pulmonary effort is normal  No respiratory distress  Breath sounds: Normal breath sounds  No wheezing or rales  Abdominal:      General: There is no distension  Palpations: Abdomen is soft  Tenderness: There is abdominal tenderness (mild tenderness to palpation in epigastric region)  Musculoskeletal: Normal range of motion  Right lower leg: No edema  Left lower leg: No edema  Skin:     General: Skin is warm and dry  Findings: No erythema  Neurological:      General: No focal deficit present  Mental Status: He is alert and oriented to person, place, and time     Psychiatric:         Mood and Affect: Mood normal          Behavior: Behavior normal              Laboratory Results:  Results from last 7 days   Lab Units 08/26/20 2257 08/26/20 2024 08/26/20 1614   TROPONIN I ng/mL <0 02 <0 02 <0 02       CBC with diff:   Results from last 7 days   Lab Units 08/26/20 2257 08/26/20 1614 08/21/20  1542   WBC Thousand/uL  --  6 54 5 94   HEMOGLOBIN g/dL  --  16 1 17 1*   HEMATOCRIT %  --  48 2 51 7*   MCV fL  --  95 94   PLATELETS Thousands/uL 106* 118* 128*   MCH pg  --  31 6 31 2   MCHC g/dL  --  33 4 33 1   RDW %  --  14 3 14 1   MPV fL 10 5 10 5 9 9   NRBC AUTO /100 WBCs  --  0 0         CMP:  Results from last 7 days   Lab Units 08/26/20 1614 08/21/20  1542   POTASSIUM mmol/L 3 9 4 6   CHLORIDE mmol/L 105 103   CO2 mmol/L 24 26   BUN mg/dL 16 16   CREATININE mg/dL 1 33* 1 40*   CALCIUM mg/dL 8 5 9 4   AST U/L 23  --    ALT U/L 20  --    ALK PHOS U/L 117*  --    EGFR ml/min/1 73sq m 51 48         BMP:  Results from last 7 days   Lab Units 08/26/20 1614 08/21/20  1542   POTASSIUM mmol/L 3 9 4 6   CHLORIDE mmol/L 105 103   CO2 mmol/L 24 26   BUN mg/dL 16 16   CREATININE mg/dL 1 33* 1 40*   CALCIUM mg/dL 8 5 9 4       BNP: No results for input(s): BNP in the last 72 hours  Magnesium:   Results from last 7 days   Lab Units 20  1614   MAGNESIUM mg/dL 2 0       Coags:   Results from last 7 days   Lab Units 20  1614   PTT seconds 32   INR  1 06       TSH:       Hemoglobin A1C       Lipid Profile:         Cardiac testing:   Results for orders placed during the hospital encounter of 20   Echo complete with contrast if indicated    Narrative [address]  [address], [place] [address]  [phone]    Echocardiogram    Study date:  07-Aug-2020    Patient: Melanie Randhawa  MR number: [id number]  Account number: [de-identified]  : 1940  Age: 70 years  Gender: Male  Status: Outpatient  Location:  Height: 69 in  Weight: 173 6 lb  BP:    Diagnoses: R06 00 - Dyspnea, unspecified    Sonographer:  Gary Alonzo RDCS  Primary Physician:  Maribel Velez DO  Referring Physician:  Maribel Velez DO  Group:  [id number]  Interpreting Physician:  America Lam MD    SUMMARY    LEFT VENTRICLE:  Size was normal   Systolic function was normal  Ejection fraction was estimated to be 60 %  There were no regional wall motion abnormalities  Wall thickness was at the upper limits of normal     LEFT ATRIUM:  The atrium was mildly dilated  MITRAL VALVE:  There was trace regurgitation  TRICUSPID VALVE:  There was mild regurgitation  LEFT VENTRICLE: Size was normal  Systolic function was normal  Ejection fraction was estimated to be 60 %  There were no regional wall motion abnormalities  Wall thickness was at the upper limits of normal     RIGHT VENTRICLE: The size was normal  Systolic function was normal  Wall thickness was normal     LEFT ATRIUM: The atrium was mildly dilated  RIGHT ATRIUM: Size was at the upper limits of normal     MITRAL VALVE: Valve structure was normal  There was normal leaflet separation   DOPPLER: The transmitral velocity was within the normal range  There was no evidence for stenosis  There was trace regurgitation  AORTIC VALVE: The valve was trileaflet  Leaflets exhibited normal thickness and normal cuspal separation  DOPPLER: Transaortic velocity was within the normal range  There was no evidence for stenosis  There was no regurgitation  TRICUSPID VALVE: The valve structure was normal  There was normal leaflet separation  DOPPLER: The transtricuspid velocity was within the normal range  There was no evidence for stenosis  There was mild regurgitation  PULMONIC VALVE: Leaflets exhibited normal thickness, no calcification, and normal cuspal separation  DOPPLER: The transpulmonic velocity was within the normal range  There was no regurgitation  PERICARDIUM: There was no pericardial effusion  The pericardium was normal in appearance  AORTA: The root exhibited normal size  SYSTEM MEASUREMENT TABLES    2D  %FS: 32 75 %  Ao Diam: 3 cm  EDV(Teich): 123 77 ml  EF(Teich): 60 88 %  ESV(Teich): 48 42 ml  IVSd: 1 22 cm  LA Area: 24 45 cm2  LA Diam: 3 4 cm  LVEDV MOD A4C: 102 09 ml  LVEF MOD A4C: 39 75 %  LVESV MOD A4C: 61 5 ml  LVIDd: 5 1 cm  LVIDs: 3 43 cm  LVLd A4C: 7 73 cm  LVLs A4C: 7 44 cm  LVPWd: 1 21 cm  RA Area: 19 99 cm2  RVIDd: 4 19 cm  RWT: 0 48  SV MOD A4C: 40 58 ml  SV(Teich): 75 35 ml    CW  AV Vmax: 1 13 m/s  AV maxP 13 mmHg  PV Vmax: 0 72 m/s  PV maxP 06 mmHg    MM  TAPSE: 1 94 cm    PW  E' Lat: 0 07 m/s  E' Sept: 0 06 m/s  E/E' Lat: 6 72  E/E' Sept: 7 41  LVOT Vmax: 0 63 m/s  LVOT maxP 59 mmHg  MV A Nakul: 0 93 m/s  MV Dec Edgar: 1 65 m/s2  MV DecT: 281 38 ms  MV E Nakul: 0 47 m/s  MV E/A Ratio: 0 5    [employer] Accredited Echocardiography Laboratory    Prepared and electronically signed by    Princess Bruner MD  Signed 07-Aug-2020 14:51:49       No results found for this or any previous visit  No results found for this or any previous visit  No results found for this or any previous visit        Imaging: I have personally reviewed pertinent reports  Xr Chest 1 View Portable    Result Date: 8/26/2020  Narrative: CHEST INDICATION:   chest pain  COMPARISON:  July 22, 2020 EXAM PERFORMED/VIEWS:  XR CHEST PORTABLE Single image FINDINGS:  Lungs adequately aerated  No lobar consolidation or large effusion  Mild atelectatic changes bilaterally (more so on the right than left)  Cardiac size within normal limits  Numerous surgical clips projecting over the left heart border  Prior sternotomy  No pneumothorax or free air  Osseous structures appear within normal limits for patient age  Impression: Mild atelectatic changes bilaterally (more so on the right than left)  No lobar consolidation or large effusion  Cardiac size normal   No pulmonary edema  Workstation performed: [id number]     Xr Ribs 2 Vw Right    Result Date: 8/3/2020  Narrative: RIGHT RIBS INDICATION:   R07 81: Pleurodynia  COMPARISON:  Chest x-ray from July 22, 2020  VIEWS:  XR RIBS 2 VW RIGHT FINDINGS: There is no fracture or pathologic bone lesion  Soft tissues are unremarkable  Centrilobular emphysema and mild stable coarse interstitial markings with biapical pleural scarring  Median sternotomy wires and surgical clips from CABG  Impression: No fracture  Emphysema and biapical pleural scarring  Workstation performed: [id number]     Xr Spine Thoracic 3 Vw    Result Date: 8/3/2020  Narrative: THORACIC SPINE INDICATION:   R07 81: Pleurodynia  COMPARISON:  None VIEWS:  XR SPINE THORACIC 3 VW FINDINGS: Median sternotomy wires and surgical clips from CABG  Diffuse osteopenia/osteoporosis  Mild gradual mid thoracic kyphosis  There is no fracture or pathologic bone lesion  Limited evaluation of the upper thoracic spine due to superimposition of shadows  Thoracic vertebral alignment is within normal limits  Anterior marginal osteophytes in the mid and lower thoracic spine  There is no displacement of the paraspinal line  The pedicles appear intact  Impression: No acute osseous abnormality  Diffuse osteopenia/osteoporosis  Mild thoracic degenerative disease  Workstation performed: [id number]     Ct Head Without Contrast    Result Date: 8/26/2020  Narrative: CT BRAIN - WITHOUT CONTRAST INDICATION:   Altered mental status  COMPARISON:  November 18, 2019 TECHNIQUE:  CT examination of the brain was performed  In addition to axial images, sagittal and coronal 2D reformatted images were created and submitted for interpretation  Radiation dose length product (DLP) for this visit:  911 41 mGy-cm   This examination, like all CT scans performed in the [facility], was performed utilizing techniques to minimize radiation dose exposure, including the use of iterative  reconstruction and automated exposure control  IMAGE QUALITY:  Diagnostic  FINDINGS: PARENCHYMA: Decreased attenuation is noted in periventricular and subcortical white matter demonstrating an appearance that is statistically most likely to represent mild microangiopathic change  Arterial atherosclerosis  No CT signs of acute infarction  No intracranial mass, mass effect or midline shift  No acute parenchymal hemorrhage  VENTRICLES AND EXTRA-AXIAL SPACES:  Normal for the patient's age  VISUALIZED ORBITS AND PARANASAL SINUSES:  Unremarkable  CALVARIUM AND EXTRACRANIAL SOFT TISSUES:  Normal      Impression: No acute intracranial abnormality  Workstation performed: [id number]     Mri Thoracic Spine Wo Contrast    Result Date: 8/21/2020  Narrative: MRI THORACIC SPINE WITHOUT CONTRAST INDICATION: M54 6: Pain in thoracic spine G89 29: Other chronic pain  COMPARISON:  X-ray 8/3/2020 TECHNIQUE:  Sagittal T1, sagittal T2, sagittal inversion recovery, axial T2,  axial 2D MERGE  IMAGE QUALITY: Intermittent motion related artifact  Owing to pain, axial T2*images could not be performed  FINDINGS: ALIGNMENT: Normal alignment of the thoracic spine  No compression fracture  No subluxation    No scoliosis  MARROW SIGNAL:  Normal marrow signal is identified within the visualized bony structures  No discrete marrow lesion  High STIR marrow signal from the T5 through the T12 level thought to be artifactual   This is not apparent on the T1 or T2 sequences  The paraspinal soft tissues are diffusely upright  THORACIC CORD: Normal signal within the thoracic cord  Conus terminates at the T12-L1 level  PARAVERTEBRAL SOFT TISSUES:  Normal  THORACIC DEGENERATIVE CHANGE: Mild, multilevel degenerative changes not compressive  Impression: Minor, noncompressive degenerative changes of the thoracic spine  Thoracic cord is normal  Workstation performed: [id number]     Cta Dissection Protocol Chest Abdomen Pelvis W Wo Contrast    Result Date: 8/5/2020  Narrative: CT ANGIOGRAM OF THE CHEST, ABDOMEN AND PELVIS WITH AND WITHOUT IV CONTRAST INDICATION:  Severe back pain b/w shoulder blades radiate down back status post endovascular repair of aortic aneurysm  COMPARISON: CT of the chest on August 3, 2020  TECHNIQUE:  CT angiogram examination of the chest, abdomen and pelvis was performed according to standard protocol  This examination, like all CT scans performed in the [facility], was performed utilizing techniques to minimize radiation dose exposure, including the use of iterative reconstruction and automated exposure control  Contrast as well as noncontrast images were obtained  3D reconstructions were performed an independent workstation, and are supplied for review  Rad dose 1856 75 mGy-cm IV Contrast:  100 mL of iohexol (OMNIPAQUE) was administered intravenously without immediate adverse reaction  Enteric Contrast:  Not given FINDINGS: VASCULAR STRUCTURES:  No evidence of aortic dissection  There is a aortobiiliac graft which is patent  No evidence of endoleak  There is a infrarenal abdominal aortic aneurysm not significantly changed in size measuring 5 9 x 5 7 cm    Moderate atherosclerotic calcifications  Moderate narrowing of the left external iliac artery  Mild narrowing of the right external iliac artery  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  Focal area of moderate narrowing at the origin of the superior mesenteric artery (series 602, image 80), stable  OTHER FINDINGS: CHEST: HEART:  Heart is normal in size  Coronary artery calcifications  Status post CABG  No pericardial effusion    LUNGS:  Emphysematous changes of lungs  Right upper lobe scarring is stable  Central airways are patent  PLEURA: No pleural effusion  MEDIASTINUM AND DAVID:  No mass or significant lymphadenopathy  Small mediastinal and hilar lymph nodes measure up to 5 mm in short axis  CHEST WALL AND LOWER NECK: Normal  ABDOMEN LIVER/BILIARY TREE:  Subcentimeter hypodensities in liver incompletely characterized however likely due to cysts  No biliary duct dilatation  GALLBLADDER:  Cholelithiasis without evidence of cholecystitis  SPLEEN:  Unremarkable  Normal size  PANCREAS:  Unremarkable  ADRENAL GLANDS:  Unremarkable  KIDNEYS/URETERS:  Bilateral nonobstructive nephrolithiasis  Calculi measure up to 5 mm on the left and 2 mm on the right  No hydronephrosis  Scattered areas of cortical scarring within bilateral kidneys  PELVIS REPRODUCTIVE ORGANS:  Unremarkable for patient's age  URINARY BLADDER:  Unremarkable  ADDITIONAL ABDOMINAL AND PELVIC STRUCTURES: STOMACH AND BOWEL:  Small hiatal hernia  No bowel obstruction  ABDOMINOPELVIC CAVITY:  No pathologically enlarged mesenteric or retroperitoneal lymph nodes  No ascites or free intraperitoneal air  ABDOMINAL WALL/INGUINAL REGIONS:  Unremarkable  OSSEOUS STRUCTURES:  No acute fracture or destructive osseous lesion  Impression: 1  Stable infrarenal abdominal aortic aneurysm measuring up to 5 9 cm status post aortobiiliac graft repair which is patent  No evidence of endoleak  2   No evidence of aortic dissection   3   Stable moderate narrowing of the left external iliac artery  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  4   Stable focal area of moderate narrowing at the origin of the superior mesenteric artery  5   Coronary artery calcifications status post CABG  6   Cholelithiasis without evidence of cholecystitis  7   Bilateral nonobstructing nephrolithiasis  No hydronephrosis  8   Small hiatal hernia  Workstation performed: [id number]     Cta Chest Pe Study    Result Date: 8/3/2020  Narrative: CTA - CHEST WITH IV CONTRAST - PULMONARY ANGIOGRAM INDICATION:   R07 81: Pleurodynia  COMPARISON: CTA chest 2/18/2020 TECHNIQUE: CTA examination of the chest was performed using angiographic technique according to a protocol specifically tailored to evaluate for pulmonary embolism  Axial, sagittal, and coronal 2D reformatted images were created from the source data and  submitted for interpretation  In addition, coronal 3D MIP postprocessing was performed on the acquisition scanner  Radiation dose length product (DLP) for this visit:  468 49 mGy-cm   This examination, like all CT scans performed in the [facility], was performed utilizing techniques to minimize radiation dose exposure, including the use of iterative  reconstruction and automated exposure control  IV Contrast:  85 mL of iohexol (OMNIPAQUE)  FINDINGS: PULMONARY ARTERIAL TREE:  No pulmonary arterial embolism  LUNGS:  Moderate upper lobe predominant centrilobular pulmonary emphysema  Discoid right upper lobe scarring  The previously seen 14 mm subpleural right lower lobe nodular density has resolved and likely represented atelectasis  PLEURA:  Unremarkable  HEART/GREAT VESSELS:  Atherosclerotic MEDIASTINUM AND DAVID:  Small hiatal hernia CHEST WALL AND LOWER NECK:   Median sternotomy wires  VISUALIZED STRUCTURES IN THE UPPER ABDOMEN:  Nonobstructive renal calculi OSSEOUS STRUCTURES:  No acute fracture or destructive osseous lesion       Impression: No acute findings in the chest; specifically, no pulmonary arterial embolism or pulmonary infiltrate/consolidation  Workstation performed: [id number]     Cta Ed Chest Pe Study    Result Date: 8/26/2020  Narrative: CTA - CHEST WITH IV CONTRAST - PULMONARY ANGIOGRAM INDICATION:   PE suspected, intermediate prob, positive D-dimer  COMPARISON: August 4, 2020, July 1, 2019 TECHNIQUE: CTA examination of the chest was performed using angiographic technique according to a protocol specifically tailored to evaluate for pulmonary embolism  Axial, sagittal, and coronal 2D reformatted images were created from the source data and  submitted for interpretation  In addition, coronal 3D MIP postprocessing was performed on the acquisition scanner  Radiation dose length product (DLP) for this visit:  334 63 mGy-cm   This examination, like all CT scans performed in the [facility], was performed utilizing techniques to minimize radiation dose exposure, including the use of iterative  reconstruction and automated exposure control  IV Contrast:  85 mL of iohexol (OMNIPAQUE)  FINDINGS: PULMONARY ARTERIAL TREE:  No pulmonary embolus is seen  LUNGS:  Diffuse moderate panlobular emphysema  Focal oval and linear scar in the posterior right upper lobe without suspicious change compared to 2019  Mild subpleural reticular interstitial thickening in both lung bases  No suspicious areas of consolidation or mass  Minimal residual linear and ill-defined opacity in the right lower lobe image 3/81, improved compared to most recent study  Corresponds to area of nodule seen in 2019  PLEURA:  Unremarkable  HEART/GREAT VESSELS:  Unremarkable for patient's age  MEDIASTINUM AND DAVID:  Unremarkable  CHEST WALL AND LOWER NECK:   Unremarkable  VISUALIZED STRUCTURES IN THE UPPER ABDOMEN:  Unremarkable  OSSEOUS STRUCTURES:  No acute fracture or destructive osseous lesion  Impression: No evidence for acute pulmonary embolus   Extensive bilateral diffuse panlobular emphysema and scarring as above  No suspicious interval change compared to priors  Workstation performed: [id number]     Vas Lower Limb Venous Duplex Study, Complete Bilateral    Result Date: 8/27/2020  Narrative:  [facility] REPORT CLINICAL: Indications: Physician wants to determine patency of the venous system secondary to elevated D-dimer and chest pain  Operative History: 2017-08-23 AAA Repair 2003-01-01 CABG [id number] Left CEA Risk Factors The patient has history of HLD, carotid artery disease, COPD, Previous remote smoking, AAA, CHF, and CAD  He has no history of Diabetes  FINDINGS:  Segment  Right            Left              Impression       Impression       CFV      Normal (Patent)  Normal (Patent)     CONCLUSION: RIGHT LOWER LIMB: No evidence of acute or chronic deep vein thrombosis  No evidence of superficial thrombophlebitis noted  Doppler evaluation shows a normal response to augmentation maneuvers  Popliteal, posterior tibial and anterior tibial arterial Doppler waveforms are biphasic  LEFT LOWER LIMB: No evidence of acute or chronic deep vein thrombosis  No evidence of superficial thrombophlebitis noted  Doppler evaluation shows a normal response to augmentation maneuvers  Popliteal, posterior tibial and anterior tibial arterial Doppler waveforms are biphasic          EKG reviewed personally: sinus rhythm with first degree AV block, PVC's/PAC's  Telemetry reviewed personally: normal sinus rhythm with PVC's    Assessment:  Principal Problem:    Chest pain with moderate risk for cardiac etiology  Active Problems:    COPD (chronic obstructive pulmonary disease) (HCC)    Hypercholesterolemia    GERD (gastroesophageal reflux disease)    Syncope    Abdominal aortic aneurysm (HCC)    Dementia (HCC)    Coronary artery disease involving native coronary artery of native heart without angina pectoris    Elevated d-dimer        Code Status: Level 1 - Full Code

## 2020-08-27 NOTE — UTILIZATION REVIEW
Initial Clinical Review  Observation 8/26/20 @ 1921, converted to inpatient admission 8/27/20 @ 1525 for continued care & tx for chest pain, s/p unresponsive episode  Admission: Date/Time/Statement:   Admission Orders (From admission, onward)     Ordered        08/27/20 1525  Inpatient Admission  Once                   Orders Placed This Encounter   Procedures   Inpatient Admission    Standing Status:   Standing    Number of Occurrences:   1    Order Specific Question:   Admitting Physician    Answer:   Maggy Taylor [[id number]]    Order Specific Question:   Level of Care    Answer:   Med Surg [[id number]]    Order Specific Question:   Estimated length of stay    Answer:   More than 2 Midnights    Order Specific Question:   Certification    Answer:   I certify that inpatient services are medically necessary for this patient for a duration of greater than two midnights  See H&P and MD Progress Notes for additional information about the patient's course of treatment  Comments:   suspected seizures     ED Arrival Information     Expected Arrival Acuity Means of Arrival Escorted By Service Admission Type    8/26/2020 15:52 8/26/2020 15:56 Emergent [facility]    Arrival Complaint    Chest Pain        Chief Complaint   Patient presents with    Chest Pain     chest pain and change of responsiveness well sitting in the recliner at home  given 1 nitro enroute pain down to a 6     Assessment/Plan:   70year old male presents via EMS from home for evaluation of chest pain  This started about 30 minutes prior to arrival   Pt notes pain in center of chest, does not radiate  At worse was 7/10, has improved to 3/10  He received aspirin 324 mg and nitro 0 4 SL prior to arrival   He notes pain improved at this time  He admits to shortness of breath which has been chronic - follows with pulmonology, wears O2 via NC chronically  Admits to feeling lightheaded and has had headaches    Denies visual disturbance  C/o tremors which has been ongoing - is scheduled to see neurology in a few weeks  Pt states, "I just shake all the time"  Reports nausea  Denies vomiting, diarrhea, constipation or abdominal pain  No recent falls reported  EMS also notes that wife had called EMS as he had an episode of reported unresponsiveness    Chest pain with moderate risk for cardiac etiology  Assessment & Plan  Presented to emergency room for evaluation of chest pain  Wife reports that he initially was quite lethargic at home upon EMS arrival he complained of chest pain  Did received nitroglycerin and aspirin with some relief after arrival in the ER  EKG shows-a sinus rhythm with first-degree AV block with occasional premature ventricular complexes and premature atrial complexes at a rate of 83 beats per minute  Initial troponin less than 0 02  --Admit on observation  --Telemetry monitoring  --Trend troponin x2   --Continue PTA medications  --Monitor blood pressure closely  --Consider Echo    --Cardiology consult  --AM labs  --Supportive care  Elevated d-dimer  Assessment & Plan  D-dimer level more than 10  Review of his record shows that he has been having elevated D-dimer dating back to February of 2020  No prior hx of DVT/PE  CTA negative for acute pulmonary embolism  --Check VAS lower limb venous duplex study  --VTE prophylaxis heparin 5000 units SQ q 8 hours  Coronary artery disease involving native coronary artery of native heart without angina pectoris  Assessment & Plan  Presented to the emergency room complaints of chest pain  Does have history of drug-eluting stent placement in February of 2020  Did receive aspirin and nitroglycerin in the ER with some relief  --Continue PTA medication (atenolol, aspirin, Plavix, Lipitor)  --Consider inpatient cardiology consult   Abdominal aortic aneurysm [facility])  Assessment & Plan  Currently stable  He did have repair on 08/23/2017  Monitor blood pressure closely  Syncope  Assessment & Plan  Possible syncope episode at home  His wife reports that he was sitting on recliner and went unresponsive  Wife reports that he was not responding for several miniutes  EMS reports upon their arrival he was awake and complaint of chest pain  Head CT shows-No acute intracranial abnormality  EKG shows- sinus rhythm with first-degree AV block with occasional premature ventricular complexes and premature atrial complexes at a rate of 83 beats per minute  His blood pressure was elevated soon after arrival to the ER  --Telemetry monitoring  --Check Orthostatic  --Monitor blood pressure closely  -0Monitor I/O  --OT/PT eval  --Am labs  --Supportive care  COPD (chronic obstructive pulmonary disease) ([facility])  Assessment & Plan  Respiratory distress evidence of acute exacerbation  --Respiratory protocol  --Continue oxygen at 3 LPM  --Continue PTA respiratory medication  --Continue outpatient pulmonary follow-up  Dementia ([facility])  Assessment & Plan  Currently at baseline  Continue Aricept and Namenda  GERD (gastroesophageal reflux disease)  Assessment & Plan  Continue Protonix 40 mg p o  Daily  Hypercholesterolemia  Assessment & Plan  Continue PTA dose statins    Per cardio:  Atypical chest pain              - atypical for angina              - sounds either GI related or musculoskeletal, patient has some tenderness to palpation of the epigastric region              - troponin negative x 3, EKG without ischemic changes  - he had an echocardiogram a few weeks ago showing preserved EF  This does not need to be repeated  - CTA negative for PE, pulmonary edema              - no further ischemic evaluation is needed at this time  2  Coronary artery disease              - with prior CABG in 2003, AMANDA to circumflex in February 2020              - continue aspirin, plavix, statin, beta-blocker therapy  3   Chronic diastolic congestive heart failure              - appears euvolemic on exam, weight is stable              - BNP normal               - continue lasix 20 mg daily  4  Hypertension               - had a spike yesterday evening - /143; BP is now well controlled    5  S/p EVAR for AAA              - continue aspirin, plavix, and statin  ED Triage Vitals [08/26/20 1559]   Temperature Pulse Respirations Blood Pressure SpO2   98 °F (36 7 °C) 98 18 126/77 91 %      Temp Source Heart Rate Source Patient Position - Orthostatic VS BP Location FiO2 (%)   Temporal Monitor Sitting Right arm --      Pain Score       6          Wt Readings from Last 1 Encounters:   08/27/20 74 8 kg (164 lb 14 5 oz)     Additional Vital Signs:   Date/Time   Temp   Pulse   Resp   BP   MAP (mmHg)   SpO2   Calculated FIO2 (%) - Nasal Cannula   Nasal Cannula O2 Flow Rate (L/min)   O2 Device   Patient Position - Orthostatic VS    08/27/20 07:09:04   98 5 °F (36 9 °C)   80   18   130/76   94   95 %                08/26/20 22:43:56   98 3 °F (36 8 °C)   70   18   127/77   94   97 %   36   4 L/min   Nasal cannula       08/26/20 20:27:40   98 °F (36 7 °C)   72   16   131/63   86   93 %   36   4 L/min   Nasal cannula       08/26/20 2022                     36   4 L/min   Nasal cannula       08/26/20 2005   98 °F (36 7 °C)   88   16                           Pertinent Labs/Diagnostic Test Results:   Results from last 7 days   Lab Units 08/26/20 2257 08/26/20  1614 08/21/20  1542   WBC Thousand/uL  --  6 54 5 94   HEMOGLOBIN g/dL  --  16 1 17 1*   HEMATOCRIT %  --  48 2 51 7*   PLATELETS Thousands/uL 106* 118* 128*   NEUTROS ABS Thousands/µL  --  3 95 3 41     Results from last 7 days   Lab Units 08/26/20  1614 08/21/20  1542   SODIUM mmol/L 142 141   POTASSIUM mmol/L 3 9 4 6   CHLORIDE mmol/L 105 103   CO2 mmol/L 24 26   ANION GAP mmol/L 13 12   BUN mg/dL 16 16   CREATININE mg/dL 1 33* 1 40*   EGFR ml/min/1 73sq m 51 48   CALCIUM mg/dL 8 5 9 4   MAGNESIUM mg/dL 2 0  --      Results from last 7 days   Lab Units 08/26/20  1614   AST U/L 23   ALT U/L 20   ALK PHOS U/L 117*   TOTAL PROTEIN g/dL 7 2   ALBUMIN g/dL 3 8   TOTAL BILIRUBIN mg/dL 1 40*     Results from last 7 days   Lab Units 08/26/20  1614 08/21/20  1542   GLUCOSE RANDOM mg/dL 125 99     Results from last 7 days   Lab Units 08/26/20  2257 08/26/20  2024 08/26/20  1614   TROPONIN I ng/mL <0 02 <0 02 <0 02     Results from last 7 days   Lab Units 08/26/20  1614   D-DIMER QUANTITATIVE ug/ml FEU >10 00*     Results from last 7 days   Lab Units 08/26/20  1614   PROTIME seconds 13 6   INR  1 06   PTT seconds 32     Results from last 7 days   Lab Units 08/26/20  1614   NT-PRO BNP pg/mL 167     Results from last 7 days   Lab Units 08/26/20  1907   CLARITY UA  Clear   COLOR UA  Yellow   SPEC GRAV UA  <=1 005   PH UA  6 5   GLUCOSE UA mg/dl Negative   KETONES UA mg/dl Negative   BLOOD UA  Negative   PROTEIN UA mg/dl Negative   NITRITE UA  Negative   BILIRUBIN UA  Negative   UROBILINOGEN UA E U /dl 0 2   LEUKOCYTES UA  Negative     8/26  Cxr=Mild atelectatic changes bilaterally (more so on the right than left)  No lobar consolidation or large effusion  Cardiac size normal   No pulmonary edema  Ct head=No acute intracranial abnormality  cta chest pe study=No evidence for acute pulmonary embolus  Extensive bilateral diffuse panlobular emphysema and scarring as above  No suspicious interval change compared to priors  Ekg= Rhythm: sinus rhythm and A-V block    Ectopy:     Ectopy: PVCs    QRS:     QRS axis:  Normal    QRS intervals:  Normal  Conduction:     Conduction: abnormal      Abnormal conduction: 1st degree    ST segments:     ST segments:  Non-specific  T waves:     T waves: non-specific    Comments:      QRS 84, QT/QTc 368/455; quality of tracing limited secondary to pt's tremor  Prehospital 12 lead was reviewed  Sinus rhythm w/ frequent PVCs and 1st deg AV block    , , QT/QTc 356/406, nonspecific inferior T wave abnormality  8/27  VAS lower limb venous duplex study, complete bilateral=  RIGHT LOWER LIMB:  No evidence of acute or chronic deep vein thrombosis  No evidence of superficial thrombophlebitis noted  Doppler evaluation shows a normal response to augmentation maneuvers  Popliteal, posterior tibial and anterior tibial arterial Doppler waveforms are triphasic  LEFT LOWER LIMB:  No evidence of acute or chronic deep vein thrombosis  No evidence of superficial thrombophlebitis noted  Doppler evaluation shows a normal response to augmentation maneuvers  Popliteal, posterior tibial and anterior tibial arterial Doppler waveforms are triphasic        ED Treatment:   Medication Administration from 08/26/2020 1556 to 08/26/2020 2004       Date/Time Order Dose Route Action     08/26/2020 1621 nitroglycerin (NITRO-BID) 2 % TD ointment 1 inch 1 inch Topical Given     08/26/2020 0618 iohexol (OMNIPAQUE) 350 MG/ML injection (SINGLE-DOSE) 100 mL 85 mL Intravenous Given        Past Medical History:   Diagnosis Date    [facility]ab[facility])     Acid reflux     Acute on chronic diastolic congestive heart failure ([facility]) 2/25/2020    Acute serous otitis media of left ear     recurrence not specified     Anesthesia     "always has mental changes /lingers and lingers after anesthesia"    Anxiety     Aortic aneurysm without rupture ([address] )     Arm bruise     right inner forearm "recent IV"    At risk for falls     BPH without urinary obstruction     Cancer ([address] )     skin CA on nose    CAP (community acquired pneumonia)     Cataracts, bilateral     Change in bowel function     Clubbing of fingers     Colon polyps     Common cold     Contusion of elbow, left     initial encounter     COPD (chronic obstructive pulmonary disease) ([facility])     Coronary artery disease     Cough     Dementia ([facility])     Depression     Dizziness     upon "standing quickly on occas"    Exercise counseling     Fatigue     Full dentures     "doesn't wear them"    Glaucoma screening     High cholesterol     History of abdominal aortic aneurysm (AAA) repair 08/2017    History of bacteremia     History of chronic obstructive lung disease     History of epistaxis     History of influenza vaccination     History of kidney stones     History of pneumonia     "many times" "at least 5 times" almost always goes to sepsis"    History of sepsis 10/2017    History of sinusitis     History of skin cancer     History of sleep apnea     History of transfusion     History of urinary tract infection     [place] (hard of hearing)     Hydronephrosis with obstructing calculus     Influenza vaccine needed     Jock itch     left/saw doctor 11/8 and started on antifungal cream    Kidney stones     Left hip pain     Need for pneumococcal vaccination     Need for prophylactic vaccination and inoculation against influenza     Other emph[facility]sema [facility]     Pancreatitis, chronic ([facility] )     pt and wife can't confirm 11/10/17    Pneumonia 10/26/2017    admitted [facility]    Pulmonary emphysema ([facility]     Screening for genitourinary condition     Screening for neurological condition     Sepsis ([facility] )     due to unspecified organism     Short-term memory loss     Sleep apnea     does not use CPAP      Special screening examination for neoplasm of prostate     TIA involving carotid artery     "before carotid surgery"    Ulcer     stomach, "years ago"    Unsteady gait     Use of cane as ambulatory aid     sometimes    Vitamin D deficiency     Wears glasses      Present on Admission:   COPD [facility]chronic obstructive pulmonary disease) ([facility]   Hypercholesterolemia   GERD (gastroesophageal reflux disease)   Abdominal aortic aneurysm (HCC)   Dementia ([facility]   Coronary artery disease involving native coronary artery of native heart without angina pectoris   Ambulatory dysfunction    Admitting Diagnosis: Chest pain [R07 9]  HTN (hypertension) [I10]  CAD (coronary artery disease) [I25 10]  Thrombocytopenia (HCC) [D69 6]  Altered level of consciousness [R40 4]  CKD (chronic kidney disease) stage 3, GFR 30-59 ml/min (HCC) [N18 3]  Age/Sex: 70 y o  male  Admission Orders:  Telemetry  Pt/ot eval & tx  scd  Consult cardio    Scheduled Medications:  aspirin, 81 mg, Oral, Daily  atenolol, 25 mg, Oral, HS  atorvastatin, 20 mg, Oral, HS  clopidogrel, 75 mg, Oral, Daily  vitamin B-12, 1,000 mcg, Oral, Once per day on Mon Wed Fri  donepezil, 10 mg, Oral, HS  fluticasone-vilanterol, 1 puff, Inhalation, Daily  furosemide, 20 mg, Oral, Daily  heparin (porcine), 5,000 Units, Subcutaneous, Q8H [address]  lactobacillus acidophilus-bulgaricus, 1 packet, Oral, Daily  memantine, 5 mg, Oral, BID  methocarbamol, 500 mg, Oral, BID  multivitamin-minerals, 1 tablet, Oral, Daily  pantoprazole, 40 mg, Oral, Daily  QUEtiapine, 12 5 mg, Oral, HS  tamsulosin, 0 4 mg, Oral, Daily  tiotropium, 18 mcg, Inhalation, Daily    PRN Meds:  acetaminophen, 650 mg, Oral, Q6H PRN  albuterol, 2 puff, Inhalation, Q6H PRN  ondansetron, 4 mg, Intravenous, Q6H PRN    Network Utilization Review Department  [email or web address]  ATTENTION: Please call with any questions or concerns to [phone] and carefully listen to the prompts so that you are directed to the right person  All voicemails are confidential   [facility] all requests for admission clinical reviews, approved or denied determinations and any other requests to dedicated fax number below belonging to the campus where the patient is receiving treatment   List of dedicated fax numbers for the Facilities:  FACILITY NAME UR FAX NUMBER   ADMISSION DENIALS (Administrative/Medical Necessity) [phone]   [address] (Maternity/NICU/Pediatrics) [phone]   [facility] [address] [facility] [address] [place] [address] [facility] [phone]   Aracelis Leaver [facility] [phone] [address] [facility]  [phone]   [address] [place] [address] [phone]

## 2020-08-27 NOTE — ASSESSMENT & PLAN NOTE
Presented to the emergency room complaints of chest pain  Does have history of drug-eluting stent placement in February of 2020  Did receive aspirin and nitroglycerin in the ER, with some relief  Continue PTA medication (atenolol, aspirin, Plavix, Lipitor)    Patient seen and evaluated by cardiology team, given negative troponin x 3 and EKG negative for ischemia, recommending no further workup  Patient also reports self-limiting 2 episodes of sharp lower chest upper abdominal pain  Other differential diagnosis include musculoskeletal pain versus GERD related pain, will add p r n  Tramadol  Continue Protonix for GERD

## 2020-08-27 NOTE — PHYSICAL THERAPY NOTE
Physical Therapy Eval    Patient Name: Sherry Minor    JFGAD'P Date: 8/27/2020     Problem List  Principal Problem:    Chest pain with moderate risk for cardiac etiology  Active Problems:    COPD (chronic obstructive pulmonary disease) (HCC)    Hypercholesterolemia    GERD (gastroesophageal reflux disease)    Syncope    Abdominal aortic aneurysm (HCC)    Dementia (HCC)    Coronary artery disease involving native coronary artery of native heart without angina pectoris    Elevated d-dimer       Past Medical History  Past Medical History:   Diagnosis Date    AAA (abdominal aortic aneurysm) (HCC)     Acid reflux     Acute on chronic diastolic congestive heart failure ([facility] [address] ) 2/25/2020    Acute serous otitis media of left ear     recurrence not specified     Anesthesia     "always has mental changes /lingers and lingers after anesthesia"    Anxiety     Aortic aneurysm without rupture ([address] )     Arm bruise     right inner forearm "recent IV"    At risk for falls     BPH without urinary obstruction     Cancer ([address] )     skin CA on nose    CAP (community acquired pneumonia)     Cataracts, bilateral     Change in bowel function     Clubbing of fingers     Colon polyps     Common cold     Contusion of elbow, left     initial encounter     COPD (chronic obstructive pulmonary disease) ([address] )     Coronary artery disease     Cough     Dementia ([address] )     Depression     Dizziness     upon "standing quickly on occas"    Exercise counseling     Fatigue     Full dentures     "doesn't wear them"    Glaucoma screening     High cholesterol     History of abdominal aortic aneurysm (AAA) repair 08/2017    History of bacteremia     History of chronic obstructive lung disease     History of epistaxis     History of influenza vaccination     History of kidney stones     History of pneumonia     "many times" "at least 5 times" almost always goes to sepsis"    History of sepsis 10/2017    History of sinusitis     History of skin cancer     History of sleep apnea     History of transfusion     History of urinary tract infection     [place] (hard of hearing)     Hydronephrosis with obstructing calculus     Influenza vaccine needed     Jock itch     left/saw doctor 11/8 and started on antifungal cream    Kidney stones     Left hip pain     Need for pneumococcal vaccination     Need for prophylactic vaccination and inoculation against influenza     Other emphysema ([facility] [address] )     Pancreatitis, chronic ([facility] [address] )     pt and wife can't confirm 11/10/17    Pneumonia 10/26/2017    admitted [facility]    Pulmonary emphysema ([address] )     Screening for genitourinary condition     Screening for neurological condition     Sepsis ([facility] [address] )     due to unspecified organism     Short-term memory loss     Sleep apnea     does not use CPAP      Special screening examination for neoplasm of prostate     TIA involving carotid artery     "before carotid surgery"    Ulcer     stomach, "years ago"    Unsteady gait     Use of cane as ambulatory aid     sometimes    Vitamin D deficiency     Wears glasses         Past Surgical History  Past Surgical History:   Procedure Laterality Date    ABDOMINAL AORTIC ANEURYSM REPAIR  08/23/2017    ABDOMINAL AORTIC ANEURYSM REPAIR, ENDOVASCULAR      BACK SURGERY      spinal stenosis    CARDIAC SURGERY      CABG x3    CAROTID ENDARTARECTOMY Left 11/1996    CATARACT EXTRACTION Bilateral     CORONARY ARTERY BYPASS GRAFT  01/2003    x3    CYSTOSCOPY      with insertion of ureteral stent     CYSTOSCOPY      with ureteroscopy with lithotripsy     EXCISIONAL HEMORRHOIDECTOMY      EYE SURGERY Bilateral     "for a wrinkle" after cataract surgery    HERNIA REPAIR      umbilical    LITHOTRIPSY      renal    MOUTH SURGERY      full mouth extraction     IN COLONOSCOPY FLX DX W/COLLJ SPEC WHEN PFRMD N/A 5/16/2017    Procedure: COLONOSCOPY with polypectomies/ hot snare and tattoo;  Surgeon: Paulina Gatica MD;  Location: [facility]; Service: Gastroenterology    DC CYSTOURETHROSCOPY N/A 11/30/2017    Procedure: Misha Arora;  Surgeon: Carlos A Hooper MD;  [facility]: [facility];  Service: Urology    DC ESOPHAGOGASTRODUODENOSCOPY TRANSORAL DIAGNOSTIC N/A 8/18/2016    Procedure: ESOPHAGOGASTRODUODENOSCOPY (EGD); Surgeon: Paulina Gatica MD;  Location: [facility]; Service: Gastroenterology    DC REMOVE BLADDER STONE,<2 5 CM N/A 11/30/2017    Procedure: Nilo Tony;  Surgeon: Carlos A Hooper MD;  [facility];  Service: Urology    SKIN BIOPSY      TONSILLECTOMY      URETERAL STENT PLACEMENT      and removal           08/27/20 0841   Note Type   Note type Eval/Treat   Pain Assessment   Pain Assessment Tool 0-10   Pain Score No Pain   Home Living   Type of Home House   Home Layout Multi-level; Able to live on main level with bedroom/bathroom   Home Equipment Cane   Additional Comments 1 BERNICE, walks without device   Prior Function   Level of [place] Independent with ADLs and functional mobility; Needs assistance with ADLs and functional mobility   Lives With Spouse   Receives Help From Family   ADL Assistance Independent   IADLs Needs assistance   Restrictions/Precautions   Other Precautions Chair Alarm;O2;Telemetry   General   Family/Caregiver Present No   Cognition   Overall Cognitive Status Impaired   Arousal/Participation Alert   Orientation Level Oriented to person   Memory Decreased short term memory;Decreased recall of recent events;Decreased recall of biographical information   Following Commands Follows one step commands with increased time or repetition   RLE Assessment   RLE Assessment WFL   Strength RLE   RLE Overall Strength 4/5   LLE Assessment   LLE Assessment WFL   Strength LLE   LLE Overall Strength 4/5   Coordination   Movements are Fluid and Coordinated 1   Bed Mobility   Supine to Sit 5  Supervision   Transfers Sit to Stand 5  Supervision   Stand to Sit 5  Supervision   Ambulation/Elevation   Gait pattern   (Decreased step length)   Gait Assistance   (CGA to supervision)   Assistive Device None   Distance 450'   Balance   Static Sitting Good   Dynamic Sitting Good   Static Standing Fair +   Dynamic Standing Fair +   Ambulatory Fair +   Activity Tolerance   Activity Tolerance Patient tolerated treatment well   Assessment   Prognosis Good   Problem List Decreased strength;Decreased endurance; Impaired balance;Decreased mobility; Decreased safety awareness; Impaired judgement;Decreased cognition   Assessment Pt is a 71 yo male admitted on 8/26/20 due to chest pain, questionable syncopal episode  Pt with hx of CABG, AAA, CEA, dementia, TIA  Pt lives with his wife in a 2 story home  Walks with no device  Pt is O2 dependent  Pt on 4L of O2 during eval  Moderate complexity PT eval completed on 8/27/20  O2 sats 94-97% on 4L throughout gait  Pt gait trained 450' without device with initial CGA  With increased gait distance, pt required supervision  Pt will benefit from continued PT to progress gait, transfers, balance, strengthening, steps, and safety in order to maximize function and decrease risk for falls  Goals   Patient Goals To go home   LTG Expiration Date 09/10/20   Long Term Goal #1 Pt will be mod I with transfers from all surfaces  Long Term Goal #2 Gait - 450' without device with I; 4 steps with 1 rail with supervision   PT Treatment Day 1   Plan   Treatment/Interventions Elevations; Therapeutic exercise;LE strengthening/ROM; Functional transfer training; Endurance training;Patient/family training;Bed mobility;Gait training   Recommendation   PT Discharge Recommendation Return to previous environment with social support   PT - OK to Discharge Yes  (When medically stable)     Pt in chair  Call bell in reach  Chair alarm in place

## 2020-08-27 NOTE — SOCIAL WORK
Spoke with patient's wife and she was concerned about not being able to care for her  at home because his dementia is getting worse   He is awaking at night a lot and has been getting a lot more forgetful and confused  Pt is concerned about losing her home if he goes to place long term and she has to apply for MA  I assured her that as long as she is living in house they can not take from her  I told her that they could look into getting him into an assisted living facility with dementia unit  Since patient was a  he could get up to $1900 a month off the cost   Told her to look  at [facility] on [address]  The other possibility is to hire aides to help  Pt's wife said she has 3 sons but they are all very busy with their lifes and unable to help  Pt's wife wants me to try to get patient into STR   She is agreeable for me to see if they have any Beds at [facility], [place] or [facility]  I will make referrals to [facility]  I am going to meet with patient's wife and her  at 3 pm tomorrow in [place]

## 2020-08-27 NOTE — PROGRESS NOTES
Finally was able to speak to patient's wife, she reports that patient was sitting in chair, went unresponsive for about 10 minutes or so, was not responding to commands, patient's son arrived, who called EMS  Date was no reported seizures  No urinary incontinence  Patient is staring look  The wife also reports that she is feeling, she has a recent rated cuff injury, she does not feel that she take care of her   She feels her wrist pain is unsafe at home  She cannot prevent falls  Recently she noticed patient become more agitated because she cannot take care of him  Requesting if patient can be placed in a nursing facility  Regarding syncope, cardiac workup negative, normal CT head, will add EEG, carotid Doppler  Discussed with , they will work on social issues  Appreciate assistance

## 2020-08-28 ENCOUNTER — APPOINTMENT (INPATIENT)
Dept: NEUROLOGY | Facility: HOSPITAL | Age: 80
DRG: 078 | End: 2020-08-28
Attending: INTERNAL MEDICINE
Payer: MEDICARE

## 2020-08-28 ENCOUNTER — APPOINTMENT (INPATIENT)
Dept: MRI IMAGING | Facility: HOSPITAL | Age: 80
DRG: 078 | End: 2020-08-28
Payer: MEDICARE

## 2020-08-28 PROBLEM — R40.4 ALTERED LEVEL OF CONSCIOUSNESS: Status: ACTIVE | Noted: 2018-03-07

## 2020-08-28 PROCEDURE — 95819 EEG AWAKE AND ASLEEP: CPT | Performed by: PSYCHIATRY & NEUROLOGY

## 2020-08-28 PROCEDURE — 95816 EEG AWAKE AND DROWSY: CPT

## 2020-08-28 PROCEDURE — 99232 SBSQ HOSP IP/OBS MODERATE 35: CPT | Performed by: INTERNAL MEDICINE

## 2020-08-28 PROCEDURE — 97530 THERAPEUTIC ACTIVITIES: CPT

## 2020-08-28 PROCEDURE — 97116 GAIT TRAINING THERAPY: CPT

## 2020-08-28 PROCEDURE — G0427 INPT/ED TELECONSULT70: HCPCS | Performed by: PSYCHIATRY & NEUROLOGY

## 2020-08-28 RX ORDER — LORAZEPAM 0.5 MG/1
0.5 TABLET ORAL ONCE
Status: COMPLETED | OUTPATIENT
Start: 2020-08-28 | End: 2020-08-28

## 2020-08-28 RX ORDER — OLANZAPINE 10 MG/1
10 INJECTION, POWDER, LYOPHILIZED, FOR SOLUTION INTRAMUSCULAR ONCE
Status: COMPLETED | OUTPATIENT
Start: 2020-08-28 | End: 2020-08-28

## 2020-08-28 RX ADMIN — TAMSULOSIN HYDROCHLORIDE 0.4 MG: 0.4 CAPSULE ORAL at 08:33

## 2020-08-28 RX ADMIN — MEMANTINE 5 MG: 5 TABLET ORAL at 17:38

## 2020-08-28 RX ADMIN — TIOTROPIUM BROMIDE 18 MCG: 18 CAPSULE ORAL; RESPIRATORY (INHALATION) at 08:36

## 2020-08-28 RX ADMIN — CLOPIDOGREL BISULFATE 75 MG: 75 TABLET ORAL at 08:32

## 2020-08-28 RX ADMIN — OLANZAPINE 10 MG: 10 INJECTION, POWDER, FOR SOLUTION INTRAMUSCULAR at 17:28

## 2020-08-28 RX ADMIN — FLUTICASONE FUROATE AND VILANTEROL TRIFENATATE 1 PUFF: 100; 25 POWDER RESPIRATORY (INHALATION) at 08:37

## 2020-08-28 RX ADMIN — MEMANTINE 5 MG: 5 TABLET ORAL at 08:33

## 2020-08-28 RX ADMIN — ATORVASTATIN CALCIUM 20 MG: 10 TABLET, FILM COATED ORAL at 22:15

## 2020-08-28 RX ADMIN — MULTIPLE VITAMINS W/ MINERALS TAB 1 TABLET: TAB at 08:33

## 2020-08-28 RX ADMIN — WATER 2.1 ML: 1 INJECTION INTRAMUSCULAR; INTRAVENOUS; SUBCUTANEOUS at 17:28

## 2020-08-28 RX ADMIN — QUETIAPINE FUMARATE 12.5 MG: 25 TABLET ORAL at 22:15

## 2020-08-28 RX ADMIN — METHOCARBAMOL TABLETS 500 MG: 500 TABLET, COATED ORAL at 17:38

## 2020-08-28 RX ADMIN — METHOCARBAMOL TABLETS 500 MG: 500 TABLET, COATED ORAL at 08:33

## 2020-08-28 RX ADMIN — LACTOBACILLUS ACIDOPHILUS / LACTOBACILLUS BULGARICUS 1 PACKET: 100 MILLION CFU STRENGTH GRANULES at 08:33

## 2020-08-28 RX ADMIN — CYANOCOBALAMIN TAB 1000 MCG 1000 MCG: 1000 TAB at 08:33

## 2020-08-28 RX ADMIN — FUROSEMIDE 20 MG: 20 TABLET ORAL at 08:33

## 2020-08-28 RX ADMIN — ASPIRIN 81 MG: 81 TABLET, COATED ORAL at 08:32

## 2020-08-28 RX ADMIN — DONEPEZIL HYDROCHLORIDE 10 MG: 5 TABLET, FILM COATED ORAL at 22:15

## 2020-08-28 RX ADMIN — HEPARIN SODIUM 5000 UNITS: 5000 INJECTION INTRAVENOUS; SUBCUTANEOUS at 22:14

## 2020-08-28 RX ADMIN — HEPARIN SODIUM 5000 UNITS: 5000 INJECTION INTRAVENOUS; SUBCUTANEOUS at 14:04

## 2020-08-28 RX ADMIN — LORAZEPAM 0.5 MG: 0.5 TABLET ORAL at 10:21

## 2020-08-28 NOTE — PLAN OF CARE
Problem: PHYSICAL THERAPY ADULT  Goal: Performs mobility at highest level of function for planned discharge setting  See evaluation for individualized goals  Description: Treatment/Interventions: Elevations, Therapeutic exercise, LE strengthening/ROM, Functional transfer training, Endurance training, Patient/family training, Bed mobility, Gait training          See flowsheet documentation for full assessment, interventions and recommendations  8/28/2020 1526 by Deandre Jennings PTA  Outcome: Progressing  Note: Prognosis: Good  Problem List: Decreased strength, Decreased endurance, Impaired balance, Decreased mobility, Decreased safety awareness, Decreased cognition, Impaired judgement  Assessment: Performing tx/ambulation with min x 1 assist   Utilized RW this session due to unsteady gait  Improved balance with use of RW  Difficulty following cues  Pt will benefit from continued PT to progress gait, transfers, balance, strengthening, steps, and safety in order to maximize function and decrease risk for falls  PT Discharge Recommendation: Post-Acute Rehabilitation Services     PT - OK to Discharge: Yes(When medically stable)    See flowsheet documentation for full assessment  8/28/2020 1254 by Deandre Jennings PTA  Outcome: Progressing  Note: Prognosis: Good  Problem List: Decreased strength, Decreased endurance, Impaired balance, Decreased mobility, Decreased safety awareness, Decreased cognition, Impaired judgement  Assessment: Pt  performing functional mobility with supervision  No LOB with standing unsupported  Decreased problem solving with tasks  Pt  declined ambulation  Pt will benefit from continued PT to progress gait, transfers, balance, strengthening, steps, and safety in order to maximize function and decrease risk for falls             PT Discharge Recommendation: Return to previous environment with social support     PT - OK to Discharge: Yes(When medically stable)    See flowsheet documentation for full assessment

## 2020-08-28 NOTE — PHYSICAL THERAPY NOTE
PHYSICAL THERAPY NOTE          Patient Name: Teresita Lr  ENGXW'Y Date: 8/28/2020 08/28/20 1025   Restrictions/Precautions   Weight Bearing Precautions Per Order No   Other Precautions   (Chair Alarm;O2;Telemetry )   Cognition   Overall Cognitive Status Impaired   Arousal/Participation Alert   Following Commands Follows one step commands inconsistently   Subjective   Subjective I need to go to the bathroom  Declined ambulation  Maybe later  Bed Mobility   Supine to Sit 5  Supervision   Additional items Assist x 1; Increased time required   Additional Comments stood at bed side with supervision to utilize urinal  Pt  with difficulty perfoming task due to decreased problem solving skills  Transfers   Sit to Stand 5  Supervision   Additional items Increased time required   Stand to Sit 5  Supervision   Additional items Increased time required   Additional Comments stood x 3 min at bedside fair+ balance unsupported   Balance   Static Standing Fair +   Activity Tolerance   Activity Tolerance Patient tolerated treatment well   Assessment   Prognosis Good   Problem List Decreased strength;Decreased endurance; Impaired balance;Decreased mobility; Decreased safety awareness;Decreased cognition; Impaired judgement   Assessment Pt  performing functional mobility with supervision  No LOB with standing unsupported  Decreased problem solving with tasks  Pt  declined ambulation  Pt will benefit from continued PT to progress gait, transfers, balance, strengthening, steps, and safety in order to maximize function and decrease risk for falls  Goals   LTG Expiration Date 09/10/20   PT Treatment Day 2   Plan   Treatment/Interventions Functional transfer training;LE strengthening/ROM; Elevations; Therapeutic exercise; Endurance training;Bed mobility;Gait training   Recommendation   PT Discharge Recommendation Return to previous environment with social support   Pt  In bed  with call bell within reach  and alarm on at end of PT session  Discussed with Aida Guzman, PT today's treatment and patient's current level of function for care coordination

## 2020-08-28 NOTE — ASSESSMENT & PLAN NOTE
Presented to the emergency room complaints of chest pain  Does have history of drug-eluting stent placement in February of 2020  Did receive aspirin and nitroglycerin in the ER, with some relief  Continue PTA medication (atenolol, aspirin, Plavix, Lipitor)    Patient seen and evaluated by cardiology team, given negative troponin x 3 and EKG negative for ischemia, recommending no further workup  Patient also reports self-limiting 2 episodes of sharp lower chest upper abdominal pain  Other differential diagnosis include musculoskeletal pain versus GERD related pain, will add p r n  Tramadol  Continue Protonix for GERD        No reported pain while inpatient

## 2020-08-28 NOTE — ASSESSMENT & PLAN NOTE
· Increase atorvastatin to high-intensity statin therapy with atorvastatin 40 mg daily in the evening in the setting of cerebrovascular small vessel disease

## 2020-08-28 NOTE — PHYSICAL THERAPY NOTE
PHYSICAL THERAPY NOTE          Patient Name: Yuliya HEALY Date: 8/28/2020 08/28/20 1430   Restrictions/Precautions   Weight Bearing Precautions Per Order No   Other Precautions Chair Alarm; Bed Alarm;O2;Fall Risk   Cognition   Overall Cognitive Status Impaired   Arousal/Participation Alert; Cooperative   Following Commands Follows one step commands inconsistently   Subjective   Subjective My legs are stiff   Transfers   Sit to Stand 4  Minimal assistance   Additional items Assist x 1; Increased time required   Stand to Sit 4  Minimal assistance   Additional items Assist x 1; Armrests; Increased time required   Ambulation/Elevation   Gait pattern Foward flexed; Excessively slow   Gait Assistance 4  Minimal assist   Additional items Assist x 1;Verbal cues   Assistive Device Rolling walker   Distance 200'   Balance   Ambulatory Fair  (with RW)   Endurance Deficit   Endurance Deficit Yes   Activity Tolerance   Activity Tolerance Patient limited by fatigue   Assessment   Prognosis Good   Problem List Decreased strength;Decreased endurance; Impaired balance;Decreased mobility; Decreased safety awareness;Decreased cognition; Impaired judgement   Assessment Performing tx/ambulation with min x 1 assist   Utilized RW this session due to unsteady gait  Improved balance with use of RW  Difficulty following cues  Pt will benefit from continued PT to progress gait, transfers, balance, strengthening, steps, and safety in order to maximize function and decrease risk for falls  Goals   LTG Expiration Date 09/10/20   PT Treatment Day 3   Plan   Treatment/Interventions Functional transfer training;LE strengthening/ROM; Therapeutic exercise; Endurance training;Bed mobility;Gait training   Recommendation   PT Discharge Recommendation Post-Acute Rehabilitation Services   Pt   OOB in chair   with call bell within reach, scd's connected and turned on and alarm on at end of PT session  Discussed with Portillo Becerra, PT today's treatment and patient's current level of function for care coordination

## 2020-08-28 NOTE — TELEMEDICINE
TeleConsultation - Neurology   Anabel Turpin 70 y o  male MRN: [id number]  Unit/Bed#: 410-01 Encounter: [id number]      REQUIRED DOCUMENTATION:     1  This service was provided via Telemedicine  2  Provider located at home  3  TeleMed provider: Ceferino Alford MD   4  Identify all parties in room with patient during tele consult:  RN  5  After connecting through [email or web address], patient was identified by name and date of birth and assistant checked wristband  Patient was then informed that this was a Telemedicine visit and that the exam was being conducted confidentially over secure lines  My office door was closed  No one else was in the room  Patient acknowledged consent and understanding of privacy and security of the Telemedicine visit, and gave us permission to have the assistant stay in the room in order to assist with the history and to conduct the exam   I informed the patient that I have reviewed their record in Epic and presented the opportunity for them to ask any questions regarding the visit today  The patient agreed to participate  Assessment/Plan   Episode of unawareness:  Patient was reclining on his chair, asleep, when his wife found him unresponsive, with blank staring  When EMS arrived, he was complaining of chest pain  This description is not very suggestive of seizure, although not impossible  It is more likely hypertensive emergency (BP was >603 systolic)  Right arm tremors: There was low to medium amplitude, intentional tremors in right arm that was likely essential tremor  No significant bradykinesia  However, tremor was strongly right sided which necessitates head imaging  Gait imbalance:  Patient was able to stand up, but he had truncal swaying, and was unable to take a safe first step  Examination with nurse help revealed proprioception loss  It is possible this is large fiber neuropathy, but apractic gait and ataxic gait both have similar appearance   Head imaging is needed as well  Plan:  Brain MRI w/wo contrast  BP management    History of Present Illness     Reason for Consult / Principal Problem: LOC  Hx and PE limited by: none  HPI: Ela Schneider is a 70 y o  male who presents for LOC  History obtained from the patient and the chart  In brief, patient has dementia  He was lying on his recliner chair asleep  Wife tried to wake him up but he was not fully responsive  His eyes were open but he was not following commands  She called 911  When EMS arrived he was complaining of chest pain  The patient has no good recollection of the event  It appears he never had similar events before  Carotid US found to significant carotid stenosis  CT head was not actionable  Inpatient consult to Neurology  Consult performed by:  Miriam Merrill MD  Consult ordered by: Eileen Lynne MD           Review of Systems  Complete ROS was done and is negative other than what is mentioned in HPI    Historical Information   Past Medical History:   Diagnosis Date    AAA (abdominal aortic aneurysm) ([address] )     Acid reflux     Acute on chronic diastolic congestive heart failure ([address] ) 2/25/2020    Acute serous otitis media of left ear     recurrence not specified     Anesthesia     "always has mental changes /lingers and lingers after anesthesia"    Anxiety     Aortic aneurysm without rupture ([address] )     Arm bruise     right inner forearm "recent IV"    At risk for falls     BPH without urinary obstruction     Cancer ([address] )     skin CA on nose    CAP (community acquired pneumonia)     Cataracts, bilateral     Change in bowel function     Clubbing of fingers     Colon polyps     Common cold     Contusion of elbow, left     initial encounter     COPD (chronic obstructive pulmonary disease) ([address] )     Coronary artery disease     Cough     Dementia ([address] )     Depression     Dizziness     upon "standing quickly on occas"    Exercise counseling     Fatigue     Full dentures     "doesn't wear them"    Glaucoma screening     High cholesterol     History of abdominal aortic aneurysm (AAA) repair 08/2017    History of bacteremia     History of chronic obstructive lung disease     History of epistaxis     History of influenza vaccination     History of kidney stones     History of pneumonia     "many times" "at least 5 times" almost always goes to sepsis"    History of sepsis 10/2017    History of sinusitis     History of skin cancer     History of sleep apnea     History of transfusion     History of urinary tract infection     [place] (hard of hearing)     Hydronephrosis with obstructing calculus     Influenza vaccine needed     Jock itch     left/saw doctor 11/8 and started on antifungal cream    Kidney stones     Left hip pain     Need for pneumococcal vaccination     Need for prophylactic vaccination and inoculation against influenza     Other emphysema ([address] )     Pancreatitis, chronic ([address] )     pt and wife can't confirm 11/10/17    Pneumonia 10/26/2017    admitted [facility]    Pulmonary emphysema ([address] )     Screening for genitourinary condition     Screening for neurological condition     Sepsis ([address] )     due to unspecified organism     Short-term memory loss     Sleep apnea     does not use CPAP      Special screening examination for neoplasm of prostate     TIA involving carotid artery     "before carotid surgery"    Ulcer     stomach, "years ago"    Unsteady gait     Use of cane as ambulatory aid     sometimes    Vitamin D deficiency     Wears glasses      Past Surgical History:   Procedure Laterality Date    ABDOMINAL AORTIC ANEURYSM REPAIR  08/23/2017    ABDOMINAL AORTIC ANEURYSM REPAIR, ENDOVASCULAR      BACK SURGERY      spinal stenosis    CARDIAC SURGERY      CABG x3    CAROTID ENDARTARECTOMY Left 11/1996    CATARACT EXTRACTION Bilateral     CORONARY ARTERY BYPASS GRAFT  01/2003    x3    CYSTOSCOPY      with insertion of ureteral stent     CYSTOSCOPY      with ureteroscopy with lithotripsy     EXCISIONAL HEMORRHOIDECTOMY      EYE SURGERY Bilateral     "for a wrinkle" after cataract surgery    HERNIA REPAIR      umbilical    LITHOTRIPSY      renal    MOUTH SURGERY      full mouth extraction     PA COLONOSCOPY FLX DX W/COLLJ SPEC WHEN PFRMD N/A 2017    Procedure: COLONOSCOPY with polypectomies/ hot snare and tattoo;  Surgeon: Paulina Gatica MD;  Location: [facility]; Service: Gastroenterology    PA CYSTOURETHROSCOPY N/A 2017    Procedure: Misha Arora;  Surgeon: Carlos A Hooper MD;  [facility];  Service: Urology    PA ESOPHAGOGASTRODUODENOSCOPY TRANSORAL DIAGNOSTIC N/A 2016    Procedure: ESOPHAGOGASTRODUODENOSCOPY (EGD); Surgeon: Paulina Gatica MD;  [facility]; Service: Gastroenterology    PA REMOVE BLADDER STONE,<2 5 CM N/A 2017    Procedure: Nilo Tony;  Surgeon: Carlos A Hooper MD;  [facility];  Service: Urology    SKIN BIOPSY      TONSILLECTOMY      URETERAL STENT PLACEMENT      and removal     Social History   Social History     Substance and Sexual Activity   Alcohol Use Not Currently    Alcohol/week: 0 0 standard drinks    Frequency: Never    Drinks per session: Patient refused    Binge frequency: Never    Comment: quit 25 yrs ago     Social History     Substance and Sexual Activity   Drug Use No    Comment: No illicit drug use      E-Cigarette/Vaping    E-Cigarette Use Never User      E-Cigarette/Vaping Substances    Nicotine No     THC No     CBD No     Flavoring No     Other No     Unknown No      Social History     Tobacco Use   Smoking Status Former Smoker    Packs/day: 1 50    Years: 60 00    Pack years: 90 00    Last attempt to quit: 2014    Years since quittin 6   Smokeless Tobacco Never Used   Tobacco Comment     used to be a 1-1 5 ppd smoker     Family History: non-contributory    Review of previous medical records was completed       Meds/Allergies current meds:   Current Facility-Administered Medications   Medication Dose Route Frequency    acetaminophen (TYLENOL) tablet 650 mg  650 mg Oral Q6H PRN    albuterol (PROVENTIL HFA,VENTOLIN HFA) inhaler 2 puff  2 puff Inhalation Q6H PRN    aspirin (ECOTRIN LOW STRENGTH) EC tablet 81 mg  81 mg Oral Daily    atenolol (TENORMIN) tablet 25 mg  25 mg Oral HS    atorvastatin (LIPITOR) tablet 20 mg  20 mg Oral HS    clopidogrel (PLAVIX) tablet 75 mg  75 mg Oral Daily    cyanocobalamin (VITAMIN B-12) tablet 1,000 mcg  1,000 mcg Oral Once per day on Mon Wed Fri    donepezil (ARICEPT) tablet 10 mg  10 mg Oral HS    fluticasone-vilanterol (BREO ELLIPTA) 100-25 mcg/inh inhaler 1 puff  1 puff Inhalation Daily    furosemide (LASIX) tablet 20 mg  20 mg Oral Daily    heparin (porcine) subcutaneous injection 5,000 Units  5,000 Units Subcutaneous Q8H [address]    lactobacillus acidophilus-bulgaricus (FLORANEX) packet 1 packet  1 packet Oral Daily    memantine (NAMENDA) tablet 5 mg  5 mg Oral BID    methocarbamol (ROBAXIN) tablet 500 mg  500 mg Oral BID    multivitamin-minerals (CENTRUM) tablet 1 tablet  1 tablet Oral Daily    ondansetron (ZOFRAN) injection 4 mg  4 mg Intravenous Q6H PRN    pantoprazole (PROTONIX) EC tablet 40 mg  40 mg Oral Daily    QUEtiapine (SEROquel) tablet 12 5 mg  12 5 mg Oral HS    tamsulosin (FLOMAX) capsule 0 4 mg  0 4 mg Oral Daily    tiotropium (SPIRIVA) capsule for inhaler 18 mcg  18 mcg Inhalation Daily       Allergies   Allergen Reactions    Augmentin [Amoxicillin-Pot Clavulanate] Diarrhea    Ciprofloxacin Hives    Morphine And Related Other (See Comments)     Change in mental status    Levofloxacin      Headache    Wellbutrin [Bupropion]        Objective   Vitals:Blood pressure 121/73, pulse 88, temperature 98 4 °F (36 9 °C), resp  rate 18, height 5' 8" (1 727 m), weight 74 8 kg (164 lb 14 5 oz), SpO2 90 %  ,Body mass index is 25 07 kg/m²      Intake/Output Summary (Last 24 hours) at 8/28/2020 1501  Last data filed at 8/28/2020 1330  Gross per 24 hour   Intake 300 ml   Output    Net 300 ml       Invasive Devices: Invasive Devices     Peripheral Intravenous Line            Peripheral IV Left Antecubital -- days    Peripheral IV 08/26/20 Left Forearm 1 day                Physical Exam NAD  Skin: no seen lesions  HEENT: ATNC  Chest: breathing comfortably on room air  GI: no apparent distension  Neurologic Exam   Alert and oriented for self and year, but not moth  He identified president [place], but not the ones before him  Language is intact to comprehension and expression  No neglect  Speech is normal  EOMI  Pupils are symmetric  Face is symmetric  Tongue is midline  Able to move all extremities against gravity  There was lack of proprioception of big toes when tested by the nurse  When asked to stand up, he was able to stand with assistance, had truncal swaying and wide base  He was unable to step safely  Lab Results:   CBC:   Results from last 7 days   Lab Units 08/26/20  2257 08/26/20  1614   WBC Thousand/uL  --  6 54   RBC Million/uL  --  5 10   HEMOGLOBIN g/dL  --  16 1   HEMATOCRIT %  --  48 2   MCV fL  --  95   PLATELETS Thousands/uL 106* 118*   , BMP/CMP:   Results from last 7 days   Lab Units 08/26/20  1614   SODIUM mmol/L 142   POTASSIUM mmol/L 3 9   CHLORIDE mmol/L 105   CO2 mmol/L 24   BUN mg/dL 16   CREATININE mg/dL 1 33*   CALCIUM mg/dL 8 5   AST U/L 23   ALT U/L 20   ALK PHOS U/L 117*   EGFR ml/min/1 73sq m 51   , Vitamin B12:   , TSH:   , Coagulation:   Results from last 7 days   Lab Units 08/26/20  1614   INR  1 06   , Lipid Profile:     Imaging Studies: I have personally reviewed pertinent reports  EKG, Pathology, and Other Studies: I have personally reviewed pertinent reports      VTE Prophylaxis: Enoxaparin (Lovenox)    Code Status: Level 1 - Full Code  Advance Directive and Living Will:      Power of :    POLST:      Counseling / Coordination of Care  N/A

## 2020-08-28 NOTE — SOCIAL WORK
Received call from Patient's wife and she is agreeable to patient going to [facility] but she wants him to go a regular room not the dementia unit  Pt has no behaviors here in the hospital  I called Louise Morton at [facility] of same and she will notify her director

## 2020-08-28 NOTE — PLAN OF CARE
Problem: Potential for Falls  Goal: Patient will remain free of falls  Description: INTERVENTIONS:  - Assess patient frequently for physical needs  -  Identify cognitive and physical deficits and behaviors that affect risk of falls  -  [place] fall precautions as indicated by assessment   - Educate patient/family on patient safety including physical limitations  - Instruct patient to call for assistance with activity based on assessment  - Modify environment to reduce risk of injury  - Consider OT/PT consult to assist with strengthening/mobility  Outcome: Progressing     Problem: PAIN - ADULT  Goal: Verbalizes/displays adequate comfort level or baseline comfort level  Description: Interventions:  - Encourage patient to monitor pain and request assistance  - Assess pain using appropriate pain scale  - Administer analgesics based on type and severity of pain and evaluate response  - Implement non-pharmacological measures as appropriate and evaluate response  - Consider cultural and social influences on pain and pain management  - Notify physician/advanced practitioner if interventions unsuccessful or patient reports new pain  Outcome: Progressing     Problem: INFECTION - ADULT  Goal: Absence or prevention of progression during hospitalization  Description: INTERVENTIONS:  - Assess and monitor for signs and symptoms of infection  - Monitor lab/diagnostic results  - Monitor all insertion sites, i e  indwelling lines, tubes, and drains  - Administer medications as ordered  - Instruct and encourage patient and family to use good hand hygiene technique  - Identify and instruct in appropriate isolation precautions for identified infection/condition  Outcome: Progressing     Problem: SAFETY ADULT  Goal: Patient will remain free of falls  Description: INTERVENTIONS:  - Assess patient frequently for physical needs  -  Identify cognitive and physical deficits and behaviors that affect risk of falls    -  [place] fall precautions as indicated by assessment   - Educate patient/family on patient safety including physical limitations  - Instruct patient to call for assistance with activity based on assessment  - Modify environment to reduce risk of injury  - Consider OT/PT consult to assist with strengthening/mobility  Outcome: Progressing  Goal: Maintain or return to baseline ADL function  Description: INTERVENTIONS:  -  Assess patient's ability to carry out ADLs; assess patient's baseline for ADL function and identify physical deficits which impact ability to perform ADLs (bathing, care of mouth/teeth, toileting, grooming, dressing, etc )  - Assess/evaluate cause of self-care deficits   - Assess range of motion  - Assess patient's mobility; develop plan if impaired  - Assess patient's need for assistive devices and provide as appropriate  - Encourage maximum independence but intervene and supervise when necessary  - Involve family in performance of ADLs  - Assess for home care needs following discharge   - Consider OT consult to assist with ADL evaluation and planning for discharge  - Provide patient education as appropriate  Outcome: Progressing  Goal: Maintain or return mobility status to optimal level  Description: INTERVENTIONS:  - Assess patient's baseline mobility status (ambulation, transfers, stairs, etc )    - Identify cognitive and physical deficits and behaviors that affect mobility  - Identify mobility aids required to assist with transfers and/or ambulation (gait belt, sit-to-stand, lift, walker, cane, etc )  - [place] fall precautions as indicated by assessment  - Record patient progress and toleration of activity level on Mobility SBAR; progress patient to next Phase/Stage  - Instruct patient to call for assistance with activity based on assessment  - Consider rehabilitation consult to assist with strengthening/weightbearing, etc   Outcome: Progressing     Problem: DISCHARGE PLANNING  Goal: Discharge to home or other facility with appropriate resources  Description: INTERVENTIONS:  - Identify barriers to discharge w/patient and caregiver  - Arrange for needed discharge resources and transportation as appropriate  - Identify discharge learning needs (meds, wound care, etc )  - Arrange for interpretive services to assist at discharge as needed  - Refer to Case Management Department for coordinating discharge planning if the patient needs post-hospital services based on physician/advanced practitioner order or complex needs related to functional status, cognitive ability, or social support system  Outcome: Progressing     Problem: Knowledge Deficit  Goal: Patient/family/caregiver demonstrates understanding of disease process, treatment plan, medications, and discharge instructions  Description: Complete learning assessment and assess knowledge base    Interventions:  - Provide teaching at level of understanding  - Provide teaching via preferred learning methods  Outcome: Progressing     Problem: CARDIOVASCULAR - ADULT  Goal: Maintains optimal cardiac output and hemodynamic stability  Description: INTERVENTIONS:  - Monitor I/O, vital signs and rhythm  - Monitor for S/S and trends of decreased cardiac output  - Administer and titrate ordered vasoactive medications to optimize hemodynamic stability  - Assess quality of pulses, skin color and temperature  - Assess for signs of decreased coronary artery perfusion  - Instruct patient to report change in severity of symptoms  Outcome: Progressing  Goal: Absence of cardiac dysrhythmias or at baseline rhythm  Description: INTERVENTIONS:  - Continuous cardiac monitoring, vital signs, obtain 12 lead EKG if ordered  - Administer antiarrhythmic and heart rate control medications as ordered  - Monitor electrolytes and administer replacement therapy as ordered  Outcome: Progressing     Problem: METABOLIC, FLUID AND ELECTROLYTES - ADULT  Goal: Electrolytes maintained within normal limits  Description: INTERVENTIONS:  - Monitor labs and assess patient for signs and symptoms of electrolyte imbalances  - Administer electrolyte replacement as ordered  - Monitor response to electrolyte replacements, including repeat lab results as appropriate  - Instruct patient on fluid and nutrition as appropriate  Outcome: Progressing  Goal: Fluid balance maintained  Description: INTERVENTIONS:  - Monitor labs   - Monitor I/O and WT  - Instruct patient on fluid and nutrition as appropriate  - Assess for signs & symptoms of volume excess or deficit  Outcome: Progressing  Goal: Glucose maintained within target range  Description: INTERVENTIONS:  - Monitor Blood Glucose as ordered  - Assess for signs and symptoms of hyperglycemia and hypoglycemia  - Administer ordered medications to maintain glucose within target range  - Assess nutritional intake and initiate nutrition service referral as needed  Outcome: Progressing     Problem: HEMATOLOGIC - ADULT  Goal: Maintains hematologic stability  Description: INTERVENTIONS  - Assess for signs and symptoms of bleeding or hemorrhage  - Monitor labs  - Administer supportive blood products/factors as ordered and appropriate  Outcome: Progressing     Problem: MUSCULOSKELETAL - ADULT  Goal: Maintain or return mobility to safest level of function  Description: INTERVENTIONS:  - Assess patient's ability to carry out ADLs; assess patient's baseline for ADL function and identify physical deficits which impact ability to perform ADLs (bathing, care of mouth/teeth, toileting, grooming, dressing, etc )  - Assess/evaluate cause of self-care deficits   - Assess range of motion  - Assess patient's mobility  - Assess patient's need for assistive devices and provide as appropriate  - Encourage maximum independence but intervene and supervise when necessary  - Involve family in performance of ADLs  - Assess for home care needs following discharge   - Consider OT consult to assist with ADL evaluation and planning for discharge  - Provide patient education as appropriate  Outcome: Progressing  Goal: Maintain proper alignment of affected body part  Description: INTERVENTIONS:  - Support, maintain and protect limb and body alignment  - Provide patient/ family with appropriate education  Outcome: Progressing     Problem: Prexisting or High Potential for Compromised Skin Integrity  Goal: Skin integrity is maintained or improved  Description: INTERVENTIONS:  - Identify patients at risk for skin breakdown  - Assess and monitor skin integrity  - Assess and monitor nutrition and hydration status  - Monitor labs   - Assess for incontinence   - Turn and reposition patient  - Assist with mobility/ambulation  - Relieve pressure over bony prominences  - Avoid friction and shearing  - Provide appropriate hygiene as needed including keeping skin clean and dry  - Evaluate need for skin moisturizer/barrier cream  - Collaborate with interdisciplinary team   - Patient/family teaching  - Consider wound care consult   Outcome: Progressing

## 2020-08-28 NOTE — ASSESSMENT & PLAN NOTE
Patient initially dyspneic in the ER, however no signs of COPD exacerbation    -will continue patient home regimen   -oxygen as needed   -continue outpatient Pulmonology follow-up

## 2020-08-28 NOTE — ASSESSMENT & PLAN NOTE
Results for Aubrey Bustamante (MRN [id number]) as of 8/29/2020 13:58   Ref  Range 8/26/2020 16:14   D-Dimer, Anu President Latest Ref Range: <0 50 ug/ml FEU >10 00 (H)       VAS lower limb venous duplex study, complete bilateral   Status: Final result    PACS Images      Show images for VAS lower limb venous duplex study, complete bilateral    Study Result        [facility] REPORT  CLINICAL:  Indications:  Physician wants to determine patency of the venous system secondary to elevated  D-dimer and chest pain  Operative History:  2017-08-23 AAA Repair  2003-01-01 CABG  [id number] Left CEA  Risk Factors  The patient has history of HLD, carotid artery disease, COPD, Previous remote  smoking, AAA, CHF, and CAD  He has no history of Diabetes  FINDINGS:     Segment  Right            Left                Impression       Impression         CFV      Normal (Patent)  Normal (Patent)             CONCLUSION:     Impression:  RIGHT LOWER LIMB:  No evidence of acute or chronic deep vein thrombosis  No evidence of superficial thrombophlebitis noted  Doppler evaluation shows a normal response to augmentation maneuvers  Popliteal, posterior tibial and anterior tibial arterial Doppler waveforms are  triphasic  LEFT LOWER LIMB:  No evidence of acute or chronic deep vein thrombosis  No evidence of superficial thrombophlebitis noted  Doppler evaluation shows a normal response to augmentation maneuvers  Popliteal, posterior tibial and anterior tibial arterial Doppler waveforms are  triphasic       SIGNATURE:  Electronically Signed by: Elena Roque MD on 2020-08-27 12:11:16 PM     CTA ED chest PE Study   Status: Final result    PACS Images      Show images for CTA ED chest PE Study    Study Result     CTA - CHEST WITH IV CONTRAST - PULMONARY ANGIOGRAM     INDICATION:   PE suspected, intermediate prob, positive D-dimer      COMPARISON: August 4, 2020, July 1, 2019     TECHNIQUE: CTA examination of the chest was performed using angiographic technique according to a protocol specifically tailored to evaluate for pulmonary embolism  Axial, sagittal, and coronal 2D reformatted images were created from the source data and   submitted for interpretation  In addition, coronal 3D MIP postprocessing was performed on the acquisition scanner        Radiation dose length product (DLP) for this visit:  334 63 mGy-cm   This examination, like all CT scans performed in the [facility], was performed utilizing techniques to minimize radiation dose exposure, including the use of iterative   reconstruction and automated exposure control      IV Contrast:  85 mL of iohexol (OMNIPAQUE)     FINDINGS:     PULMONARY ARTERIAL TREE:  No pulmonary embolus is seen       LUNGS:  Diffuse moderate panlobular emphysema  Focal oval and linear scar in the posterior right upper lobe without suspicious change compared to 2019  Mild subpleural reticular interstitial thickening in both lung bases  No suspicious areas of   consolidation or mass  Minimal residual linear and ill-defined opacity in the right lower lobe image 3/81, improved compared to most recent study  Corresponds to area of nodule seen in 2019      PLEURA:  Unremarkable      HEART/GREAT VESSELS:  Unremarkable for patient's age      MEDIASTINUM AND DAVID:  Unremarkable      CHEST WALL AND LOWER NECK:   Unremarkable      VISUALIZED STRUCTURES IN THE UPPER ABDOMEN:  Unremarkable      OSSEOUS STRUCTURES:  No acute fracture or destructive osseous lesion      IMPRESSION:     No evidence for acute pulmonary embolus      Extensive bilateral diffuse panlobular emphysema and scarring as above  No suspicious interval change compared to priors

## 2020-08-28 NOTE — QUICK NOTE
I was notified by nursing staff that the patient is aggressive and combative    Will utilize a 1 time zyprexa 10mg IM x 1

## 2020-08-28 NOTE — PROGRESS NOTES
Progress Note - Kelly Powell 1940, 70 y o  male MRN: [id number]    Unit/Bed#: 410-01 Encounter: [id number]    Primary Care Provider: Tru Mcarthur DO   Date and time admitted to hospital: 8/26/2020  3:56 PM        Ambulatory dysfunction  Assessment & Plan  Patient is ambulatory dysfunction, need assistance at home, wife unable to take care of the patient, as per my discussion with patient wife yesterday  Social service on lower for possible placement at some assisted/nursing facility      Elevated d-dimer  Assessment & Plan  D-dimer level more than 10  CTA negative for acute pulmonary embolism  --VTE prophylaxis heparin 5000 units SQ q 8 hours        Coronary artery disease involving native coronary artery of native heart without angina pectoris  Assessment & Plan  Presented to the emergency room complaints of chest pain  Does have history of drug-eluting stent placement in February of 2020  Did receive aspirin and nitroglycerin in the ER, with some relief  Continue PTA medication (atenolol, aspirin, Plavix, Lipitor)    Patient seen and evaluated by cardiology team, given negative troponin x 3 and EKG negative for ischemia, recommending no further workup  Patient also reports self-limiting 2 episodes of sharp lower chest upper abdominal pain  Other differential diagnosis include musculoskeletal pain versus GERD related pain, will add p r n  Tramadol  Continue Protonix for GERD        No reported pain while inpatient      Dementia [facility])  Assessment & Plan  Currently at baseline  Continue Aricept and Namenda    Abdominal aortic aneurysm [facility])  Assessment & Plan  Currently stable  He did have repair on 08/23/2017  Monitor blood pressure closely    Syncope  Assessment & Plan  I called and spoke to patient wife yesterday, she reports patient was unresponsive with blank staring guys for at least 10 minutes or so, ultimately EMS was called given on responsiveness while patient was on recliner  Differential diagnosis include sepsis seizure versus cardiogenic syncope  Hypertensive encephalopathy  Less likely TIA  EMS reports upon their arrival he was awake and complaint of chest pain responded to nitroglycerin  Head CT shows-No acute intracranial abnormality  EKG shows- sinus rhythm with first-degree AV block with occasional premature ventricular complexes and premature atrial complexes at a rate of 83 beats per minute, patient seen by cardiology recommending no new intervention  Patient has a recent echo no read to repeat echo    His blood pressure was elevated soon after arrival to the ER  --Telemetry monitoring no arrhythmias  --orthostatics within normal limits  --Monitor blood pressure, well controlled   --OT/PT eval advising home with home care  Safe to discharge home    EEG and carotid Doppler ordered, still pending    I placed a neuro consult today, will follow-up the conditions, appreciate recommendations      GERD (gastroesophageal reflux disease)  Assessment & Plan  Continue Protonix 40 mg p o  Daily    Hypercholesterolemia  Assessment & Plan  Continue PTA dose statins    COPD (chronic obstructive pulmonary disease) ([facility] [address] )  Assessment & Plan  Patient initially dyspneic in the ER, however no signs of COPD exacerbation  -will continue patient home regimen   -oxygen as needed  --Continue outpatient pulmonary follow-up    * Chest pain with moderate risk for cardiac etiology  Assessment & Plan  Troponin negative x3, EKG negative for ischemia    Cardiology recommending no further workup     -Food electrolyte and nutrition:  Full    -VTE prophylaxis:  Subcu    - Level 1 - Full Code    -Pt Centered Rounds: Performed    -Education/Discussion with family and pt: Assessment plan discussed with patient      -Discharge planning/length of stay: Today 1 day(s)  [facility], case management and  on board    Patient also has pending EEG as well as carotid Doppler, also neuro consult pending     -Current patient status: Inpatient     -Certification Statement: I certify that patient need more than 2 nights stay    The total time spent more than 35 minutes, more than 50% of total time spent on counseling and coordination of care as described above  ? Please Note: Voice recognition used throughout compilation of provider note  Typographical errors may be present  Extended Emergency Contact Information  Primary Emergency Contact: [facility]  Address: [address], [address] [place] [address] Phone: [phone]  Mobile Phone: [phone]  Relation: Spouse  Secondary Emergency Contact: Jigna [place]  Mobile Phone: [phone]  Relation: Son_   ? Subjectives   Interval History:  Patient is anxious, missing his family, in tears, however denies any chest pain, shortness of breath  Still worried about his tremor right lower extremities  Rest 12 point review of systems was reviewed and otherwise negative except as noted in interval Hx     Physical Exam:    Current    Min/Max  Temperature: 98 4 °F (36 9 °C)  Temp  Min: 98 1 °F (36 7 °C)  Max: 98 8 °F (37 1 °C)  Pulse: 88  Pulse  Min: 84  Max: 102  Respirations: 18  Resp  Min: 18  Max: 18  Blood Pressure: 121/73  BP  Min: 121/73  Max: 130/90  SpO2: 90 %  SpO2  Min: 87 %  Max: 92 %  ? Weight:   Wt Readings from Last 4 Encounters:   08/27/20 74 8 kg (164 lb 14 5 oz)   08/21/20 78 4 kg (172 lb 13 5 oz)   08/17/20 78 9 kg (174 lb)   08/04/20 78 8 kg (173 lb 11 6 oz)     BMI: Body mass index is 25 07 kg/m²  Ins/Os last 24 luis manuel    General: No distress, well-developed   ? ENT: EOMI, Conjunctiva non-icteric, MMM, no facial ASYMMETRY      Respiratory: Trachea midline  Lungs clear to auscultation bilaterally  No use of accessory muscle  ?  CVS: No JVD, regular rate and rhythm, pulses equal all extremities  ? Abd: Soft and non-tender  Bowel sounds normal ? No surgery scar  ??  Skin: No bruising, rash     MSK: No joint swelling, tenderness, fine tremors at rest and right upper extremity improved with movement  ? CNS: AAOx3, hard of hearing, power symmetrical upper and lower extremity  ?   PSYCH: Anxious affect,      Genitourinary: No Martel     MEDS:  Current Facility-Administered Medications   Medication Dose Route Frequency Provider Last Rate    acetaminophen  650 mg Oral Q6H PRN Jimmie Vasquez, PA-CECILIA      albuterol  2 puff Inhalation Q6H PRN Jimmie Vasquez, PA-CECILIA      aspirin  81 mg Oral Daily Jimmie Vasquez, PA-C      atenolol  25 mg Oral HS [facility], PA-C      atorvastatin  20 mg Oral HS [facility], PA-C      clopidogrel  75 mg Oral Daily Jimmie Vasquez, PA-CECILIA      vitamin B-12  1,000 mcg Oral Once per day on Mon Wed Fri Jimmie Vasquez PA-C      donepezil  10 mg Oral HS Jimmie Vasquez, TAYE      fluticasone-vilanterol  1 puff Inhalation Daily Jimmie Vasquez, PA-CEICLIA      furosemide  20 mg Oral Daily JimmieAFIA Feldman-CECILIA      heparin (porcine)  5,000 Units Subcutaneous Q8H [facility]-C      lactobacillus acidophilus-bulgaricus  1 packet Oral Daily Jimmie Vasquez, TAYE      memantine  5 mg Oral BID Jimmie Vasquez, PA-CECILIA      methocarbamol  500 mg Oral BID Jimmie Vasquez, PA-CECILIA      multivitamin-minerals  1 tablet Oral Daily Jimmie Vasquez, PA-C      ondansetron  4 mg Intravenous Q6H PRN Jimmie Vasquez, PA-CECILIA      pantoprazole  40 mg Oral Daily [facility], PA-CECILIA      QUEtiapine  12 5 mg Oral HS [facility], PA-C      tamsulosin  0 4 mg Oral Daily [facility], PA-C      tiotropium  18 mcg Inhalation Daily [facility]OL[facility]ow[facility]           LABORATORY RESULTS:  CBC:  Results from last 7 days   Lab Units 08/26/20  2257 08/26/20  1614 08/21/20  1542   WBC Thousand/uL  --  6 54 5 94   HEMOGLOBIN g/dL  --  16 1 17 1*   HEMATOCRIT %  --  48 2 51 7*   PLATELETS Thousands/uL 106* 118* 128*   NEUTROS PCT %  --  61 57   LYMPHS PCT %  --  23 27   MONOS PCT %  --  12 12   EOS PCT %  --  3 3       CHEMISTRY:  Results from last 7 days   Lab Units 08/26/20  1614 08/21/20  1542   POTASSIUM mmol/L 3 9 4 6   CHLORIDE mmol/L 105 103   CO2 mmol/L 24 26   BUN mg/dL 16 16   CREATININE mg/dL 1 33* 1 40*   CALCIUM mg/dL 8 5 9 4   ALK PHOS U/L 117*  --    ALT U/L 20  --    AST U/L 23  --      Results from last 7 days   Lab Units 08/26/20  1614   MAGNESIUM mg/dL 2 0       COAGULATION:  Results from last 7 days   Lab Units 08/26/20  1614   INR  1 06   PTT seconds 32       OTHER TESTS:  0   Lab Value Date/Time    TROPONINI <0 02 08/26/2020 2257    TROPONINI <0 02 08/26/2020 2024    TROPONINI <0 02 08/26/2020 1614    TROPONINI <0 02 08/04/2020 2250    TROPONINI <0 02 04/04/2020 1456    TROPONINI 0 70 () 02/23/2020 2203    TROPONINI 0 69 () 02/23/2020 2008    TROPONINI 0 72 () 02/23/2020 1750    TROPONINI 0 79 () 02/23/2020 1420    TROPONINI 11 70 (H) 02/19/2020 0128    TROPONINI 14 70 (H) 02/18/2020 2210    TROPONINI 26 07 (HH) 02/18/2020 1714    TROPONINI >40 00 () 02/18/2020 1333    TROPONINI >40 00 () 02/18/2020 0849    TROPONINI 24 75 ([facility]) 02/18/2020 0546    TROPONINI <0 02 02/18/2020 0041    TROPONINI <0 02 11/18/2019 1540    TROPONINI <0 02 11/18/2019 1214    TROPONINI <0 02 07/01/2019 0932    TROPONINI <0 02 07/01/2019 0554    TROPONINI <0 02 06/14/2019 1204    TROPONINI <0 02 12/30/2018 1738    TROPONINI <0 02 12/30/2018 1505    TROPONINI <0 02 12/30/2018 0925    TROPONINI <0 02 05/13/2018 1634    TROPONINI <0 02 03/07/2018 1156    TROPONINI <0 02 03/07/2018 0750    TROPONINI <0 02 03/06/2018 1822    TROPONINI <0 02 02/25/2018 0728    TROPONINI <0 02 10/26/2017 2323    TROPONINI 0 03 12/28/2016 0947    TROPONINI 0 03 12/28/2016 0744    TROPONINI <0 02 12/28/2016 0242     ?                                                                                                                                                                          ? Radiological Studies:  I have reviewed images personally as well as reports    Penelope Ortiz Chest 1 View Portable    Result Date: 8/26/2020  Narrative: CHEST INDICATION:   chest pain  COMPARISON:  July 22, 2020 EXAM PERFORMED/VIEWS:  XR CHEST PORTABLE Single image FINDINGS:  Lungs adequately aerated  No lobar consolidation or large effusion  Mild atelectatic changes bilaterally (more so on the right than left)  Cardiac size within normal limits  Numerous surgical clips projecting over the left heart border  Prior sternotomy  No pneumothorax or free air  Osseous structures appear within normal limits for patient age  Impression: Mild atelectatic changes bilaterally (more so on the right than left)  No lobar consolidation or large effusion  Cardiac size normal   No pulmonary edema  Workstation performed: [id number]     Xr Ribs 2 Vw Right    Result Date: 8/3/2020  Narrative: RIGHT RIBS INDICATION:   R07 81: Pleurodynia  COMPARISON:  Chest x-ray from July 22, 2020  VIEWS:  XR RIBS 2 VW RIGHT FINDINGS: There is no fracture or pathologic bone lesion  Soft tissues are unremarkable  Centrilobular emphysema and mild stable coarse interstitial markings with biapical pleural scarring  Median sternotomy wires and surgical clips from CABG  Impression: No fracture  Emphysema and biapical pleural scarring  Workstation performed: [id number]     Xr Spine Thoracic 3 Vw    Result Date: 8/3/2020  Narrative: THORACIC SPINE INDICATION:   R07 81: Pleurodynia  COMPARISON:  None VIEWS:  XR SPINE THORACIC 3 VW FINDINGS: Median sternotomy wires and surgical clips from CABG  Diffuse osteopenia/osteoporosis  Mild gradual mid thoracic kyphosis  There is no fracture or pathologic bone lesion  Limited evaluation of the upper thoracic spine due to superimposition of shadows  Thoracic vertebral alignment is within normal limits  Anterior marginal osteophytes in the mid and lower thoracic spine  There is no displacement of the paraspinal line  The pedicles appear intact  Impression: No acute osseous abnormality   Diffuse osteopenia/osteoporosis  Mild thoracic degenerative disease  Workstation performed: [id number]     Ct Head Without Contrast    Result Date: 8/26/2020  Narrative: CT BRAIN - WITHOUT CONTRAST INDICATION:   Altered mental status  COMPARISON:  November 18, 2019 TECHNIQUE:  CT examination of the brain was performed  In addition to axial images, sagittal and coronal 2D reformatted images were created and submitted for interpretation  Radiation dose length product (DLP) for this visit:  911 41 mGy-cm   This examination, like all CT scans performed in the [facility], was performed utilizing techniques to minimize radiation dose exposure, including the use of iterative  reconstruction and automated exposure control  IMAGE QUALITY:  Diagnostic  FINDINGS: PARENCHYMA: Decreased attenuation is noted in periventricular and subcortical white matter demonstrating an appearance that is statistically most likely to represent mild microangiopathic change  Arterial atherosclerosis  No CT signs of acute infarction  No intracranial mass, mass effect or midline shift  No acute parenchymal hemorrhage  VENTRICLES AND EXTRA-AXIAL SPACES:  Normal for the patient's age  VISUALIZED ORBITS AND PARANASAL SINUSES:  Unremarkable  CALVARIUM AND EXTRACRANIAL SOFT TISSUES:  Normal      Impression: No acute intracranial abnormality  Workstation performed: [id number]     Mri Thoracic Spine Wo Contrast    Result Date: 8/21/2020  Narrative: MRI THORACIC SPINE WITHOUT CONTRAST INDICATION: M54 6: Pain in thoracic spine G89 29: Other chronic pain  COMPARISON:  X-ray 8/3/2020 TECHNIQUE:  Sagittal T1, sagittal T2, sagittal inversion recovery, axial T2,  axial 2D MERGE  IMAGE QUALITY: Intermittent motion related artifact  Owing to pain, axial T2*images could not be performed  FINDINGS: ALIGNMENT: Normal alignment of the thoracic spine  No compression fracture  No subluxation  No scoliosis   MARROW SIGNAL:  Normal marrow signal is identified within the visualized bony structures  No discrete marrow lesion  High STIR marrow signal from the T5 through the T12 level thought to be artifactual   This is not apparent on the T1 or T2 sequences  The paraspinal soft tissues are diffusely upright  THORACIC CORD: Normal signal within the thoracic cord  Conus terminates at the T12-L1 level  PARAVERTEBRAL SOFT TISSUES:  Normal  THORACIC DEGENERATIVE CHANGE: Mild, multilevel degenerative changes not compressive  Impression: Minor, noncompressive degenerative changes of the thoracic spine  Thoracic cord is normal  Workstation performed: [id number]     Cta Dissection Protocol Chest Abdomen Pelvis W Wo Contrast    Result Date: 8/5/2020  Narrative: CT ANGIOGRAM OF THE CHEST, ABDOMEN AND PELVIS WITH AND WITHOUT IV CONTRAST INDICATION:  Severe back pain b/w shoulder blades radiate down back status post endovascular repair of aortic aneurysm  COMPARISON: CT of the chest on August 3, 2020  TECHNIQUE:  CT angiogram examination of the chest, abdomen and pelvis was performed according to standard protocol  This examination, like all CT scans performed in the [facility], was performed utilizing techniques to minimize radiation dose exposure, including the use of iterative reconstruction and automated exposure control  Contrast as well as noncontrast images were obtained  3D reconstructions were performed an independent workstation, and are supplied for review  Rad dose 1856 75 mGy-cm IV Contrast:  100 mL of iohexol (OMNIPAQUE) was administered intravenously without immediate adverse reaction  Enteric Contrast:  Not given FINDINGS: VASCULAR STRUCTURES:  No evidence of aortic dissection  There is a aortobiiliac graft which is patent  No evidence of endoleak  There is a infrarenal abdominal aortic aneurysm not significantly changed in size measuring 5 9 x 5 7 cm  Moderate atherosclerotic calcifications    Moderate narrowing of the left external iliac artery  Mild narrowing of the right external iliac artery  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  Focal area of moderate narrowing at the origin of the superior mesenteric artery (series 602, image 80), stable  OTHER FINDINGS: CHEST: HEART:  Heart is normal in size  Coronary artery calcifications  Status post CABG  No pericardial effusion    LUNGS:  Emphysematous changes of lungs  Right upper lobe scarring is stable  Central airways are patent  PLEURA: No pleural effusion  MEDIASTINUM AND DAVID:  No mass or significant lymphadenopathy  Small mediastinal and hilar lymph nodes measure up to 5 mm in short axis  CHEST WALL AND LOWER NECK: Normal  ABDOMEN LIVER/BILIARY TREE:  Subcentimeter hypodensities in liver incompletely characterized however likely due to cysts  No biliary duct dilatation  GALLBLADDER:  Cholelithiasis without evidence of cholecystitis  SPLEEN:  Unremarkable  Normal size  PANCREAS:  Unremarkable  ADRENAL GLANDS:  Unremarkable  KIDNEYS/URETERS:  Bilateral nonobstructive nephrolithiasis  Calculi measure up to 5 mm on the left and 2 mm on the right  No hydronephrosis  Scattered areas of cortical scarring within bilateral kidneys  PELVIS REPRODUCTIVE ORGANS:  Unremarkable for patient's age  URINARY BLADDER:  Unremarkable  ADDITIONAL ABDOMINAL AND PELVIC STRUCTURES: STOMACH AND BOWEL:  Small hiatal hernia  No bowel obstruction  ABDOMINOPELVIC CAVITY:  No pathologically enlarged mesenteric or retroperitoneal lymph nodes  No ascites or free intraperitoneal air  ABDOMINAL WALL/INGUINAL REGIONS:  Unremarkable  OSSEOUS STRUCTURES:  No acute fracture or destructive osseous lesion  Impression: 1  Stable infrarenal abdominal aortic aneurysm measuring up to 5 9 cm status post aortobiiliac graft repair which is patent  No evidence of endoleak  2   No evidence of aortic dissection  3   Stable moderate narrowing of the left external iliac artery    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  4   Stable focal area of moderate narrowing at the origin of the superior mesenteric artery  5   Coronary artery calcifications status post CABG  6   Cholelithiasis without evidence of cholecystitis  7   Bilateral nonobstructing nephrolithiasis  No hydronephrosis  8   Small hiatal hernia  Workstation performed: [id number]     Vas Carotid Complete Study    Result Date: 8/27/2020  Narrative:  [facility] REPORT CLINICAL: Indications: Patient presents with recent episode of syncope  Operative History: 2017-08-23 AAA Repair 2003-01-01 CABG [id number] Left CEA Risk Factors The patient has history of HLD, AAA, COPD, previous remote smoking, and CAD  Clinical Right Pressure:  127/77 mm Hg, Left Pressure: IV  FINDINGS:  Right        Impression  PSV  EDV (cm/s)  Ratio  Dist  ICA                 58          13   0 98  Mid  ICA                  54          14   0 92  Prox  ICA    1 - 49%      76          14   1 28  Dist CCA                  51           9         Mid CCA                   59          12   1 26  Prox CCA                  47          10         Ext Carotid              135          18   2 27  Prox Vert                 20                     Subclavian                98           0          Left         Impression  PSV  EDV (cm/s)  Ratio  Dist  ICA                 67          16   0 58  Mid  ICA                  58          15   0 50  Prox  ICA    Patent       46          12   0 40  Dist CCA                 125          19         Mid CCA                  116          19   1 51  Prox CCA                  77          15         Ext Carotid               91           8   0 78  Prox Vert                 42          14         Subclavian               120                        CONCLUSION:  Impression RIGHT: There is <50% stenosis noted in the internal carotid artery  Plaque is heterogenous and irregular  Vertebral artery flow is antegrade   There is no significant subclavian artery disease  LEFT: Widely patent internal carotid artery and endarterectomy site  Vertebral artery flow is antegrade  There is no significant subclavian artery disease  In comparison to the study of 5/15/2018, there is no significant change in the disease process  Internal carotid artery stenosis determination by consensus criteria from: Aziza Freeman et al  Carotid Artery Stenosis: Gray-Scale and Doppler US Diagnosis - Society of Radiologists in [address] [place] [address] Drive, Radiology 2003; [id number]  SIGNATURE: Electronically Signed by: Jose A Cotton MD on 2020-08-27 11:45:46 PM    Cta Chest Pe Study    Result Date: 8/3/2020  Narrative: CTA - CHEST WITH IV CONTRAST - PULMONARY ANGIOGRAM INDICATION:   R07 81: Pleurodynia  COMPARISON: CTA chest 2/18/2020 TECHNIQUE: CTA examination of the chest was performed using angiographic technique according to a protocol specifically tailored to evaluate for pulmonary embolism  Axial, sagittal, and coronal 2D reformatted images were created from the source data and  submitted for interpretation  In addition, coronal 3D MIP postprocessing was performed on the acquisition scanner  Radiation dose length product (DLP) for this visit:  468 49 mGy-cm   This examination, like all CT scans performed in the [facility], was performed utilizing techniques to minimize radiation dose exposure, including the use of iterative  reconstruction and automated exposure control  IV Contrast:  85 mL of iohexol (OMNIPAQUE)  FINDINGS: PULMONARY ARTERIAL TREE:  No pulmonary arterial embolism  LUNGS:  Moderate upper lobe predominant centrilobular pulmonary emphysema  Discoid right upper lobe scarring  The previously seen 14 mm subpleural right lower lobe nodular density has resolved and likely represented atelectasis  PLEURA:  Unremarkable  HEART/GREAT VESSELS:  Atherosclerotic MEDIASTINUM AND DAVID:  Small hiatal hernia CHEST WALL AND LOWER NECK:   Median sternotomy wires  VISUALIZED STRUCTURES IN THE UPPER ABDOMEN:  Nonobstructive renal calculi OSSEOUS STRUCTURES:  No acute fracture or destructive osseous lesion  Impression: No acute findings in the chest; specifically, no pulmonary arterial embolism or pulmonary infiltrate/consolidation  Workstation performed: [id number]     Cta Ed Chest Pe Study    Result Date: 8/26/2020  Narrative: CTA - CHEST WITH IV CONTRAST - PULMONARY ANGIOGRAM INDICATION:   PE suspected, intermediate prob, positive D-dimer  COMPARISON: August 4, 2020, July 1, 2019 TECHNIQUE: CTA examination of the chest was performed using angiographic technique according to a protocol specifically tailored to evaluate for pulmonary embolism  Axial, sagittal, and coronal 2D reformatted images were created from the source data and  submitted for interpretation  In addition, coronal 3D MIP postprocessing was performed on the acquisition scanner  Radiation dose length product (DLP) for this visit:  334 63 mGy-cm   This examination, like all CT scans performed in the [facility], was performed utilizing techniques to minimize radiation dose exposure, including the use of iterative  reconstruction and automated exposure control  IV Contrast:  85 mL of iohexol (OMNIPAQUE)  FINDINGS: PULMONARY ARTERIAL TREE:  No pulmonary embolus is seen  LUNGS:  Diffuse moderate panlobular emphysema  Focal oval and linear scar in the posterior right upper lobe without suspicious change compared to 2019  Mild subpleural reticular interstitial thickening in both lung bases  No suspicious areas of consolidation or mass  Minimal residual linear and ill-defined opacity in the right lower lobe image 3/81, improved compared to most recent study  Corresponds to area of nodule seen in 2019  PLEURA:  Unremarkable  HEART/GREAT VESSELS:  Unremarkable for patient's age  MEDIASTINUM AND DAVID:  Unremarkable  CHEST WALL AND LOWER NECK:   Unremarkable   VISUALIZED STRUCTURES IN THE UPPER ABDOMEN:  Unremarkable  OSSEOUS STRUCTURES:  No acute fracture or destructive osseous lesion  Impression: No evidence for acute pulmonary embolus  Extensive bilateral diffuse panlobular emphysema and scarring as above  No suspicious interval change compared to priors  Workstation performed: [id number]     Vas Lower Limb Venous Duplex Study, Complete Bilateral    Result Date: 8/27/2020  Narrative:  [facility] REPORT CLINICAL: Indications: Physician wants to determine patency of the venous system secondary to elevated D-dimer and chest pain  Operative History: 2017-08-23 AAA Repair 2003-01-01 CABG 2440-24-42 Left CEA Risk Factors The patient has history of HLD, carotid artery disease, COPD, Previous remote smoking, AAA, CHF, and CAD  He has no history of Diabetes  FINDINGS:  Segment  Right            Left              Impression       Impression       CFV      Normal (Patent)  Normal (Patent)     CONCLUSION:  Impression: RIGHT LOWER LIMB: No evidence of acute or chronic deep vein thrombosis  No evidence of superficial thrombophlebitis noted  Doppler evaluation shows a normal response to augmentation maneuvers  Popliteal, posterior tibial and anterior tibial arterial Doppler waveforms are triphasic  LEFT LOWER LIMB: No evidence of acute or chronic deep vein thrombosis  No evidence of superficial thrombophlebitis noted  Doppler evaluation shows a normal response to augmentation maneuvers  Popliteal, posterior tibial and anterior tibial arterial Doppler waveforms are triphasic    SIGNATURE: Electronically Signed by: Hema Torres MD on 2020-08-27 12:11:16 PM        Signed,    Vanna Ridley MD  [facility]  8/28/2020 2:49 PM       ?

## 2020-08-28 NOTE — PROGRESS NOTES
Cardiology Progress Note - Teresita Lr 70 y o  male MRN: [id number]    Unit/Bed#: 410-01 Encounter: [id number]    Assessment/Plan:  1  Atypical chest pain              - atypical for angina              - likely GI or musculoskeletal, patient has tenderness to palpation of epigastric region and lower sternum   - patient denies exertional symptoms   - troponin is negative x 3, no EKG changes compared to prior    - recent echo showed preserved systolic function, this does not need to be repeated  - no further ischemic evaluation is needed at this time   - there likely is a component of anxiety  Patient has underlying dementia and wife is looking for placement into a SNF  2  Episode of unresponsiveness   - patient can not recall details of episode   - per chart review, patient apparently had an episode where he would not respond for 10 minutes  - CT head without acute changes, carotid duplex without significant disease   - telemetry review shows sinus rhythm with PVC's, no other arrhythmias noted  - EEG ordered by primary team     3  Coronary artery disease              - with prior CABG in 2003, AMANDA to circumflex in February 2020              - continue aspirin, plavix, statin, beta-blocker therapy   - as above, chest pain is atypical     4  Chronic diastolic congestive heart failure              - appears euvolemic on exam, weight is stable              - BNP normal               - continue lasix 20 mg daily     5  Hypertension               - has been well controlled over the past 24 hours on current regimen     6  S/p EVAR for AAA              - continue aspirin, plavix, and statin       Subjective:   Patient seen and examined  He would not wake up fully to talk to me this morning  Per nursing, was agitated overnight  Patient states he did have recurrence of jabbing stomach/chest pain overnight  He would not give me more details  He denies SOB, lightheadedness, palpitations      Review of Systems Unable to perform ROS: dementia   Cardiovascular: Positive for chest pain  Negative for dyspnea on exertion  Respiratory: Negative for shortness of breath  Gastrointestinal: Positive for abdominal pain  Neurological: Negative for dizziness and light-headedness  Objective:   Vitals: Blood pressure 121/73, pulse 88, temperature 98 4 °F (36 9 °C), resp  rate 18, height 5' 8" (1 727 m), weight 74 8 kg (164 lb 14 5 oz), SpO2 90 %  , Body mass index is 25 07 kg/m² ,   Orthostatic Blood Pressures      Most Recent Value   Blood Pressure  121/73 filed at 08/28/2020 0740   Patient Position - Orthostatic VS  Lying filed at 08/26/2020 2146         Systolic (33ANF), JFX:046 , Min:121 , JDC:515     Diastolic (92YOX), MKQ:53, Min:58, Max:90      Intake/Output Summary (Last 24 hours) at 8/28/2020 0832  Last data filed at 8/27/2020 1700  Gross per 24 hour   Intake 360 ml   Output    Net 360 ml     Weight (last 2 days)     Date/Time   Weight    08/28/20 0600       Weight: refused at 08/28/20 0600    08/27/20 0536   74 8 (164 9)    08/26/20 20:27:40   77 1 (170)    08/26/20 1559   85 4 (188 27)                Telemetry Review: sinus rhythm with PVC's, sometimes in trigeminy  EKG personally reviewed by Lennox Hunter PA-C  Physical Exam  Vitals signs reviewed  Constitutional:       General: He is not in acute distress  Appearance: He is well-developed  He is not diaphoretic  HENT:      Head: Normocephalic and atraumatic  Eyes:      Pupils: Pupils are equal, round, and reactive to light  Neck:      Musculoskeletal: Normal range of motion  Vascular: No carotid bruit  Cardiovascular:      Rate and Rhythm: Normal rate and regular rhythm  Pulses:           Radial pulses are 2+ on the right side and 2+ on the left side  Dorsalis pedis pulses are 2+ on the right side and 2+ on the left side  Heart sounds: S1 normal and S2 normal  No murmur     Pulmonary:      Effort: Pulmonary effort is normal  No respiratory distress  Breath sounds: Normal breath sounds  No wheezing or rales  Abdominal:      General: There is no distension  Palpations: Abdomen is soft  Tenderness: There is abdominal tenderness (to palpation in epigastric region)  Musculoskeletal: Normal range of motion  Right lower leg: No edema  Left lower leg: No edema  Skin:     General: Skin is warm and dry  Findings: No erythema  Neurological:      Mental Status: He is alert and oriented to person, place, and time  Comments: Bilateral upper extremity tremor   Psychiatric:         Mood and Affect: Mood is anxious  Behavior: Behavior normal          Cognition and Memory: Memory is impaired  Laboratory Results:  Results from last 7 days   Lab Units 08/26/20 2257 08/26/20 2024 08/26/20 1614   TROPONIN I ng/mL <0 02 <0 02 <0 02       CBC with diff:   Results from last 7 days   Lab Units 08/26/20 2257 08/26/20 1614 08/21/20  1542   WBC Thousand/uL  --  6 54 5 94   HEMOGLOBIN g/dL  --  16 1 17 1*   HEMATOCRIT %  --  48 2 51 7*   MCV fL  --  95 94   PLATELETS Thousands/uL 106* 118* 128*   MCH pg  --  31 6 31 2   MCHC g/dL  --  33 4 33 1   RDW %  --  14 3 14 1   MPV fL 10 5 10 5 9 9   NRBC AUTO /100 WBCs  --  0 0         CMP:  Results from last 7 days   Lab Units 08/26/20 1614 08/21/20  1542   POTASSIUM mmol/L 3 9 4 6   CHLORIDE mmol/L 105 103   CO2 mmol/L 24 26   BUN mg/dL 16 16   CREATININE mg/dL 1 33* 1 40*   CALCIUM mg/dL 8 5 9 4   AST U/L 23  --    ALT U/L 20  --    ALK PHOS U/L 117*  --    EGFR ml/min/1 73sq m 51 48         BMP:  Results from last 7 days   Lab Units 08/26/20 1614 08/21/20  1542   POTASSIUM mmol/L 3 9 4 6   CHLORIDE mmol/L 105 103   CO2 mmol/L 24 26   BUN mg/dL 16 16   CREATININE mg/dL 1 33* 1 40*   CALCIUM mg/dL 8 5 9 4       BNP: No results for input(s): BNP in the last 72 hours      Magnesium:   Results from last 7 days   Lab Units 08/26/20 1614   MAGNESIUM mg/dL 2 0       Coags:   Results from last 7 days   Lab Units 20  1614   PTT seconds 32   INR  1 06       TSH:        Hemoglobin A1C       Lipid Profile:       Cardiac testing:   Results for orders placed during the hospital encounter of 20   Echo complete with contrast if indicated    Narrative  [facility]  [address] Ro[address]  [phone]    Echocardiogram    Study date:  07-Aug-2020    Patient: Claribel Bernal  MR number: [id number]  Account number: [de-identified]  : 1940  Age: 70 years  Gender: Male  Status: Outpatient  Location:  Height: 69 in  Weight: 173 6 lb  BP:    Diagnoses: R06 00 - Dyspnea, unspecified    Sonographer:  Redd Lofton RDCS  Primary Physician:  Sandee Barcenas DO  Referring Physician:  Sandee Barcenas DO  Group:  [facility]  Interpreting Physician:  Jo-Ann Charles MD    SUMMARY    LEFT VENTRICLE:  Size was normal   Systolic function was normal  Ejection fraction was estimated to be 60 %  There were no regional wall motion abnormalities  Wall thickness was at the upper limits of normal     LEFT ATRIUM:  The atrium was mildly dilated  MITRAL VALVE:  There was trace regurgitation  TRICUSPID VALVE:  There was mild regurgitation  LEFT VENTRICLE: Size was normal  Systolic function was normal  Ejection fraction was estimated to be 60 %  There were no regional wall motion abnormalities  Wall thickness was at the upper limits of normal     RIGHT VENTRICLE: The size was normal  Systolic function was normal  Wall thickness was normal     LEFT ATRIUM: The atrium was mildly dilated  RIGHT ATRIUM: Size was at the upper limits of normal     MITRAL VALVE: Valve structure was normal  There was normal leaflet separation  DOPPLER: The transmitral velocity was within the normal range  There was no evidence for stenosis  There was trace regurgitation  AORTIC VALVE: The valve was trileaflet   Leaflets exhibited normal thickness and normal cuspal separation  DOPPLER: Transaortic velocity was within the normal range  There was no evidence for stenosis  There was no regurgitation  TRICUSPID VALVE: The valve structure was normal  There was normal leaflet separation  DOPPLER: The transtricuspid velocity was within the normal range  There was no evidence for stenosis  There was mild regurgitation  PULMONIC VALVE: Leaflets exhibited normal thickness, no calcification, and normal cuspal separation  DOPPLER: The transpulmonic velocity was within the normal range  There was no regurgitation  PERICARDIUM: There was no pericardial effusion  The pericardium was normal in appearance  AORTA: The root exhibited normal size  SYSTEM MEASUREMENT TABLES    2D  %FS: 32 75 %  Ao Diam: 3 cm  EDV(Teich): 123 77 ml  EF(Teich): 60 88 %  ESV(Teich): 48 42 ml  IVSd: 1 22 cm  LA Area: 24 45 cm2  LA Diam: 3 4 cm  LVEDV MOD A4C: 102 09 ml  LVEF MOD A4C: 39 75 %  LVESV MOD A4C: 61 5 ml  LVIDd: 5 1 cm  LVIDs: 3 43 cm  LVLd A4C: 7 73 cm  LVLs A4C: 7 44 cm  LVPWd: 1 21 cm  RA Area: 19 99 cm2  RVIDd: 4 19 cm  RWT: 0 48  SV MOD A4C: 40 58 ml  SV(Teich): 75 35 ml    CW  AV Vmax: 1 13 m/s  AV maxP 13 mmHg  PV Vmax: 0 72 m/s  PV maxP 06 mmHg    MM  TAPSE: 1 94 cm    PW  E' Lat: 0 07 m/s  E' Sept: 0 06 m/s  E/E' Lat: 6 72  E/E' Sept: 7 41  LVOT Vmax: 0 63 m/s  LVOT maxP 59 mmHg  MV A [place]: 0 93 m/s  MV Dec [place]: 1 65 m/s2  MV DecT: 281 38 ms  MV E [place]: 0 47 m/s  MV E/A Ratio: 0 5    [employer] Accredited Echocardiography Laboratory    Prepared and electronically signed by    Chivo Pineda MD  Signed 07-Aug-2020 14:51:49       No results found for this or any previous visit  No results found for this or any previous visit  No results found for this or any previous visit      Meds/Allergies   all current active meds have been reviewed  Medications Prior to Admission   Medication    albuterol (PROVENTIL HFA,VENTOLIN HFA) 90 mcg/act inhaler    aspirin 81 MG tablet    atenolol (TENORMIN) 25 mg tablet    atorvastatin (LIPITOR) 20 mg tablet    Bacillus Coagulans-Inulin (PROBIOTIC FORMULA) 1-250 BILLION-MG CAPS    Cholecalciferol (VITAMIN D-3 PO)    clopidogrel (PLAVIX) 75 mg tablet    cyanocobalamin (VITAMIN B-12) 1,000 mcg tablet    donepezil (ARICEPT) 10 mg tablet    fluticasone-salmeterol (AirDuo RespiClick 96/47) 51-57 mcg/act dry powder inhaler    furosemide (LASIX) 20 mg tablet    KRILL OIL PO    Melatonin 10 MG TABS    memantine (NAMENDA) 5 mg tablet    methocarbamol (ROBAXIN) 500 mg tablet    Multiple Vitamins-Minerals (CENTRUM SILVER ADULT 50+) TABS    nitroglycerin (NITROSTAT) 0 4 mg SL tablet    pantoprazole (PROTONIX) 40 mg tablet    Potassium Citrate ER 15 MEQ (1620 MG) TBCR    QUEtiapine (SEROquel) 25 mg tablet    tamsulosin (FLOMAX) 0 4 mg    tiotropium (SPIRIVA HANDIHALER) 18 mcg inhalation capsule          Assessment:  Principal Problem:    Chest pain with moderate risk for cardiac etiology  Active Problems:    COPD (chronic obstructive pulmonary disease) (HCC)    Hypercholesterolemia    GERD (gastroesophageal reflux disease)    Syncope    Abdominal aortic aneurysm (HCC)    Dementia (HCC)    Coronary artery disease involving native coronary artery of native heart without angina pectoris    Elevated d-dimer    Ambulatory dysfunction

## 2020-08-28 NOTE — ASSESSMENT & PLAN NOTE
Currently stable  History of repair on 08/23/2017  Monitor blood pressure closely  Outpatient follow-up with Vascular Surgery

## 2020-08-28 NOTE — ASSESSMENT & PLAN NOTE
Patient is ambulatory dysfunction, need assistance at home, wife unable to take care of the patient, as per my discussion with patient wife yesterday    Social service on lower for possible placement at some assisted/nursing facility

## 2020-08-28 NOTE — ASSESSMENT & PLAN NOTE
I called and spoke to patient wife yesterday, she reports patient was unresponsive with blank staring guys for at least 10 minutes or so, ultimately EMS was called given on responsiveness while patient was on recliner  Differential diagnosis include sepsis seizure versus cardiogenic syncope  Hypertensive encephalopathy  Less likely TIA  EMS reports upon their arrival he was awake and complaint of chest pain responded to nitroglycerin  Head CT shows-No acute intracranial abnormality  EKG shows- sinus rhythm with first-degree AV block with occasional premature ventricular complexes and premature atrial complexes at a rate of 83 beats per minute, patient seen by cardiology recommending no new intervention  Patient has a recent echo no read to repeat echo    His blood pressure was elevated soon after arrival to the ER  --Telemetry monitoring no arrhythmias  --orthostatics within normal limits  --Monitor blood pressure, well controlled   --OT/PT eval advising short-term rehabilitation placement  --The patient was seen in consultation by Neurology and will need outpatient follow-up with Neurology      EEG awake or drowsy routine   Status: Final result    EEG awake or drowsy routine   Order: [id number]   Status:  Final result   Visible to patient:  No (not released) Next appt:  2020 at 01:00 PM in Family Medicine [facility]   Details     Reading Physician  Reading Date  Result Priority    Willy Lennox, MD  [phone]  2020  Routine       Narrative & Impression      ELECTROENCEPHALOGRAM (EEG)        Patient Name:  Dara Foss  MRN: [id number]   :  1940 File #: [id number]    Age: 70 y o      Date performed: 2020                                                                         Report date: 2020                                                                       Study type: Routine EEG     ICD 10 diagnosis: Spells/Fit NOS R56 9     Start time: 11:50 End time: 12:18      -------------------------------------------------------------------------------------------------------------------   Patient History: This recording was observed in a 70 y o  male to determine whether spells of unresponsiveness are seizures      Medications include: donepezil, quetiapine, memantine     -------------------------------------------------------------------------------------------------------------------   Description of Procedure:  · 32 channel digital recording with electrodes placed according to the International 10-20 system with additional T1/T2 electrodes, EOG, EKG, and simultaneous video  The recording was technically satisfactory      -------------------------------------------------------------------------------------------------------------------   EEG Description:     The recording was performed with the patient briefly awake, drowsy, and asleep  He was oriented to self, president, and       During brief maximally attained wakefulness, there was no identifiable posteriorly dominant alpha rhythm  Instead, there were diffuse, low amplitude, poorly organized 6 cps theta activities  There were low amplitude, anteriorly dominant beta activities       With drowsiness, there were low and medium amplitude, 6-7 cps theta activities with anteriorly dominant, medium amplitude 1-2 cps delta activities  With sleep, symmetric sleep spindles were present       -------------------------------------------------------------------------------------------------------------------   Activation Procedures:  Hyperventilation was not performed      Stepped photic stimulation between 1-30 cps was performed and induced symmetric photic driving       Other findings: The single lead ECG demonstrated normal sinus rhythm     -------------------------------------------------------------------------------------------------------------------   EEG Interpretation:   This Routine EEG recorded during wakefulness, drowsiness, and sleep is abnormal  Background activities are too mildly too slow suggesting mild diffuse cerebral dysfunction of nonspecific etiology       Nav Boykin MD   [address] [facility]            MRI brain wo contrast   Status: Final result    PACS Images      Show images for MRI brain wo contrast    Study Result     MRI BRAIN WITHOUT CONTRAST     INDICATION: Transient ischemic attack  Syncope   Dementia  Carotid stenosis  Hypertension      COMPARISON:   Head CT performed August 26, 2020  Most recent prior brain MRI performed the 28th 2018      TECHNIQUE:  Sagittal T1, axial T2, axial FLAIR, axial T1, axial [place] and axial diffusion imaging      IMAGE QUALITY:  Diagnostic      FINDINGS:     BRAIN PARENCHYMA:      There is no discrete mass, mass effect or midline shift  Multiple small scattered foci of T2 prolongation are identified within cerebral white matter  These foci of T2 prolongation are focal and patchy and again noted in the periventricular, deep   bilateral cerebral and subcortical regions, and aren't most minimally progressed from November 28, 2018  Brainstem and cerebellum demonstrate normal signal  There is no intracranial hemorrhage  There is no evidence of acute infarction and diffusion   imaging is unremarkable        There is no discrete mass, mass effect or midline shift  There is no intracranial hemorrhage  There is no evidence of acute infarction and diffusion imaging is unremarkable    There are no white matter changes in the cerebral hemispheres           VENTRICLES:  The ventricles are prominent, similar from previous examination most consistent with central atrophy      SELLA AND PITUITARY GLAND:  Partial empty sella is again noted      ORBITS:  Normal      PARANASAL SINUSES:  Normal      VASCULATURE:  Again noted is abnormal flow void of the right distal vertebral artery above the level of the foramen magnum, similar from November 2018   Vascular flow voids are otherwise within normal limits      CALVARIUM AND SKULL BASE:  Normal      EXTRACRANIAL SOFT TISSUES:  Normal      IMPRESSION:     No diffusion restriction to suggest acute intracranial ischemia      Moderate cerebral white matter microangiopathic changes, slightly progressed from November 2018  Generalized atrophy, similar from prior exam      As on prior examination of the heart 2018, there is abnormal intradural right vertebral artery flow void  VAS carotid complete study   Status: Final result    PACS Images      Show images for VAS carotid complete study    Study Result        [facility] REPORT  CLINICAL:  Indications:  Patient presents with recent episode of syncope  Operative History:  2017-08-23 AAA Repair  2003-01-01 CABG  2177-76-02 Left CEA  Risk Factors  The patient has history of HLD, AAA, COPD, previous remote smoking, and CAD  Clinical  Right Pressure:  127/77 mm Hg, Left Pressure: IV  FINDINGS:     Right        Impression  PSV  EDV (cm/s)  Ratio    Dist  ICA                 58          13   0 98    Mid  ICA                  54          14   0 92    Prox  ICA    1 - 49%      76          14   1 28    Dist CCA                  51           9           Mid CCA                   59          12   1 26    Prox CCA                  47          10           Ext Carotid              135          18   2 27    Prox Vert                 20                       Subclavian                98           0              Left         Impression  PSV  EDV (cm/s)  Ratio    Dist  ICA                 67          16   0 58    Mid  ICA                  58          15   0 50    Prox   ICA    Patent       46          12   0 40    Dist CCA                 125          19           Mid CCA                  116          19   1 51    Prox CCA                  77          15           Ext Carotid               91           8   0 78    Prox Vert                 42          14 Subclavian               120                                CONCLUSION:     Impression  RIGHT:  There is <50% stenosis noted in the internal carotid artery  Plaque is heterogenous and irregular  Vertebral artery flow is antegrade  There is no significant subclavian artery  disease  LEFT:  Widely patent internal carotid artery and endarterectomy site  Vertebral artery flow is antegrade  There is no significant subclavian artery  disease  In comparison to the study of 5/15/2018, there is no significant change in the  disease process  Internal carotid artery stenosis determination by consensus criteria from:  Misty Burger , et al  Carotid Artery Stenosis: Gray-Scale and Doppler US Diagnosis  - Society of Radiologists in [address] [place] [address], Radiology 2003;  [phone]       SIGNATURE:  Electronically Signed by: Verenice Thompson MD on 2020-08-27 11:45:46 PM

## 2020-08-28 NOTE — PLAN OF CARE
Problem: PHYSICAL THERAPY ADULT  Goal: Performs mobility at highest level of function for planned discharge setting  See evaluation for individualized goals  Description: Treatment/Interventions: Elevations, Therapeutic exercise, LE strengthening/ROM, Functional transfer training, Endurance training, Patient/family training, Bed mobility, Gait training          See flowsheet documentation for full assessment, interventions and recommendations  Outcome: Progressing  Note: Prognosis: Good  Problem List: Decreased strength, Decreased endurance, Impaired balance, Decreased mobility, Decreased safety awareness, Decreased cognition, Impaired judgement  Assessment: Pt  performing functional mobility with supervision  No LOB with standing unsupported  Decreased problem solving with tasks  Pt  declined ambulation  Pt will benefit from continued PT to progress gait, transfers, balance, strengthening, steps, and safety in order to maximize function and decrease risk for falls  PT Discharge Recommendation: Return to previous environment with social support     PT - OK to Discharge: Yes(When medically stable)    See flowsheet documentation for full assessment

## 2020-08-28 NOTE — ASSESSMENT & PLAN NOTE
I called and spoke to patient wife yesterday, she reports patient was unresponsive with blank staring guys for at least 10 minutes or so, ultimately EMS was called given on responsiveness while patient was on recliner  Differential diagnosis include sepsis seizure versus cardiogenic syncope  Hypertensive encephalopathy  Less likely TIA  EMS reports upon their arrival he was awake and complaint of chest pain responded to nitroglycerin  Head CT shows-No acute intracranial abnormality  EKG shows- sinus rhythm with first-degree AV block with occasional premature ventricular complexes and premature atrial complexes at a rate of 83 beats per minute, patient seen by cardiology recommending no new intervention  Patient has a recent echo no read to repeat echo    His blood pressure was elevated soon after arrival to the ER  --Telemetry monitoring no arrhythmias  --orthostatics within normal limits  --Monitor blood pressure, well controlled   --OT/PT eval advising home with home care    Safe to discharge home    EEG and carotid Doppler ordered, still pending    I placed a neuro consult today, will follow-up the conditions, appreciate recommendations

## 2020-08-28 NOTE — SOCIAL WORK
Pt was accepted at [facility]  As per physician patient is not ready for discharge today  As per Agustin Ortiz at [facility] it is ok for patient to come on Saturday

## 2020-08-28 NOTE — ASSESSMENT & PLAN NOTE
· The patient was evaluated by Physical therapy and Occupational therapy during the hospitalization  · The patient will be discharged to [facility] in [place] for short-term rehabilitation on 08/29/2020

## 2020-08-29 ENCOUNTER — APPOINTMENT (INPATIENT)
Dept: MRI IMAGING | Facility: HOSPITAL | Age: 80
DRG: 078 | End: 2020-08-29
Payer: MEDICARE

## 2020-08-29 VITALS
TEMPERATURE: 97.5 F | OXYGEN SATURATION: 96 % | WEIGHT: 164.9 LBS | BODY MASS INDEX: 24.99 KG/M2 | SYSTOLIC BLOOD PRESSURE: 99 MMHG | HEIGHT: 68 IN | HEART RATE: 80 BPM | RESPIRATION RATE: 18 BRPM | DIASTOLIC BLOOD PRESSURE: 59 MMHG

## 2020-08-29 PROBLEM — I67.9 CEREBROVASCULAR SMALL VESSEL DISEASE: Status: ACTIVE | Noted: 2020-08-29

## 2020-08-29 LAB
ANION GAP SERPL CALCULATED.3IONS-SCNC: 11 MMOL/L (ref 4–13)
BUN SERPL-MCNC: 22 MG/DL (ref 5–25)
CALCIUM SERPL-MCNC: 9.1 MG/DL (ref 8.3–10.1)
CHLORIDE SERPL-SCNC: 109 MMOL/L (ref 100–108)
CO2 SERPL-SCNC: 25 MMOL/L (ref 21–32)
CREAT SERPL-MCNC: 1.33 MG/DL (ref 0.6–1.3)
ERYTHROCYTE [DISTWIDTH] IN BLOOD BY AUTOMATED COUNT: 15 % (ref 11.6–15.1)
GFR SERPL CREATININE-BSD FRML MDRD: 51 ML/MIN/1.73SQ M
GLUCOSE SERPL-MCNC: 107 MG/DL (ref 65–140)
HCT VFR BLD AUTO: 50.5 % (ref 36.5–49.3)
HGB BLD-MCNC: 16.5 G/DL (ref 12–17)
MCH RBC QN AUTO: 31.1 PG (ref 26.8–34.3)
MCHC RBC AUTO-ENTMCNC: 32.7 G/DL (ref 31.4–37.4)
MCV RBC AUTO: 95 FL (ref 82–98)
PLATELET # BLD AUTO: 110 THOUSANDS/UL (ref 149–390)
PMV BLD AUTO: 10.2 FL (ref 8.9–12.7)
POTASSIUM SERPL-SCNC: 3.8 MMOL/L (ref 3.5–5.3)
RBC # BLD AUTO: 5.3 MILLION/UL (ref 3.88–5.62)
SARS-COV-2 RNA RESP QL NAA+PROBE: NEGATIVE
SODIUM SERPL-SCNC: 145 MMOL/L (ref 136–145)
WBC # BLD AUTO: 5.52 THOUSAND/UL (ref 4.31–10.16)

## 2020-08-29 PROCEDURE — 70551 MRI BRAIN STEM W/O DYE: CPT

## 2020-08-29 PROCEDURE — G1004 CDSM NDSC: HCPCS

## 2020-08-29 PROCEDURE — 80048 BASIC METABOLIC PNL TOTAL CA: CPT | Performed by: INTERNAL MEDICINE

## 2020-08-29 PROCEDURE — 85027 COMPLETE CBC AUTOMATED: CPT | Performed by: INTERNAL MEDICINE

## 2020-08-29 PROCEDURE — 99239 HOSP IP/OBS DSCHRG MGMT >30: CPT | Performed by: INTERNAL MEDICINE

## 2020-08-29 PROCEDURE — 87635 SARS-COV-2 COVID-19 AMP PRB: CPT | Performed by: INTERNAL MEDICINE

## 2020-08-29 RX ORDER — ATENOLOL 25 MG/1
25 TABLET ORAL
Refills: 0
Start: 2020-08-29 | End: 2020-10-29 | Stop reason: HOSPADM

## 2020-08-29 RX ORDER — ACETAMINOPHEN 325 MG/1
650 TABLET ORAL EVERY 6 HOURS PRN
Refills: 0
Start: 2020-08-29 | End: 2020-10-25 | Stop reason: HOSPADM

## 2020-08-29 RX ORDER — ATORVASTATIN CALCIUM 40 MG/1
40 TABLET, FILM COATED ORAL
Status: DISCONTINUED | OUTPATIENT
Start: 2020-08-29 | End: 2020-08-29 | Stop reason: HOSPADM

## 2020-08-29 RX ORDER — POTASSIUM CHLORIDE 20 MEQ/1
20 TABLET, EXTENDED RELEASE ORAL ONCE
Status: COMPLETED | OUTPATIENT
Start: 2020-08-29 | End: 2020-08-29

## 2020-08-29 RX ORDER — ATORVASTATIN CALCIUM 40 MG/1
40 TABLET, FILM COATED ORAL
Refills: 0
Start: 2020-08-29 | End: 2020-10-29 | Stop reason: HOSPADM

## 2020-08-29 RX ADMIN — LACTOBACILLUS ACIDOPHILUS / LACTOBACILLUS BULGARICUS 1 PACKET: 100 MILLION CFU STRENGTH GRANULES at 08:39

## 2020-08-29 RX ADMIN — TAMSULOSIN HYDROCHLORIDE 0.4 MG: 0.4 CAPSULE ORAL at 08:39

## 2020-08-29 RX ADMIN — HEPARIN SODIUM 5000 UNITS: 5000 INJECTION INTRAVENOUS; SUBCUTANEOUS at 06:17

## 2020-08-29 RX ADMIN — MULTIPLE VITAMINS W/ MINERALS TAB 1 TABLET: TAB at 08:39

## 2020-08-29 RX ADMIN — POTASSIUM CHLORIDE 20 MEQ: 1500 TABLET, EXTENDED RELEASE ORAL at 10:23

## 2020-08-29 RX ADMIN — METHOCARBAMOL TABLETS 500 MG: 500 TABLET, COATED ORAL at 08:40

## 2020-08-29 RX ADMIN — MEMANTINE 5 MG: 5 TABLET ORAL at 08:40

## 2020-08-29 RX ADMIN — FLUTICASONE FUROATE AND VILANTEROL TRIFENATATE 1 PUFF: 100; 25 POWDER RESPIRATORY (INHALATION) at 08:39

## 2020-08-29 RX ADMIN — ASPIRIN 81 MG: 81 TABLET, COATED ORAL at 08:40

## 2020-08-29 RX ADMIN — CLOPIDOGREL BISULFATE 75 MG: 75 TABLET ORAL at 08:39

## 2020-08-29 RX ADMIN — PANTOPRAZOLE SODIUM 40 MG: 40 TABLET, DELAYED RELEASE ORAL at 06:17

## 2020-08-29 RX ADMIN — TIOTROPIUM BROMIDE 18 MCG: 18 CAPSULE ORAL; RESPIRATORY (INHALATION) at 08:39

## 2020-08-29 NOTE — PLAN OF CARE
Problem: Potential for Falls  Goal: Patient will remain free of falls  Description: INTERVENTIONS:  - Assess patient frequently for physical needs  -  Identify cognitive and physical deficits and behaviors that affect risk of falls  -  [place] fall precautions as indicated by assessment   - Educate patient/family on patient safety including physical limitations  - Instruct patient to call for assistance with activity based on assessment  - Modify environment to reduce risk of injury  - Consider OT/PT consult to assist with strengthening/mobility  Outcome: Progressing     Problem: PAIN - ADULT  Goal: Verbalizes/displays adequate comfort level or baseline comfort level  Description: Interventions:  - Encourage patient to monitor pain and request assistance  - Assess pain using appropriate pain scale  - Administer analgesics based on type and severity of pain and evaluate response  - Implement non-pharmacological measures as appropriate and evaluate response  - Consider cultural and social influences on pain and pain management  - Notify physician/advanced practitioner if interventions unsuccessful or patient reports new pain  Outcome: Progressing     Problem: INFECTION - ADULT  Goal: Absence or prevention of progression during hospitalization  Description: INTERVENTIONS:  - Assess and monitor for signs and symptoms of infection  - Monitor lab/diagnostic results  - Monitor all insertion sites, i e  indwelling lines, tubes, and drains  - Administer medications as ordered  - Instruct and encourage patient and family to use good hand hygiene technique  - Identify and instruct in appropriate isolation precautions for identified infection/condition  Outcome: Progressing     Problem: SAFETY ADULT  Goal: Patient will remain free of falls  Description: INTERVENTIONS:  - Assess patient frequently for physical needs  -  Identify cognitive and physical deficits and behaviors that affect risk of falls    -  [place] fall precautions as indicated by assessment   - Educate patient/family on patient safety including physical limitations  - Instruct patient to call for assistance with activity based on assessment  - Modify environment to reduce risk of injury  - Consider OT/PT consult to assist with strengthening/mobility  Outcome: Progressing  Goal: Maintain or return to baseline ADL function  Description: INTERVENTIONS:  -  Assess patient's ability to carry out ADLs; assess patient's baseline for ADL function and identify physical deficits which impact ability to perform ADLs (bathing, care of mouth/teeth, toileting, grooming, dressing, etc )  - Assess/evaluate cause of self-care deficits   - Assess range of motion  - Assess patient's mobility; develop plan if impaired  - Assess patient's need for assistive devices and provide as appropriate  - Encourage maximum independence but intervene and supervise when necessary  - Involve family in performance of ADLs  - Assess for home care needs following discharge   - Consider OT consult to assist with ADL evaluation and planning for discharge  - Provide patient education as appropriate  Outcome: Progressing  Goal: Maintain or return mobility status to optimal level  Description: INTERVENTIONS:  - Assess patient's baseline mobility status (ambulation, transfers, stairs, etc )    - Identify cognitive and physical deficits and behaviors that affect mobility  - Identify mobility aids required to assist with transfers and/or ambulation (gait belt, sit-to-stand, lift, walker, cane, etc )  - [place] fall precautions as indicated by assessment  - Record patient progress and toleration of activity level on Mobility SBAR; progress patient to next Phase/Stage  - Instruct patient to call for assistance with activity based on assessment  - Consider rehabilitation consult to assist with strengthening/weightbearing, etc   Outcome: Progressing     Problem: DISCHARGE PLANNING  Goal: Discharge to home or other facility with appropriate resources  Description: INTERVENTIONS:  - Identify barriers to discharge w/patient and caregiver  - Arrange for needed discharge resources and transportation as appropriate  - Identify discharge learning needs (meds, wound care, etc )  - Arrange for interpretive services to assist at discharge as needed  - Refer to Case Management Department for coordinating discharge planning if the patient needs post-hospital services based on physician/advanced practitioner order or complex needs related to functional status, cognitive ability, or social support system  Outcome: Progressing     Problem: Knowledge Deficit  Goal: Patient/family/caregiver demonstrates understanding of disease process, treatment plan, medications, and discharge instructions  Description: Complete learning assessment and assess knowledge base    Interventions:  - Provide teaching at level of understanding  - Provide teaching via preferred learning methods  Outcome: Progressing     Problem: CARDIOVASCULAR - ADULT  Goal: Maintains optimal cardiac output and hemodynamic stability  Description: INTERVENTIONS:  - Monitor I/O, vital signs and rhythm  - Monitor for S/S and trends of decreased cardiac output  - Administer and titrate ordered vasoactive medications to optimize hemodynamic stability  - Assess quality of pulses, skin color and temperature  - Assess for signs of decreased coronary artery perfusion  - Instruct patient to report change in severity of symptoms  Outcome: Progressing  Goal: Absence of cardiac dysrhythmias or at baseline rhythm  Description: INTERVENTIONS:  - Continuous cardiac monitoring, vital signs, obtain 12 lead EKG if ordered  - Administer antiarrhythmic and heart rate control medications as ordered  - Monitor electrolytes and administer replacement therapy as ordered  Outcome: Progressing     Problem: METABOLIC, FLUID AND ELECTROLYTES - ADULT  Goal: Electrolytes maintained within normal limits  Description: INTERVENTIONS:  - Monitor labs and assess patient for signs and symptoms of electrolyte imbalances  - Administer electrolyte replacement as ordered  - Monitor response to electrolyte replacements, including repeat lab results as appropriate  - Instruct patient on fluid and nutrition as appropriate  Outcome: Progressing  Goal: Fluid balance maintained  Description: INTERVENTIONS:  - Monitor labs   - Monitor I/O and WT  - Instruct patient on fluid and nutrition as appropriate  - Assess for signs & symptoms of volume excess or deficit  Outcome: Progressing  Goal: Glucose maintained within target range  Description: INTERVENTIONS:  - Monitor Blood Glucose as ordered  - Assess for signs and symptoms of hyperglycemia and hypoglycemia  - Administer ordered medications to maintain glucose within target range  - Assess nutritional intake and initiate nutrition service referral as needed  Outcome: Progressing     Problem: HEMATOLOGIC - ADULT  Goal: Maintains hematologic stability  Description: INTERVENTIONS  - Assess for signs and symptoms of bleeding or hemorrhage  - Monitor labs  - Administer supportive blood products/factors as ordered and appropriate  Outcome: Progressing     Problem: MUSCULOSKELETAL - ADULT  Goal: Maintain or return mobility to safest level of function  Description: INTERVENTIONS:  - Assess patient's ability to carry out ADLs; assess patient's baseline for ADL function and identify physical deficits which impact ability to perform ADLs (bathing, care of mouth/teeth, toileting, grooming, dressing, etc )  - Assess/evaluate cause of self-care deficits   - Assess range of motion  - Assess patient's mobility  - Assess patient's need for assistive devices and provide as appropriate  - Encourage maximum independence but intervene and supervise when necessary  - Involve family in performance of ADLs  - Assess for home care needs following discharge   - Consider OT consult to assist with ADL evaluation and planning for discharge  - Provide patient education as appropriate  Outcome: Progressing  Goal: Maintain proper alignment of affected body part  Description: INTERVENTIONS:  - Support, maintain and protect limb and body alignment  - Provide patient/ family with appropriate education  Outcome: Progressing     Problem: Prexisting or High Potential for Compromised Skin Integrity  Goal: Skin integrity is maintained or improved  Description: INTERVENTIONS:  - Identify patients at risk for skin breakdown  - Assess and monitor skin integrity  - Assess and monitor nutrition and hydration status  - Monitor labs   - Assess for incontinence   - Turn and reposition patient  - Assist with mobility/ambulation  - Relieve pressure over bony prominences  - Avoid friction and shearing  - Provide appropriate hygiene as needed including keeping skin clean and dry  - Evaluate need for skin moisturizer/barrier cream  - Collaborate with interdisciplinary team   - Patient/family teaching  - Consider wound care consult   Outcome: Progressing

## 2020-08-29 NOTE — DISCHARGE SUMMARY
Discharge- Rishabh Hoffmann 1940, 70 y o  male MRN: [id number]    Unit/Bed#: 410-01 Encounter: [id number]    Primary Care Provider: Kori Zavaleta DO   Date and time admitted to hospital: 2020  3:56 PM        * Syncope  Assessment & Plan  I called and spoke to patient wife yesterday, she reports patient was unresponsive with blank staring guys for at least 10 minutes or so, ultimately EMS was called given on responsiveness while patient was on recliner  Differential diagnosis include sepsis seizure versus cardiogenic syncope  Hypertensive encephalopathy  Less likely TIA  EMS reports upon their arrival he was awake and complaint of chest pain responded to nitroglycerin  Head CT shows-No acute intracranial abnormality  EKG shows- sinus rhythm with first-degree AV block with occasional premature ventricular complexes and premature atrial complexes at a rate of 83 beats per minute, patient seen by cardiology recommending no new intervention  Patient has a recent echo no read to repeat echo    His blood pressure was elevated soon after arrival to the ER  --Telemetry monitoring no arrhythmias  --orthostatics within normal limits  --Monitor blood pressure, well controlled   --OT/PT eval advising short-term rehabilitation placement  --The patient was seen in consultation by Neurology and will need outpatient follow-up with Neurology      EEG awake or drowsy routine   Status: Final result    EEG awake or drowsy routine   Order: [id number]   Status:  Final result   Visible to patient:  No (not released) Next appt:  2020 at 01:00 PM in Family Medicine Kori Zavaleta DO)   Details     Reading Physician  Reading Date  Result Priority    Judy Rodriguez MD  [phone]  2020  Routine       Narrative & Impression      ELECTROENCEPHALOGRAM (EEG)        Patient Name:  Rishabh Hoffmann  MRN: [id number]   :  1940 File #: [address] [place] [address]   Age: 70 y o      Date performed: 2020    Report date: 2020                                                                       Study type: Routine EEG     ICD 10 diagnosis: Spells/Fit NOS R56 9     Start time: 11:50 End time: 12:18      -------------------------------------------------------------------------------------------------------------------   Patient History: This recording was observed in a 70 y o  male to determine whether spells of unresponsiveness are seizures      Medications include: donepezil, quetiapine, memantine     -------------------------------------------------------------------------------------------------------------------   Description of Procedure:  · 32 channel digital recording with electrodes placed according to the International 10-20 system with additional T1/T2 electrodes, EOG, EKG, and simultaneous video  The recording was technically satisfactory      -------------------------------------------------------------------------------------------------------------------   EEG Description:     The recording was performed with the patient briefly awake, drowsy, and asleep  He was oriented to self, president, and       During brief maximally attained wakefulness, there was no identifiable posteriorly dominant alpha rhythm  Instead, there were diffuse, low amplitude, poorly organized 6 cps theta activities  There were low amplitude, anteriorly dominant beta activities       With drowsiness, there were low and medium amplitude, 6-7 cps theta activities with anteriorly dominant, medium amplitude 1-2 cps delta activities   With sleep, symmetric sleep spindles were present       -------------------------------------------------------------------------------------------------------------------   Activation Procedures:  Hyperventilation was not performed      Stepped photic stimulation between 1-30 cps was performed and induced symmetric photic driving       Other findings: The single lead ECG demonstrated normal sinus rhythm     -------------------------------------------------------------------------------------------------------------------   EEG Interpretation: This Routine EEG recorded during wakefulness, drowsiness, and sleep is abnormal  Background activities are too mildly too slow suggesting mild diffuse cerebral dysfunction of nonspecific etiology       Jazmin Anton MD   [address] [facility] [place] [facility] [place]   Status: Final result    PACS Images      Show images for MRI brain wo contrast    Study Result     MRI BRAIN WITHOUT CONTRAST     INDICATION: Transient ischemic attack  Syncope   Dementia  Carotid stenosis  Hypertension      COMPARISON:   Head CT performed August 26, 2020  Most recent prior brain MRI performed the 28th 2018      TECHNIQUE:  Sagittal T1, axial T2, axial FLAIR, axial T1, axial [place] and axial diffusion imaging      IMAGE QUALITY:  Diagnostic      FINDINGS:     BRAIN PARENCHYMA:      There is no discrete mass, mass effect or midline shift  Multiple small scattered foci of T2 prolongation are identified within cerebral white matter  These foci of T2 prolongation are focal and patchy and again noted in the periventricular, deep   bilateral cerebral and subcortical regions, and aren't most minimally progressed from November 28, 2018  Brainstem and cerebellum demonstrate normal signal  There is no intracranial hemorrhage  There is no evidence of acute infarction and diffusion   imaging is unremarkable        There is no discrete mass, mass effect or midline shift  There is no intracranial hemorrhage  There is no evidence of acute infarction and diffusion imaging is unremarkable    There are no white matter changes in the cerebral hemispheres           VENTRICLES:  The ventricles are prominent, similar from previous examination most consistent with central atrophy      SELLA AND PITUITARY GLAND:  Partial empty sella is again noted      ORBITS:  Normal      PARANASAL SINUSES:  Normal      VASCULATURE:  Again noted is abnormal flow void of the right distal vertebral artery above the level of the foramen magnum, similar from November 2018  Vascular flow voids are otherwise within normal limits      CALVARIUM AND SKULL BASE:  Normal      EXTRACRANIAL SOFT TISSUES:  Normal      IMPRESSION:     No diffusion restriction to suggest acute intracranial ischemia      Moderate cerebral white matter microangiopathic changes, slightly progressed from November 2018  Generalized atrophy, similar from prior exam      As on prior examination of the heart 2018, there is abnormal intradural right vertebral artery flow void  VAS carotid complete study   Status: Final result    PACS Images      Show images for VAS carotid complete study    Study Result        [facility] REPORT  CLINICAL:  Indications:  Patient presents with recent episode of syncope  Operative History:  2017-08-23 AAA Repair  2003-01-01 CABG  [id number] Left CEA  Risk Factors  The patient has history of HLD, AAA, COPD, previous remote smoking, and CAD  Clinical  Right Pressure:  127/77 mm Hg, Left Pressure: IV  FINDINGS:     Right        Impression  PSV  EDV (cm/s)  Ratio    Dist  ICA                 58          13   0 98    Mid  ICA                  54          14   0 92    Prox  ICA    1 - 49%      76          14   1 28    Dist CCA                  51           9           Mid CCA                   59          12   1 26    Prox CCA                  47          10           Ext Carotid              135          18   2 27    Prox Vert                 20                       Subclavian                98           0              Left         Impression  PSV  EDV (cm/s)  Ratio    Dist  ICA                 67          16   0 58    Mid  ICA                  58          15   0 50    Prox   ICA    Patent       46          12   0 40 Dist CCA                 125          19           Mid CCA                  116          19   1 51    Prox CCA                  77          15           Ext Carotid               91           8   0 78    Prox Vert                 42          14           Subclavian               120                                CONCLUSION:     Impression  RIGHT:  There is <50% stenosis noted in the internal carotid artery  Plaque is heterogenous and irregular  Vertebral artery flow is antegrade  There is no significant subclavian artery  disease  LEFT:  Widely patent internal carotid artery and endarterectomy site  Vertebral artery flow is antegrade  There is no significant subclavian artery  disease  In comparison to the study of 5/15/2018, there is no significant change in the  disease process  Internal carotid artery stenosis determination by consensus criteria from:  ary Davies al  Carotid Artery Stenosis: Gray-Scale and Doppler US Diagnosis  - Society of Radiologists in [address] [place] [address], Radiology 2003;  [phone]  SIGNATURE:  Electronically Signed by: Дмитрий Delgadillo MD on 2020-08-27 11:45:46 PM           Chest pain with moderate risk for cardiac etiology  Assessment & Plan  Troponin negative x 3 sets, EKG negative for ischemia  Cardiology recommending no further workup  Cerebrovascular small vessel disease  Assessment & Plan  · Continue aspirin 81 mg PO Qdaily and plavix 75 mg PO Qdaily  · Increase atorvastatin to high-intensity statin therapy with atorvastatin 40 mg daily in the evening  · Outpatient follow-up with Neurology    Ambulatory dysfunction  Assessment & Plan  · The patient was evaluated by Physical therapy and Occupational therapy during the hospitalization    · The patient will be [facility] to [facility] for short-term rehabilitation on 08/29/2020     1st degree AV block  Assessment & Plan  · Outpatient follow-up with Cardiology    Ventricular ectopy  Assessment & Plan  Keep the patient's magnesium at 2 mg/dl and above in the setting of ventricular ectopy  Keep the patient's potassium at 4 mmol/L and above in the setting of ventricular ectopy  Outpatient follow-up with Cardiology    Elevated d-dimer  Assessment & Plan  Results for Loc Shepard (MRN [id number]) as of 8/29/2020 13:58   Ref  Range 8/26/2020 16:14   D-Dimer, Jo Blow Latest Ref Range: <0 50 ug/ml FEU >10 00 (H)       VAS lower limb venous duplex study, complete bilateral   Status: Final result    PACS Images      Show images for VAS lower limb venous duplex study, complete bilateral    Study Result        [facility] REPORT  CLINICAL:  Indications:  Physician wants to determine patency of the venous system secondary to elevated  D-dimer and chest pain  Operative History:  2017-08-23 AAA Repair  2003-01-01 CABG  [id number] Left CEA  Risk Factors  The patient has history of HLD, carotid artery disease, COPD, Previous remote  smoking, AAA, CHF, and CAD  He has no history of Diabetes  FINDINGS:     Segment  Right            Left                Impression       Impression         CFV      Normal (Patent)  Normal (Patent)             CONCLUSION:     Impression:  RIGHT LOWER LIMB:  No evidence of acute or chronic deep vein thrombosis  No evidence of superficial thrombophlebitis noted  Doppler evaluation shows a normal response to augmentation maneuvers  Popliteal, posterior tibial and anterior tibial arterial Doppler waveforms are  triphasic  LEFT LOWER LIMB:  No evidence of acute or chronic deep vein thrombosis  No evidence of superficial thrombophlebitis noted  Doppler evaluation shows a normal response to augmentation maneuvers  Popliteal, posterior tibial and anterior tibial arterial Doppler waveforms are  triphasic       SIGNATURE:  Electronically Signed by: Marcelo Lyn MD on 2020-08-27 12:11:16 PM     CTA ED chest PE Study   Status: Final result    PACS Images     [facility] images for CTA ED chest PE Study    Study Result     CTA - CHEST WITH IV CONTRAST - PULMONARY ANGIOGRAM     INDICATION:   PE suspected, intermediate prob, positive D-dimer      COMPARISON: August 4, 2020, July 1, 2019     TECHNIQUE: CTA examination of the chest was performed using angiographic technique according to a protocol specifically tailored to evaluate for pulmonary embolism  Axial, sagittal, and coronal 2D reformatted images were created from the source data and   submitted for interpretation  In addition, coronal 3D MIP postprocessing was performed on the acquisition scanner        Radiation dose length product (DLP) for this visit:  334 63 mGy-cm   This examination, like all CT scans performed in the [facility], was performed utilizing techniques to minimize radiation dose exposure, including the use of iterative   reconstruction and automated exposure control      IV Contrast:  85 mL of iohexol (OMNIPAQUE)     FINDINGS:     PULMONARY ARTERIAL TREE:  No pulmonary embolus is seen       LUNGS:  Diffuse moderate panlobular emphysema  Focal oval and linear scar in the posterior right upper lobe without suspicious change compared to 2019  Mild subpleural reticular interstitial thickening in both lung bases  No suspicious areas of   consolidation or mass  Minimal residual linear and ill-defined opacity in the right lower lobe image 3/81, improved compared to most recent study  Corresponds to area of nodule seen in 2019      PLEURA:  Unremarkable      HEART/GREAT VESSELS:  Unremarkable for patient's age      MEDIASTINUM AND DAVID:  Unremarkable      CHEST WALL AND LOWER NECK:   Unremarkable      VISUALIZED STRUCTURES IN THE UPPER ABDOMEN:  Unremarkable      OSSEOUS STRUCTURES:  No acute fracture or destructive osseous lesion      IMPRESSION:     No evidence for acute pulmonary embolus      Extensive bilateral diffuse panlobular emphysema and scarring as above    No suspicious interval change compared to priors  Coronary artery disease involving native coronary artery of native heart without angina pectoris  Assessment & Plan  Presented to the emergency room complaints of chest pain  Does have history of drug-eluting stent placement in February of 2020  Did receive aspirin and nitroglycerin in the ER, with some relief  Continue PTA medication (atenolol, aspirin, Plavix, Lipitor)    Patient seen and evaluated by cardiology team, given negative troponin x 3 and EKG negative for ischemia, recommending no further workup  Patient also reports self-limiting 2 episodes of sharp lower chest upper abdominal pain  Other differential diagnosis include musculoskeletal pain versus GERD related pain, will add p r n  Tramadol  Continue Protonix for GERD  No reported pain while inpatient      Dementia [facility])  Assessment & Plan  Currently at baseline  Continue Aricept and Namenda  Outpatient follow-up with Neurology    Thrombocytopenia [facility])  Assessment & Plan  · Monitor for any signs of bleeding  · Follow the platelet count after discharge with his PCP  · Outpatient Hematology/Oncology evaluation      Abdominal aortic aneurysm [facility])  Assessment & Plan  Currently stable  History of repair on 08/23/2017  Monitor blood pressure closely  Outpatient follow-up with Vascular Surgery    Hypercholesterolemia  Assessment & Plan  · Increase atorvastatin to high-intensity statin therapy with atorvastatin 40 mg daily in the evening in the setting of cerebrovascular small vessel disease    COPD (chronic obstructive pulmonary disease) ([facility] [address] )  Assessment & Plan  Patient initially dyspneic in the ER, however no signs of COPD exacerbation    -will continue patient home regimen   -oxygen as needed   -continue outpatient Pulmonology follow-up    Discharging Physician / Practitioner: Sonu Terrazas DO  PCP: Renny Kussmaul, DO  Admission Date:   Admission Orders (From admission, onward)     Ordered        08/27/20 1525  Inpatient Admission  Once         20 1921  Place in Observation  Once                   Discharge Date: 20    Consultations During Hospital Stay:  · Cardiology  · Neurology    Procedures Performed:   · None    Significant Findings / Test Results:   Xr Chest 1 View Portable    Result Date: 2020  Impression: Mild atelectatic changes bilaterally (more so on the right than left)  No lobar consolidation or large effusion  Cardiac size normal   No pulmonary edema  Workstation performed: [id number]     Ct Head Without Contrast    Result Date: 2020  Impression: No acute intracranial abnormality  Workstation performed: [id number]     Mri Brain Wo Contrast    Result Date: 2020  Impression: No diffusion restriction to suggest acute intracranial ischemia  Moderate cerebral white matter microangiopathic changes, slightly progressed from 2018  Generalized atrophy, similar from prior exam  As on prior examination of the heart , there is abnormal intradural right vertebral artery flow void  Workstation performed: [id number]     Cta Ed Chest Pe Study    Result Date: 2020  Impression: No evidence for acute pulmonary embolus  Extensive bilateral diffuse panlobular emphysema and scarring as above  No suspicious interval change compared to priors   Workstation performed: [id number]     EEG awake or drowsy routine   Status: Final result    EEG awake or drowsy routine   Order: [id number]   Status:  Final result   Visible to patient:  No (not released)   Next appt:  2020 at 01:00 PM in Family Medicine BECKY Tafoya   Details     Reading Physician  Reading Date  Result Priority    Susan Denise MD  [phone]  2020  Routine       Narrative & Impression      ELECTROENCEPHALOGRAM (EEG)        Patient Name:  Claudia Santos  MRN: [id number]   :  1940 File #: Teresa Manning    Age: 70 y o      Date performed: 2020    Report date: 2020                                                                       Study type: Routine EEG     ICD 10 diagnosis: Spells/Fit NOS R56 9     Start time: 11:50 End time: 12:18      -------------------------------------------------------------------------------------------------------------------   Patient History: This recording was observed in a 70 y o  male to determine whether spells of unresponsiveness are seizures      Medications include: donepezil, quetiapine, memantine     -------------------------------------------------------------------------------------------------------------------   Description of Procedure:  · 32 channel digital recording with electrodes placed according to the International 10-20 system with additional T1/T2 electrodes, EOG, EKG, and simultaneous video  The recording was technically satisfactory      -------------------------------------------------------------------------------------------------------------------   EEG Description:     The recording was performed with the patient briefly awake, drowsy, and asleep  He was oriented to self, president, and       During brief maximally attained wakefulness, there was no identifiable posteriorly dominant alpha rhythm  Instead, there were diffuse, low amplitude, poorly organized 6 cps theta activities  There were low amplitude, anteriorly dominant beta activities       With drowsiness, there were low and medium amplitude, 6-7 cps theta activities with anteriorly dominant, medium amplitude 1-2 cps delta activities  With sleep, symmetric sleep spindles were present       -------------------------------------------------------------------------------------------------------------------   Activation Procedures:  Hyperventilation was not performed      Stepped photic stimulation between 1-30 cps was performed and induced symmetric photic driving       Other findings:   The single lead ECG demonstrated normal sinus rhythm     -------------------------------------------------------------------------------------------------------------------   EEG Interpretation: This Routine EEG recorded during wakefulness, drowsiness, and sleep is abnormal  Background activities are too mildly too slow suggesting mild diffuse cerebral dysfunction of nonspecific etiology       Morgan Gibbs MD   [address] [facility] [place]            VAS lower limb venous duplex study, complete bilateral   Status: Final result    PACS Images      Show images for VAS lower limb venous duplex study, complete bilateral    Study Result        [facility] REPORT  CLINICAL:  Indications:  Physician wants to determine patency of the venous system secondary to elevated  D-dimer and chest pain  Operative History:  2017-08-23 AAA Repair  2003-01-01 CABG  [id number] Left CEA  Risk Factors  The patient has history of HLD, carotid artery disease, COPD, Previous remote  smoking, AAA, CHF, and CAD  He has no history of Diabetes  FINDINGS:     Segment  Right            Left                Impression       Impression         CFV      Normal (Patent)  Normal (Patent)             CONCLUSION:     Impression:  RIGHT LOWER LIMB:  No evidence of acute or chronic deep vein thrombosis  No evidence of superficial thrombophlebitis noted  Doppler evaluation shows a normal response to augmentation maneuvers  Popliteal, posterior tibial and anterior tibial arterial Doppler waveforms are  triphasic  LEFT LOWER LIMB:  No evidence of acute or chronic deep vein thrombosis  No evidence of superficial thrombophlebitis noted  Doppler evaluation shows a normal response to augmentation maneuvers  Popliteal, posterior tibial and anterior tibial arterial Doppler waveforms are  triphasic       SIGNATURE:  Electronically Signed by: aMtt Bazzi MD on 2020-08-27 12:11:16 PM     VAS carotid complete study   Status: Final result    PACS Images  Show images for VAS carotid complete study    Study Result        [facility] REPORT  CLINICAL:  Indications:  Patient presents with recent episode of syncope  Operative History:  2017-08-23 AAA Repair  2003-01-01 CABG  [id number] Left CEA  Risk Factors  The patient has history of HLD, AAA, COPD, previous remote smoking, and CAD  Clinical  Right Pressure:  127/77 mm Hg, Left Pressure: IV  FINDINGS:     Right        Impression  PSV  EDV (cm/s)  Ratio    Dist  ICA                 58          13   0 98    Mid  ICA                  54          14   0 92    Prox  ICA    1 - 49%      76          14   1 28    Dist CCA                  51           9           Mid CCA                   59          12   1 26    Prox CCA                  47          10           Ext Carotid              135          18   2 27    Prox Vert                 20                       Subclavian                98           0              Left         Impression  PSV  EDV (cm/s)  Ratio    Dist  ICA                 67          16   0 58    Mid  ICA                  58          15   0 50    Prox  ICA    Patent       46          12   0 40    Dist CCA                 125          19           Mid CCA                  116          19   1 51    Prox CCA                  77          15           Ext Carotid               91           8   0 78    Prox Vert                 42          14           Subclavian               120                                CONCLUSION:     Impression  RIGHT:  There is <50% stenosis noted in the internal carotid artery  Plaque is heterogenous and irregular  Vertebral artery flow is antegrade  There is no significant subclavian artery  disease  LEFT:  Widely patent internal carotid artery and endarterectomy site  Vertebral artery flow is antegrade  There is no significant subclavian artery  disease  In comparison to the study of 5/15/2018, there is no significant change in the  disease process  Internal carotid artery stenosis determination by consensus criteria from:  Serafin Hopper et al  Carotid Artery Stenosis: Gray-Scale and Doppler US Diagnosis  - Society of Radiologists in [facility] [address], Radiology 2003;  [phone]  SIGNATURE:  Electronically Signed by: Halle Anderson MD on 2020-08-27 11:45:46 PM     VAS carotid complete study   Status: Final result    PACS Images      Show images for VAS carotid complete study    Study Result        [facility] REPORT  CLINICAL:  Indications:  Patient presents with recent episode of syncope  Operative History:  2017-08-23 AAA Repair  2003-01-01 CABG  [id number] Left CEA  Risk Factors  The patient has history of HLD, AAA, COPD, previous remote smoking, and CAD  Clinical  Right Pressure:  127/77 mm Hg, Left Pressure: IV  FINDINGS:     Right        Impression  PSV  EDV (cm/s)  Ratio    Dist  ICA                 58          13   0 98    Mid  ICA                  54          14   0 92    Prox  ICA    1 - 49%      76          14   1 28    Dist CCA                  51           9           Mid CCA                   59          12   1 26    Prox CCA                  47          10           Ext Carotid              135          18   2 27    Prox Vert                 20                       Subclavian                98           0              Left         Impression  PSV  EDV (cm/s)  Ratio    Dist  ICA                 67          16   0 58    Mid  ICA                  58          15   0 50    Prox   ICA    Patent       46          12   0 40    Dist CCA                 125          19           Mid CCA                  116          19   1 51    Prox CCA                  77          15           Ext Carotid               91           8   0 78    Prox Vert                 42          14           Subclavian               120                                CONCLUSION:     Impression  RIGHT:  There is <50% stenosis noted in the internal carotid artery  Plaque is heterogenous and irregular  Vertebral artery flow is antegrade  There is no significant subclavian artery  disease  LEFT:  Widely patent internal carotid artery and endarterectomy site  Vertebral artery flow is antegrade  There is no significant subclavian artery  disease  In comparison to the study of 5/15/2018, there is no significant change in the  disease process  Internal carotid artery stenosis determination by consensus criteria from:  Frank Bird et al  Carotid Artery Stenosis: Gray-Scale and Doppler US Diagnosis  - [employer] in [address] [place] [address], Radiology 2003;  [phone]  SIGNATURE:  Electronically Signed by: Anshul Prado MD on 2020-08-27 11:45:46 PM       Microbiology Results (last 21 days)     Collected  Updated  Procedure  Result Status     08/29/2020 1026  08/29/2020 1124  Novel Coronavirus (Covid-19),PCR [facility] [[id number]]     Nares from Nose     Final result  Component  Value    EXT SARS-COV-2  Negative               08/27/2020 1630  08/27/2020 1731  Novel Coronavirus (Covid-19),PCR [facility] [[id number]]     Nares from Nose     Final result  Component  Value    EXT SARS-COV-2  Negative        Results from last 7 days   Lab Units 08/29/20  0608 08/26/20  2257 08/26/20  1614   WBC Thousand/uL 5 52  --  6 54   HEMOGLOBIN g/dL 16 5  --  16 1   HEMATOCRIT % 50 5*  --  48 2   PLATELETS Thousands/uL 110* 106* 118*     Results from last 7 days   Lab Units 08/29/20  0608 08/26/20  1614   SODIUM mmol/L 145 142   POTASSIUM mmol/L 3 8 3 9   CHLORIDE mmol/L 109* 105   CO2 mmol/L 25 24   BUN mg/dL 22 16   CREATININE mg/dL 1 33* 1 33*   CALCIUM mg/dL 9 1 8 5     Results from last 7 days   Lab Units 08/26/20  1614   ALK PHOS U/L 117*   ALT U/L 20   AST U/L 23     Test Results Pending at Discharge (will require follow up):   · None     Outpatient Tests Requested:  · CBC with diff , CMP, Magnesium level, and Phosphorus level in 1 day and in 1 week    Complications:  None    Reason for Admission:  Chest pain and syncope    Hospital Course:     Debbie Ortiz is a 70 y o  male patient who originally presented to the hospital on 8/26/2020 due to the patient experiencing chest pain and a syncope event  Please see above list of diagnoses and related plan for additional information  Condition at Discharge: good     Discharge Day Visit / Exam:     Subjective: The patient was seen and examined  The patient is doing better  No chest pain  No shortness of breath  No abdominal pain  No nausea or vomiting  Vitals: Blood Pressure: 99/59 (08/29/20 0831)  Pulse: 80 (08/29/20 1128)  Temperature: 97 5 °F (36 4 °C) (08/29/20 0624)  Temp Source: Oral (08/26/20 2027)  Respirations: 18 (08/29/20 0831)  Height: 5' 8" (172 7 cm) (08/26/20 2027)  Weight - Scale: 74 8 kg (164 lb 14 5 oz) (08/27/20 0536)  SpO2: 96 % (08/29/20 1128)  Exam:   Physical Exam  General:  NAD, follows commands  HEENT:  NC/AT, mucous membranes moist  Neck:  Supple, No JVP elevation  CV:  + S1, + S2, RRR  Pulm:  Lung fields are CTA bilaterally  Abd:  Soft, Non-tender, Non-distended  Ext:  No clubbing/cyanosis/edema  Skin:  No rashes  Neuro:  Awake, alert, oriented  Psych:  Normal mood and affect    Discharge instructions/Information to patient and family:  See after visit summary for information provided to patient and family  Provisions for Follow-Up Care:  See after visit summary for information related to follow-up care and any pertinent home health orders  Disposition:     Other [facility]    Planned Readmission:  No     Discharge Statement:  I spent 40 minutes discharging the patient  This time was spent on the day of discharge  I had direct contact with the patient on the day of discharge   Greater than 50% of the total time was spent examining patient, answering all patient questions, arranging and discussing plan of care with patient as well as directly providing post-discharge instructions  Additional time then spent on discharge activities  Discharge Medications:  See after visit summary for reconciled discharge medications provided to patient and family        ** Please Note: This note has been constructed using a voice recognition system **

## 2020-08-29 NOTE — ASSESSMENT & PLAN NOTE
Keep the patient's magnesium at 2 mg/dl and above in the setting of ventricular ectopy  Keep the patient's potassium at 4 mmol/L and above in the setting of ventricular ectopy  Outpatient follow-up with Cardiology

## 2020-08-29 NOTE — ASSESSMENT & PLAN NOTE
· Monitor for any signs of bleeding  · Follow the platelet count after discharge with his PCP  · Outpatient Hematology/Oncology evaluation

## 2020-08-29 NOTE — SOCIAL WORK
Pt is being discharged to [facility] today  I called [facility] and notified them patient is medically ready to come today  I called patient's wife Alisa Holm and notified her that patient is being discharged today and will be going to [address] [facility] home  She is agreeable to same  [address] [facility] chair Serjio Welch will pick the patient up at 2:30  A copy of AVS sent with patient

## 2020-08-29 NOTE — NURSING NOTE
[facility]  Patient in no acute distress

## 2020-08-29 NOTE — ASSESSMENT & PLAN NOTE
· Continue aspirin 81 mg PO Qdaily and plavix 75 mg PO Qdaily  · Increase atorvastatin to high-intensity statin therapy with atorvastatin 40 mg daily in the evening  · Outpatient follow-up with Neurology

## 2020-09-09 ENCOUNTER — TELEPHONE (OUTPATIENT)
Dept: NEUROLOGY | Facility: CLINIC | Age: 80
End: 2020-09-09

## 2020-09-09 NOTE — TELEPHONE ENCOUNTER
L/m on v/m to confirm appt with Dr Herminio Mena on 9/16/20 @ 11 am  Requested c/b from patient to confirm appt

## 2020-09-14 ENCOUNTER — APPOINTMENT (EMERGENCY)
Dept: RADIOLOGY | Facility: HOSPITAL | Age: 80
End: 2020-09-14
Payer: MEDICARE

## 2020-09-14 ENCOUNTER — HOSPITAL ENCOUNTER (EMERGENCY)
Facility: HOSPITAL | Age: 80
Discharge: HOME | End: 2020-09-15
Attending: EMERGENCY MEDICINE | Admitting: EMERGENCY MEDICINE
Payer: MEDICARE

## 2020-09-14 DIAGNOSIS — F03.90 DEMENTIA (HCC): ICD-10-CM

## 2020-09-14 DIAGNOSIS — R41.82 ALTERED MENTAL STATUS: ICD-10-CM

## 2020-09-14 DIAGNOSIS — R45.1 AGITATION: Primary | ICD-10-CM

## 2020-09-14 LAB
ALBUMIN SERPL BCP-MCNC: 4.2 G/DL (ref 3.5–5.7)
ALP SERPL-CCNC: 74 U/L (ref 55–165)
ALT SERPL W P-5'-P-CCNC: 16 U/L (ref 7–52)
ANION GAP SERPL CALCULATED.3IONS-SCNC: 7 MMOL/L (ref 4–13)
AST SERPL W P-5'-P-CCNC: 23 U/L (ref 13–39)
BASOPHILS # BLD AUTO: 0 THOUSANDS/ΜL (ref 0–0.1)
BASOPHILS NFR BLD AUTO: 1 % (ref 0–2)
BILIRUB SERPL-MCNC: 1.6 MG/DL (ref 0.2–1)
BUN SERPL-MCNC: 16 MG/DL (ref 7–25)
CALCIUM SERPL-MCNC: 9.3 MG/DL (ref 8.6–10.5)
CHLORIDE SERPL-SCNC: 109 MMOL/L (ref 98–107)
CO2 SERPL-SCNC: 25 MMOL/L (ref 21–31)
CREAT SERPL-MCNC: 0.93 MG/DL (ref 0.7–1.3)
EOSINOPHIL # BLD AUTO: 0.1 THOUSAND/ΜL (ref 0–0.61)
EOSINOPHIL NFR BLD AUTO: 2 % (ref 0–5)
ERYTHROCYTE [DISTWIDTH] IN BLOOD BY AUTOMATED COUNT: 17.1 % (ref 11.5–14.5)
ETHANOL EXG-MCNC: NORMAL MG/DL
GFR SERPL CREATININE-BSD FRML MDRD: 79 ML/MIN/1.73SQ M
GLUCOSE SERPL-MCNC: 90 MG/DL (ref 65–99)
HCT VFR BLD AUTO: 44.9 % (ref 42–47)
HGB BLD-MCNC: 15.2 G/DL (ref 14–18)
LYMPHOCYTES # BLD AUTO: 1.1 THOUSANDS/ΜL (ref 0.6–4.47)
LYMPHOCYTES NFR BLD AUTO: 23 % (ref 21–51)
MCH RBC QN AUTO: 31.7 PG (ref 26–34)
MCHC RBC AUTO-ENTMCNC: 33.8 G/DL (ref 31–37)
MCV RBC AUTO: 94 FL (ref 81–99)
MONOCYTES # BLD AUTO: 0.6 THOUSAND/ΜL (ref 0.17–1.22)
MONOCYTES NFR BLD AUTO: 13 % (ref 2–12)
NEUTROPHILS # BLD AUTO: 3.1 THOUSANDS/ΜL (ref 1.4–6.5)
NEUTS SEG NFR BLD AUTO: 63 % (ref 42–75)
PLATELET # BLD AUTO: 122 THOUSANDS/UL (ref 149–390)
PMV BLD AUTO: 8.7 FL (ref 8.6–11.7)
POTASSIUM SERPL-SCNC: 3.6 MMOL/L (ref 3.5–5.5)
PROT SERPL-MCNC: 6.6 G/DL (ref 6.4–8.9)
RBC # BLD AUTO: 4.78 MILLION/UL (ref 4.3–5.9)
SARS-COV-2 RNA RESP QL NAA+PROBE: NEGATIVE
SODIUM SERPL-SCNC: 141 MMOL/L (ref 134–143)
TROPONIN I SERPL-MCNC: <0.03 NG/ML
TSH SERPL DL<=0.05 MIU/L-ACNC: 0.67 UIU/ML (ref 0.45–5.33)
WBC # BLD AUTO: 5 THOUSAND/UL (ref 4.8–10.8)

## 2020-09-14 PROCEDURE — 71045 X-RAY EXAM CHEST 1 VIEW: CPT

## 2020-09-14 PROCEDURE — 93005 ELECTROCARDIOGRAM TRACING: CPT

## 2020-09-14 PROCEDURE — 87635 SARS-COV-2 COVID-19 AMP PRB: CPT | Performed by: EMERGENCY MEDICINE

## 2020-09-14 PROCEDURE — 82075 ASSAY OF BREATH ETHANOL: CPT | Performed by: EMERGENCY MEDICINE

## 2020-09-14 PROCEDURE — 80053 COMPREHEN METABOLIC PANEL: CPT | Performed by: EMERGENCY MEDICINE

## 2020-09-14 PROCEDURE — 84443 ASSAY THYROID STIM HORMONE: CPT | Performed by: EMERGENCY MEDICINE

## 2020-09-14 PROCEDURE — 99285 EMERGENCY DEPT VISIT HI MDM: CPT

## 2020-09-14 PROCEDURE — 96372 THER/PROPH/DIAG INJ SC/IM: CPT

## 2020-09-14 PROCEDURE — 85025 COMPLETE CBC W/AUTO DIFF WBC: CPT | Performed by: EMERGENCY MEDICINE

## 2020-09-14 PROCEDURE — 36415 COLL VENOUS BLD VENIPUNCTURE: CPT | Performed by: EMERGENCY MEDICINE

## 2020-09-14 PROCEDURE — 84484 ASSAY OF TROPONIN QUANT: CPT | Performed by: EMERGENCY MEDICINE

## 2020-09-14 PROCEDURE — 99285 EMERGENCY DEPT VISIT HI MDM: CPT | Performed by: EMERGENCY MEDICINE

## 2020-09-14 RX ORDER — SACCHAROMYCES BOULARDII 250 MG
250 CAPSULE ORAL EVERY MORNING
COMMUNITY
End: 2020-10-29 | Stop reason: HOSPADM

## 2020-09-14 RX ORDER — ALPRAZOLAM 0.5 MG/1
TABLET ORAL 2 TIMES DAILY PRN
COMMUNITY
End: 2020-10-29 | Stop reason: HOSPADM

## 2020-09-14 RX ORDER — SERTRALINE HYDROCHLORIDE 25 MG/1
25 TABLET, FILM COATED ORAL DAILY
COMMUNITY
End: 2020-10-29 | Stop reason: HOSPADM

## 2020-09-14 RX ORDER — LORAZEPAM 2 MG/ML
1 INJECTION INTRAMUSCULAR ONCE
Status: COMPLETED | OUTPATIENT
Start: 2020-09-14 | End: 2020-09-14

## 2020-09-14 RX ADMIN — LORAZEPAM 1 MG: 2 INJECTION INTRAMUSCULAR; INTRAVENOUS at 23:37

## 2020-09-15 VITALS
HEART RATE: 84 BPM | DIASTOLIC BLOOD PRESSURE: 79 MMHG | TEMPERATURE: 97.3 F | RESPIRATION RATE: 18 BRPM | SYSTOLIC BLOOD PRESSURE: 132 MMHG | HEIGHT: 68 IN | BODY MASS INDEX: 24.93 KG/M2 | WEIGHT: 164.5 LBS | OXYGEN SATURATION: 98 %

## 2020-09-15 LAB
AMPHETAMINES SERPL QL SCN: NEGATIVE
BACTERIA UR QL AUTO: ABNORMAL /HPF
BARBITURATES UR QL: NEGATIVE
BENZODIAZ UR QL: POSITIVE
BILIRUB UR QL STRIP: NEGATIVE
CLARITY UR: CLEAR
COCAINE UR QL: NEGATIVE
COLOR UR: YELLOW
GLUCOSE UR STRIP-MCNC: NEGATIVE MG/DL
HGB UR QL STRIP.AUTO: ABNORMAL
KETONES UR STRIP-MCNC: ABNORMAL MG/DL
LEUKOCYTE ESTERASE UR QL STRIP: NEGATIVE
METHADONE UR QL: NEGATIVE
NITRITE UR QL STRIP: NEGATIVE
NON-SQ EPI CELLS URNS QL MICRO: ABNORMAL /HPF
OPIATES UR QL SCN: NEGATIVE
OXYCODONE+OXYMORPHONE UR QL SCN: NEGATIVE
PCP UR QL: NEGATIVE
PH UR STRIP.AUTO: 6.5 [PH]
PROT UR STRIP-MCNC: NEGATIVE MG/DL
RBC #/AREA URNS AUTO: ABNORMAL /HPF
SP GR UR STRIP.AUTO: 1.01 (ref 1–1.03)
THC UR QL: NEGATIVE
UROBILINOGEN UR QL STRIP.AUTO: 0.2 E.U./DL
WBC #/AREA URNS AUTO: ABNORMAL /HPF

## 2020-09-15 PROCEDURE — 80307 DRUG TEST PRSMV CHEM ANLYZR: CPT | Performed by: EMERGENCY MEDICINE

## 2020-09-15 PROCEDURE — 96372 THER/PROPH/DIAG INJ SC/IM: CPT

## 2020-09-15 PROCEDURE — 81001 URINALYSIS AUTO W/SCOPE: CPT | Performed by: EMERGENCY MEDICINE

## 2020-09-15 RX ORDER — OLANZAPINE 10 MG/1
10 INJECTION, POWDER, LYOPHILIZED, FOR SOLUTION INTRAMUSCULAR ONCE
Status: COMPLETED | OUTPATIENT
Start: 2020-09-15 | End: 2020-09-15

## 2020-09-15 RX ORDER — LORAZEPAM 2 MG/ML
1 INJECTION INTRAMUSCULAR ONCE
Status: COMPLETED | OUTPATIENT
Start: 2020-09-15 | End: 2020-09-15

## 2020-09-15 RX ADMIN — LORAZEPAM 1 MG: 2 INJECTION INTRAMUSCULAR; INTRAVENOUS at 02:45

## 2020-09-15 RX ADMIN — OLANZAPINE 10 MG: 10 INJECTION, POWDER, FOR SOLUTION INTRAMUSCULAR at 00:59

## 2020-09-15 NOTE — ED NOTES
Spoke with Maggy Godinez the [address] at Replaced by [facility], informed her that I received a call from Eddie at [facility] and that the 302 was denied at this time  Maggy Godinez, made aware patient will be discharged from the ER  Maggy Godinez is requesting the area 2 be called with an ETA

## 2020-09-15 NOTE — ED NOTES
Pt sleeping at this time  Offering no complaints  Remains 1:1 constant watch at this time        Gustavo Gimenez, OCTAVIO  09/15/20 8612

## 2020-09-15 NOTE — ED PROVIDER NOTES
History  Chief Complaint   Patient presents with    Psychiatric Evaluation     Per SNF, pt was acting violevt and threw an O2 tank and was walking around with a knife  Pt denies and is not oriented to situation and place but gives year, president, and phone number correctly  71 yo M    Sent from Dementia unit for episode of agitation, pt threatened to stab staff with a knife    Pt calm here, at apparent baseline    Pt has no complaints  He has notable signfiicant Dementia       Psychiatric Evaluation   Presenting symptoms: agitation (pre-hospital, now resolved )    Degree of incapacity (severity): Moderate  Onset quality:  Sudden  Progression:  Improving  Relieved by:  Nothing  Associated symptoms: no chest pain and no headaches        Prior to Admission Medications   Prescriptions Last Dose Informant Patient Reported? Taking? ALPRAZolam (XANAX) 0 5 mg tablet 9/14/2020 at 1900  Yes Yes   Sig: Take by mouth 2 (two) times a day as needed for anxiety   Bacillus Coagulans-Inulin (PROBIOTIC FORMULA) 1-250 BILLION-MG CAPS 9/14/2020 at Unknown time Self Yes Yes   Sig: Take 1 capsule by mouth daily Record states dose is currently 4 billion   Bisacodyl (DULCOLAX RE)  at Unknown time  Yes Yes   Sig: Insert 1 suppository into the rectum Insert 1 suppository rectally as needed for Constipation  For no Bowel movement within 24 hours after administration of Milk of Magnesia   Cholecalciferol (VITAMIN D-3 PO) 9/14/2020 at Unknown time Self Yes Yes   Sig: Take 2,000 Units by mouth daily     Multiple Vitamins-Minerals (CENTRUM SILVER ADULT 50+) TABS  Self Yes No   Sig: Take 1 tablet by mouth daily   acetaminophen (TYLENOL) 325 mg tablet 9/13/2020 at Unknown time  No Yes   Sig: Take 2 tablets (650 mg total) by mouth every 6 (six) hours as needed for mild pain, headaches or fever   albuterol (PROVENTIL HFA,VENTOLIN HFA) 90 mcg/act inhaler   Yes Yes   Sig: Inhale 2 puffs every 6 (six) hours as needed for wheezing   aspirin 81 MG tablet Past Month at Unknown time Self Yes Yes   Sig: Take 81 mg by mouth daily Took within 24 hours    atenolol (TENORMIN) 25 mg tablet 9/13/2020 at Unknown time  No Yes   Sig: Take 1 tablet (25 mg total) by mouth daily at bedtime   atorvastatin (LIPITOR) 40 mg tablet 9/14/2020 at Unknown time  No Yes   Sig: Take 1 tablet (40 mg total) by mouth daily at bedtime   clopidogrel (PLAVIX) 75 mg tablet 9/14/2020 at Unknown time  No Yes   Sig: Take 1 tablet (75 mg total) by mouth daily   cyanocobalamin (VITAMIN B-12) 500 MCG tablet 9/14/2020 at Unknown time  No Yes   Sig: Take 2 tablets (1,000 mcg total) by mouth daily   donepezil (ARICEPT) 10 mg tablet 9/13/2020 at Unknown time  No Yes   Sig: TAKE 1 TABLET DAILY AT     BEDTIME   enoxaparin (LOVENOX) 40 mg/0 4 mL Past Month at Unknown time  No Yes   Sig: Inject 0 4 mL (40 mg total) under the skin daily   fluticasone-salmeterol (AirDuo RespiClick 58/88) 99-85 mcg/act dry powder inhaler 9/14/2020 at Unknown time  No Yes   Sig: Inhale 1 puff 2 (two) times a day AM & PM   magnesium hydroxide (MILK OF MAGNESIA) 400 mg/5 mL oral suspension 9/8/2020  Yes Yes   Sig: Take 5 mL by mouth daily as needed for constipation   memantine (NAMENDA) 5 mg tablet 9/14/2020 at Unknown time Spouse/Significant Other Yes Yes   Sig: Take 5 mg by mouth 2 (two) times a day In the evening    saccharomyces boulardii (FLORASTOR) 250 mg capsule 9/14/2020 at Unknown time  Yes Yes   Sig: Take 250 mg by mouth every morning   sertraline (ZOLOFT) 25 mg tablet 9/14/2020 at Unknown time  Yes Yes   Sig: Take 25 mg by mouth daily   tamsulosin (FLOMAX) 0 4 mg 9/14/2020 at Unknown time  No Yes   Sig: TAKE 1 CAPSULE DAILY   tiotropium (SPIRIVA HANDIHALER) 18 mcg inhalation capsule 9/14/2020 at Unknown time Self Yes Yes   Sig: Place 18 mcg into inhaler and inhale daily        Facility-Administered Medications: None       Past Medical History:   Diagnosis Date    AAA (abdominal aortic aneurysm) (HCC)     Acid reflux     Acute on chronic diastolic congestive heart failure (HCC) 2/25/2020    Acute serous otitis media of left ear     recurrence not specified     Anesthesia     "always has mental changes /lingers and lingers after anesthesia"    Anxiety     Aortic aneurysm without rupture (HCC)     Arm bruise     right inner forearm "recent IV"    At risk for falls     BPH without urinary obstruction     Cancer ([address] )     skin CA on nose    CAP (community acquired pneumonia)     Cataracts, bilateral     Change in bowel function     Clubbing of fingers     Colon polyps     Common cold     Contusion of elbow, left     initial encounter     COPD (chronic obstructive pulmonary disease) (HCC)     Coronary artery disease     Cough     Dementia (HCC)     Depression     Dizziness     upon "standing quickly on occas"    Exercise counseling     Fatigue     Full dentures     "doesn't wear them"    Glaucoma screening     High cholesterol     History of abdominal aortic aneurysm (AAA) repair 08/2017    History of bacteremia     History of chronic obstructive lung disease     History of epistaxis     History of influenza vaccination     History of kidney stones     History of pneumonia     "many times" "at least 5 times" almost always goes to sepsis"    History of sepsis 10/2017    History of sinusitis     History of skin cancer     History of sleep apnea     History of transfusion     History of urinary tract infection     [place] (hard of hearing)     Hydronephrosis with obstructing calculus     Influenza vaccine needed     Jock itch     left/saw doctor 11/8 and started on antifungal cream    Kidney stones     Left hip pain     Need for pneumococcal vaccination     Need for prophylactic vaccination and inoculation against influenza     Other emphysema ([address] )     Pancreatitis, chronic ([address] )     pt and wife can't confirm 11/10/17    Pneumonia 10/26/2017    admitted [facility]    Pulmonary emphysema [facility])     Screening for genitourinary condition     Screening for neurological condition     Sepsis ([facility] [address] )     due to unspecified organism     Short-term memory loss     Sleep apnea     does not use CPAP   Special screening examination for neoplasm of prostate     TIA involving carotid artery     "before carotid surgery"    Ulcer     stomach, "years ago"    Unsteady gait     Use of cane as ambulatory aid     sometimes    Vitamin D deficiency     Wears glasses        Past Surgical History:   Procedure Laterality Date    ABDOMINAL AORTIC ANEURYSM REPAIR  08/23/2017    ABDOMINAL AORTIC ANEURYSM REPAIR, ENDOVASCULAR      BACK SURGERY      spinal stenosis    CARDIAC SURGERY      CABG x3    CAROTID ENDARTARECTOMY Left 11/1996    CATARACT EXTRACTION Bilateral     CORONARY ARTERY BYPASS GRAFT  01/2003    x3    CYSTOSCOPY      with insertion of ureteral stent     CYSTOSCOPY      with ureteroscopy with lithotripsy     EXCISIONAL HEMORRHOIDECTOMY      EYE SURGERY Bilateral     "for a wrinkle" after cataract surgery    HERNIA REPAIR      umbilical    LITHOTRIPSY      renal    MOUTH SURGERY      full mouth extraction     MA COLONOSCOPY FLX DX W/COLLJ SPEC WHEN PFRMD N/A 5/16/2017    Procedure: COLONOSCOPY with polypectomies/ hot snare and tattoo;  Surgeon: Severo Fernando MD;  [facility]; Service: Gastroenterology    MA CYSTOURETHROSCOPY N/A 11/30/2017    Procedure: Savannah Barber;  Surgeon: Doe Gordon MD;  [facility];  Service: Urology    MA ESOPHAGOGASTRODUODENOSCOPY TRANSORAL DIAGNOSTIC N/A 8/18/2016    Procedure: ESOPHAGOGASTRODUODENOSCOPY (EGD); Surgeon: Severo Fernando MD;  [facility];   Service: Gastroenterology    MA REMOVE BLADDER STONE,<2 5 CM N/A 11/30/2017    Procedure: Robert Wilkinson;  Surgeon: Doe Gordon MD;  [facility];  Service: Urology    SKIN BIOPSY     [address]      and removal Family History   Problem Relation Age of Onset    Diabetes type II Mother         mellitus    Kidney failure Father     Diabetes type II Maternal Grandmother         mellitus     Hypertension Paternal Grandmother         benign essential      I have reviewed and agree with the history as documented  E-Cigarette/Vaping    E-Cigarette Use Never User      E-Cigarette/Vaping Substances    Nicotine No     THC No     CBD No     Flavoring No     Other No     Unknown No      Social History     Tobacco Use    Smoking status: Former Smoker     Packs/day: 1 50     Years: 60 00     Pack years: 90 00     Last attempt to quit:      Years since quittin 7    Smokeless tobacco: Never Used    Tobacco comment:  used to be a 1-1 5 ppd smoker   Substance Use Topics    Alcohol use: Not Currently     Alcohol/week: 0 0 standard drinks     Frequency: Never     Drinks per session: Patient refused     Binge frequency: Never     Comment: quit 25 yrs ago    Drug use: No     Comment: No illicit drug use        Review of Systems   Unable to perform ROS: Dementia   Respiratory: Negative for cough and shortness of breath  Cardiovascular: Negative for chest pain  Gastrointestinal: Negative for vomiting  Genitourinary: Negative for difficulty urinating, dysuria, flank pain and frequency  Musculoskeletal: Negative for back pain, gait problem, joint swelling, neck pain and neck stiffness  Skin: Negative for rash and wound  Neurological: Negative for light-headedness and headaches  Psychiatric/Behavioral: Positive for agitation (pre-hospital, now resolved )  Physical Exam  Physical Exam  Constitutional:       General: He is not in acute distress  Appearance: He is well-developed  He is not ill-appearing, toxic-appearing or diaphoretic  HENT:      Head: Normocephalic and atraumatic  Nose: Nose normal       Mouth/Throat:      Pharynx: No oropharyngeal exudate     Eyes:      General: No scleral icterus  Right eye: No discharge  Left eye: No discharge  Conjunctiva/sclera: Conjunctivae normal       Pupils: Pupils are equal, round, and reactive to light  Neck:      Musculoskeletal: Normal range of motion and neck supple  No neck rigidity or muscular tenderness  Vascular: No JVD  Trachea: No tracheal deviation  Cardiovascular:      Rate and Rhythm: Normal rate and regular rhythm  Heart sounds: Normal heart sounds  No murmur  No friction rub  No gallop  Pulmonary:      Effort: Pulmonary effort is normal  No respiratory distress  Breath sounds: Normal breath sounds  No stridor  No wheezing, rhonchi or rales  Chest:      Chest wall: No tenderness  Abdominal:      General: Bowel sounds are normal  There is no distension  Palpations: Abdomen is soft  There is no mass  Tenderness: There is no abdominal tenderness  There is no guarding or rebound  Hernia: No hernia is present  Musculoskeletal: Normal range of motion  General: No swelling, tenderness, deformity or signs of injury  Right lower leg: No edema  Left lower leg: No edema  Lymphadenopathy:      Cervical: No cervical adenopathy  Skin:     General: Skin is warm  Capillary Refill: Capillary refill takes less than 2 seconds  Coloration: Skin is not jaundiced or pale  Findings: No bruising, erythema, lesion or rash  Neurological:      General: No focal deficit present  Mental Status: He is alert and oriented to person, place, and time  Mental status is at baseline  Cranial Nerves: No cranial nerve deficit  Sensory: No sensory deficit  Motor: No weakness or abnormal muscle tone        Coordination: Coordination normal    Psychiatric:         Behavior: Behavior normal       Comments: Oriented to person  Dementia at apparent baseline          Vital Signs  ED Triage Vitals   Temperature Pulse Respirations Blood Pressure SpO2   09/14/20 2130 09/14/20 2130 09/14/20 2130 09/14/20 2130 09/14/20 2149   (!) 97 3 °F (36 3 °C) 82 18 139/72 96 %      Temp Source Heart Rate Source Patient Position - Orthostatic VS BP Location FiO2 (%)   09/14/20 2130 09/14/20 2130 09/15/20 0411 09/14/20 2130 --   Temporal Monitor Lying Left arm       Pain Score       09/14/20 2130       No Pain           Vitals:    09/14/20 2130 09/15/20 0411 09/15/20 0800   BP: 139/72 159/88 132/79   Pulse: 82 82 84   Patient Position - Orthostatic VS:  Lying Lying         Visual Acuity      ED Medications  Medications   LORazepam (ATIVAN) injection 1 mg (1 mg Intramuscular Given 9/14/20 2337)   OLANZapine (ZyPREXA) IM injection 10 mg (10 mg Intramuscular Given 9/15/20 0059)   LORazepam (ATIVAN) injection 1 mg (1 mg Intramuscular Given 9/15/20 0245)       Diagnostic Studies  Results Reviewed     Procedure Component Value Units Date/Time    Urine Microscopic [[id number]]  (Abnormal) Collected:  09/15/20 0452    Lab Status:  Final result Specimen:  Urine, Clean Catch Updated:  09/15/20 0537     RBC, UA 1-2 /hpf      WBC, UA 2-4 /hpf      Epithelial Cells Occasional /hpf      Bacteria, UA Occasional /hpf     Rapid drug screen, urine [[id number]]  (Abnormal) Collected:  09/15/20 0450    Lab Status:  Final result Specimen:  Urine, Clean Catch Updated:  09/15/20 0512     Amph/Meth UR Negative     Barbiturate Ur Negative     Benzodiazepine Urine Positive     Cocaine Urine Negative     Methadone Urine Negative     Opiate Urine Negative     PCP Ur Negative     THC Urine Negative     Oxycodone Urine Negative    Narrative:       Presumptive report  If requested, specimen will be sent to reference lab for confirmation  FOR MEDICAL PURPOSES ONLY  IF CONFIRMATION NEEDED PLEASE CONTACT THE LAB WITHIN 5 DAYS      Drug Screen Cutoff Levels:  AMPHETAMINE/METHAMPHETAMINES  1000 ng/mL  BARBITURATES     200 ng/mL  BENZODIAZEPINES     200 ng/mL  COCAINE      300 ng/mL  METHADONE      300 ng/mL  OPIATES      300 ng/mL  PHENCYCLIDINE     25 ng/mL  THC       50 ng/mL  OXYCODONE      100 ng/mL    UA w Reflex to Microscopic w Reflex to Culture [[id number]]  (Abnormal) Collected:  09/15/20 0452    Lab Status:  Final result Specimen:  Urine, Clean Catch Updated:  09/15/20 0501     Color, UA Yellow     Clarity, UA Clear     Specific Gravity, UA 1 010     pH, UA 6 5     Leukocytes, UA Negative     Nitrite, UA Negative     Protein, UA Negative mg/dl      Glucose, UA Negative mg/dl      Ketones, UA Trace mg/dl      Urobilinogen, UA 0 2 E U /dl      Bilirubin, UA Negative     Blood, UA Trace-lysed    TSH [[id number]]  (Normal) Collected:  09/14/20 2202    Lab Status:  Final result Specimen:  Blood from Arm, Left Updated:  09/14/20 2338     TSH 3RD GENERATON 0 670 uIU/mL     Narrative:       Patients undergoing fluorescein dye angiography may retain small amounts of fluorescein in the body for 48-72 hours post procedure  Samples containing fluorescein can produce falsely depressed TSH values  If the patient had this procedure,a specimen should be resubmitted post fluorescein clearance  Novel Coronavirus [facility] [[id number]]  (Normal) Collected:  09/14/20 2202    Lab Status:  Final result Specimen:  Nares from Nasopharyngeal Swab Updated:  09/14/20 2311     SARS-CoV-2 Negative    Narrative: The specimen collection materials, transport medium, and/or testing methodology utilized in the production of these test results have been proven to be reliable in a limited validation with an abbreviated program under the Emergency Utilization Authorization provided by the FDA  Testing reported as "Presumptive positive" will be confirmed with secondary testing with a reference laboratory to ensure result accuracy  Clinical caution and judgement should be used with the interpretation of these results with consideration of the clinical impression and other laboratory testing    Testing reported as "Positive" or "Negative" has been proven to be accurate according to standard laboratory validation requirements  All testing is performed with control materials showing appropriate reactivity at standard intervals        Comprehensive metabolic panel [[id number]]  (Abnormal) Collected:  09/14/20 2202    Lab Status:  Final result Specimen:  Blood from Arm, Left Updated:  09/14/20 2230     Sodium 141 mmol/L      Potassium 3 6 mmol/L      Chloride 109 mmol/L      CO2 25 mmol/L      ANION GAP 7 mmol/L      BUN 16 mg/dL      Creatinine 0 93 mg/dL      Glucose 90 mg/dL      Calcium 9 3 mg/dL      AST 23 U/L      ALT 16 U/L      Alkaline Phosphatase 74 U/L      Total Protein 6 6 g/dL      Albumin 4 2 g/dL      Total Bilirubin 1 60 mg/dL      eGFR 79 ml/min/1 73sq m     Narrative:       [place] guidelines for Chronic Kidney Disease (CKD):     Stage 1 with normal or high GFR (GFR > 90 mL/min/1 73 square meters)    Stage 2 Mild CKD (GFR = 60-89 mL/min/1 73 square meters)    Stage 3A Moderate CKD (GFR = 45-59 mL/min/1 73 square meters)    Stage 3B Moderate CKD (GFR = 30-44 mL/min/1 73 square meters)    Stage 4 Severe CKD (GFR = 15-29 mL/min/1 73 square meters)    Stage 5 End Stage CKD (GFR <15 mL/min/1 73 square meters)  Note: GFR calculation is accurate only with a steady state creatinine    Troponin I [[id number]]  (Normal) Collected:  09/14/20 2202    Lab Status:  Final result Specimen:  Blood from Arm, Left Updated:  09/14/20 2228     Troponin I <0 03 ng/mL     CBC and differential [[id number]]  (Abnormal) Collected:  09/14/20 2202    Lab Status:  Final result Specimen:  Blood from Arm, Left Updated:  09/14/20 2213     WBC 5 00 Thousand/uL      RBC 4 78 Million/uL      Hemoglobin 15 2 g/dL      Hematocrit 44 9 %      MCV 94 fL      MCH 31 7 pg      MCHC 33 8 g/dL      RDW 17 1 %      MPV 8 7 fL      Platelets 897 Thousands/uL      Neutrophils Relative 63 %      Lymphocytes Relative 23 %      Monocytes Relative 13 % Eosinophils Relative 2 %      Basophils Relative 1 %      Neutrophils Absolute 3 10 Thousands/µL      Lymphocytes Absolute 1 10 Thousands/µL      Monocytes Absolute 0 60 Thousand/µL      Eosinophils Absolute 0 10 Thousand/µL      Basophils Absolute 0 00 Thousands/µL     POCT alcohol breath test [[id number]]  (Normal) Resulted:  09/14/20 2204    Lab Status:  Final result Updated:  09/14/20 2205     EXTBreath Alcohol Negative 0 00                 XR chest 1 view portable   ED Interpretation by Romelia Carrasco MD (09/14 2242)     INDICATION:   Medical clearance     COMPARISON:  July and August, 2020     EXAM PERFORMED/VIEWS:  XR CHEST PORTABLE  Single image     FINDINGS:  Lungs adequately aerated  No lobar consolidation or large effusion  Mild atelectatic changes bilaterally     Cardiac size within normal limits  Numerous surgical clips projecting over the left heart border  Prior sternotomy      No pneumothorax or free air      Osseous structures appear within normal limits for patient age      IMPRESSION:  No lobar consolidation or large effusion  Cardiac size normal   No pulmonary edema          Final Result by Jimena Bernal MD (09/15 9736)      No acute cardiopulmonary disease  Hyperinflation  Postoperative changes              Workstation performed: [id number]                    Procedures  Procedures         ED Course  ED Course as of Sep 15 2106   [facility] Sep 14, 2020   2240 EXTBreath Alcohol: Negative 0 00   2240 Troponin I: <0 03   2240 WBC: 5 00   2240 Hemoglobin: 15 2   2240 Platelet Count(!): 934   2240 Neutrophils %: 63   2240 Lymphocytes Relative: 23   2240 Monocytes Relative(!): 13   2240 Eosinophils: 2   2240 Sodium: 141   2240 Potassium: 3 6   2240 Chloride(!): 109   2240 CO2: 25   2240 Anion Gap: 7   2240 BUN: 16   2240 Creatinine: 0 93   2240 AST: 23   2240 ALT: 16   2240 Alkaline Phosphatase: 74   2240 TOTAL BILIRUBIN(!): 1 60   2240 Labs at baseline       2313 EXT SARS-COV-2: Negative   Tue Sep 15, 2020   2305 Leukocytes, UA: Negative   0628 Nitrite, UA: Negative   0628 Pt is medically cleared for Psychiatric admission    POCT URINE PROTEIN: Negative   0629 Sign out:     D/w Dr Isabela Savage to see the pt  Dr Marycruz Matthews will assist w/ disposition               HEART Risk Score      Most Recent Value   Heart Score Risk Calculator   History  0 Filed at: 09/14/2020 2140   ECG  0 Filed at: 09/14/2020 2140   Age  2 Filed at: 09/14/2020 2140   Risk Factors  1 Filed at: 09/14/2020 2140   Troponin  0 Filed at: 09/14/2020 2140   HEART Score  3 Filed at: 09/14/2020 2140                                  MDM    Disposition  Final diagnoses:   Agitation   Dementia ([facility] [address] )   Altered mental status     Time reflects when diagnosis was documented in both MDM as applicable and the Disposition within this note     Time User Action Codes Description Comment    9/15/2020  2:39 AM Parnell Gash Add [R45 1] Agitation     9/15/2020  2:40 AM Parnell Gash Add [F03 90] Dementia ([facility] [address] )     9/15/2020 11:59 AM Jessica Gram Add [R41 82] Altered mental status       ED Disposition     ED Disposition Condition Date/Time Comment    Discharge Stable Tue Sep 15, 2020 11:59 AM [facility] discharge to home/self care              MD Documentation      Most Recent Value   Sending MD Ashkan Veliz MD      Follow-up Information     Follow up With Specialties Details Why Contact Info    Viola Caser, DO Family Medicine Call  As needed [address] [address]  [address]  [phone]            Discharge Medication List as of 9/15/2020 12:01 PM      CONTINUE these medications which have NOT CHANGED    Details   acetaminophen (TYLENOL) 325 mg tablet Take 2 tablets (650 mg total) by mouth every 6 (six) hours as needed for mild pain, headaches or fever, Starting Sat 8/29/2020, No Print      albuterol (PROVENTIL HFA,VENTOLIN HFA) 90 mcg/act inhaler Inhale 2 puffs every 6 (six) hours as needed for wheezing, Historical Med ALPRAZolam (XANAX) 0 5 mg tablet Take by mouth 2 (two) times a day as needed for anxiety, Historical Med      aspirin 81 MG tablet Take 81 mg by mouth daily Took within 24 hours , Historical Med      atenolol (TENORMIN) 25 mg tablet Take 1 tablet (25 mg total) by mouth daily at bedtime, Starting Sat 8/29/2020, No Print      atorvastatin (LIPITOR) 40 mg tablet Take 1 tablet (40 mg total) by mouth daily at bedtime, Starting Sat 8/29/2020, No Print      Bacillus Coagulans-Inulin (PROBIOTIC FORMULA) 1-250 BILLION-MG CAPS Take 1 capsule by mouth daily Record states dose is currently 4 billion, Historical Med      Bisacodyl (DULCOLAX RE) Insert 1 suppository into the rectum Insert 1 suppository rectally as needed for Constipation  For no Bowel movement within 24 hours after administration of Milk of Magnesia, Historical Med      Cholecalciferol (VITAMIN D-3 PO) Take 2,000 Units by mouth daily  , Until Discontinued, Historical Med      clopidogrel (PLAVIX) 75 mg tablet Take 1 tablet (75 mg total) by mouth daily, Starting Mon 3/2/2020, Normal      cyanocobalamin (VITAMIN B-12) 500 MCG tablet Take 2 tablets (1,000 mcg total) by mouth daily, Starting Sat 8/29/2020, No Print      donepezil (ARICEPT) 10 mg tablet TAKE 1 TABLET DAILY AT     BEDTIME, Normal      enoxaparin (LOVENOX) 40 mg/0 4 mL Inject 0 4 mL (40 mg total) under the skin daily, Starting Sat 8/29/2020, No Print      fluticasone-salmeterol (AirDuo RespiClick 70/60) 38-10 mcg/act dry powder inhaler Inhale 1 puff 2 (two) times a day AM & PM, Starting Mon 8/17/2020, Normal      magnesium hydroxide (MILK OF MAGNESIA) 400 mg/5 mL oral suspension Take 5 mL by mouth daily as needed for constipation, Historical Med      memantine (NAMENDA) 5 mg tablet Take 5 mg by mouth 2 (two) times a day In the evening , Historical Med      saccharomyces boulardii (FLORASTOR) 250 mg capsule Take 250 mg by mouth every morning, Historical Med      sertraline (ZOLOFT) 25 mg tablet Take 25 mg by mouth daily, Historical Med      tamsulosin (FLOMAX) 0 4 mg TAKE 1 CAPSULE DAILY, Normal      tiotropium (SPIRIVA HANDIHALER) 18 mcg inhalation capsule Place 18 mcg into inhaler and inhale daily  , Historical Med      Multiple Vitamins-Minerals (CENTRUM SILVER ADULT 50+) TABS Take 1 tablet by mouth daily, Historical Med           No discharge procedures on file      PDMP Review       Value Time User    PDMP Reviewed  Yes 8/5/2020  1:12 AM Doc MD Yosef          ED Provider  Electronically Signed by           Noelle Howard MD  09/15/20 5722       Noelle Howard MD  09/15/20 8539
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
## 2020-09-15 NOTE — ED NOTES
Phoned [phone] to inquire about BLS transport from this facility back to [facility] as patient is on oxygen continuously  ANDREIA will be able to transport patient approximately 1500 hours        Karen Bland RN  09/15/20 1748

## 2020-09-15 NOTE — ED NOTES
Received call from Perla Callaway at [place] [phone] petition was denied by Catal[facility] delegate

## 2020-09-16 LAB
ATRIAL RATE: 71 BPM
P AXIS: 75 DEGREES
PR INTERVAL: 206 MS
QRS AXIS: 82 DEGREES
QRSD INTERVAL: 86 MS
QT INTERVAL: 400 MS
QTC INTERVAL: 434 MS
T WAVE AXIS: 76 DEGREES
VENTRICULAR RATE: 71 BPM

## 2020-09-16 PROCEDURE — 93010 ELECTROCARDIOGRAM REPORT: CPT | Performed by: INTERNAL MEDICINE

## 2020-09-20 ENCOUNTER — HOSPITAL ENCOUNTER (INPATIENT)
Facility: HOSPITAL | Age: 80
LOS: 8 days | DRG: 885 | End: 2020-09-29
Attending: EMERGENCY MEDICINE | Admitting: PSYCHIATRY & NEUROLOGY
Payer: MEDICARE

## 2020-09-20 DIAGNOSIS — F91.9 BEHAVIOR DISTURBANCE: Primary | ICD-10-CM

## 2020-09-20 DIAGNOSIS — J44.9 CHRONIC OBSTRUCTIVE PULMONARY DISEASE, UNSPECIFIED COPD TYPE (HCC): ICD-10-CM

## 2020-09-20 DIAGNOSIS — R17 ELEVATED BILIRUBIN: ICD-10-CM

## 2020-09-20 DIAGNOSIS — E78.00 HYPERCHOLESTEROLEMIA: ICD-10-CM

## 2020-09-20 LAB
ALBUMIN SERPL BCP-MCNC: 4.3 G/DL (ref 3.5–5.7)
ALP SERPL-CCNC: 75 U/L (ref 55–165)
ALT SERPL W P-5'-P-CCNC: 16 U/L (ref 7–52)
AMPHETAMINES SERPL QL SCN: NEGATIVE
ANION GAP SERPL CALCULATED.3IONS-SCNC: 10 MMOL/L (ref 4–13)
AST SERPL W P-5'-P-CCNC: 22 U/L (ref 13–39)
BARBITURATES UR QL: NEGATIVE
BASOPHILS # BLD AUTO: 0 THOUSANDS/ΜL (ref 0–0.1)
BASOPHILS NFR BLD AUTO: 1 % (ref 0–2)
BENZODIAZ UR QL: POSITIVE
BILIRUB SERPL-MCNC: 2.2 MG/DL (ref 0.2–1)
BILIRUB UR QL STRIP: NEGATIVE
BUN SERPL-MCNC: 20 MG/DL (ref 7–25)
CALCIUM SERPL-MCNC: 9.5 MG/DL (ref 8.6–10.5)
CHLORIDE SERPL-SCNC: 107 MMOL/L (ref 98–107)
CLARITY UR: CLEAR
CO2 SERPL-SCNC: 24 MMOL/L (ref 21–31)
COCAINE UR QL: NEGATIVE
COLOR UR: YELLOW
CREAT SERPL-MCNC: 0.95 MG/DL (ref 0.7–1.3)
EOSINOPHIL # BLD AUTO: 0.1 THOUSAND/ΜL (ref 0–0.61)
EOSINOPHIL NFR BLD AUTO: 2 % (ref 0–5)
ERYTHROCYTE [DISTWIDTH] IN BLOOD BY AUTOMATED COUNT: 16.5 % (ref 11.5–14.5)
ETHANOL SERPL-MCNC: <10 MG/DL
GFR SERPL CREATININE-BSD FRML MDRD: 77 ML/MIN/1.73SQ M
GLUCOSE SERPL-MCNC: 96 MG/DL (ref 65–99)
GLUCOSE UR STRIP-MCNC: NEGATIVE MG/DL
HCT VFR BLD AUTO: 44.2 % (ref 42–47)
HGB BLD-MCNC: 14.9 G/DL (ref 14–18)
HGB UR QL STRIP.AUTO: NEGATIVE
KETONES UR STRIP-MCNC: ABNORMAL MG/DL
LEUKOCYTE ESTERASE UR QL STRIP: NEGATIVE
LYMPHOCYTES # BLD AUTO: 1 THOUSANDS/ΜL (ref 0.6–4.47)
LYMPHOCYTES NFR BLD AUTO: 20 % (ref 21–51)
MCH RBC QN AUTO: 31.9 PG (ref 26–34)
MCHC RBC AUTO-ENTMCNC: 33.8 G/DL (ref 31–37)
MCV RBC AUTO: 94 FL (ref 81–99)
METHADONE UR QL: NEGATIVE
MONOCYTES # BLD AUTO: 0.6 THOUSAND/ΜL (ref 0.17–1.22)
MONOCYTES NFR BLD AUTO: 11 % (ref 2–12)
NEUTROPHILS # BLD AUTO: 3.2 THOUSANDS/ΜL (ref 1.4–6.5)
NEUTS SEG NFR BLD AUTO: 66 % (ref 42–75)
NITRITE UR QL STRIP: NEGATIVE
OPIATES UR QL SCN: NEGATIVE
OXYCODONE+OXYMORPHONE UR QL SCN: NEGATIVE
PCP UR QL: NEGATIVE
PH UR STRIP.AUTO: 5.5 [PH]
PLATELET # BLD AUTO: 102 THOUSANDS/UL (ref 149–390)
PMV BLD AUTO: 8.6 FL (ref 8.6–11.7)
POTASSIUM SERPL-SCNC: 3.9 MMOL/L (ref 3.5–5.5)
PROT SERPL-MCNC: 6.6 G/DL (ref 6.4–8.9)
PROT UR STRIP-MCNC: NEGATIVE MG/DL
RBC # BLD AUTO: 4.68 MILLION/UL (ref 4.3–5.9)
SARS-COV-2 RNA RESP QL NAA+PROBE: NEGATIVE
SODIUM SERPL-SCNC: 141 MMOL/L (ref 134–143)
SP GR UR STRIP.AUTO: 1.02 (ref 1–1.03)
THC UR QL: NEGATIVE
TSH SERPL DL<=0.05 MIU/L-ACNC: 0.71 UIU/ML (ref 0.45–5.33)
UROBILINOGEN UR QL STRIP.AUTO: 0.2 E.U./DL
WBC # BLD AUTO: 4.8 THOUSAND/UL (ref 4.8–10.8)

## 2020-09-20 PROCEDURE — 80307 DRUG TEST PRSMV CHEM ANLYZR: CPT | Performed by: EMERGENCY MEDICINE

## 2020-09-20 PROCEDURE — 36415 COLL VENOUS BLD VENIPUNCTURE: CPT | Performed by: EMERGENCY MEDICINE

## 2020-09-20 PROCEDURE — 85025 COMPLETE CBC W/AUTO DIFF WBC: CPT | Performed by: EMERGENCY MEDICINE

## 2020-09-20 PROCEDURE — 93005 ELECTROCARDIOGRAM TRACING: CPT

## 2020-09-20 PROCEDURE — 84443 ASSAY THYROID STIM HORMONE: CPT | Performed by: EMERGENCY MEDICINE

## 2020-09-20 PROCEDURE — 1123F ACP DISCUSS/DSCN MKR DOCD: CPT | Performed by: EMERGENCY MEDICINE

## 2020-09-20 PROCEDURE — 99285 EMERGENCY DEPT VISIT HI MDM: CPT

## 2020-09-20 PROCEDURE — 99284 EMERGENCY DEPT VISIT MOD MDM: CPT | Performed by: EMERGENCY MEDICINE

## 2020-09-20 PROCEDURE — 80320 DRUG SCREEN QUANTALCOHOLS: CPT | Performed by: EMERGENCY MEDICINE

## 2020-09-20 PROCEDURE — 87635 SARS-COV-2 COVID-19 AMP PRB: CPT | Performed by: EMERGENCY MEDICINE

## 2020-09-20 PROCEDURE — 80053 COMPREHEN METABOLIC PANEL: CPT | Performed by: EMERGENCY MEDICINE

## 2020-09-20 PROCEDURE — 81003 URINALYSIS AUTO W/O SCOPE: CPT | Performed by: EMERGENCY MEDICINE

## 2020-09-20 RX ORDER — DONEPEZIL HYDROCHLORIDE 10 MG/1
10 TABLET, FILM COATED ORAL
Status: DISCONTINUED | OUTPATIENT
Start: 2020-09-20 | End: 2020-09-20

## 2020-09-20 RX ORDER — DONEPEZIL HYDROCHLORIDE 10 MG/1
10 TABLET, FILM COATED ORAL ONCE
Status: COMPLETED | OUTPATIENT
Start: 2020-09-20 | End: 2020-09-20

## 2020-09-20 RX ORDER — ATORVASTATIN CALCIUM 10 MG/1
10 TABLET, FILM COATED ORAL
Status: DISCONTINUED | OUTPATIENT
Start: 2020-09-21 | End: 2020-09-20

## 2020-09-20 RX ORDER — LANOLIN ALCOHOL/MO/W.PET/CERES
6 CREAM (GRAM) TOPICAL ONCE
Status: COMPLETED | OUTPATIENT
Start: 2020-09-21 | End: 2020-09-21

## 2020-09-20 RX ORDER — HYDROXYZINE HYDROCHLORIDE 25 MG/1
25 TABLET, FILM COATED ORAL ONCE
Status: COMPLETED | OUTPATIENT
Start: 2020-09-21 | End: 2020-09-21

## 2020-09-20 RX ORDER — MEMANTINE HYDROCHLORIDE 5 MG/1
5 TABLET ORAL DAILY
Status: DISCONTINUED | OUTPATIENT
Start: 2020-09-21 | End: 2020-09-20

## 2020-09-20 RX ORDER — ATENOLOL 25 MG/1
25 TABLET ORAL ONCE
Status: COMPLETED | OUTPATIENT
Start: 2020-09-20 | End: 2020-09-20

## 2020-09-20 RX ORDER — ATORVASTATIN CALCIUM 10 MG/1
10 TABLET, FILM COATED ORAL ONCE
Status: COMPLETED | OUTPATIENT
Start: 2020-09-20 | End: 2020-09-20

## 2020-09-20 RX ORDER — MEMANTINE HYDROCHLORIDE 5 MG/1
5 TABLET ORAL ONCE
Status: COMPLETED | OUTPATIENT
Start: 2020-09-20 | End: 2020-09-20

## 2020-09-20 RX ORDER — ATENOLOL 25 MG/1
25 TABLET ORAL DAILY
Status: DISCONTINUED | OUTPATIENT
Start: 2020-09-21 | End: 2020-09-20

## 2020-09-20 RX ORDER — ALPRAZOLAM 0.5 MG/1
0.5 TABLET ORAL ONCE
Status: COMPLETED | OUTPATIENT
Start: 2020-09-20 | End: 2020-09-20

## 2020-09-20 RX ADMIN — MEMANTINE 5 MG: 5 TABLET ORAL at 20:31

## 2020-09-20 RX ADMIN — DONEPEZIL HYDROCHLORIDE 10 MG: 10 TABLET ORAL at 20:32

## 2020-09-20 RX ADMIN — ATORVASTATIN CALCIUM 10 MG: 10 TABLET, FILM COATED ORAL at 20:32

## 2020-09-20 RX ADMIN — ATENOLOL 25 MG: 25 TABLET ORAL at 20:31

## 2020-09-20 RX ADMIN — ALPRAZOLAM 0.5 MG: 0.5 TABLET ORAL at 20:31

## 2020-09-21 LAB
ATRIAL RATE: 88 BPM
P AXIS: 69 DEGREES
PR INTERVAL: 192 MS
QRS AXIS: 72 DEGREES
QRSD INTERVAL: 90 MS
QT INTERVAL: 398 MS
QTC INTERVAL: 481 MS
T WAVE AXIS: 71 DEGREES
VENTRICULAR RATE: 88 BPM

## 2020-09-21 PROCEDURE — 93010 ELECTROCARDIOGRAM REPORT: CPT | Performed by: INTERNAL MEDICINE

## 2020-09-21 PROCEDURE — 96372 THER/PROPH/DIAG INJ SC/IM: CPT

## 2020-09-21 RX ORDER — LORAZEPAM 1 MG/1
2 TABLET ORAL ONCE
Status: DISCONTINUED | OUTPATIENT
Start: 2020-09-21 | End: 2020-09-21

## 2020-09-21 RX ORDER — FLUTICASONE FUROATE AND VILANTEROL 100; 25 UG/1; UG/1
1 POWDER RESPIRATORY (INHALATION)
Status: DISCONTINUED | OUTPATIENT
Start: 2020-09-21 | End: 2020-09-29 | Stop reason: HOSPADM

## 2020-09-21 RX ORDER — DONEPEZIL HYDROCHLORIDE 5 MG/1
5 TABLET, FILM COATED ORAL
Status: DISCONTINUED | OUTPATIENT
Start: 2020-09-21 | End: 2020-09-22

## 2020-09-21 RX ORDER — HALOPERIDOL 5 MG/ML
5 INJECTION INTRAMUSCULAR ONCE
Status: COMPLETED | OUTPATIENT
Start: 2020-09-21 | End: 2020-09-21

## 2020-09-21 RX ORDER — BISACODYL 10 MG
10 SUPPOSITORY, RECTAL RECTAL DAILY PRN
Status: DISCONTINUED | OUTPATIENT
Start: 2020-09-21 | End: 2020-09-29 | Stop reason: HOSPADM

## 2020-09-21 RX ORDER — ATENOLOL 25 MG/1
25 TABLET ORAL
Status: DISCONTINUED | OUTPATIENT
Start: 2020-09-21 | End: 2020-09-29 | Stop reason: HOSPADM

## 2020-09-21 RX ORDER — QUETIAPINE FUMARATE 25 MG/1
25 TABLET, FILM COATED ORAL
Status: DISCONTINUED | OUTPATIENT
Start: 2020-09-21 | End: 2020-09-23

## 2020-09-21 RX ORDER — CLOPIDOGREL BISULFATE 75 MG/1
75 TABLET ORAL DAILY
Status: DISCONTINUED | OUTPATIENT
Start: 2020-09-21 | End: 2020-09-29 | Stop reason: HOSPADM

## 2020-09-21 RX ORDER — ACETAMINOPHEN 325 MG/1
975 TABLET ORAL EVERY 6 HOURS PRN
Status: DISCONTINUED | OUTPATIENT
Start: 2020-09-21 | End: 2020-09-29 | Stop reason: HOSPADM

## 2020-09-21 RX ORDER — ALBUTEROL SULFATE 90 UG/1
2 AEROSOL, METERED RESPIRATORY (INHALATION) EVERY 6 HOURS PRN
Status: DISCONTINUED | OUTPATIENT
Start: 2020-09-21 | End: 2020-09-29 | Stop reason: HOSPADM

## 2020-09-21 RX ORDER — DONEPEZIL HYDROCHLORIDE 10 MG/1
10 TABLET, FILM COATED ORAL
Status: DISCONTINUED | OUTPATIENT
Start: 2020-09-21 | End: 2020-09-21

## 2020-09-21 RX ORDER — LORAZEPAM 2 MG/ML
1 INJECTION INTRAMUSCULAR EVERY 6 HOURS PRN
Status: DISCONTINUED | OUTPATIENT
Start: 2020-09-21 | End: 2020-09-22

## 2020-09-21 RX ORDER — HYDROXYZINE HYDROCHLORIDE 25 MG/1
25 TABLET, FILM COATED ORAL EVERY 6 HOURS PRN
Status: DISCONTINUED | OUTPATIENT
Start: 2020-09-21 | End: 2020-09-29 | Stop reason: HOSPADM

## 2020-09-21 RX ORDER — NICOTINE 21 MG/24HR
1 PATCH, TRANSDERMAL 24 HOURS TRANSDERMAL DAILY
Status: DISCONTINUED | OUTPATIENT
Start: 2020-09-21 | End: 2020-09-23

## 2020-09-21 RX ORDER — TRAZODONE HYDROCHLORIDE 50 MG/1
50 TABLET ORAL
Status: DISCONTINUED | OUTPATIENT
Start: 2020-09-21 | End: 2020-09-29 | Stop reason: HOSPADM

## 2020-09-21 RX ORDER — LORAZEPAM 0.5 MG/1
0.5 TABLET ORAL EVERY 6 HOURS PRN
Status: DISCONTINUED | OUTPATIENT
Start: 2020-09-21 | End: 2020-09-26

## 2020-09-21 RX ORDER — ACETAMINOPHEN 325 MG/1
650 TABLET ORAL EVERY 6 HOURS PRN
Status: DISCONTINUED | OUTPATIENT
Start: 2020-09-21 | End: 2020-09-21 | Stop reason: SDUPTHER

## 2020-09-21 RX ORDER — LORAZEPAM 2 MG/ML
1 INJECTION INTRAMUSCULAR ONCE
Status: COMPLETED | OUTPATIENT
Start: 2020-09-21 | End: 2020-09-21

## 2020-09-21 RX ORDER — TAMSULOSIN HYDROCHLORIDE 0.4 MG/1
0.4 CAPSULE ORAL DAILY
Status: DISCONTINUED | OUTPATIENT
Start: 2020-09-21 | End: 2020-09-29 | Stop reason: HOSPADM

## 2020-09-21 RX ORDER — ACETAMINOPHEN 325 MG/1
650 TABLET ORAL EVERY 4 HOURS PRN
Status: DISCONTINUED | OUTPATIENT
Start: 2020-09-21 | End: 2020-09-29 | Stop reason: HOSPADM

## 2020-09-21 RX ORDER — ATORVASTATIN CALCIUM 10 MG/1
10 TABLET, FILM COATED ORAL
Status: DISCONTINUED | OUTPATIENT
Start: 2020-09-21 | End: 2020-09-21 | Stop reason: SDUPTHER

## 2020-09-21 RX ORDER — ACETAMINOPHEN 325 MG/1
650 TABLET ORAL EVERY 6 HOURS PRN
Status: DISCONTINUED | OUTPATIENT
Start: 2020-09-21 | End: 2020-09-29 | Stop reason: HOSPADM

## 2020-09-21 RX ORDER — ATENOLOL 25 MG/1
25 TABLET ORAL DAILY
Status: DISCONTINUED | OUTPATIENT
Start: 2020-09-21 | End: 2020-09-21 | Stop reason: SDUPTHER

## 2020-09-21 RX ORDER — SERTRALINE HYDROCHLORIDE 25 MG/1
25 TABLET, FILM COATED ORAL DAILY
Status: DISCONTINUED | OUTPATIENT
Start: 2020-09-21 | End: 2020-09-21

## 2020-09-21 RX ORDER — ASPIRIN 81 MG/1
81 TABLET, CHEWABLE ORAL DAILY
Status: DISCONTINUED | OUTPATIENT
Start: 2020-09-21 | End: 2020-09-29 | Stop reason: HOSPADM

## 2020-09-21 RX ORDER — MEMANTINE HYDROCHLORIDE 5 MG/1
5 TABLET ORAL 2 TIMES DAILY
Status: DISCONTINUED | OUTPATIENT
Start: 2020-09-21 | End: 2020-09-26

## 2020-09-21 RX ORDER — OLANZAPINE 10 MG/1
5 INJECTION, POWDER, LYOPHILIZED, FOR SOLUTION INTRAMUSCULAR 2 TIMES DAILY PRN
Status: DISCONTINUED | OUTPATIENT
Start: 2020-09-21 | End: 2020-09-26

## 2020-09-21 RX ORDER — TAMSULOSIN HYDROCHLORIDE 0.4 MG/1
0.4 CAPSULE ORAL ONCE
Status: DISCONTINUED | OUTPATIENT
Start: 2020-09-21 | End: 2020-09-21 | Stop reason: SDUPTHER

## 2020-09-21 RX ORDER — ATORVASTATIN CALCIUM 40 MG/1
40 TABLET, FILM COATED ORAL
Status: DISCONTINUED | OUTPATIENT
Start: 2020-09-21 | End: 2020-09-29 | Stop reason: HOSPADM

## 2020-09-21 RX ORDER — ASPIRIN 81 MG/1
81 TABLET ORAL DAILY
Status: DISCONTINUED | OUTPATIENT
Start: 2020-09-21 | End: 2020-09-21 | Stop reason: SDUPTHER

## 2020-09-21 RX ORDER — OLANZAPINE 5 MG/1
5 TABLET ORAL 2 TIMES DAILY PRN
Status: DISCONTINUED | OUTPATIENT
Start: 2020-09-21 | End: 2020-09-26

## 2020-09-21 RX ADMIN — LORAZEPAM 0.5 MG: 0.5 TABLET ORAL at 23:30

## 2020-09-21 RX ADMIN — MELATONIN 6 MG: at 00:08

## 2020-09-21 RX ADMIN — MEMANTINE 5 MG: 5 TABLET ORAL at 17:26

## 2020-09-21 RX ADMIN — HALOPERIDOL LACTATE 5 MG: 5 INJECTION, SOLUTION INTRAMUSCULAR at 04:39

## 2020-09-21 RX ADMIN — ATORVASTATIN CALCIUM 40 MG: 40 TABLET, FILM COATED ORAL at 21:15

## 2020-09-21 RX ADMIN — TRAZODONE HYDROCHLORIDE 50 MG: 50 TABLET ORAL at 22:16

## 2020-09-21 RX ADMIN — LORAZEPAM 1 MG: 2 INJECTION INTRAMUSCULAR; INTRAVENOUS at 04:11

## 2020-09-21 RX ADMIN — DONEPEZIL HYDROCHLORIDE 5 MG: 5 TABLET ORAL at 21:15

## 2020-09-21 RX ADMIN — QUETIAPINE FUMARATE 25 MG: 25 TABLET ORAL at 21:15

## 2020-09-21 RX ADMIN — HYDROXYZINE HYDROCHLORIDE 25 MG: 25 TABLET, FILM COATED ORAL at 00:00

## 2020-09-22 ENCOUNTER — TELEPHONE (OUTPATIENT)
Dept: UROLOGY | Facility: MEDICAL CENTER | Age: 80
End: 2020-09-22

## 2020-09-22 PROBLEM — F03.91 PSYCHOSIS IN ELDERLY WITH BEHAVIORAL DISTURBANCE (HCC): Status: ACTIVE | Noted: 2020-09-22

## 2020-09-22 LAB
25(OH)D3 SERPL-MCNC: 43 NG/ML (ref 30–100)
FOLATE SERPL-MCNC: 19.2 NG/ML (ref 3.1–17.5)
TROPONIN I SERPL-MCNC: <0.03 NG/ML
VIT B12 SERPL-MCNC: 882 PG/ML (ref 100–900)

## 2020-09-22 PROCEDURE — 82306 VITAMIN D 25 HYDROXY: CPT | Performed by: FAMILY MEDICINE

## 2020-09-22 PROCEDURE — 82607 VITAMIN B-12: CPT | Performed by: FAMILY MEDICINE

## 2020-09-22 PROCEDURE — 97167 OT EVAL HIGH COMPLEX 60 MIN: CPT

## 2020-09-22 PROCEDURE — 93005 ELECTROCARDIOGRAM TRACING: CPT

## 2020-09-22 PROCEDURE — 97116 GAIT TRAINING THERAPY: CPT

## 2020-09-22 PROCEDURE — 84484 ASSAY OF TROPONIN QUANT: CPT | Performed by: FAMILY MEDICINE

## 2020-09-22 PROCEDURE — 97163 PT EVAL HIGH COMPLEX 45 MIN: CPT

## 2020-09-22 PROCEDURE — 99221 1ST HOSP IP/OBS SF/LOW 40: CPT | Performed by: PSYCHIATRY & NEUROLOGY

## 2020-09-22 PROCEDURE — 82746 ASSAY OF FOLIC ACID SERUM: CPT | Performed by: FAMILY MEDICINE

## 2020-09-22 RX ORDER — LIDOCAINE HYDROCHLORIDE 20 MG/ML
15 SOLUTION OROPHARYNGEAL 2 TIMES DAILY PRN
Status: DISCONTINUED | OUTPATIENT
Start: 2020-09-22 | End: 2020-09-22

## 2020-09-22 RX ORDER — LIDOCAINE HYDROCHLORIDE 20 MG/ML
15 SOLUTION OROPHARYNGEAL 2 TIMES DAILY PRN
Status: DISCONTINUED | OUTPATIENT
Start: 2020-09-22 | End: 2020-09-29 | Stop reason: HOSPADM

## 2020-09-22 RX ORDER — QUETIAPINE FUMARATE 25 MG/1
25 TABLET, FILM COATED ORAL
Status: DISCONTINUED | OUTPATIENT
Start: 2020-09-22 | End: 2020-09-25

## 2020-09-22 RX ORDER — MAGNESIUM HYDROXIDE/ALUMINUM HYDROXICE/SIMETHICONE 120; 1200; 1200 MG/30ML; MG/30ML; MG/30ML
30 SUSPENSION ORAL EVERY 4 HOURS PRN
Status: DISCONTINUED | OUTPATIENT
Start: 2020-09-22 | End: 2020-09-29 | Stop reason: HOSPADM

## 2020-09-22 RX ORDER — PANTOPRAZOLE SODIUM 40 MG/1
40 TABLET, DELAYED RELEASE ORAL
Status: DISCONTINUED | OUTPATIENT
Start: 2020-09-22 | End: 2020-09-29 | Stop reason: HOSPADM

## 2020-09-22 RX ORDER — LORAZEPAM 0.5 MG/1
0.5 TABLET ORAL EVERY 4 HOURS PRN
Status: DISCONTINUED | OUTPATIENT
Start: 2020-09-22 | End: 2020-09-26

## 2020-09-22 RX ORDER — NITROGLYCERIN 0.4 MG/1
0.4 TABLET SUBLINGUAL
Status: DISCONTINUED | OUTPATIENT
Start: 2020-09-22 | End: 2020-09-29 | Stop reason: HOSPADM

## 2020-09-22 RX ORDER — RISPERIDONE 0.5 MG/1
0.5 TABLET, FILM COATED ORAL 2 TIMES DAILY PRN
Status: DISCONTINUED | OUTPATIENT
Start: 2020-09-22 | End: 2020-09-26

## 2020-09-22 RX ORDER — LORAZEPAM 0.5 MG/1
0.5 TABLET ORAL EVERY 8 HOURS PRN
Status: DISCONTINUED | OUTPATIENT
Start: 2020-09-22 | End: 2020-09-22

## 2020-09-22 RX ADMIN — ACETAMINOPHEN 650 MG: 325 TABLET ORAL at 14:00

## 2020-09-22 RX ADMIN — ATENOLOL 25 MG: 25 TABLET ORAL at 20:49

## 2020-09-22 RX ADMIN — LORAZEPAM 0.5 MG: 0.5 TABLET ORAL at 11:14

## 2020-09-22 RX ADMIN — OLANZAPINE 5 MG: 5 TABLET, FILM COATED ORAL at 14:00

## 2020-09-22 RX ADMIN — ASPIRIN 81 MG 81 MG: 81 TABLET ORAL at 09:02

## 2020-09-22 RX ADMIN — LIDOCAINE HYDROCHLORIDE 15 ML: 20 SOLUTION ORAL; TOPICAL at 11:37

## 2020-09-22 RX ADMIN — ATORVASTATIN CALCIUM 40 MG: 40 TABLET, FILM COATED ORAL at 20:49

## 2020-09-22 RX ADMIN — QUETIAPINE FUMARATE 25 MG: 25 TABLET ORAL at 20:49

## 2020-09-22 RX ADMIN — Medication 10000 UNITS: at 09:02

## 2020-09-22 RX ADMIN — ACETAMINOPHEN 975 MG: 325 TABLET ORAL at 02:42

## 2020-09-22 RX ADMIN — ALUMINUM HYDROXIDE, MAGNESIUM HYDROXIDE, AND SIMETHICONE 30 ML: 200; 200; 20 SUSPENSION ORAL at 11:37

## 2020-09-22 RX ADMIN — PANTOPRAZOLE SODIUM 40 MG: 40 TABLET, DELAYED RELEASE ORAL at 12:03

## 2020-09-22 RX ADMIN — MEMANTINE 5 MG: 5 TABLET ORAL at 17:18

## 2020-09-22 RX ADMIN — FLUTICASONE FUROATE AND VILANTEROL TRIFENATATE 1 PUFF: 100; 25 POWDER RESPIRATORY (INHALATION) at 08:59

## 2020-09-22 RX ADMIN — TIOTROPIUM BROMIDE 18 MCG: 18 CAPSULE ORAL; RESPIRATORY (INHALATION) at 08:59

## 2020-09-22 RX ADMIN — Medication 1 TABLET: at 09:02

## 2020-09-22 RX ADMIN — TAMSULOSIN HYDROCHLORIDE 0.4 MG: 0.4 CAPSULE ORAL at 09:02

## 2020-09-22 RX ADMIN — MEMANTINE 5 MG: 5 TABLET ORAL at 09:02

## 2020-09-22 RX ADMIN — CLOPIDOGREL BISULFATE 75 MG: 75 TABLET ORAL at 09:02

## 2020-09-22 NOTE — TELEPHONE ENCOUNTER
Patient of Dr Marika Long   Received call from wife in regard to any possible appointments  The patient is currently admitted to a skilled behavioral facility  His wife reported she will call back when and if it is appropriate to transfer him to an appointment  Please be aware    No further action needed,  Thank you

## 2020-09-23 LAB
ATRIAL RATE: 85 BPM
P AXIS: 71 DEGREES
PR INTERVAL: 200 MS
QRS AXIS: 69 DEGREES
QRSD INTERVAL: 78 MS
QT INTERVAL: 384 MS
QTC INTERVAL: 456 MS
T WAVE AXIS: 59 DEGREES
VENTRICULAR RATE: 85 BPM

## 2020-09-23 PROCEDURE — 97116 GAIT TRAINING THERAPY: CPT

## 2020-09-23 PROCEDURE — 99232 SBSQ HOSP IP/OBS MODERATE 35: CPT | Performed by: PSYCHIATRY & NEUROLOGY

## 2020-09-23 PROCEDURE — 97110 THERAPEUTIC EXERCISES: CPT

## 2020-09-23 PROCEDURE — 93010 ELECTROCARDIOGRAM REPORT: CPT | Performed by: INTERNAL MEDICINE

## 2020-09-23 RX ORDER — QUETIAPINE FUMARATE 25 MG/1
37.5 TABLET, FILM COATED ORAL
Status: DISCONTINUED | OUTPATIENT
Start: 2020-09-23 | End: 2020-09-24

## 2020-09-23 RX ADMIN — LORAZEPAM 0.5 MG: 0.5 TABLET ORAL at 23:51

## 2020-09-23 RX ADMIN — TAMSULOSIN HYDROCHLORIDE 0.4 MG: 0.4 CAPSULE ORAL at 08:54

## 2020-09-23 RX ADMIN — Medication 1 TABLET: at 08:53

## 2020-09-23 RX ADMIN — ACETAMINOPHEN 650 MG: 325 TABLET ORAL at 02:12

## 2020-09-23 RX ADMIN — TIOTROPIUM BROMIDE 18 MCG: 18 CAPSULE ORAL; RESPIRATORY (INHALATION) at 08:52

## 2020-09-23 RX ADMIN — CLOPIDOGREL BISULFATE 75 MG: 75 TABLET ORAL at 08:54

## 2020-09-23 RX ADMIN — PANTOPRAZOLE SODIUM 40 MG: 40 TABLET, DELAYED RELEASE ORAL at 05:58

## 2020-09-23 RX ADMIN — MEMANTINE 5 MG: 5 TABLET ORAL at 08:53

## 2020-09-23 RX ADMIN — ATORVASTATIN CALCIUM 40 MG: 40 TABLET, FILM COATED ORAL at 21:25

## 2020-09-23 RX ADMIN — QUETIAPINE FUMARATE 37.5 MG: 25 TABLET ORAL at 21:25

## 2020-09-23 RX ADMIN — ASPIRIN 81 MG 81 MG: 81 TABLET ORAL at 08:53

## 2020-09-23 RX ADMIN — Medication 10000 UNITS: at 08:53

## 2020-09-23 RX ADMIN — ATENOLOL 25 MG: 25 TABLET ORAL at 21:25

## 2020-09-23 RX ADMIN — QUETIAPINE FUMARATE 25 MG: 25 TABLET ORAL at 14:27

## 2020-09-23 RX ADMIN — MEMANTINE 5 MG: 5 TABLET ORAL at 17:36

## 2020-09-23 RX ADMIN — FLUTICASONE FUROATE AND VILANTEROL TRIFENATATE 1 PUFF: 100; 25 POWDER RESPIRATORY (INHALATION) at 08:51

## 2020-09-24 PROCEDURE — 99232 SBSQ HOSP IP/OBS MODERATE 35: CPT | Performed by: NURSE PRACTITIONER

## 2020-09-24 RX ORDER — QUETIAPINE FUMARATE 50 MG/1
50 TABLET, FILM COATED ORAL
Status: DISCONTINUED | OUTPATIENT
Start: 2020-09-24 | End: 2020-09-25

## 2020-09-24 RX ORDER — LANOLIN ALCOHOL/MO/W.PET/CERES
3 CREAM (GRAM) TOPICAL
Status: DISCONTINUED | OUTPATIENT
Start: 2020-09-24 | End: 2020-09-26

## 2020-09-24 RX ADMIN — TRAZODONE HYDROCHLORIDE 50 MG: 50 TABLET ORAL at 02:20

## 2020-09-24 RX ADMIN — TIOTROPIUM BROMIDE 18 MCG: 18 CAPSULE ORAL; RESPIRATORY (INHALATION) at 08:31

## 2020-09-24 RX ADMIN — Medication 1 TABLET: at 08:31

## 2020-09-24 RX ADMIN — ACETAMINOPHEN 650 MG: 325 TABLET ORAL at 10:09

## 2020-09-24 RX ADMIN — MEMANTINE 5 MG: 5 TABLET ORAL at 08:32

## 2020-09-24 RX ADMIN — TAMSULOSIN HYDROCHLORIDE 0.4 MG: 0.4 CAPSULE ORAL at 08:31

## 2020-09-24 RX ADMIN — RISPERIDONE 0.5 MG: 0.5 TABLET ORAL at 10:09

## 2020-09-24 RX ADMIN — OLANZAPINE 5 MG: 5 TABLET, FILM COATED ORAL at 23:58

## 2020-09-24 RX ADMIN — CLOPIDOGREL BISULFATE 75 MG: 75 TABLET ORAL at 08:32

## 2020-09-24 RX ADMIN — ATORVASTATIN CALCIUM 40 MG: 40 TABLET, FILM COATED ORAL at 21:01

## 2020-09-24 RX ADMIN — MELATONIN 3 MG: at 21:01

## 2020-09-24 RX ADMIN — PANTOPRAZOLE SODIUM 40 MG: 40 TABLET, DELAYED RELEASE ORAL at 05:59

## 2020-09-24 RX ADMIN — MEMANTINE 5 MG: 5 TABLET ORAL at 18:20

## 2020-09-24 RX ADMIN — QUETIAPINE 50 MG: 50 TABLET ORAL at 21:01

## 2020-09-24 RX ADMIN — OLANZAPINE 5 MG: 5 TABLET, FILM COATED ORAL at 01:08

## 2020-09-24 RX ADMIN — QUETIAPINE FUMARATE 25 MG: 25 TABLET ORAL at 14:30

## 2020-09-24 RX ADMIN — Medication 10000 UNITS: at 08:31

## 2020-09-24 RX ADMIN — ASPIRIN 81 MG 81 MG: 81 TABLET ORAL at 08:32

## 2020-09-24 RX ADMIN — FLUTICASONE FUROATE AND VILANTEROL TRIFENATATE 1 PUFF: 100; 25 POWDER RESPIRATORY (INHALATION) at 08:31

## 2020-09-25 PROCEDURE — 99232 SBSQ HOSP IP/OBS MODERATE 35: CPT | Performed by: HOSPITALIST

## 2020-09-25 RX ORDER — OLANZAPINE 2.5 MG/1
2.5 TABLET ORAL
Status: DISCONTINUED | OUTPATIENT
Start: 2020-09-25 | End: 2020-09-26

## 2020-09-25 RX ADMIN — TIOTROPIUM BROMIDE 18 MCG: 18 CAPSULE ORAL; RESPIRATORY (INHALATION) at 08:09

## 2020-09-25 RX ADMIN — MELATONIN 3 MG: at 21:21

## 2020-09-25 RX ADMIN — MEMANTINE 5 MG: 5 TABLET ORAL at 08:08

## 2020-09-25 RX ADMIN — ATORVASTATIN CALCIUM 40 MG: 40 TABLET, FILM COATED ORAL at 21:21

## 2020-09-25 RX ADMIN — MEMANTINE 5 MG: 5 TABLET ORAL at 16:31

## 2020-09-25 RX ADMIN — OLANZAPINE 2.5 MG: 2.5 TABLET, FILM COATED ORAL at 21:21

## 2020-09-25 RX ADMIN — FLUTICASONE FUROATE AND VILANTEROL TRIFENATATE 1 PUFF: 100; 25 POWDER RESPIRATORY (INHALATION) at 08:07

## 2020-09-25 RX ADMIN — PANTOPRAZOLE SODIUM 40 MG: 40 TABLET, DELAYED RELEASE ORAL at 05:57

## 2020-09-25 RX ADMIN — CLOPIDOGREL BISULFATE 75 MG: 75 TABLET ORAL at 08:08

## 2020-09-25 RX ADMIN — TAMSULOSIN HYDROCHLORIDE 0.4 MG: 0.4 CAPSULE ORAL at 08:08

## 2020-09-25 RX ADMIN — OLANZAPINE 5 MG: 5 TABLET, FILM COATED ORAL at 16:58

## 2020-09-25 RX ADMIN — ASPIRIN 81 MG 81 MG: 81 TABLET ORAL at 08:08

## 2020-09-25 RX ADMIN — Medication 1 TABLET: at 08:12

## 2020-09-25 RX ADMIN — Medication 10000 UNITS: at 08:08

## 2020-09-26 LAB
BILIRUB UR QL STRIP: NEGATIVE
CLARITY UR: CLEAR
COLOR UR: YELLOW
GLUCOSE UR STRIP-MCNC: NEGATIVE MG/DL
HGB UR QL STRIP.AUTO: NEGATIVE
KETONES UR STRIP-MCNC: NEGATIVE MG/DL
LEUKOCYTE ESTERASE UR QL STRIP: NEGATIVE
NITRITE UR QL STRIP: NEGATIVE
PH UR STRIP.AUTO: 6 [PH]
PROT UR STRIP-MCNC: NEGATIVE MG/DL
SP GR UR STRIP.AUTO: 1.02 (ref 1–1.03)
UROBILINOGEN UR QL STRIP.AUTO: 0.2 E.U./DL

## 2020-09-26 PROCEDURE — 99232 SBSQ HOSP IP/OBS MODERATE 35: CPT | Performed by: NURSE PRACTITIONER

## 2020-09-26 PROCEDURE — 81003 URINALYSIS AUTO W/O SCOPE: CPT | Performed by: INTERNAL MEDICINE

## 2020-09-26 RX ORDER — SORBITOL SOLUTION 70 %
30 SOLUTION, ORAL MISCELLANEOUS EVERY 2 HOUR PRN
Status: DISCONTINUED | OUTPATIENT
Start: 2020-09-26 | End: 2020-09-28

## 2020-09-26 RX ORDER — HALOPERIDOL 5 MG/ML
2 INJECTION INTRAMUSCULAR EVERY 6 HOURS PRN
Status: DISCONTINUED | OUTPATIENT
Start: 2020-09-26 | End: 2020-09-29 | Stop reason: HOSPADM

## 2020-09-26 RX ORDER — QUETIAPINE FUMARATE 50 MG/1
50 TABLET, FILM COATED ORAL
Status: DISCONTINUED | OUTPATIENT
Start: 2020-09-26 | End: 2020-09-26

## 2020-09-26 RX ORDER — LORAZEPAM 2 MG/ML
1 INJECTION INTRAMUSCULAR ONCE
Status: DISCONTINUED | OUTPATIENT
Start: 2020-09-26 | End: 2020-09-26

## 2020-09-26 RX ORDER — MEMANTINE HYDROCHLORIDE 5 MG/1
10 TABLET ORAL 2 TIMES DAILY
Status: DISCONTINUED | OUTPATIENT
Start: 2020-09-26 | End: 2020-09-29 | Stop reason: HOSPADM

## 2020-09-26 RX ORDER — LANOLIN ALCOHOL/MO/W.PET/CERES
6 CREAM (GRAM) TOPICAL
Status: DISCONTINUED | OUTPATIENT
Start: 2020-09-26 | End: 2020-09-29 | Stop reason: HOSPADM

## 2020-09-26 RX ORDER — LORAZEPAM 0.5 MG/1
0.5 TABLET ORAL EVERY 4 HOURS PRN
Status: DISCONTINUED | OUTPATIENT
Start: 2020-09-26 | End: 2020-09-29 | Stop reason: HOSPADM

## 2020-09-26 RX ORDER — QUETIAPINE FUMARATE 25 MG/1
25 TABLET, FILM COATED ORAL 2 TIMES DAILY
Status: DISCONTINUED | OUTPATIENT
Start: 2020-09-26 | End: 2020-09-29 | Stop reason: HOSPADM

## 2020-09-26 RX ORDER — HALOPERIDOL 0.5 MG/1
1 TABLET ORAL EVERY 6 HOURS PRN
Status: DISCONTINUED | OUTPATIENT
Start: 2020-09-26 | End: 2020-09-29 | Stop reason: HOSPADM

## 2020-09-26 RX ORDER — LORAZEPAM 2 MG/ML
1 INJECTION INTRAMUSCULAR EVERY 6 HOURS PRN
Status: DISCONTINUED | OUTPATIENT
Start: 2020-09-26 | End: 2020-09-29 | Stop reason: HOSPADM

## 2020-09-26 RX ORDER — LORAZEPAM 2 MG/ML
1 INJECTION INTRAMUSCULAR ONCE
Status: COMPLETED | OUTPATIENT
Start: 2020-09-26 | End: 2020-09-26

## 2020-09-26 RX ADMIN — CLOPIDOGREL BISULFATE 75 MG: 75 TABLET ORAL at 08:18

## 2020-09-26 RX ADMIN — Medication 10000 UNITS: at 08:19

## 2020-09-26 RX ADMIN — LORAZEPAM 1 MG: 2 INJECTION INTRAMUSCULAR; INTRAVENOUS at 10:26

## 2020-09-26 RX ADMIN — FLUTICASONE FUROATE AND VILANTEROL TRIFENATATE 1 PUFF: 100; 25 POWDER RESPIRATORY (INHALATION) at 08:19

## 2020-09-26 RX ADMIN — MELATONIN 6 MG: at 21:20

## 2020-09-26 RX ADMIN — ACETAMINOPHEN 650 MG: 325 TABLET ORAL at 04:53

## 2020-09-26 RX ADMIN — MEMANTINE 10 MG: 5 TABLET ORAL at 16:26

## 2020-09-26 RX ADMIN — MEMANTINE 5 MG: 5 TABLET ORAL at 08:19

## 2020-09-26 RX ADMIN — ACETAMINOPHEN 650 MG: 325 TABLET ORAL at 09:06

## 2020-09-26 RX ADMIN — HYDROXYZINE HYDROCHLORIDE 25 MG: 25 TABLET, FILM COATED ORAL at 08:34

## 2020-09-26 RX ADMIN — ATORVASTATIN CALCIUM 40 MG: 40 TABLET, FILM COATED ORAL at 21:21

## 2020-09-26 RX ADMIN — Medication 1 TABLET: at 08:18

## 2020-09-26 RX ADMIN — QUETIAPINE FUMARATE 25 MG: 25 TABLET ORAL at 14:38

## 2020-09-26 RX ADMIN — TAMSULOSIN HYDROCHLORIDE 0.4 MG: 0.4 CAPSULE ORAL at 08:19

## 2020-09-26 RX ADMIN — PANTOPRAZOLE SODIUM 40 MG: 40 TABLET, DELAYED RELEASE ORAL at 04:53

## 2020-09-26 RX ADMIN — BISACODYL 10 MG: 10 SUPPOSITORY RECTAL at 11:11

## 2020-09-26 RX ADMIN — QUETIAPINE 75 MG: 50 TABLET ORAL at 21:21

## 2020-09-26 RX ADMIN — SORBITOL SOLUTION (BULK) 30 ML: 70 SOLUTION at 21:21

## 2020-09-26 RX ADMIN — TIOTROPIUM BROMIDE 18 MCG: 18 CAPSULE ORAL; RESPIRATORY (INHALATION) at 08:19

## 2020-09-26 RX ADMIN — ASPIRIN 81 MG 81 MG: 81 TABLET ORAL at 08:19

## 2020-09-27 LAB
ALBUMIN SERPL BCP-MCNC: 4 G/DL (ref 3.5–5.7)
ALP SERPL-CCNC: 79 U/L (ref 55–165)
ALT SERPL W P-5'-P-CCNC: 15 U/L (ref 7–52)
ANION GAP SERPL CALCULATED.3IONS-SCNC: 9 MMOL/L (ref 4–13)
AST SERPL W P-5'-P-CCNC: 21 U/L (ref 13–39)
BASOPHILS # BLD AUTO: 0.1 THOUSANDS/ΜL (ref 0–0.1)
BASOPHILS NFR BLD AUTO: 1 % (ref 0–2)
BILIRUB SERPL-MCNC: 2.2 MG/DL (ref 0.2–1)
BUN SERPL-MCNC: 25 MG/DL (ref 7–25)
CALCIUM SERPL-MCNC: 9.3 MG/DL (ref 8.6–10.5)
CHLORIDE SERPL-SCNC: 110 MMOL/L (ref 98–107)
CO2 SERPL-SCNC: 26 MMOL/L (ref 21–31)
CREAT SERPL-MCNC: 1.09 MG/DL (ref 0.7–1.3)
EOSINOPHIL # BLD AUTO: 0.1 THOUSAND/ΜL (ref 0–0.61)
EOSINOPHIL NFR BLD AUTO: 1 % (ref 0–5)
ERYTHROCYTE [DISTWIDTH] IN BLOOD BY AUTOMATED COUNT: 17.5 % (ref 11.5–14.5)
GFR SERPL CREATININE-BSD FRML MDRD: 65 ML/MIN/1.73SQ M
GLUCOSE SERPL-MCNC: 169 MG/DL (ref 65–99)
HCT VFR BLD AUTO: 45.2 % (ref 42–47)
HGB BLD-MCNC: 15.1 G/DL (ref 14–18)
LYMPHOCYTES # BLD AUTO: 1 THOUSANDS/ΜL (ref 0.6–4.47)
LYMPHOCYTES NFR BLD AUTO: 13 % (ref 21–51)
MCH RBC QN AUTO: 31.9 PG (ref 26–34)
MCHC RBC AUTO-ENTMCNC: 33.3 G/DL (ref 31–37)
MCV RBC AUTO: 96 FL (ref 81–99)
MONOCYTES # BLD AUTO: 0.5 THOUSAND/ΜL (ref 0.17–1.22)
MONOCYTES NFR BLD AUTO: 7 % (ref 2–12)
NEUTROPHILS # BLD AUTO: 6.3 THOUSANDS/ΜL (ref 1.4–6.5)
NEUTS SEG NFR BLD AUTO: 79 % (ref 42–75)
PLATELET # BLD AUTO: 112 THOUSANDS/UL (ref 149–390)
PMV BLD AUTO: 8.3 FL (ref 8.6–11.7)
POTASSIUM SERPL-SCNC: 3.6 MMOL/L (ref 3.5–5.5)
PROT SERPL-MCNC: 6.2 G/DL (ref 6.4–8.9)
RBC # BLD AUTO: 4.73 MILLION/UL (ref 4.3–5.9)
SODIUM SERPL-SCNC: 145 MMOL/L (ref 134–143)
WBC # BLD AUTO: 8 THOUSAND/UL (ref 4.8–10.8)

## 2020-09-27 PROCEDURE — 99232 SBSQ HOSP IP/OBS MODERATE 35: CPT | Performed by: NURSE PRACTITIONER

## 2020-09-27 PROCEDURE — 80053 COMPREHEN METABOLIC PANEL: CPT | Performed by: NURSE PRACTITIONER

## 2020-09-27 PROCEDURE — 85025 COMPLETE CBC W/AUTO DIFF WBC: CPT | Performed by: NURSE PRACTITIONER

## 2020-09-27 RX ADMIN — ATENOLOL 25 MG: 25 TABLET ORAL at 21:06

## 2020-09-27 RX ADMIN — LORAZEPAM 0.5 MG: 0.5 TABLET ORAL at 08:31

## 2020-09-27 RX ADMIN — QUETIAPINE FUMARATE 25 MG: 25 TABLET ORAL at 13:24

## 2020-09-27 RX ADMIN — QUETIAPINE 75 MG: 50 TABLET ORAL at 21:06

## 2020-09-27 RX ADMIN — MELATONIN 6 MG: at 21:06

## 2020-09-27 RX ADMIN — PANTOPRAZOLE SODIUM 40 MG: 40 TABLET, DELAYED RELEASE ORAL at 06:26

## 2020-09-27 RX ADMIN — MEMANTINE 10 MG: 5 TABLET ORAL at 16:16

## 2020-09-27 RX ADMIN — ACETAMINOPHEN 975 MG: 325 TABLET ORAL at 08:28

## 2020-09-27 RX ADMIN — ACETAMINOPHEN 975 MG: 325 TABLET ORAL at 16:15

## 2020-09-27 RX ADMIN — ATORVASTATIN CALCIUM 40 MG: 40 TABLET, FILM COATED ORAL at 21:06

## 2020-09-28 PROCEDURE — 99232 SBSQ HOSP IP/OBS MODERATE 35: CPT | Performed by: NURSE PRACTITIONER

## 2020-09-28 PROCEDURE — 97530 THERAPEUTIC ACTIVITIES: CPT

## 2020-09-28 RX ADMIN — SILVER SULFADIAZINE 1 APPLICATION: 10 CREAM TOPICAL at 17:24

## 2020-09-28 RX ADMIN — FLUTICASONE FUROATE AND VILANTEROL TRIFENATATE 1 PUFF: 100; 25 POWDER RESPIRATORY (INHALATION) at 08:33

## 2020-09-28 RX ADMIN — CLOPIDOGREL BISULFATE 75 MG: 75 TABLET ORAL at 08:32

## 2020-09-28 RX ADMIN — TIOTROPIUM BROMIDE 18 MCG: 18 CAPSULE ORAL; RESPIRATORY (INHALATION) at 08:33

## 2020-09-28 RX ADMIN — ATORVASTATIN CALCIUM 40 MG: 40 TABLET, FILM COATED ORAL at 21:09

## 2020-09-28 RX ADMIN — Medication 1 TABLET: at 08:33

## 2020-09-28 RX ADMIN — QUETIAPINE 75 MG: 50 TABLET ORAL at 21:09

## 2020-09-28 RX ADMIN — LORAZEPAM 0.5 MG: 0.5 TABLET ORAL at 10:00

## 2020-09-28 RX ADMIN — MELATONIN 6 MG: at 21:09

## 2020-09-28 RX ADMIN — LORAZEPAM 0.5 MG: 0.5 TABLET ORAL at 03:24

## 2020-09-28 RX ADMIN — MEMANTINE 10 MG: 5 TABLET ORAL at 08:32

## 2020-09-28 RX ADMIN — PANTOPRAZOLE SODIUM 40 MG: 40 TABLET, DELAYED RELEASE ORAL at 06:17

## 2020-09-28 RX ADMIN — QUETIAPINE FUMARATE 25 MG: 25 TABLET ORAL at 08:33

## 2020-09-28 RX ADMIN — ASPIRIN 81 MG 81 MG: 81 TABLET ORAL at 08:33

## 2020-09-28 RX ADMIN — Medication 10000 UNITS: at 08:33

## 2020-09-28 RX ADMIN — TAMSULOSIN HYDROCHLORIDE 0.4 MG: 0.4 CAPSULE ORAL at 08:32

## 2020-09-28 RX ADMIN — MEMANTINE 10 MG: 5 TABLET ORAL at 17:24

## 2020-09-28 RX ADMIN — QUETIAPINE FUMARATE 25 MG: 25 TABLET ORAL at 14:14

## 2020-09-28 RX ADMIN — ATENOLOL 25 MG: 25 TABLET ORAL at 21:09

## 2020-09-29 ENCOUNTER — HOSPITAL ENCOUNTER (OUTPATIENT)
Facility: HOSPITAL | Age: 80
Setting detail: OBSERVATION
LOS: 1 days | End: 2020-09-30
Attending: FAMILY MEDICINE | Admitting: FAMILY MEDICINE
Payer: MEDICARE

## 2020-09-29 ENCOUNTER — APPOINTMENT (INPATIENT)
Dept: RADIOLOGY | Facility: HOSPITAL | Age: 80
DRG: 885 | End: 2020-09-29
Payer: MEDICARE

## 2020-09-29 VITALS
HEART RATE: 80 BPM | HEIGHT: 68 IN | SYSTOLIC BLOOD PRESSURE: 91 MMHG | BODY MASS INDEX: 24.4 KG/M2 | TEMPERATURE: 96.2 F | DIASTOLIC BLOOD PRESSURE: 54 MMHG | OXYGEN SATURATION: 93 % | WEIGHT: 161 LBS | RESPIRATION RATE: 16 BRPM

## 2020-09-29 DIAGNOSIS — J44.9 CHRONIC OBSTRUCTIVE PULMONARY DISEASE, UNSPECIFIED COPD TYPE (HCC): ICD-10-CM

## 2020-09-29 DIAGNOSIS — F03.90 DEMENTIA WITHOUT BEHAVIORAL DISTURBANCE, UNSPECIFIED DEMENTIA TYPE (HCC): Primary | ICD-10-CM

## 2020-09-29 DIAGNOSIS — J96.11 CHRONIC RESPIRATORY FAILURE WITH HYPOXIA (HCC): ICD-10-CM

## 2020-09-29 DIAGNOSIS — F02.80 DEMENTIA OF THE ALZHEIMER'S TYPE, WITH LATE ONSET, WITH DELIRIUM (HCC): ICD-10-CM

## 2020-09-29 DIAGNOSIS — F05 DEMENTIA OF THE ALZHEIMER'S TYPE, WITH LATE ONSET, WITH DELIRIUM (HCC): ICD-10-CM

## 2020-09-29 DIAGNOSIS — G30.1 DEMENTIA OF THE ALZHEIMER'S TYPE, WITH LATE ONSET, WITH DELIRIUM (HCC): ICD-10-CM

## 2020-09-29 DIAGNOSIS — F03.91 PSYCHOSIS IN ELDERLY WITH BEHAVIORAL DISTURBANCE (HCC): ICD-10-CM

## 2020-09-29 DIAGNOSIS — E78.00 HYPERCHOLESTEROLEMIA: ICD-10-CM

## 2020-09-29 LAB
ALBUMIN SERPL BCP-MCNC: 3.6 G/DL (ref 3.5–5.7)
ALP SERPL-CCNC: 97 U/L (ref 55–165)
ALT SERPL W P-5'-P-CCNC: 37 U/L (ref 7–52)
ANION GAP SERPL CALCULATED.3IONS-SCNC: 10 MMOL/L (ref 4–13)
AST SERPL W P-5'-P-CCNC: 64 U/L (ref 13–39)
BACTERIA UR QL AUTO: ABNORMAL /HPF
BASOPHILS # BLD AUTO: 0 THOUSANDS/ΜL (ref 0–0.1)
BASOPHILS NFR BLD AUTO: 1 % (ref 0–2)
BILIRUB SERPL-MCNC: 1.8 MG/DL (ref 0.2–1)
BILIRUB UR QL STRIP: NEGATIVE
BUN SERPL-MCNC: 28 MG/DL (ref 7–25)
CALCIUM SERPL-MCNC: 8.9 MG/DL (ref 8.6–10.5)
CHLORIDE SERPL-SCNC: 113 MMOL/L (ref 98–107)
CLARITY UR: ABNORMAL
CO2 SERPL-SCNC: 24 MMOL/L (ref 21–31)
COLOR UR: YELLOW
CREAT SERPL-MCNC: 1.23 MG/DL (ref 0.7–1.3)
EOSINOPHIL # BLD AUTO: 0.1 THOUSAND/ΜL (ref 0–0.61)
EOSINOPHIL NFR BLD AUTO: 1 % (ref 0–5)
ERYTHROCYTE [DISTWIDTH] IN BLOOD BY AUTOMATED COUNT: 17.4 % (ref 11.5–14.5)
GFR SERPL CREATININE-BSD FRML MDRD: 56 ML/MIN/1.73SQ M
GLUCOSE P FAST SERPL-MCNC: 140 MG/DL (ref 65–99)
GLUCOSE SERPL-MCNC: 140 MG/DL (ref 65–99)
GLUCOSE SERPL-MCNC: 146 MG/DL (ref 65–140)
GLUCOSE UR STRIP-MCNC: NEGATIVE MG/DL
HCT VFR BLD AUTO: 41 % (ref 42–47)
HGB BLD-MCNC: 14 G/DL (ref 14–18)
HGB UR QL STRIP.AUTO: ABNORMAL
KETONES UR STRIP-MCNC: ABNORMAL MG/DL
LEUKOCYTE ESTERASE UR QL STRIP: ABNORMAL
LYMPHOCYTES # BLD AUTO: 0.9 THOUSANDS/ΜL (ref 0.6–4.47)
LYMPHOCYTES NFR BLD AUTO: 11 % (ref 21–51)
MCH RBC QN AUTO: 32.3 PG (ref 26–34)
MCHC RBC AUTO-ENTMCNC: 34 G/DL (ref 31–37)
MCV RBC AUTO: 95 FL (ref 81–99)
MONOCYTES # BLD AUTO: 0.8 THOUSAND/ΜL (ref 0.17–1.22)
MONOCYTES NFR BLD AUTO: 9 % (ref 2–12)
NEUTROPHILS # BLD AUTO: 6.4 THOUSANDS/ΜL (ref 1.4–6.5)
NEUTS SEG NFR BLD AUTO: 79 % (ref 42–75)
NITRITE UR QL STRIP: NEGATIVE
NON-SQ EPI CELLS URNS QL MICRO: ABNORMAL /HPF
PH UR STRIP.AUTO: 6 [PH]
PLATELET # BLD AUTO: 113 THOUSANDS/UL (ref 149–390)
PMV BLD AUTO: 8.4 FL (ref 8.6–11.7)
POTASSIUM SERPL-SCNC: 3.6 MMOL/L (ref 3.5–5.5)
PROT SERPL-MCNC: 6 G/DL (ref 6.4–8.9)
PROT UR STRIP-MCNC: ABNORMAL MG/DL
RBC # BLD AUTO: 4.32 MILLION/UL (ref 4.3–5.9)
RBC #/AREA URNS AUTO: ABNORMAL /HPF
SODIUM SERPL-SCNC: 147 MMOL/L (ref 134–143)
SP GR UR STRIP.AUTO: 1.02 (ref 1–1.03)
UROBILINOGEN UR QL STRIP.AUTO: 0.2 E.U./DL
WBC # BLD AUTO: 8.2 THOUSAND/UL (ref 4.8–10.8)
WBC #/AREA URNS AUTO: ABNORMAL /HPF

## 2020-09-29 PROCEDURE — 99238 HOSP IP/OBS DSCHRG MGMT 30/<: CPT | Performed by: NURSE PRACTITIONER

## 2020-09-29 PROCEDURE — 81001 URINALYSIS AUTO W/SCOPE: CPT | Performed by: PHYSICIAN ASSISTANT

## 2020-09-29 PROCEDURE — 87086 URINE CULTURE/COLONY COUNT: CPT | Performed by: PHYSICIAN ASSISTANT

## 2020-09-29 PROCEDURE — 85025 COMPLETE CBC W/AUTO DIFF WBC: CPT | Performed by: PHYSICIAN ASSISTANT

## 2020-09-29 PROCEDURE — G0379 DIRECT REFER HOSPITAL OBSERV: HCPCS

## 2020-09-29 PROCEDURE — 87040 BLOOD CULTURE FOR BACTERIA: CPT | Performed by: PHYSICIAN ASSISTANT

## 2020-09-29 PROCEDURE — 80053 COMPREHEN METABOLIC PANEL: CPT | Performed by: PHYSICIAN ASSISTANT

## 2020-09-29 PROCEDURE — 71045 X-RAY EXAM CHEST 1 VIEW: CPT

## 2020-09-29 PROCEDURE — 87186 SC STD MICRODIL/AGAR DIL: CPT | Performed by: PHYSICIAN ASSISTANT

## 2020-09-29 PROCEDURE — 82948 REAGENT STRIP/BLOOD GLUCOSE: CPT

## 2020-09-29 RX ORDER — TRAZODONE HYDROCHLORIDE 50 MG/1
50 TABLET ORAL
Status: CANCELLED | OUTPATIENT
Start: 2020-09-29

## 2020-09-29 RX ORDER — HYDROXYZINE HYDROCHLORIDE 25 MG/1
25 TABLET, FILM COATED ORAL EVERY 6 HOURS PRN
Status: CANCELLED | OUTPATIENT
Start: 2020-09-29

## 2020-09-29 RX ORDER — CLOPIDOGREL BISULFATE 75 MG/1
75 TABLET ORAL DAILY
Status: CANCELLED | OUTPATIENT
Start: 2020-09-30

## 2020-09-29 RX ORDER — ALBUTEROL SULFATE 90 UG/1
2 AEROSOL, METERED RESPIRATORY (INHALATION) EVERY 6 HOURS PRN
Status: CANCELLED | OUTPATIENT
Start: 2020-09-29

## 2020-09-29 RX ORDER — ALBUTEROL SULFATE 90 UG/1
2 AEROSOL, METERED RESPIRATORY (INHALATION) EVERY 6 HOURS PRN
Status: DISCONTINUED | OUTPATIENT
Start: 2020-09-29 | End: 2020-09-30 | Stop reason: HOSPADM

## 2020-09-29 RX ORDER — LANOLIN ALCOHOL/MO/W.PET/CERES
6 CREAM (GRAM) TOPICAL
Status: CANCELLED | OUTPATIENT
Start: 2020-09-29

## 2020-09-29 RX ORDER — MAGNESIUM HYDROXIDE/ALUMINUM HYDROXICE/SIMETHICONE 120; 1200; 1200 MG/30ML; MG/30ML; MG/30ML
30 SUSPENSION ORAL EVERY 4 HOURS PRN
Status: DISCONTINUED | OUTPATIENT
Start: 2020-09-29 | End: 2020-09-30 | Stop reason: HOSPADM

## 2020-09-29 RX ORDER — PANTOPRAZOLE SODIUM 40 MG/1
40 TABLET, DELAYED RELEASE ORAL
Status: DISCONTINUED | OUTPATIENT
Start: 2020-09-30 | End: 2020-09-30 | Stop reason: HOSPADM

## 2020-09-29 RX ORDER — QUETIAPINE FUMARATE 25 MG/1
25 TABLET, FILM COATED ORAL 2 TIMES DAILY
Status: CANCELLED | OUTPATIENT
Start: 2020-09-29

## 2020-09-29 RX ORDER — ACETAMINOPHEN 325 MG/1
650 TABLET ORAL EVERY 6 HOURS PRN
Status: DISCONTINUED | OUTPATIENT
Start: 2020-09-29 | End: 2020-09-30 | Stop reason: HOSPADM

## 2020-09-29 RX ORDER — PANTOPRAZOLE SODIUM 40 MG/1
40 TABLET, DELAYED RELEASE ORAL
Status: CANCELLED | OUTPATIENT
Start: 2020-09-30

## 2020-09-29 RX ORDER — LIDOCAINE HYDROCHLORIDE 20 MG/ML
15 SOLUTION OROPHARYNGEAL 2 TIMES DAILY PRN
Status: CANCELLED | OUTPATIENT
Start: 2020-09-29

## 2020-09-29 RX ORDER — ACETAMINOPHEN 325 MG/1
975 TABLET ORAL EVERY 6 HOURS PRN
Status: DISCONTINUED | OUTPATIENT
Start: 2020-09-29 | End: 2020-09-30 | Stop reason: HOSPADM

## 2020-09-29 RX ORDER — HYDROXYZINE HYDROCHLORIDE 10 MG/1
25 TABLET, FILM COATED ORAL EVERY 6 HOURS PRN
Status: DISCONTINUED | OUTPATIENT
Start: 2020-09-29 | End: 2020-09-30 | Stop reason: HOSPADM

## 2020-09-29 RX ORDER — QUETIAPINE FUMARATE 25 MG/1
25 TABLET, FILM COATED ORAL 2 TIMES DAILY
Status: DISCONTINUED | OUTPATIENT
Start: 2020-09-29 | End: 2020-09-30 | Stop reason: HOSPADM

## 2020-09-29 RX ORDER — ATENOLOL 25 MG/1
25 TABLET ORAL
Status: CANCELLED | OUTPATIENT
Start: 2020-09-29

## 2020-09-29 RX ORDER — LORAZEPAM 0.5 MG/1
0.5 TABLET ORAL EVERY 4 HOURS PRN
Status: DISCONTINUED | OUTPATIENT
Start: 2020-09-29 | End: 2020-09-30 | Stop reason: HOSPADM

## 2020-09-29 RX ORDER — MAGNESIUM HYDROXIDE/ALUMINUM HYDROXICE/SIMETHICONE 120; 1200; 1200 MG/30ML; MG/30ML; MG/30ML
30 SUSPENSION ORAL EVERY 4 HOURS PRN
Status: CANCELLED | OUTPATIENT
Start: 2020-09-29

## 2020-09-29 RX ORDER — CLOPIDOGREL BISULFATE 75 MG/1
75 TABLET ORAL DAILY
Status: DISCONTINUED | OUTPATIENT
Start: 2020-09-30 | End: 2020-09-30 | Stop reason: HOSPADM

## 2020-09-29 RX ORDER — ACETAMINOPHEN 325 MG/1
650 TABLET ORAL EVERY 6 HOURS PRN
Status: CANCELLED | OUTPATIENT
Start: 2020-09-29

## 2020-09-29 RX ORDER — ATORVASTATIN CALCIUM 40 MG/1
40 TABLET, FILM COATED ORAL
Status: CANCELLED | OUTPATIENT
Start: 2020-09-29

## 2020-09-29 RX ORDER — ATENOLOL 25 MG/1
25 TABLET ORAL
Status: DISCONTINUED | OUTPATIENT
Start: 2020-09-29 | End: 2020-09-30 | Stop reason: HOSPADM

## 2020-09-29 RX ORDER — NITROGLYCERIN 0.4 MG/1
0.4 TABLET SUBLINGUAL
Status: CANCELLED | OUTPATIENT
Start: 2020-09-29

## 2020-09-29 RX ORDER — POTASSIUM CHLORIDE AND SODIUM CHLORIDE 450; 150 MG/100ML; MG/100ML
75 INJECTION, SOLUTION INTRAVENOUS CONTINUOUS
Status: DISCONTINUED | OUTPATIENT
Start: 2020-09-29 | End: 2020-09-30

## 2020-09-29 RX ORDER — LORAZEPAM 2 MG/ML
1 INJECTION INTRAMUSCULAR EVERY 6 HOURS PRN
Status: DISCONTINUED | OUTPATIENT
Start: 2020-09-29 | End: 2020-09-30 | Stop reason: HOSPADM

## 2020-09-29 RX ORDER — HALOPERIDOL 0.5 MG/1
1 TABLET ORAL EVERY 6 HOURS PRN
Status: CANCELLED | OUTPATIENT
Start: 2020-09-29

## 2020-09-29 RX ORDER — ACETAMINOPHEN 325 MG/1
650 TABLET ORAL EVERY 4 HOURS PRN
Status: DISCONTINUED | OUTPATIENT
Start: 2020-09-29 | End: 2020-09-30 | Stop reason: HOSPADM

## 2020-09-29 RX ORDER — ACETAMINOPHEN 325 MG/1
975 TABLET ORAL EVERY 6 HOURS PRN
Status: CANCELLED | OUTPATIENT
Start: 2020-09-29

## 2020-09-29 RX ORDER — ASPIRIN 81 MG/1
81 TABLET, CHEWABLE ORAL DAILY
Status: CANCELLED | OUTPATIENT
Start: 2020-09-30

## 2020-09-29 RX ORDER — LIDOCAINE HYDROCHLORIDE 20 MG/ML
15 SOLUTION OROPHARYNGEAL 2 TIMES DAILY PRN
Status: DISCONTINUED | OUTPATIENT
Start: 2020-09-29 | End: 2020-09-30 | Stop reason: HOSPADM

## 2020-09-29 RX ORDER — NITROGLYCERIN 0.4 MG/1
0.4 TABLET SUBLINGUAL
Status: DISCONTINUED | OUTPATIENT
Start: 2020-09-29 | End: 2020-09-30 | Stop reason: HOSPADM

## 2020-09-29 RX ORDER — FLUTICASONE FUROATE AND VILANTEROL 100; 25 UG/1; UG/1
1 POWDER RESPIRATORY (INHALATION)
Status: DISCONTINUED | OUTPATIENT
Start: 2020-09-30 | End: 2020-09-30 | Stop reason: HOSPADM

## 2020-09-29 RX ORDER — HALOPERIDOL 5 MG/ML
2 INJECTION INTRAMUSCULAR EVERY 6 HOURS PRN
Status: CANCELLED | OUTPATIENT
Start: 2020-09-29

## 2020-09-29 RX ORDER — ACETAMINOPHEN 325 MG/1
650 TABLET ORAL EVERY 4 HOURS PRN
Status: CANCELLED | OUTPATIENT
Start: 2020-09-29

## 2020-09-29 RX ORDER — TAMSULOSIN HYDROCHLORIDE 0.4 MG/1
0.4 CAPSULE ORAL DAILY
Status: DISCONTINUED | OUTPATIENT
Start: 2020-09-29 | End: 2020-09-30 | Stop reason: HOSPADM

## 2020-09-29 RX ORDER — LORAZEPAM 0.5 MG/1
0.5 TABLET ORAL EVERY 4 HOURS PRN
Status: CANCELLED | OUTPATIENT
Start: 2020-09-29

## 2020-09-29 RX ORDER — BISACODYL 10 MG
10 SUPPOSITORY, RECTAL RECTAL DAILY PRN
Status: CANCELLED | OUTPATIENT
Start: 2020-09-29

## 2020-09-29 RX ORDER — BISACODYL 10 MG
10 SUPPOSITORY, RECTAL RECTAL DAILY PRN
Status: DISCONTINUED | OUTPATIENT
Start: 2020-09-29 | End: 2020-09-30 | Stop reason: HOSPADM

## 2020-09-29 RX ORDER — MEMANTINE HYDROCHLORIDE 5 MG/1
10 TABLET ORAL 2 TIMES DAILY
Status: DISCONTINUED | OUTPATIENT
Start: 2020-09-29 | End: 2020-09-30 | Stop reason: HOSPADM

## 2020-09-29 RX ORDER — HALOPERIDOL 0.5 MG/1
1 TABLET ORAL EVERY 6 HOURS PRN
Status: DISCONTINUED | OUTPATIENT
Start: 2020-09-29 | End: 2020-09-30 | Stop reason: HOSPADM

## 2020-09-29 RX ORDER — MEMANTINE HYDROCHLORIDE 5 MG/1
10 TABLET ORAL 2 TIMES DAILY
Status: CANCELLED | OUTPATIENT
Start: 2020-09-29

## 2020-09-29 RX ORDER — TAMSULOSIN HYDROCHLORIDE 0.4 MG/1
0.4 CAPSULE ORAL DAILY
Status: CANCELLED | OUTPATIENT
Start: 2020-09-30

## 2020-09-29 RX ORDER — ASPIRIN 81 MG/1
81 TABLET, CHEWABLE ORAL DAILY
Status: DISCONTINUED | OUTPATIENT
Start: 2020-09-30 | End: 2020-09-30 | Stop reason: HOSPADM

## 2020-09-29 RX ORDER — HALOPERIDOL 5 MG/ML
2 INJECTION INTRAMUSCULAR EVERY 6 HOURS PRN
Status: DISCONTINUED | OUTPATIENT
Start: 2020-09-29 | End: 2020-09-30 | Stop reason: HOSPADM

## 2020-09-29 RX ORDER — ATORVASTATIN CALCIUM 40 MG/1
40 TABLET, FILM COATED ORAL
Status: DISCONTINUED | OUTPATIENT
Start: 2020-09-29 | End: 2020-09-30 | Stop reason: HOSPADM

## 2020-09-29 RX ORDER — LANOLIN ALCOHOL/MO/W.PET/CERES
6 CREAM (GRAM) TOPICAL
Status: DISCONTINUED | OUTPATIENT
Start: 2020-09-29 | End: 2020-09-30 | Stop reason: HOSPADM

## 2020-09-29 RX ORDER — LORAZEPAM 2 MG/ML
1 INJECTION INTRAMUSCULAR EVERY 6 HOURS PRN
Status: CANCELLED | OUTPATIENT
Start: 2020-09-29

## 2020-09-29 RX ORDER — TRAZODONE HYDROCHLORIDE 50 MG/1
50 TABLET ORAL
Status: DISCONTINUED | OUTPATIENT
Start: 2020-09-29 | End: 2020-09-30 | Stop reason: HOSPADM

## 2020-09-29 RX ORDER — FLUTICASONE FUROATE AND VILANTEROL 100; 25 UG/1; UG/1
1 POWDER RESPIRATORY (INHALATION)
Status: CANCELLED | OUTPATIENT
Start: 2020-09-30

## 2020-09-29 RX ADMIN — ATORVASTATIN CALCIUM 40 MG: 40 TABLET, FILM COATED ORAL at 22:11

## 2020-09-29 RX ADMIN — TAMSULOSIN HYDROCHLORIDE 0.4 MG: 0.4 CAPSULE ORAL at 17:21

## 2020-09-29 RX ADMIN — PANTOPRAZOLE SODIUM 40 MG: 40 TABLET, DELAYED RELEASE ORAL at 06:15

## 2020-09-29 RX ADMIN — QUETIAPINE FUMARATE 75 MG: 50 TABLET ORAL at 22:11

## 2020-09-29 RX ADMIN — SODIUM CHLORIDE 250 ML: 0.9 INJECTION, SOLUTION INTRAVENOUS at 12:04

## 2020-09-29 RX ADMIN — POTASSIUM CHLORIDE AND SODIUM CHLORIDE 125 ML/HR: 450; 150 INJECTION, SOLUTION INTRAVENOUS at 12:36

## 2020-09-29 RX ADMIN — ATENOLOL 25 MG: 25 TABLET ORAL at 22:11

## 2020-09-29 RX ADMIN — ACETAMINOPHEN 975 MG: 325 TABLET ORAL at 17:20

## 2020-09-29 RX ADMIN — MELATONIN 6 MG: at 22:11

## 2020-09-29 RX ADMIN — SILVER SULFADIAZINE: 10 CREAM TOPICAL at 17:20

## 2020-09-29 RX ADMIN — ACETAMINOPHEN 650 MG: 325 TABLET ORAL at 06:33

## 2020-09-29 RX ADMIN — QUETIAPINE FUMARATE 25 MG: 25 TABLET ORAL at 15:28

## 2020-09-29 RX ADMIN — MEMANTINE 10 MG: 5 TABLET ORAL at 17:21

## 2020-09-30 ENCOUNTER — HOSPITAL ENCOUNTER (INPATIENT)
Facility: HOSPITAL | Age: 80
LOS: 24 days | DRG: 885 | End: 2020-10-25
Attending: PSYCHIATRY & NEUROLOGY | Admitting: PSYCHIATRY & NEUROLOGY
Payer: MEDICARE

## 2020-09-30 VITALS
RESPIRATION RATE: 20 BRPM | OXYGEN SATURATION: 91 % | HEIGHT: 68 IN | SYSTOLIC BLOOD PRESSURE: 124 MMHG | TEMPERATURE: 99.3 F | HEART RATE: 88 BPM | BODY MASS INDEX: 23.49 KG/M2 | WEIGHT: 154.98 LBS | DIASTOLIC BLOOD PRESSURE: 59 MMHG

## 2020-09-30 DIAGNOSIS — F03.91 PSYCHOSIS IN ELDERLY WITH BEHAVIORAL DISTURBANCE (HCC): ICD-10-CM

## 2020-09-30 DIAGNOSIS — F02.80 DEMENTIA OF THE ALZHEIMER'S TYPE, WITH LATE ONSET, WITH DELIRIUM (HCC): ICD-10-CM

## 2020-09-30 DIAGNOSIS — J44.9 CHRONIC OBSTRUCTIVE PULMONARY DISEASE, UNSPECIFIED COPD TYPE (HCC): ICD-10-CM

## 2020-09-30 DIAGNOSIS — G47.00 INSOMNIA: ICD-10-CM

## 2020-09-30 DIAGNOSIS — F05 DEMENTIA OF THE ALZHEIMER'S TYPE, WITH LATE ONSET, WITH DELIRIUM (HCC): ICD-10-CM

## 2020-09-30 DIAGNOSIS — N39.0 UTI (URINARY TRACT INFECTION): ICD-10-CM

## 2020-09-30 DIAGNOSIS — J96.11 CHRONIC RESPIRATORY FAILURE WITH HYPOXIA (HCC): ICD-10-CM

## 2020-09-30 DIAGNOSIS — I25.110 CORONARY ARTERY DISEASE WITH UNSTABLE ANGINA PECTORIS, UNSPECIFIED VESSEL OR LESION TYPE, UNSPECIFIED WHETHER NATIVE OR TRANSPLANTED HEART (HCC): ICD-10-CM

## 2020-09-30 DIAGNOSIS — F03.90 DEMENTIA WITHOUT BEHAVIORAL DISTURBANCE, UNSPECIFIED DEMENTIA TYPE (HCC): ICD-10-CM

## 2020-09-30 DIAGNOSIS — G30.1 DEMENTIA OF THE ALZHEIMER'S TYPE, WITH LATE ONSET, WITH DELIRIUM (HCC): ICD-10-CM

## 2020-09-30 DIAGNOSIS — K21.9 GERD (GASTROESOPHAGEAL REFLUX DISEASE): Primary | ICD-10-CM

## 2020-09-30 PROBLEM — R06.03 RESPIRATORY DISTRESS: Status: ACTIVE | Noted: 2020-09-30

## 2020-09-30 PROCEDURE — 99213 OFFICE O/P EST LOW 20 MIN: CPT | Performed by: PSYCHIATRY & NEUROLOGY

## 2020-09-30 PROCEDURE — 97167 OT EVAL HIGH COMPLEX 60 MIN: CPT

## 2020-09-30 PROCEDURE — 97163 PT EVAL HIGH COMPLEX 45 MIN: CPT

## 2020-09-30 RX ORDER — ALBUTEROL SULFATE 90 UG/1
2 AEROSOL, METERED RESPIRATORY (INHALATION) EVERY 6 HOURS PRN
Status: DISCONTINUED | OUTPATIENT
Start: 2020-09-30 | End: 2020-10-25 | Stop reason: HOSPADM

## 2020-09-30 RX ORDER — LORAZEPAM 0.5 MG/1
0.5 TABLET ORAL EVERY 4 HOURS PRN
Status: DISCONTINUED | OUTPATIENT
Start: 2020-09-30 | End: 2020-09-30

## 2020-09-30 RX ORDER — ATORVASTATIN CALCIUM 40 MG/1
40 TABLET, FILM COATED ORAL
Status: CANCELLED | OUTPATIENT
Start: 2020-09-30

## 2020-09-30 RX ORDER — CLOPIDOGREL BISULFATE 75 MG/1
75 TABLET ORAL DAILY
Status: DISCONTINUED | OUTPATIENT
Start: 2020-10-01 | End: 2020-10-25 | Stop reason: HOSPADM

## 2020-09-30 RX ORDER — ASPIRIN 81 MG/1
81 TABLET, CHEWABLE ORAL DAILY
Status: CANCELLED | OUTPATIENT
Start: 2020-10-01

## 2020-09-30 RX ORDER — LORAZEPAM 2 MG/ML
1 INJECTION INTRAMUSCULAR EVERY 6 HOURS PRN
Status: DISCONTINUED | OUTPATIENT
Start: 2020-09-30 | End: 2020-10-25 | Stop reason: HOSPADM

## 2020-09-30 RX ORDER — PANTOPRAZOLE SODIUM 40 MG/1
40 TABLET, DELAYED RELEASE ORAL
Status: DISCONTINUED | OUTPATIENT
Start: 2020-10-01 | End: 2020-10-25 | Stop reason: HOSPADM

## 2020-09-30 RX ORDER — ATORVASTATIN CALCIUM 40 MG/1
40 TABLET, FILM COATED ORAL
Status: DISCONTINUED | OUTPATIENT
Start: 2020-09-30 | End: 2020-10-25 | Stop reason: HOSPADM

## 2020-09-30 RX ORDER — HALOPERIDOL 1 MG/1
1 TABLET ORAL EVERY 6 HOURS PRN
Status: CANCELLED | OUTPATIENT
Start: 2020-09-30

## 2020-09-30 RX ORDER — MAGNESIUM HYDROXIDE/ALUMINUM HYDROXICE/SIMETHICONE 120; 1200; 1200 MG/30ML; MG/30ML; MG/30ML
30 SUSPENSION ORAL EVERY 4 HOURS PRN
Status: CANCELLED | OUTPATIENT
Start: 2020-09-30

## 2020-09-30 RX ORDER — HALOPERIDOL 0.5 MG/1
1 TABLET ORAL EVERY 6 HOURS PRN
Status: DISCONTINUED | OUTPATIENT
Start: 2020-09-30 | End: 2020-10-25 | Stop reason: HOSPADM

## 2020-09-30 RX ORDER — HYDROXYZINE HYDROCHLORIDE 25 MG/1
25 TABLET, FILM COATED ORAL EVERY 6 HOURS PRN
Status: DISCONTINUED | OUTPATIENT
Start: 2020-09-30 | End: 2020-10-25 | Stop reason: HOSPADM

## 2020-09-30 RX ORDER — ACETAMINOPHEN 325 MG/1
650 TABLET ORAL EVERY 6 HOURS PRN
Status: CANCELLED | OUTPATIENT
Start: 2020-09-30

## 2020-09-30 RX ORDER — ACETAMINOPHEN 325 MG/1
650 TABLET ORAL EVERY 6 HOURS PRN
Status: DISCONTINUED | OUTPATIENT
Start: 2020-09-30 | End: 2020-10-25 | Stop reason: HOSPADM

## 2020-09-30 RX ORDER — MEMANTINE HYDROCHLORIDE 5 MG/1
10 TABLET ORAL 2 TIMES DAILY
Status: CANCELLED | OUTPATIENT
Start: 2020-09-30

## 2020-09-30 RX ORDER — HALOPERIDOL 5 MG/ML
2 INJECTION INTRAMUSCULAR EVERY 6 HOURS PRN
Status: DISCONTINUED | OUTPATIENT
Start: 2020-09-30 | End: 2020-10-25 | Stop reason: HOSPADM

## 2020-09-30 RX ORDER — FLUTICASONE FUROATE AND VILANTEROL 100; 25 UG/1; UG/1
1 POWDER RESPIRATORY (INHALATION)
Status: CANCELLED | OUTPATIENT
Start: 2020-10-01

## 2020-09-30 RX ORDER — CLOPIDOGREL BISULFATE 75 MG/1
75 TABLET ORAL DAILY
Status: CANCELLED | OUTPATIENT
Start: 2020-10-01

## 2020-09-30 RX ORDER — BISACODYL 10 MG
10 SUPPOSITORY, RECTAL RECTAL DAILY PRN
Status: CANCELLED | OUTPATIENT
Start: 2020-09-30

## 2020-09-30 RX ORDER — HALOPERIDOL 5 MG/ML
2 INJECTION INTRAMUSCULAR EVERY 6 HOURS PRN
Status: CANCELLED | OUTPATIENT
Start: 2020-09-30

## 2020-09-30 RX ORDER — BISACODYL 10 MG
10 SUPPOSITORY, RECTAL RECTAL DAILY PRN
Status: DISCONTINUED | OUTPATIENT
Start: 2020-09-30 | End: 2020-10-25 | Stop reason: HOSPADM

## 2020-09-30 RX ORDER — NITROGLYCERIN 0.4 MG/1
0.4 TABLET SUBLINGUAL
Status: DISCONTINUED | OUTPATIENT
Start: 2020-09-30 | End: 2020-10-25 | Stop reason: HOSPADM

## 2020-09-30 RX ORDER — LANOLIN ALCOHOL/MO/W.PET/CERES
6 CREAM (GRAM) TOPICAL
Status: CANCELLED | OUTPATIENT
Start: 2020-09-30

## 2020-09-30 RX ORDER — HYDROXYZINE HYDROCHLORIDE 10 MG/1
25 TABLET, FILM COATED ORAL EVERY 6 HOURS PRN
Status: CANCELLED | OUTPATIENT
Start: 2020-09-30

## 2020-09-30 RX ORDER — ASPIRIN 81 MG/1
81 TABLET, CHEWABLE ORAL DAILY
Status: DISCONTINUED | OUTPATIENT
Start: 2020-10-01 | End: 2020-10-25 | Stop reason: HOSPADM

## 2020-09-30 RX ORDER — ATENOLOL 25 MG/1
25 TABLET ORAL
Status: CANCELLED | OUTPATIENT
Start: 2020-09-30

## 2020-09-30 RX ORDER — ACETAMINOPHEN 325 MG/1
975 TABLET ORAL EVERY 6 HOURS PRN
Status: DISCONTINUED | OUTPATIENT
Start: 2020-09-30 | End: 2020-10-15

## 2020-09-30 RX ORDER — NITROGLYCERIN 0.4 MG/1
0.4 TABLET SUBLINGUAL
Status: CANCELLED | OUTPATIENT
Start: 2020-09-30

## 2020-09-30 RX ORDER — QUETIAPINE FUMARATE 25 MG/1
25 TABLET, FILM COATED ORAL 2 TIMES DAILY
Status: CANCELLED | OUTPATIENT
Start: 2020-09-30

## 2020-09-30 RX ORDER — QUETIAPINE FUMARATE 25 MG/1
25 TABLET, FILM COATED ORAL 2 TIMES DAILY
Status: DISCONTINUED | OUTPATIENT
Start: 2020-10-01 | End: 2020-09-30

## 2020-09-30 RX ORDER — QUETIAPINE FUMARATE 25 MG/1
25 TABLET, FILM COATED ORAL DAILY
Status: DISCONTINUED | OUTPATIENT
Start: 2020-10-01 | End: 2020-10-05

## 2020-09-30 RX ORDER — TRAZODONE HYDROCHLORIDE 50 MG/1
50 TABLET ORAL
Status: CANCELLED | OUTPATIENT
Start: 2020-09-30

## 2020-09-30 RX ORDER — ALBUTEROL SULFATE 90 UG/1
2 AEROSOL, METERED RESPIRATORY (INHALATION) EVERY 6 HOURS PRN
Status: CANCELLED | OUTPATIENT
Start: 2020-09-30

## 2020-09-30 RX ORDER — LIDOCAINE HYDROCHLORIDE 20 MG/ML
15 SOLUTION OROPHARYNGEAL 2 TIMES DAILY PRN
Status: CANCELLED | OUTPATIENT
Start: 2020-09-30

## 2020-09-30 RX ORDER — FLUTICASONE FUROATE AND VILANTEROL 100; 25 UG/1; UG/1
1 POWDER RESPIRATORY (INHALATION)
Status: DISCONTINUED | OUTPATIENT
Start: 2020-10-01 | End: 2020-10-25 | Stop reason: HOSPADM

## 2020-09-30 RX ORDER — LORAZEPAM 0.5 MG/1
0.5 TABLET ORAL EVERY 4 HOURS PRN
Status: CANCELLED | OUTPATIENT
Start: 2020-09-30

## 2020-09-30 RX ORDER — ACETAMINOPHEN 325 MG/1
650 TABLET ORAL EVERY 4 HOURS PRN
Status: DISCONTINUED | OUTPATIENT
Start: 2020-09-30 | End: 2020-10-25 | Stop reason: HOSPADM

## 2020-09-30 RX ORDER — TAMSULOSIN HYDROCHLORIDE 0.4 MG/1
0.4 CAPSULE ORAL DAILY
Status: DISCONTINUED | OUTPATIENT
Start: 2020-09-30 | End: 2020-10-25 | Stop reason: HOSPADM

## 2020-09-30 RX ORDER — LIDOCAINE HYDROCHLORIDE 20 MG/ML
15 SOLUTION OROPHARYNGEAL 2 TIMES DAILY PRN
Status: DISCONTINUED | OUTPATIENT
Start: 2020-09-30 | End: 2020-09-30

## 2020-09-30 RX ORDER — LORAZEPAM 2 MG/ML
1 INJECTION INTRAMUSCULAR EVERY 6 HOURS PRN
Status: CANCELLED | OUTPATIENT
Start: 2020-09-30

## 2020-09-30 RX ORDER — ACETAMINOPHEN 325 MG/1
975 TABLET ORAL EVERY 6 HOURS PRN
Status: CANCELLED | OUTPATIENT
Start: 2020-09-30

## 2020-09-30 RX ORDER — PANTOPRAZOLE SODIUM 40 MG/1
40 TABLET, DELAYED RELEASE ORAL
Status: CANCELLED | OUTPATIENT
Start: 2020-10-01

## 2020-09-30 RX ORDER — ATENOLOL 25 MG/1
25 TABLET ORAL
Status: DISCONTINUED | OUTPATIENT
Start: 2020-09-30 | End: 2020-10-25 | Stop reason: HOSPADM

## 2020-09-30 RX ORDER — MAGNESIUM HYDROXIDE/ALUMINUM HYDROXICE/SIMETHICONE 120; 1200; 1200 MG/30ML; MG/30ML; MG/30ML
30 SUSPENSION ORAL EVERY 4 HOURS PRN
Status: DISCONTINUED | OUTPATIENT
Start: 2020-09-30 | End: 2020-10-25 | Stop reason: HOSPADM

## 2020-09-30 RX ORDER — MEMANTINE HYDROCHLORIDE 5 MG/1
10 TABLET ORAL 2 TIMES DAILY
Status: DISCONTINUED | OUTPATIENT
Start: 2020-09-30 | End: 2020-10-25 | Stop reason: HOSPADM

## 2020-09-30 RX ORDER — LANOLIN ALCOHOL/MO/W.PET/CERES
6 CREAM (GRAM) TOPICAL
Status: DISCONTINUED | OUTPATIENT
Start: 2020-09-30 | End: 2020-10-25 | Stop reason: HOSPADM

## 2020-09-30 RX ORDER — LORAZEPAM 0.5 MG/1
0.5 TABLET ORAL EVERY 6 HOURS PRN
Status: DISCONTINUED | OUTPATIENT
Start: 2020-09-30 | End: 2020-10-25 | Stop reason: HOSPADM

## 2020-09-30 RX ORDER — TAMSULOSIN HYDROCHLORIDE 0.4 MG/1
0.4 CAPSULE ORAL DAILY
Status: CANCELLED | OUTPATIENT
Start: 2020-09-30

## 2020-09-30 RX ORDER — TRAZODONE HYDROCHLORIDE 50 MG/1
50 TABLET ORAL
Status: DISCONTINUED | OUTPATIENT
Start: 2020-09-30 | End: 2020-10-25 | Stop reason: HOSPADM

## 2020-09-30 RX ORDER — ACETAMINOPHEN 325 MG/1
650 TABLET ORAL EVERY 4 HOURS PRN
Status: CANCELLED | OUTPATIENT
Start: 2020-09-30

## 2020-09-30 RX ADMIN — ATORVASTATIN CALCIUM 40 MG: 40 TABLET, FILM COATED ORAL at 21:05

## 2020-09-30 RX ADMIN — CLOPIDOGREL BISULFATE 75 MG: 75 TABLET ORAL at 08:43

## 2020-09-30 RX ADMIN — ASPIRIN 81 MG: 81 TABLET, CHEWABLE ORAL at 08:43

## 2020-09-30 RX ADMIN — MEMANTINE 10 MG: 5 TABLET ORAL at 16:21

## 2020-09-30 RX ADMIN — PANTOPRAZOLE SODIUM 40 MG: 40 TABLET, DELAYED RELEASE ORAL at 05:20

## 2020-09-30 RX ADMIN — TAMSULOSIN HYDROCHLORIDE 0.4 MG: 0.4 CAPSULE ORAL at 16:21

## 2020-09-30 RX ADMIN — ACETAMINOPHEN 975 MG: 325 TABLET ORAL at 05:25

## 2020-09-30 RX ADMIN — MEMANTINE 10 MG: 5 TABLET ORAL at 08:43

## 2020-09-30 RX ADMIN — QUETIAPINE FUMARATE 25 MG: 25 TABLET ORAL at 08:42

## 2020-09-30 RX ADMIN — FLUTICASONE FUROATE AND VILANTEROL TRIFENATATE 1 PUFF: 100; 25 POWDER RESPIRATORY (INHALATION) at 08:43

## 2020-09-30 RX ADMIN — HYDROXYZINE HYDROCHLORIDE 25 MG: 10 TABLET, FILM COATED ORAL at 05:25

## 2020-09-30 RX ADMIN — Medication 1 TABLET: at 08:42

## 2020-09-30 RX ADMIN — Medication 10000 UNITS: at 08:44

## 2020-09-30 RX ADMIN — MELATONIN 6 MG: at 21:05

## 2020-09-30 RX ADMIN — BISACODYL 10 MG: 10 SUPPOSITORY RECTAL at 05:25

## 2020-09-30 RX ADMIN — SILVER SULFADIAZINE: 10 CREAM TOPICAL at 08:43

## 2020-09-30 RX ADMIN — ATENOLOL 25 MG: 25 TABLET ORAL at 21:05

## 2020-09-30 RX ADMIN — SILVER SULFADIAZINE: 10 CREAM TOPICAL at 22:44

## 2020-09-30 RX ADMIN — QUETIAPINE 75 MG: 50 TABLET ORAL at 21:08

## 2020-09-30 RX ADMIN — TIOTROPIUM BROMIDE 18 MCG: 18 CAPSULE ORAL; RESPIRATORY (INHALATION) at 08:44

## 2020-09-30 RX ADMIN — POTASSIUM CHLORIDE AND SODIUM CHLORIDE 75 ML/HR: 450; 150 INJECTION, SOLUTION INTRAVENOUS at 01:18

## 2020-10-01 PROBLEM — F33.2 MDD (MAJOR DEPRESSIVE DISORDER), RECURRENT EPISODE, SEVERE (HCC): Status: ACTIVE | Noted: 2020-10-01

## 2020-10-01 LAB
BACTERIA UR CULT: ABNORMAL

## 2020-10-01 PROCEDURE — G0008 ADMIN INFLUENZA VIRUS VAC: HCPCS | Performed by: FAMILY MEDICINE

## 2020-10-01 PROCEDURE — 90662 IIV NO PRSV INCREASED AG IM: CPT | Performed by: FAMILY MEDICINE

## 2020-10-01 PROCEDURE — 99221 1ST HOSP IP/OBS SF/LOW 40: CPT | Performed by: PSYCHIATRY & NEUROLOGY

## 2020-10-01 PROCEDURE — 97116 GAIT TRAINING THERAPY: CPT

## 2020-10-01 PROCEDURE — 97163 PT EVAL HIGH COMPLEX 45 MIN: CPT

## 2020-10-01 RX ORDER — NITROFURANTOIN 25; 75 MG/1; MG/1
100 CAPSULE ORAL 2 TIMES DAILY WITH MEALS
Status: COMPLETED | OUTPATIENT
Start: 2020-10-01 | End: 2020-10-06

## 2020-10-01 RX ADMIN — SILVER SULFADIAZINE: 10 CREAM TOPICAL at 16:49

## 2020-10-01 RX ADMIN — MEMANTINE 10 MG: 5 TABLET ORAL at 16:48

## 2020-10-01 RX ADMIN — FLUTICASONE FUROATE AND VILANTEROL TRIFENATATE 1 PUFF: 100; 25 POWDER RESPIRATORY (INHALATION) at 08:22

## 2020-10-01 RX ADMIN — ATENOLOL 25 MG: 25 TABLET ORAL at 21:36

## 2020-10-01 RX ADMIN — CLOPIDOGREL BISULFATE 75 MG: 75 TABLET ORAL at 08:23

## 2020-10-01 RX ADMIN — PANTOPRAZOLE SODIUM 40 MG: 40 TABLET, DELAYED RELEASE ORAL at 06:17

## 2020-10-01 RX ADMIN — ASPIRIN 81 MG 81 MG: 81 TABLET ORAL at 08:23

## 2020-10-01 RX ADMIN — QUETIAPINE 75 MG: 50 TABLET ORAL at 21:36

## 2020-10-01 RX ADMIN — MEMANTINE 10 MG: 5 TABLET ORAL at 08:23

## 2020-10-01 RX ADMIN — SILVER SULFADIAZINE: 10 CREAM TOPICAL at 08:22

## 2020-10-01 RX ADMIN — TAMSULOSIN HYDROCHLORIDE 0.4 MG: 0.4 CAPSULE ORAL at 16:49

## 2020-10-01 RX ADMIN — Medication 1 TABLET: at 08:23

## 2020-10-01 RX ADMIN — QUETIAPINE FUMARATE 25 MG: 25 TABLET ORAL at 08:22

## 2020-10-01 RX ADMIN — ATORVASTATIN CALCIUM 40 MG: 40 TABLET, FILM COATED ORAL at 21:36

## 2020-10-01 RX ADMIN — Medication 10000 UNITS: at 08:23

## 2020-10-01 RX ADMIN — MELATONIN 6 MG: at 21:36

## 2020-10-01 RX ADMIN — NITROFURANTOIN MONOHYDRATE/MACROCRYSTALLINE 100 MG: 25; 75 CAPSULE ORAL at 16:49

## 2020-10-01 RX ADMIN — TIOTROPIUM BROMIDE 18 MCG: 18 CAPSULE ORAL; RESPIRATORY (INHALATION) at 08:25

## 2020-10-01 RX ADMIN — INFLUENZA A VIRUS A/MICHIGAN/45/2015 X-275 (H1N1) ANTIGEN (FORMALDEHYDE INACTIVATED), INFLUENZA A VIRUS A/SINGAPORE/INFIMH-16-0019/2016 IVR-186 (H3N2) ANTIGEN (FORMALDEHYDE INACTIVATED), INFLUENZA B VIRUS B/PHUKET/3073/2013 ANTIGEN (FORMALDEHYDE INACTIVATED), AND INFLUENZA B VIRUS B/MARYLAND/15/2016 BX-69A ANTIGEN (FORMALDEHYDE INACTIVATED) 0.7 ML: 60; 60; 60; 60 INJECTION, SUSPENSION INTRAMUSCULAR at 13:16

## 2020-10-02 PROCEDURE — 97167 OT EVAL HIGH COMPLEX 60 MIN: CPT

## 2020-10-02 PROCEDURE — 97116 GAIT TRAINING THERAPY: CPT

## 2020-10-02 PROCEDURE — 99232 SBSQ HOSP IP/OBS MODERATE 35: CPT | Performed by: PSYCHIATRY & NEUROLOGY

## 2020-10-02 PROCEDURE — 97530 THERAPEUTIC ACTIVITIES: CPT

## 2020-10-02 RX ADMIN — TIOTROPIUM BROMIDE 18 MCG: 18 CAPSULE ORAL; RESPIRATORY (INHALATION) at 08:49

## 2020-10-02 RX ADMIN — MELATONIN 6 MG: at 20:51

## 2020-10-02 RX ADMIN — NITROFURANTOIN MONOHYDRATE/MACROCRYSTALLINE 100 MG: 25; 75 CAPSULE ORAL at 16:18

## 2020-10-02 RX ADMIN — ASPIRIN 81 MG 81 MG: 81 TABLET ORAL at 08:47

## 2020-10-02 RX ADMIN — QUETIAPINE FUMARATE 25 MG: 25 TABLET ORAL at 08:47

## 2020-10-02 RX ADMIN — MEMANTINE 10 MG: 5 TABLET ORAL at 08:46

## 2020-10-02 RX ADMIN — NITROFURANTOIN MONOHYDRATE/MACROCRYSTALLINE 100 MG: 25; 75 CAPSULE ORAL at 08:46

## 2020-10-02 RX ADMIN — MEMANTINE 10 MG: 5 TABLET ORAL at 16:18

## 2020-10-02 RX ADMIN — TAMSULOSIN HYDROCHLORIDE 0.4 MG: 0.4 CAPSULE ORAL at 16:18

## 2020-10-02 RX ADMIN — QUETIAPINE 75 MG: 50 TABLET ORAL at 20:52

## 2020-10-02 RX ADMIN — FLUTICASONE FUROATE AND VILANTEROL TRIFENATATE 1 PUFF: 100; 25 POWDER RESPIRATORY (INHALATION) at 08:45

## 2020-10-02 RX ADMIN — SILVER SULFADIAZINE: 10 CREAM TOPICAL at 08:50

## 2020-10-02 RX ADMIN — Medication 10000 UNITS: at 08:47

## 2020-10-02 RX ADMIN — PANTOPRAZOLE SODIUM 40 MG: 40 TABLET, DELAYED RELEASE ORAL at 05:58

## 2020-10-02 RX ADMIN — CLOPIDOGREL BISULFATE 75 MG: 75 TABLET ORAL at 08:47

## 2020-10-02 RX ADMIN — Medication 1 TABLET: at 08:47

## 2020-10-02 RX ADMIN — ATORVASTATIN CALCIUM 40 MG: 40 TABLET, FILM COATED ORAL at 20:52

## 2020-10-02 RX ADMIN — SILVER SULFADIAZINE 1 APPLICATION: 10 CREAM TOPICAL at 17:07

## 2020-10-03 PROCEDURE — 99232 SBSQ HOSP IP/OBS MODERATE 35: CPT | Performed by: PSYCHIATRY & NEUROLOGY

## 2020-10-03 RX ADMIN — ACETAMINOPHEN 650 MG: 325 TABLET ORAL at 14:41

## 2020-10-03 RX ADMIN — TAMSULOSIN HYDROCHLORIDE 0.4 MG: 0.4 CAPSULE ORAL at 16:51

## 2020-10-03 RX ADMIN — MELATONIN 6 MG: at 20:53

## 2020-10-03 RX ADMIN — MEMANTINE 10 MG: 5 TABLET ORAL at 08:45

## 2020-10-03 RX ADMIN — QUETIAPINE FUMARATE 25 MG: 25 TABLET ORAL at 08:46

## 2020-10-03 RX ADMIN — MEMANTINE 10 MG: 5 TABLET ORAL at 17:02

## 2020-10-03 RX ADMIN — Medication 1 TABLET: at 08:46

## 2020-10-03 RX ADMIN — PANTOPRAZOLE SODIUM 40 MG: 40 TABLET, DELAYED RELEASE ORAL at 06:08

## 2020-10-03 RX ADMIN — Medication 10000 UNITS: at 08:46

## 2020-10-03 RX ADMIN — TIOTROPIUM BROMIDE 18 MCG: 18 CAPSULE ORAL; RESPIRATORY (INHALATION) at 08:44

## 2020-10-03 RX ADMIN — ATORVASTATIN CALCIUM 40 MG: 40 TABLET, FILM COATED ORAL at 20:53

## 2020-10-03 RX ADMIN — QUETIAPINE 75 MG: 50 TABLET ORAL at 20:53

## 2020-10-03 RX ADMIN — CLOPIDOGREL BISULFATE 75 MG: 75 TABLET ORAL at 08:46

## 2020-10-03 RX ADMIN — FLUTICASONE FUROATE AND VILANTEROL TRIFENATATE 1 PUFF: 100; 25 POWDER RESPIRATORY (INHALATION) at 13:09

## 2020-10-03 RX ADMIN — SILVER SULFADIAZINE 1 APPLICATION: 10 CREAM TOPICAL at 17:03

## 2020-10-03 RX ADMIN — ATENOLOL 25 MG: 25 TABLET ORAL at 20:49

## 2020-10-03 RX ADMIN — NITROFURANTOIN MONOHYDRATE/MACROCRYSTALLINE 100 MG: 25; 75 CAPSULE ORAL at 15:31

## 2020-10-03 RX ADMIN — ASPIRIN 81 MG 81 MG: 81 TABLET ORAL at 08:45

## 2020-10-03 RX ADMIN — NITROFURANTOIN MONOHYDRATE/MACROCRYSTALLINE 100 MG: 25; 75 CAPSULE ORAL at 08:45

## 2020-10-03 RX ADMIN — SILVER SULFADIAZINE 1 APPLICATION: 10 CREAM TOPICAL at 08:44

## 2020-10-03 RX ADMIN — ACETAMINOPHEN 650 MG: 325 TABLET ORAL at 04:00

## 2020-10-04 LAB — BACTERIA BLD CULT: NORMAL

## 2020-10-04 PROCEDURE — 99233 SBSQ HOSP IP/OBS HIGH 50: CPT | Performed by: PSYCHIATRY & NEUROLOGY

## 2020-10-04 RX ADMIN — NITROFURANTOIN MONOHYDRATE/MACROCRYSTALLINE 100 MG: 25; 75 CAPSULE ORAL at 06:33

## 2020-10-04 RX ADMIN — ACETAMINOPHEN 650 MG: 325 TABLET ORAL at 00:38

## 2020-10-04 RX ADMIN — ATORVASTATIN CALCIUM 40 MG: 40 TABLET, FILM COATED ORAL at 21:55

## 2020-10-04 RX ADMIN — Medication 1 TABLET: at 08:18

## 2020-10-04 RX ADMIN — TRAZODONE HYDROCHLORIDE 50 MG: 50 TABLET ORAL at 00:38

## 2020-10-04 RX ADMIN — MELATONIN 6 MG: at 21:55

## 2020-10-04 RX ADMIN — CLOPIDOGREL BISULFATE 75 MG: 75 TABLET ORAL at 08:19

## 2020-10-04 RX ADMIN — Medication 10000 UNITS: at 08:19

## 2020-10-04 RX ADMIN — PANTOPRAZOLE SODIUM 40 MG: 40 TABLET, DELAYED RELEASE ORAL at 06:33

## 2020-10-04 RX ADMIN — SILVER SULFADIAZINE 1 APPLICATION: 10 CREAM TOPICAL at 17:14

## 2020-10-04 RX ADMIN — TAMSULOSIN HYDROCHLORIDE 0.4 MG: 0.4 CAPSULE ORAL at 16:39

## 2020-10-04 RX ADMIN — LORAZEPAM 0.5 MG: 0.5 TABLET ORAL at 07:46

## 2020-10-04 RX ADMIN — SILVER SULFADIAZINE 1 APPLICATION: 10 CREAM TOPICAL at 08:19

## 2020-10-04 RX ADMIN — TIOTROPIUM BROMIDE 18 MCG: 18 CAPSULE ORAL; RESPIRATORY (INHALATION) at 08:18

## 2020-10-04 RX ADMIN — NITROFURANTOIN MONOHYDRATE/MACROCRYSTALLINE 100 MG: 25; 75 CAPSULE ORAL at 16:39

## 2020-10-04 RX ADMIN — MEMANTINE 10 MG: 5 TABLET ORAL at 17:13

## 2020-10-04 RX ADMIN — ASPIRIN 81 MG 81 MG: 81 TABLET ORAL at 08:18

## 2020-10-04 RX ADMIN — FLUTICASONE FUROATE AND VILANTEROL TRIFENATATE 1 PUFF: 100; 25 POWDER RESPIRATORY (INHALATION) at 08:18

## 2020-10-04 RX ADMIN — QUETIAPINE FUMARATE 25 MG: 25 TABLET ORAL at 08:19

## 2020-10-04 RX ADMIN — MEMANTINE 10 MG: 5 TABLET ORAL at 08:19

## 2020-10-04 RX ADMIN — QUETIAPINE 75 MG: 50 TABLET ORAL at 21:55

## 2020-10-05 PROCEDURE — 99232 SBSQ HOSP IP/OBS MODERATE 35: CPT | Performed by: PSYCHIATRY & NEUROLOGY

## 2020-10-05 RX ORDER — QUETIAPINE FUMARATE 100 MG/1
100 TABLET, FILM COATED ORAL
Status: DISCONTINUED | OUTPATIENT
Start: 2020-10-05 | End: 2020-10-25 | Stop reason: HOSPADM

## 2020-10-05 RX ADMIN — QUETIAPINE FUMARATE 25 MG: 25 TABLET ORAL at 08:43

## 2020-10-05 RX ADMIN — LORAZEPAM 0.5 MG: 0.5 TABLET ORAL at 14:00

## 2020-10-05 RX ADMIN — ASPIRIN 81 MG 81 MG: 81 TABLET ORAL at 08:42

## 2020-10-05 RX ADMIN — MEMANTINE 10 MG: 5 TABLET ORAL at 17:09

## 2020-10-05 RX ADMIN — ACETAMINOPHEN 975 MG: 325 TABLET ORAL at 21:02

## 2020-10-05 RX ADMIN — ATENOLOL 25 MG: 25 TABLET ORAL at 21:02

## 2020-10-05 RX ADMIN — FLUTICASONE FUROATE AND VILANTEROL TRIFENATATE 1 PUFF: 100; 25 POWDER RESPIRATORY (INHALATION) at 08:46

## 2020-10-05 RX ADMIN — QUETIAPINE FUMARATE 100 MG: 100 TABLET ORAL at 21:02

## 2020-10-05 RX ADMIN — TIOTROPIUM BROMIDE 18 MCG: 18 CAPSULE ORAL; RESPIRATORY (INHALATION) at 08:43

## 2020-10-05 RX ADMIN — Medication 10000 UNITS: at 08:42

## 2020-10-05 RX ADMIN — MEMANTINE 10 MG: 5 TABLET ORAL at 08:42

## 2020-10-05 RX ADMIN — SILVER SULFADIAZINE: 10 CREAM TOPICAL at 08:43

## 2020-10-05 RX ADMIN — CLOPIDOGREL BISULFATE 75 MG: 75 TABLET ORAL at 08:42

## 2020-10-05 RX ADMIN — ATORVASTATIN CALCIUM 40 MG: 40 TABLET, FILM COATED ORAL at 21:03

## 2020-10-05 RX ADMIN — NITROFURANTOIN MONOHYDRATE/MACROCRYSTALLINE 100 MG: 25; 75 CAPSULE ORAL at 16:43

## 2020-10-05 RX ADMIN — SILVER SULFADIAZINE: 10 CREAM TOPICAL at 18:40

## 2020-10-05 RX ADMIN — NITROFURANTOIN MONOHYDRATE/MACROCRYSTALLINE 100 MG: 25; 75 CAPSULE ORAL at 08:42

## 2020-10-05 RX ADMIN — TAMSULOSIN HYDROCHLORIDE 0.4 MG: 0.4 CAPSULE ORAL at 16:43

## 2020-10-05 RX ADMIN — MELATONIN 6 MG: at 21:03

## 2020-10-05 RX ADMIN — Medication 1 TABLET: at 08:42

## 2020-10-06 LAB — GLUCOSE SERPL-MCNC: 241 MG/DL (ref 65–140)

## 2020-10-06 PROCEDURE — 97116 GAIT TRAINING THERAPY: CPT

## 2020-10-06 PROCEDURE — 82948 REAGENT STRIP/BLOOD GLUCOSE: CPT

## 2020-10-06 PROCEDURE — 99232 SBSQ HOSP IP/OBS MODERATE 35: CPT | Performed by: PSYCHIATRY & NEUROLOGY

## 2020-10-06 PROCEDURE — 97110 THERAPEUTIC EXERCISES: CPT

## 2020-10-06 RX ADMIN — ASPIRIN 81 MG 81 MG: 81 TABLET ORAL at 08:17

## 2020-10-06 RX ADMIN — SILVER SULFADIAZINE 1 APPLICATION: 10 CREAM TOPICAL at 08:18

## 2020-10-06 RX ADMIN — Medication 10000 UNITS: at 08:18

## 2020-10-06 RX ADMIN — ATORVASTATIN CALCIUM 40 MG: 40 TABLET, FILM COATED ORAL at 21:14

## 2020-10-06 RX ADMIN — FLUTICASONE FUROATE AND VILANTEROL TRIFENATATE 1 PUFF: 100; 25 POWDER RESPIRATORY (INHALATION) at 08:16

## 2020-10-06 RX ADMIN — MELATONIN 6 MG: at 21:14

## 2020-10-06 RX ADMIN — TIOTROPIUM BROMIDE 18 MCG: 18 CAPSULE ORAL; RESPIRATORY (INHALATION) at 08:16

## 2020-10-06 RX ADMIN — NITROFURANTOIN MONOHYDRATE/MACROCRYSTALLINE 100 MG: 25; 75 CAPSULE ORAL at 08:17

## 2020-10-06 RX ADMIN — MEMANTINE 10 MG: 5 TABLET ORAL at 17:02

## 2020-10-06 RX ADMIN — TRAZODONE HYDROCHLORIDE 50 MG: 50 TABLET ORAL at 21:14

## 2020-10-06 RX ADMIN — ATENOLOL 25 MG: 25 TABLET ORAL at 21:14

## 2020-10-06 RX ADMIN — LORAZEPAM 0.5 MG: 0.5 TABLET ORAL at 14:54

## 2020-10-06 RX ADMIN — Medication 1 TABLET: at 08:17

## 2020-10-06 RX ADMIN — CLOPIDOGREL BISULFATE 75 MG: 75 TABLET ORAL at 08:17

## 2020-10-06 RX ADMIN — ACETAMINOPHEN 650 MG: 325 TABLET ORAL at 21:13

## 2020-10-06 RX ADMIN — TAMSULOSIN HYDROCHLORIDE 0.4 MG: 0.4 CAPSULE ORAL at 16:38

## 2020-10-06 RX ADMIN — PANTOPRAZOLE SODIUM 40 MG: 40 TABLET, DELAYED RELEASE ORAL at 08:17

## 2020-10-06 RX ADMIN — MEMANTINE 10 MG: 5 TABLET ORAL at 08:17

## 2020-10-06 RX ADMIN — QUETIAPINE FUMARATE 100 MG: 100 TABLET ORAL at 21:13

## 2020-10-07 PROCEDURE — 99231 SBSQ HOSP IP/OBS SF/LOW 25: CPT | Performed by: PSYCHIATRY & NEUROLOGY

## 2020-10-07 RX ORDER — LORAZEPAM 0.5 MG/1
0.5 TABLET ORAL 2 TIMES DAILY
Status: DISCONTINUED | OUTPATIENT
Start: 2020-10-07 | End: 2020-10-15

## 2020-10-07 RX ADMIN — LORAZEPAM 0.5 MG: 0.5 TABLET ORAL at 10:06

## 2020-10-07 RX ADMIN — QUETIAPINE FUMARATE 100 MG: 100 TABLET ORAL at 21:18

## 2020-10-07 RX ADMIN — TRAZODONE HYDROCHLORIDE 50 MG: 50 TABLET ORAL at 21:18

## 2020-10-07 RX ADMIN — ACETAMINOPHEN 650 MG: 325 TABLET ORAL at 21:58

## 2020-10-07 RX ADMIN — ATORVASTATIN CALCIUM 40 MG: 40 TABLET, FILM COATED ORAL at 21:18

## 2020-10-07 RX ADMIN — CLOPIDOGREL BISULFATE 75 MG: 75 TABLET ORAL at 08:21

## 2020-10-07 RX ADMIN — HYDROXYZINE HYDROCHLORIDE 25 MG: 25 TABLET, FILM COATED ORAL at 13:34

## 2020-10-07 RX ADMIN — MELATONIN 6 MG: at 21:18

## 2020-10-07 RX ADMIN — TAMSULOSIN HYDROCHLORIDE 0.4 MG: 0.4 CAPSULE ORAL at 15:18

## 2020-10-07 RX ADMIN — TIOTROPIUM BROMIDE 18 MCG: 18 CAPSULE ORAL; RESPIRATORY (INHALATION) at 08:22

## 2020-10-07 RX ADMIN — ACETAMINOPHEN 650 MG: 325 TABLET ORAL at 01:54

## 2020-10-07 RX ADMIN — MEMANTINE 10 MG: 5 TABLET ORAL at 16:48

## 2020-10-07 RX ADMIN — PANTOPRAZOLE SODIUM 40 MG: 40 TABLET, DELAYED RELEASE ORAL at 05:55

## 2020-10-07 RX ADMIN — MEMANTINE 10 MG: 5 TABLET ORAL at 08:21

## 2020-10-07 RX ADMIN — Medication 1 TABLET: at 08:22

## 2020-10-07 RX ADMIN — LORAZEPAM 0.5 MG: 0.5 TABLET ORAL at 21:18

## 2020-10-07 RX ADMIN — LORAZEPAM 0.5 MG: 0.5 TABLET ORAL at 15:17

## 2020-10-07 RX ADMIN — ASPIRIN 81 MG 81 MG: 81 TABLET ORAL at 08:21

## 2020-10-07 RX ADMIN — FLUTICASONE FUROATE AND VILANTEROL TRIFENATATE 1 PUFF: 100; 25 POWDER RESPIRATORY (INHALATION) at 08:21

## 2020-10-07 RX ADMIN — Medication 10000 UNITS: at 08:22

## 2020-10-08 PROCEDURE — 99231 SBSQ HOSP IP/OBS SF/LOW 25: CPT | Performed by: PSYCHIATRY & NEUROLOGY

## 2020-10-08 RX ADMIN — Medication 10000 UNITS: at 08:06

## 2020-10-08 RX ADMIN — Medication 1 TABLET: at 08:06

## 2020-10-08 RX ADMIN — ATORVASTATIN CALCIUM 40 MG: 40 TABLET, FILM COATED ORAL at 21:51

## 2020-10-08 RX ADMIN — PANTOPRAZOLE SODIUM 40 MG: 40 TABLET, DELAYED RELEASE ORAL at 06:17

## 2020-10-08 RX ADMIN — MELATONIN 6 MG: at 21:51

## 2020-10-08 RX ADMIN — LORAZEPAM 0.5 MG: 0.5 TABLET ORAL at 20:20

## 2020-10-08 RX ADMIN — LORAZEPAM 0.5 MG: 0.5 TABLET ORAL at 13:01

## 2020-10-08 RX ADMIN — ACETAMINOPHEN 650 MG: 325 TABLET ORAL at 20:18

## 2020-10-08 RX ADMIN — TIOTROPIUM BROMIDE 18 MCG: 18 CAPSULE ORAL; RESPIRATORY (INHALATION) at 08:06

## 2020-10-08 RX ADMIN — CLOPIDOGREL BISULFATE 75 MG: 75 TABLET ORAL at 08:06

## 2020-10-08 RX ADMIN — FLUTICASONE FUROATE AND VILANTEROL TRIFENATATE 1 PUFF: 100; 25 POWDER RESPIRATORY (INHALATION) at 08:06

## 2020-10-08 RX ADMIN — ATENOLOL 25 MG: 25 TABLET ORAL at 21:51

## 2020-10-08 RX ADMIN — TAMSULOSIN HYDROCHLORIDE 0.4 MG: 0.4 CAPSULE ORAL at 16:13

## 2020-10-08 RX ADMIN — MEMANTINE 10 MG: 5 TABLET ORAL at 17:22

## 2020-10-08 RX ADMIN — MEMANTINE 10 MG: 5 TABLET ORAL at 08:06

## 2020-10-08 RX ADMIN — QUETIAPINE FUMARATE 100 MG: 100 TABLET ORAL at 21:50

## 2020-10-08 RX ADMIN — LORAZEPAM 0.5 MG: 0.5 TABLET ORAL at 08:28

## 2020-10-08 RX ADMIN — ASPIRIN 81 MG 81 MG: 81 TABLET ORAL at 08:06

## 2020-10-09 ENCOUNTER — APPOINTMENT (INPATIENT)
Dept: ULTRASOUND IMAGING | Facility: HOSPITAL | Age: 80
DRG: 885 | End: 2020-10-09
Payer: MEDICARE

## 2020-10-09 LAB
AMORPH PHOS CRY URNS QL MICRO: ABNORMAL /HPF
BACTERIA UR QL AUTO: ABNORMAL /HPF
BILIRUB UR QL STRIP: NEGATIVE
CLARITY UR: ABNORMAL
COLOR UR: YELLOW
GLUCOSE UR STRIP-MCNC: NEGATIVE MG/DL
HGB UR QL STRIP.AUTO: ABNORMAL
KETONES UR STRIP-MCNC: NEGATIVE MG/DL
LEUKOCYTE ESTERASE UR QL STRIP: ABNORMAL
NITRITE UR QL STRIP: NEGATIVE
NON-SQ EPI CELLS URNS QL MICRO: ABNORMAL /HPF
PH UR STRIP.AUTO: 6.5 [PH]
PROT UR STRIP-MCNC: NEGATIVE MG/DL
RBC #/AREA URNS AUTO: ABNORMAL /HPF
SP GR UR STRIP.AUTO: 1.02 (ref 1–1.03)
UROBILINOGEN UR QL STRIP.AUTO: 0.2 E.U./DL
WBC #/AREA URNS AUTO: ABNORMAL /HPF

## 2020-10-09 PROCEDURE — 76770 US EXAM ABDO BACK WALL COMP: CPT

## 2020-10-09 PROCEDURE — 87086 URINE CULTURE/COLONY COUNT: CPT | Performed by: PHYSICIAN ASSISTANT

## 2020-10-09 PROCEDURE — 81003 URINALYSIS AUTO W/O SCOPE: CPT | Performed by: PHYSICIAN ASSISTANT

## 2020-10-09 PROCEDURE — 99231 SBSQ HOSP IP/OBS SF/LOW 25: CPT | Performed by: PSYCHIATRY & NEUROLOGY

## 2020-10-09 PROCEDURE — 81001 URINALYSIS AUTO W/SCOPE: CPT | Performed by: PHYSICIAN ASSISTANT

## 2020-10-09 RX ORDER — SULFAMETHOXAZOLE AND TRIMETHOPRIM 800; 160 MG/1; MG/1
1 TABLET ORAL EVERY 12 HOURS SCHEDULED
Status: DISCONTINUED | OUTPATIENT
Start: 2020-10-09 | End: 2020-10-09

## 2020-10-09 RX ORDER — NITROFURANTOIN 25; 75 MG/1; MG/1
100 CAPSULE ORAL 2 TIMES DAILY WITH MEALS
Status: COMPLETED | OUTPATIENT
Start: 2020-10-10 | End: 2020-10-16

## 2020-10-09 RX ADMIN — Medication 10000 UNITS: at 08:19

## 2020-10-09 RX ADMIN — ATENOLOL 25 MG: 25 TABLET ORAL at 21:36

## 2020-10-09 RX ADMIN — ASPIRIN 81 MG 81 MG: 81 TABLET ORAL at 08:19

## 2020-10-09 RX ADMIN — PANTOPRAZOLE SODIUM 40 MG: 40 TABLET, DELAYED RELEASE ORAL at 06:34

## 2020-10-09 RX ADMIN — TIOTROPIUM BROMIDE 18 MCG: 18 CAPSULE ORAL; RESPIRATORY (INHALATION) at 08:19

## 2020-10-09 RX ADMIN — Medication 1 TABLET: at 08:19

## 2020-10-09 RX ADMIN — SULFAMETHOXAZOLE AND TRIMETHOPRIM 1 TABLET: 800; 160 TABLET ORAL at 14:18

## 2020-10-09 RX ADMIN — LORAZEPAM 0.5 MG: 0.5 TABLET ORAL at 14:18

## 2020-10-09 RX ADMIN — MEMANTINE 10 MG: 5 TABLET ORAL at 17:05

## 2020-10-09 RX ADMIN — CLOPIDOGREL BISULFATE 75 MG: 75 TABLET ORAL at 08:20

## 2020-10-09 RX ADMIN — QUETIAPINE FUMARATE 100 MG: 100 TABLET ORAL at 21:36

## 2020-10-09 RX ADMIN — MELATONIN 6 MG: at 21:36

## 2020-10-09 RX ADMIN — ATORVASTATIN CALCIUM 40 MG: 40 TABLET, FILM COATED ORAL at 21:36

## 2020-10-09 RX ADMIN — LORAZEPAM 0.5 MG: 0.5 TABLET ORAL at 21:36

## 2020-10-09 RX ADMIN — FLUTICASONE FUROATE AND VILANTEROL TRIFENATATE 1 PUFF: 100; 25 POWDER RESPIRATORY (INHALATION) at 08:19

## 2020-10-09 RX ADMIN — TAMSULOSIN HYDROCHLORIDE 0.4 MG: 0.4 CAPSULE ORAL at 17:05

## 2020-10-09 RX ADMIN — ACETAMINOPHEN 650 MG: 325 TABLET ORAL at 06:01

## 2020-10-09 RX ADMIN — LORAZEPAM 0.5 MG: 0.5 TABLET ORAL at 08:20

## 2020-10-09 RX ADMIN — MEMANTINE 10 MG: 5 TABLET ORAL at 08:19

## 2020-10-10 LAB — BACTERIA UR CULT: NORMAL

## 2020-10-10 PROCEDURE — 99231 SBSQ HOSP IP/OBS SF/LOW 25: CPT | Performed by: PSYCHIATRY & NEUROLOGY

## 2020-10-10 RX ADMIN — LORAZEPAM 0.5 MG: 0.5 TABLET ORAL at 21:05

## 2020-10-10 RX ADMIN — ATORVASTATIN CALCIUM 40 MG: 40 TABLET, FILM COATED ORAL at 21:05

## 2020-10-10 RX ADMIN — Medication 10000 UNITS: at 09:03

## 2020-10-10 RX ADMIN — CLOPIDOGREL BISULFATE 75 MG: 75 TABLET ORAL at 09:03

## 2020-10-10 RX ADMIN — ACETAMINOPHEN 975 MG: 325 TABLET ORAL at 15:18

## 2020-10-10 RX ADMIN — NITROFURANTOIN (MONOHYDRATE/MACROCRYSTALS) 100 MG: 75; 25 CAPSULE ORAL at 09:03

## 2020-10-10 RX ADMIN — QUETIAPINE FUMARATE 100 MG: 100 TABLET ORAL at 21:06

## 2020-10-10 RX ADMIN — MEMANTINE 10 MG: 5 TABLET ORAL at 17:30

## 2020-10-10 RX ADMIN — FLUTICASONE FUROATE AND VILANTEROL TRIFENATATE 1 PUFF: 100; 25 POWDER RESPIRATORY (INHALATION) at 09:04

## 2020-10-10 RX ADMIN — Medication 1 TABLET: at 09:03

## 2020-10-10 RX ADMIN — LORAZEPAM 0.5 MG: 0.5 TABLET ORAL at 13:42

## 2020-10-10 RX ADMIN — ASPIRIN 81 MG 81 MG: 81 TABLET ORAL at 09:03

## 2020-10-10 RX ADMIN — MEMANTINE 10 MG: 5 TABLET ORAL at 09:03

## 2020-10-10 RX ADMIN — PANTOPRAZOLE SODIUM 40 MG: 40 TABLET, DELAYED RELEASE ORAL at 06:53

## 2020-10-10 RX ADMIN — NITROFURANTOIN (MONOHYDRATE/MACROCRYSTALS) 100 MG: 75; 25 CAPSULE ORAL at 17:30

## 2020-10-10 RX ADMIN — MELATONIN 6 MG: at 21:05

## 2020-10-10 RX ADMIN — TAMSULOSIN HYDROCHLORIDE 0.4 MG: 0.4 CAPSULE ORAL at 17:30

## 2020-10-10 RX ADMIN — TIOTROPIUM BROMIDE 18 MCG: 18 CAPSULE ORAL; RESPIRATORY (INHALATION) at 09:04

## 2020-10-11 PROCEDURE — 99231 SBSQ HOSP IP/OBS SF/LOW 25: CPT | Performed by: PSYCHIATRY & NEUROLOGY

## 2020-10-11 RX ORDER — LANOLIN ALCOHOL/MO/W.PET/CERES
6 CREAM (GRAM) TOPICAL
Qty: 60 TABLET | Refills: 0 | Status: SHIPPED | OUTPATIENT
Start: 2020-10-11 | End: 2020-10-29 | Stop reason: HOSPADM

## 2020-10-11 RX ORDER — NITROFURANTOIN 25; 75 MG/1; MG/1
100 CAPSULE ORAL 2 TIMES DAILY WITH MEALS
Qty: 11 CAPSULE | Refills: 0 | Status: SHIPPED | OUTPATIENT
Start: 2020-10-11 | End: 2020-10-17

## 2020-10-11 RX ORDER — NITROGLYCERIN 0.4 MG/1
0.4 TABLET SUBLINGUAL
Qty: 10 TABLET | Refills: 0 | Status: SHIPPED | OUTPATIENT
Start: 2020-10-11 | End: 2020-10-29 | Stop reason: HOSPADM

## 2020-10-11 RX ORDER — PANTOPRAZOLE SODIUM 40 MG/1
40 TABLET, DELAYED RELEASE ORAL
Qty: 30 TABLET | Refills: 0 | Status: SHIPPED | OUTPATIENT
Start: 2020-10-12 | End: 2020-10-29 | Stop reason: HOSPADM

## 2020-10-11 RX ADMIN — ATENOLOL 25 MG: 25 TABLET ORAL at 20:54

## 2020-10-11 RX ADMIN — NITROFURANTOIN (MONOHYDRATE/MACROCRYSTALS) 100 MG: 75; 25 CAPSULE ORAL at 08:17

## 2020-10-11 RX ADMIN — Medication 10000 UNITS: at 08:17

## 2020-10-11 RX ADMIN — TIOTROPIUM BROMIDE 18 MCG: 18 CAPSULE ORAL; RESPIRATORY (INHALATION) at 08:17

## 2020-10-11 RX ADMIN — ACETAMINOPHEN 650 MG: 325 TABLET ORAL at 02:39

## 2020-10-11 RX ADMIN — MEMANTINE 10 MG: 5 TABLET ORAL at 16:54

## 2020-10-11 RX ADMIN — TAMSULOSIN HYDROCHLORIDE 0.4 MG: 0.4 CAPSULE ORAL at 16:55

## 2020-10-11 RX ADMIN — ATORVASTATIN CALCIUM 40 MG: 40 TABLET, FILM COATED ORAL at 20:54

## 2020-10-11 RX ADMIN — PANTOPRAZOLE SODIUM 40 MG: 40 TABLET, DELAYED RELEASE ORAL at 06:33

## 2020-10-11 RX ADMIN — ASPIRIN 81 MG 81 MG: 81 TABLET ORAL at 08:17

## 2020-10-11 RX ADMIN — NITROFURANTOIN (MONOHYDRATE/MACROCRYSTALS) 100 MG: 75; 25 CAPSULE ORAL at 16:54

## 2020-10-11 RX ADMIN — FLUTICASONE FUROATE AND VILANTEROL TRIFENATATE 1 PUFF: 100; 25 POWDER RESPIRATORY (INHALATION) at 08:17

## 2020-10-11 RX ADMIN — MELATONIN 6 MG: at 20:53

## 2020-10-11 RX ADMIN — LORAZEPAM 0.5 MG: 0.5 TABLET ORAL at 20:53

## 2020-10-11 RX ADMIN — CLOPIDOGREL BISULFATE 75 MG: 75 TABLET ORAL at 08:17

## 2020-10-11 RX ADMIN — QUETIAPINE FUMARATE 100 MG: 100 TABLET ORAL at 20:52

## 2020-10-11 RX ADMIN — ACETAMINOPHEN 975 MG: 325 TABLET ORAL at 16:53

## 2020-10-11 RX ADMIN — Medication 1 TABLET: at 08:17

## 2020-10-11 RX ADMIN — LORAZEPAM 0.5 MG: 0.5 TABLET ORAL at 13:48

## 2020-10-11 RX ADMIN — MEMANTINE 10 MG: 5 TABLET ORAL at 08:17

## 2020-10-12 PROCEDURE — 99232 SBSQ HOSP IP/OBS MODERATE 35: CPT | Performed by: NURSE PRACTITIONER

## 2020-10-12 RX ADMIN — ATENOLOL 25 MG: 25 TABLET ORAL at 21:23

## 2020-10-12 RX ADMIN — MEMANTINE 10 MG: 5 TABLET ORAL at 17:51

## 2020-10-12 RX ADMIN — NITROFURANTOIN (MONOHYDRATE/MACROCRYSTALS) 100 MG: 75; 25 CAPSULE ORAL at 16:44

## 2020-10-12 RX ADMIN — NITROFURANTOIN (MONOHYDRATE/MACROCRYSTALS) 100 MG: 75; 25 CAPSULE ORAL at 08:42

## 2020-10-12 RX ADMIN — TAMSULOSIN HYDROCHLORIDE 0.4 MG: 0.4 CAPSULE ORAL at 16:44

## 2020-10-12 RX ADMIN — MELATONIN 6 MG: at 21:23

## 2020-10-12 RX ADMIN — ASPIRIN 81 MG 81 MG: 81 TABLET ORAL at 08:42

## 2020-10-12 RX ADMIN — ACETAMINOPHEN 650 MG: 325 TABLET ORAL at 15:39

## 2020-10-12 RX ADMIN — Medication 10000 UNITS: at 08:42

## 2020-10-12 RX ADMIN — QUETIAPINE FUMARATE 100 MG: 100 TABLET ORAL at 21:24

## 2020-10-12 RX ADMIN — LORAZEPAM 0.5 MG: 0.5 TABLET ORAL at 21:23

## 2020-10-12 RX ADMIN — CLOPIDOGREL BISULFATE 75 MG: 75 TABLET ORAL at 08:42

## 2020-10-12 RX ADMIN — MEMANTINE 10 MG: 5 TABLET ORAL at 08:42

## 2020-10-12 RX ADMIN — Medication 1 TABLET: at 08:42

## 2020-10-12 RX ADMIN — LORAZEPAM 0.5 MG: 0.5 TABLET ORAL at 12:27

## 2020-10-12 RX ADMIN — FLUTICASONE FUROATE AND VILANTEROL TRIFENATATE 1 PUFF: 100; 25 POWDER RESPIRATORY (INHALATION) at 08:42

## 2020-10-12 RX ADMIN — ATORVASTATIN CALCIUM 40 MG: 40 TABLET, FILM COATED ORAL at 21:23

## 2020-10-12 RX ADMIN — PANTOPRAZOLE SODIUM 40 MG: 40 TABLET, DELAYED RELEASE ORAL at 05:56

## 2020-10-12 RX ADMIN — TIOTROPIUM BROMIDE 18 MCG: 18 CAPSULE ORAL; RESPIRATORY (INHALATION) at 08:42

## 2020-10-13 PROCEDURE — 99232 SBSQ HOSP IP/OBS MODERATE 35: CPT | Performed by: NURSE PRACTITIONER

## 2020-10-13 RX ADMIN — CLOPIDOGREL BISULFATE 75 MG: 75 TABLET ORAL at 08:35

## 2020-10-13 RX ADMIN — Medication 1 TABLET: at 08:35

## 2020-10-13 RX ADMIN — MEMANTINE 10 MG: 5 TABLET ORAL at 08:35

## 2020-10-13 RX ADMIN — ASPIRIN 81 MG 81 MG: 81 TABLET ORAL at 08:35

## 2020-10-13 RX ADMIN — FLUTICASONE FUROATE AND VILANTEROL TRIFENATATE 1 PUFF: 100; 25 POWDER RESPIRATORY (INHALATION) at 08:36

## 2020-10-13 RX ADMIN — ACETAMINOPHEN 650 MG: 325 TABLET ORAL at 19:23

## 2020-10-13 RX ADMIN — MEMANTINE 10 MG: 5 TABLET ORAL at 17:06

## 2020-10-13 RX ADMIN — Medication 10000 UNITS: at 08:35

## 2020-10-13 RX ADMIN — NITROFURANTOIN (MONOHYDRATE/MACROCRYSTALS) 100 MG: 75; 25 CAPSULE ORAL at 17:06

## 2020-10-13 RX ADMIN — ATORVASTATIN CALCIUM 40 MG: 40 TABLET, FILM COATED ORAL at 21:29

## 2020-10-13 RX ADMIN — QUETIAPINE FUMARATE 100 MG: 100 TABLET ORAL at 21:32

## 2020-10-13 RX ADMIN — TAMSULOSIN HYDROCHLORIDE 0.4 MG: 0.4 CAPSULE ORAL at 17:06

## 2020-10-13 RX ADMIN — MELATONIN 6 MG: at 21:33

## 2020-10-13 RX ADMIN — PANTOPRAZOLE SODIUM 40 MG: 40 TABLET, DELAYED RELEASE ORAL at 08:35

## 2020-10-13 RX ADMIN — LORAZEPAM 0.5 MG: 0.5 TABLET ORAL at 19:25

## 2020-10-13 RX ADMIN — LORAZEPAM 0.5 MG: 0.5 TABLET ORAL at 13:42

## 2020-10-13 RX ADMIN — TIOTROPIUM BROMIDE 18 MCG: 18 CAPSULE ORAL; RESPIRATORY (INHALATION) at 08:36

## 2020-10-13 RX ADMIN — NITROFURANTOIN (MONOHYDRATE/MACROCRYSTALS) 100 MG: 75; 25 CAPSULE ORAL at 08:35

## 2020-10-14 PROCEDURE — 99232 SBSQ HOSP IP/OBS MODERATE 35: CPT | Performed by: NURSE PRACTITIONER

## 2020-10-14 PROCEDURE — 97116 GAIT TRAINING THERAPY: CPT

## 2020-10-14 PROCEDURE — 97530 THERAPEUTIC ACTIVITIES: CPT

## 2020-10-14 RX ADMIN — ACETAMINOPHEN 650 MG: 325 TABLET ORAL at 06:00

## 2020-10-14 RX ADMIN — FLUTICASONE FUROATE AND VILANTEROL TRIFENATATE 1 PUFF: 100; 25 POWDER RESPIRATORY (INHALATION) at 08:21

## 2020-10-14 RX ADMIN — NITROFURANTOIN (MONOHYDRATE/MACROCRYSTALS) 100 MG: 75; 25 CAPSULE ORAL at 08:21

## 2020-10-14 RX ADMIN — Medication 10000 UNITS: at 08:22

## 2020-10-14 RX ADMIN — ASPIRIN 81 MG 81 MG: 81 TABLET ORAL at 08:21

## 2020-10-14 RX ADMIN — PANTOPRAZOLE SODIUM 40 MG: 40 TABLET, DELAYED RELEASE ORAL at 06:05

## 2020-10-14 RX ADMIN — MEMANTINE 10 MG: 5 TABLET ORAL at 08:21

## 2020-10-14 RX ADMIN — LORAZEPAM 0.5 MG: 0.5 TABLET ORAL at 08:53

## 2020-10-14 RX ADMIN — TIOTROPIUM BROMIDE 18 MCG: 18 CAPSULE ORAL; RESPIRATORY (INHALATION) at 08:22

## 2020-10-14 RX ADMIN — LORAZEPAM 0.5 MG: 0.5 TABLET ORAL at 13:24

## 2020-10-14 RX ADMIN — ATORVASTATIN CALCIUM 40 MG: 40 TABLET, FILM COATED ORAL at 20:38

## 2020-10-14 RX ADMIN — CLOPIDOGREL BISULFATE 75 MG: 75 TABLET ORAL at 08:21

## 2020-10-14 RX ADMIN — ATENOLOL 25 MG: 25 TABLET ORAL at 20:41

## 2020-10-14 RX ADMIN — Medication 1 TABLET: at 08:21

## 2020-10-14 RX ADMIN — MELATONIN 6 MG: at 20:41

## 2020-10-14 RX ADMIN — ACETAMINOPHEN 975 MG: 325 TABLET ORAL at 17:06

## 2020-10-14 RX ADMIN — MEMANTINE 10 MG: 5 TABLET ORAL at 16:04

## 2020-10-14 RX ADMIN — NITROFURANTOIN (MONOHYDRATE/MACROCRYSTALS) 100 MG: 75; 25 CAPSULE ORAL at 16:04

## 2020-10-14 RX ADMIN — QUETIAPINE FUMARATE 100 MG: 100 TABLET ORAL at 20:41

## 2020-10-14 RX ADMIN — TAMSULOSIN HYDROCHLORIDE 0.4 MG: 0.4 CAPSULE ORAL at 16:05

## 2020-10-14 RX ADMIN — LORAZEPAM 0.5 MG: 0.5 TABLET ORAL at 20:40

## 2020-10-15 PROCEDURE — 99232 SBSQ HOSP IP/OBS MODERATE 35: CPT | Performed by: NURSE PRACTITIONER

## 2020-10-15 PROCEDURE — 97110 THERAPEUTIC EXERCISES: CPT

## 2020-10-15 PROCEDURE — 97116 GAIT TRAINING THERAPY: CPT

## 2020-10-15 RX ORDER — LORAZEPAM 0.5 MG/1
0.5 TABLET ORAL 3 TIMES DAILY
Status: DISCONTINUED | OUTPATIENT
Start: 2020-10-16 | End: 2020-10-25 | Stop reason: HOSPADM

## 2020-10-15 RX ORDER — TRAMADOL HYDROCHLORIDE 50 MG/1
50 TABLET ORAL EVERY 6 HOURS PRN
Status: DISCONTINUED | OUTPATIENT
Start: 2020-10-15 | End: 2020-10-16

## 2020-10-15 RX ADMIN — MEMANTINE 10 MG: 5 TABLET ORAL at 17:21

## 2020-10-15 RX ADMIN — QUETIAPINE FUMARATE 100 MG: 100 TABLET ORAL at 21:32

## 2020-10-15 RX ADMIN — ACETAMINOPHEN 650 MG: 325 TABLET ORAL at 09:03

## 2020-10-15 RX ADMIN — PANTOPRAZOLE SODIUM 40 MG: 40 TABLET, DELAYED RELEASE ORAL at 06:30

## 2020-10-15 RX ADMIN — TRAMADOL HYDROCHLORIDE 50 MG: 50 TABLET, FILM COATED ORAL at 15:51

## 2020-10-15 RX ADMIN — CLOPIDOGREL BISULFATE 75 MG: 75 TABLET ORAL at 09:03

## 2020-10-15 RX ADMIN — MELATONIN 6 MG: at 21:32

## 2020-10-15 RX ADMIN — TIOTROPIUM BROMIDE 18 MCG: 18 CAPSULE ORAL; RESPIRATORY (INHALATION) at 09:04

## 2020-10-15 RX ADMIN — FLUTICASONE FUROATE AND VILANTEROL TRIFENATATE 1 PUFF: 100; 25 POWDER RESPIRATORY (INHALATION) at 09:04

## 2020-10-15 RX ADMIN — LORAZEPAM 0.5 MG: 0.5 TABLET ORAL at 11:09

## 2020-10-15 RX ADMIN — Medication 10000 UNITS: at 09:05

## 2020-10-15 RX ADMIN — ATENOLOL 25 MG: 25 TABLET ORAL at 21:32

## 2020-10-15 RX ADMIN — ATORVASTATIN CALCIUM 40 MG: 40 TABLET, FILM COATED ORAL at 21:32

## 2020-10-15 RX ADMIN — LORAZEPAM 0.5 MG: 0.5 TABLET ORAL at 14:00

## 2020-10-15 RX ADMIN — MEMANTINE 10 MG: 5 TABLET ORAL at 09:03

## 2020-10-15 RX ADMIN — ASPIRIN 81 MG 81 MG: 81 TABLET ORAL at 09:03

## 2020-10-15 RX ADMIN — NITROFURANTOIN (MONOHYDRATE/MACROCRYSTALS) 100 MG: 75; 25 CAPSULE ORAL at 15:44

## 2020-10-15 RX ADMIN — ACETAMINOPHEN 975 MG: 325 TABLET ORAL at 03:27

## 2020-10-15 RX ADMIN — TAMSULOSIN HYDROCHLORIDE 0.4 MG: 0.4 CAPSULE ORAL at 17:21

## 2020-10-15 RX ADMIN — Medication 1 TABLET: at 09:04

## 2020-10-15 RX ADMIN — NITROFURANTOIN (MONOHYDRATE/MACROCRYSTALS) 100 MG: 75; 25 CAPSULE ORAL at 09:03

## 2020-10-16 LAB
BASOPHILS # BLD AUTO: 0 THOUSANDS/ΜL (ref 0–0.1)
BASOPHILS NFR BLD AUTO: 1 % (ref 0–2)
EOSINOPHIL # BLD AUTO: 0.2 THOUSAND/ΜL (ref 0–0.61)
EOSINOPHIL NFR BLD AUTO: 4 % (ref 0–5)
ERYTHROCYTE [DISTWIDTH] IN BLOOD BY AUTOMATED COUNT: 16.3 % (ref 11.5–14.5)
HCT VFR BLD AUTO: 44 % (ref 42–47)
HGB BLD-MCNC: 14.9 G/DL (ref 14–18)
LYMPHOCYTES # BLD AUTO: 1.3 THOUSANDS/ΜL (ref 0.6–4.47)
LYMPHOCYTES NFR BLD AUTO: 28 % (ref 21–51)
MCH RBC QN AUTO: 32.2 PG (ref 26–34)
MCHC RBC AUTO-ENTMCNC: 33.9 G/DL (ref 31–37)
MCV RBC AUTO: 95 FL (ref 81–99)
MONOCYTES # BLD AUTO: 0.4 THOUSAND/ΜL (ref 0.17–1.22)
MONOCYTES NFR BLD AUTO: 9 % (ref 2–12)
NEUTROPHILS # BLD AUTO: 2.8 THOUSANDS/ΜL (ref 1.4–6.5)
NEUTS SEG NFR BLD AUTO: 58 % (ref 42–75)
PLATELET # BLD AUTO: 132 THOUSANDS/UL (ref 149–390)
PMV BLD AUTO: 8.5 FL (ref 8.6–11.7)
RBC # BLD AUTO: 4.64 MILLION/UL (ref 4.3–5.9)
WBC # BLD AUTO: 4.8 THOUSAND/UL (ref 4.8–10.8)

## 2020-10-16 PROCEDURE — 85025 COMPLETE CBC W/AUTO DIFF WBC: CPT | Performed by: PHYSICIAN ASSISTANT

## 2020-10-16 PROCEDURE — 99232 SBSQ HOSP IP/OBS MODERATE 35: CPT | Performed by: NURSE PRACTITIONER

## 2020-10-16 RX ORDER — TRAMADOL HYDROCHLORIDE 50 MG/1
25 TABLET ORAL EVERY 6 HOURS PRN
Status: DISCONTINUED | OUTPATIENT
Start: 2020-10-16 | End: 2020-10-25 | Stop reason: HOSPADM

## 2020-10-16 RX ADMIN — NITROFURANTOIN (MONOHYDRATE/MACROCRYSTALS) 100 MG: 75; 25 CAPSULE ORAL at 15:44

## 2020-10-16 RX ADMIN — ASPIRIN 81 MG 81 MG: 81 TABLET ORAL at 08:10

## 2020-10-16 RX ADMIN — LORAZEPAM 0.5 MG: 0.5 TABLET ORAL at 08:10

## 2020-10-16 RX ADMIN — Medication 10000 UNITS: at 08:21

## 2020-10-16 RX ADMIN — CLOPIDOGREL BISULFATE 75 MG: 75 TABLET ORAL at 08:10

## 2020-10-16 RX ADMIN — TRAMADOL HYDROCHLORIDE 50 MG: 50 TABLET, FILM COATED ORAL at 05:51

## 2020-10-16 RX ADMIN — TIOTROPIUM BROMIDE 18 MCG: 18 CAPSULE ORAL; RESPIRATORY (INHALATION) at 08:10

## 2020-10-16 RX ADMIN — MELATONIN 6 MG: at 22:00

## 2020-10-16 RX ADMIN — ATENOLOL 25 MG: 25 TABLET ORAL at 22:00

## 2020-10-16 RX ADMIN — FLUTICASONE FUROATE AND VILANTEROL TRIFENATATE 1 PUFF: 100; 25 POWDER RESPIRATORY (INHALATION) at 08:10

## 2020-10-16 RX ADMIN — TAMSULOSIN HYDROCHLORIDE 0.4 MG: 0.4 CAPSULE ORAL at 15:45

## 2020-10-16 RX ADMIN — MEMANTINE 10 MG: 5 TABLET ORAL at 08:10

## 2020-10-16 RX ADMIN — ATORVASTATIN CALCIUM 40 MG: 40 TABLET, FILM COATED ORAL at 22:01

## 2020-10-16 RX ADMIN — QUETIAPINE FUMARATE 100 MG: 100 TABLET ORAL at 21:59

## 2020-10-16 RX ADMIN — NITROFURANTOIN (MONOHYDRATE/MACROCRYSTALS) 100 MG: 75; 25 CAPSULE ORAL at 08:09

## 2020-10-16 RX ADMIN — TRAMADOL HYDROCHLORIDE 25 MG: 50 TABLET, FILM COATED ORAL at 16:52

## 2020-10-16 RX ADMIN — MEMANTINE 10 MG: 5 TABLET ORAL at 15:44

## 2020-10-16 RX ADMIN — Medication 1 TABLET: at 08:09

## 2020-10-16 RX ADMIN — ACETAMINOPHEN 650 MG: 325 TABLET ORAL at 19:58

## 2020-10-16 RX ADMIN — LORAZEPAM 0.5 MG: 0.5 TABLET ORAL at 15:44

## 2020-10-16 RX ADMIN — PANTOPRAZOLE SODIUM 40 MG: 40 TABLET, DELAYED RELEASE ORAL at 05:51

## 2020-10-17 PROCEDURE — 99231 SBSQ HOSP IP/OBS SF/LOW 25: CPT | Performed by: PSYCHIATRY & NEUROLOGY

## 2020-10-17 RX ADMIN — QUETIAPINE FUMARATE 100 MG: 100 TABLET ORAL at 21:41

## 2020-10-17 RX ADMIN — ASPIRIN 81 MG 81 MG: 81 TABLET ORAL at 08:13

## 2020-10-17 RX ADMIN — MELATONIN 6 MG: at 21:41

## 2020-10-17 RX ADMIN — LORAZEPAM 0.5 MG: 0.5 TABLET ORAL at 08:13

## 2020-10-17 RX ADMIN — MEMANTINE 10 MG: 5 TABLET ORAL at 08:13

## 2020-10-17 RX ADMIN — ATENOLOL 25 MG: 25 TABLET ORAL at 21:41

## 2020-10-17 RX ADMIN — ATORVASTATIN CALCIUM 40 MG: 40 TABLET, FILM COATED ORAL at 21:41

## 2020-10-17 RX ADMIN — TRAMADOL HYDROCHLORIDE 25 MG: 50 TABLET, FILM COATED ORAL at 15:30

## 2020-10-17 RX ADMIN — CLOPIDOGREL BISULFATE 75 MG: 75 TABLET ORAL at 08:13

## 2020-10-17 RX ADMIN — PANTOPRAZOLE SODIUM 40 MG: 40 TABLET, DELAYED RELEASE ORAL at 05:59

## 2020-10-17 RX ADMIN — LORAZEPAM 0.5 MG: 0.5 TABLET ORAL at 16:53

## 2020-10-17 RX ADMIN — TRAMADOL HYDROCHLORIDE 25 MG: 50 TABLET, FILM COATED ORAL at 04:51

## 2020-10-17 RX ADMIN — FLUTICASONE FUROATE AND VILANTEROL TRIFENATATE 1 PUFF: 100; 25 POWDER RESPIRATORY (INHALATION) at 08:12

## 2020-10-17 RX ADMIN — TAMSULOSIN HYDROCHLORIDE 0.4 MG: 0.4 CAPSULE ORAL at 16:53

## 2020-10-17 RX ADMIN — LORAZEPAM 0.5 MG: 0.5 TABLET ORAL at 11:53

## 2020-10-17 RX ADMIN — Medication 1 TABLET: at 08:13

## 2020-10-17 RX ADMIN — MEMANTINE 10 MG: 5 TABLET ORAL at 17:15

## 2020-10-17 RX ADMIN — TIOTROPIUM BROMIDE 18 MCG: 18 CAPSULE ORAL; RESPIRATORY (INHALATION) at 08:12

## 2020-10-17 RX ADMIN — Medication 10000 UNITS: at 08:13

## 2020-10-18 PROCEDURE — 99232 SBSQ HOSP IP/OBS MODERATE 35: CPT | Performed by: PSYCHIATRY & NEUROLOGY

## 2020-10-18 RX ADMIN — MEMANTINE 10 MG: 5 TABLET ORAL at 17:03

## 2020-10-18 RX ADMIN — TIOTROPIUM BROMIDE 18 MCG: 18 CAPSULE ORAL; RESPIRATORY (INHALATION) at 08:04

## 2020-10-18 RX ADMIN — PANTOPRAZOLE SODIUM 40 MG: 40 TABLET, DELAYED RELEASE ORAL at 06:24

## 2020-10-18 RX ADMIN — Medication 10000 UNITS: at 08:05

## 2020-10-18 RX ADMIN — FLUTICASONE FUROATE AND VILANTEROL TRIFENATATE 1 PUFF: 100; 25 POWDER RESPIRATORY (INHALATION) at 08:04

## 2020-10-18 RX ADMIN — CLOPIDOGREL BISULFATE 75 MG: 75 TABLET ORAL at 08:05

## 2020-10-18 RX ADMIN — LORAZEPAM 0.5 MG: 0.5 TABLET ORAL at 08:05

## 2020-10-18 RX ADMIN — ASPIRIN 81 MG 81 MG: 81 TABLET ORAL at 08:04

## 2020-10-18 RX ADMIN — Medication 1 TABLET: at 08:05

## 2020-10-18 RX ADMIN — LORAZEPAM 0.5 MG: 0.5 TABLET ORAL at 15:25

## 2020-10-18 RX ADMIN — LORAZEPAM 0.5 MG: 0.5 TABLET ORAL at 11:56

## 2020-10-18 RX ADMIN — TAMSULOSIN HYDROCHLORIDE 0.4 MG: 0.4 CAPSULE ORAL at 17:03

## 2020-10-18 RX ADMIN — MEMANTINE 10 MG: 5 TABLET ORAL at 08:05

## 2020-10-19 PROBLEM — Z85.118 HISTORY OF LUNG CANCER: Status: RESOLVED | Noted: 2020-08-17 | Resolved: 2020-10-19

## 2020-10-19 PROCEDURE — 99231 SBSQ HOSP IP/OBS SF/LOW 25: CPT | Performed by: PSYCHIATRY & NEUROLOGY

## 2020-10-19 PROCEDURE — 97110 THERAPEUTIC EXERCISES: CPT

## 2020-10-19 RX ADMIN — Medication 10000 UNITS: at 08:17

## 2020-10-19 RX ADMIN — MEMANTINE 10 MG: 5 TABLET ORAL at 17:02

## 2020-10-19 RX ADMIN — ATORVASTATIN CALCIUM 40 MG: 40 TABLET, FILM COATED ORAL at 21:04

## 2020-10-19 RX ADMIN — ASPIRIN 81 MG 81 MG: 81 TABLET ORAL at 08:15

## 2020-10-19 RX ADMIN — FLUTICASONE FUROATE AND VILANTEROL TRIFENATATE 1 PUFF: 100; 25 POWDER RESPIRATORY (INHALATION) at 08:16

## 2020-10-19 RX ADMIN — MELATONIN 6 MG: at 21:04

## 2020-10-19 RX ADMIN — Medication 1 TABLET: at 08:17

## 2020-10-19 RX ADMIN — MEMANTINE 10 MG: 5 TABLET ORAL at 08:16

## 2020-10-19 RX ADMIN — TAMSULOSIN HYDROCHLORIDE 0.4 MG: 0.4 CAPSULE ORAL at 17:02

## 2020-10-19 RX ADMIN — QUETIAPINE FUMARATE 100 MG: 100 TABLET ORAL at 21:04

## 2020-10-19 RX ADMIN — CLOPIDOGREL BISULFATE 75 MG: 75 TABLET ORAL at 08:16

## 2020-10-19 RX ADMIN — LORAZEPAM 0.5 MG: 0.5 TABLET ORAL at 11:32

## 2020-10-19 RX ADMIN — TIOTROPIUM BROMIDE 18 MCG: 18 CAPSULE ORAL; RESPIRATORY (INHALATION) at 08:17

## 2020-10-19 RX ADMIN — ATENOLOL 25 MG: 25 TABLET ORAL at 21:04

## 2020-10-19 RX ADMIN — PANTOPRAZOLE SODIUM 40 MG: 40 TABLET, DELAYED RELEASE ORAL at 06:24

## 2020-10-19 RX ADMIN — LORAZEPAM 0.5 MG: 0.5 TABLET ORAL at 17:00

## 2020-10-19 RX ADMIN — LORAZEPAM 0.5 MG: 0.5 TABLET ORAL at 08:16

## 2020-10-20 PROCEDURE — 99232 SBSQ HOSP IP/OBS MODERATE 35: CPT | Performed by: PSYCHIATRY & NEUROLOGY

## 2020-10-20 RX ADMIN — Medication 10000 UNITS: at 08:47

## 2020-10-20 RX ADMIN — ATORVASTATIN CALCIUM 40 MG: 40 TABLET, FILM COATED ORAL at 20:50

## 2020-10-20 RX ADMIN — LORAZEPAM 0.5 MG: 0.5 TABLET ORAL at 08:47

## 2020-10-20 RX ADMIN — LORAZEPAM 0.5 MG: 0.5 TABLET ORAL at 11:46

## 2020-10-20 RX ADMIN — FLUTICASONE FUROATE AND VILANTEROL TRIFENATATE 1 PUFF: 100; 25 POWDER RESPIRATORY (INHALATION) at 08:45

## 2020-10-20 RX ADMIN — QUETIAPINE FUMARATE 100 MG: 100 TABLET ORAL at 20:50

## 2020-10-20 RX ADMIN — ATENOLOL 25 MG: 25 TABLET ORAL at 20:48

## 2020-10-20 RX ADMIN — MEMANTINE 10 MG: 5 TABLET ORAL at 17:00

## 2020-10-20 RX ADMIN — ASPIRIN 81 MG 81 MG: 81 TABLET ORAL at 08:47

## 2020-10-20 RX ADMIN — PANTOPRAZOLE SODIUM 40 MG: 40 TABLET, DELAYED RELEASE ORAL at 06:35

## 2020-10-20 RX ADMIN — TAMSULOSIN HYDROCHLORIDE 0.4 MG: 0.4 CAPSULE ORAL at 16:34

## 2020-10-20 RX ADMIN — ACETAMINOPHEN 650 MG: 325 TABLET ORAL at 16:32

## 2020-10-20 RX ADMIN — MELATONIN 6 MG: at 20:49

## 2020-10-20 RX ADMIN — TIOTROPIUM BROMIDE 18 MCG: 18 CAPSULE ORAL; RESPIRATORY (INHALATION) at 08:47

## 2020-10-20 RX ADMIN — Medication 1 TABLET: at 08:47

## 2020-10-20 RX ADMIN — MEMANTINE 10 MG: 5 TABLET ORAL at 08:47

## 2020-10-20 RX ADMIN — CLOPIDOGREL BISULFATE 75 MG: 75 TABLET ORAL at 08:47

## 2020-10-20 RX ADMIN — LORAZEPAM 0.5 MG: 0.5 TABLET ORAL at 15:36

## 2020-10-21 PROCEDURE — 99232 SBSQ HOSP IP/OBS MODERATE 35: CPT | Performed by: PSYCHIATRY & NEUROLOGY

## 2020-10-21 RX ADMIN — PANTOPRAZOLE SODIUM 40 MG: 40 TABLET, DELAYED RELEASE ORAL at 08:39

## 2020-10-21 RX ADMIN — TIOTROPIUM BROMIDE 18 MCG: 18 CAPSULE ORAL; RESPIRATORY (INHALATION) at 08:38

## 2020-10-21 RX ADMIN — FLUTICASONE FUROATE AND VILANTEROL TRIFENATATE 1 PUFF: 100; 25 POWDER RESPIRATORY (INHALATION) at 08:37

## 2020-10-21 RX ADMIN — LORAZEPAM 0.5 MG: 0.5 TABLET ORAL at 16:21

## 2020-10-21 RX ADMIN — LORAZEPAM 0.5 MG: 0.5 TABLET ORAL at 11:15

## 2020-10-21 RX ADMIN — ATENOLOL 25 MG: 25 TABLET ORAL at 20:56

## 2020-10-21 RX ADMIN — MELATONIN 6 MG: at 20:56

## 2020-10-21 RX ADMIN — LORAZEPAM 0.5 MG: 0.5 TABLET ORAL at 08:39

## 2020-10-21 RX ADMIN — ATORVASTATIN CALCIUM 40 MG: 40 TABLET, FILM COATED ORAL at 20:56

## 2020-10-21 RX ADMIN — ACETAMINOPHEN 650 MG: 325 TABLET ORAL at 18:17

## 2020-10-21 RX ADMIN — MEMANTINE 10 MG: 5 TABLET ORAL at 08:39

## 2020-10-21 RX ADMIN — TAMSULOSIN HYDROCHLORIDE 0.4 MG: 0.4 CAPSULE ORAL at 16:21

## 2020-10-21 RX ADMIN — Medication 1 TABLET: at 08:39

## 2020-10-21 RX ADMIN — Medication 10000 UNITS: at 08:38

## 2020-10-21 RX ADMIN — MEMANTINE 10 MG: 5 TABLET ORAL at 16:21

## 2020-10-21 RX ADMIN — ASPIRIN 81 MG 81 MG: 81 TABLET ORAL at 08:39

## 2020-10-21 RX ADMIN — CLOPIDOGREL BISULFATE 75 MG: 75 TABLET ORAL at 08:39

## 2020-10-21 RX ADMIN — QUETIAPINE FUMARATE 100 MG: 100 TABLET ORAL at 20:55

## 2020-10-21 RX ADMIN — TRAMADOL HYDROCHLORIDE 25 MG: 50 TABLET, FILM COATED ORAL at 20:55

## 2020-10-22 PROCEDURE — 99232 SBSQ HOSP IP/OBS MODERATE 35: CPT | Performed by: NURSE PRACTITIONER

## 2020-10-22 RX ADMIN — CLOPIDOGREL BISULFATE 75 MG: 75 TABLET ORAL at 08:01

## 2020-10-22 RX ADMIN — LORAZEPAM 0.5 MG: 0.5 TABLET ORAL at 08:02

## 2020-10-22 RX ADMIN — PANTOPRAZOLE SODIUM 40 MG: 40 TABLET, DELAYED RELEASE ORAL at 05:59

## 2020-10-22 RX ADMIN — MELATONIN 6 MG: at 21:22

## 2020-10-22 RX ADMIN — ASPIRIN 81 MG 81 MG: 81 TABLET ORAL at 08:02

## 2020-10-22 RX ADMIN — QUETIAPINE FUMARATE 100 MG: 100 TABLET ORAL at 21:25

## 2020-10-22 RX ADMIN — TRAMADOL HYDROCHLORIDE 25 MG: 50 TABLET, FILM COATED ORAL at 14:03

## 2020-10-22 RX ADMIN — LORAZEPAM 0.5 MG: 0.5 TABLET ORAL at 11:33

## 2020-10-22 RX ADMIN — MEMANTINE 10 MG: 5 TABLET ORAL at 08:02

## 2020-10-22 RX ADMIN — FLUTICASONE FUROATE AND VILANTEROL TRIFENATATE 1 PUFF: 100; 25 POWDER RESPIRATORY (INHALATION) at 08:00

## 2020-10-22 RX ADMIN — TAMSULOSIN HYDROCHLORIDE 0.4 MG: 0.4 CAPSULE ORAL at 16:12

## 2020-10-22 RX ADMIN — TIOTROPIUM BROMIDE 18 MCG: 18 CAPSULE ORAL; RESPIRATORY (INHALATION) at 08:04

## 2020-10-22 RX ADMIN — Medication 1 TABLET: at 08:02

## 2020-10-22 RX ADMIN — ATORVASTATIN CALCIUM 40 MG: 40 TABLET, FILM COATED ORAL at 21:25

## 2020-10-22 RX ADMIN — ATENOLOL 25 MG: 25 TABLET ORAL at 21:22

## 2020-10-22 RX ADMIN — LORAZEPAM 0.5 MG: 0.5 TABLET ORAL at 16:10

## 2020-10-22 RX ADMIN — MEMANTINE 10 MG: 5 TABLET ORAL at 16:11

## 2020-10-22 RX ADMIN — Medication 10000 UNITS: at 08:02

## 2020-10-23 LAB
AMORPH PHOS CRY URNS QL MICRO: ABNORMAL /HPF
BACTERIA UR QL AUTO: ABNORMAL /HPF
BILIRUB UR QL STRIP: NEGATIVE
CLARITY UR: ABNORMAL
COLOR UR: YELLOW
GLUCOSE UR STRIP-MCNC: NEGATIVE MG/DL
HGB UR QL STRIP.AUTO: NEGATIVE
KETONES UR STRIP-MCNC: NEGATIVE MG/DL
LEUKOCYTE ESTERASE UR QL STRIP: ABNORMAL
NITRITE UR QL STRIP: NEGATIVE
NON-SQ EPI CELLS URNS QL MICRO: ABNORMAL /HPF
PH UR STRIP.AUTO: 7 [PH]
PROT UR STRIP-MCNC: NEGATIVE MG/DL
RBC #/AREA URNS AUTO: ABNORMAL /HPF
SP GR UR STRIP.AUTO: 1.01 (ref 1–1.03)
UROBILINOGEN UR QL STRIP.AUTO: 1 E.U./DL
WBC #/AREA URNS AUTO: ABNORMAL /HPF

## 2020-10-23 PROCEDURE — 81001 URINALYSIS AUTO W/SCOPE: CPT | Performed by: PHYSICIAN ASSISTANT

## 2020-10-23 PROCEDURE — 87086 URINE CULTURE/COLONY COUNT: CPT | Performed by: PHYSICIAN ASSISTANT

## 2020-10-23 PROCEDURE — 99231 SBSQ HOSP IP/OBS SF/LOW 25: CPT | Performed by: PSYCHIATRY & NEUROLOGY

## 2020-10-23 RX ORDER — NITROFURANTOIN 25; 75 MG/1; MG/1
100 CAPSULE ORAL 2 TIMES DAILY WITH MEALS
Status: DISCONTINUED | OUTPATIENT
Start: 2020-10-23 | End: 2020-10-25 | Stop reason: HOSPADM

## 2020-10-23 RX ADMIN — TRAMADOL HYDROCHLORIDE 25 MG: 50 TABLET, FILM COATED ORAL at 11:20

## 2020-10-23 RX ADMIN — TAMSULOSIN HYDROCHLORIDE 0.4 MG: 0.4 CAPSULE ORAL at 16:30

## 2020-10-23 RX ADMIN — ASPIRIN 81 MG 81 MG: 81 TABLET ORAL at 08:45

## 2020-10-23 RX ADMIN — ATENOLOL 25 MG: 25 TABLET ORAL at 20:44

## 2020-10-23 RX ADMIN — MEMANTINE 10 MG: 5 TABLET ORAL at 16:30

## 2020-10-23 RX ADMIN — FLUTICASONE FUROATE AND VILANTEROL TRIFENATATE 1 PUFF: 100; 25 POWDER RESPIRATORY (INHALATION) at 08:47

## 2020-10-23 RX ADMIN — LORAZEPAM 0.5 MG: 0.5 TABLET ORAL at 08:45

## 2020-10-23 RX ADMIN — CLOPIDOGREL BISULFATE 75 MG: 75 TABLET ORAL at 08:45

## 2020-10-23 RX ADMIN — ATORVASTATIN CALCIUM 40 MG: 40 TABLET, FILM COATED ORAL at 20:45

## 2020-10-23 RX ADMIN — MELATONIN 6 MG: at 20:45

## 2020-10-23 RX ADMIN — TIOTROPIUM BROMIDE 18 MCG: 18 CAPSULE ORAL; RESPIRATORY (INHALATION) at 08:43

## 2020-10-23 RX ADMIN — Medication 10000 UNITS: at 08:45

## 2020-10-23 RX ADMIN — Medication 1 TABLET: at 08:45

## 2020-10-23 RX ADMIN — QUETIAPINE FUMARATE 100 MG: 100 TABLET ORAL at 20:45

## 2020-10-23 RX ADMIN — PANTOPRAZOLE SODIUM 40 MG: 40 TABLET, DELAYED RELEASE ORAL at 05:49

## 2020-10-23 RX ADMIN — NITROFURANTOIN (MONOHYDRATE/MACROCRYSTALS) 100 MG: 75; 25 CAPSULE ORAL at 16:30

## 2020-10-23 RX ADMIN — LORAZEPAM 0.5 MG: 0.5 TABLET ORAL at 11:39

## 2020-10-23 RX ADMIN — LORAZEPAM 0.5 MG: 0.5 TABLET ORAL at 16:30

## 2020-10-23 RX ADMIN — MEMANTINE 10 MG: 5 TABLET ORAL at 08:45

## 2020-10-24 ENCOUNTER — APPOINTMENT (INPATIENT)
Dept: RADIOLOGY | Facility: HOSPITAL | Age: 80
DRG: 885 | End: 2020-10-24
Payer: MEDICARE

## 2020-10-24 LAB
BACTERIA UR CULT: NORMAL
ERYTHROCYTE [DISTWIDTH] IN BLOOD BY AUTOMATED COUNT: 16.2 % (ref 11.5–14.5)
HCT VFR BLD AUTO: 46.3 % (ref 42–47)
HGB BLD-MCNC: 15.7 G/DL (ref 14–18)
LYMPHOCYTES # BLD AUTO: 0.72 THOUSAND/UL (ref 0.6–4.47)
LYMPHOCYTES # BLD AUTO: 6 % (ref 20–51)
MCH RBC QN AUTO: 32.3 PG (ref 26–34)
MCHC RBC AUTO-ENTMCNC: 33.9 G/DL (ref 31–37)
MCV RBC AUTO: 96 FL (ref 81–99)
MONOCYTES # BLD AUTO: 0.24 THOUSAND/UL (ref 0–1.22)
MONOCYTES NFR BLD AUTO: 2 % (ref 4–12)
NEUTS SEG # BLD: 11.04 THOUSAND/UL (ref 1.81–6.82)
NEUTS SEG NFR BLD AUTO: 92 % (ref 42–75)
PLATELET # BLD AUTO: 118 THOUSANDS/UL (ref 149–390)
PLATELET BLD QL SMEAR: ABNORMAL
PMV BLD AUTO: 8.2 FL (ref 8.6–11.7)
RBC # BLD AUTO: 4.85 MILLION/UL (ref 4.3–5.9)
RBC MORPH BLD: NORMAL
TOTAL CELLS COUNTED SPEC: 100
WBC # BLD AUTO: 12 THOUSAND/UL (ref 4.8–10.8)

## 2020-10-24 PROCEDURE — 85007 BL SMEAR W/DIFF WBC COUNT: CPT | Performed by: FAMILY MEDICINE

## 2020-10-24 PROCEDURE — 99232 SBSQ HOSP IP/OBS MODERATE 35: CPT | Performed by: PHYSICIAN ASSISTANT

## 2020-10-24 PROCEDURE — 87040 BLOOD CULTURE FOR BACTERIA: CPT | Performed by: FAMILY MEDICINE

## 2020-10-24 PROCEDURE — 85027 COMPLETE CBC AUTOMATED: CPT | Performed by: FAMILY MEDICINE

## 2020-10-24 PROCEDURE — 71045 X-RAY EXAM CHEST 1 VIEW: CPT

## 2020-10-24 RX ADMIN — ASPIRIN 81 MG 81 MG: 81 TABLET ORAL at 08:42

## 2020-10-24 RX ADMIN — PANTOPRAZOLE SODIUM 40 MG: 40 TABLET, DELAYED RELEASE ORAL at 08:41

## 2020-10-24 RX ADMIN — TRAMADOL HYDROCHLORIDE 25 MG: 50 TABLET, FILM COATED ORAL at 13:36

## 2020-10-24 RX ADMIN — CLOPIDOGREL BISULFATE 75 MG: 75 TABLET ORAL at 08:41

## 2020-10-24 RX ADMIN — Medication 10000 UNITS: at 08:42

## 2020-10-24 RX ADMIN — ATORVASTATIN CALCIUM 40 MG: 40 TABLET, FILM COATED ORAL at 21:09

## 2020-10-24 RX ADMIN — LORAZEPAM 0.5 MG: 0.5 TABLET ORAL at 16:41

## 2020-10-24 RX ADMIN — CEFTRIAXONE SODIUM 1000 MG: 1 INJECTION, POWDER, FOR SOLUTION INTRAMUSCULAR; INTRAVENOUS at 22:58

## 2020-10-24 RX ADMIN — FLUTICASONE FUROATE AND VILANTEROL TRIFENATATE 1 PUFF: 100; 25 POWDER RESPIRATORY (INHALATION) at 08:42

## 2020-10-24 RX ADMIN — LORAZEPAM 0.5 MG: 0.5 TABLET ORAL at 12:43

## 2020-10-24 RX ADMIN — MEMANTINE 10 MG: 5 TABLET ORAL at 08:41

## 2020-10-24 RX ADMIN — NITROFURANTOIN (MONOHYDRATE/MACROCRYSTALS) 100 MG: 75; 25 CAPSULE ORAL at 08:41

## 2020-10-24 RX ADMIN — TIOTROPIUM BROMIDE 18 MCG: 18 CAPSULE ORAL; RESPIRATORY (INHALATION) at 08:43

## 2020-10-24 RX ADMIN — NITROFURANTOIN (MONOHYDRATE/MACROCRYSTALS) 100 MG: 75; 25 CAPSULE ORAL at 16:41

## 2020-10-24 RX ADMIN — LORAZEPAM 0.5 MG: 0.5 TABLET ORAL at 08:41

## 2020-10-24 RX ADMIN — TAMSULOSIN HYDROCHLORIDE 0.4 MG: 0.4 CAPSULE ORAL at 16:41

## 2020-10-24 RX ADMIN — Medication 1 TABLET: at 08:41

## 2020-10-24 RX ADMIN — MELATONIN 6 MG: at 21:09

## 2020-10-24 RX ADMIN — QUETIAPINE FUMARATE 100 MG: 100 TABLET ORAL at 21:09

## 2020-10-25 ENCOUNTER — APPOINTMENT (INPATIENT)
Dept: CT IMAGING | Facility: HOSPITAL | Age: 80
DRG: 189 | End: 2020-10-25
Payer: MEDICARE

## 2020-10-25 ENCOUNTER — HOSPITAL ENCOUNTER (INPATIENT)
Facility: HOSPITAL | Age: 80
LOS: 4 days | Discharge: NURSING, REHAB OR LONG-TERM CARE | DRG: 189 | End: 2020-10-29
Attending: INTERNAL MEDICINE | Admitting: HOSPITALIST
Payer: MEDICARE

## 2020-10-25 VITALS
SYSTOLIC BLOOD PRESSURE: 106 MMHG | BODY MASS INDEX: 23.1 KG/M2 | TEMPERATURE: 98.1 F | DIASTOLIC BLOOD PRESSURE: 63 MMHG | OXYGEN SATURATION: 87 % | RESPIRATION RATE: 30 BRPM | WEIGHT: 152.4 LBS | HEART RATE: 91 BPM | HEIGHT: 68 IN

## 2020-10-25 DIAGNOSIS — R06.03 RESPIRATORY DISTRESS: ICD-10-CM

## 2020-10-25 DIAGNOSIS — J96.21 ACUTE ON CHRONIC RESPIRATORY FAILURE WITH HYPOXIA (HCC): ICD-10-CM

## 2020-10-25 DIAGNOSIS — J96.91 RESPIRATORY FAILURE WITH HYPOXIA, UNSPECIFIED CHRONICITY (HCC): ICD-10-CM

## 2020-10-25 DIAGNOSIS — N21.0 URINARY BLADDER CALCULUS: Primary | ICD-10-CM

## 2020-10-25 DIAGNOSIS — R10.9 ABDOMINAL PAIN: ICD-10-CM

## 2020-10-25 PROBLEM — J96.90 RESPIRATORY FAILURE (HCC): Status: ACTIVE | Noted: 2020-10-25

## 2020-10-25 PROBLEM — A41.51 SEPSIS DUE TO ESCHERICHIA COLI (HCC): Status: ACTIVE | Noted: 2020-10-25

## 2020-10-25 LAB
ALBUMIN SERPL BCP-MCNC: 3.6 G/DL (ref 3.5–5.7)
ALP SERPL-CCNC: 98 U/L (ref 55–165)
ALT SERPL W P-5'-P-CCNC: 16 U/L (ref 7–52)
ANION GAP SERPL CALCULATED.3IONS-SCNC: 10 MMOL/L (ref 4–13)
ARTERIAL PATENCY WRIST A: YES
AST SERPL W P-5'-P-CCNC: 20 U/L (ref 13–39)
BASE EXCESS BLDA CALC-SCNC: -3.9 MMOL/L (ref -2–3)
BASOPHILS # BLD AUTO: 0 THOUSANDS/ΜL (ref 0–0.1)
BASOPHILS NFR BLD AUTO: 0 % (ref 0–2)
BILIRUB SERPL-MCNC: 1.6 MG/DL (ref 0.2–1)
BNP SERPL-MCNC: 71 PG/ML (ref 1–100)
BUN SERPL-MCNC: 26 MG/DL (ref 7–25)
CALCIUM SERPL-MCNC: 8.9 MG/DL (ref 8.6–10.5)
CHLORIDE SERPL-SCNC: 109 MMOL/L (ref 98–107)
CO2 SERPL-SCNC: 23 MMOL/L (ref 21–31)
CREAT SERPL-MCNC: 1.26 MG/DL (ref 0.7–1.3)
EOSINOPHIL # BLD AUTO: 0.3 THOUSAND/ΜL (ref 0–0.61)
EOSINOPHIL NFR BLD AUTO: 4 % (ref 0–5)
ERYTHROCYTE [DISTWIDTH] IN BLOOD BY AUTOMATED COUNT: 16.9 % (ref 11.5–14.5)
GFR SERPL CREATININE-BSD FRML MDRD: 55 ML/MIN/1.73SQ M
GLUCOSE SERPL-MCNC: 126 MG/DL (ref 65–99)
GLUCOSE SERPL-MCNC: 151 MG/DL (ref 65–140)
HCO3 BLDA-SCNC: 18.4 MMOL/L (ref 22–28)
HCT VFR BLD AUTO: 44.3 % (ref 42–47)
HGB BLD-MCNC: 14.5 G/DL (ref 14–18)
LACTATE SERPL-SCNC: 1.2 MMOL/L (ref 0.5–2)
LG PLATELETS BLD QL SMEAR: PRESENT
LYMPHOCYTES # BLD AUTO: 0.4 THOUSANDS/ΜL (ref 0.6–4.47)
LYMPHOCYTES NFR BLD AUTO: 6 % (ref 21–51)
MCH RBC QN AUTO: 31.6 PG (ref 26–34)
MCHC RBC AUTO-ENTMCNC: 32.6 G/DL (ref 31–37)
MCV RBC AUTO: 97 FL (ref 81–99)
MONOCYTES # BLD AUTO: 0.5 THOUSAND/ΜL (ref 0.17–1.22)
MONOCYTES NFR BLD AUTO: 7 % (ref 2–12)
NEUTROPHILS # BLD AUTO: 6.2 THOUSANDS/ΜL (ref 1.4–6.5)
NEUTS SEG NFR BLD AUTO: 83 % (ref 42–75)
O2 CT BLDA-SCNC: 17.9 ML/DL
OTHER FIO2: ABNORMAL %
OXYHGB MFR BLDA: 85.8 % (ref 94–100)
PCO2 BLDA: 26.4 MM HG (ref 35–45)
PH BLDA: 7.46 [PH] (ref 7.35–7.45)
PLATELET # BLD AUTO: 99 THOUSANDS/UL (ref 149–390)
PLATELET BLD QL SMEAR: ABNORMAL
PMV BLD AUTO: 8.4 FL (ref 8.6–11.7)
PO2 BLDA: 54 MM HG (ref 80–100)
POTASSIUM SERPL-SCNC: 3.8 MMOL/L (ref 3.5–5.5)
PROCALCITONIN SERPL-MCNC: 1 NG/ML
PROT SERPL-MCNC: 6.4 G/DL (ref 6.4–8.9)
RBC # BLD AUTO: 4.58 MILLION/UL (ref 4.3–5.9)
RBC MORPH BLD: NORMAL
SODIUM SERPL-SCNC: 142 MMOL/L (ref 134–143)
SPECIMEN SOURCE: ABNORMAL
TROPONIN I SERPL-MCNC: <0.03 NG/ML
WBC # BLD AUTO: 7.5 THOUSAND/UL (ref 4.8–10.8)

## 2020-10-25 PROCEDURE — 82805 BLOOD GASES W/O2 SATURATION: CPT | Performed by: PHYSICIAN ASSISTANT

## 2020-10-25 PROCEDURE — 94760 N-INVAS EAR/PLS OXIMETRY 1: CPT

## 2020-10-25 PROCEDURE — 74177 CT ABD & PELVIS W/CONTRAST: CPT

## 2020-10-25 PROCEDURE — 83880 ASSAY OF NATRIURETIC PEPTIDE: CPT | Performed by: INTERNAL MEDICINE

## 2020-10-25 PROCEDURE — 71260 CT THORAX DX C+: CPT

## 2020-10-25 PROCEDURE — 84484 ASSAY OF TROPONIN QUANT: CPT | Performed by: INTERNAL MEDICINE

## 2020-10-25 PROCEDURE — 82948 REAGENT STRIP/BLOOD GLUCOSE: CPT

## 2020-10-25 PROCEDURE — 99222 1ST HOSP IP/OBS MODERATE 55: CPT | Performed by: SURGERY

## 2020-10-25 PROCEDURE — 36600 WITHDRAWAL OF ARTERIAL BLOOD: CPT

## 2020-10-25 PROCEDURE — 84145 PROCALCITONIN (PCT): CPT | Performed by: FAMILY MEDICINE

## 2020-10-25 PROCEDURE — 85025 COMPLETE CBC W/AUTO DIFF WBC: CPT | Performed by: INTERNAL MEDICINE

## 2020-10-25 PROCEDURE — G1004 CDSM NDSC: HCPCS

## 2020-10-25 PROCEDURE — 80053 COMPREHEN METABOLIC PANEL: CPT | Performed by: INTERNAL MEDICINE

## 2020-10-25 PROCEDURE — 83605 ASSAY OF LACTIC ACID: CPT | Performed by: FAMILY MEDICINE

## 2020-10-25 RX ORDER — PANTOPRAZOLE SODIUM 40 MG/1
40 TABLET, DELAYED RELEASE ORAL
Status: DISCONTINUED | OUTPATIENT
Start: 2020-10-26 | End: 2020-10-27

## 2020-10-25 RX ORDER — NITROFURANTOIN 25; 75 MG/1; MG/1
100 CAPSULE ORAL 2 TIMES DAILY WITH MEALS
Status: DISCONTINUED | OUTPATIENT
Start: 2020-10-25 | End: 2020-10-25

## 2020-10-25 RX ORDER — ACETAMINOPHEN 325 MG/1
650 TABLET ORAL EVERY 4 HOURS PRN
Status: DISCONTINUED | OUTPATIENT
Start: 2020-10-25 | End: 2020-10-27

## 2020-10-25 RX ORDER — ALBUTEROL SULFATE 90 UG/1
2 AEROSOL, METERED RESPIRATORY (INHALATION) EVERY 6 HOURS PRN
Status: DISCONTINUED | OUTPATIENT
Start: 2020-10-25 | End: 2020-10-26

## 2020-10-25 RX ORDER — ASPIRIN 81 MG/1
81 TABLET, CHEWABLE ORAL DAILY
Status: DISCONTINUED | OUTPATIENT
Start: 2020-10-25 | End: 2020-10-27

## 2020-10-25 RX ORDER — DOCUSATE SODIUM 100 MG/1
100 CAPSULE, LIQUID FILLED ORAL 2 TIMES DAILY
Status: DISCONTINUED | OUTPATIENT
Start: 2020-10-25 | End: 2020-10-27

## 2020-10-25 RX ORDER — MEMANTINE HYDROCHLORIDE 5 MG/1
10 TABLET ORAL 2 TIMES DAILY
Status: DISCONTINUED | OUTPATIENT
Start: 2020-10-25 | End: 2020-10-27

## 2020-10-25 RX ORDER — ONDANSETRON 2 MG/ML
4 INJECTION INTRAMUSCULAR; INTRAVENOUS EVERY 6 HOURS PRN
Status: DISCONTINUED | OUTPATIENT
Start: 2020-10-25 | End: 2020-10-27

## 2020-10-25 RX ORDER — ACETAMINOPHEN 325 MG/1
650 TABLET ORAL EVERY 6 HOURS PRN
Status: DISCONTINUED | OUTPATIENT
Start: 2020-10-25 | End: 2020-10-27

## 2020-10-25 RX ORDER — VANCOMYCIN HYDROCHLORIDE 1 G/200ML
15 INJECTION, SOLUTION INTRAVENOUS EVERY 12 HOURS
Status: DISCONTINUED | OUTPATIENT
Start: 2020-10-25 | End: 2020-10-25

## 2020-10-25 RX ORDER — MAGNESIUM HYDROXIDE/ALUMINUM HYDROXICE/SIMETHICONE 120; 1200; 1200 MG/30ML; MG/30ML; MG/30ML
30 SUSPENSION ORAL EVERY 4 HOURS PRN
Status: DISCONTINUED | OUTPATIENT
Start: 2020-10-25 | End: 2020-10-27

## 2020-10-25 RX ORDER — ATENOLOL 25 MG/1
25 TABLET ORAL
Status: DISCONTINUED | OUTPATIENT
Start: 2020-10-25 | End: 2020-10-27

## 2020-10-25 RX ORDER — MORPHINE SULFATE 4 MG/ML
4 INJECTION, SOLUTION INTRAMUSCULAR; INTRAVENOUS EVERY 4 HOURS PRN
Status: DISCONTINUED | OUTPATIENT
Start: 2020-10-25 | End: 2020-10-27

## 2020-10-25 RX ORDER — LORAZEPAM 0.5 MG/1
0.5 TABLET ORAL 3 TIMES DAILY
Status: DISCONTINUED | OUTPATIENT
Start: 2020-10-25 | End: 2020-10-27

## 2020-10-25 RX ORDER — CEFEPIME HYDROCHLORIDE 1 G/50ML
1000 INJECTION, SOLUTION INTRAVENOUS EVERY 12 HOURS
Status: DISCONTINUED | OUTPATIENT
Start: 2020-10-25 | End: 2020-10-27

## 2020-10-25 RX ORDER — QUETIAPINE FUMARATE 50 MG/1
100 TABLET, FILM COATED ORAL
Status: DISCONTINUED | OUTPATIENT
Start: 2020-10-25 | End: 2020-10-27

## 2020-10-25 RX ORDER — TRAMADOL HYDROCHLORIDE 50 MG/1
25 TABLET ORAL EVERY 6 HOURS PRN
Status: DISCONTINUED | OUTPATIENT
Start: 2020-10-25 | End: 2020-10-26

## 2020-10-25 RX ORDER — MORPHINE SULFATE 4 MG/ML
2 INJECTION, SOLUTION INTRAMUSCULAR; INTRAVENOUS ONCE
Status: DISCONTINUED | OUTPATIENT
Start: 2020-10-25 | End: 2020-10-25 | Stop reason: HOSPADM

## 2020-10-25 RX ORDER — NITROGLYCERIN 0.4 MG/1
0.4 TABLET SUBLINGUAL
Status: DISCONTINUED | OUTPATIENT
Start: 2020-10-25 | End: 2020-10-27

## 2020-10-25 RX ORDER — HALOPERIDOL 5 MG/ML
2 INJECTION INTRAMUSCULAR EVERY 6 HOURS PRN
Status: DISCONTINUED | OUTPATIENT
Start: 2020-10-25 | End: 2020-10-27

## 2020-10-25 RX ORDER — HYDROXYZINE HYDROCHLORIDE 25 MG/1
25 TABLET, FILM COATED ORAL EVERY 6 HOURS PRN
Status: DISCONTINUED | OUTPATIENT
Start: 2020-10-25 | End: 2020-10-27

## 2020-10-25 RX ORDER — TAMSULOSIN HYDROCHLORIDE 0.4 MG/1
0.4 CAPSULE ORAL DAILY
Status: DISCONTINUED | OUTPATIENT
Start: 2020-10-25 | End: 2020-10-27

## 2020-10-25 RX ORDER — BISACODYL 10 MG
10 SUPPOSITORY, RECTAL RECTAL DAILY PRN
Status: DISCONTINUED | OUTPATIENT
Start: 2020-10-25 | End: 2020-10-27

## 2020-10-25 RX ORDER — LORAZEPAM 2 MG/ML
1 INJECTION INTRAMUSCULAR EVERY 6 HOURS PRN
Status: DISCONTINUED | OUTPATIENT
Start: 2020-10-25 | End: 2020-10-27

## 2020-10-25 RX ORDER — LANOLIN ALCOHOL/MO/W.PET/CERES
6 CREAM (GRAM) TOPICAL
Status: DISCONTINUED | OUTPATIENT
Start: 2020-10-25 | End: 2020-10-27

## 2020-10-25 RX ORDER — SODIUM CHLORIDE 9 MG/ML
100 INJECTION, SOLUTION INTRAVENOUS CONTINUOUS
Status: DISCONTINUED | OUTPATIENT
Start: 2020-10-25 | End: 2020-10-26

## 2020-10-25 RX ORDER — HALOPERIDOL 0.5 MG/1
1 TABLET ORAL EVERY 6 HOURS PRN
Status: DISCONTINUED | OUTPATIENT
Start: 2020-10-25 | End: 2020-10-26

## 2020-10-25 RX ORDER — CEFEPIME HYDROCHLORIDE 2 G/50ML
2000 INJECTION, SOLUTION INTRAVENOUS EVERY 12 HOURS
Status: DISCONTINUED | OUTPATIENT
Start: 2020-10-25 | End: 2020-10-25 | Stop reason: DRUGHIGH

## 2020-10-25 RX ORDER — ATORVASTATIN CALCIUM 40 MG/1
40 TABLET, FILM COATED ORAL
Status: DISCONTINUED | OUTPATIENT
Start: 2020-10-25 | End: 2020-10-27

## 2020-10-25 RX ORDER — FLUTICASONE FUROATE AND VILANTEROL 100; 25 UG/1; UG/1
1 POWDER RESPIRATORY (INHALATION)
Status: DISCONTINUED | OUTPATIENT
Start: 2020-10-25 | End: 2020-10-26

## 2020-10-25 RX ORDER — SODIUM CHLORIDE 9 MG/ML
150 INJECTION, SOLUTION INTRAVENOUS CONTINUOUS
Status: DISCONTINUED | OUTPATIENT
Start: 2020-10-25 | End: 2020-10-25

## 2020-10-25 RX ORDER — CLOPIDOGREL BISULFATE 75 MG/1
75 TABLET ORAL DAILY
Status: DISCONTINUED | OUTPATIENT
Start: 2020-10-25 | End: 2020-10-27

## 2020-10-25 RX ORDER — TRAZODONE HYDROCHLORIDE 50 MG/1
50 TABLET ORAL
Status: DISCONTINUED | OUTPATIENT
Start: 2020-10-25 | End: 2020-10-27

## 2020-10-25 RX ADMIN — CEFEPIME HYDROCHLORIDE 2000 MG: 2 INJECTION, SOLUTION INTRAVENOUS at 12:47

## 2020-10-25 RX ADMIN — ATORVASTATIN CALCIUM 40 MG: 40 TABLET, FILM COATED ORAL at 21:15

## 2020-10-25 RX ADMIN — TRAMADOL HYDROCHLORIDE 25 MG: 50 TABLET, FILM COATED ORAL at 05:52

## 2020-10-25 RX ADMIN — MELATONIN 6 MG: at 21:17

## 2020-10-25 RX ADMIN — VANCOMYCIN HYDROCHLORIDE 750 MG: 750 INJECTION, SOLUTION INTRAVENOUS at 14:03

## 2020-10-25 RX ADMIN — SODIUM CHLORIDE 150 ML/HR: 0.9 INJECTION, SOLUTION INTRAVENOUS at 20:51

## 2020-10-25 RX ADMIN — LORAZEPAM 1 MG: 2 INJECTION INTRAMUSCULAR; INTRAVENOUS at 12:45

## 2020-10-25 RX ADMIN — MORPHINE SULFATE 4 MG: 4 INJECTION INTRAVENOUS at 13:17

## 2020-10-25 RX ADMIN — QUETIAPINE 100 MG: 50 TABLET ORAL at 21:16

## 2020-10-25 RX ADMIN — MORPHINE SULFATE 4 MG: 4 INJECTION INTRAVENOUS at 20:15

## 2020-10-25 RX ADMIN — SODIUM CHLORIDE 150 ML/HR: 0.9 INJECTION, SOLUTION INTRAVENOUS at 09:34

## 2020-10-25 RX ADMIN — PANTOPRAZOLE SODIUM 40 MG: 40 TABLET, DELAYED RELEASE ORAL at 05:39

## 2020-10-25 RX ADMIN — IOHEXOL 100 ML: 350 INJECTION, SOLUTION INTRAVENOUS at 13:41

## 2020-10-25 RX ADMIN — DOCUSATE SODIUM 100 MG: 100 CAPSULE, LIQUID FILLED ORAL at 21:15

## 2020-10-26 ENCOUNTER — APPOINTMENT (INPATIENT)
Dept: RADIOLOGY | Facility: HOSPITAL | Age: 80
DRG: 189 | End: 2020-10-26
Payer: MEDICARE

## 2020-10-26 PROBLEM — N30.00 ACUTE CYSTITIS WITHOUT HEMATURIA: Status: ACTIVE | Noted: 2020-10-26

## 2020-10-26 PROBLEM — J96.21 ACUTE ON CHRONIC RESPIRATORY FAILURE WITH HYPOXIA (HCC): Status: ACTIVE | Noted: 2020-10-25

## 2020-10-26 PROBLEM — R10.32 LLQ ABDOMINAL PAIN: Status: ACTIVE | Noted: 2020-10-26

## 2020-10-26 LAB
ALBUMIN SERPL BCP-MCNC: 3.9 G/DL (ref 3.5–5.7)
ALP SERPL-CCNC: 98 U/L (ref 55–165)
ALT SERPL W P-5'-P-CCNC: 16 U/L (ref 7–52)
ANION GAP SERPL CALCULATED.3IONS-SCNC: 11 MMOL/L (ref 4–13)
AST SERPL W P-5'-P-CCNC: 25 U/L (ref 13–39)
BASOPHILS # BLD AUTO: 0 THOUSANDS/ΜL (ref 0–0.1)
BASOPHILS NFR BLD AUTO: 0 % (ref 0–2)
BILIRUB SERPL-MCNC: 1.5 MG/DL (ref 0.2–1)
BUN SERPL-MCNC: 21 MG/DL (ref 7–25)
CALCIUM SERPL-MCNC: 9.2 MG/DL (ref 8.6–10.5)
CHLORIDE SERPL-SCNC: 110 MMOL/L (ref 98–107)
CO2 SERPL-SCNC: 22 MMOL/L (ref 21–31)
CREAT SERPL-MCNC: 0.99 MG/DL (ref 0.7–1.3)
EOSINOPHIL # BLD AUTO: 0 THOUSAND/ΜL (ref 0–0.61)
EOSINOPHIL NFR BLD AUTO: 0 % (ref 0–5)
ERYTHROCYTE [DISTWIDTH] IN BLOOD BY AUTOMATED COUNT: 16.6 % (ref 11.5–14.5)
FLUAV RNA NPH QL NAA+PROBE: NORMAL
FLUBV RNA NPH QL NAA+PROBE: NORMAL
GFR SERPL CREATININE-BSD FRML MDRD: 73 ML/MIN/1.73SQ M
GLUCOSE SERPL-MCNC: 152 MG/DL (ref 65–99)
HCT VFR BLD AUTO: 45.8 % (ref 42–47)
HGB BLD-MCNC: 15.5 G/DL (ref 14–18)
L PNEUMO1 AG UR QL IA.RAPID: NEGATIVE
LG PLATELETS BLD QL SMEAR: PRESENT
LYMPHOCYTES # BLD AUTO: 0.4 THOUSANDS/ΜL (ref 0.6–4.47)
LYMPHOCYTES NFR BLD AUTO: 8 % (ref 21–51)
MCH RBC QN AUTO: 32.5 PG (ref 26–34)
MCHC RBC AUTO-ENTMCNC: 33.9 G/DL (ref 31–37)
MCV RBC AUTO: 96 FL (ref 81–99)
MONOCYTES # BLD AUTO: 0.1 THOUSAND/ΜL (ref 0.17–1.22)
MONOCYTES NFR BLD AUTO: 2 % (ref 2–12)
NEUTROPHILS # BLD AUTO: 4.9 THOUSANDS/ΜL (ref 1.4–6.5)
NEUTS SEG NFR BLD AUTO: 90 % (ref 42–75)
PLATELET # BLD AUTO: 94 THOUSANDS/UL (ref 149–390)
PLATELET BLD QL SMEAR: ABNORMAL
PMV BLD AUTO: 8.5 FL (ref 8.6–11.7)
POTASSIUM SERPL-SCNC: 3.9 MMOL/L (ref 3.5–5.5)
PROCALCITONIN SERPL-MCNC: 0.47 NG/ML
PROCALCITONIN SERPL-MCNC: 0.77 NG/ML
PROT SERPL-MCNC: 6.9 G/DL (ref 6.4–8.9)
RBC # BLD AUTO: 4.78 MILLION/UL (ref 4.3–5.9)
RBC MORPH BLD: NORMAL
RSV RNA NPH QL NAA+PROBE: NORMAL
S PNEUM AG UR QL: NEGATIVE
SARS-COV-2 RNA RESP QL NAA+PROBE: NEGATIVE
SODIUM SERPL-SCNC: 143 MMOL/L (ref 134–143)
TROPONIN I SERPL-MCNC: 0.03 NG/ML
TROPONIN I SERPL-MCNC: <0.03 NG/ML
WBC # BLD AUTO: 5.5 THOUSAND/UL (ref 4.8–10.8)

## 2020-10-26 PROCEDURE — 80053 COMPREHEN METABOLIC PANEL: CPT | Performed by: SPECIALIST

## 2020-10-26 PROCEDURE — 87635 SARS-COV-2 COVID-19 AMP PRB: CPT | Performed by: HOSPITALIST

## 2020-10-26 PROCEDURE — 71045 X-RAY EXAM CHEST 1 VIEW: CPT

## 2020-10-26 PROCEDURE — 99232 SBSQ HOSP IP/OBS MODERATE 35: CPT | Performed by: SPECIALIST

## 2020-10-26 PROCEDURE — 99291 CRITICAL CARE FIRST HOUR: CPT | Performed by: ANESTHESIOLOGY

## 2020-10-26 PROCEDURE — 84484 ASSAY OF TROPONIN QUANT: CPT | Performed by: PHYSICIAN ASSISTANT

## 2020-10-26 PROCEDURE — 84145 PROCALCITONIN (PCT): CPT | Performed by: FAMILY MEDICINE

## 2020-10-26 PROCEDURE — 99233 SBSQ HOSP IP/OBS HIGH 50: CPT | Performed by: HOSPITALIST

## 2020-10-26 PROCEDURE — 94640 AIRWAY INHALATION TREATMENT: CPT

## 2020-10-26 PROCEDURE — 94760 N-INVAS EAR/PLS OXIMETRY 1: CPT

## 2020-10-26 PROCEDURE — 85025 COMPLETE CBC W/AUTO DIFF WBC: CPT | Performed by: SPECIALIST

## 2020-10-26 PROCEDURE — 87631 RESP VIRUS 3-5 TARGETS: CPT | Performed by: HOSPITALIST

## 2020-10-26 PROCEDURE — 87081 CULTURE SCREEN ONLY: CPT | Performed by: HOSPITALIST

## 2020-10-26 PROCEDURE — 87449 NOS EACH ORGANISM AG IA: CPT | Performed by: HOSPITALIST

## 2020-10-26 PROCEDURE — 94668 MNPJ CHEST WALL SBSQ: CPT

## 2020-10-26 RX ORDER — MAGNESIUM CARB/ALUMINUM HYDROX 105-160MG
30 TABLET,CHEWABLE ORAL
Status: COMPLETED | OUTPATIENT
Start: 2020-10-26 | End: 2020-10-26

## 2020-10-26 RX ORDER — METOPROLOL TARTRATE 5 MG/5ML
5 INJECTION INTRAVENOUS EVERY 6 HOURS PRN
Status: DISCONTINUED | OUTPATIENT
Start: 2020-10-26 | End: 2020-10-27

## 2020-10-26 RX ORDER — ALBUTEROL SULFATE 2.5 MG/3ML
2.5 SOLUTION RESPIRATORY (INHALATION) EVERY 4 HOURS PRN
Status: DISCONTINUED | OUTPATIENT
Start: 2020-10-26 | End: 2020-10-27

## 2020-10-26 RX ORDER — LEVALBUTEROL INHALATION SOLUTION 0.63 MG/3ML
0.63 SOLUTION RESPIRATORY (INHALATION) EVERY 4 HOURS PRN
Status: DISCONTINUED | OUTPATIENT
Start: 2020-10-26 | End: 2020-10-26

## 2020-10-26 RX ORDER — SODIUM CHLORIDE, SODIUM GLUCONATE, SODIUM ACETATE, POTASSIUM CHLORIDE, MAGNESIUM CHLORIDE, SODIUM PHOSPHATE, DIBASIC, AND POTASSIUM PHOSPHATE .53; .5; .37; .037; .03; .012; .00082 G/100ML; G/100ML; G/100ML; G/100ML; G/100ML; G/100ML; G/100ML
100 INJECTION, SOLUTION INTRAVENOUS CONTINUOUS
Status: DISCONTINUED | OUTPATIENT
Start: 2020-10-26 | End: 2020-10-27

## 2020-10-26 RX ORDER — LEVALBUTEROL 1.25 MG/.5ML
1.25 SOLUTION, CONCENTRATE RESPIRATORY (INHALATION)
Status: DISCONTINUED | OUTPATIENT
Start: 2020-10-26 | End: 2020-10-27

## 2020-10-26 RX ORDER — HALOPERIDOL 5 MG/ML
2 INJECTION INTRAMUSCULAR EVERY 6 HOURS PRN
Status: DISCONTINUED | OUTPATIENT
Start: 2020-10-26 | End: 2020-10-26

## 2020-10-26 RX ORDER — IPRATROPIUM BROMIDE AND ALBUTEROL SULFATE 2.5; .5 MG/3ML; MG/3ML
3 SOLUTION RESPIRATORY (INHALATION)
Status: DISCONTINUED | OUTPATIENT
Start: 2020-10-26 | End: 2020-10-26

## 2020-10-26 RX ORDER — METHYLPREDNISOLONE SODIUM SUCCINATE 40 MG/ML
40 INJECTION, POWDER, LYOPHILIZED, FOR SOLUTION INTRAMUSCULAR; INTRAVENOUS EVERY 8 HOURS SCHEDULED
Status: DISCONTINUED | OUTPATIENT
Start: 2020-10-26 | End: 2020-10-27

## 2020-10-26 RX ORDER — BUDESONIDE 0.5 MG/2ML
0.5 INHALANT ORAL
Status: DISCONTINUED | OUTPATIENT
Start: 2020-10-26 | End: 2020-10-27

## 2020-10-26 RX ADMIN — MORPHINE SULFATE 4 MG: 4 INJECTION INTRAVENOUS at 19:04

## 2020-10-26 RX ADMIN — BUDESONIDE 0.5 MG: 0.5 INHALANT ORAL at 12:00

## 2020-10-26 RX ADMIN — LEVALBUTEROL HYDROCHLORIDE 1.25 MG: 1.25 SOLUTION, CONCENTRATE RESPIRATORY (INHALATION) at 19:33

## 2020-10-26 RX ADMIN — METOPROLOL TARTRATE 5 MG: 5 INJECTION, SOLUTION INTRAVENOUS at 19:25

## 2020-10-26 RX ADMIN — ASPIRIN 81 MG CHEWABLE TABLET 81 MG: 81 TABLET CHEWABLE at 08:11

## 2020-10-26 RX ADMIN — HALOPERIDOL 1 MG: 0.5 TABLET ORAL at 18:14

## 2020-10-26 RX ADMIN — METHYLPREDNISOLONE SODIUM SUCCINATE 40 MG: 40 INJECTION, POWDER, FOR SOLUTION INTRAMUSCULAR; INTRAVENOUS at 22:25

## 2020-10-26 RX ADMIN — LORAZEPAM 0.5 MG: 0.5 TABLET ORAL at 13:01

## 2020-10-26 RX ADMIN — METHYLPREDNISOLONE SODIUM SUCCINATE 40 MG: 40 INJECTION, POWDER, FOR SOLUTION INTRAMUSCULAR; INTRAVENOUS at 14:20

## 2020-10-26 RX ADMIN — LORAZEPAM 0.5 MG: 0.5 TABLET ORAL at 08:11

## 2020-10-26 RX ADMIN — ENOXAPARIN SODIUM 40 MG: 40 INJECTION SUBCUTANEOUS at 10:59

## 2020-10-26 RX ADMIN — MELATONIN 6 MG: at 22:25

## 2020-10-26 RX ADMIN — ATORVASTATIN CALCIUM 40 MG: 40 TABLET, FILM COATED ORAL at 22:25

## 2020-10-26 RX ADMIN — HALOPERIDOL 1 MG: 0.5 TABLET ORAL at 08:11

## 2020-10-26 RX ADMIN — SODIUM CHLORIDE, SODIUM GLUCONATE, SODIUM ACETATE, POTASSIUM CHLORIDE, MAGNESIUM CHLORIDE, SODIUM PHOSPHATE, DIBASIC, AND POTASSIUM PHOSPHATE 100 ML/HR: .53; .5; .37; .037; .03; .012; .00082 INJECTION, SOLUTION INTRAVENOUS at 10:59

## 2020-10-26 RX ADMIN — MINERAL OIL 30 ML: 1000 SOLUTION ORAL at 18:13

## 2020-10-26 RX ADMIN — VANCOMYCIN HYDROCHLORIDE 750 MG: 750 INJECTION, SOLUTION INTRAVENOUS at 02:19

## 2020-10-26 RX ADMIN — TIOTROPIUM BROMIDE 18 MCG: 18 CAPSULE ORAL; RESPIRATORY (INHALATION) at 08:12

## 2020-10-26 RX ADMIN — CEFEPIME HYDROCHLORIDE 1000 MG: 1 INJECTION, SOLUTION INTRAVENOUS at 00:58

## 2020-10-26 RX ADMIN — VANCOMYCIN HYDROCHLORIDE 750 MG: 750 INJECTION, SOLUTION INTRAVENOUS at 14:20

## 2020-10-26 RX ADMIN — DOCUSATE SODIUM 100 MG: 100 CAPSULE, LIQUID FILLED ORAL at 18:14

## 2020-10-26 RX ADMIN — Medication 10000 UNITS: at 08:11

## 2020-10-26 RX ADMIN — TAMSULOSIN HYDROCHLORIDE 0.4 MG: 0.4 CAPSULE ORAL at 18:13

## 2020-10-26 RX ADMIN — IPRATROPIUM BROMIDE 0.5 MG: 0.5 SOLUTION RESPIRATORY (INHALATION) at 19:33

## 2020-10-26 RX ADMIN — BUDESONIDE 0.5 MG: 0.5 INHALANT ORAL at 19:33

## 2020-10-26 RX ADMIN — TRAZODONE HYDROCHLORIDE 50 MG: 50 TABLET ORAL at 22:49

## 2020-10-26 RX ADMIN — MAGNESIUM HYDROXIDE 30 ML: 400 SUSPENSION ORAL at 18:12

## 2020-10-26 RX ADMIN — METHYLPREDNISOLONE SODIUM SUCCINATE 40 MG: 40 INJECTION, POWDER, FOR SOLUTION INTRAMUSCULAR; INTRAVENOUS at 06:05

## 2020-10-26 RX ADMIN — MAGNESIUM HYDROXIDE 30 ML: 400 SUSPENSION ORAL at 13:02

## 2020-10-26 RX ADMIN — ATENOLOL 25 MG: 25 TABLET ORAL at 22:24

## 2020-10-26 RX ADMIN — CLOPIDOGREL BISULFATE 75 MG: 75 TABLET ORAL at 08:13

## 2020-10-26 RX ADMIN — SODIUM CHLORIDE, SODIUM GLUCONATE, SODIUM ACETATE, POTASSIUM CHLORIDE, MAGNESIUM CHLORIDE, SODIUM PHOSPHATE, DIBASIC, AND POTASSIUM PHOSPHATE 100 ML/HR: .53; .5; .37; .037; .03; .012; .00082 INJECTION, SOLUTION INTRAVENOUS at 22:38

## 2020-10-26 RX ADMIN — HALOPERIDOL LACTATE 2 MG: 5 INJECTION, SOLUTION INTRAMUSCULAR at 19:26

## 2020-10-26 RX ADMIN — MORPHINE SULFATE 4 MG: 4 INJECTION INTRAVENOUS at 14:25

## 2020-10-26 RX ADMIN — IPRATROPIUM BROMIDE AND ALBUTEROL SULFATE 3 ML: 2.5; .5 SOLUTION RESPIRATORY (INHALATION) at 12:00

## 2020-10-26 RX ADMIN — PANTOPRAZOLE SODIUM 40 MG: 40 TABLET, DELAYED RELEASE ORAL at 06:05

## 2020-10-26 RX ADMIN — Medication 1 TABLET: at 08:10

## 2020-10-26 RX ADMIN — MORPHINE SULFATE 4 MG: 4 INJECTION INTRAVENOUS at 04:44

## 2020-10-26 RX ADMIN — CEFEPIME HYDROCHLORIDE 1000 MG: 1 INJECTION, SOLUTION INTRAVENOUS at 13:01

## 2020-10-26 RX ADMIN — DOCUSATE SODIUM 100 MG: 100 CAPSULE, LIQUID FILLED ORAL at 08:10

## 2020-10-26 RX ADMIN — MEMANTINE 10 MG: 5 TABLET ORAL at 08:12

## 2020-10-26 RX ADMIN — LORAZEPAM 0.5 MG: 0.5 TABLET ORAL at 18:14

## 2020-10-26 RX ADMIN — FLUTICASONE FUROATE AND VILANTEROL TRIFENATATE 1 PUFF: 100; 25 POWDER RESPIRATORY (INHALATION) at 08:12

## 2020-10-26 RX ADMIN — LORAZEPAM 1 MG: 2 INJECTION INTRAMUSCULAR; INTRAVENOUS at 20:42

## 2020-10-26 RX ADMIN — MINERAL OIL 30 ML: 1000 SOLUTION ORAL at 13:01

## 2020-10-26 RX ADMIN — QUETIAPINE 100 MG: 50 TABLET ORAL at 22:24

## 2020-10-27 PROBLEM — Z71.89 GOALS OF CARE, COUNSELING/DISCUSSION: Status: ACTIVE | Noted: 2020-10-27

## 2020-10-27 LAB
ALBUMIN SERPL BCP-MCNC: 3.4 G/DL (ref 3.5–5.7)
ALP SERPL-CCNC: 85 U/L (ref 55–165)
ALT SERPL W P-5'-P-CCNC: 14 U/L (ref 7–52)
ANION GAP SERPL CALCULATED.3IONS-SCNC: 10 MMOL/L (ref 4–13)
AST SERPL W P-5'-P-CCNC: 25 U/L (ref 13–39)
BASOPHILS # BLD AUTO: 0 THOUSANDS/ΜL (ref 0–0.1)
BASOPHILS NFR BLD AUTO: 0 % (ref 0–2)
BILIRUB SERPL-MCNC: 1.4 MG/DL (ref 0.2–1)
BUN SERPL-MCNC: 20 MG/DL (ref 7–25)
CALCIUM ALBUM COR SERPL-MCNC: 9.3 MG/DL (ref 8.3–10.1)
CALCIUM SERPL-MCNC: 8.8 MG/DL (ref 8.6–10.5)
CHLORIDE SERPL-SCNC: 108 MMOL/L (ref 98–107)
CO2 SERPL-SCNC: 24 MMOL/L (ref 21–31)
CREAT SERPL-MCNC: 0.84 MG/DL (ref 0.7–1.3)
EOSINOPHIL # BLD AUTO: 0 THOUSAND/ΜL (ref 0–0.61)
EOSINOPHIL NFR BLD AUTO: 0 % (ref 0–5)
ERYTHROCYTE [DISTWIDTH] IN BLOOD BY AUTOMATED COUNT: 16.4 % (ref 11.5–14.5)
GFR SERPL CREATININE-BSD FRML MDRD: 84 ML/MIN/1.73SQ M
GLUCOSE SERPL-MCNC: 150 MG/DL (ref 65–99)
HCT VFR BLD AUTO: 41.8 % (ref 42–47)
HGB BLD-MCNC: 14 G/DL (ref 14–18)
LYMPHOCYTES # BLD AUTO: 0.5 THOUSANDS/ΜL (ref 0.6–4.47)
LYMPHOCYTES NFR BLD AUTO: 9 % (ref 21–51)
MAGNESIUM SERPL-MCNC: 2.4 MG/DL (ref 1.9–2.7)
MCH RBC QN AUTO: 32 PG (ref 26–34)
MCHC RBC AUTO-ENTMCNC: 33.4 G/DL (ref 31–37)
MCV RBC AUTO: 96 FL (ref 81–99)
MONOCYTES # BLD AUTO: 0.2 THOUSAND/ΜL (ref 0.17–1.22)
MONOCYTES NFR BLD AUTO: 4 % (ref 2–12)
MRSA NOSE QL CULT: NORMAL
NEUTROPHILS # BLD AUTO: 4.4 THOUSANDS/ΜL (ref 1.4–6.5)
NEUTS SEG NFR BLD AUTO: 87 % (ref 42–75)
PHOSPHATE SERPL-MCNC: 3.2 MG/DL (ref 3–5.5)
PLATELET # BLD AUTO: 109 THOUSANDS/UL (ref 149–390)
PMV BLD AUTO: 8.3 FL (ref 8.6–11.7)
POTASSIUM SERPL-SCNC: 4 MMOL/L (ref 3.5–5.5)
PROT SERPL-MCNC: 6.2 G/DL (ref 6.4–8.9)
RBC # BLD AUTO: 4.36 MILLION/UL (ref 4.3–5.9)
SODIUM SERPL-SCNC: 142 MMOL/L (ref 134–143)
VANCOMYCIN TROUGH SERPL-MCNC: 11.3 UG/ML (ref 5–12)
WBC # BLD AUTO: 5.1 THOUSAND/UL (ref 4.8–10.8)

## 2020-10-27 PROCEDURE — 85025 COMPLETE CBC W/AUTO DIFF WBC: CPT | Performed by: HOSPITALIST

## 2020-10-27 PROCEDURE — 94668 MNPJ CHEST WALL SBSQ: CPT

## 2020-10-27 PROCEDURE — 80053 COMPREHEN METABOLIC PANEL: CPT | Performed by: HOSPITALIST

## 2020-10-27 PROCEDURE — 84100 ASSAY OF PHOSPHORUS: CPT | Performed by: HOSPITALIST

## 2020-10-27 PROCEDURE — 83735 ASSAY OF MAGNESIUM: CPT | Performed by: HOSPITALIST

## 2020-10-27 PROCEDURE — 94760 N-INVAS EAR/PLS OXIMETRY 1: CPT

## 2020-10-27 PROCEDURE — 99232 SBSQ HOSP IP/OBS MODERATE 35: CPT | Performed by: HOSPITALIST

## 2020-10-27 PROCEDURE — 80202 ASSAY OF VANCOMYCIN: CPT | Performed by: INTERNAL MEDICINE

## 2020-10-27 PROCEDURE — 94640 AIRWAY INHALATION TREATMENT: CPT

## 2020-10-27 PROCEDURE — NC001 PR NO CHARGE: Performed by: SURGERY

## 2020-10-27 RX ORDER — LORAZEPAM 2 MG/ML
2 INJECTION INTRAMUSCULAR
Status: DISCONTINUED | OUTPATIENT
Start: 2020-10-27 | End: 2020-10-28

## 2020-10-27 RX ADMIN — LORAZEPAM 1 MG: 2 INJECTION INTRAMUSCULAR; INTRAVENOUS at 05:08

## 2020-10-27 RX ADMIN — MORPHINE SULFATE 2 MG: 2 INJECTION, SOLUTION INTRAMUSCULAR; INTRAVENOUS at 08:50

## 2020-10-27 RX ADMIN — HALOPERIDOL LACTATE 2 MG: 5 INJECTION, SOLUTION INTRAMUSCULAR at 02:23

## 2020-10-27 RX ADMIN — LEVALBUTEROL HYDROCHLORIDE 1.25 MG: 1.25 SOLUTION, CONCENTRATE RESPIRATORY (INHALATION) at 08:08

## 2020-10-27 RX ADMIN — IPRATROPIUM BROMIDE 0.5 MG: 0.5 SOLUTION RESPIRATORY (INHALATION) at 08:08

## 2020-10-27 RX ADMIN — BUDESONIDE 0.5 MG: 0.5 INHALANT ORAL at 08:08

## 2020-10-27 RX ADMIN — PANTOPRAZOLE SODIUM 40 MG: 40 TABLET, DELAYED RELEASE ORAL at 05:04

## 2020-10-27 RX ADMIN — LORAZEPAM 2 MG: 2 INJECTION INTRAMUSCULAR; INTRAVENOUS at 08:51

## 2020-10-27 RX ADMIN — LORAZEPAM 2 MG: 2 INJECTION INTRAMUSCULAR; INTRAVENOUS at 20:08

## 2020-10-27 RX ADMIN — LORAZEPAM 2 MG: 2 INJECTION INTRAMUSCULAR; INTRAVENOUS at 13:37

## 2020-10-27 RX ADMIN — METHYLPREDNISOLONE SODIUM SUCCINATE 40 MG: 40 INJECTION, POWDER, FOR SOLUTION INTRAMUSCULAR; INTRAVENOUS at 05:04

## 2020-10-27 RX ADMIN — MORPHINE SULFATE 2 MG: 2 INJECTION, SOLUTION INTRAMUSCULAR; INTRAVENOUS at 11:43

## 2020-10-27 RX ADMIN — CEFEPIME HYDROCHLORIDE 1000 MG: 1 INJECTION, SOLUTION INTRAVENOUS at 00:30

## 2020-10-27 RX ADMIN — VANCOMYCIN HYDROCHLORIDE 750 MG: 750 INJECTION, SOLUTION INTRAVENOUS at 02:00

## 2020-10-27 RX ADMIN — MORPHINE SULFATE 2 MG: 2 INJECTION, SOLUTION INTRAMUSCULAR; INTRAVENOUS at 22:06

## 2020-10-28 PROBLEM — Z51.5 COMFORT MEASURES ONLY STATUS: Status: ACTIVE | Noted: 2020-10-28

## 2020-10-28 PROCEDURE — 99232 SBSQ HOSP IP/OBS MODERATE 35: CPT | Performed by: HOSPITALIST

## 2020-10-28 RX ORDER — MORPHINE SULFATE 100 MG/5ML
5 SOLUTION ORAL
Status: DISCONTINUED | OUTPATIENT
Start: 2020-10-28 | End: 2020-10-29 | Stop reason: HOSPADM

## 2020-10-28 RX ORDER — LORAZEPAM 2 MG/ML
2 CONCENTRATE ORAL EVERY 4 HOURS PRN
Status: DISCONTINUED | OUTPATIENT
Start: 2020-10-28 | End: 2020-10-29 | Stop reason: HOSPADM

## 2020-10-28 RX ADMIN — LORAZEPAM 2 MG: 2 SOLUTION, CONCENTRATE ORAL at 14:53

## 2020-10-28 RX ADMIN — MORPHINE SULFATE 2 MG: 2 INJECTION, SOLUTION INTRAMUSCULAR; INTRAVENOUS at 06:20

## 2020-10-28 RX ADMIN — MORPHINE SULFATE 5 MG: 100 SOLUTION ORAL at 21:16

## 2020-10-28 RX ADMIN — LORAZEPAM 2 MG: 2 INJECTION INTRAMUSCULAR; INTRAVENOUS at 10:20

## 2020-10-28 RX ADMIN — MORPHINE SULFATE 2 MG: 2 INJECTION, SOLUTION INTRAMUSCULAR; INTRAVENOUS at 11:43

## 2020-10-28 RX ADMIN — LORAZEPAM 2 MG: 2 SOLUTION, CONCENTRATE ORAL at 18:53

## 2020-10-28 RX ADMIN — MORPHINE SULFATE 5 MG: 100 SOLUTION ORAL at 16:22

## 2020-10-28 RX ADMIN — LORAZEPAM 2 MG: 2 INJECTION INTRAMUSCULAR; INTRAVENOUS at 05:33

## 2020-10-28 RX ADMIN — LORAZEPAM 2 MG: 2 INJECTION INTRAMUSCULAR; INTRAVENOUS at 01:20

## 2020-10-29 VITALS
RESPIRATION RATE: 18 BRPM | DIASTOLIC BLOOD PRESSURE: 91 MMHG | HEIGHT: 68 IN | OXYGEN SATURATION: 90 % | SYSTOLIC BLOOD PRESSURE: 138 MMHG | TEMPERATURE: 97.5 F | HEART RATE: 81 BPM | WEIGHT: 154 LBS | BODY MASS INDEX: 23.34 KG/M2

## 2020-10-29 LAB — SARS-COV-2 RNA RESP QL NAA+PROBE: NEGATIVE

## 2020-10-29 PROCEDURE — 87635 SARS-COV-2 COVID-19 AMP PRB: CPT | Performed by: HOSPITALIST

## 2020-10-29 PROCEDURE — 99239 HOSP IP/OBS DSCHRG MGMT >30: CPT | Performed by: HOSPITALIST

## 2020-10-29 RX ORDER — LORAZEPAM 2 MG/ML
2 CONCENTRATE ORAL EVERY 4 HOURS PRN
Qty: 30 ML | Refills: 0 | Status: SHIPPED | OUTPATIENT
Start: 2020-10-29 | End: 2020-11-08

## 2020-10-29 RX ORDER — LORAZEPAM 2 MG/ML
2 CONCENTRATE ORAL EVERY 4 HOURS PRN
Qty: 30 ML | Refills: 0
Start: 2020-10-29 | End: 2020-10-29

## 2020-10-29 RX ORDER — MORPHINE SULFATE 100 MG/5ML
5 SOLUTION ORAL
Qty: 20 ML | Refills: 0 | Status: SHIPPED | OUTPATIENT
Start: 2020-10-29 | End: 2020-11-08

## 2020-10-29 RX ORDER — MORPHINE SULFATE 100 MG/5ML
5 SOLUTION ORAL
Qty: 20 ML | Refills: 0
Start: 2020-10-29 | End: 2020-10-29

## 2020-10-29 RX ADMIN — MORPHINE SULFATE 5 MG: 100 SOLUTION ORAL at 11:32

## 2020-10-29 RX ADMIN — LORAZEPAM 2 MG: 2 SOLUTION, CONCENTRATE ORAL at 13:29

## 2020-10-29 RX ADMIN — LORAZEPAM 2 MG: 2 SOLUTION, CONCENTRATE ORAL at 08:32

## 2020-10-30 LAB
BACTERIA BLD CULT: NORMAL
BACTERIA BLD CULT: NORMAL

## 2020-11-03 ENCOUNTER — TELEPHONE (OUTPATIENT)
Dept: FAMILY MEDICINE CLINIC | Facility: CLINIC | Age: 80
End: 2020-11-03

## 2021-02-12 DIAGNOSIS — Z23 ENCOUNTER FOR IMMUNIZATION: ICD-10-CM

## 2021-03-22 NOTE — LETTER
2018    Jay Parks MD  [address] [facility] [address]    Patient: Samuel Gastelum   YOB: 1940   Date of Visit: 2018     Encounter Diagnosis     ICD-10-CM    1  Chronic left-sided low back pain with left-sided sciatica M54 42     G89 29        Dear Dr Leo Arce:    Please review the attached Plan of Care from Kati Chen recent visit  Please verify that you agree therapy should continue by signing the attached document and sending it back to our office  If you have any questions or concerns, please don't hesitate to call  Sincerely,    Pearl Hong PT      Referring Provider:      I certify that I have read the below Plan of Care and certify the need for these services furnished under this plan of treatment while under my care  Jay Parks MD  [address] [place] [address]  VIA Facsimile: [phone]          PT Evaluation     Today's date: 2018  Patient name: Samuel Gastelum  : 1940  MRN: [id number]  Referring provider: Fransico Lorenzo MD  Dx:   Encounter Diagnosis     ICD-10-CM    1  Chronic left-sided low back pain with left-sided sciatica M54 42     G89 29                   Assessment  Impairments: abnormal gait, abnormal or restricted ROM, activity intolerance, impaired balance, impaired physical strength, lacks appropriate home exercise program and pain with function  Other impairment: decreased flexibility  Functional limitations: difficulty with dressing - shoes/socks/pants  Assessment details: The patient is a 77 y/o male who presents to PT with diagnosis of lumbago   He has complaints of constant back pain, with most pain being in his L buttock and down his LLE    He also demonstrates deficits with decreased L/S and BLE ROM and strength, decreased postural awareness, decreased flexibility, gait dysfunction with intermittent use of AD, limited mobility, difficulty sleeping and pain with completing his ADLs  He ambulates with [facility] for community distances but goes without AD in the house  He has difficulty with stair negotiation and is going up and down the steps with use of SPC and HR with non-reciprocal gait pattern  He does need help with some ADLs such as dressing  He has most difficulty with putting on his shoes and socks and pants  He has also made modifications with bathing and has been using a shower chair  He notes difficulty with sleeping and pain can wake him up overnight  He has been limited with his overall mobility and is not walking as much at home during the day  He is more sedentary by admission and typically sits during the day  He also has difficulty with transfers and needs more time to complete sit to stand transfers  He also has difficulty with getting in/out of the car and also with getting his legs on/off the bed with supine to sit transfers  The patient would benefit from continued PT to address deficits and improve function  Tx to include ROM, stretching, strengthening, modalities, HEP, pt education, postural ed, lifting/body mechanics, neuro re-ed, balance/proprioception Te, MT and equipment  Understanding of Dx/Px/POC: good   Prognosis: fair    Goals  STGs:  1  Initiate and complete HEP with verbal cues  2   Decrease LBP by 25% in 4 weeks  3   Improve  BLE ROM by 5-10 degrees in 4 weeks  4   Improve BLE strength by 1/2 grade in 4 weeks  LTGs:  1  Patient to be I with HEP in 8 weeks  2   Decrease LBP to < or = to 2-3/10 with activity in 8 weeks to improve function  3   Improve L/S ROM to [facility] t/o in 8 weeks to improve function  4   Improve BLE ROM to [facility] t/o in 8 weeks to improve function  5   Postural control is improved to maximal level of function in 8 weeks  6   Recreational performance in related activities is improved to maximal level of function in 8 weeks  7   Improve BLE strength to > or = to 4/5 t/o in 8 weeks to improve function    8  Stair negotiation is improved to maximal level of function in 8 weeks  9   ADL performance in related activities is improved to maximal level of function in 8 weeks  Plan  Patient would benefit from: skilled physical therapy  Planned modality interventions: cryotherapy and thermotherapy: hydrocollator packs  Planned therapy interventions: abdominal trunk stabilization, manual therapy, neuromuscular re-education, body mechanics training, patient education, postural training, self care, strengthening, stretching, therapeutic activities, flexibility, therapeutic exercise, home exercise program and balance  Frequency: 2x week  Duration in weeks: 8  Treatment plan discussed with: patient and family        Subjective Evaluation    History of Present Illness  Onset date: 2017  Mechanism of injury: The patient states that last August he had a AAA repaired and after this procedure he had HHPT  He received stretching to his legs and they feel like it was "too aggressive"  Since then he has had pain in his buttocks and into his L leg  Then on 18 his wife had called the ambulance to take him to the hospital because of the pain  He had some testing and was sent home same day  After the hospital visit he started to see Dr Bandar Izquierdo and did have injections and nerve block  Some relief was noted after the nerve block for a few days  He was to start therapy last month but ended up in the hospital and was unable to start then  He now presents for his evaluation  He will also be seeing Dr Toan Lugo tomorrow and will return to Dr Bandar Izquierdo on 18      Quality of life: good    Pain  At best pain ratin  At worst pain rating: 10 (in the morning)  Location: LBP, buttock pain and L leg pain to calf   Quality: dull ache, sharp and discomfort  Relieving factors: ice and medications  Aggravating factors: standing and walking  Progression: no change    Social Support  Steps to enter house: yes  1  Stairs in house: yes (FF)   Lives in: multiple-level home  Lives with: spouse    Employment status: not working  Treatments  Previous treatment: injection treatment and medication  Patient Goals  Patient goals for therapy: decreased pain, increased motion, increased strength and independence with ADLs/IADLs  Patient goal: "To help get rid of the pain, to be able to walk better "          Objective     Static Posture     Lumbar Spine   Flattened  Postural Observations  Seated posture: fair  Standing posture: fair  Correction of posture: has no consistent effect        Neurological Testing     Sensation     Lumbar   Left   Intact: light touch    Right   Intact: light touch    Active Range of Motion   Left Hip   Flexion: 90 degrees with pain  Abduction: 20 degrees with pain    Right Hip   Flexion: 100 degrees   Abduction: 35 degrees   Left Knee   Flexion: 120 degrees   Extension: 0 degrees     Right Knee   Flexion: 125 degrees   Extension: 0 degrees     Additional Active Range of Motion Details  Seated can touch the floor  Extension: oakley to neutral with pain  Rotation seated: oakley 50% with pain   R SLR: 50  L SLR: 42    Strength/Myotome Testing     Left Hip   Planes of Motion   Flexion: 3-  Abduction: 2+  Adduction: 3-    Right Hip   Planes of Motion   Flexion: 4  Abduction: 4-  Adduction: 4+    Left Knee   Flexion: 3-  Extension: 3-    Right Knee   Flexion: 4  Extension: 4    Ambulation     Ambulation: Level Surfaces   Ambulation with assistive device: independent  Ambulation without assistive device: independent    Additional Level Surfaces Ambulation Details  SPC for community distances  No AD in the house    Ambulation: Stairs   Ascend stairs: independent  Pattern: non-reciprocal  Railings: one rail  Descend stairs: independent  Pattern: non-reciprocal  Railings: one rail    Additional Stairs Ambulation Details  Uses SPC on the steps  Observational Gait   Decreased walking speed, stride length and left stance time  Flowsheet Rows      Most Recent Value   PT/OT G-Codes   Current Score  20   FOTO information reviewed  Yes   Assessment Type  Evaluation   G code set  Other PT/OT Primary   Other PT Primary Current Status ()  CM   Other PT Primary Goal Status ()  CK          Precautions: None    Re-Eval DUE: 7/5/18    Specialty Daily Treatment Diary     Manual  6/5/18                                           Exercise Diary         NuStep        UBE - Retro        Stand - SLR x 3 ways - Mo        Squats        Marches        Stepups - F/L        SBB        MTP/LTP        PPT        PPT with marches        Supine SLR        Bridges        Hip Abd with TBand        Hip Add with Ball        Isometric Hip Flexion - Mo        Hooklying Heel Walk        S/L Hip Abd - Mo        Clamshells - Mo        LTR        SKTC            Modalities        HP to LB prn R RUFINA Block

## 2021-08-31 NOTE — TELEPHONE ENCOUNTER
He has a lot of yellow phlegm and croupy cough  He finished the keflex 10 days ago and is no better   He did will with levaquin and amoxicillin  in the past
Patient aware
Prescription for Levaquin has sent to the been sent
no

## 2022-02-15 NOTE — DISCHARGE INSTRUCTIONS
Sciatica   WHAT YOU NEED TO KNOW:   Sciatica is a condition that causes pain along your sciatic nerve  The sciatic nerve runs from your spine through both sides of your buttocks  It then runs down the back of your thigh, into your lower leg and foot  Your sciatic nerve may be compressed, inflamed, irritated, or stretched  DISCHARGE INSTRUCTIONS:   Medicines:   · NSAIDs:  These medicines decrease swelling and pain  NSAIDs are available without a doctor's order  Ask your healthcare provider which medicine is right for you  Ask how much to take and when to take it  Take as directed  NSAIDs can cause stomach bleeding or kidney problems if not taken correctly  · Acetaminophen: This medicine decreases pain  Acetaminophen is available without a doctor's order  Ask how much to take and when to take it  Follow directions  Acetaminophen can cause liver damage if not taken correctly  · Muscle relaxers  help decrease pain and muscle spasms  · Take your medicine as directed  Contact your healthcare provider if you think your medicine is not helping or if you have side effects  Tell him of her if you are allergic to any medicine  Keep a list of the medicines, vitamins, and herbs you take  Include the amounts, and when and why you take them  Bring the list or the pill bottles to follow-up visits  Carry your medicine list with you in case of an emergency  Follow up with your healthcare provider as directed:  Write down your questions so you remember to ask them during your visits  Manage your symptoms:   · Activity:  Decrease your activity  Do not lift heavy objects or twist your back for at least 6 weeks  Slowly return to your usual activity  · Ice:  Ice helps decrease swelling and pain  Ice may also help prevent tissue damage  Use an ice pack, or put crushed ice in a plastic bag  Cover it with a towel and place it on your low back or leg for 15 to 20 minutes every hour or as directed      · Heat:  Heat helps decrease pain and muscle spasms  Apply heat on the area for 20 to 30 minutes every 2 hours for as many days as directed  · Physical therapy:  You may need to see physical therapist to teach you exercises to help improve movement and strength, and to decrease pain  An occupational therapist teaches you skills to help with your daily activities  · Use assistive devices if directed: You may need to wear back support, such as a back brace  You may need crutches, a cane, or a walker to decrease stress on your lower back and leg muscles  Ask your healthcare provider for more information about assistive devices and how to use them correctly  Self-care:   · Avoid pressure on your back and legs:  Do not  lift heavy objects, or stand or sit for long periods of time  · Lift objects safely:  Keep your back straight and bend your knees when you  an object  Do not bend or twist your back when you lift  · Maintain a healthy weight:  Ask your healthcare provider how much you should weigh  Ask him to help you create a weight loss plan if you are overweight  · Exercise:  Ask your healthcare provider about the best stretching, warmup, and exercise plan for you  Contact your healthcare provider if:   · You have pain in your lower back at night or when resting  · You have pain in your lower back with numbness below the knee  · You have weakness in one leg only  · You have questions or concerns about your condition or care  Return to the emergency department if:   · You have trouble holding back your urine or bowel movements  · You have weakness in both legs  · You have numbness in your groin or buttocks  © 2017 [employer] [employer] Information is for End User's use only and may not be sold, redistributed or otherwise used for commercial purposes  All illustrations and images included in CareNotes® are the copyrighted property of [employer]  or Babak Deshpande    The above information is an  only  It is not intended as medical advice for individual conditions or treatments  Talk to your doctor, nurse or pharmacist before following any medical regimen to see if it is safe and effective for you  Sciatica   WHAT YOU NEED TO KNOW:   Sciatica is a condition that causes pain along your sciatic nerve  The sciatic nerve runs from your spine through both sides of your buttocks  It then runs down the back of your thigh, into your lower leg and foot  Your sciatic nerve may be compressed, inflamed, irritated, or stretched  DISCHARGE INSTRUCTIONS:   Medicines:   · NSAIDs:  These medicines decrease swelling and pain  NSAIDs are available without a doctor's order  Ask your healthcare provider which medicine is right for you  Ask how much to take and when to take it  Take as directed  NSAIDs can cause stomach bleeding or kidney problems if not taken correctly  · Acetaminophen: This medicine decreases pain  Acetaminophen is available without a doctor's order  Ask how much to take and when to take it  Follow directions  Acetaminophen can cause liver damage if not taken correctly  · Muscle relaxers  help decrease pain and muscle spasms  · Take your medicine as directed  Contact your healthcare provider if you think your medicine is not helping or if you have side effects  Tell him of her if you are allergic to any medicine  Keep a list of the medicines, vitamins, and herbs you take  Include the amounts, and when and why you take them  Bring the list or the pill bottles to follow-up visits  Carry your medicine list with you in case of an emergency  Follow up with your healthcare provider as directed:  Write down your questions so you remember to ask them during your visits  Manage your symptoms:   · Activity:  Decrease your activity  Do not lift heavy objects or twist your back for at least 6 weeks  Slowly return to your usual activity      · Ice:  Ice helps decrease swelling and pain  Ice may also help prevent tissue damage  Use an ice pack, or put crushed ice in a plastic bag  Cover it with a towel and place it on your low back or leg for 15 to 20 minutes every hour or as directed  · Heat:  Heat helps decrease pain and muscle spasms  Apply heat on the area for 20 to 30 minutes every 2 hours for as many days as directed  · Physical therapy:  You may need to see physical therapist to teach you exercises to help improve movement and strength, and to decrease pain  An occupational therapist teaches you skills to help with your daily activities  · Use assistive devices if directed: You may need to wear back support, such as a back brace  You may need crutches, a cane, or a walker to decrease stress on your lower back and leg muscles  Ask your healthcare provider for more information about assistive devices and how to use them correctly  Self-care:   · Avoid pressure on your back and legs:  Do not  lift heavy objects, or stand or sit for long periods of time  · Lift objects safely:  Keep your back straight and bend your knees when you  an object  Do not bend or twist your back when you lift  · Maintain a healthy weight:  Ask your healthcare provider how much you should weigh  Ask him to help you create a weight loss plan if you are overweight  · Exercise:  Ask your healthcare provider about the best stretching, warmup, and exercise plan for you  Contact your healthcare provider if:   · You have pain in your lower back at night or when resting  · You have pain in your lower back with numbness below the knee  · You have weakness in one leg only  · You have questions or concerns about your condition or care  Return to the emergency department if:   · You have trouble holding back your urine or bowel movements  · You have weakness in both legs  · You have numbness in your groin or buttocks    © 2017 [address] [facility] Information is for End User's use only and may not be sold, redistributed or otherwise used for commercial purposes  All illustrations and images included in CareNotes® are the copyrighted property of [employer]  or [employer]  The above information is an  only  It is not intended as medical advice for individual conditions or treatments  Talk to your doctor, nurse or pharmacist before following any medical regimen to see if it is safe and effective for you  No

## 2022-06-01 NOTE — PROGRESS NOTES
Daily Note     Today's date: 2018  Patient name: Lakhwinder Holcomb  : 1940  MRN: [id number]  Referring provider: Allan Blum MD  Dx:   Encounter Diagnosis     ICD-10-CM    1  Spinal stenosis, lumbar region with neurogenic claudication M48 062               Subjective: I am doing well with PT  I saw Dr Mariana Troy on Monday and was told to continue with PT  No pain at present  Objective: See treatment diary below    Assessment: Tolerated treatment well  Occasional vc's needed for correct form and muscle control  Stressed importance of lumbar stabilization with all TE  Patient would benefit from continued PT    Plan: Continue per plan of care     Precautions: None     Re-Eval DUE: 18     Specialty Daily Treatment Diary      Manual  8-23-18 8/8/18  8/13/18  8/15/18  8/17/18   BLE - GENTLE Calf and HS  10' gentle Calf and Hams 8 mins Calf and HS   10 min gently Calf and HS  10 min gently  Calf and HS   10 min gently         Exercise Diary  8-23-18 8/8/18  8/13/18  8/15/18  8/17/18   NuStep Level 2  10 minutes Level 2  10 minutes  Level 2   10 min  Level 2   10 min  Level 2  10 min   UBE - Retro  6'    5 min  6 min  6 min   Stand - SLR x 3 ways - Mo Mo 1 x 15 each Mo 1 x 15 each  Mo 1 x 15 ea  Mo 1 x 15 ea  Mo 1 x 15 ea   Squats 1 x 15 1 x 15  1 x 15  1 x 15  1 x 15   Marches Mo 1 x 15 Mo 1 x 15   Mo 1 x 15  Mo 1 x 15  Mo 1 x 15   Stepups - F/L Mo Fwd C  1 x 15 Mo - For - C 1 x 10  Mo For C 1 x 10  Mo For C   1 x 15  Mo For C   1 x 15   SBB             MTP/LTP Green  1 x 15 Yellow 1 x 10 each  Yellow   1 x 10 ea  Yellow   1 x 15 ea  Yellow   1 x 15 ea   PPT  5" x 10      5" x 10  5" x 10   PPT with marches             Supine SLR Mo 1 x 15 Mo 1 x 10  Mo 1 x 15  Mo 1 x 15  Mo 1 x 15    Bridges 1 x 10 1 x 10  1 x 10  1 x 10  1 x 10   Hip Abd with TBand Green  1 x 15 Green 1 x 10  Green 1 x 15  Green   1 x 15  Green  1 x 15   Hip Add with Ball 3" 1 x 15 3" 1 x 10   3" 1 x 15  3" 1 x 15  3" 1 x 15   Isometric Hip Flexion - Mo             Hooklying Heel Walk             S/L Hip Abd - Mo             SAQ 3" Mo 1 x 15 Mo 3" 1 x 10   3" 1 x 15  3" 1 x 15  3" 1 x 15   LTR  5" x 10 Mo 1 x 10   5" x 10  5" x 10  5" x 10   [facility]                   Modalities  8-23-18 8/8/18  8/13/18  8/15/18  8/17/18   HP to LB prn Declined Seated   10 minutes Pt declined  Pt declined   Pt declined      No Repair - Repaired With Adjacent Surgical Defect Text (Leave Blank If You Do Not Want): After obtaining clear surgical margins the defect was repaired concurrently with another surgical defect which was in close approximation.

## 2022-08-23 NOTE — PROGRESS NOTES
Assessment/Plan:      Diagnoses and all orders for this visit:    Bradycardia    Ventricular bigeminy         Subjective:     Patient ID: Rodney Hawkins is a 78 y o  male  This is a transition of care visit patient was hospitalized at [facility] with bradyarrhythmia and tachyarrhythmia and PVCs he was on symptomatic for several hours prior to presenting to the emergency room he also at that time had so terrible sciatic pain on he I have reviewed his hospital records in total he was seen in consultation by Dr Zuhair Espinal Letters his monitors did show PVCs sometimes in a bigeminal pattern also had on some episodes of bradyarrhythmia        Review of Systems   Constitutional: Negative for activity change, appetite change, diaphoresis, fatigue and fever  HENT: Negative  Eyes: Negative  Respiratory: Negative for apnea, cough, chest tightness, shortness of breath and wheezing  Cardiovascular: Positive for chest pain  Negative for palpitations and leg swelling  Gastrointestinal: Negative for abdominal distention, abdominal pain, anal bleeding, constipation, diarrhea, nausea and vomiting  Endocrine: Negative for cold intolerance, heat intolerance, polydipsia, polyphagia and polyuria  Genitourinary: Negative for difficulty urinating, dysuria, flank pain, hematuria and urgency  Musculoskeletal: Positive for arthralgias ( Sciatica)  Negative for back pain, gait problem, joint swelling and myalgias  Skin: Negative for color change, rash and wound  Allergic/Immunologic: Negative for environmental allergies, food allergies and immunocompromised state  Neurological: Negative for dizziness, seizures, syncope, speech difficulty, numbness and headaches  Hematological: Negative for adenopathy  Does not bruise/bleed easily  Psychiatric/Behavioral: Negative for agitation, behavioral problems, hallucinations, sleep disturbance and suicidal ideas           Objective:     Physical Exam   Constitutional: He is oriented to person, place, and time  He appears well-developed and well-nourished  No distress  HENT:   Head: Normocephalic  Right Ear: External ear normal    Left Ear: External ear normal    Nose: Nose normal    Mouth/Throat: Oropharynx is clear and moist    Eyes: Conjunctivae and EOM are normal  Pupils are equal, round, and reactive to light  Right eye exhibits no discharge  Left eye exhibits no discharge  No scleral icterus  Neck: Normal range of motion  No tracheal deviation present  No thyromegaly present  Cardiovascular: Normal rate, regular rhythm and normal heart sounds  Exam reveals no gallop and no friction rub  No murmur heard  Pulmonary/Chest: Effort normal and breath sounds normal  No respiratory distress  He has no wheezes  Abdominal: Soft  Bowel sounds are normal  He exhibits no mass  There is no tenderness  There is no guarding  Musculoskeletal: He exhibits no edema or deformity  Lymphadenopathy:     He has no cervical adenopathy  Neurological: He is alert and oriented to person, place, and time  No cranial nerve deficit  Skin: Skin is warm and dry  No rash noted  He is not diaphoretic  No erythema  Psychiatric: He has a normal mood and affect  Thought content normal          Vitals:    03/15/18 1427   BP: 118/72   BP Location: Left arm   Patient Position: Sitting   Cuff Size: Standard   Weight: 72 9 kg (160 lb 12 8 oz)   Height: 5' 8" (1 727 m)       The following portions of the patient's history were reviewed and updated as appropriate:   He  has a past medical history of AAA (abdominal aortic aneurysm) ([address] ); Acid reflux; Acute exacerbation of chronic obstructive airways disease with asthma ([address] ); Acute serous otitis media of left ear; Anesthesia; Anxiety; Aortic aneurysm without rupture ([address] ); Arm bruise; Arthritis; Asthma; At risk for falls; BPH (benign prostatic hyperplasia); BPH without urinary obstruction; Cancer ([address] ); CAP (community acquired pneumonia);  Cataracts, bilateral; Change in bowel function; CHF (congestive heart failure) ([email or web address] ); Clubbing of fingers; Colon polyps; Common cold; Contusion of elbow, left; COPD (chronic obstructive pulmonary disease) ([email or web address] ); Coronary artery disease; Cough; Dementia; Depression; Diverticulosis; Dizziness; Exercise counseling; Fatigue; Full dentures; Glaucoma screening; High cholesterol; History of abdominal aortic aneurysm (AAA) repair (08/2017); History of bacteremia; History of chronic obstructive lung disease; History of epistaxis; History of influenza vaccination; History of kidney stones; History of pneumonia; History of sepsis (10/2017); History of sinusitis; History of skin cancer; History of sleep apnea; History of urinary tract infection; [place] (hard of hearing); Hydronephrosis with obstructing calculus; Influenza vaccine needed; Insomnia; Jock itch; Kidney stones; Left hip pain; Need for pneumococcal vaccination; Need for prophylactic vaccination and inoculation against influenza; Other emphysema ([place] [address] ); Pancreatitis, chronic ([place] [address] ); Pneumonia (10/26/2017); Pulmonary emphysema ([email or web address] ); S/P CABG x 3; Screening for genitourinary condition; Screening for neurological condition; Sepsis ([place] [address] ); Short-term memory loss; Sleep apnea; Special screening examination for neoplasm of prostate; TIA involving carotid artery; Ulcer ([email or web address] ); Unsteady gait; Use of cane as ambulatory aid; Vitamin D deficiency; and Wears glasses    He   Patient Active Problem List    Diagnosis Date Noted    Pulmonary nodule 03/07/2018    Ventricular bigeminy 03/07/2018    Positional lightheadedness 03/07/2018    Bradycardia 03/06/2018    SOB (shortness of breath) 03/06/2018    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 01/29/2018    History of aortic aneurysm repair 09/01/2017    Thrombocytopenia ([facility] ) 06/02/2017    Abnormal CT scan, chest 06/01/2017    Abdominal aortic aneurysm ([email or web address] ) 06/01/2017    COPD (chronic obstructive pulmonary disease) ([email or web address] ) 12/28/2016    Hyperlipidemia 12/28/2016    GERD (gastroesophageal reflux disease) 12/28/2016    Benign prostatic hyperplasia 09/18/2015     He  has a past surgical history that includes Cardiac surgery; Ureteral stent placement; Excisional hemorrhoidectomy; Mouth surgery; pr esophagogastroduodenoscopy transoral diagnostic (N/A, 8/18/2016); Cataract extraction (Bilateral); Lithotripsy; Hernia repair; pr colonoscopy flx dx w/collj spec when pfrmd (N/A, 5/16/2017); Abdominal aortic aneurysm repair (08/23/2017); CAROTID ENDARTARECTOMY (Left, 11/1996); Coronary artery bypass graft (01/2003); Eye surgery (Bilateral); Cystoscopy; pr cystourethroscopy (N/A, 11/30/2017); pr remove bladder stone,<2 5 cm (N/A, 11/30/2017); Cystoscopy; AAA repair, endovascular; and Tonsillectomy  His family history includes Diabetes type II in his maternal grandmother and mother; Hypertension in his paternal grandmother; Kidney failure in his father  He  reports that he quit smoking about 4 years ago  He has a 90 00 pack-year smoking history  He has never used smokeless tobacco  He reports that he does not drink alcohol or use drugs  Current Outpatient Prescriptions   Medication Sig Dispense Refill    aspirin 81 MG tablet Take 81 mg by mouth daily Took within 24 hours       atorvastatin (LIPITOR) 20 mg tablet TAKE 1 TABLET AT BEDTIME 90 tablet 1    cetirizine (ZYRTEC ALLERGY) 10 mg tablet Take 10 mg by mouth every evening        Cholecalciferol (VITAMIN D-3 PO) Take 2,000 Units by mouth daily   cyanocobalamin (VITAMIN B-12) 1,000 mcg tablet Take by mouth daily      donepezil (ARICEPT) 10 mg tablet Take 1 tablet (10 mg total) by mouth daily at bedtime for 30 days 30 tablet 0    escitalopram (LEXAPRO) 20 mg tablet Take 1 tablet (20 mg total) by mouth daily 30 tablet 0    Fluticasone Furoate-Vilanterol (BREO ELLIPTA) 100-25 MCG/INH AEPB Inhale 1 puff daily   KRILL OIL PO Take 500 mg by mouth daily        METOPROLOL SUCCINATE ER PO Take 25 mg by mouth daily at bedtime        Montelukast Sodium (SINGULAIR PO) Take 10 mg by mouth daily   Multiple Vitamin (MULTIVITAMIN) tablet Take 1 tablet by mouth daily      Potassium Citrate ER 15 MEQ (1620 MG) TBCR Take 15 mEq by mouth 2 (two) times a day   Probiotic Product (PROBIOTIC DAILY PO) Take by mouth daily   tamsulosin (FLOMAX) 0 4 mg Take 0 4 mg by mouth daily   tiotropium (SPIRIVA HANDIHALER) 18 mcg inhalation capsule Place 18 mcg into inhaler and inhale daily  No current facility-administered medications for this visit  Current Outpatient Prescriptions on File Prior to Visit   Medication Sig    aspirin 81 MG tablet Take 81 mg by mouth daily Took within 24 hours     atorvastatin (LIPITOR) 20 mg tablet TAKE 1 TABLET AT BEDTIME    cetirizine (ZYRTEC ALLERGY) 10 mg tablet Take 10 mg by mouth every evening      Cholecalciferol (VITAMIN D-3 PO) Take 2,000 Units by mouth daily   cyanocobalamin (VITAMIN B-12) 1,000 mcg tablet Take by mouth daily    donepezil (ARICEPT) 10 mg tablet Take 1 tablet (10 mg total) by mouth daily at bedtime for 30 days    escitalopram (LEXAPRO) 20 mg tablet Take 1 tablet (20 mg total) by mouth daily    Fluticasone Furoate-Vilanterol (BREO ELLIPTA) 100-25 MCG/INH AEPB Inhale 1 puff daily   KRILL OIL PO Take 500 mg by mouth daily   METOPROLOL SUCCINATE ER PO Take 25 mg by mouth daily at bedtime      Montelukast Sodium (SINGULAIR PO) Take 10 mg by mouth daily   Multiple Vitamin (MULTIVITAMIN) tablet Take 1 tablet by mouth daily    Potassium Citrate ER 15 MEQ (1620 MG) TBCR Take 15 mEq by mouth 2 (two) times a day   Probiotic Product (PROBIOTIC DAILY PO) Take by mouth daily   tamsulosin (FLOMAX) 0 4 mg Take 0 4 mg by mouth daily   tiotropium (SPIRIVA HANDIHALER) 18 mcg inhalation capsule Place 18 mcg into inhaler and inhale daily  No current facility-administered medications on file prior to visit        He is allergic to augmentin [amoxicillin-pot clavulanate]; ciprofloxacin; morphine and related; and quetiapine       Transitional Care Management Review:  Floyd Boston is a 78 y o  male here for TCM follow up       During the TCM phone call patient stated:    Date and time hospital follow up call was made:  3/9/2018  8:22 AM  Hospital care reviewed:  Discussed with Inpatient Physician  Patient was hopsitalized at:  [facility]  Date of admission:  3/6/18  Date of discharge:  3/8/18  Diagnosis:  BRADYCARDIA  Were the patients medicaitons reviewed and updated:  No  Current symptoms:  None  Should patient be enrolled in anticoag monitoring?:  No  Scheduled for follow up?:  Yes  Patients specialists:  Cardiologist  Cardiologist's Name:  DR RANGEL SHAYNE [facility]  Did you obtain your prescribed medications:  No  Do you need help managing your perscriptions or medications:  No  Is transportation to your appointments needed:  No  I have advised the patient to call PCP with any new or worsening symptoms (please type in name along with any credentials):  Reena Snowflavia  Living Arrangements:  Spouse or Significiant other  Support System:  Spouse  The type of support provided:  Emotional, Physical  Do you have social support:  Yes, as much as I need  Are you recieving outpatient services:  No  Are you recieving home care services:  No  Are you using any community resources:  No  Current waiver service:  No  Have you fallen in the last 12 months:  No  Interperter language line required?:  No  Counseling:  Patient             Lata Benítez, DO Yes

## 2023-01-19 NOTE — ED PROVIDER NOTES
History  Chief Complaint   Patient presents with    Hip Pain     Pt c/o left hip pain since Friday - now having increased pain with weight bearing - denies trauma     68-year-old male patient presents to the emergency department for evaluation of left hip pain  The patient has point tenderness over left buttock  Patient states that he has pain from the left buttock shooting down the back of his left leg  The patient states the pain is worse when he strains like out  Patient has a positive straight leg test   The patient has not had sciatica to the point that he has been in this type of pain before, states that he has had pain like this in the past but was never diagnosed as sciatica, the patient likely has sciatica now  Because the patient's age, a single plain film x-ray will be done of his pelvis to assure that there is no obvious bony abnormality of the pelvis and if not is present patient will be treated with muscle relaxers and antispasmodics/anti-inflammatories  History provided by:  Patient   used: No    Hip Pain   Location:  Left hip  Quality:  Pain  Severity:  Moderate  Onset quality:  Sudden  Timing:  Constant  Progression:  Unchanged  Chronicity:  New  Context:  Left buttock pain  Relieved by:  Position  Worsened by: Movement  Ineffective treatments:  None tried  Associated symptoms: no abdominal pain, no chest pain, no congestion, no cough, no diarrhea, no ear pain, no fatigue, no fever, no headaches, no loss of consciousness, no myalgias, no nausea, no rash, no rhinorrhea, no shortness of breath, no sore throat, no vomiting and no wheezing        Prior to Admission Medications   Prescriptions Last Dose Informant Patient Reported? Taking? Cholecalciferol (VITAMIN D-3 PO)  Self Yes Yes   Sig: Take 2,000 Units by mouth daily  Fluticasone Furoate-Vilanterol (BREO ELLIPTA) 100-25 MCG/INH AEPB  Spouse/Significant Other Yes Yes   Sig: Inhale 1 puff daily     KRILL OIL PO Spouse/Significant Other Yes Yes   Sig: Take 500 mg by mouth daily  METOPROLOL SUCCINATE ER PO  Spouse/Significant Other Yes Yes   Sig: Take 25 mg by mouth daily at bedtime     Montelukast Sodium (SINGULAIR PO)  Self Yes Yes   Sig: Take 10 mg by mouth daily  Multiple Vitamin (MULTIVITAMIN) tablet   Yes Yes   Sig: Take 1 tablet by mouth daily   Potassium Citrate ER 15 MEQ (1620 MG) TBCR  Spouse/Significant Other Yes No   Sig: Take 15 mEq by mouth 2 (two) times a day  Probiotic Product (PROBIOTIC DAILY PO)  Spouse/Significant Other Yes Yes   Sig: Take by mouth daily  aspirin 81 MG tablet  Spouse/Significant Other Yes Yes   Sig: Take 81 mg by mouth daily Took within 24 hours    atorvastatin (LIPITOR) 20 mg tablet   No Yes   Sig: TAKE 1 TABLET AT BEDTIME   cetirizine (ZYRTEC ALLERGY) 10 mg tablet  Self Yes Yes   Sig: Take 10 mg by mouth every evening     cyanocobalamin (VITAMIN B-12) 1,000 mcg tablet   Yes Yes   Sig: Take by mouth daily   donepezil (ARICEPT) 10 mg tablet   No Yes   Sig: Take 1 tablet (10 mg total) by mouth daily at bedtime for 30 days   Patient taking differently: Take 5 mg by mouth daily at bedtime     escitalopram (LEXAPRO) 20 mg tablet   No Yes   Sig: Take 1 tablet (20 mg total) by mouth daily   tamsulosin (FLOMAX) 0 4 mg  Spouse/Significant Other Yes Yes   Sig: Take 0 4 mg by mouth daily  tiotropium (SPIRIVA HANDIHALER) 18 mcg inhalation capsule  Spouse/Significant Other Yes Yes   Sig: Place 18 mcg into inhaler and inhale daily        Facility-Administered Medications: None       Past Medical History:   Diagnosis Date    AAA [facility]abdominal aortic [facility]) [facility])     Acid reflux     Acute exacerbation of chronic obstructive airways disease with asthma ([facility])     Acute serous otitis media of left ear     recurrence not specified     Anesthesia     "always has mental changes /lingers and lingers after anesthesia"    Anxiety     Aortic aneurysm without rupture (HCC)     Arm bruise     right inner forearm "recent IV"    Arthritis     spine and poss left hip    Asthma     bronchietasis    At risk for falls     BPH (benign prostatic hyperplasia)     pt and wife can't confirm 11/10    BPH without urinary obstruction     Cancer ([address] )     skin CA on nose    CAP (community acquired pneumonia)     Cataracts, bilateral     Change in bowel function     CHF (congestive heart failure) (HCC)     Clubbing of fingers     Colon polyps     Common cold     Contusion of elbow, left     initial encounter     COPD (chronic obstructive pulmonary disease) (HCC)     Coronary artery disease     Cough     Dementia     Depression     Diverticulosis     Dizziness     upon "standing quickly on occas"    Exercise counseling     Fatigue     Full dentures     "doesn't wear them"    Glaucoma screening     High cholesterol     History of abdominal aortic aneurysm (AAA) repair 08/2017    History of bacteremia     History of chronic obstructive lung disease     History of epistaxis     History of influenza vaccination     History of kidney stones     History of pneumonia     "many times" "at least 5 times" almost always goes to sepsis"    History of sepsis 10/2017    History of sinusitis     History of skin cancer     History of sleep apnea     History of urinary tract infection     [place] (hard of hearing)     Hydronephrosis with obstructing calculus     Influenza vaccine needed     Insomnia     Jock itch     left/saw doctor 11/8 and started on antifungal cream    Kidney stones     Left hip pain     Need for pneumococcal vaccination     Need for prophylactic vaccination and inoculation against influenza     Other emphysema ([address] )     Pancreatitis, chronic ([address] )     pt and wife can't confirm 11/10/17    Pneumonia 10/26/2017    admitted [facility]    Pulmonary emphysema ([address] )     S/P CABG x 3     approx 14 yrs ago    Screening for genitourinary condition     Screening for neurological condition  Sepsis ([facility]     due to unspecified organism     Short-term memory loss     Sleep apnea     does not use CPAP   Special screening examination for neoplasm of prostate     TIA involving carotid artery     "before carotid surgery"    Ulcer ([facility]     stomach, "years ago"    Unsteady gait     Use of cane as ambulatory aid     sometimes    Vitamin D deficiency     Wears glasses        Past Surgical History:   Procedure Laterality Date    ABDOMINAL AORTIC ANEURYSM REPAIR  08/23/2017    ABDOMINAL AORTIC ANEURYSM REPAIR, ENDOVASCULAR      CARDIAC SURGERY      CABG x3    CAROTID ENDARTARECTOMY Left 11/1996    CATARACT EXTRACTION Bilateral     CORONARY ARTERY BYPASS GRAFT  01/2003    x3    CYSTOSCOPY      with insertion of ureteral stent     CYSTOSCOPY      with ureteroscopy with lithotripsy     EXCISIONAL HEMORRHOIDECTOMY      EYE SURGERY Bilateral     "for a wrinkle" after cataract surgery    HERNIA REPAIR      umbilical    LITHOTRIPSY      renal    MOUTH SURGERY      full mouth extraction     [place] COLONOSCOPY FLX DX W/COLLJ SPEC WHEN PFRMD N/A 5/16/2017    Procedure: COLONOSCOPY with polypectomies/ hot snare and tattoo;  Surgeon: Paulina Gatica MD;  Location: [facility]; Service: Gastroenterology    NJ CYSTOURETHROSCOPY N/A 11/30/2017    Procedure: Misha Arora;  Surgeon: Carlos A Hooper MD;  [facility];  Service: Urology    NJ ESOPHAGOGASTRODUODENOSCOPY TRANSORAL DIAGNOSTIC N/A 8/18/2016    Procedure: ESOPHAGOGASTRODUODENOSCOPY (EGD); Surgeon: Paulina Gatica MD;  [facility]: [facility];   Service: Gastroenterology    NJ REMOVE BLADDER STONE,<2 5 CM N/A 11/30/2017    Procedure: Nilo Tony;  Surgeon: Carlos A Hooper MD;  [facility];  Service: Urology    TONSILLECTOMY      URETERAL STENT PLACEMENT      and removal       Family History   Problem Relation Age of Onset    Diabetes type II Mother      mellitus    Kidney failure Father     Diabetes type II Maternal Grandmother      mellitus     Hypertension Paternal Grandmother      benign essential      I have reviewed and agree with the history as documented  Social History   Substance Use Topics    Smoking status: Former Smoker     Packs/day: 1 50     Years: 60 00     Quit date: 2014    Smokeless tobacco: Never Used      Comment: quit 3 yrs ago, used to be a 1-1 5 ppd smoker    Alcohol use No      Comment: quit 25 yrs ago        Review of Systems   Constitutional: Negative for fatigue and fever  HENT: Negative for congestion, ear pain, rhinorrhea and sore throat  Respiratory: Negative for cough, shortness of breath and wheezing  Cardiovascular: Negative for chest pain  Gastrointestinal: Negative for abdominal pain, diarrhea, nausea and vomiting  Musculoskeletal: Negative for myalgias  Skin: Negative for rash  Neurological: Negative for loss of consciousness and headaches  All other systems reviewed and are negative  Physical Exam  ED Triage Vitals   Temperature Pulse Respirations Blood Pressure SpO2   02/25/18 0614 02/25/18 0614 02/25/18 0614 02/25/18 0614 02/25/18 0614   98 3 °F (36 8 °C) 97 (!) 24 135/90 96 %      Temp Source Heart Rate Source Patient Position - Orthostatic VS BP Location FiO2 (%)   02/25/18 0614 02/25/18 0614 02/25/18 0614 02/25/18 0614 --   Temporal Monitor Lying Left arm       Pain Score       02/25/18 0618       4           Orthostatic Vital Signs  Vitals:    02/25/18 0614   BP: 135/90   Pulse: 97   Patient Position - Orthostatic VS: Lying       Physical Exam   Constitutional: He is oriented to person, place, and time  He appears well-developed and well-nourished  No distress  HENT:   Head: Normocephalic and atraumatic  Right Ear: External ear normal    Left Ear: External ear normal    Eyes: Conjunctivae and EOM are normal  Right eye exhibits no discharge  Left eye exhibits no discharge  No scleral icterus  Neck: Normal range of motion  Neck supple   No JVD present  No tracheal deviation present  No thyromegaly present  Cardiovascular: Normal rate and regular rhythm  Pulmonary/Chest: Effort normal and breath sounds normal  No stridor  No respiratory distress  He has no wheezes  He has no rales  Abdominal: Soft  Bowel sounds are normal  He exhibits no distension  There is no tenderness  Musculoskeletal: Normal range of motion  He exhibits no edema, tenderness or deformity  Legs:  Neurological: He is alert and oriented to person, place, and time  No cranial nerve deficit  Coordination normal    Skin: Skin is warm and dry  He is not diaphoretic  Psychiatric: He has a normal mood and affect  His behavior is normal    Nursing note and vitals reviewed        ED Medications  Medications   diazepam (VALIUM) tablet 5 mg (5 mg Oral Given 2/25/18 9997)   predniSONE tablet 60 mg (60 mg Oral Given 2/25/18 1802)       Diagnostic Studies  Results Reviewed     None                 XR pelvis ap only 1 or 2 vw   ED Interpretation by Nieves Mayo DO (02/25 0935)   No acute osseous abnormality of the pelvis                 Procedures  Procedures       Phone Contacts  ED Phone Contact    ED Course  ED Course                                MDM  Number of Diagnoses or Management Options  Ambulatory dysfunction: new and requires workup  Sciatica: new and requires workup     Amount and/or Complexity of Data Reviewed  Decide to obtain previous medical records or to obtain history from someone other than the patient: yes  Review and summarize past medical records: yes    Patient Progress  Patient progress: stable    CritCare Time    Disposition  Final diagnoses:   Sciatica     Time reflects when diagnosis was documented in both MDM as applicable and the Disposition within this note     Time User Action Codes Description Comment    2/25/2018  6:54 AM Sharla Turpin Add [M54 30] Sciatica       ED Disposition     ED Disposition Condition Comment    Discharge  Yuliya Cooney discharge to home/self care  Condition at discharge: Good        Follow-up Information     Follow up With Specialties Details Why Contact Zaina Walls DO Family Medicine  As needed [address] [address]  [address]  [phone]          Patient's Medications   Discharge Prescriptions    DIAZEPAM (VALIUM) 5 MG TABLET    Take 0 5 tablets (2 5 mg total) by mouth every 8 (eight) hours as needed for anxiety for up to 10 days       Start Date: 2/25/2018 End Date: 3/7/2018       Order Dose: 2 5 mg       Quantity: 15 tablet    Refills: 0    PREDNISONE 20 MG TABLET    Take 3 tablets (60 mg total) by mouth daily for 5 days       Start Date: 2/25/2018 End Date: 3/2/2018       Order Dose: 60 mg       Quantity: 15 tablet    Refills: 0     No discharge procedures on file      ED Provider  Electronically Signed by           Deisi Valverde DO  02/28/18 [phone]

## 2024-12-26 NOTE — PROGRESS NOTES
Daily Note     Today's date: 2018  Patient name: Fiona Ventura  : 1940  MRN: [id number]  Referring provider: Farbizio Humphries MD  Dx: No diagnosis found  Subjective: Pt  Reports he has no pain today  Objective: See treatment diary below      Assessment: Tolerated treatment well  Patient exhibited good technique with therapeutic exercises and would benefit from continued PT      Plan: Progress treatment as tolerated            Precautions: None     Re-Eval DUE: 18     Specialty Daily Treatment Diary      Manual  8-23-18 8/24/18  8/27/18  8/15/18  8/17/18   BLE - GENTLE Calf and HS  10' gentle Calf and Hams 10 mins Calf and HS   10 min gently Calf and HS  10 min gently  Calf and HS   10 min gently         Exercise Diary  8-23-18 8/24/18  8/27/18  8/15/18  8/17/18   NuStep Level 2  10 minutes Level 2  10 minutes  Level 2   10 min  Level 2   10 min  Level 2  10 min   UBE - Retro  6'  6 min  6 min  6 min  6 min   Stand - SLR x 3 ways - Mo Mo 1 x 15 each Mo 1 x 15 each  Mo 1 x 15 ea  Mo 1 x 15 ea  Mo 1 x 15 ea   Squats 1 x 15 1 x 15  1 x 15  1 x 15  1 x 15   Marches Mo 1 x 15 Mo 1 x 15   Mo 1 x 15  Mo 1 x 15  Mo 1 x 15   Stepups - F/L Mo Fwd C  1 x 15 Mo - For - C   1 x 15  Mo For C 1 x 15  Mo For C   1 x 15  Mo For C   1 x 15   SBB             MTP/LTP Green  1 x 15 Green 1 x 15 each  Green  1 x 15 ea  Yellow   1 x 15 ea  Yellow   1 x 15 ea   PPT  5" x 10  5" x 10  5" x 10  5" x 10  5" x 10   PPT with marches             Supine SLR Mo 1 x 15 Mo 1 x 15  Mo 1 x 15  Mo 1 x 15  Mo 1 x 15    Bridges 1 x 10 1 x 10  1 x 10  1 x 10  1 x 10   Hip Abd with TBand Green  1 x 15 Green 1 x 15  Green 1 x 15  Green   1 x 15  Green  1 x 15   Hip Add with Ball 3" 1 x 15 3" 1 x 15  3" 1 x 15  3" 1 x 15  3" 1 x 15   Isometric Hip Flexion - Mo             Hooklying Heel Walk             S/L Hip Abd - Mo             SAQ 3" Mo 1 x 15 Mo 3" 1 x 10   3" 1 x 15  3" 1 x 15  3" 1 x 15 EA - Per patient this is a follow up procedure.    LTR  5" x 10 Mo 1 x 10   5" x 10  5" x 10  5" x 10   SKTC                   Modalities  8-23-18 8/24/18  8/27/18  8/15/18  8/17/18   HP to LB prn Declined Pt declined  Pt declined  Pt declined   Pt declined

## (undated) DEVICE — 3M™ TEGADERM™ TRANSPARENT FILM DRESSING FRAME STYLE, 1627, 4 IN X 10 IN (10 CM X 25 CM), 20/CT 4CT/CASE: Brand: 3M™ TEGADERM™

## (undated) DEVICE — NEEDLE SCLERO INTERJECT 25G 240MM

## (undated) DEVICE — LUBRICANT SURGILUBE TUBE 4 OZ  FLIP TOP

## (undated) DEVICE — PACK TUR

## (undated) DEVICE — SPECIMEN CONTAINER STERILE PEEL PACK

## (undated) DEVICE — SINGLE-USE BIOPSY FORCEPS: Brand: RADIAL JAW 4

## (undated) DEVICE — TRANSPOSAL ULTRAFLEX DUO/QUAD ULTRA CART MANIFOLD

## (undated) DEVICE — GLOVE INDICATOR PI UNDERGLOVE SZ 7.5 BLUE

## (undated) DEVICE — TUBING SUCTION 5MM X 12 FT

## (undated) DEVICE — NIPPLE MARKERS, 1.5MM LEAD-FREE PELLET: Brand: N-SPOT

## (undated) DEVICE — SINGLE-USE POLYPECTOMY SNARE: Brand: ROTATABLE SNARE

## (undated) DEVICE — GLOVE SRG BIOGEL 7.5

## (undated) DEVICE — THE DISPOSABLE ROTH NET FOREIGN BODY STANDARD RETRIEVAL DEVICE IS USED IN THE ENDOSCOPIC RETRIEVAL OF FOREIGN BODY, FOOD BOLUS AND EXCISED TISSUE SUCH AS POLYPS.: Brand: ROTH NET

## (undated) DEVICE — SCD SEQUENTIAL COMPRESSION COMFORT SLEEVE MEDIUM KNEE LENGTH: Brand: KENDALL SCD

## (undated) DEVICE — UROCATCH BAG